# Patient Record
Sex: MALE | Race: BLACK OR AFRICAN AMERICAN | Employment: OTHER | URBAN - METROPOLITAN AREA
[De-identification: names, ages, dates, MRNs, and addresses within clinical notes are randomized per-mention and may not be internally consistent; named-entity substitution may affect disease eponyms.]

---

## 2018-04-18 ENCOUNTER — HOSPITAL ENCOUNTER (OUTPATIENT)
Age: 63
Discharge: HOME | End: 2018-04-18
Payer: MEDICARE

## 2018-04-18 LAB
ANION GAP SERPL CALCULATED.3IONS-SCNC: 11 MMOL/L (ref 7–16)
BACTERIA: NORMAL /HPF
BILIRUBIN URINE: ABNORMAL
BLOOD, URINE: ABNORMAL
BUN BLDV-MCNC: 21 MG/DL (ref 8–23)
CALCIUM SERPL-MCNC: 9.4 MG/DL (ref 8.6–10.2)
CHLORIDE BLD-SCNC: 104 MMOL/L (ref 98–107)
CLARITY: CLEAR
CO2: 24 MMOL/L (ref 22–29)
COLOR: YELLOW
CREAT SERPL-MCNC: 1.8 MG/DL (ref 0.7–1.2)
CREATININE URINE: 85 MG/DL (ref 40–278)
EPITHELIAL CELLS, UA: NORMAL /HPF
GFR AFRICAN AMERICAN: 47
GFR NON-AFRICAN AMERICAN: 47 ML/MIN/1.73
GLUCOSE BLD-MCNC: 109 MG/DL (ref 74–109)
GLUCOSE URINE: NEGATIVE MG/DL
KETONES, URINE: NEGATIVE MG/DL
LEUKOCYTE ESTERASE, URINE: NEGATIVE
NITRITE, URINE: NEGATIVE
PH UA: 5.5 (ref 5–9)
POTASSIUM SERPL-SCNC: 4.4 MMOL/L (ref 3.5–5)
PROTEIN PROTEIN: 205 MG/DL (ref 0–12)
PROTEIN UA: 100 MG/DL
PROTEIN/CREAT RATIO: 2.4
PROTEIN/CREAT RATIO: 2.4 (ref 0–0.2)
RBC UA: NORMAL /HPF (ref 0–2)
SODIUM BLD-SCNC: 139 MMOL/L (ref 132–146)
SPECIFIC GRAVITY UA: 1.02 (ref 1–1.03)
UROBILINOGEN, URINE: 0.2 E.U./DL
WBC UA: NORMAL /HPF (ref 0–5)

## 2018-04-18 PROCEDURE — 84156 ASSAY OF PROTEIN URINE: CPT

## 2018-04-18 PROCEDURE — 81001 URINALYSIS AUTO W/SCOPE: CPT

## 2018-04-18 PROCEDURE — 80048 BASIC METABOLIC PNL TOTAL CA: CPT

## 2018-04-18 PROCEDURE — 36415 COLL VENOUS BLD VENIPUNCTURE: CPT

## 2018-04-18 PROCEDURE — 82570 ASSAY OF URINE CREATININE: CPT

## 2018-05-01 ENCOUNTER — HOSPITAL ENCOUNTER (OUTPATIENT)
Age: 63
Discharge: HOME | End: 2018-05-01
Payer: MEDICARE

## 2018-05-01 LAB — PROSTATE SPECIFIC ANTIGEN: 0.86 NG/ML (ref 0–4)

## 2018-05-01 PROCEDURE — G0103 PSA SCREENING: HCPCS

## 2018-05-01 PROCEDURE — 36415 COLL VENOUS BLD VENIPUNCTURE: CPT

## 2018-05-15 ENCOUNTER — HOSPITAL ENCOUNTER (OUTPATIENT)
Age: 63
Discharge: HOME | End: 2018-05-17
Payer: MEDICARE

## 2018-05-15 ENCOUNTER — HOSPITAL ENCOUNTER (OUTPATIENT)
Dept: GENERAL RADIOLOGY | Age: 63
Discharge: HOME | End: 2018-05-17
Payer: MEDICARE

## 2018-05-15 DIAGNOSIS — R06.02 SOB (SHORTNESS OF BREATH): ICD-10-CM

## 2018-05-15 PROCEDURE — 71046 X-RAY EXAM CHEST 2 VIEWS: CPT

## 2018-05-17 ENCOUNTER — HOSPITAL ENCOUNTER (OUTPATIENT)
Dept: NON INVASIVE DIAGNOSTICS | Age: 63
Discharge: HOME | End: 2018-05-17
Payer: MEDICARE

## 2018-05-17 LAB
LV EF: 66 %
LVEF MODALITY: NORMAL

## 2018-05-17 PROCEDURE — 93306 TTE W/DOPPLER COMPLETE: CPT

## 2018-05-19 ENCOUNTER — HOSPITAL ENCOUNTER (OUTPATIENT)
Age: 63
Discharge: HOME | End: 2018-05-19
Payer: MEDICARE

## 2018-05-19 LAB
ANION GAP SERPL CALCULATED.3IONS-SCNC: 14 MMOL/L (ref 7–16)
BACTERIA: ABNORMAL /HPF
BILIRUBIN URINE: NEGATIVE
BLOOD, URINE: ABNORMAL
BUN BLDV-MCNC: 28 MG/DL (ref 8–23)
CALCIUM SERPL-MCNC: 9.2 MG/DL (ref 8.6–10.2)
CHLORIDE BLD-SCNC: 105 MMOL/L (ref 98–107)
CLARITY: CLEAR
CO2: 21 MMOL/L (ref 22–29)
COLOR: YELLOW
CREAT SERPL-MCNC: 2 MG/DL (ref 0.7–1.2)
CREATININE URINE: 145 MG/DL (ref 40–278)
EPITHELIAL CELLS, UA: ABNORMAL /HPF
GFR AFRICAN AMERICAN: 41
GFR NON-AFRICAN AMERICAN: 41 ML/MIN/1.73
GLUCOSE BLD-MCNC: 99 MG/DL (ref 74–109)
GLUCOSE URINE: NEGATIVE MG/DL
KETONES, URINE: NEGATIVE MG/DL
LEUKOCYTE ESTERASE, URINE: NEGATIVE
MICROALBUMIN UR-MCNC: 2322.7 MG/L
MICROALBUMIN/CREAT UR-RTO: 1601.9 (ref 0–30)
NITRITE, URINE: NEGATIVE
PH UA: 5.5 (ref 5–9)
POTASSIUM SERPL-SCNC: 4.6 MMOL/L (ref 3.5–5)
PROTEIN UA: >=300 MG/DL
RBC UA: ABNORMAL /HPF (ref 0–2)
SODIUM BLD-SCNC: 140 MMOL/L (ref 132–146)
SPECIFIC GRAVITY UA: >=1.03 (ref 1–1.03)
UROBILINOGEN, URINE: 0.2 E.U./DL
WBC UA: ABNORMAL /HPF (ref 0–5)

## 2018-05-19 PROCEDURE — 36415 COLL VENOUS BLD VENIPUNCTURE: CPT

## 2018-05-19 PROCEDURE — 82044 UR ALBUMIN SEMIQUANTITATIVE: CPT

## 2018-05-19 PROCEDURE — 80048 BASIC METABOLIC PNL TOTAL CA: CPT

## 2018-05-19 PROCEDURE — 82570 ASSAY OF URINE CREATININE: CPT

## 2018-05-19 PROCEDURE — 81001 URINALYSIS AUTO W/SCOPE: CPT

## 2018-08-03 ENCOUNTER — HOSPITAL ENCOUNTER (OUTPATIENT)
Age: 63
Discharge: HOME | End: 2018-08-03
Payer: MEDICARE

## 2018-08-03 ENCOUNTER — HOSPITAL ENCOUNTER (OUTPATIENT)
Dept: GENERAL RADIOLOGY | Age: 63
Discharge: HOME | End: 2018-08-05
Payer: MEDICARE

## 2018-08-03 ENCOUNTER — HOSPITAL ENCOUNTER (OUTPATIENT)
Age: 63
Discharge: HOME | End: 2018-08-05
Payer: MEDICARE

## 2018-08-03 DIAGNOSIS — R06.02 SOB (SHORTNESS OF BREATH): ICD-10-CM

## 2018-08-03 LAB
ANION GAP SERPL CALCULATED.3IONS-SCNC: 9 MMOL/L (ref 7–16)
BACTERIA: ABNORMAL /HPF
BILIRUBIN URINE: NEGATIVE
BLOOD, URINE: ABNORMAL
BUN BLDV-MCNC: 27 MG/DL (ref 8–23)
CALCIUM SERPL-MCNC: 9.5 MG/DL (ref 8.6–10.2)
CHLORIDE BLD-SCNC: 108 MMOL/L (ref 98–107)
CLARITY: CLEAR
CO2: 24 MMOL/L (ref 22–29)
COLOR: YELLOW
CREAT SERPL-MCNC: 2.2 MG/DL (ref 0.7–1.2)
CREATININE URINE: 188 MG/DL (ref 40–278)
EPITHELIAL CELLS, UA: ABNORMAL /HPF
FERRITIN: 87 NG/ML
GFR AFRICAN AMERICAN: 37
GFR NON-AFRICAN AMERICAN: 37 ML/MIN/1.73
GLUCOSE BLD-MCNC: 144 MG/DL (ref 74–109)
GLUCOSE URINE: NEGATIVE MG/DL
HCT VFR BLD CALC: 37.6 % (ref 37–54)
HEMOGLOBIN: 12 G/DL (ref 12.5–16.5)
IRON SATURATION: 29 % (ref 20–55)
IRON: 77 MCG/DL (ref 59–158)
KETONES, URINE: NEGATIVE MG/DL
LEUKOCYTE ESTERASE, URINE: NEGATIVE
MCH RBC QN AUTO: 26.2 PG (ref 26–35)
MCHC RBC AUTO-ENTMCNC: 31.9 % (ref 32–34.5)
MCV RBC AUTO: 82.1 FL (ref 80–99.9)
NITRITE, URINE: NEGATIVE
PARATHYROID HORMONE INTACT: 70 PG/ML (ref 15–65)
PDW BLD-RTO: 15.3 FL (ref 11.5–15)
PH UA: 5.5 (ref 5–9)
PHOSPHORUS: 3.4 MG/DL (ref 2.5–4.5)
PLATELET # BLD: 273 E9/L (ref 130–450)
PMV BLD AUTO: 10.5 FL (ref 7–12)
POTASSIUM SERPL-SCNC: 4.8 MMOL/L (ref 3.5–5)
PROTEIN PROTEIN: 345 MG/DL (ref 0–12)
PROTEIN UA: >=300 MG/DL
PROTEIN/CREAT RATIO: 1.8
PROTEIN/CREAT RATIO: 1.8 (ref 0–0.2)
RBC # BLD: 4.58 E12/L (ref 3.8–5.8)
RBC UA: ABNORMAL /HPF (ref 0–2)
SODIUM BLD-SCNC: 141 MMOL/L (ref 132–146)
SPECIFIC GRAVITY UA: 1.02 (ref 1–1.03)
TOTAL IRON BINDING CAPACITY: 263 MCG/DL (ref 250–450)
UROBILINOGEN, URINE: 0.2 E.U./DL
VITAMIN D 25-HYDROXY: 14 NG/ML (ref 30–100)
WBC # BLD: 5.6 E9/L (ref 4.5–11.5)
WBC UA: ABNORMAL /HPF (ref 0–5)

## 2018-08-03 PROCEDURE — 82570 ASSAY OF URINE CREATININE: CPT

## 2018-08-03 PROCEDURE — 82306 VITAMIN D 25 HYDROXY: CPT

## 2018-08-03 PROCEDURE — 82728 ASSAY OF FERRITIN: CPT

## 2018-08-03 PROCEDURE — 81001 URINALYSIS AUTO W/SCOPE: CPT

## 2018-08-03 PROCEDURE — 84156 ASSAY OF PROTEIN URINE: CPT

## 2018-08-03 PROCEDURE — 80048 BASIC METABOLIC PNL TOTAL CA: CPT

## 2018-08-03 PROCEDURE — 83550 IRON BINDING TEST: CPT

## 2018-08-03 PROCEDURE — 83970 ASSAY OF PARATHORMONE: CPT

## 2018-08-03 PROCEDURE — 36415 COLL VENOUS BLD VENIPUNCTURE: CPT

## 2018-08-03 PROCEDURE — 83540 ASSAY OF IRON: CPT

## 2018-08-03 PROCEDURE — 71046 X-RAY EXAM CHEST 2 VIEWS: CPT

## 2018-08-03 PROCEDURE — 84100 ASSAY OF PHOSPHORUS: CPT

## 2018-08-03 PROCEDURE — 85027 COMPLETE CBC AUTOMATED: CPT

## 2018-09-21 ENCOUNTER — HOSPITAL ENCOUNTER (INPATIENT)
Age: 63
LOS: 1 days | Discharge: HOME | DRG: 439 | End: 2018-09-24
Attending: EMERGENCY MEDICINE | Admitting: FAMILY MEDICINE
Payer: MEDICARE

## 2018-09-21 ENCOUNTER — APPOINTMENT (OUTPATIENT)
Dept: ULTRASOUND IMAGING | Age: 63
DRG: 439 | End: 2018-09-21
Payer: MEDICARE

## 2018-09-21 ENCOUNTER — APPOINTMENT (OUTPATIENT)
Dept: CT IMAGING | Age: 63
DRG: 439 | End: 2018-09-21
Payer: MEDICARE

## 2018-09-21 DIAGNOSIS — R10.13 ABDOMINAL PAIN, EPIGASTRIC: ICD-10-CM

## 2018-09-21 DIAGNOSIS — K85.90 ACUTE PANCREATITIS WITHOUT INFECTION OR NECROSIS, UNSPECIFIED PANCREATITIS TYPE: Primary | ICD-10-CM

## 2018-09-21 PROBLEM — K85.00 IDIOPATHIC ACUTE PANCREATITIS: Status: ACTIVE | Noted: 2018-09-21

## 2018-09-21 LAB
ALBUMIN SERPL-MCNC: 3.6 G/DL (ref 3.5–5.2)
ALP BLD-CCNC: 55 U/L (ref 40–129)
ALT SERPL-CCNC: 6 U/L (ref 0–40)
ANION GAP SERPL CALCULATED.3IONS-SCNC: 14 MMOL/L (ref 7–16)
AST SERPL-CCNC: 13 U/L (ref 0–39)
BACTERIA: ABNORMAL /HPF
BASOPHILS ABSOLUTE: 0.01 E9/L (ref 0–0.2)
BASOPHILS RELATIVE PERCENT: 0.1 % (ref 0–2)
BILIRUB SERPL-MCNC: 0.6 MG/DL (ref 0–1.2)
BILIRUBIN URINE: ABNORMAL
BLOOD, URINE: ABNORMAL
BUN BLDV-MCNC: 31 MG/DL (ref 8–23)
CALCIUM SERPL-MCNC: 9.4 MG/DL (ref 8.6–10.2)
CASTS: ABNORMAL /LPF
CHLORIDE BLD-SCNC: 104 MMOL/L (ref 98–107)
CLARITY: CLEAR
CO2: 23 MMOL/L (ref 22–29)
COLOR: YELLOW
CREAT SERPL-MCNC: 2.3 MG/DL (ref 0.7–1.2)
EKG ATRIAL RATE: 63 BPM
EKG P AXIS: 48 DEGREES
EKG P-R INTERVAL: 136 MS
EKG Q-T INTERVAL: 386 MS
EKG QRS DURATION: 98 MS
EKG QTC CALCULATION (BAZETT): 395 MS
EKG R AXIS: 59 DEGREES
EKG T AXIS: 56 DEGREES
EKG VENTRICULAR RATE: 63 BPM
EOSINOPHILS ABSOLUTE: 0.03 E9/L (ref 0.05–0.5)
EOSINOPHILS RELATIVE PERCENT: 0.4 % (ref 0–6)
EPITHELIAL CELLS, UA: ABNORMAL /HPF
GFR AFRICAN AMERICAN: 35
GFR NON-AFRICAN AMERICAN: 35 ML/MIN/1.73
GLUCOSE BLD-MCNC: 117 MG/DL (ref 74–109)
GLUCOSE URINE: NEGATIVE MG/DL
HCT VFR BLD CALC: 37.4 % (ref 37–54)
HEMOGLOBIN: 12 G/DL (ref 12.5–16.5)
IMMATURE GRANULOCYTES #: 0.02 E9/L
IMMATURE GRANULOCYTES %: 0.3 % (ref 0–5)
KETONES, URINE: ABNORMAL MG/DL
LACTIC ACID: 0.8 MMOL/L (ref 0.5–2.2)
LEUKOCYTE ESTERASE, URINE: NEGATIVE
LIPASE: 151 U/L (ref 13–60)
LYMPHOCYTES ABSOLUTE: 1.38 E9/L (ref 1.5–4)
LYMPHOCYTES RELATIVE PERCENT: 20.6 % (ref 20–42)
MCH RBC QN AUTO: 26.6 PG (ref 26–35)
MCHC RBC AUTO-ENTMCNC: 32.1 % (ref 32–34.5)
MCV RBC AUTO: 82.9 FL (ref 80–99.9)
MONOCYTES ABSOLUTE: 0.88 E9/L (ref 0.1–0.95)
MONOCYTES RELATIVE PERCENT: 13.1 % (ref 2–12)
NEUTROPHILS ABSOLUTE: 4.38 E9/L (ref 1.8–7.3)
NEUTROPHILS RELATIVE PERCENT: 65.5 % (ref 43–80)
NITRITE, URINE: NEGATIVE
PDW BLD-RTO: 15.9 FL (ref 11.5–15)
PH UA: 5.5 (ref 5–9)
PLATELET # BLD: 261 E9/L (ref 130–450)
PMV BLD AUTO: 10.4 FL (ref 7–12)
POTASSIUM SERPL-SCNC: 4.3 MMOL/L (ref 3.5–5)
PROTEIN UA: >=300 MG/DL
RBC # BLD: 4.51 E12/L (ref 3.8–5.8)
RBC UA: ABNORMAL /HPF (ref 0–2)
SODIUM BLD-SCNC: 141 MMOL/L (ref 132–146)
SPECIFIC GRAVITY UA: >=1.03 (ref 1–1.03)
TOTAL PROTEIN: 7.9 G/DL (ref 6.4–8.3)
TROPONIN: <0.01 NG/ML (ref 0–0.03)
UROBILINOGEN, URINE: 1 E.U./DL
WBC # BLD: 6.7 E9/L (ref 4.5–11.5)
WBC UA: ABNORMAL /HPF (ref 0–5)

## 2018-09-21 PROCEDURE — 36415 COLL VENOUS BLD VENIPUNCTURE: CPT

## 2018-09-21 PROCEDURE — 83690 ASSAY OF LIPASE: CPT

## 2018-09-21 PROCEDURE — G0378 HOSPITAL OBSERVATION PER HR: HCPCS

## 2018-09-21 PROCEDURE — 85025 COMPLETE CBC W/AUTO DIFF WBC: CPT

## 2018-09-21 PROCEDURE — 81001 URINALYSIS AUTO W/SCOPE: CPT

## 2018-09-21 PROCEDURE — 6370000000 HC RX 637 (ALT 250 FOR IP): Performed by: PHYSICIAN ASSISTANT

## 2018-09-21 PROCEDURE — 6360000002 HC RX W HCPCS: Performed by: FAMILY MEDICINE

## 2018-09-21 PROCEDURE — 74176 CT ABD & PELVIS W/O CONTRAST: CPT

## 2018-09-21 PROCEDURE — 80053 COMPREHEN METABOLIC PANEL: CPT

## 2018-09-21 PROCEDURE — 84484 ASSAY OF TROPONIN QUANT: CPT

## 2018-09-21 PROCEDURE — C9113 INJ PANTOPRAZOLE SODIUM, VIA: HCPCS | Performed by: PHYSICIAN ASSISTANT

## 2018-09-21 PROCEDURE — 96374 THER/PROPH/DIAG INJ IV PUSH: CPT

## 2018-09-21 PROCEDURE — 83605 ASSAY OF LACTIC ACID: CPT

## 2018-09-21 PROCEDURE — 76705 ECHO EXAM OF ABDOMEN: CPT

## 2018-09-21 PROCEDURE — 6360000002 HC RX W HCPCS: Performed by: PHYSICIAN ASSISTANT

## 2018-09-21 PROCEDURE — 2580000003 HC RX 258: Performed by: FAMILY MEDICINE

## 2018-09-21 PROCEDURE — 99285 EMERGENCY DEPT VISIT HI MDM: CPT

## 2018-09-21 PROCEDURE — 2580000003 HC RX 258: Performed by: PHYSICIAN ASSISTANT

## 2018-09-21 RX ORDER — PANTOPRAZOLE SODIUM 40 MG/10ML
40 INJECTION, POWDER, LYOPHILIZED, FOR SOLUTION INTRAVENOUS ONCE
Status: COMPLETED | OUTPATIENT
Start: 2018-09-21 | End: 2018-09-21

## 2018-09-21 RX ORDER — PROMETHAZINE HYDROCHLORIDE 25 MG/ML
25 INJECTION, SOLUTION INTRAMUSCULAR; INTRAVENOUS EVERY 4 HOURS PRN
Status: DISCONTINUED | OUTPATIENT
Start: 2018-09-21 | End: 2018-09-24 | Stop reason: HOSPADM

## 2018-09-21 RX ORDER — FUROSEMIDE 40 MG/1
40 TABLET ORAL DAILY
COMMUNITY
End: 2019-09-11 | Stop reason: ALTCHOICE

## 2018-09-21 RX ORDER — LISINOPRIL 20 MG/1
20 TABLET ORAL DAILY
Status: ON HOLD | COMMUNITY
End: 2020-02-13 | Stop reason: HOSPADM

## 2018-09-21 RX ORDER — SODIUM PHOSPHATE,MONO-DIBASIC 19G-7G/118
1 ENEMA (ML) RECTAL DAILY PRN
Status: DISCONTINUED | OUTPATIENT
Start: 2018-09-21 | End: 2018-09-24 | Stop reason: HOSPADM

## 2018-09-21 RX ORDER — GLIMEPIRIDE 4 MG/1
4 TABLET ORAL 2 TIMES DAILY WITH MEALS
COMMUNITY
End: 2019-09-11

## 2018-09-21 RX ORDER — GLUCOSAMINE HCL 500 MG
150 TABLET ORAL DAILY
COMMUNITY
End: 2021-06-28 | Stop reason: ALTCHOICE

## 2018-09-21 RX ORDER — SODIUM CHLORIDE 450 MG/100ML
INJECTION, SOLUTION INTRAVENOUS CONTINUOUS
Status: DISCONTINUED | OUTPATIENT
Start: 2018-09-21 | End: 2018-09-23

## 2018-09-21 RX ORDER — 0.9 % SODIUM CHLORIDE 0.9 %
1000 INTRAVENOUS SOLUTION INTRAVENOUS ONCE
Status: COMPLETED | OUTPATIENT
Start: 2018-09-21 | End: 2018-09-21

## 2018-09-21 RX ORDER — AMLODIPINE BESYLATE 10 MG/1
5 TABLET ORAL 2 TIMES DAILY
Status: ON HOLD | COMMUNITY
End: 2021-07-25 | Stop reason: SDUPTHER

## 2018-09-21 RX ORDER — BISACODYL 10 MG
10 SUPPOSITORY, RECTAL RECTAL DAILY PRN
Status: DISCONTINUED | OUTPATIENT
Start: 2018-09-21 | End: 2018-09-24 | Stop reason: HOSPADM

## 2018-09-21 RX ORDER — MORPHINE SULFATE 10 MG/ML
5 INJECTION, SOLUTION INTRAMUSCULAR; INTRAVENOUS
Status: DISCONTINUED | OUTPATIENT
Start: 2018-09-21 | End: 2018-09-24 | Stop reason: HOSPADM

## 2018-09-21 RX ADMIN — MORPHINE SULFATE 5 MG: 10 INJECTION INTRAVENOUS at 20:00

## 2018-09-21 RX ADMIN — LIDOCAINE HYDROCHLORIDE: 20 SOLUTION ORAL; TOPICAL at 11:03

## 2018-09-21 RX ADMIN — PANTOPRAZOLE SODIUM 40 MG: 40 INJECTION, POWDER, FOR SOLUTION INTRAVENOUS at 11:20

## 2018-09-21 RX ADMIN — SODIUM CHLORIDE: 4.5 INJECTION, SOLUTION INTRAVENOUS at 20:28

## 2018-09-21 RX ADMIN — SODIUM CHLORIDE 1000 ML: 9 INJECTION, SOLUTION INTRAVENOUS at 11:03

## 2018-09-21 ASSESSMENT — PAIN DESCRIPTION - FREQUENCY
FREQUENCY: CONTINUOUS

## 2018-09-21 ASSESSMENT — PAIN DESCRIPTION - ORIENTATION
ORIENTATION: MID
ORIENTATION: UPPER

## 2018-09-21 ASSESSMENT — PAIN DESCRIPTION - PROGRESSION: CLINICAL_PROGRESSION: NOT CHANGED

## 2018-09-21 ASSESSMENT — PAIN SCALES - GENERAL
PAINLEVEL_OUTOF10: 9

## 2018-09-21 ASSESSMENT — PAIN DESCRIPTION - PAIN TYPE
TYPE: ACUTE PAIN

## 2018-09-21 ASSESSMENT — PAIN DESCRIPTION - LOCATION
LOCATION: ABDOMEN

## 2018-09-21 ASSESSMENT — PAIN DESCRIPTION - ONSET: ONSET: ON-GOING

## 2018-09-21 ASSESSMENT — PAIN DESCRIPTION - DESCRIPTORS
DESCRIPTORS: SHOOTING;SHARP;DISCOMFORT
DESCRIPTORS: SHARP;DISCOMFORT;CONSTANT
DESCRIPTORS: SHARP;DISCOMFORT

## 2018-09-21 NOTE — ED PROVIDER NOTES
ED Attending  CC: No    HPI:  9/21/18,   Time: 11:12 AM         Minoo Meraz is a 63 y.o. male presenting to the ED for epigastric and upper abdominal pain, beginning 4 days ago. The complaint has been persistent, moderate in severity, and worsened by eating and laying down. Patient presents today with epigastric and upper abdominal pain. He reports that it started on Monday and has been constant. He describes this as a sharp pain that does not radiate. It is worse when he lays down and tries to eat. He denies any nausea or vomiting. He reports that he hasn't moved his bowels much at all since Monday. Denies black or bloody stools. States he isn't even passing any gas. He denies urinary burning or frequency. No reports of fever or chills. The patient tried some Gas-X with minimal relief reported. He states that he has had a history of acid reflux but is currently off his meds because he's not recently been symptomatic. He has never had any prior abdominal surgery. He states that couple years ago he had similar symptoms and was told that he had inflammation of his pancreas. Patient denies any alcohol use.  He is a smoker      ROS:     Constitutional: Negative for fever and chills  HENT: Negative for ear pain, sore throat and sinus pressure  Eyes: Negative for pain, discharge and redness  Respiratory:  Negative for shortness of breath, cough and wheezing  Cardiovascular: Negative for CP, edema or palpitations  Gastrointestinal: See HPI  Genitourinary: Negative for dysuria and frequency  Musculoskeletal: Negative for back pain and arthralgia  Skin: Negative for rash and wound  Neurological: Negative for weakness and headaches  Hematological: Negative for adenopathy    All other systems reviewed and are negative      --------------------------------------------- PAST HISTORY ---------------------------------------------  Past Medical History:  has a past medical history of Back pain; [facility] DMII mmol/L    CO2 23 22 - 29 mmol/L    Anion Gap 14 7 - 16 mmol/L    Glucose 117 (H) 74 - 109 mg/dL    BUN 31 (H) 8 - 23 mg/dL    CREATININE 2.3 (H) 0.7 - 1.2 mg/dL    GFR Non-African American 35 >=60 mL/min/1.73    GFR African American 35     Calcium 9.4 8.6 - 10.2 mg/dL    Total Protein 7.9 6.4 - 8.3 g/dL    Alb 3.6 3.5 - 5.2 g/dL    Total Bilirubin 0.6 0.0 - 1.2 mg/dL    Alkaline Phosphatase 55 40 - 129 U/L    ALT 6 0 - 40 U/L    AST 13 0 - 39 U/L   Troponin   Result Value Ref Range    Troponin <0.01 0.00 - 0.03 ng/mL   Lipase   Result Value Ref Range    Lipase 151 (H) 13 - 60 U/L   Lactic Acid, Plasma   Result Value Ref Range    Lactic Acid 0.8 0.5 - 2.2 mmol/L   Urinalysis   Result Value Ref Range    Color, UA Yellow Straw/Yellow    Clarity, UA Clear Clear    Glucose, Ur Negative Negative mg/dL    Bilirubin Urine MODERATE (A) Negative    Ketones, Urine TRACE (A) Negative mg/dL    Specific Gravity, UA >=1.030 1.005 - 1.030    Blood, Urine SMALL (A) Negative    pH, UA 5.5 5.0 - 9.0    Protein, UA >=300 (A) Negative mg/dL    Urobilinogen, Urine 1.0 <2.0 E.U./dL    Nitrite, Urine Negative Negative    Leukocyte Esterase, Urine Negative Negative   Microscopic Urinalysis   Result Value Ref Range    Casts RARE /LPF    WBC, UA 0-1 0 - 5 /HPF    RBC, UA 1-3 0 - 2 /HPF    Epi Cells NONE /HPF    Bacteria, UA FEW (A) /HPF       RADIOLOGY:  Interpreted by Radiologist.  CT ABDOMEN PELVIS WO CONTRAST Additional Contrast? None   Final Result   Findings suggesting pancreatitis poorly assessed on a noncontrast   study. US GALLBLADDER RUQ   Final Result   1. Unremarkable right upper quadrant ultrasound.                ------------------------- NURSING NOTES AND VITALS REVIEWED ---------------------------   The nursing notes within the ED encounter and vital signs as below have been reviewed.    BP (!) 162/58   Pulse 80   Temp 98.3 °F (36.8 °C) (Oral)   Resp 20   Ht 5' 7\" (1.702 m)   Wt 196 lb 4 oz (89 kg)   SpO2 96%   BMI plan.      --------------------------------- IMPRESSION AND DISPOSITION ---------------------------------    IMPRESSION  1. Acute pancreatitis without infection or necrosis, unspecified pancreatitis type    2.  Abdominal pain, epigastric        DISPOSITION  Disposition: Observation to med/surg floor  Patient condition is stable                   Theresa Amaya PA-C  09/21/18 [id number]

## 2018-09-21 NOTE — ED NOTES
Patient requesting food to eat; Tashi Pérez aware; box lunch given to patient.      Elissa Medina RN  09/21/18 [id number]

## 2018-09-22 LAB
ALBUMIN SERPL-MCNC: 3.2 G/DL (ref 3.5–5.2)
ALP BLD-CCNC: 52 U/L (ref 40–129)
ALT SERPL-CCNC: 7 U/L (ref 0–40)
AMYLASE: 110 U/L (ref 20–100)
ANION GAP SERPL CALCULATED.3IONS-SCNC: 8 MMOL/L (ref 7–16)
AST SERPL-CCNC: 11 U/L (ref 0–39)
BILIRUB SERPL-MCNC: 0.5 MG/DL (ref 0–1.2)
BUN BLDV-MCNC: 23 MG/DL (ref 8–23)
CALCIUM SERPL-MCNC: 9.1 MG/DL (ref 8.6–10.2)
CHLORIDE BLD-SCNC: 108 MMOL/L (ref 98–107)
CO2: 26 MMOL/L (ref 22–29)
CREAT SERPL-MCNC: 1.9 MG/DL (ref 0.7–1.2)
GFR AFRICAN AMERICAN: 44
GFR NON-AFRICAN AMERICAN: 44 ML/MIN/1.73
GLUCOSE BLD-MCNC: 103 MG/DL (ref 74–109)
HCT VFR BLD CALC: 35.5 % (ref 37–54)
HEMOGLOBIN: 11.1 G/DL (ref 12.5–16.5)
LIPASE: 146 U/L (ref 13–60)
MCH RBC QN AUTO: 26.2 PG (ref 26–35)
MCHC RBC AUTO-ENTMCNC: 31.3 % (ref 32–34.5)
MCV RBC AUTO: 83.9 FL (ref 80–99.9)
METER GLUCOSE: 143 MG/DL (ref 70–110)
METER GLUCOSE: 53 MG/DL (ref 70–110)
PDW BLD-RTO: 15.9 FL (ref 11.5–15)
PLATELET # BLD: 222 E9/L (ref 130–450)
PMV BLD AUTO: 9.5 FL (ref 7–12)
POTASSIUM SERPL-SCNC: 4.4 MMOL/L (ref 3.5–5)
RBC # BLD: 4.23 E12/L (ref 3.8–5.8)
SODIUM BLD-SCNC: 142 MMOL/L (ref 132–146)
TOTAL PROTEIN: 7.2 G/DL (ref 6.4–8.3)
TSH SERPL DL<=0.05 MIU/L-ACNC: 0.34 UIU/ML (ref 0.27–4.2)
WBC # BLD: 6.1 E9/L (ref 4.5–11.5)

## 2018-09-22 PROCEDURE — 84443 ASSAY THYROID STIM HORMONE: CPT

## 2018-09-22 PROCEDURE — 85027 COMPLETE CBC AUTOMATED: CPT

## 2018-09-22 PROCEDURE — 2580000003 HC RX 258: Performed by: FAMILY MEDICINE

## 2018-09-22 PROCEDURE — 36415 COLL VENOUS BLD VENIPUNCTURE: CPT

## 2018-09-22 PROCEDURE — 82150 ASSAY OF AMYLASE: CPT

## 2018-09-22 PROCEDURE — C9113 INJ PANTOPRAZOLE SODIUM, VIA: HCPCS | Performed by: FAMILY MEDICINE

## 2018-09-22 PROCEDURE — 80053 COMPREHEN METABOLIC PANEL: CPT

## 2018-09-22 PROCEDURE — 96375 TX/PRO/DX INJ NEW DRUG ADDON: CPT

## 2018-09-22 PROCEDURE — 6370000000 HC RX 637 (ALT 250 FOR IP): Performed by: FAMILY MEDICINE

## 2018-09-22 PROCEDURE — 96376 TX/PRO/DX INJ SAME DRUG ADON: CPT

## 2018-09-22 PROCEDURE — 82962 GLUCOSE BLOOD TEST: CPT

## 2018-09-22 PROCEDURE — 6360000002 HC RX W HCPCS: Performed by: FAMILY MEDICINE

## 2018-09-22 PROCEDURE — G0378 HOSPITAL OBSERVATION PER HR: HCPCS

## 2018-09-22 PROCEDURE — 83690 ASSAY OF LIPASE: CPT

## 2018-09-22 RX ORDER — 0.9 % SODIUM CHLORIDE 0.9 %
10 VIAL (ML) INJECTION 2 TIMES DAILY
Status: DISCONTINUED | OUTPATIENT
Start: 2018-09-22 | End: 2018-09-24 | Stop reason: HOSPADM

## 2018-09-22 RX ORDER — DEXTROSE MONOHYDRATE 25 G/50ML
25 INJECTION, SOLUTION INTRAVENOUS PRN
Status: DISCONTINUED | OUTPATIENT
Start: 2018-09-22 | End: 2018-09-24 | Stop reason: HOSPADM

## 2018-09-22 RX ORDER — PANTOPRAZOLE SODIUM 40 MG/10ML
40 INJECTION, POWDER, LYOPHILIZED, FOR SOLUTION INTRAVENOUS 2 TIMES DAILY
Status: DISCONTINUED | OUTPATIENT
Start: 2018-09-22 | End: 2018-09-24 | Stop reason: HOSPADM

## 2018-09-22 RX ADMIN — MORPHINE SULFATE 5 MG: 10 INJECTION INTRAVENOUS at 00:36

## 2018-09-22 RX ADMIN — BISACODYL 5 MG: 5 TABLET, COATED ORAL at 17:56

## 2018-09-22 RX ADMIN — PANTOPRAZOLE SODIUM 40 MG: 40 INJECTION, POWDER, FOR SOLUTION INTRAVENOUS at 08:22

## 2018-09-22 RX ADMIN — Medication 10 ML: at 08:22

## 2018-09-22 RX ADMIN — MORPHINE SULFATE 5 MG: 10 INJECTION INTRAVENOUS at 13:07

## 2018-09-22 RX ADMIN — SODIUM CHLORIDE: 4.5 INJECTION, SOLUTION INTRAVENOUS at 15:09

## 2018-09-22 RX ADMIN — MORPHINE SULFATE 5 MG: 10 INJECTION INTRAVENOUS at 19:51

## 2018-09-22 RX ADMIN — DEXTROSE MONOHYDRATE 25 G: 25 INJECTION, SOLUTION INTRAVENOUS at 15:17

## 2018-09-22 RX ADMIN — PANTOPRAZOLE SODIUM 40 MG: 40 INJECTION, POWDER, FOR SOLUTION INTRAVENOUS at 20:30

## 2018-09-22 RX ADMIN — SODIUM CHLORIDE: 4.5 INJECTION, SOLUTION INTRAVENOUS at 19:50

## 2018-09-22 RX ADMIN — MORPHINE SULFATE 5 MG: 10 INJECTION INTRAVENOUS at 08:21

## 2018-09-22 RX ADMIN — Medication 10 ML: at 20:30

## 2018-09-22 ASSESSMENT — PAIN SCALES - GENERAL
PAINLEVEL_OUTOF10: 0
PAINLEVEL_OUTOF10: 1
PAINLEVEL_OUTOF10: 6
PAINLEVEL_OUTOF10: 7
PAINLEVEL_OUTOF10: 5
PAINLEVEL_OUTOF10: 1
PAINLEVEL_OUTOF10: 5
PAINLEVEL_OUTOF10: 6

## 2018-09-22 ASSESSMENT — ENCOUNTER SYMPTOMS
SHORTNESS OF BREATH: 0
VOMITING: 0
DIARRHEA: 0
NAUSEA: 0
COUGH: 0

## 2018-09-22 ASSESSMENT — PAIN DESCRIPTION - PAIN TYPE
TYPE: ACUTE PAIN
TYPE: ACUTE PAIN

## 2018-09-22 ASSESSMENT — PAIN DESCRIPTION - ORIENTATION
ORIENTATION: RIGHT;LEFT;UPPER
ORIENTATION: RIGHT;LEFT

## 2018-09-22 ASSESSMENT — PAIN DESCRIPTION - LOCATION
LOCATION: ABDOMEN
LOCATION: ABDOMEN

## 2018-09-22 ASSESSMENT — PAIN DESCRIPTION - ONSET: ONSET: ON-GOING

## 2018-09-22 ASSESSMENT — PAIN DESCRIPTION - FREQUENCY: FREQUENCY: CONTINUOUS

## 2018-09-22 ASSESSMENT — PAIN DESCRIPTION - DESCRIPTORS
DESCRIPTORS: CRAMPING;SHARP;SHOOTING
DESCRIPTORS: SHOOTING;STABBING

## 2018-09-22 NOTE — H&P
three days.         Luis Alberto Frey MD    D: 09/22/2018 2:27:52       T: 09/22/2018 5:24:25     [email or web address]  Job#: [id number]     Doc#: [id number]    CC:

## 2018-09-22 NOTE — PLAN OF CARE
Problem: Pain:  Goal: Pain level will decrease  Pain level will decrease   Outcome: Ongoing      Problem: Falls - Risk of:  Goal: Will remain free from falls  Will remain free from falls   Outcome: Not Met This Shift

## 2018-09-23 PROBLEM — K85.90 ACUTE PANCREATITIS WITHOUT NECROSIS OR INFECTION, UNSPECIFIED: Status: ACTIVE | Noted: 2018-09-23

## 2018-09-23 LAB
AMYLASE: 69 U/L (ref 20–100)
ANION GAP SERPL CALCULATED.3IONS-SCNC: 11 MMOL/L (ref 7–16)
BUN BLDV-MCNC: 18 MG/DL (ref 8–23)
CALCIUM SERPL-MCNC: 8.7 MG/DL (ref 8.6–10.2)
CHLORIDE BLD-SCNC: 108 MMOL/L (ref 98–107)
CO2: 22 MMOL/L (ref 22–29)
CREAT SERPL-MCNC: 1.6 MG/DL (ref 0.7–1.2)
GFR AFRICAN AMERICAN: 53
GFR NON-AFRICAN AMERICAN: 53 ML/MIN/1.73
GLUCOSE BLD-MCNC: 150 MG/DL (ref 74–109)
LIPASE: 86 U/L (ref 13–60)
METER GLUCOSE: 112 MG/DL (ref 70–110)
METER GLUCOSE: 202 MG/DL (ref 70–110)
METER GLUCOSE: 247 MG/DL (ref 70–110)
POTASSIUM SERPL-SCNC: 4.4 MMOL/L (ref 3.5–5)
SODIUM BLD-SCNC: 141 MMOL/L (ref 132–146)

## 2018-09-23 PROCEDURE — 36415 COLL VENOUS BLD VENIPUNCTURE: CPT

## 2018-09-23 PROCEDURE — 80048 BASIC METABOLIC PNL TOTAL CA: CPT

## 2018-09-23 PROCEDURE — 6360000002 HC RX W HCPCS: Performed by: FAMILY MEDICINE

## 2018-09-23 PROCEDURE — 2580000003 HC RX 258: Performed by: STUDENT IN AN ORGANIZED HEALTH CARE EDUCATION/TRAINING PROGRAM

## 2018-09-23 PROCEDURE — 83690 ASSAY OF LIPASE: CPT

## 2018-09-23 PROCEDURE — 2580000003 HC RX 258: Performed by: FAMILY MEDICINE

## 2018-09-23 PROCEDURE — 82150 ASSAY OF AMYLASE: CPT

## 2018-09-23 PROCEDURE — 82962 GLUCOSE BLOOD TEST: CPT

## 2018-09-23 PROCEDURE — 6370000000 HC RX 637 (ALT 250 FOR IP): Performed by: FAMILY MEDICINE

## 2018-09-23 PROCEDURE — C9113 INJ PANTOPRAZOLE SODIUM, VIA: HCPCS | Performed by: FAMILY MEDICINE

## 2018-09-23 PROCEDURE — 82787 IGG 1 2 3 OR 4 EACH: CPT

## 2018-09-23 PROCEDURE — 96376 TX/PRO/DX INJ SAME DRUG ADON: CPT

## 2018-09-23 PROCEDURE — 1200000000 HC SEMI PRIVATE

## 2018-09-23 RX ORDER — AMLODIPINE BESYLATE 10 MG/1
10 TABLET ORAL DAILY
Status: DISCONTINUED | OUTPATIENT
Start: 2018-09-23 | End: 2018-09-24 | Stop reason: HOSPADM

## 2018-09-23 RX ORDER — LISINOPRIL 20 MG/1
20 TABLET ORAL DAILY
Status: DISCONTINUED | OUTPATIENT
Start: 2018-09-23 | End: 2018-09-24 | Stop reason: HOSPADM

## 2018-09-23 RX ORDER — LISINOPRIL 10 MG/1
10 TABLET ORAL DAILY
Status: DISCONTINUED | OUTPATIENT
Start: 2018-09-23 | End: 2018-09-23

## 2018-09-23 RX ORDER — AMLODIPINE BESYLATE 10 MG/1
10 TABLET ORAL DAILY
Status: DISCONTINUED | OUTPATIENT
Start: 2018-09-23 | End: 2018-09-23 | Stop reason: SDUPTHER

## 2018-09-23 RX ORDER — SODIUM CHLORIDE 9 MG/ML
INJECTION, SOLUTION INTRAVENOUS CONTINUOUS
Status: DISCONTINUED | OUTPATIENT
Start: 2018-09-23 | End: 2018-09-24 | Stop reason: HOSPADM

## 2018-09-23 RX ORDER — GLIMEPIRIDE 4 MG/1
4 TABLET ORAL 2 TIMES DAILY WITH MEALS
Status: DISCONTINUED | OUTPATIENT
Start: 2018-09-23 | End: 2018-09-24 | Stop reason: HOSPADM

## 2018-09-23 RX ORDER — LACTULOSE 10 G/15ML
20 SOLUTION ORAL 3 TIMES DAILY
Status: DISCONTINUED | OUTPATIENT
Start: 2018-09-23 | End: 2018-09-24 | Stop reason: HOSPADM

## 2018-09-23 RX ORDER — LISINOPRIL 20 MG/1
20 TABLET ORAL DAILY
Status: DISCONTINUED | OUTPATIENT
Start: 2018-09-23 | End: 2018-09-23 | Stop reason: SDUPTHER

## 2018-09-23 RX ADMIN — MORPHINE SULFATE 5 MG: 10 INJECTION INTRAVENOUS at 21:17

## 2018-09-23 RX ADMIN — MORPHINE SULFATE 5 MG: 10 INJECTION INTRAVENOUS at 13:47

## 2018-09-23 RX ADMIN — LISINOPRIL 20 MG: 20 TABLET ORAL at 09:17

## 2018-09-23 RX ADMIN — MAGNESIUM HYDROXIDE 30 ML: 400 SUSPENSION ORAL at 21:02

## 2018-09-23 RX ADMIN — Medication 10 ML: at 09:19

## 2018-09-23 RX ADMIN — GLIMEPIRIDE 4 MG: 4 TABLET ORAL at 21:02

## 2018-09-23 RX ADMIN — MORPHINE SULFATE 5 MG: 10 INJECTION INTRAVENOUS at 00:12

## 2018-09-23 RX ADMIN — BISACODYL 5 MG: 5 TABLET, COATED ORAL at 09:30

## 2018-09-23 RX ADMIN — VITAMIN D, TAB 1000IU (100/BT) 3000 UNITS: 25 TAB at 21:02

## 2018-09-23 RX ADMIN — Medication 10 ML: at 21:02

## 2018-09-23 RX ADMIN — SODIUM CHLORIDE: 9 INJECTION, SOLUTION INTRAVENOUS at 21:09

## 2018-09-23 RX ADMIN — SODIUM CHLORIDE: 9 INJECTION, SOLUTION INTRAVENOUS at 08:04

## 2018-09-23 RX ADMIN — AMLODIPINE BESYLATE 10 MG: 10 TABLET ORAL at 09:17

## 2018-09-23 RX ADMIN — MORPHINE SULFATE 5 MG: 10 INJECTION INTRAVENOUS at 09:30

## 2018-09-23 RX ADMIN — PANTOPRAZOLE SODIUM 40 MG: 40 INJECTION, POWDER, FOR SOLUTION INTRAVENOUS at 09:17

## 2018-09-23 RX ADMIN — SODIUM CHLORIDE: 9 INJECTION, SOLUTION INTRAVENOUS at 14:48

## 2018-09-23 RX ADMIN — SODIUM CHLORIDE: 4.5 INJECTION, SOLUTION INTRAVENOUS at 03:46

## 2018-09-23 RX ADMIN — MORPHINE SULFATE 5 MG: 10 INJECTION INTRAVENOUS at 03:46

## 2018-09-23 RX ADMIN — PANTOPRAZOLE SODIUM 40 MG: 40 INJECTION, POWDER, FOR SOLUTION INTRAVENOUS at 21:02

## 2018-09-23 ASSESSMENT — PAIN DESCRIPTION - DESCRIPTORS
DESCRIPTORS: ACHING;SHARP
DESCRIPTORS: ACHING;DISCOMFORT;DULL
DESCRIPTORS: CONSTANT;SHARP;STABBING

## 2018-09-23 ASSESSMENT — PAIN SCALES - GENERAL
PAINLEVEL_OUTOF10: 9
PAINLEVEL_OUTOF10: 5
PAINLEVEL_OUTOF10: 1
PAINLEVEL_OUTOF10: 5
PAINLEVEL_OUTOF10: 0
PAINLEVEL_OUTOF10: 7
PAINLEVEL_OUTOF10: 7
PAINLEVEL_OUTOF10: 0
PAINLEVEL_OUTOF10: 2
PAINLEVEL_OUTOF10: 1

## 2018-09-23 ASSESSMENT — PAIN DESCRIPTION - ORIENTATION
ORIENTATION: RIGHT;LEFT

## 2018-09-23 ASSESSMENT — PAIN DESCRIPTION - LOCATION
LOCATION: ABDOMEN

## 2018-09-23 ASSESSMENT — PAIN DESCRIPTION - ONSET
ONSET: ON-GOING
ONSET: ON-GOING

## 2018-09-23 ASSESSMENT — PAIN DESCRIPTION - FREQUENCY: FREQUENCY: INTERMITTENT

## 2018-09-23 ASSESSMENT — PAIN DESCRIPTION - PAIN TYPE
TYPE: ACUTE PAIN

## 2018-09-23 ASSESSMENT — ENCOUNTER SYMPTOMS
NAUSEA: 0
VOMITING: 0
DIARRHEA: 0
SHORTNESS OF BREATH: 0
COUGH: 0

## 2018-09-23 ASSESSMENT — PAIN DESCRIPTION - PROGRESSION: CLINICAL_PROGRESSION: GRADUALLY IMPROVING

## 2018-09-23 NOTE — PROGRESS NOTES
lb 4 oz (89 kg), SpO2 95 %. Subjective:  Symptoms:  Stable. He reports malaise, weakness and anxiety. No shortness of breath, cough, chest pain, headache, chest pressure, anorexia or diarrhea. Diet:  Poor intake. No nausea or vomiting. Activity level: Impaired due to weakness. Pain:  He complains of pain that is severe. Pain is requiring pain medication. Objective:  General Appearance:  Comfortable. Vital signs: (most recent): Blood pressure (!) 160/72, pulse 96, temperature 98 °F (36.7 °C), temperature source Oral, resp. rate 20, height 5' 7\" (1.702 m), weight 196 lb 4 oz (89 kg), SpO2 95 %. Vital signs are normal.    Output: Producing urine. HEENT: Normal HEENT exam.    Lungs:  Normal effort and normal respiratory rate. No rales, decreased breath sounds, wheezes or rhonchi. Heart: Normal rate. Regular rhythm. Abdomen: Abdomen is soft. Bowel sounds are normal.   There is generalized tenderness. There is epigastric tenderness. There is no suprapubic area or incisional tenderness. There is rebound tenderness. There is no guarding. There is no mass. Assessment:  (Principal Problem:    Idiopathic acute pancreatitis  Active Problems:    DMII (diabetes mellitus, type 2) (HCC)    HTN (hypertension)    Spinal stenosis    B12 deficiency    Acute pancreatitis without necrosis or infection, unspecified  Resolved Problems:    * No resolved hospital problems. *   ). Plan:   (Ivb fluids continue treatment  Amylase elevb).        Yaneth Acharya MD  9/23/2018

## 2018-09-23 NOTE — CONSULTS
[facility]  Dr. Jose Alejandro Saravia M.D., Dr. Joshua Reynaga M.D., Dr. Uma Jones D.O., Dr. Ann-Marie Stewart M.D. Patient Name: Ely Lundy  MRN: [id number]  : 1955 (63 y.o. male)  Allergies: is allergic to other. Date of Service: 2018       Reason for Consultation:  Abdominal Pain    HISTORY OF PRESENT ILLNESS:      The patient is a 63 y.o. male with history of HTN, HLD, DM and prior pancreatitis who presents with abdominal pain. Patient states that he developed acute onset of epigastric abdominal pain 6 days prior to admission. The pain was sharp and gnawing with radiation around his upper abdomen. The pain was worse with eating and lying flat. There was associated nausea without vomiting. He thought he was constipated and went to move his bowel, but the pain persisted. The following day, the pain was still present. The patient tried Gas-X which did not relieve his symptoms. Over the next few days, his pain was unresolving which prompted him to present to the ED. Upon admission, CT abdomen/pelvis without contrast showed inflammatory changes of the pancreas without pancreatic head mass. RUQ US was negative. Patient was admitted for further management Currently, patient states that his pain is improved but still present. He denies further nausea and would like to try a regular diet. Of note, he had one episode of pancreatitis in  of unknown cause. Patient does not consume alcohol. He denies family history of pancreatitis, recent trauma and change in medication. REVIEW OF SYSTEMS:   Constitutional: Denies fever, chills, or unintentional weight loss. HEENT: Denies double or blurry vision, headaches, ear pain or ringing in the ears. No drainage from the ears, nose or throat. Cardiovascular: Denies any chest pain, irregular heartbeats, or palpitations. Respiratory: Denies shortness of breath, coughing, sputum production, hemoptysis, or wheezing.    Gastrointestinal: Denies (PRINIVIL;ZESTRIL) 20 MG tablet Take 20 mg by mouth daily   Yes Historical Provider, MD   Cholecalciferol (VITAMIN D3) 3000 units TABS Take 3,000 Units by mouth daily   Yes Historical Provider, MD   metFORMIN (GLUCOPHAGE) 1000 MG tablet Take 1,000 mg by mouth 2 times daily (with meals)  3/9/16  Yes Historical Provider, MD       Allergies: Other    Social History:  Social History     Social History    Marital status: Single     Spouse name: N/A    Number of children: N/A    Years of education: N/A     Occupational History    Not on file. Social History Main Topics    Smoking status: Current Every Day Smoker     Packs/day: 1.00    Smokeless tobacco: Never Used    Alcohol use No    Drug use: No    Sexual activity: Not on file     Other Topics Concern    Not on file     Social History Narrative    No narrative on file       Family History:  History reviewed. No pertinent family history. PHYSICAL EXAM:  Vital Signs: BP (!) 152/68   Pulse 76   Temp 98 °F (36.7 °C) (Oral)   Resp 20   Ht 5' 7\" (1.702 m)   Wt 196 lb 4 oz (89 kg)   SpO2 98%   BMI 30.74 kg/m²   GENERAL APPEARANCE:  awake, alert, oriented, cooperative, and in no acute distress  EYES:  Lids and lashes normal, PERRLA, EOMI, sclera clear, conjunctiva normal  HENT:  Normocephalic, without obvious abnormality, atramatic, oral pharynx with moist mucus membranes, tonsils without erythema or exudates  NECK:  Supple with no carotid bruits, JVD or thyromegaly. No cervical adenopathy  LUNGS:  Clear to auscultation bilaterally with no wheezes, rales or rhonchi. No increased work of breathing, good air exchange. CARDIOVASCULAR: Regular rate and rhythm, no murmur  ABDOMEN:  normal bowel sounds in all 4 quadrants, soft, non-distended, tenderness in epigastrium, no masses palpated, no hepatosplenomegally  MUSCULOSKELETAL:  There is no redness, warmth, or swelling of the joints. Full range of motion noted.   Motor strength is 5 out of 5 all Findings suggesting pancreatitis poorly assessed on a noncontrast study. Us Gallbladder Ruq    Result Date: 9/21/2018  Location:200 Exam: US GALLBLADDER RUQ Comparison: None History:  Abdominal pain Technique: Real-time, gray scale, color flow images of the right upper quadrant was obtained. Findings: The liver is normal  in echogenicity. No intrahepatic biliary ductal dilation seen. The majority of the pancreas is obscured by bowel gas. The visualized portions of the pancreas are unremarkable. The gallbladder is unremarkable without stones or sludge. No focal wall thickening seen. Sonographic Apodaca sign was negative. The common bile duct measures 4.0 mm in greatest dimension. Screening examination of the right kidney is within normal limits. 1. Unremarkable right upper quadrant ultrasound. ASSESSMENT/PLAN:      1) Acute Pancreatitis  - patient currently meets 2/3 criteria for pancreatitis (epigastric pain and CT evidence of pancreatitic inflammation; lipase mildly elevated)  - CT imaging without contrast does not show pancreatitic head mass or anatomic abnormalities, patient may benefit with pancreatitic CT protocol vs. MRI of pancreas to further rule out pancreatic lesions/mass - will discuss with attending physician  - Rafi Lamwilmanedis does not show gall stones and patient does not consume ETOH  - CMP not consistent with hypercalcemia  - no family history of pancreatitis to suggest genetic causes  - review of his medication list reveals furosemide which has been reported to be a cause of pancreatitis   - Hx of DM and HLD: ordered triglyceride level for tomorrow which may be falsely low due to a prolonged fasting state  - ordered NKECHI/total IgG to screen for autoimmune pancreatitis  - changed IV fluids to NS at 150 cc/hr  - increased diet to regular with low fat and diabetic restrictions     Above recommendations are tentative at this time. Case will be discussed with Atul Wasserman/Carlos.      Clari Rondon Lillie Jimenez.   GI fellow  9/23/2018 at 7:41 AM     Pt seen and independently examined. D/w Dr. Erika Arnold. Agree with physical exam and A&P. Above has been discussed with the patient and all questions answered to his satisfaction - he is agreeable with the plan as delineated. Thank you for the opportunity to see Mr. [place] in consultation.       Harjit Maynard MD  9/23/2018  11:39 AM

## 2018-09-24 VITALS
OXYGEN SATURATION: 98 % | RESPIRATION RATE: 16 BRPM | TEMPERATURE: 98.5 F | WEIGHT: 196.25 LBS | HEART RATE: 69 BPM | BODY MASS INDEX: 30.8 KG/M2 | SYSTOLIC BLOOD PRESSURE: 170 MMHG | HEIGHT: 67 IN | DIASTOLIC BLOOD PRESSURE: 72 MMHG

## 2018-09-24 LAB
AMYLASE: 69 U/L (ref 20–100)
ANION GAP SERPL CALCULATED.3IONS-SCNC: 11 MMOL/L (ref 7–16)
BUN BLDV-MCNC: 19 MG/DL (ref 8–23)
CALCIUM SERPL-MCNC: 8.7 MG/DL (ref 8.6–10.2)
CHLORIDE BLD-SCNC: 110 MMOL/L (ref 98–107)
CO2: 24 MMOL/L (ref 22–29)
CREAT SERPL-MCNC: 1.6 MG/DL (ref 0.7–1.2)
GFR AFRICAN AMERICAN: 53
GFR NON-AFRICAN AMERICAN: 53 ML/MIN/1.73
GLUCOSE BLD-MCNC: 124 MG/DL (ref 74–109)
LIPASE: 78 U/L (ref 13–60)
POTASSIUM SERPL-SCNC: 4.3 MMOL/L (ref 3.5–5)
SODIUM BLD-SCNC: 145 MMOL/L (ref 132–146)
TRIGL SERPL-MCNC: 63 MG/DL (ref 0–149)

## 2018-09-24 PROCEDURE — 36415 COLL VENOUS BLD VENIPUNCTURE: CPT

## 2018-09-24 PROCEDURE — 80048 BASIC METABOLIC PNL TOTAL CA: CPT

## 2018-09-24 PROCEDURE — 84478 ASSAY OF TRIGLYCERIDES: CPT

## 2018-09-24 PROCEDURE — 6370000000 HC RX 637 (ALT 250 FOR IP): Performed by: FAMILY MEDICINE

## 2018-09-24 PROCEDURE — 2580000003 HC RX 258: Performed by: FAMILY MEDICINE

## 2018-09-24 PROCEDURE — C9113 INJ PANTOPRAZOLE SODIUM, VIA: HCPCS | Performed by: FAMILY MEDICINE

## 2018-09-24 PROCEDURE — 82150 ASSAY OF AMYLASE: CPT

## 2018-09-24 PROCEDURE — 86038 ANTINUCLEAR ANTIBODIES: CPT

## 2018-09-24 PROCEDURE — 6360000002 HC RX W HCPCS: Performed by: FAMILY MEDICINE

## 2018-09-24 PROCEDURE — 83690 ASSAY OF LIPASE: CPT

## 2018-09-24 PROCEDURE — 82784 ASSAY IGA/IGD/IGG/IGM EACH: CPT

## 2018-09-24 PROCEDURE — 2580000003 HC RX 258: Performed by: STUDENT IN AN ORGANIZED HEALTH CARE EDUCATION/TRAINING PROGRAM

## 2018-09-24 RX ORDER — PANTOPRAZOLE SODIUM 40 MG/1
40 TABLET, DELAYED RELEASE ORAL DAILY
Qty: 30 TABLET | Refills: 3 | Status: SHIPPED | OUTPATIENT
Start: 2018-09-24 | End: 2019-09-11

## 2018-09-24 RX ADMIN — GLIMEPIRIDE 4 MG: 4 TABLET ORAL at 17:48

## 2018-09-24 RX ADMIN — Medication 10 ML: at 09:49

## 2018-09-24 RX ADMIN — GLIMEPIRIDE 4 MG: 4 TABLET ORAL at 08:45

## 2018-09-24 RX ADMIN — AMLODIPINE BESYLATE 10 MG: 10 TABLET ORAL at 08:45

## 2018-09-24 RX ADMIN — VITAMIN D, TAB 1000IU (100/BT) 3000 UNITS: 25 TAB at 08:45

## 2018-09-24 RX ADMIN — LISINOPRIL 20 MG: 20 TABLET ORAL at 08:46

## 2018-09-24 RX ADMIN — LACTULOSE 20 G: 20 SOLUTION ORAL at 08:45

## 2018-09-24 RX ADMIN — PANTOPRAZOLE SODIUM 40 MG: 40 INJECTION, POWDER, FOR SOLUTION INTRAVENOUS at 09:49

## 2018-09-24 RX ADMIN — SODIUM CHLORIDE: 9 INJECTION, SOLUTION INTRAVENOUS at 03:57

## 2018-09-24 RX ADMIN — SODIUM CHLORIDE: 9 INJECTION, SOLUTION INTRAVENOUS at 10:03

## 2018-09-24 ASSESSMENT — ENCOUNTER SYMPTOMS
VOMITING: 0
COUGH: 0
SHORTNESS OF BREATH: 0
DIARRHEA: 0
NAUSEA: 0

## 2018-09-24 ASSESSMENT — PAIN SCALES - GENERAL
PAINLEVEL_OUTOF10: 0

## 2018-09-24 NOTE — PROGRESS NOTES
PROGRESS NOTE    By Abby Villarreal D.O GI Fellow    [facility]  Dr. Micheal Olsen MD, Dr. Keyshawn Gallego MD, Dr Jennifer Garces, Dr. Abdulaziz Gu  63 y.o.  male    SUBJECTIVE:  Patient seen and examined today at bedside. Only new complaint is not having a BM since admission. Abdominal pain is much improved. He is tolerating a diet without n/v/d and worsening abdominal pain. Overall he is much improved since admission.      OBJECTIVE:    BP (!) 170/72   Pulse 69   Temp 98.5 °F (36.9 °C) (Oral)   Resp 16   Ht 5' 7\" (1.702 m)   Wt 196 lb 4 oz (89 kg)   SpO2 98%   BMI 30.74 kg/m²     Gen: NAD, AAO x 3  HEENT:PEERL, no icterus  Heart: RRR, no M/R/G  Lungs: CTAB  Abd.: soft, minimal epigastric tenderness to deep palpation, ND, BS +, no G/R, no HSM  Extr.: no C/C/E, no bruising      Stool (measured) : 0 mL  Lab Results   Component Value Date    WBC 6.1 09/22/2018    WBC 6.7 09/21/2018    WBC 5.6 08/03/2018    HGB 11.1 09/22/2018    HGB 12.0 09/21/2018    HGB 12.0 08/03/2018    HCT 35.5 09/22/2018    MCV 83.9 09/22/2018    RDW 15.9 09/22/2018     09/22/2018     09/21/2018     08/03/2018     Lab Results   Component Value Date     09/23/2018    K 4.4 09/23/2018     09/23/2018    CO2 22 09/23/2018    BUN 18 09/23/2018    CREATININE 1.6 09/23/2018    CALCIUM 8.7 09/23/2018    PROT 7.2 09/22/2018    LABALBU 3.2 09/22/2018    BILITOT 0.5 09/22/2018    BILITOT 0.6 09/21/2018    BILITOT 0.5 03/08/2018    ALKPHOS 52 09/22/2018    ALKPHOS 55 09/21/2018    ALKPHOS 62 03/08/2018    AST 11 09/22/2018    AST 13 09/21/2018    AST 11 03/08/2018    ALT 7 09/22/2018    ALT 6 09/21/2018    ALT 9 03/08/2018     Lab Results   Component Value Date    LIPASE 86 09/23/2018    LIPASE 146 09/22/2018    LIPASE 151 09/21/2018     Lab Results   Component Value Date    AMYLASE 69 09/23/2018         ASSESSMENT/PLAN:    1) Acute Pancreatitis  - patient currently meets 2/3 criteria for pancreatitis (epigastric pain and CT evidence of pancreatitic inflammation; lipase mildly elevated)  - CT imaging without contrast does not show pancreatitic head mass or anatomic abnormalities, patient may benefit with pancreatitic CT protocol vs. MRI of pancreas to further rule out pancreatic lesions/mass - which can be completed as an outpatient in follow up  - RUQ US does not show gall stones and patient does not consume ETOH  - CMP not consistent with hypercalcemia  - no family history of pancreatitis to suggest genetic causes  - review of his medication list reveals furosemide which has been reported to be a cause of pancreatitis  - triglycerides wnl   - awaiting NKCEHI/total IgG to screen for autoimmune pancreatitis  - continue low fat diet as outpatient  - continue IV fluids while hospitalized  - patient can follow up 2-3 weeks in office post-discharge  - pt to call for appointment - [phone]. Above recommendations are tentative at this time. Case will be discussed with attending physician. 27 Cristina Pisano DO  9/24/2018  8:58 AM     Pt seen and independently examined. D/w Dr. Olivier Anderson. Agree with physical exam and A&P. Above has been discussed with patient and all questions answered to his satisfaction - he is agreeable with the plan as delineated. OK to be discharged from GI POV. Follow-up in the office in 2-3 weeks after discharge - patient to call for appointment at [phone]. Thank you for the opportunity to participate in the care of Mr. MARCIAL [facility].       Yolanda Villalba MD  9/24/2018  11:46 AM

## 2018-09-24 NOTE — PROGRESS NOTES
Spoke with Dr Jvoon Freeman who wants to DC this patient. Unable to sign DC order d/t admit order not being signed.

## 2018-09-24 NOTE — PROGRESS NOTES
Comments)     environmental     Principal Problem:    Idiopathic acute pancreatitis  Active Problems:    DMII (diabetes mellitus, type 2) (HCC)    HTN (hypertension)    Spinal stenosis    B12 deficiency    Acute pancreatitis without necrosis or infection, unspecified  Resolved Problems:    * No resolved hospital problems. *    Blood pressure (!) 170/72, pulse 69, temperature 98.5 °F (36.9 °C), temperature source Oral, resp. rate 16, height 5' 7\" (1.702 m), weight 196 lb 4 oz (89 kg), SpO2 98 %. Subjective:  Symptoms:  Stable. He reports malaise, weakness and anxiety. No shortness of breath, cough, chest pain, chest pressure, anorexia or diarrhea. Diet:  Adequate intake. No nausea or vomiting. Activity level: Impaired due to weakness. Pain:  He complains of pain that is moderate. Pain is requiring pain medication. Objective:  General Appearance:  Comfortable. Vital signs: (most recent): Blood pressure (!) 170/72, pulse 69, temperature 98.5 °F (36.9 °C), temperature source Oral, resp. rate 16, height 5' 7\" (1.702 m), weight 196 lb 4 oz (89 kg), SpO2 98 %. Vital signs are normal.  No fever. Output: Producing urine. HEENT: Normal HEENT exam.    Lungs:  Normal effort and normal respiratory rate. Breath sounds clear to auscultation. He is not in respiratory distress. No decreased breath sounds. Heart: Normal rate. Regular rhythm. No murmur. Abdomen: Abdomen is soft and non-distended. Bowel sounds are normal.   There is no abdominal tenderness. There is no epigastric area or suprapubic area tenderness. There is no rebound tenderness. There is no mass. Assessment:  (Principal Problem:    Idiopathic acute pancreatitis  Active Problems:    DMII (diabetes mellitus, type 2) (HCC)    HTN (hypertension)    Spinal stenosis    B12 deficiency    Acute pancreatitis without necrosis or infection, unspecified  Resolved Problems:    * No resolved hospital problems. *   ).      Plan: (Labs pending  Continue treatment).        Lissette Alcantar MD  9/24/2018

## 2018-09-25 LAB
ANTI-NUCLEAR ANTIBODY (ANA): NEGATIVE
IGG 1: 721 MG/DL (ref 240–1118)
IGG 2: 373 MG/DL (ref 124–549)
IGG 3: 92 MG/DL (ref 21–134)
IGG 4: 58 MG/DL (ref 1–123)
IGG: 1078 MG/DL (ref 700–1600)

## 2019-03-06 ENCOUNTER — HOSPITAL ENCOUNTER (OUTPATIENT)
Age: 64
Discharge: HOME | End: 2019-03-06
Payer: MEDICARE

## 2019-03-06 LAB
ANION GAP SERPL CALCULATED.3IONS-SCNC: 11 MMOL/L (ref 7–16)
BACTERIA: ABNORMAL /HPF
BILIRUBIN URINE: NEGATIVE
BLOOD, URINE: ABNORMAL
BUN BLDV-MCNC: 21 MG/DL (ref 8–23)
CALCIUM SERPL-MCNC: 9.2 MG/DL (ref 8.6–10.2)
CHLORIDE BLD-SCNC: 107 MMOL/L (ref 98–107)
CLARITY: CLEAR
CO2: 23 MMOL/L (ref 22–29)
COLOR: YELLOW
CREAT SERPL-MCNC: 2.2 MG/DL (ref 0.7–1.2)
CREATININE URINE: 30 MG/DL (ref 40–278)
EPITHELIAL CELLS, UA: ABNORMAL /HPF
GFR AFRICAN AMERICAN: 37
GFR NON-AFRICAN AMERICAN: 37 ML/MIN/1.73
GLUCOSE BLD-MCNC: 171 MG/DL (ref 74–99)
GLUCOSE URINE: NEGATIVE MG/DL
HCT VFR BLD CALC: 38.6 % (ref 37–54)
HEMOGLOBIN: 12 G/DL (ref 12.5–16.5)
KETONES, URINE: NEGATIVE MG/DL
LEUKOCYTE ESTERASE, URINE: NEGATIVE
MAGNESIUM: 2.3 MG/DL (ref 1.6–2.6)
MCH RBC QN AUTO: 26.5 PG (ref 26–35)
MCHC RBC AUTO-ENTMCNC: 31.1 % (ref 32–34.5)
MCV RBC AUTO: 85.4 FL (ref 80–99.9)
NITRITE, URINE: NEGATIVE
PARATHYROID HORMONE INTACT: 99 PG/ML (ref 15–65)
PDW BLD-RTO: 14.6 FL (ref 11.5–15)
PH UA: 6 (ref 5–9)
PHOSPHORUS: 3 MG/DL (ref 2.5–4.5)
PLATELET # BLD: 263 E9/L (ref 130–450)
PMV BLD AUTO: 10.5 FL (ref 7–12)
POTASSIUM SERPL-SCNC: 4.9 MMOL/L (ref 3.5–5)
PROTEIN PROTEIN: 143 MG/DL (ref 0–12)
PROTEIN UA: 100 MG/DL
PROTEIN/CREAT RATIO: 4.8
PROTEIN/CREAT RATIO: 4.8 (ref 0–0.2)
RBC # BLD: 4.52 E12/L (ref 3.8–5.8)
RBC UA: ABNORMAL /HPF (ref 0–2)
SODIUM BLD-SCNC: 141 MMOL/L (ref 132–146)
SPECIFIC GRAVITY UA: 1.01 (ref 1–1.03)
UROBILINOGEN, URINE: 0.2 E.U./DL
VITAMIN D 25-HYDROXY: 24 NG/ML (ref 30–100)
WBC # BLD: 6.1 E9/L (ref 4.5–11.5)
WBC UA: ABNORMAL /HPF (ref 0–5)

## 2019-03-06 PROCEDURE — 84156 ASSAY OF PROTEIN URINE: CPT

## 2019-03-06 PROCEDURE — 83735 ASSAY OF MAGNESIUM: CPT

## 2019-03-06 PROCEDURE — 80048 BASIC METABOLIC PNL TOTAL CA: CPT

## 2019-03-06 PROCEDURE — 36415 COLL VENOUS BLD VENIPUNCTURE: CPT

## 2019-03-06 PROCEDURE — 82306 VITAMIN D 25 HYDROXY: CPT

## 2019-03-06 PROCEDURE — 83970 ASSAY OF PARATHORMONE: CPT

## 2019-03-06 PROCEDURE — 84100 ASSAY OF PHOSPHORUS: CPT

## 2019-03-06 PROCEDURE — 82570 ASSAY OF URINE CREATININE: CPT

## 2019-03-06 PROCEDURE — 85027 COMPLETE CBC AUTOMATED: CPT

## 2019-03-06 PROCEDURE — 81001 URINALYSIS AUTO W/SCOPE: CPT

## 2019-03-22 ENCOUNTER — HOSPITAL ENCOUNTER (OUTPATIENT)
Dept: GENERAL RADIOLOGY | Age: 64
Discharge: HOME | End: 2019-03-24
Payer: MEDICARE

## 2019-03-22 ENCOUNTER — HOSPITAL ENCOUNTER (OUTPATIENT)
Age: 64
Discharge: HOME | End: 2019-03-24
Payer: MEDICARE

## 2019-03-22 DIAGNOSIS — R52 PAIN: ICD-10-CM

## 2019-03-22 PROCEDURE — 73030 X-RAY EXAM OF SHOULDER: CPT

## 2019-08-07 ENCOUNTER — HOSPITAL ENCOUNTER (EMERGENCY)
Age: 64
Discharge: HOME | End: 2019-08-07
Attending: EMERGENCY MEDICINE
Payer: MEDICARE

## 2019-08-07 VITALS
WEIGHT: 204 LBS | OXYGEN SATURATION: 99 % | DIASTOLIC BLOOD PRESSURE: 82 MMHG | HEIGHT: 66 IN | TEMPERATURE: 98.4 F | RESPIRATION RATE: 16 BRPM | SYSTOLIC BLOOD PRESSURE: 171 MMHG | BODY MASS INDEX: 32.78 KG/M2 | HEART RATE: 67 BPM

## 2019-08-07 DIAGNOSIS — L02.91 ABSCESS: Primary | ICD-10-CM

## 2019-08-07 PROCEDURE — 99282 EMERGENCY DEPT VISIT SF MDM: CPT

## 2019-08-07 PROCEDURE — 10061 I&D ABSCESS COMP/MULTIPLE: CPT

## 2019-08-07 PROCEDURE — 2500000003 HC RX 250 WO HCPCS: Performed by: EMERGENCY MEDICINE

## 2019-08-07 RX ORDER — LIDOCAINE HYDROCHLORIDE 10 MG/ML
5 INJECTION, SOLUTION INFILTRATION; PERINEURAL ONCE
Status: COMPLETED | OUTPATIENT
Start: 2019-08-07 | End: 2019-08-07

## 2019-08-07 RX ADMIN — LIDOCAINE HYDROCHLORIDE 5 ML: 10 INJECTION, SOLUTION INFILTRATION; PERINEURAL at 12:16

## 2019-08-07 ASSESSMENT — PAIN SCALES - GENERAL
PAINLEVEL_OUTOF10: 9
PAINLEVEL_OUTOF10: 9

## 2019-08-07 ASSESSMENT — PAIN DESCRIPTION - PAIN TYPE: TYPE: ACUTE PAIN

## 2019-08-07 ASSESSMENT — PAIN DESCRIPTION - DESCRIPTORS: DESCRIPTORS: CONSTANT

## 2019-08-07 ASSESSMENT — PAIN DESCRIPTION - LOCATION: LOCATION: BACK

## 2019-08-07 NOTE — ED PROVIDER NOTES
this time and they are agreeable with the plan.      --------------------------------- IMPRESSION AND DISPOSITION ---------------------------------    IMPRESSION  1. Abscess        DISPOSITION  Disposition: Discharge to home  Patient condition is good      NOTE: This report was transcribed using voice recognition software.  Every effort was made to ensure accuracy; however, inadvertent computerized transcription errors may be present      Aury West MD  08/07/19 [id number]

## 2019-08-12 ENCOUNTER — TELEPHONE (OUTPATIENT)
Dept: SURGERY | Age: 64
End: 2019-08-12

## 2019-09-11 ENCOUNTER — OFFICE VISIT (OUTPATIENT)
Dept: SURGERY | Age: 64
End: 2019-09-11
Payer: MEDICARE

## 2019-09-11 VITALS
HEART RATE: 87 BPM | DIASTOLIC BLOOD PRESSURE: 78 MMHG | OXYGEN SATURATION: 96 % | HEIGHT: 66 IN | RESPIRATION RATE: 16 BRPM | TEMPERATURE: 97 F | SYSTOLIC BLOOD PRESSURE: 140 MMHG | WEIGHT: 202 LBS | BODY MASS INDEX: 32.47 KG/M2

## 2019-09-11 DIAGNOSIS — R22.2 MASS OF SKIN OF BACK: Primary | ICD-10-CM

## 2019-09-11 PROCEDURE — 99204 OFFICE O/P NEW MOD 45 MIN: CPT | Performed by: PLASTIC SURGERY

## 2019-09-11 RX ORDER — ETODOLAC 400 MG/1
400 TABLET, FILM COATED ORAL
Status: ON HOLD | COMMUNITY
End: 2020-02-13 | Stop reason: HOSPADM

## 2019-09-11 RX ORDER — METOPROLOL TARTRATE 50 MG/1
50 TABLET, FILM COATED ORAL
Status: ON HOLD | COMMUNITY
Start: 2017-03-31 | End: 2020-02-13 | Stop reason: HOSPADM

## 2019-09-11 RX ORDER — GABAPENTIN 100 MG/1
100 CAPSULE ORAL 2 TIMES DAILY
COMMUNITY
End: 2021-08-10

## 2019-09-11 RX ORDER — GLIMEPIRIDE 4 MG/1
4 TABLET ORAL
Status: ON HOLD | COMMUNITY
Start: 2017-03-31 | End: 2020-02-02 | Stop reason: ALTCHOICE

## 2019-09-11 NOTE — PROGRESS NOTES
Department of Plastic Surgery - Adult  Attending Consult Note      CHIEF COMPLAINT:   Mass of back    History Obtained From:  patient    HISTORY OF PRESENT ILLNESS:                The patient is a 66 y.o. male who presents with a mass of his upper back for three months duration. Most recently patient was seen in [address] [place] [address] ED where the lesion was Incised and drainage was expressed from said lesion. The lesion has had discharge every morning since then. The mass has been stable in size, it has not been biopsied. Patient states that the lesion is painful on his shirt. Patient denies any associated symptoms. Past Medical History:    Past Medical History:   Diagnosis Date    Back pain     [facility]     DMII (diabetes mellitus, type 2) ([facility] 7/28/2015    History of cardiovascular stress test 2/16/2015    lexiscan    HTN (hypertension) 7/28/2015    Hyperlipidemia     Hypertension     Lumbar radicular pain 7/28/2015    Type II or unspecified type diabetes mellitus without mention of complication, not stated as uncontrolled      Past Surgical History:    Past Surgical History:   Procedure Laterality Date    OTHER SURGICAL HISTORY Left 06/06/14    cain bunionectomy left foot with osteomed screw x1    SHOULDER SURGERY      Bilateral    VEIN SURGERY       Current Medications:   No current facility-administered medications for this visit. Allergies:   Other    Social History:   Social History     Socioeconomic History    Marital status: Single     Spouse name: Not on file    Number of children: Not on file    Years of education: Not on file    Highest education level: Not on file   Occupational History    Not on file   Social Needs    Financial resource strain: Not on file    Food insecurity:     Worry: Not on file     Inability: Not on file    Transportation needs:     Medical: Not on file     Non-medical: Not on file   Tobacco Use    Smoking status: Current Every Day Smoker plan outlined. This document is generated, in part, by voice recognition software and thus  syntax and grammatical errors are possible.     Joseph Ewing  3:17 PM  9/11/2019

## 2019-09-26 ENCOUNTER — TELEPHONE (OUTPATIENT)
Dept: SURGERY | Age: 64
End: 2019-09-26

## 2019-10-02 ENCOUNTER — HOSPITAL ENCOUNTER (OUTPATIENT)
Age: 64
Discharge: HOME | End: 2019-10-02
Payer: COMMERCIAL

## 2019-10-02 LAB
AMORPHOUS: ABNORMAL
ANION GAP SERPL CALCULATED.3IONS-SCNC: 7 MMOL/L (ref 7–16)
BACTERIA: ABNORMAL /HPF
BILIRUBIN URINE: NEGATIVE
BLOOD, URINE: ABNORMAL
BUN BLDV-MCNC: 37 MG/DL (ref 8–23)
CALCIUM SERPL-MCNC: 9.4 MG/DL (ref 8.6–10.2)
CHLORIDE BLD-SCNC: 110 MMOL/L (ref 98–107)
CLARITY: CLEAR
CO2: 22 MMOL/L (ref 22–29)
COLOR: YELLOW
CREAT SERPL-MCNC: 3 MG/DL (ref 0.7–1.2)
CREATININE URINE: 146 MG/DL (ref 40–278)
EPITHELIAL CELLS, UA: ABNORMAL /HPF
GFR AFRICAN AMERICAN: 26
GFR NON-AFRICAN AMERICAN: 26 ML/MIN/1.73
GLUCOSE BLD-MCNC: 132 MG/DL (ref 74–99)
GLUCOSE URINE: NEGATIVE MG/DL
HCT VFR BLD CALC: 35.5 % (ref 37–54)
HEMOGLOBIN: 11 G/DL (ref 12.5–16.5)
KETONES, URINE: NEGATIVE MG/DL
LEUKOCYTE ESTERASE, URINE: NEGATIVE
MAGNESIUM: 1.9 MG/DL (ref 1.6–2.6)
MCH RBC QN AUTO: 26.4 PG (ref 26–35)
MCHC RBC AUTO-ENTMCNC: 31 % (ref 32–34.5)
MCV RBC AUTO: 85.3 FL (ref 80–99.9)
NITRITE, URINE: NEGATIVE
PARATHYROID HORMONE INTACT: 97 PG/ML (ref 15–65)
PDW BLD-RTO: 15 FL (ref 11.5–15)
PH UA: 5.5 (ref 5–9)
PHOSPHORUS: 3.3 MG/DL (ref 2.5–4.5)
PLATELET # BLD: 261 E9/L (ref 130–450)
PMV BLD AUTO: 10.1 FL (ref 7–12)
POTASSIUM SERPL-SCNC: 4.6 MMOL/L (ref 3.5–5)
PROTEIN PROTEIN: 346 MG/DL (ref 0–12)
PROTEIN UA: >=300 MG/DL
PROTEIN/CREAT RATIO: 2.4
PROTEIN/CREAT RATIO: 2.4 (ref 0–0.2)
RBC # BLD: 4.16 E12/L (ref 3.8–5.8)
RBC UA: ABNORMAL /HPF (ref 0–2)
SODIUM BLD-SCNC: 139 MMOL/L (ref 132–146)
SPECIFIC GRAVITY UA: 1.02 (ref 1–1.03)
UROBILINOGEN, URINE: 0.2 E.U./DL
VITAMIN D 25-HYDROXY: 42 NG/ML (ref 30–100)
WBC # BLD: 5.7 E9/L (ref 4.5–11.5)
WBC UA: ABNORMAL /HPF (ref 0–5)

## 2019-10-02 PROCEDURE — 82570 ASSAY OF URINE CREATININE: CPT

## 2019-10-02 PROCEDURE — 82306 VITAMIN D 25 HYDROXY: CPT

## 2019-10-02 PROCEDURE — 84100 ASSAY OF PHOSPHORUS: CPT

## 2019-10-02 PROCEDURE — 85027 COMPLETE CBC AUTOMATED: CPT

## 2019-10-02 PROCEDURE — 83970 ASSAY OF PARATHORMONE: CPT

## 2019-10-02 PROCEDURE — 83735 ASSAY OF MAGNESIUM: CPT

## 2019-10-02 PROCEDURE — 81001 URINALYSIS AUTO W/SCOPE: CPT

## 2019-10-02 PROCEDURE — 84156 ASSAY OF PROTEIN URINE: CPT

## 2019-10-02 PROCEDURE — 80048 BASIC METABOLIC PNL TOTAL CA: CPT

## 2019-10-02 PROCEDURE — 36415 COLL VENOUS BLD VENIPUNCTURE: CPT

## 2019-10-10 ENCOUNTER — HOSPITAL ENCOUNTER (OUTPATIENT)
Dept: ULTRASOUND IMAGING | Age: 64
Discharge: HOME | End: 2019-10-10
Payer: COMMERCIAL

## 2019-10-10 DIAGNOSIS — N18.4 CHRONIC KIDNEY DISEASE, STAGE IV (SEVERE) (HCC): ICD-10-CM

## 2019-10-10 PROCEDURE — 76770 US EXAM ABDO BACK WALL COMP: CPT

## 2019-10-10 PROCEDURE — 76775 US EXAM ABDO BACK WALL LIM: CPT

## 2019-10-16 ENCOUNTER — PROCEDURE VISIT (OUTPATIENT)
Dept: SURGERY | Age: 64
End: 2019-10-16
Payer: COMMERCIAL

## 2019-10-16 ENCOUNTER — HOSPITAL ENCOUNTER (OUTPATIENT)
Age: 64
Discharge: HOME | End: 2019-10-18
Payer: COMMERCIAL

## 2019-10-16 VITALS
TEMPERATURE: 97.2 F | RESPIRATION RATE: 20 BRPM | OXYGEN SATURATION: 93 % | WEIGHT: 209 LBS | BODY MASS INDEX: 32.8 KG/M2 | HEART RATE: 82 BPM | SYSTOLIC BLOOD PRESSURE: 160 MMHG | HEIGHT: 67 IN | DIASTOLIC BLOOD PRESSURE: 60 MMHG

## 2019-10-16 DIAGNOSIS — R22.2 MASS OF SKIN OF BACK: ICD-10-CM

## 2019-10-16 DIAGNOSIS — G89.18 POST-OP PAIN: Primary | ICD-10-CM

## 2019-10-16 PROCEDURE — 11402 EXC TR-EXT B9+MARG 1.1-2 CM: CPT | Performed by: PLASTIC SURGERY

## 2019-10-16 PROCEDURE — 12032 INTMD RPR S/A/T/EXT 2.6-7.5: CPT | Performed by: PLASTIC SURGERY

## 2019-10-16 PROCEDURE — 88304 TISSUE EXAM BY PATHOLOGIST: CPT

## 2019-10-16 RX ORDER — HYDROCODONE BITARTRATE AND ACETAMINOPHEN 5; 325 MG/1; MG/1
1 TABLET ORAL EVERY 6 HOURS PRN
Qty: 10 TABLET | Refills: 0 | Status: SHIPPED | OUTPATIENT
Start: 2019-10-16 | End: 2019-10-23

## 2019-10-16 RX ORDER — LIDOCAINE HYDROCHLORIDE AND EPINEPHRINE 10; 10 MG/ML; UG/ML
3 INJECTION, SOLUTION INFILTRATION; PERINEURAL ONCE
Status: COMPLETED | OUTPATIENT
Start: 2019-10-16 | End: 2019-10-16

## 2019-10-16 RX ORDER — CEPHALEXIN 500 MG/1
500 CAPSULE ORAL 4 TIMES DAILY
Qty: 20 CAPSULE | Refills: 0 | Status: SHIPPED | OUTPATIENT
Start: 2019-10-16 | End: 2019-10-21

## 2019-10-16 RX ADMIN — LIDOCAINE HYDROCHLORIDE AND EPINEPHRINE 3 ML: 10; 10 INJECTION, SOLUTION INFILTRATION; PERINEURAL at 13:46

## 2019-10-28 ENCOUNTER — OFFICE VISIT (OUTPATIENT)
Dept: SURGERY | Age: 64
End: 2019-10-28

## 2019-10-28 VITALS
HEART RATE: 92 BPM | RESPIRATION RATE: 20 BRPM | OXYGEN SATURATION: 99 % | WEIGHT: 208 LBS | BODY MASS INDEX: 32.65 KG/M2 | HEIGHT: 67 IN | TEMPERATURE: 97 F

## 2019-10-28 DIAGNOSIS — Z09 POSTOP CHECK: Primary | ICD-10-CM

## 2019-10-28 PROCEDURE — 99024 POSTOP FOLLOW-UP VISIT: CPT | Performed by: PHYSICIAN ASSISTANT

## 2019-11-13 ENCOUNTER — HOSPITAL ENCOUNTER (OUTPATIENT)
Age: 64
Discharge: HOME | End: 2019-11-13
Payer: COMMERCIAL

## 2019-11-13 LAB
ANION GAP SERPL CALCULATED.3IONS-SCNC: 11 MMOL/L (ref 7–16)
BUN BLDV-MCNC: 31 MG/DL (ref 8–23)
CALCIUM SERPL-MCNC: 9.5 MG/DL (ref 8.6–10.2)
CHLORIDE BLD-SCNC: 111 MMOL/L (ref 98–107)
CO2: 20 MMOL/L (ref 22–29)
CREAT SERPL-MCNC: 3.4 MG/DL (ref 0.7–1.2)
FERRITIN: 188 NG/ML
GFR AFRICAN AMERICAN: 22
GFR NON-AFRICAN AMERICAN: 22 ML/MIN/1.73
GLUCOSE BLD-MCNC: 63 MG/DL (ref 74–99)
HCT VFR BLD CALC: 34.5 % (ref 37–54)
HEMOGLOBIN: 10.4 G/DL (ref 12.5–16.5)
IRON SATURATION: 22 % (ref 20–55)
IRON: 58 MCG/DL (ref 59–158)
MAGNESIUM: 2.1 MG/DL (ref 1.6–2.6)
MCH RBC QN AUTO: 25.9 PG (ref 26–35)
MCHC RBC AUTO-ENTMCNC: 30.1 % (ref 32–34.5)
MCV RBC AUTO: 85.8 FL (ref 80–99.9)
PDW BLD-RTO: 15.8 FL (ref 11.5–15)
PHOSPHORUS: 3.6 MG/DL (ref 2.5–4.5)
PLATELET # BLD: 286 E9/L (ref 130–450)
PMV BLD AUTO: 9.1 FL (ref 7–12)
POTASSIUM SERPL-SCNC: 4.6 MMOL/L (ref 3.5–5)
RBC # BLD: 4.02 E12/L (ref 3.8–5.8)
SODIUM BLD-SCNC: 142 MMOL/L (ref 132–146)
TOTAL IRON BINDING CAPACITY: 262 MCG/DL (ref 250–450)
WBC # BLD: 7.2 E9/L (ref 4.5–11.5)

## 2019-11-13 PROCEDURE — 84100 ASSAY OF PHOSPHORUS: CPT

## 2019-11-13 PROCEDURE — 83735 ASSAY OF MAGNESIUM: CPT

## 2019-11-13 PROCEDURE — 80048 BASIC METABOLIC PNL TOTAL CA: CPT

## 2019-11-13 PROCEDURE — 85027 COMPLETE CBC AUTOMATED: CPT

## 2019-11-13 PROCEDURE — 82728 ASSAY OF FERRITIN: CPT

## 2019-11-13 PROCEDURE — 83550 IRON BINDING TEST: CPT

## 2019-11-13 PROCEDURE — 83540 ASSAY OF IRON: CPT

## 2019-11-13 PROCEDURE — 36415 COLL VENOUS BLD VENIPUNCTURE: CPT

## 2019-12-27 ENCOUNTER — HOSPITAL ENCOUNTER (OUTPATIENT)
Age: 64
Discharge: HOME | End: 2019-12-27
Payer: COMMERCIAL

## 2019-12-27 LAB
ANION GAP SERPL CALCULATED.3IONS-SCNC: 13 MMOL/L (ref 7–16)
BUN BLDV-MCNC: 41 MG/DL (ref 8–23)
CALCIUM SERPL-MCNC: 9 MG/DL (ref 8.6–10.2)
CHLORIDE BLD-SCNC: 107 MMOL/L (ref 98–107)
CO2: 18 MMOL/L (ref 22–29)
CREAT SERPL-MCNC: 3.4 MG/DL (ref 0.7–1.2)
GFR AFRICAN AMERICAN: 22
GFR NON-AFRICAN AMERICAN: 22 ML/MIN/1.73
GLUCOSE FASTING: 122 MG/DL (ref 74–99)
HCT VFR BLD CALC: 29.7 % (ref 37–54)
HEMOGLOBIN: 8.9 G/DL (ref 12.5–16.5)
MCH RBC QN AUTO: 26 PG (ref 26–35)
MCHC RBC AUTO-ENTMCNC: 30 % (ref 32–34.5)
MCV RBC AUTO: 86.8 FL (ref 80–99.9)
PDW BLD-RTO: 15.6 FL (ref 11.5–15)
PLATELET # BLD: 394 E9/L (ref 130–450)
PMV BLD AUTO: 9.5 FL (ref 7–12)
POTASSIUM SERPL-SCNC: 5.1 MMOL/L (ref 3.5–5)
RBC # BLD: 3.42 E12/L (ref 3.8–5.8)
SODIUM BLD-SCNC: 138 MMOL/L (ref 132–146)
WBC # BLD: 8.5 E9/L (ref 4.5–11.5)

## 2019-12-27 PROCEDURE — 80048 BASIC METABOLIC PNL TOTAL CA: CPT

## 2019-12-27 PROCEDURE — 36415 COLL VENOUS BLD VENIPUNCTURE: CPT

## 2019-12-27 PROCEDURE — 85027 COMPLETE CBC AUTOMATED: CPT

## 2020-01-23 ENCOUNTER — HOSPITAL ENCOUNTER (OUTPATIENT)
Age: 65
Discharge: HOME | End: 2020-01-23
Payer: MEDICARE

## 2020-01-23 LAB
ANION GAP SERPL CALCULATED.3IONS-SCNC: 15 MMOL/L (ref 7–16)
BACTERIA: ABNORMAL /HPF
BILIRUBIN URINE: NEGATIVE
BLOOD, URINE: ABNORMAL
BUN BLDV-MCNC: 39 MG/DL (ref 8–23)
CALCIUM SERPL-MCNC: 9 MG/DL (ref 8.6–10.2)
CHLORIDE BLD-SCNC: 105 MMOL/L (ref 98–107)
CLARITY: CLEAR
CO2: 19 MMOL/L (ref 22–29)
COLOR: YELLOW
CREAT SERPL-MCNC: 3.8 MG/DL (ref 0.7–1.2)
CREATININE URINE: 77 MG/DL (ref 40–278)
GFR AFRICAN AMERICAN: 20
GFR NON-AFRICAN AMERICAN: 20 ML/MIN/1.73
GLUCOSE FASTING: 115 MG/DL (ref 74–99)
GLUCOSE URINE: NEGATIVE MG/DL
HCT VFR BLD CALC: 30.8 % (ref 37–54)
HEMOGLOBIN: 9.3 G/DL (ref 12.5–16.5)
KETONES, URINE: NEGATIVE MG/DL
LEUKOCYTE ESTERASE, URINE: NEGATIVE
MAGNESIUM: 1.9 MG/DL (ref 1.6–2.6)
MCH RBC QN AUTO: 26.1 PG (ref 26–35)
MCHC RBC AUTO-ENTMCNC: 30.2 % (ref 32–34.5)
MCV RBC AUTO: 86.5 FL (ref 80–99.9)
NITRITE, URINE: NEGATIVE
PARATHYROID HORMONE INTACT: 154 PG/ML (ref 15–65)
PDW BLD-RTO: 15.6 FL (ref 11.5–15)
PH UA: 6 (ref 5–9)
PHOSPHORUS: 4.2 MG/DL (ref 2.5–4.5)
PLATELET # BLD: 311 E9/L (ref 130–450)
PMV BLD AUTO: 9.5 FL (ref 7–12)
POTASSIUM SERPL-SCNC: 4.8 MMOL/L (ref 3.5–5)
PROTEIN PROTEIN: 276 MG/DL (ref 0–12)
PROTEIN UA: 100 MG/DL
PROTEIN/CREAT RATIO: 3.6
PROTEIN/CREAT RATIO: 3.6 (ref 0–0.2)
RBC # BLD: 3.56 E12/L (ref 3.8–5.8)
RBC UA: ABNORMAL /HPF (ref 0–2)
SODIUM BLD-SCNC: 139 MMOL/L (ref 132–146)
SPECIFIC GRAVITY UA: 1.01 (ref 1–1.03)
UROBILINOGEN, URINE: 0.2 E.U./DL
VITAMIN D 25-HYDROXY: 32 NG/ML (ref 30–100)
WBC # BLD: 7.8 E9/L (ref 4.5–11.5)
WBC UA: ABNORMAL /HPF (ref 0–5)

## 2020-01-23 PROCEDURE — 85027 COMPLETE CBC AUTOMATED: CPT

## 2020-01-23 PROCEDURE — 82570 ASSAY OF URINE CREATININE: CPT

## 2020-01-23 PROCEDURE — 81001 URINALYSIS AUTO W/SCOPE: CPT

## 2020-01-23 PROCEDURE — 83735 ASSAY OF MAGNESIUM: CPT

## 2020-01-23 PROCEDURE — 84100 ASSAY OF PHOSPHORUS: CPT

## 2020-01-23 PROCEDURE — 82306 VITAMIN D 25 HYDROXY: CPT

## 2020-01-23 PROCEDURE — 84156 ASSAY OF PROTEIN URINE: CPT

## 2020-01-23 PROCEDURE — 83970 ASSAY OF PARATHORMONE: CPT

## 2020-01-23 PROCEDURE — 36415 COLL VENOUS BLD VENIPUNCTURE: CPT

## 2020-01-23 PROCEDURE — 80048 BASIC METABOLIC PNL TOTAL CA: CPT

## 2020-01-28 PROBLEM — J18.9 PNEUMONIA: Status: ACTIVE | Noted: 2020-01-28

## 2020-02-02 ENCOUNTER — APPOINTMENT (OUTPATIENT)
Dept: GENERAL RADIOLOGY | Age: 65
DRG: 291 | End: 2020-02-02
Payer: MEDICARE

## 2020-02-02 ENCOUNTER — APPOINTMENT (OUTPATIENT)
Dept: CT IMAGING | Age: 65
DRG: 291 | End: 2020-02-02
Payer: MEDICARE

## 2020-02-02 ENCOUNTER — HOSPITAL ENCOUNTER (INPATIENT)
Age: 65
LOS: 11 days | Discharge: HOME | DRG: 291 | End: 2020-02-13
Attending: EMERGENCY MEDICINE | Admitting: INTERNAL MEDICINE
Payer: MEDICARE

## 2020-02-02 PROBLEM — J96.90 RESPIRATORY FAILURE (HCC): Status: ACTIVE | Noted: 2020-02-02

## 2020-02-02 LAB
ADENOVIRUS BY PCR: NOT DETECTED
ALBUMIN SERPL-MCNC: 3.3 G/DL (ref 3.5–5.2)
ALP BLD-CCNC: 62 U/L (ref 40–129)
ALT SERPL-CCNC: 13 U/L (ref 0–40)
ANION GAP SERPL CALCULATED.3IONS-SCNC: 17 MMOL/L (ref 7–16)
AST SERPL-CCNC: 13 U/L (ref 0–39)
B.E.: -7.1 MMOL/L (ref -3–3)
BACTERIA: NORMAL /HPF
BASOPHILS ABSOLUTE: 0.04 E9/L (ref 0–0.2)
BASOPHILS RELATIVE PERCENT: 0.9 % (ref 0–2)
BILIRUB SERPL-MCNC: 0.4 MG/DL (ref 0–1.2)
BILIRUBIN URINE: NEGATIVE
BLOOD, URINE: ABNORMAL
BORDETELLA PARAPERTUSSIS BY PCR: NOT DETECTED
BORDETELLA PERTUSSIS BY PCR: NOT DETECTED
BUN BLDV-MCNC: 50 MG/DL (ref 8–23)
CALCIUM SERPL-MCNC: 9.5 MG/DL (ref 8.6–10.2)
CHLAMYDOPHILIA PNEUMONIAE BY PCR: NOT DETECTED
CHLORIDE BLD-SCNC: 105 MMOL/L (ref 98–107)
CLARITY: CLEAR
CO2: 16 MMOL/L (ref 22–29)
COLOR: YELLOW
CORONAVIRUS 229E BY PCR: NOT DETECTED
CORONAVIRUS HKU1 BY PCR: NOT DETECTED
CORONAVIRUS NL63 BY PCR: NOT DETECTED
CORONAVIRUS OC43 BY PCR: NOT DETECTED
CREAT SERPL-MCNC: 4 MG/DL (ref 0.7–1.2)
DELIVERY SYSTEMS: ABNORMAL
DEVICE: ABNORMAL
EKG ATRIAL RATE: 108 BPM
EKG P AXIS: 23 DEGREES
EKG P-R INTERVAL: 148 MS
EKG Q-T INTERVAL: 334 MS
EKG QRS DURATION: 78 MS
EKG QTC CALCULATION (BAZETT): 447 MS
EKG R AXIS: 42 DEGREES
EKG T AXIS: 54 DEGREES
EKG VENTRICULAR RATE: 108 BPM
EOSINOPHILS ABSOLUTE: 0 E9/L (ref 0.05–0.5)
EOSINOPHILS RELATIVE PERCENT: 0.4 % (ref 0–6)
FIO2 ARTERIAL: 15
GFR AFRICAN AMERICAN: 18
GFR NON-AFRICAN AMERICAN: 18 ML/MIN/1.73
GLUCOSE BLD-MCNC: 145 MG/DL (ref 74–99)
GLUCOSE URINE: 250 MG/DL
HBA1C MFR BLD: 7.6 % (ref 4–5.6)
HCO3 ARTERIAL: 17.1 MMOL/L (ref 22–26)
HCT VFR BLD CALC: 56 % (ref 37–54)
HEMOGLOBIN: 16.7 G/DL (ref 12.5–16.5)
HUMAN METAPNEUMOVIRUS BY PCR: NOT DETECTED
HUMAN RHINOVIRUS/ENTEROVIRUS BY PCR: NOT DETECTED
INFLUENZA A BY PCR: NOT DETECTED
INFLUENZA A BY PCR: NOT DETECTED
INFLUENZA B BY PCR: NOT DETECTED
INFLUENZA B BY PCR: NOT DETECTED
KETONES, URINE: NEGATIVE MG/DL
LACTIC ACID, SEPSIS: 1.5 MMOL/L (ref 0.5–1.9)
LEUKOCYTE ESTERASE, URINE: NEGATIVE
LYMPHOCYTES ABSOLUTE: 0.24 E9/L (ref 1.5–4)
LYMPHOCYTES RELATIVE PERCENT: 5.4 % (ref 20–42)
MCH RBC QN AUTO: 26 PG (ref 26–35)
MCHC RBC AUTO-ENTMCNC: 29.8 % (ref 32–34.5)
MCV RBC AUTO: 87.1 FL (ref 80–99.9)
METER GLUCOSE: 282 MG/DL (ref 74–99)
METER GLUCOSE: 321 MG/DL (ref 74–99)
MONOCYTES ABSOLUTE: 0.33 E9/L (ref 0.1–0.95)
MONOCYTES RELATIVE PERCENT: 7.2 % (ref 2–12)
MYCOPLASMA PNEUMONIAE BY PCR: NOT DETECTED
NEUTROPHILS ABSOLUTE: 4.09 E9/L (ref 1.8–7.3)
NEUTROPHILS RELATIVE PERCENT: 86.5 % (ref 43–80)
NITRITE, URINE: NEGATIVE
O2 SATURATION: 99.6 % (ref 92–98.5)
OPERATOR ID: 377
OVALOCYTES: ABNORMAL
PARAINFLUENZA VIRUS 1 BY PCR: NOT DETECTED
PARAINFLUENZA VIRUS 2 BY PCR: NOT DETECTED
PARAINFLUENZA VIRUS 3 BY PCR: NOT DETECTED
PARAINFLUENZA VIRUS 4 BY PCR: NOT DETECTED
PCO2 ARTERIAL: 29.1 MMHG (ref 35–45)
PDW BLD-RTO: 17.7 FL (ref 11.5–15)
PH BLOOD GAS: 7.38 (ref 7.35–7.45)
PH UA: 5 (ref 5–9)
PLATELET # BLD: 161 E9/L (ref 130–450)
PMV BLD AUTO: 9.7 FL (ref 7–12)
PO2 ARTERIAL: 185.1 MMHG (ref 60–80)
POIKILOCYTES: ABNORMAL
POLYCHROMASIA: ABNORMAL
POTASSIUM REFLEX MAGNESIUM: 5.6 MMOL/L (ref 3.5–5)
PRO-BNP: 4812 PG/ML (ref 0–125)
PROCALCITONIN: 0.25 NG/ML (ref 0–0.08)
PROTEIN UA: >=300 MG/DL
RBC # BLD: 6.43 E12/L (ref 3.8–5.8)
RBC UA: NORMAL /HPF (ref 0–2)
RESPIRATORY SYNCYTIAL VIRUS BY PCR: NOT DETECTED
SODIUM BLD-SCNC: 138 MMOL/L (ref 132–146)
SOURCE, BLOOD GAS: ABNORMAL
SPECIFIC GRAVITY UA: 1.02 (ref 1–1.03)
TOTAL PROTEIN: 8.4 G/DL (ref 6.4–8.3)
TROPONIN: 0.09 NG/ML (ref 0–0.03)
UROBILINOGEN, URINE: 0.2 E.U./DL
WBC # BLD: 4.7 E9/L (ref 4.5–11.5)
WBC UA: NORMAL /HPF (ref 0–5)

## 2020-02-02 PROCEDURE — 71045 X-RAY EXAM CHEST 1 VIEW: CPT

## 2020-02-02 PROCEDURE — 82962 GLUCOSE BLOOD TEST: CPT

## 2020-02-02 PROCEDURE — 6360000002 HC RX W HCPCS: Performed by: INTERNAL MEDICINE

## 2020-02-02 PROCEDURE — 6370000000 HC RX 637 (ALT 250 FOR IP): Performed by: INTERNAL MEDICINE

## 2020-02-02 PROCEDURE — 71250 CT THORAX DX C-: CPT

## 2020-02-02 PROCEDURE — 6360000002 HC RX W HCPCS: Performed by: EMERGENCY MEDICINE

## 2020-02-02 PROCEDURE — 81001 URINALYSIS AUTO W/SCOPE: CPT

## 2020-02-02 PROCEDURE — 36600 WITHDRAWAL OF ARTERIAL BLOOD: CPT

## 2020-02-02 PROCEDURE — 94644 CONT INHLJ TX 1ST HOUR: CPT

## 2020-02-02 PROCEDURE — 83880 ASSAY OF NATRIURETIC PEPTIDE: CPT

## 2020-02-02 PROCEDURE — 99285 EMERGENCY DEPT VISIT HI MDM: CPT

## 2020-02-02 PROCEDURE — 1200000000 HC SEMI PRIVATE

## 2020-02-02 PROCEDURE — 84145 PROCALCITONIN (PCT): CPT

## 2020-02-02 PROCEDURE — 87502 INFLUENZA DNA AMP PROBE: CPT

## 2020-02-02 PROCEDURE — 87040 BLOOD CULTURE FOR BACTERIA: CPT

## 2020-02-02 PROCEDURE — 94660 CPAP INITIATION&MGMT: CPT

## 2020-02-02 PROCEDURE — 83036 HEMOGLOBIN GLYCOSYLATED A1C: CPT

## 2020-02-02 PROCEDURE — 96374 THER/PROPH/DIAG INJ IV PUSH: CPT

## 2020-02-02 PROCEDURE — 87088 URINE BACTERIA CULTURE: CPT

## 2020-02-02 PROCEDURE — 0100U HC RESPIRPTHGN MULT REV TRANS & AMP PRB TECH 21 TRGT: CPT

## 2020-02-02 PROCEDURE — 6370000000 HC RX 637 (ALT 250 FOR IP): Performed by: EMERGENCY MEDICINE

## 2020-02-02 PROCEDURE — 82803 BLOOD GASES ANY COMBINATION: CPT

## 2020-02-02 PROCEDURE — 93005 ELECTROCARDIOGRAM TRACING: CPT | Performed by: EMERGENCY MEDICINE

## 2020-02-02 PROCEDURE — 80053 COMPREHEN METABOLIC PANEL: CPT

## 2020-02-02 PROCEDURE — 83605 ASSAY OF LACTIC ACID: CPT

## 2020-02-02 PROCEDURE — 85025 COMPLETE CBC W/AUTO DIFF WBC: CPT

## 2020-02-02 PROCEDURE — 84484 ASSAY OF TROPONIN QUANT: CPT

## 2020-02-02 PROCEDURE — 36415 COLL VENOUS BLD VENIPUNCTURE: CPT

## 2020-02-02 PROCEDURE — 2580000003 HC RX 258: Performed by: EMERGENCY MEDICINE

## 2020-02-02 PROCEDURE — 87450 HC DIRECT STREP B ANTIGEN: CPT

## 2020-02-02 RX ORDER — FUROSEMIDE 10 MG/ML
20 INJECTION INTRAMUSCULAR; INTRAVENOUS ONCE
Status: COMPLETED | OUTPATIENT
Start: 2020-02-02 | End: 2020-02-02

## 2020-02-02 RX ORDER — LISINOPRIL 20 MG/1
20 TABLET ORAL DAILY
Status: DISCONTINUED | OUTPATIENT
Start: 2020-02-03 | End: 2020-02-03

## 2020-02-02 RX ORDER — IPRATROPIUM BROMIDE AND ALBUTEROL SULFATE 2.5; .5 MG/3ML; MG/3ML
1 SOLUTION RESPIRATORY (INHALATION)
Status: DISCONTINUED | OUTPATIENT
Start: 2020-02-02 | End: 2020-02-13 | Stop reason: HOSPADM

## 2020-02-02 RX ORDER — VITAMIN B COMPLEX
3000 TABLET ORAL DAILY
Status: DISCONTINUED | OUTPATIENT
Start: 2020-02-03 | End: 2020-02-13 | Stop reason: HOSPADM

## 2020-02-02 RX ORDER — IPRATROPIUM BROMIDE AND ALBUTEROL SULFATE 2.5; .5 MG/3ML; MG/3ML
3 SOLUTION RESPIRATORY (INHALATION) ONCE
Status: COMPLETED | OUTPATIENT
Start: 2020-02-02 | End: 2020-02-02

## 2020-02-02 RX ORDER — NICOTINE POLACRILEX 4 MG
15 LOZENGE BUCCAL PRN
Status: DISCONTINUED | OUTPATIENT
Start: 2020-02-02 | End: 2020-02-13 | Stop reason: HOSPADM

## 2020-02-02 RX ORDER — IPRATROPIUM BROMIDE AND ALBUTEROL SULFATE 2.5; .5 MG/3ML; MG/3ML
3 SOLUTION RESPIRATORY (INHALATION)
Status: DISCONTINUED | OUTPATIENT
Start: 2020-02-02 | End: 2020-02-02

## 2020-02-02 RX ORDER — GLIMEPIRIDE 4 MG/1
4 TABLET ORAL
Status: DISCONTINUED | OUTPATIENT
Start: 2020-02-03 | End: 2020-02-08

## 2020-02-02 RX ORDER — METOPROLOL TARTRATE 50 MG/1
50 TABLET, FILM COATED ORAL 2 TIMES DAILY
Status: DISCONTINUED | OUTPATIENT
Start: 2020-02-02 | End: 2020-02-13 | Stop reason: HOSPADM

## 2020-02-02 RX ORDER — HYDRALAZINE HYDROCHLORIDE 25 MG/1
25 TABLET, FILM COATED ORAL 2 TIMES DAILY
Status: ON HOLD | COMMUNITY
End: 2020-02-13 | Stop reason: SDUPTHER

## 2020-02-02 RX ORDER — METHYLPREDNISOLONE SODIUM SUCCINATE 125 MG/2ML
125 INJECTION, POWDER, LYOPHILIZED, FOR SOLUTION INTRAMUSCULAR; INTRAVENOUS ONCE
Status: COMPLETED | OUTPATIENT
Start: 2020-02-02 | End: 2020-02-02

## 2020-02-02 RX ORDER — CALCITRIOL 0.25 UG/1
0.25 CAPSULE, LIQUID FILLED ORAL
Status: DISCONTINUED | OUTPATIENT
Start: 2020-02-03 | End: 2020-02-13 | Stop reason: HOSPADM

## 2020-02-02 RX ORDER — GUAIFENESIN 600 MG/1
600 TABLET, EXTENDED RELEASE ORAL 2 TIMES DAILY
Status: DISCONTINUED | OUTPATIENT
Start: 2020-02-02 | End: 2020-02-13 | Stop reason: HOSPADM

## 2020-02-02 RX ORDER — INSULIN GLARGINE 100 [IU]/ML
25 INJECTION, SOLUTION SUBCUTANEOUS NIGHTLY
Status: DISCONTINUED | OUTPATIENT
Start: 2020-02-02 | End: 2020-02-03

## 2020-02-02 RX ORDER — INSULIN GLARGINE 100 [IU]/ML
55 INJECTION, SOLUTION SUBCUTANEOUS NIGHTLY
Status: ON HOLD | COMMUNITY
End: 2021-07-25 | Stop reason: SDUPTHER

## 2020-02-02 RX ORDER — FERROUS SULFATE 325(65) MG
325 TABLET ORAL
Status: DISCONTINUED | OUTPATIENT
Start: 2020-02-03 | End: 2020-02-13 | Stop reason: HOSPADM

## 2020-02-02 RX ORDER — IPRATROPIUM BROMIDE AND ALBUTEROL SULFATE 2.5; .5 MG/3ML; MG/3ML
1 SOLUTION RESPIRATORY (INHALATION) EVERY 4 HOURS PRN
Status: DISCONTINUED | OUTPATIENT
Start: 2020-02-02 | End: 2020-02-13 | Stop reason: HOSPADM

## 2020-02-02 RX ORDER — FERROUS SULFATE 325(65) MG
325 TABLET ORAL
COMMUNITY
End: 2021-06-28 | Stop reason: ALTCHOICE

## 2020-02-02 RX ORDER — HYDRALAZINE HYDROCHLORIDE 25 MG/1
25 TABLET, FILM COATED ORAL 2 TIMES DAILY
Status: DISCONTINUED | OUTPATIENT
Start: 2020-02-02 | End: 2020-02-03

## 2020-02-02 RX ORDER — ONDANSETRON 2 MG/ML
4 INJECTION INTRAMUSCULAR; INTRAVENOUS EVERY 6 HOURS PRN
Status: DISCONTINUED | OUTPATIENT
Start: 2020-02-02 | End: 2020-02-13 | Stop reason: HOSPADM

## 2020-02-02 RX ORDER — DEXTROSE MONOHYDRATE 50 MG/ML
100 INJECTION, SOLUTION INTRAVENOUS PRN
Status: DISCONTINUED | OUTPATIENT
Start: 2020-02-02 | End: 2020-02-13 | Stop reason: HOSPADM

## 2020-02-02 RX ORDER — GABAPENTIN 100 MG/1
100 CAPSULE ORAL 2 TIMES DAILY
Status: DISCONTINUED | OUTPATIENT
Start: 2020-02-02 | End: 2020-02-13 | Stop reason: HOSPADM

## 2020-02-02 RX ORDER — AMLODIPINE BESYLATE 10 MG/1
10 TABLET ORAL DAILY
Status: DISCONTINUED | OUTPATIENT
Start: 2020-02-02 | End: 2020-02-13 | Stop reason: HOSPADM

## 2020-02-02 RX ORDER — ACETAMINOPHEN 500 MG
500 TABLET ORAL EVERY 6 HOURS PRN
Status: DISCONTINUED | OUTPATIENT
Start: 2020-02-02 | End: 2020-02-13 | Stop reason: HOSPADM

## 2020-02-02 RX ORDER — DEXTROSE MONOHYDRATE 25 G/50ML
12.5 INJECTION, SOLUTION INTRAVENOUS PRN
Status: DISCONTINUED | OUTPATIENT
Start: 2020-02-02 | End: 2020-02-13 | Stop reason: HOSPADM

## 2020-02-02 RX ORDER — HEPARIN SODIUM 5000 [USP'U]/ML
5000 INJECTION, SOLUTION INTRAVENOUS; SUBCUTANEOUS EVERY 8 HOURS SCHEDULED
Status: DISCONTINUED | OUTPATIENT
Start: 2020-02-02 | End: 2020-02-13 | Stop reason: HOSPADM

## 2020-02-02 RX ORDER — CALCITRIOL 0.25 UG/1
1.5 CAPSULE, LIQUID FILLED ORAL
COMMUNITY

## 2020-02-02 RX ORDER — ACETAMINOPHEN 325 MG/1
650 TABLET ORAL ONCE
Status: COMPLETED | OUTPATIENT
Start: 2020-02-02 | End: 2020-02-02

## 2020-02-02 RX ORDER — LEVOFLOXACIN 5 MG/ML
500 INJECTION, SOLUTION INTRAVENOUS
Status: DISCONTINUED | OUTPATIENT
Start: 2020-02-02 | End: 2020-02-13 | Stop reason: HOSPADM

## 2020-02-02 RX ADMIN — GABAPENTIN 100 MG: 100 CAPSULE ORAL at 21:33

## 2020-02-02 RX ADMIN — CALCIUM GLUCONATE 1 G: 98 INJECTION, SOLUTION INTRAVENOUS at 14:26

## 2020-02-02 RX ADMIN — CEFEPIME HYDROCHLORIDE 2 G: 2 INJECTION, POWDER, FOR SOLUTION INTRAVENOUS at 14:38

## 2020-02-02 RX ADMIN — METHYLPREDNISOLONE SODIUM SUCCINATE 125 MG: 125 INJECTION, POWDER, FOR SOLUTION INTRAMUSCULAR; INTRAVENOUS at 12:19

## 2020-02-02 RX ADMIN — INSULIN LISPRO 8 UNITS: 100 INJECTION, SOLUTION INTRAVENOUS; SUBCUTANEOUS at 17:17

## 2020-02-02 RX ADMIN — LEVOFLOXACIN 500 MG: 5 INJECTION, SOLUTION INTRAVENOUS at 17:14

## 2020-02-02 RX ADMIN — HEPARIN SODIUM 5000 UNITS: 5000 INJECTION, SOLUTION INTRAVENOUS; SUBCUTANEOUS at 21:34

## 2020-02-02 RX ADMIN — METOPROLOL TARTRATE 50 MG: 50 TABLET, FILM COATED ORAL at 21:33

## 2020-02-02 RX ADMIN — AMLODIPINE BESYLATE 10 MG: 10 TABLET ORAL at 17:14

## 2020-02-02 RX ADMIN — INSULIN GLARGINE 25 UNITS: 100 INJECTION, SOLUTION SUBCUTANEOUS at 21:34

## 2020-02-02 RX ADMIN — FUROSEMIDE 20 MG: 10 INJECTION, SOLUTION INTRAMUSCULAR; INTRAVENOUS at 17:14

## 2020-02-02 RX ADMIN — HYDRALAZINE HYDROCHLORIDE 25 MG: 25 TABLET, FILM COATED ORAL at 21:33

## 2020-02-02 RX ADMIN — ACETAMINOPHEN 650 MG: 325 TABLET, FILM COATED ORAL at 14:26

## 2020-02-02 RX ADMIN — INSULIN LISPRO 3 UNITS: 100 INJECTION, SOLUTION INTRAVENOUS; SUBCUTANEOUS at 21:34

## 2020-02-02 RX ADMIN — GUAIFENESIN 600 MG: 600 TABLET, EXTENDED RELEASE ORAL at 21:33

## 2020-02-02 RX ADMIN — FUROSEMIDE 20 MG: 10 INJECTION, SOLUTION INTRAMUSCULAR; INTRAVENOUS at 14:25

## 2020-02-02 RX ADMIN — IPRATROPIUM BROMIDE AND ALBUTEROL SULFATE 3 AMPULE: .5; 3 SOLUTION RESPIRATORY (INHALATION) at 12:07

## 2020-02-02 ASSESSMENT — ENCOUNTER SYMPTOMS
SORE THROAT: 0
CONSTIPATION: 0
ALLERGIC/IMMUNOLOGIC NEGATIVE: 1
VOMITING: 0
PHOTOPHOBIA: 0
EYE REDNESS: 0
FACIAL SWELLING: 0
CHEST TIGHTNESS: 0
DIARRHEA: 0
SHORTNESS OF BREATH: 1
ABDOMINAL PAIN: 0
EYE PAIN: 0
COUGH: 1
NAUSEA: 0

## 2020-02-02 ASSESSMENT — PAIN SCALES - GENERAL
PAINLEVEL_OUTOF10: 0

## 2020-02-02 NOTE — ED PROVIDER NOTES
Patient is 80-year-old male presented to ED after being discharged yesterday from another hospital that he was admitted to for pneumonia. Patient states that he was placed on 2 and half liters to oxygen to go home with and was given a tank however when he went to use his tank today he had ran out of oxygen. He he drove himself to the hospital and upon arrival he was 65% on room air. Patient states that he was in the hospital for pneumonia at that time. Upon arrival to the ED the patient was hypoxic still in the 60% to 65% pulse ox he was tachypneic and tachycardic and febrile upon arrival.  Me upon arrival he was placed in room and put on BiPAP which improved his symptoms and his oxygen saturation. The past history is negative for hypertension, acute kidney disease and diabetes mellitus type 2. The history is provided by the patient. Shortness of Breath   Severity:  Severe  Onset quality:  Gradual  Duration:  1 day  Timing:  Constant  Progression:  Worsening  Chronicity:  New  Relieved by:  Nothing  Worsened by:  Exertion, activity and movement  Ineffective treatments:  None tried  Associated symptoms: cough and headaches    Associated symptoms: no abdominal pain, no chest pain, no fever, no sore throat and no vomiting         Review of Systems   Constitutional: Negative for chills, fatigue and fever. HENT: Negative for congestion, facial swelling, hearing loss and sore throat. Eyes: Negative for photophobia, pain and redness. Respiratory: Positive for cough and shortness of breath. Negative for chest tightness. Cardiovascular: Negative for chest pain and palpitations. Gastrointestinal: Negative for abdominal pain, constipation, diarrhea, nausea and vomiting. Endocrine: Negative for cold intolerance, polydipsia and polyuria. Genitourinary: Negative for dysuria, flank pain and frequency. Musculoskeletal: Negative for arthralgias, joint swelling and myalgias. Skin: Negative. point. Patient was stable throughout his stay in the emergency department and will be admitted for treatment for his various issues at this point. ED Course as of Feb 02 1409   Sun Feb 02, 2020   1123 EKG: This EKG is signed and interpreted by me. Rate: 108  Rhythm: Sinus tachycardia  Interpretation: Sinus tachycardia. Comparison: Compared with prior EKG on 9- and there is no significant change.      [AD]      ED Course User Index  [AD] Lowellradhika CharbelDO allyn      ED Course as of Feb 02 1409   Sun Feb 02, 2020   1123 EKG: This EKG is signed and interpreted by me. Rate: 108  Rhythm: Sinus tachycardia  Interpretation: Sinus tachycardia. Comparison: Compared with prior EKG on 9- and there is no significant change.      [AD]      ED Course User Index  [AD] Julián BargerDO allyn       --------------------------------------------- PAST HISTORY ---------------------------------------------  Past Medical History:  has a past medical history of Back pain, Diabetes mellitus ([facility] [address] DMII (diabetes mellitus, type 2) ([facility] History of cardiovascular stress test, HTN (hypertension), Hyperlipidemia, Hypertension, Lumbar radicular pain, and Type II or unspecified type diabetes mellitus without mention of complication, not stated as uncontrolled. Past Surgical History:  has a past surgical history that includes shoulder surgery; Vein Surgery; and other surgical history (Left, 06/06/14). Social History:  reports that he has quit smoking. He has a 30.00 pack-year smoking history. He has never used smokeless tobacco. He reports that he does not drink alcohol or use drugs. Family History: family history is not on file. The patients home medications have been reviewed. Allergies:  Other    -------------------------------------------------- RESULTS -------------------------------------------------    LABS:  Results for orders placed or performed during the hospital encounter of 02/02/20   CBC auto differential   Result Value Ref Range    WBC 4.7 4.5 - 11.5 E9/L    RBC 6.43 (H) 3.80 - 5.80 E12/L    Hemoglobin 16.7 (H) 12.5 - 16.5 g/dL    Hematocrit 56.0 (H) 37.0 - 54.0 %    MCV 87.1 80.0 - 99.9 fL    MCH 26.0 26.0 - 35.0 pg    MCHC 29.8 (L) 32.0 - 34.5 %    RDW 17.7 (H) 11.5 - 15.0 fL    Platelets 990 448 - 964 E9/L    MPV 9.7 7.0 - 12.0 fL    Neutrophils % 86.5 (H) 43.0 - 80.0 %    Lymphocytes % 5.4 (L) 20.0 - 42.0 %    Monocytes % 7.2 2.0 - 12.0 %    Eosinophils % 0.4 0.0 - 6.0 %    Basophils % 0.9 0.0 - 2.0 %    Neutrophils Absolute 4.09 1.80 - 7.30 E9/L    Lymphocytes Absolute 0.24 (L) 1.50 - 4.00 E9/L    Monocytes Absolute 0.33 0.10 - 0.95 E9/L    Eosinophils Absolute 0.00 (L) 0.05 - 0.50 E9/L    Basophils Absolute 0.04 0.00 - 0.20 E9/L    Polychromasia 1+     Poikilocytes 1+     Ovalocytes 1+    Comprehensive Metabolic Panel w/ Reflex to MG   Result Value Ref Range    Sodium 138 132 - 146 mmol/L    Potassium reflex Magnesium 5.6 (H) 3.5 - 5.0 mmol/L    Chloride 105 98 - 107 mmol/L    CO2 16 (L) 22 - 29 mmol/L    Anion Gap 17 (H) 7 - 16 mmol/L    Glucose 145 (H) 74 - 99 mg/dL    BUN 50 (H) 8 - 23 mg/dL    CREATININE 4.0 (H) 0.7 - 1.2 mg/dL    GFR Non-African American 18 >=60 mL/min/1.73    GFR African American 18     Calcium 9.5 8.6 - 10.2 mg/dL    Total Protein 8.4 (H) 6.4 - 8.3 g/dL    Alb 3.3 (L) 3.5 - 5.2 g/dL    Total Bilirubin 0.4 0.0 - 1.2 mg/dL    Alkaline Phosphatase 62 40 - 129 U/L    ALT 13 0 - 40 U/L    AST 13 0 - 39 U/L   Lactate, Sepsis   Result Value Ref Range    Lactic Acid, Sepsis 1.5 0.5 - 1.9 mmol/L   Troponin   Result Value Ref Range    Troponin 0.09 (H) 0.00 - 0.03 ng/mL   Brain Natriuretic Peptide   Result Value Ref Range    Pro-BNP 4,812 (H) 0 - 125 pg/mL   Arterial Blood Gas, Respiratory Only   Result Value Ref Range    Source: Arterial     FIO2 Arterial 15.0     pH, Blood Gas 7.377 7.350 - 7.450    pCO2, Arterial 29.1 (L) 35.0 - 45.0 mmHg    pO2, Arterial 185.1 (H) 60.0 - 80.0

## 2020-02-02 NOTE — H&P
No pertinent family history. REVIEW OF SYSTEMS:    Gen: Patient denies any lightheadedness or dizziness. No LOC or syncope. No fevers or chills. HEENT: No earache, sore throat or nasal congestion. Resp: Denies cough, hemoptysis or sputum production. Cardiac: Denies chest pain, + SOB, diaphoresis or palpitations. GI: No nausea, vomiting, diarrhea or constipation. No melena or hematochezia. : No urinary complaints, dysuria, hematuria or frequency. MSK: No extremity weakness, paralysis or paresthesias. PHYSICAL EXAM:    Vitals:  BP (!) 188/88   Pulse 105   Temp 100.1 °F (37.8 °C) (Oral)   Resp 20   Ht 5' 8\" (1.727 m)   Wt 208 lb (94.3 kg)   SpO2 100%   BMI 31.63 kg/m²     General:  This is a 60 y.o. yo male who is alert and oriented in NAD  HEENT:  Head is normocephalic and atraumatic, PERRLA, EOMI, mucus membranes moist with no pharyngeal erythema or exudate. Neck:  Supple with no carotid bruits, JVD or thyromegaly.   No cervical adenopathy  CV:  Regular rate and rhythm, no murmurs  Lungs: Coarse breath sounds with mild scattered crackles to auscultation bilaterally with no wheezes or rhonchi  Abdomen:  Soft, + obese, nontender, nondistended, bowel sounds present  Extremities:  No edema, peripheral pulses intact bilaterally  Neuro:  Cranial nerves II-XII grossly intact; motor and sensory function intact with no focal deficits  Skin:  No rashes, lesions or wounds    DATA:  CBC with Differential:    Lab Results   Component Value Date    WBC 4.7 02/02/2020    RBC 6.43 02/02/2020    HGB 16.7 02/02/2020    HCT 56.0 02/02/2020     02/02/2020    MCV 87.1 02/02/2020    MCH 26.0 02/02/2020    MCHC 29.8 02/02/2020    RDW 17.7 02/02/2020    LYMPHOPCT 5.4 02/02/2020    MONOPCT 7.2 02/02/2020    BASOPCT 0.9 02/02/2020    MONOSABS 0.33 02/02/2020    LYMPHSABS 0.24 02/02/2020    EOSABS 0.00 02/02/2020    BASOSABS 0.04 02/02/2020     CMP:    Lab Results   Component Value Date     02/02/2020    K 5.6 02/02/2020     02/02/2020    CO2 16 02/02/2020    BUN 50 02/02/2020    CREATININE 4.0 02/02/2020    GFRAA 18 02/02/2020    LABGLOM 18 02/02/2020    GLUCOSE 145 02/02/2020    PROT 8.4 02/02/2020    LABALBU 3.3 02/02/2020    CALCIUM 9.5 02/02/2020    BILITOT 0.4 02/02/2020    ALKPHOS 62 02/02/2020    AST 13 02/02/2020    ALT 13 02/02/2020     Magnesium:    Lab Results   Component Value Date    MG 1.9 01/23/2020     Phosphorus:    Lab Results   Component Value Date    PHOS 4.2 01/23/2020     PT/INR:  No results found for: PROTIME, INR  Troponin:    Lab Results   Component Value Date    TROPONINI 0.09 02/02/2020     U/A:    Lab Results   Component Value Date    COLORU Yellow 01/23/2020    PROTEINU 100 01/23/2020    PHUR 6.0 01/23/2020    LABCAST RARE 09/21/2018    WBCUA 1-3 01/23/2020    RBCUA 2-5 01/23/2020    BACTERIA RARE 01/23/2020    CLARITYU Clear 01/23/2020    SPECGRAV 1.015 01/23/2020    LEUKOCYTESUR Negative 01/23/2020    UROBILINOGEN 0.2 01/23/2020    BILIRUBINUR Negative 01/23/2020    BLOODU SMALL 01/23/2020    GLUCOSEU Negative 01/23/2020    AMORPHOUS FEW 10/02/2019     ABG:    Lab Results   Component Value Date    PH 7.377 02/02/2020    BE -7.1 02/02/2020    O2SAT 99.6 02/02/2020     HgBA1c:    Lab Results   Component Value Date    LABA1C 6.8 03/08/2016     FLP:    Lab Results   Component Value Date    TRIG 63 09/24/2018    HDL 47 03/08/2016    LDLCALC 96 03/08/2016    LABVLDL 12 03/08/2016     TSH:    Lab Results   Component Value Date    TSH 0.337 09/22/2018     IRON:    Lab Results   Component Value Date    IRON 58 11/13/2019     LIPASE:    Lab Results   Component Value Date    LIPASE 78 09/24/2018       ASSESSMENT AND PLAN:      Patient Active Problem List    Diagnosis Date Noted    Hallux valgus, acquired 06/06/2014     Priority: Low     Class: Present on Admission    Pneumonia 01/28/2020    Acute pancreatitis without necrosis or infection, unspecified 09/23/2018    Idiopathic acute pancreatitis 09/21/2018    B12 deficiency 03/17/2016    DMII (diabetes mellitus, type 2) ([facility] [address] 07/28/2015    HTN (hypertension) 07/28/2015    Lumbar radicular pain 07/28/2015    Spinal stenosis 07/28/2015     1. Acute hypoxic respiratory failure  2. Acute congestive heart failure  3. Acute on chronic kidney disease stage IV  4. Hypertension  5. Non-insulin-dependent diabetes mellitus type II  6.   Pneumonia      Plan:  Admit to fourth floor telemetry  Echocardiogram  Get records from [address] [place] [address] reviewed  Glucoscans 4 times daily with sliding scale insulin  DuoNeb aerosols  O2 nasal cannula  Hold Lodine, metformin  Consult nephrology  Procalcitonin now  Routine labs in a.m.  CT of the lung without contrast    Pino Lund D.O.  2/2/2020  2:08 PM

## 2020-02-03 LAB
ALBUMIN SERPL-MCNC: 3.1 G/DL (ref 3.5–5.2)
ALP BLD-CCNC: 58 U/L (ref 40–129)
ALT SERPL-CCNC: 11 U/L (ref 0–40)
ANION GAP SERPL CALCULATED.3IONS-SCNC: 14 MMOL/L (ref 7–16)
ANION GAP SERPL CALCULATED.3IONS-SCNC: 18 MMOL/L (ref 7–16)
ANION GAP SERPL CALCULATED.3IONS-SCNC: 19 MMOL/L (ref 7–16)
AST SERPL-CCNC: 11 U/L (ref 0–39)
BASOPHILS ABSOLUTE: 0.01 E9/L (ref 0–0.2)
BASOPHILS RELATIVE PERCENT: 0.1 % (ref 0–2)
BILIRUB SERPL-MCNC: 0.3 MG/DL (ref 0–1.2)
BUN BLDV-MCNC: 67 MG/DL (ref 8–23)
BUN BLDV-MCNC: 76 MG/DL (ref 8–23)
BUN BLDV-MCNC: 77 MG/DL (ref 8–23)
CALCIUM SERPL-MCNC: 9.3 MG/DL (ref 8.6–10.2)
CALCIUM SERPL-MCNC: 9.6 MG/DL (ref 8.6–10.2)
CALCIUM SERPL-MCNC: 9.7 MG/DL (ref 8.6–10.2)
CHLORIDE BLD-SCNC: 100 MMOL/L (ref 98–107)
CHLORIDE BLD-SCNC: 102 MMOL/L (ref 98–107)
CHLORIDE BLD-SCNC: 104 MMOL/L (ref 98–107)
CHOLESTEROL, TOTAL: 143 MG/DL (ref 0–199)
CO2: 16 MMOL/L (ref 22–29)
CO2: 16 MMOL/L (ref 22–29)
CO2: 20 MMOL/L (ref 22–29)
CREAT SERPL-MCNC: 4.7 MG/DL (ref 0.7–1.2)
CREAT SERPL-MCNC: 4.7 MG/DL (ref 0.7–1.2)
CREAT SERPL-MCNC: 4.8 MG/DL (ref 0.7–1.2)
EOSINOPHILS ABSOLUTE: 0 E9/L (ref 0.05–0.5)
EOSINOPHILS RELATIVE PERCENT: 0 % (ref 0–6)
GFR AFRICAN AMERICAN: 15
GFR NON-AFRICAN AMERICAN: 15 ML/MIN/1.73
GLUCOSE BLD-MCNC: 146 MG/DL (ref 74–99)
GLUCOSE BLD-MCNC: 226 MG/DL (ref 74–99)
GLUCOSE BLD-MCNC: 253 MG/DL (ref 74–99)
HCT VFR BLD CALC: 27.6 % (ref 37–54)
HCT VFR BLD CALC: 28.1 % (ref 37–54)
HDLC SERPL-MCNC: 60 MG/DL
HEMOGLOBIN: 8.2 G/DL (ref 12.5–16.5)
HEMOGLOBIN: 8.3 G/DL (ref 12.5–16.5)
IMMATURE GRANULOCYTES #: 0.06 E9/L
IMMATURE GRANULOCYTES %: 0.5 % (ref 0–5)
L. PNEUMOPHILA SEROGP 1 UR AG: NORMAL
LDL CHOLESTEROL CALCULATED: 71 MG/DL (ref 0–99)
LYMPHOCYTES ABSOLUTE: 0.77 E9/L (ref 1.5–4)
LYMPHOCYTES RELATIVE PERCENT: 6.8 % (ref 20–42)
MAGNESIUM: 2.4 MG/DL (ref 1.6–2.6)
MCH RBC QN AUTO: 26.1 PG (ref 26–35)
MCHC RBC AUTO-ENTMCNC: 29.2 % (ref 32–34.5)
MCV RBC AUTO: 89.5 FL (ref 80–99.9)
METER GLUCOSE: 125 MG/DL (ref 74–99)
METER GLUCOSE: 136 MG/DL (ref 74–99)
METER GLUCOSE: 221 MG/DL (ref 74–99)
METER GLUCOSE: 259 MG/DL (ref 74–99)
MONOCYTES ABSOLUTE: 0.35 E9/L (ref 0.1–0.95)
MONOCYTES RELATIVE PERCENT: 3.1 % (ref 2–12)
NEUTROPHILS ABSOLUTE: 10.14 E9/L (ref 1.8–7.3)
NEUTROPHILS RELATIVE PERCENT: 89.5 % (ref 43–80)
PDW BLD-RTO: 16.2 FL (ref 11.5–15)
PHOSPHORUS: 5.1 MG/DL (ref 2.5–4.5)
PLATELET # BLD: 328 E9/L (ref 130–450)
PMV BLD AUTO: 9.7 FL (ref 7–12)
POTASSIUM SERPL-SCNC: 5.2 MMOL/L (ref 3.5–5)
POTASSIUM SERPL-SCNC: 5.3 MMOL/L (ref 3.5–5)
POTASSIUM SERPL-SCNC: 5.9 MMOL/L (ref 3.5–5)
RBC # BLD: 3.14 E12/L (ref 3.8–5.8)
SODIUM BLD-SCNC: 132 MMOL/L (ref 132–146)
SODIUM BLD-SCNC: 134 MMOL/L (ref 132–146)
SODIUM BLD-SCNC: 143 MMOL/L (ref 132–146)
STREP PNEUMONIAE ANTIGEN, URINE: NORMAL
TOTAL PROTEIN: 8.4 G/DL (ref 6.4–8.3)
TRIGL SERPL-MCNC: 60 MG/DL (ref 0–149)
TSH SERPL DL<=0.05 MIU/L-ACNC: 0.93 UIU/ML (ref 0.27–4.2)
VLDLC SERPL CALC-MCNC: 12 MG/DL
WBC # BLD: 11.3 E9/L (ref 4.5–11.5)

## 2020-02-03 PROCEDURE — 36415 COLL VENOUS BLD VENIPUNCTURE: CPT

## 2020-02-03 PROCEDURE — 80053 COMPREHEN METABOLIC PANEL: CPT

## 2020-02-03 PROCEDURE — 94640 AIRWAY INHALATION TREATMENT: CPT

## 2020-02-03 PROCEDURE — 93306 TTE W/DOPPLER COMPLETE: CPT

## 2020-02-03 PROCEDURE — 6360000002 HC RX W HCPCS: Performed by: INTERNAL MEDICINE

## 2020-02-03 PROCEDURE — 84100 ASSAY OF PHOSPHORUS: CPT

## 2020-02-03 PROCEDURE — 83735 ASSAY OF MAGNESIUM: CPT

## 2020-02-03 PROCEDURE — 80048 BASIC METABOLIC PNL TOTAL CA: CPT

## 2020-02-03 PROCEDURE — 85018 HEMOGLOBIN: CPT

## 2020-02-03 PROCEDURE — 2700000000 HC OXYGEN THERAPY PER DAY

## 2020-02-03 PROCEDURE — 80061 LIPID PANEL: CPT

## 2020-02-03 PROCEDURE — 85014 HEMATOCRIT: CPT

## 2020-02-03 PROCEDURE — 6370000000 HC RX 637 (ALT 250 FOR IP): Performed by: INTERNAL MEDICINE

## 2020-02-03 PROCEDURE — 85025 COMPLETE CBC W/AUTO DIFF WBC: CPT

## 2020-02-03 PROCEDURE — 1200000000 HC SEMI PRIVATE

## 2020-02-03 PROCEDURE — 94660 CPAP INITIATION&MGMT: CPT

## 2020-02-03 PROCEDURE — 82962 GLUCOSE BLOOD TEST: CPT

## 2020-02-03 PROCEDURE — 84443 ASSAY THYROID STIM HORMONE: CPT

## 2020-02-03 RX ORDER — HYDRALAZINE HYDROCHLORIDE 50 MG/1
50 TABLET, FILM COATED ORAL 2 TIMES DAILY
Status: DISCONTINUED | OUTPATIENT
Start: 2020-02-03 | End: 2020-02-12

## 2020-02-03 RX ORDER — SODIUM POLYSTYRENE SULFONATE 15 G/60ML
30 SUSPENSION ORAL; RECTAL ONCE
Status: COMPLETED | OUTPATIENT
Start: 2020-02-03 | End: 2020-02-03

## 2020-02-03 RX ORDER — SEVELAMER CARBONATE 800 MG/1
800 TABLET, FILM COATED ORAL
Status: DISCONTINUED | OUTPATIENT
Start: 2020-02-03 | End: 2020-02-13 | Stop reason: HOSPADM

## 2020-02-03 RX ORDER — INSULIN GLARGINE 100 [IU]/ML
32 INJECTION, SOLUTION SUBCUTANEOUS NIGHTLY
Status: DISCONTINUED | OUTPATIENT
Start: 2020-02-03 | End: 2020-02-08

## 2020-02-03 RX ORDER — SODIUM BICARBONATE 650 MG/1
1300 TABLET ORAL 3 TIMES DAILY
Status: DISCONTINUED | OUTPATIENT
Start: 2020-02-03 | End: 2020-02-13 | Stop reason: HOSPADM

## 2020-02-03 RX ADMIN — HYDRALAZINE HYDROCHLORIDE 25 MG: 25 TABLET, FILM COATED ORAL at 08:15

## 2020-02-03 RX ADMIN — AMLODIPINE BESYLATE 10 MG: 10 TABLET ORAL at 08:14

## 2020-02-03 RX ADMIN — IPRATROPIUM BROMIDE AND ALBUTEROL SULFATE 1 AMPULE: .5; 3 SOLUTION RESPIRATORY (INHALATION) at 13:38

## 2020-02-03 RX ADMIN — GABAPENTIN 100 MG: 100 CAPSULE ORAL at 20:27

## 2020-02-03 RX ADMIN — VITAMIN D, TAB 1000IU (100/BT) 3000 UNITS: 25 TAB at 08:14

## 2020-02-03 RX ADMIN — SEVELAMER CARBONATE 800 MG: 800 TABLET, FILM COATED ORAL at 12:42

## 2020-02-03 RX ADMIN — HYDRALAZINE HYDROCHLORIDE 50 MG: 50 TABLET ORAL at 20:27

## 2020-02-03 RX ADMIN — FERROUS SULFATE TAB 325 MG (65 MG ELEMENTAL FE) 325 MG: 325 (65 FE) TAB at 08:14

## 2020-02-03 RX ADMIN — INSULIN GLARGINE 32 UNITS: 100 INJECTION, SOLUTION SUBCUTANEOUS at 20:28

## 2020-02-03 RX ADMIN — METOPROLOL TARTRATE 50 MG: 50 TABLET, FILM COATED ORAL at 20:27

## 2020-02-03 RX ADMIN — GABAPENTIN 100 MG: 100 CAPSULE ORAL at 08:14

## 2020-02-03 RX ADMIN — SODIUM POLYSTYRENE SULFONATE 30 G: 15 SUSPENSION ORAL; RECTAL at 16:21

## 2020-02-03 RX ADMIN — CALCITRIOL 0.25 MCG: 0.25 CAPSULE ORAL at 12:55

## 2020-02-03 RX ADMIN — EPOETIN ALFA-EPBX 8000 UNITS: 10000 INJECTION, SOLUTION INTRAVENOUS; SUBCUTANEOUS at 16:20

## 2020-02-03 RX ADMIN — HEPARIN SODIUM 5000 UNITS: 5000 INJECTION, SOLUTION INTRAVENOUS; SUBCUTANEOUS at 21:02

## 2020-02-03 RX ADMIN — IPRATROPIUM BROMIDE AND ALBUTEROL SULFATE 1 AMPULE: .5; 3 SOLUTION RESPIRATORY (INHALATION) at 16:43

## 2020-02-03 RX ADMIN — LISINOPRIL 20 MG: 20 TABLET ORAL at 08:15

## 2020-02-03 RX ADMIN — INSULIN LISPRO 6 UNITS: 100 INJECTION, SOLUTION INTRAVENOUS; SUBCUTANEOUS at 08:18

## 2020-02-03 RX ADMIN — IPRATROPIUM BROMIDE AND ALBUTEROL SULFATE 1 AMPULE: .5; 3 SOLUTION RESPIRATORY (INHALATION) at 06:29

## 2020-02-03 RX ADMIN — METOPROLOL TARTRATE 50 MG: 50 TABLET, FILM COATED ORAL at 08:14

## 2020-02-03 RX ADMIN — SODIUM BICARBONATE 650 MG TABLET 1300 MG: at 20:27

## 2020-02-03 RX ADMIN — GUAIFENESIN 600 MG: 600 TABLET, EXTENDED RELEASE ORAL at 20:27

## 2020-02-03 RX ADMIN — IPRATROPIUM BROMIDE AND ALBUTEROL SULFATE 1 AMPULE: .5; 3 SOLUTION RESPIRATORY (INHALATION) at 09:51

## 2020-02-03 RX ADMIN — GUAIFENESIN 600 MG: 600 TABLET, EXTENDED RELEASE ORAL at 08:14

## 2020-02-03 RX ADMIN — SODIUM BICARBONATE 650 MG TABLET 1300 MG: at 13:01

## 2020-02-03 RX ADMIN — MOMETASONE FUROATE AND FORMOTEROL FUMARATE DIHYDRATE 2 PUFF: 200; 5 AEROSOL RESPIRATORY (INHALATION) at 16:45

## 2020-02-03 RX ADMIN — HEPARIN SODIUM 5000 UNITS: 5000 INJECTION, SOLUTION INTRAVENOUS; SUBCUTANEOUS at 13:01

## 2020-02-03 RX ADMIN — SEVELAMER CARBONATE 800 MG: 800 TABLET, FILM COATED ORAL at 16:20

## 2020-02-03 RX ADMIN — MOMETASONE FUROATE AND FORMOTEROL FUMARATE DIHYDRATE 2 PUFF: 200; 5 AEROSOL RESPIRATORY (INHALATION) at 06:29

## 2020-02-03 RX ADMIN — GLIMEPIRIDE 4 MG: 4 TABLET ORAL at 12:43

## 2020-02-03 RX ADMIN — HEPARIN SODIUM 5000 UNITS: 5000 INJECTION, SOLUTION INTRAVENOUS; SUBCUTANEOUS at 05:46

## 2020-02-03 RX ADMIN — INSULIN LISPRO 4 UNITS: 100 INJECTION, SOLUTION INTRAVENOUS; SUBCUTANEOUS at 12:44

## 2020-02-03 NOTE — CONSULTS
Progress Note  2/3/2020 12:04 PM  Subjective:   Admit Date: 2/2/2020  PCP: Ange Almazan DO  Interval History: Full consult deferred as just saw pt while he was inpt at [facility] and seen 2/2. He was admitted for Acute Resp Failure and our service was seening for CKD G4 with a baseline serum cr 3.0-3.8mg/dl. Pt was at baseline during the hospital admission. He had an amb pulse ox prior to D/C and was found to be hypoxic and home O2 prescribed. He presented back to ED 2/2/20 after O2 tank ran out. In the ED initial O2 sat 60% and /90. No fever or chills but he mdid feel lightheaded    Diet: DIET CARB CONTROL;    Data:   Scheduled Meds:   vancomycin  1,500 mg Intravenous Once    amLODIPine  10 mg Oral Daily    lisinopril  20 mg Oral Daily    Vitamin D  3,000 Units Oral Daily    metoprolol tartrate  50 mg Oral BID    glimepiride  4 mg Oral Daily with breakfast    gabapentin  100 mg Oral BID    insulin lispro  0-12 Units Subcutaneous TID WC    insulin lispro  0-6 Units Subcutaneous Nightly    heparin (porcine)  5,000 Units Subcutaneous 3 times per day    levofloxacin  500 mg Intravenous Q48H    ipratropium-albuterol  1 ampule Inhalation Q4H WA    guaiFENesin  600 mg Oral BID    mometasone-formoterol  2 puff Inhalation BID    insulin glargine  25 Units Subcutaneous Nightly    hydrALAZINE  25 mg Oral BID    ferrous sulfate  325 mg Oral Daily with breakfast    calcitRIOL  0.25 mcg Oral Once per day on Mon Wed Fri     Continuous Infusions:   dextrose       PRN Meds:glucose, dextrose, glucagon (rDNA), dextrose, acetaminophen, ondansetron, magnesium hydroxide, ipratropium-albuterol  I/O last 3 completed shifts:   In: 580 [P.O.:580]  Out: 575 [Urine:575]  I/O this shift:  In: 120 [P.O.:120]  Out: -     Intake/Output Summary (Last 24 hours) at 2/3/2020 1204  Last data filed at 2/3/2020 1018  Gross per 24 hour   Intake 700 ml   Output 575 ml   Net 125 ml     CBC:   Recent Labs     02/02/20  1153 Multifocal peripheral consolidation/airspace disease left lower   lobe. Objective:   Vitals: BP (!) 169/78   Pulse 88   Temp 97.7 °F (36.5 °C) (Oral)   Resp 20   Ht 5' 8\" (1.727 m)   Wt 208 lb (94.3 kg)   SpO2 94%   BMI 31.63 kg/m²   General appearance: alert, appears stated age and cooperative  Skin: Skin color, texture, turgor normal. No rashes or lesions  HEENT: Head: Normocephalic, no lesions, without obvious abnormality. Head: Normal, normocephalic, atraumatic. Eye: Normal external eye, conjunctiva, lids cornea, CHATO. Nose: Normal external nose, mucus membranes and septum. Pharynx: Dental Hygiene adequate. Normal buccal mucosa. Normal pharynx. Neck: no adenopathy, no carotid bruit, no JVD and supple, symmetrical, trachea midline  Lungs: rhonchi bilaterally  Heart: regular rate and rhythm, S1, S2 normal, no S3 or S4 and no rub  Abdomen: soft, non-tender; bowel sounds normal; no masses,  no organomegaly  Extremities: extremities normal, atraumatic, no cyanosis or edema  Neurologic: Mental status: Alert, oriented, thought content appropriate    Assessment:   Patient Active Problem List:     Hallux valgus, acquired     DMII (diabetes mellitus, type 2) (HCC)     HTN (hypertension)     Lumbar radicular pain     Spinal stenosis     B12 deficiency     Idiopathic acute pancreatitis     Acute pancreatitis without necrosis or infection, unspecified     Pneumonia     Respiratory failure ([facility] [address]    Plan:   1. Stage I HUGH -the rise in the cr above baseline most probably reflects the acute hypoxia and uncontrolled HTN    2. CKD G4 with a baseline serum cr 3.0-3.8mg/dl with an e-GFR=20-26ml/min with a Hx of Nephrotic Range Proteinuria most recent Urine Protein to Cr ratio 3.6 in Jan 2020. He has a Hx Microhematuria and in March 2018 had a (-) NKECHI, C3&4 not depressed, Hep B&C non reactive, (-) ANCA, (-) Anti-GBM, SPEP and UPEP no monoclonal protein    3.  Hyperkalemia in the setting of the worsening renal failure and ACEI-will treat K+ and stop the ACEI    4. Non Anion gap Met acidosis in the setting of the advanced CKD with HUGH-will start oral HCO3    5. 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 HTN with CKD I-IV-stop ACEI and titrate hydralazine-1/31/20 2D ECHO-EF 65%, mild LVH, LV wall motion normal, RV normal systolic function, no pericardial effusion or pl effusion, Pulm artery normal in size and mild dilation IVC    7. Acute Resp Failure with evidence Pl Effusions-on levaquin and Vanc for Pne    8. Anemia in CKD-ferritin 188 and Iron Sat 22% and placed on MANUEL and oral Fe++ last admission at [facility]     9.  Sec HPTH of Renal; Origin- and Vit D 32-on calcitriol    Soledad Green

## 2020-02-03 NOTE — CONSULTS
Smokeless tobacco: Never Used   Substance and Sexual Activity    Alcohol use: No    Drug use: No    Sexual activity: Not on file   Lifestyle    Physical activity:     Days per week: Not on file     Minutes per session: Not on file    Stress: Not on file   Relationships    Social connections:     Talks on phone: Not on file     Gets together: Not on file     Attends Shinto service: Not on file     Active member of club or organization: Not on file     Attends meetings of clubs or organizations: Not on file     Relationship status: Not on file    Intimate partner violence:     Fear of current or ex partner: Not on file     Emotionally abused: Not on file     Physically abused: Not on file     Forced sexual activity: Not on file   Other Topics Concern    Not on file   Social History Narrative    Not on file       MEDICAL HISTORY:  Past Medical History:   Diagnosis Date    Back pain     Diabetes mellitus ([facility] [address]     DMII (diabetes mellitus, type 2) ([facility] [address] 7/28/2015    History of cardiovascular stress test 2/16/2015    lexiscan    HTN (hypertension) 7/28/2015    Hyperlipidemia     Hypertension     Lumbar radicular pain 7/28/2015    Type II or unspecified type diabetes mellitus without mention of complication, not stated as uncontrolled        MEDICATIONS:   sodium polystyrene  30 g Oral Once    sodium bicarbonate  1,300 mg Oral TID    sevelamer  800 mg Oral TID WC    hydrALAZINE  50 mg Oral BID    epoetin elvia-epbx  8,000 Units Subcutaneous Once per day on Mon Wed Fri    insulin glargine  32 Units Subcutaneous Nightly    amLODIPine  10 mg Oral Daily    Vitamin D  3,000 Units Oral Daily    metoprolol tartrate  50 mg Oral BID    glimepiride  4 mg Oral Daily with breakfast    gabapentin  100 mg Oral BID    insulin lispro  0-12 Units Subcutaneous TID WC    insulin lispro  0-6 Units Subcutaneous Nightly    heparin (porcine)  5,000 Units Subcutaneous 3 times per day    levofloxacin  500 mg Intravenous Q48H    ipratropium-albuterol  1 ampule Inhalation Q4H WA    guaiFENesin  600 mg Oral BID    mometasone-formoterol  2 puff Inhalation BID    ferrous sulfate  325 mg Oral Daily with breakfast    calcitRIOL  0.25 mcg Oral Once per day on Mon Wed Fri      dextrose       glucose, dextrose, glucagon (rDNA), dextrose, acetaminophen, ondansetron, magnesium hydroxide, ipratropium-albuterol    REVIEW OF SYSTEMS:  Constitutional: Denies fever, weight loss, night sweats, and fatigue  Skin: Denies pigmentation, dark lesions, and rashes   HEENT: Denies hearing loss, tinnitus, ear drainage, epistaxis, sore throat, and hoarseness. Cardiovascular: Denies palpitations, chest pain, and chest pressure. Respiratory: Denies cough, dyspnea at rest, hemoptysis, apnea, and choking. Gastrointestinal: Denies nausea, vomiting, poor appetite, diarrhea, heartburn or reflux  Genitourinary: Denies dysuria, frequency, urgency or hematuria  Musculoskeletal: Denies myalgias, muscle weakness, and bone pain  Neurological: Denies dizziness, vertigo, headache, and focal weakness  Psychological: Denies anxiety and depression  Endocrine: Denies heat intolerance and cold intolerance  Hematopoietic/Lymphatic: Denies bleeding problems and blood transfusions    PHYSICAL EXAM:  Vitals:    02/03/20 1245   BP: (!) 140/60   Pulse: 77   Resp: 20   Temp: 97.5 °F (36.4 °C)   SpO2: 98%     FiO2 : 40 %  O2 Flow Rate (L/min): 3 L/min  O2 Device: Nasal cannula    Constitutional: No fever, chills, diaphoresis  Skin: Can rash, no skin breakdown  HEENT: Unremarkable  Neck: Thick neck, no jugular venous distention, lymphadenopathy, or thyromegaly  Cardiovascular: S1, S2 normal.  No S3-S4 murmurs rubs present  Respiratory: Faint inspiratory crackles at the bases.   I do not hear any overt wheezing or pleural rubs  Gastrointestinal: Soft, modestly obese, nontender  Genitourinary: No CVA tenderness  Extremities: No clubbing, cyanosis, or edema  Neurological:, Alert, oriented x3. No evidence of focal motor or sensory deficits  Psychological: Good affect.   In good spirits    LABS:  WBC   Date Value Ref Range Status   02/03/2020 11.3 4.5 - 11.5 E9/L Final   02/02/2020 4.7 4.5 - 11.5 E9/L Final   01/23/2020 7.8 4.5 - 11.5 E9/L Final     Hemoglobin   Date Value Ref Range Status   02/03/2020 8.3 (L) 12.5 - 16.5 g/dL Final   02/03/2020 8.2 (L) 12.5 - 16.5 g/dL Final   02/02/2020 16.7 (H) 12.5 - 16.5 g/dL Final     Hematocrit   Date Value Ref Range Status   02/03/2020 27.6 (L) 37.0 - 54.0 % Final   02/03/2020 28.1 (L) 37.0 - 54.0 % Final   02/02/2020 56.0 (H) 37.0 - 54.0 % Final     MCV   Date Value Ref Range Status   02/03/2020 89.5 80.0 - 99.9 fL Final   02/02/2020 87.1 80.0 - 99.9 fL Final   01/23/2020 86.5 80.0 - 99.9 fL Final     Platelets   Date Value Ref Range Status   02/03/2020 328 130 - 450 E9/L Final   02/02/2020 161 130 - 450 E9/L Final   01/23/2020 311 130 - 450 E9/L Final     Sodium   Date Value Ref Range Status   02/03/2020 134 132 - 146 mmol/L Final   02/03/2020 132 132 - 146 mmol/L Final   02/02/2020 138 132 - 146 mmol/L Final     Potassium   Date Value Ref Range Status   02/03/2020 5.2 (H) 3.5 - 5.0 mmol/L Final   02/03/2020 5.9 (H) 3.5 - 5.0 mmol/L Final   01/23/2020 4.8 3.5 - 5.0 mmol/L Final     Potassium reflex Magnesium   Date Value Ref Range Status   02/02/2020 5.6 (H) 3.5 - 5.0 mmol/L Final     Chloride   Date Value Ref Range Status   02/03/2020 100 98 - 107 mmol/L Final   02/03/2020 102 98 - 107 mmol/L Final   02/02/2020 105 98 - 107 mmol/L Final     CO2   Date Value Ref Range Status   02/03/2020 16 (L) 22 - 29 mmol/L Final   02/03/2020 16 (L) 22 - 29 mmol/L Final   02/02/2020 16 (L) 22 - 29 mmol/L Final     BUN   Date Value Ref Range Status   02/03/2020 76 (H) 8 - 23 mg/dL Final   02/03/2020 67 (H) 8 - 23 mg/dL Final   02/02/2020 50 (H) 8 - 23 mg/dL Final     CREATININE   Date Value Ref Range Status   02/03/2020 4.7 (H) 0.7 - 1.2 mg/dL Final   02/03/2020 4.7 (H) 0.7 - 1.2 mg/dL Final   02/02/2020 4.0 (H) 0.7 - 1.2 mg/dL Final     Glucose   Date Value Ref Range Status   02/03/2020 226 (H) 74 - 99 mg/dL Final   02/03/2020 253 (H) 74 - 99 mg/dL Final   02/02/2020 145 (H) 74 - 99 mg/dL Final     Calcium   Date Value Ref Range Status   02/03/2020 9.7 8.6 - 10.2 mg/dL Final   02/03/2020 9.6 8.6 - 10.2 mg/dL Final   02/02/2020 9.5 8.6 - 10.2 mg/dL Final     Total Protein   Date Value Ref Range Status   02/03/2020 8.4 (H) 6.4 - 8.3 g/dL Final   02/02/2020 8.4 (H) 6.4 - 8.3 g/dL Final   09/22/2018 7.2 6.4 - 8.3 g/dL Final     Alb   Date Value Ref Range Status   02/03/2020 3.1 (L) 3.5 - 5.2 g/dL Final   02/02/2020 3.3 (L) 3.5 - 5.2 g/dL Final   09/22/2018 3.2 (L) 3.5 - 5.2 g/dL Final     Total Bilirubin   Date Value Ref Range Status   02/03/2020 0.3 0.0 - 1.2 mg/dL Final   02/02/2020 0.4 0.0 - 1.2 mg/dL Final   09/22/2018 0.5 0.0 - 1.2 mg/dL Final     Alkaline Phosphatase   Date Value Ref Range Status   02/03/2020 58 40 - 129 U/L Final   02/02/2020 62 40 - 129 U/L Final   09/22/2018 52 40 - 129 U/L Final     AST   Date Value Ref Range Status   02/03/2020 11 0 - 39 U/L Final   02/02/2020 13 0 - 39 U/L Final   09/22/2018 11 0 - 39 U/L Final     ALT   Date Value Ref Range Status   02/03/2020 11 0 - 40 U/L Final   02/02/2020 13 0 - 40 U/L Final   09/22/2018 7 0 - 40 U/L Final     GFR Non-   Date Value Ref Range Status   02/03/2020 15 >=60 mL/min/1.73 Final     Comment:     Chronic Kidney Disease: less than 60 ml/min/1.73 sq.m. Kidney Failure: less than 15 ml/min/1.73 sq.m. Results valid for patients 18 years and older. 02/03/2020 15 >=60 mL/min/1.73 Final     Comment:     Chronic Kidney Disease: less than 60 ml/min/1.73 sq.m. Kidney Failure: less than 15 ml/min/1.73 sq.m. Results valid for patients 18 years and older.      02/02/2020 18 >=60 mL/min/1.73 Final     Comment:     Chronic Kidney Disease: less than 60 ml/min/1.73 sq.m. Kidney Failure: less than 15 ml/min/1.73 sq.m. Results valid for patients 18 years and older. GFR    Date Value Ref Range Status   02/03/2020 15  Final   02/03/2020 15  Final   02/02/2020 18  Final     Magnesium   Date Value Ref Range Status   02/03/2020 2.4 1.6 - 2.6 mg/dL Final   01/23/2020 1.9 1.6 - 2.6 mg/dL Final   11/13/2019 2.1 1.6 - 2.6 mg/dL Final     Phosphorus   Date Value Ref Range Status   02/03/2020 5.1 (H) 2.5 - 4.5 mg/dL Final   01/23/2020 4.2 2.5 - 4.5 mg/dL Final   11/13/2019 3.6 2.5 - 4.5 mg/dL Final     Recent Labs     02/02/20  1109   PH 7.377   BE -7.1*   O2SAT 99.6*       RADIOLOGY:  CT Chest WO Contrast   Final Result   1. Diffuse pulmonary groundglass opacities with septal thickening. 2. Small pleural effusions. 3. Multifocal peripheral consolidation/airspace disease left lower   lobe. XR CHEST PORTABLE   Final Result      XR CHEST STANDARD (2 VW)    (Results Pending)       IMPRESSION:  1. Bilateral pneumonia  2. Bilateral and subcarinal lymphadenopathy, likely related to infection or remote and asymptomatic sarcoidosis. Malignancy less likely  3. CKD 4  4. Severe COPD  5. Possible underlying obstructive sleep apnea    PLAN:  1. Agree with levofloxacin 500 mg every other day  2. Chest x-ray in a.m.   3. Nephrology consultation has already been performed  4. Echocardiogram for the morning        Electronically signed by Dorothy Fierro MD on 2/3/2020 at 4:18 PM

## 2020-02-03 NOTE — PROGRESS NOTES
[facility] Quality Flow/Interdisciplinary Rounds Progress Note        Quality Flow Rounds held on February 3, 2020    Disciplines Attending:  Bedside Nurse, ,  and Nursing Unit Leadership    Edis Wellington was admitted on 2/2/2020 10:58 AM    Anticipated Discharge Date:  Expected Discharge Date: 02/05/20    Disposition:    Bear Score:  Bear Scale Score: 22    Readmission Risk              Risk of Unplanned Readmission:        15           Discussed patient goal for the day, patient clinical progression, and barriers to discharge.   The following Goal(s) of the Day/Commitment(s) have been identified:  Activity progression, refused Erika bradford Barrier  February 3, 2020

## 2020-02-03 NOTE — PROGRESS NOTES
Department of Internal Medicine        CHIEF COMPLAINT: Shortness of breath    Reason for Admission: Acute hypoxic respiratory failure    HISTORY OF PRESENT ILLNESS:      The patient is a 60 y.o. male who presents with being discharged from [facility] [place] yesterday and was put on 2 half liters nasal cannula O2 and was given a portable tank but was not set up with a stationary tank at home. The patient stated that he checked his tank today and was empty. He was 65% O2 saturation on room air on admission. Chest x-ray suggested CHF. Patient had temperature of 100.1. BUN/creatinine was 50/4.0 with the patient have a creatinine of 3.8 on old lab work. proBNP was 4800. WBC is 4.7. Patient denies any fever and chills or productive cough at this time. Patient was admitted for further evaluation and treatment. 2/3/2020  Patient seen and examined on telemetry floor. Patient feels better today than yesterday but still on 3 L nasal cannula. BUN/creatinine is elevated at 76/4.7. Blood sugars are in the 200s. WBC 11.3. CT of the lungs showed diffuse pulmonary infiltrates. Temperature is 97.5 today but was 100.1 on admission. O2 sat is 90% on 3 L at rest.  Will consult pulmonology. Nephrology note reviewed.     Past Medical History:    Past Medical History:   Diagnosis Date    Back pain     Diabetes mellitus ([facility] [address]     DMII (diabetes mellitus, type 2) ([facility] [address] 7/28/2015    History of cardiovascular stress test 2/16/2015    lexiscan    HTN (hypertension) 7/28/2015    Hyperlipidemia     Hypertension     Lumbar radicular pain 7/28/2015    Type II or unspecified type diabetes mellitus without mention of complication, not stated as uncontrolled      Past Surgical History:    Past Surgical History:   Procedure Laterality Date    OTHER SURGICAL HISTORY Left 06/06/14    cain bunionectomy left foot with osteomed screw x1    SHOULDER SURGERY      Bilateral    VEIN SURGERY         Medications Prior to Admission: @  Prior to Admission medications    Medication Sig Start Date End Date Taking? Authorizing Provider   ferrous sulfate 325 (65 Fe) MG tablet Take 325 mg by mouth daily (with breakfast)   Yes Historical Provider, MD   hydrALAZINE (APRESOLINE) 25 MG tablet Take 25 mg by mouth 2 times daily   Yes Historical Provider, MD   calcitRIOL (ROCALTROL) 0.25 MCG capsule Take 0.25 mcg by mouth Takes 3 times a week   Yes Historical Provider, MD   insulin glargine (LANTUS) 100 UNIT/ML injection vial Inject 25 Units into the skin nightly   Yes Historical Provider, MD   metoprolol tartrate (LOPRESSOR) 50 MG tablet Take 50 mg by mouth 3/31/17  Yes Historical Provider, MD   gabapentin (NEURONTIN) 100 MG capsule Take 100 mg by mouth. Yes Historical Provider, MD   etodolac (LODINE) 400 MG tablet Take 400 mg by mouth   Yes Historical Provider, MD   amLODIPine (NORVASC) 10 MG tablet Take 5 mg by mouth 2 times daily    Yes Historical Provider, MD   lisinopril (PRINIVIL;ZESTRIL) 20 MG tablet Take 20 mg by mouth daily   Yes Historical Provider, MD   Cholecalciferol (VITAMIN D3) 3000 units TABS Take 150 mcg by mouth daily    Yes Historical Provider, MD       Allergies:   Other    Social History:   Social History     Socioeconomic History    Marital status: Single     Spouse name: Not on file    Number of children: Not on file    Years of education: Not on file    Highest education level: Not on file   Occupational History    Not on file   Social Needs    Financial resource strain: Not on file    Food insecurity:     Worry: Not on file     Inability: Not on file    Transportation needs:     Medical: Not on file     Non-medical: Not on file   Tobacco Use    Smoking status: Former Smoker     Packs/day: 1.00     Years: 30.00     Pack years: 30.00    Smokeless tobacco: Never Used   Substance and Sexual Activity    Alcohol use: No    Drug use: No    Sexual activity: Not on file   Lifestyle    Physical activity:     Days per week: Not on file     Minutes per session: Not on file    Stress: Not on file   Relationships    Social connections:     Talks on phone: Not on file     Gets together: Not on file     Attends Sikh service: Not on file     Active member of club or organization: Not on file     Attends meetings of clubs or organizations: Not on file     Relationship status: Not on file    Intimate partner violence:     Fear of current or ex partner: Not on file     Emotionally abused: Not on file     Physically abused: Not on file     Forced sexual activity: Not on file   Other Topics Concern    Not on file   Social History Narrative    Not on file       Family History:   History reviewed. No pertinent family history. REVIEW OF SYSTEMS:    Gen: Patient denies any lightheadedness or dizziness. No LOC or syncope. No fevers or chills. HEENT: No earache, sore throat or nasal congestion. Resp: Denies cough, hemoptysis or sputum production. Cardiac: Denies chest pain, + SOB, diaphoresis or palpitations. GI: No nausea, vomiting, diarrhea or constipation. No melena or hematochezia. : No urinary complaints, dysuria, hematuria or frequency. MSK: No extremity weakness, paralysis or paresthesias. PHYSICAL EXAM:    Vitals:  BP (!) 140/60   Pulse 77   Temp 97.5 °F (36.4 °C) (Axillary)   Resp 20   Ht 5' 8\" (1.727 m)   Wt 208 lb (94.3 kg)   SpO2 98%   BMI 31.63 kg/m²     General:  This is a 60 y.o. yo male who is alert and oriented in NAD  HEENT:  Head is normocephalic and atraumatic, PERRLA, EOMI, mucus membranes moist with no pharyngeal erythema or exudate. Neck:  Supple with no carotid bruits, JVD or thyromegaly.   No cervical adenopathy  CV:  Regular rate and rhythm, no murmurs  Lungs: Coarse breath sounds with mild scattered crackles to auscultation bilaterally with no wheezes or rhonchi  Abdomen:  Soft, + obese, nontender, nondistended, bowel sounds present  Extremities:  No edema, peripheral pulses intact bilaterally  Neuro:  Cranial nerves II-XII grossly intact; motor and sensory function intact with no focal deficits  Skin:  No rashes, lesions or wounds    DATA:  CBC with Differential:    Lab Results   Component Value Date    WBC 11.3 02/03/2020    RBC 3.14 02/03/2020    HGB 8.3 02/03/2020    HCT 27.6 02/03/2020     02/03/2020    MCV 89.5 02/03/2020    MCH 26.1 02/03/2020    MCHC 29.2 02/03/2020    RDW 16.2 02/03/2020    LYMPHOPCT 6.8 02/03/2020    MONOPCT 3.1 02/03/2020    BASOPCT 0.1 02/03/2020    MONOSABS 0.35 02/03/2020    LYMPHSABS 0.77 02/03/2020    EOSABS 0.00 02/03/2020    BASOSABS 0.01 02/03/2020     CMP:    Lab Results   Component Value Date     02/03/2020    K 5.2 02/03/2020    K 5.6 02/02/2020     02/03/2020    CO2 16 02/03/2020    BUN 76 02/03/2020    CREATININE 4.7 02/03/2020    GFRAA 15 02/03/2020    LABGLOM 15 02/03/2020    GLUCOSE 226 02/03/2020    PROT 8.4 02/03/2020    LABALBU 3.1 02/03/2020    CALCIUM 9.7 02/03/2020    BILITOT 0.3 02/03/2020    ALKPHOS 58 02/03/2020    AST 11 02/03/2020    ALT 11 02/03/2020     Magnesium:    Lab Results   Component Value Date    MG 2.4 02/03/2020     Phosphorus:    Lab Results   Component Value Date    PHOS 5.1 02/03/2020     PT/INR:  No results found for: PROTIME, INR  Troponin:    Lab Results   Component Value Date    TROPONINI 0.09 02/02/2020     U/A:    Lab Results   Component Value Date    COLORU Yellow 02/02/2020    PROTEINU >=300 02/02/2020    PHUR 5.0 02/02/2020    LABCAST RARE 09/21/2018    WBCUA 0-1 02/02/2020    RBCUA NONE 02/02/2020    BACTERIA NONE 02/02/2020    CLARITYU Clear 02/02/2020    SPECGRAV 1.025 02/02/2020    LEUKOCYTESUR Negative 02/02/2020    UROBILINOGEN 0.2 02/02/2020    BILIRUBINUR Negative 02/02/2020    BLOODU TRACE 02/02/2020    GLUCOSEU 250 02/02/2020    AMORPHOUS FEW 10/02/2019     ABG:    Lab Results   Component Value Date    PH 7.377 02/02/2020    BE -7.1 02/02/2020    O2SAT 99.6 02/02/2020     HgBA1c: Lab Results   Component Value Date    LABA1C 7.6 02/02/2020     FLP:    Lab Results   Component Value Date    TRIG 60 02/03/2020    HDL 60 02/03/2020    LDLCALC 71 02/03/2020    LABVLDL 12 02/03/2020     TSH:    Lab Results   Component Value Date    TSH 0.927 02/03/2020     IRON:    Lab Results   Component Value Date    IRON 58 11/13/2019     LIPASE:    Lab Results   Component Value Date    LIPASE 78 09/24/2018       ASSESSMENT AND PLAN:      Patient Active Problem List    Diagnosis Date Noted    Hallux valgus, acquired 06/06/2014     Priority: Low     Class: Present on Admission    Respiratory failure ([facility] 02/02/2020    Pneumonia 01/28/2020    Acute pancreatitis without necrosis or infection, unspecified 09/23/2018    Idiopathic acute pancreatitis 09/21/2018    B12 deficiency 03/17/2016    DMII (diabetes mellitus, type 2) ([facility] [address] 07/28/2015    HTN (hypertension) 07/28/2015    Lumbar radicular pain 07/28/2015    Spinal stenosis 07/28/2015     1. Acute hypoxic respiratory failure  2. Acute congestive heart failure  3. Acute on chronic kidney disease stage IV  4. Hypertension  5. Non-insulin-dependent diabetes mellitus type II-hyperglycemia  6.   Pneumonia      Plan:  Admit to fourth floor telemetry  Echocardiogram  [facility] from [facility] reviewed  Home meds reviewed  Glucoscans 4 times daily with sliding scale insulin  DuoNeb aerosols  O2 nasal cannula  Hold Lodine, metformin  Consult nephrology, pulmonology  Procalcitonin 0.25  Routine labs in a.m.  CT of the lung-diffuse pulmonary infiltrates    Sammy Giron D.O.  2/3/2020  3:30 PM

## 2020-02-04 ENCOUNTER — APPOINTMENT (OUTPATIENT)
Dept: GENERAL RADIOLOGY | Age: 65
DRG: 291 | End: 2020-02-04
Payer: MEDICARE

## 2020-02-04 LAB
ALBUMIN SERPL-MCNC: 2.9 G/DL (ref 3.5–5.2)
ALP BLD-CCNC: 53 U/L (ref 40–129)
ALT SERPL-CCNC: 20 U/L (ref 0–40)
ANION GAP SERPL CALCULATED.3IONS-SCNC: 20 MMOL/L (ref 7–16)
AST SERPL-CCNC: 25 U/L (ref 0–39)
BILIRUB SERPL-MCNC: <0.2 MG/DL (ref 0–1.2)
BUN BLDV-MCNC: 82 MG/DL (ref 8–23)
CALCIUM SERPL-MCNC: 8.4 MG/DL (ref 8.6–10.2)
CHLORIDE BLD-SCNC: 101 MMOL/L (ref 98–107)
CO2: 17 MMOL/L (ref 22–29)
CREAT SERPL-MCNC: 4.9 MG/DL (ref 0.7–1.2)
GFR AFRICAN AMERICAN: 15
GFR NON-AFRICAN AMERICAN: 15 ML/MIN/1.73
GLUCOSE BLD-MCNC: 160 MG/DL (ref 74–99)
HCT VFR BLD CALC: 28.2 % (ref 37–54)
HEMOGLOBIN: 8.1 G/DL (ref 12.5–16.5)
MAGNESIUM: 2.4 MG/DL (ref 1.6–2.6)
MCH RBC QN AUTO: 26 PG (ref 26–35)
MCHC RBC AUTO-ENTMCNC: 28.7 % (ref 32–34.5)
MCV RBC AUTO: 90.7 FL (ref 80–99.9)
METER GLUCOSE: 102 MG/DL (ref 74–99)
METER GLUCOSE: 262 MG/DL (ref 74–99)
METER GLUCOSE: 312 MG/DL (ref 74–99)
METER GLUCOSE: 47 MG/DL (ref 74–99)
METER GLUCOSE: 93 MG/DL (ref 74–99)
PDW BLD-RTO: 16.2 FL (ref 11.5–15)
PHOSPHORUS: 7.8 MG/DL (ref 2.5–4.5)
PLATELET # BLD: 349 E9/L (ref 130–450)
PMV BLD AUTO: 10.3 FL (ref 7–12)
POTASSIUM SERPL-SCNC: 4.7 MMOL/L (ref 3.5–5)
RBC # BLD: 3.11 E12/L (ref 3.8–5.8)
SODIUM BLD-SCNC: 138 MMOL/L (ref 132–146)
TOTAL PROTEIN: 7.6 G/DL (ref 6.4–8.3)
URINE CULTURE, ROUTINE: NORMAL
VANCOMYCIN RANDOM: <4 MCG/ML (ref 5–40)
WBC # BLD: 12.1 E9/L (ref 4.5–11.5)

## 2020-02-04 PROCEDURE — 6360000002 HC RX W HCPCS: Performed by: INTERNAL MEDICINE

## 2020-02-04 PROCEDURE — 83735 ASSAY OF MAGNESIUM: CPT

## 2020-02-04 PROCEDURE — 6370000000 HC RX 637 (ALT 250 FOR IP): Performed by: INTERNAL MEDICINE

## 2020-02-04 PROCEDURE — 94640 AIRWAY INHALATION TREATMENT: CPT

## 2020-02-04 PROCEDURE — 1200000000 HC SEMI PRIVATE

## 2020-02-04 PROCEDURE — 2700000000 HC OXYGEN THERAPY PER DAY

## 2020-02-04 PROCEDURE — 80053 COMPREHEN METABOLIC PANEL: CPT

## 2020-02-04 PROCEDURE — 36415 COLL VENOUS BLD VENIPUNCTURE: CPT

## 2020-02-04 PROCEDURE — 80202 ASSAY OF VANCOMYCIN: CPT

## 2020-02-04 PROCEDURE — 71046 X-RAY EXAM CHEST 2 VIEWS: CPT

## 2020-02-04 PROCEDURE — 85027 COMPLETE CBC AUTOMATED: CPT

## 2020-02-04 PROCEDURE — 82962 GLUCOSE BLOOD TEST: CPT

## 2020-02-04 PROCEDURE — 84100 ASSAY OF PHOSPHORUS: CPT

## 2020-02-04 RX ADMIN — GLIMEPIRIDE 4 MG: 4 TABLET ORAL at 07:54

## 2020-02-04 RX ADMIN — GUAIFENESIN 600 MG: 600 TABLET, EXTENDED RELEASE ORAL at 21:25

## 2020-02-04 RX ADMIN — MOMETASONE FUROATE AND FORMOTEROL FUMARATE DIHYDRATE 2 PUFF: 200; 5 AEROSOL RESPIRATORY (INHALATION) at 06:44

## 2020-02-04 RX ADMIN — AMLODIPINE BESYLATE 10 MG: 10 TABLET ORAL at 07:55

## 2020-02-04 RX ADMIN — FERROUS SULFATE TAB 325 MG (65 MG ELEMENTAL FE) 325 MG: 325 (65 FE) TAB at 07:55

## 2020-02-04 RX ADMIN — SODIUM BICARBONATE 650 MG TABLET 1300 MG: at 07:55

## 2020-02-04 RX ADMIN — IPRATROPIUM BROMIDE AND ALBUTEROL SULFATE 1 AMPULE: .5; 3 SOLUTION RESPIRATORY (INHALATION) at 12:28

## 2020-02-04 RX ADMIN — INSULIN LISPRO 8 UNITS: 100 INJECTION, SOLUTION INTRAVENOUS; SUBCUTANEOUS at 13:12

## 2020-02-04 RX ADMIN — IPRATROPIUM BROMIDE AND ALBUTEROL SULFATE 1 AMPULE: .5; 3 SOLUTION RESPIRATORY (INHALATION) at 16:28

## 2020-02-04 RX ADMIN — HEPARIN SODIUM 5000 UNITS: 5000 INJECTION, SOLUTION INTRAVENOUS; SUBCUTANEOUS at 21:31

## 2020-02-04 RX ADMIN — SEVELAMER CARBONATE 800 MG: 800 TABLET, FILM COATED ORAL at 19:48

## 2020-02-04 RX ADMIN — GABAPENTIN 100 MG: 100 CAPSULE ORAL at 21:25

## 2020-02-04 RX ADMIN — SEVELAMER CARBONATE 800 MG: 800 TABLET, FILM COATED ORAL at 13:12

## 2020-02-04 RX ADMIN — HEPARIN SODIUM 5000 UNITS: 5000 INJECTION, SOLUTION INTRAVENOUS; SUBCUTANEOUS at 06:02

## 2020-02-04 RX ADMIN — INSULIN LISPRO 3 UNITS: 100 INJECTION, SOLUTION INTRAVENOUS; SUBCUTANEOUS at 21:29

## 2020-02-04 RX ADMIN — GABAPENTIN 100 MG: 100 CAPSULE ORAL at 07:55

## 2020-02-04 RX ADMIN — HEPARIN SODIUM 5000 UNITS: 5000 INJECTION, SOLUTION INTRAVENOUS; SUBCUTANEOUS at 13:13

## 2020-02-04 RX ADMIN — INSULIN GLARGINE 32 UNITS: 100 INJECTION, SOLUTION SUBCUTANEOUS at 21:30

## 2020-02-04 RX ADMIN — METOPROLOL TARTRATE 50 MG: 50 TABLET, FILM COATED ORAL at 07:55

## 2020-02-04 RX ADMIN — GUAIFENESIN 600 MG: 600 TABLET, EXTENDED RELEASE ORAL at 07:55

## 2020-02-04 RX ADMIN — LEVOFLOXACIN 500 MG: 5 INJECTION, SOLUTION INTRAVENOUS at 19:36

## 2020-02-04 RX ADMIN — SODIUM BICARBONATE 650 MG TABLET 1300 MG: at 13:11

## 2020-02-04 RX ADMIN — MOMETASONE FUROATE AND FORMOTEROL FUMARATE DIHYDRATE 2 PUFF: 200; 5 AEROSOL RESPIRATORY (INHALATION) at 16:28

## 2020-02-04 RX ADMIN — METOPROLOL TARTRATE 50 MG: 50 TABLET, FILM COATED ORAL at 21:25

## 2020-02-04 RX ADMIN — SODIUM BICARBONATE 650 MG TABLET 1300 MG: at 21:25

## 2020-02-04 RX ADMIN — Medication 15 G: at 05:59

## 2020-02-04 RX ADMIN — HYDRALAZINE HYDROCHLORIDE 50 MG: 50 TABLET ORAL at 07:56

## 2020-02-04 RX ADMIN — SEVELAMER CARBONATE 800 MG: 800 TABLET, FILM COATED ORAL at 07:55

## 2020-02-04 RX ADMIN — VITAMIN D, TAB 1000IU (100/BT) 3000 UNITS: 25 TAB at 07:55

## 2020-02-04 RX ADMIN — HYDRALAZINE HYDROCHLORIDE 50 MG: 50 TABLET ORAL at 21:26

## 2020-02-04 RX ADMIN — IPRATROPIUM BROMIDE AND ALBUTEROL SULFATE 1 AMPULE: .5; 3 SOLUTION RESPIRATORY (INHALATION) at 06:43

## 2020-02-04 RX ADMIN — IPRATROPIUM BROMIDE AND ALBUTEROL SULFATE 1 AMPULE: .5; 3 SOLUTION RESPIRATORY (INHALATION) at 09:43

## 2020-02-04 ASSESSMENT — PAIN SCALES - GENERAL: PAINLEVEL_OUTOF10: 0

## 2020-02-04 NOTE — PROGRESS NOTES
[facility] Quality Flow/Interdisciplinary Rounds Progress Note        Quality Flow Rounds held on February 4, 2020    Disciplines Attending:  Bedside Nurse, ,  and Nursing Unit Leadership    Tunde Michael was admitted on 2/2/2020 10:58 AM    Anticipated Discharge Date:  Expected Discharge Date: 02/05/20    Disposition:    Bear Score:  Bear Scale Score: 19    Readmission Risk              Risk of Unplanned Readmission:        18           Discussed patient goal for the day, patient clinical progression, and barriers to discharge.   The following Goal(s) of the Day/Commitment(s) have been identified:  Activity progression, home O2, xray, need code      Roberto   February 4, 2020

## 2020-02-04 NOTE — PROGRESS NOTES
 DMII (diabetes mellitus, type 2) ([facility] [address] 7/28/2015    History of cardiovascular stress test 2/16/2015    lexiscan    HTN (hypertension) 7/28/2015    Hyperlipidemia     Hypertension     Lumbar radicular pain 7/28/2015    Type II or unspecified type diabetes mellitus without mention of complication, not stated as uncontrolled      Past Surgical History:    Past Surgical History:   Procedure Laterality Date    OTHER SURGICAL HISTORY Left 06/06/14    cain bunionectomy left foot with osteomed screw x1    SHOULDER SURGERY      Bilateral    VEIN SURGERY         Medications Prior to Admission:    @  Prior to Admission medications    Medication Sig Start Date End Date Taking? Authorizing Provider   ferrous sulfate 325 (65 Fe) MG tablet Take 325 mg by mouth daily (with breakfast)   Yes Historical Provider, MD   hydrALAZINE (APRESOLINE) 25 MG tablet Take 25 mg by mouth 2 times daily   Yes Historical Provider, MD   calcitRIOL (ROCALTROL) 0.25 MCG capsule Take 0.25 mcg by mouth Takes 3 times a week   Yes Historical Provider, MD   insulin glargine (LANTUS) 100 UNIT/ML injection vial Inject 25 Units into the skin nightly   Yes Historical Provider, MD   metoprolol tartrate (LOPRESSOR) 50 MG tablet Take 50 mg by mouth 3/31/17  Yes Historical Provider, MD   gabapentin (NEURONTIN) 100 MG capsule Take 100 mg by mouth. Yes Historical Provider, MD   etodolac (LODINE) 400 MG tablet Take 400 mg by mouth   Yes Historical Provider, MD   amLODIPine (NORVASC) 10 MG tablet Take 5 mg by mouth 2 times daily    Yes Historical Provider, MD   lisinopril (PRINIVIL;ZESTRIL) 20 MG tablet Take 20 mg by mouth daily   Yes Historical Provider, MD   Cholecalciferol (VITAMIN D3) 3000 units TABS Take 150 mcg by mouth daily    Yes Historical Provider, MD       Allergies:   Other    Social History:   Social History     Socioeconomic History    Marital status: Single     Spouse name: Not on file    Number of children: Not on file    Years of 208 lb (94.3 kg)   SpO2 97%   BMI 31.63 kg/m²     General:  This is a 60 y.o. yo male who is alert and oriented in NAD  HEENT:  Head is normocephalic and atraumatic, PERRLA, EOMI, mucus membranes moist with no pharyngeal erythema or exudate. Neck:  Supple with no carotid bruits, JVD or thyromegaly. No cervical adenopathy  CV:  Regular rate and rhythm, no murmurs  Lungs: Clear to auscultation bilaterally with no wheezes or rhonchi. Abdomen:  Soft, + obese, nontender, nondistended, bowel sounds present  Extremities: +trace nonpitting edema to right lower leg.  No edema to left leg, peripheral pulses intact bilaterally  Neuro:  Cranial nerves II-XII grossly intact; motor and sensory function intact with no focal deficits  Skin:  No rashes, lesions or wounds    DATA:  CBC with Differential:    Lab Results   Component Value Date    WBC 12.1 02/04/2020    RBC 3.11 02/04/2020    HGB 8.1 02/04/2020    HCT 28.2 02/04/2020     02/04/2020    MCV 90.7 02/04/2020    MCH 26.0 02/04/2020    MCHC 28.7 02/04/2020    RDW 16.2 02/04/2020    LYMPHOPCT 6.8 02/03/2020    MONOPCT 3.1 02/03/2020    BASOPCT 0.1 02/03/2020    MONOSABS 0.35 02/03/2020    LYMPHSABS 0.77 02/03/2020    EOSABS 0.00 02/03/2020    BASOSABS 0.01 02/03/2020     CMP:    Lab Results   Component Value Date     02/04/2020    K 4.7 02/04/2020    K 5.6 02/02/2020     02/04/2020    CO2 17 02/04/2020    BUN 82 02/04/2020    CREATININE 4.9 02/04/2020    GFRAA 15 02/04/2020    LABGLOM 15 02/04/2020    GLUCOSE 160 02/04/2020    PROT 7.6 02/04/2020    LABALBU 2.9 02/04/2020    CALCIUM 8.4 02/04/2020    BILITOT <0.2 02/04/2020    ALKPHOS 53 02/04/2020    AST 25 02/04/2020    ALT 20 02/04/2020     Magnesium:    Lab Results   Component Value Date    MG 2.4 02/04/2020     Phosphorus:    Lab Results   Component Value Date    PHOS 7.8 02/04/2020     PT/INR:  No results found for: PROTIME, INR  Troponin:    Lab Results   Component Value Date    TROPONINI 0.09 insulin  DuoNeb aerosols  O2 nasal cannula  Hold Lodine, metformin  Consult nephrology, pulmonology  Procalcitonin 0.25  Routine labs in a.m.  CT of the lung-diffuse pulmonary infiltrates    Veronica Guan D.O.  2/4/2020  12:23 PM

## 2020-02-04 NOTE — PROGRESS NOTES
Pulmonary/Critical Care Progress Note    We are following patient for bilateral pneumonia, community-acquired, chronic kidney disease, hypertension, hilar and subcarinal lymphadenopathy    SUBJECTIVE:  Patient is clearly improved both by his subjective and objective appearance. There are fewer crackles bilaterally. I believe the interstitial pattern on the chest x-ray is becoming better aerated compared to the prior study from February 2. We will continue the current course of levofloxacin as well as aerosolized bronchodilators and inhaled steroids. The patient does still desaturate with exertion but not nearly as much as before. BUN and creatinine continue to rise.     MEDICATIONS:   sodium bicarbonate  1,300 mg Oral TID    sevelamer  800 mg Oral TID WC    hydrALAZINE  50 mg Oral BID    epoetin elvia-epbx  8,000 Units Subcutaneous Once per day on Mon Wed Fri    insulin glargine  32 Units Subcutaneous Nightly    amLODIPine  10 mg Oral Daily    Vitamin D  3,000 Units Oral Daily    metoprolol tartrate  50 mg Oral BID    glimepiride  4 mg Oral Daily with breakfast    gabapentin  100 mg Oral BID    insulin lispro  0-12 Units Subcutaneous TID     insulin lispro  0-6 Units Subcutaneous Nightly    heparin (porcine)  5,000 Units Subcutaneous 3 times per day    levofloxacin  500 mg Intravenous Q48H    ipratropium-albuterol  1 ampule Inhalation Q4H WA    guaiFENesin  600 mg Oral BID    mometasone-formoterol  2 puff Inhalation BID    ferrous sulfate  325 mg Oral Daily with breakfast    calcitRIOL  0.25 mcg Oral Once per day on Mon Wed Fri      dextrose       perflutren lipid microspheres, glucose, dextrose, glucagon (rDNA), dextrose, acetaminophen, ondansetron, magnesium hydroxide, ipratropium-albuterol      REVIEW OF SYSTEMS:  Constitutional: Denies fever, weight loss, night sweats, and fatigue  Skin: Denies pigmentation, dark lesions, and rashes   HEENT: Denies hearing loss, tinnitus, ear Status   02/04/2020 2.9 (L) 3.5 - 5.2 g/dL Final   02/03/2020 3.1 (L) 3.5 - 5.2 g/dL Final   02/02/2020 3.3 (L) 3.5 - 5.2 g/dL Final     Total Bilirubin   Date Value Ref Range Status   02/04/2020 <0.2 0.0 - 1.2 mg/dL Final   02/03/2020 0.3 0.0 - 1.2 mg/dL Final   02/02/2020 0.4 0.0 - 1.2 mg/dL Final     Alkaline Phosphatase   Date Value Ref Range Status   02/04/2020 53 40 - 129 U/L Final   02/03/2020 58 40 - 129 U/L Final   02/02/2020 62 40 - 129 U/L Final     AST   Date Value Ref Range Status   02/04/2020 25 0 - 39 U/L Final   02/03/2020 11 0 - 39 U/L Final   02/02/2020 13 0 - 39 U/L Final     ALT   Date Value Ref Range Status   02/04/2020 20 0 - 40 U/L Final   02/03/2020 11 0 - 40 U/L Final   02/02/2020 13 0 - 40 U/L Final     GFR Non-   Date Value Ref Range Status   02/04/2020 15 >=60 mL/min/1.73 Final     Comment:     Chronic Kidney Disease: less than 60 ml/min/1.73 sq.m. Kidney Failure: less than 15 ml/min/1.73 sq.m. Results valid for patients 18 years and older. 02/03/2020 15 >=60 mL/min/1.73 Final     Comment:     Chronic Kidney Disease: less than 60 ml/min/1.73 sq.m. Kidney Failure: less than 15 ml/min/1.73 sq.m. Results valid for patients 18 years and older. 02/03/2020 15 >=60 mL/min/1.73 Final     Comment:     Chronic Kidney Disease: less than 60 ml/min/1.73 sq.m. Kidney Failure: less than 15 ml/min/1.73 sq.m. Results valid for patients 18 years and older.        GFR    Date Value Ref Range Status   02/04/2020 15  Final   02/03/2020 15  Final   02/03/2020 15  Final     Magnesium   Date Value Ref Range Status   02/04/2020 2.4 1.6 - 2.6 mg/dL Final   02/03/2020 2.4 1.6 - 2.6 mg/dL Final   01/23/2020 1.9 1.6 - 2.6 mg/dL Final     Phosphorus   Date Value Ref Range Status   02/04/2020 7.8 (H) 2.5 - 4.5 mg/dL Final   02/03/2020 5.1 (H) 2.5 - 4.5 mg/dL Final   01/23/2020 4.2 2.5 - 4.5 mg/dL Final     Recent Labs     02/02/20  1109   PH 7.377 BE -7.1*   O2SAT 99.6*       RADIOLOGY:  XR CHEST STANDARD (2 VW)   Final Result   Stable chest x-ray. CT Chest WO Contrast   Final Result   1. Diffuse pulmonary groundglass opacities with septal thickening. 2. Small pleural effusions. 3. Multifocal peripheral consolidation/airspace disease left lower   lobe. XR CHEST PORTABLE   Final Result              PROBLEM LIST:  Active Problems:    Respiratory failure ([address]  Resolved Problems:    * No resolved hospital problems. *      IMPRESSION:  1. Acute respiratory failure  2. Bilateral pneumonia  3. Hilar and subcarinal lymphadenopathy on CT  4. Chronic kidney disease, progressive    PLAN:  1. Continue Levaquin every other day  2. Continue inhaled bronchodilators and steroids  3.  May require dialysis at some point        Electronically signed by Starla Montez MD on 2/4/2020 at 3:25 PM

## 2020-02-04 NOTE — PROGRESS NOTES
[Urine:200]    Intake/Output Summary (Last 24 hours) at 2/4/2020 1358  Last data filed at 2/4/2020 0800  Gross per 24 hour   Intake 480 ml   Output 600 ml   Net -120 ml     CBC:   Recent Labs     02/02/20  1153 02/03/20  0536 02/03/20  1339 02/04/20  0705   WBC 4.7 11.3  --  12.1*   HGB 16.7* 8.2* 8.3* 8.1*    328  --  349     BMP:    Recent Labs     02/03/20  1339 02/03/20  1929 02/04/20  0705    143 138   K 5.2* 5.3* 4.7    104 101   CO2 16* 20* 17*   BUN 76* 77* 82*   CREATININE 4.7* 4.8* 4.9*   GLUCOSE 226* 146* 160*     Hepatic:   Recent Labs     02/02/20  1153 02/03/20  0536 02/04/20  0705   AST 13 11 25   ALT 13 11 20   BILITOT 0.4 0.3 <0.2   ALKPHOS 62 58 53     Troponin:   Recent Labs     02/02/20  1153   TROPONINI 0.09*     BNP: No results for input(s): BNP in the last 72 hours. Lipids:   Recent Labs     02/03/20  0536   CHOL 143   HDL 60     ABGs:   Lab Results   Component Value Date    PO2ART 185.1 02/02/2020    PZD5OME 29.1 02/02/2020     INR: No results for input(s): INR in the last 72 hours. -----------------------------------------------------------------  RAD:   Reading location:  200       HISTORY: Hypertension.       A single frontal portable chest x-ray was obtained.       Diffuse bilateral airspace disease is noted favored to represent CHF.       The heart is not enlarged.       IMPRESSION   1. Diffuse bilateral airspace disease favored to represent  CHF. Pneumonia is less likely but not excluded. Reading location: 200           INDICATION: Hypoxia       FINDINGS: Axial CT images through the thorax without benefit of IV   contrast. Automated dose control.        Limited assessment of the upper abdomen without acute finding.       Small right larger than the left pleural effusions. Normal heart size. Atherosclerosis. Multiple enlarged mediastinal and hilar lymph nodes. Left prevascular nodes range up to at least 21 x 15 mm diameter.    Subcarinal node 25 x 19 mm.     serum cr 3.0-3.8mg/dl with an e-GFR=20-26ml/min with a Hx of Nephrotic Range Proteinuria most recent Urine Protein to Cr ratio 3.6 in Jan 2020. He has a Hx Microhematuria and in March 2018 had a (-) NKECHI, C3&4 not depressed, Hep B&C non reactive, (-) ANCA, (-) Anti-GBM, SPEP and UPEP no monoclonal protein    3. Hyperkalemia in the setting of the worsening renal failure and ACEI-K+ back WNL post treatment with the Kayexalate    4. Non Anion gap Met acidosis in the setting of the advanced CKD with HUGH-started on oral HCO3    5. 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 HTN with CKD [id number] ACEI and titrated hydralazine-BP trending down-no new additions-1/31/20 2D ECHO-EF 65%, mild LVH, LV wall motion normal, RV normal systolic function, no pericardial effusion or pl effusion, Pulm artery normal in size and mild dilation IVC    7. Acute Resp Failure with evidence Pl Effusions-on levaquin-received  Vanc for Pne    8. Anemia in CKD-ferritin 188 and Iron Sat 22% and  on MANUEL and oral Fe++     9.  Sec HPTH of Renal; Origin- and Vit D 32-on calcitriol    Yuliet Green

## 2020-02-05 LAB
ANION GAP SERPL CALCULATED.3IONS-SCNC: 16 MMOL/L (ref 7–16)
BUN BLDV-MCNC: 90 MG/DL (ref 8–23)
CALCIUM SERPL-MCNC: 8.2 MG/DL (ref 8.6–10.2)
CHLORIDE BLD-SCNC: 99 MMOL/L (ref 98–107)
CO2: 24 MMOL/L (ref 22–29)
CREAT SERPL-MCNC: 5.4 MG/DL (ref 0.7–1.2)
GFR AFRICAN AMERICAN: 13
GFR NON-AFRICAN AMERICAN: 13 ML/MIN/1.73
GLUCOSE BLD-MCNC: 191 MG/DL (ref 74–99)
HCT VFR BLD CALC: 28.5 % (ref 37–54)
HEMOGLOBIN: 8.4 G/DL (ref 12.5–16.5)
MAGNESIUM: 2.5 MG/DL (ref 1.6–2.6)
MCH RBC QN AUTO: 26.3 PG (ref 26–35)
MCHC RBC AUTO-ENTMCNC: 29.5 % (ref 32–34.5)
MCV RBC AUTO: 89.1 FL (ref 80–99.9)
METER GLUCOSE: 100 MG/DL (ref 74–99)
METER GLUCOSE: 144 MG/DL (ref 74–99)
METER GLUCOSE: 147 MG/DL (ref 74–99)
METER GLUCOSE: 159 MG/DL (ref 74–99)
PDW BLD-RTO: 16.3 FL (ref 11.5–15)
PHOSPHORUS: 5.7 MG/DL (ref 2.5–4.5)
PLATELET # BLD: 380 E9/L (ref 130–450)
PMV BLD AUTO: 10.1 FL (ref 7–12)
POTASSIUM SERPL-SCNC: 4.7 MMOL/L (ref 3.5–5)
RBC # BLD: 3.2 E12/L (ref 3.8–5.8)
SODIUM BLD-SCNC: 139 MMOL/L (ref 132–146)
WBC # BLD: 8.8 E9/L (ref 4.5–11.5)

## 2020-02-05 PROCEDURE — 6360000002 HC RX W HCPCS: Performed by: INTERNAL MEDICINE

## 2020-02-05 PROCEDURE — 84100 ASSAY OF PHOSPHORUS: CPT

## 2020-02-05 PROCEDURE — 6370000000 HC RX 637 (ALT 250 FOR IP): Performed by: INTERNAL MEDICINE

## 2020-02-05 PROCEDURE — 80048 BASIC METABOLIC PNL TOTAL CA: CPT

## 2020-02-05 PROCEDURE — 1200000000 HC SEMI PRIVATE

## 2020-02-05 PROCEDURE — 94640 AIRWAY INHALATION TREATMENT: CPT

## 2020-02-05 PROCEDURE — 85027 COMPLETE CBC AUTOMATED: CPT

## 2020-02-05 PROCEDURE — 82962 GLUCOSE BLOOD TEST: CPT

## 2020-02-05 PROCEDURE — 36415 COLL VENOUS BLD VENIPUNCTURE: CPT

## 2020-02-05 PROCEDURE — 2700000000 HC OXYGEN THERAPY PER DAY

## 2020-02-05 PROCEDURE — 2580000003 HC RX 258

## 2020-02-05 PROCEDURE — 83735 ASSAY OF MAGNESIUM: CPT

## 2020-02-05 RX ORDER — SODIUM CHLORIDE 0.9 % (FLUSH) 0.9 %
SYRINGE (ML) INJECTION
Status: COMPLETED
Start: 2020-02-05 | End: 2020-02-05

## 2020-02-05 RX ADMIN — SEVELAMER CARBONATE 800 MG: 800 TABLET, FILM COATED ORAL at 08:55

## 2020-02-05 RX ADMIN — HYDRALAZINE HYDROCHLORIDE 50 MG: 50 TABLET ORAL at 21:42

## 2020-02-05 RX ADMIN — Medication 10 ML: at 11:51

## 2020-02-05 RX ADMIN — FERROUS SULFATE TAB 325 MG (65 MG ELEMENTAL FE) 325 MG: 325 (65 FE) TAB at 08:55

## 2020-02-05 RX ADMIN — SEVELAMER CARBONATE 800 MG: 800 TABLET, FILM COATED ORAL at 17:56

## 2020-02-05 RX ADMIN — HEPARIN SODIUM 5000 UNITS: 5000 INJECTION, SOLUTION INTRAVENOUS; SUBCUTANEOUS at 05:56

## 2020-02-05 RX ADMIN — INSULIN LISPRO 2 UNITS: 100 INJECTION, SOLUTION INTRAVENOUS; SUBCUTANEOUS at 08:59

## 2020-02-05 RX ADMIN — HEPARIN SODIUM 5000 UNITS: 5000 INJECTION, SOLUTION INTRAVENOUS; SUBCUTANEOUS at 21:46

## 2020-02-05 RX ADMIN — EPOETIN ALFA-EPBX 8000 UNITS: 10000 INJECTION, SOLUTION INTRAVENOUS; SUBCUTANEOUS at 14:33

## 2020-02-05 RX ADMIN — SODIUM BICARBONATE 650 MG TABLET 1300 MG: at 21:41

## 2020-02-05 RX ADMIN — SEVELAMER CARBONATE 800 MG: 800 TABLET, FILM COATED ORAL at 11:51

## 2020-02-05 RX ADMIN — IPRATROPIUM BROMIDE AND ALBUTEROL SULFATE 1 AMPULE: .5; 3 SOLUTION RESPIRATORY (INHALATION) at 14:04

## 2020-02-05 RX ADMIN — IPRATROPIUM BROMIDE AND ALBUTEROL SULFATE 1 AMPULE: .5; 3 SOLUTION RESPIRATORY (INHALATION) at 18:42

## 2020-02-05 RX ADMIN — CALCITRIOL 0.25 MCG: 0.25 CAPSULE ORAL at 08:54

## 2020-02-05 RX ADMIN — SODIUM BICARBONATE 650 MG TABLET 1300 MG: at 13:28

## 2020-02-05 RX ADMIN — MOMETASONE FUROATE AND FORMOTEROL FUMARATE DIHYDRATE 2 PUFF: 200; 5 AEROSOL RESPIRATORY (INHALATION) at 07:47

## 2020-02-05 RX ADMIN — GUAIFENESIN 600 MG: 600 TABLET, EXTENDED RELEASE ORAL at 21:41

## 2020-02-05 RX ADMIN — AMLODIPINE BESYLATE 10 MG: 10 TABLET ORAL at 08:55

## 2020-02-05 RX ADMIN — GUAIFENESIN 600 MG: 600 TABLET, EXTENDED RELEASE ORAL at 08:55

## 2020-02-05 RX ADMIN — IPRATROPIUM BROMIDE AND ALBUTEROL SULFATE 1 AMPULE: .5; 3 SOLUTION RESPIRATORY (INHALATION) at 10:47

## 2020-02-05 RX ADMIN — IPRATROPIUM BROMIDE AND ALBUTEROL SULFATE 1 AMPULE: .5; 3 SOLUTION RESPIRATORY (INHALATION) at 07:47

## 2020-02-05 RX ADMIN — SODIUM BICARBONATE 650 MG TABLET 1300 MG: at 08:54

## 2020-02-05 RX ADMIN — ACETAMINOPHEN 500 MG: 500 TABLET, FILM COATED ORAL at 13:28

## 2020-02-05 RX ADMIN — VITAMIN D, TAB 1000IU (100/BT) 3000 UNITS: 25 TAB at 08:54

## 2020-02-05 RX ADMIN — INSULIN LISPRO 1 UNITS: 100 INJECTION, SOLUTION INTRAVENOUS; SUBCUTANEOUS at 21:45

## 2020-02-05 RX ADMIN — INSULIN GLARGINE 32 UNITS: 100 INJECTION, SOLUTION SUBCUTANEOUS at 21:46

## 2020-02-05 RX ADMIN — METOPROLOL TARTRATE 50 MG: 50 TABLET, FILM COATED ORAL at 08:58

## 2020-02-05 RX ADMIN — GABAPENTIN 100 MG: 100 CAPSULE ORAL at 21:42

## 2020-02-05 RX ADMIN — INSULIN LISPRO 2 UNITS: 100 INJECTION, SOLUTION INTRAVENOUS; SUBCUTANEOUS at 17:56

## 2020-02-05 RX ADMIN — HYDRALAZINE HYDROCHLORIDE 50 MG: 50 TABLET ORAL at 08:54

## 2020-02-05 RX ADMIN — MOMETASONE FUROATE AND FORMOTEROL FUMARATE DIHYDRATE 2 PUFF: 200; 5 AEROSOL RESPIRATORY (INHALATION) at 18:43

## 2020-02-05 RX ADMIN — HEPARIN SODIUM 5000 UNITS: 5000 INJECTION, SOLUTION INTRAVENOUS; SUBCUTANEOUS at 13:28

## 2020-02-05 RX ADMIN — METOPROLOL TARTRATE 50 MG: 50 TABLET, FILM COATED ORAL at 21:44

## 2020-02-05 RX ADMIN — GABAPENTIN 100 MG: 100 CAPSULE ORAL at 08:54

## 2020-02-05 ASSESSMENT — PAIN SCALES - GENERAL
PAINLEVEL_OUTOF10: 0
PAINLEVEL_OUTOF10: 6
PAINLEVEL_OUTOF10: 0
PAINLEVEL_OUTOF10: 0

## 2020-02-05 NOTE — PROGRESS NOTES
Department of Internal Medicine        CHIEF COMPLAINT: Shortness of breath    Reason for Admission: Acute hypoxic respiratory failure    HISTORY OF PRESENT ILLNESS:      The patient is a 60 y.o. male who presents with being discharged from [facility] yesterday and was put on 2 half liters nasal cannula O2 and was given a portable tank but was not set up with a stationary tank at home. The patient stated that he checked his tank today and was empty. He was 65% O2 saturation on room air on admission. Chest x-ray suggested CHF. Patient had temperature of 100.1. BUN/creatinine was 50/4.0 with the patient have a creatinine of 3.8 on old lab work. proBNP was 4800. WBC is 4.7. Patient denies any fever and chills or productive cough at this time. Patient was admitted for further evaluation and treatment. 2/3/2020  Patient seen and examined on telemetry floor. Patient feels better today than yesterday but still on 3 L nasal cannula. BUN/creatinine is elevated at 76/4.7. Blood sugars are in the 200s. WBC 11.3. CT of the lungs showed diffuse pulmonary infiltrates. Temperature is 97.5 today but was 100.1 on admission. O2 sat is 90% on 3 L at rest.  Will consult pulmonology. Nephrology note reviewed. 2/4/2020  Patient seen and examined on telemetry floor. Patient states he feels better than he did yesterday. At the time of examination patient was not wearing his oxygen, states he took it off to go to the bathroom- he states he did not get SOB while off the oxygen. Denies chest pain, abdomen pain, nausea, vomiting, constipation, diarrhea. Vitals are as follows: temp 97.7F, heart rate 97 with resp rate of 18, /66, . Blood Glucose 160- metered glucose 312, BUN/Creat 82/4.9, CO2 17, WBC 12.1, RBC 3.1, Hemoglobin 8.1. O2 saturation on room air with activity was 84% and was 91% on 2 L with activity. 2/5/2020  Patient seen and examined on telemetry floor. Patient feels better again today.   Patient denies any productive cough. No chest pain or abdominal pain temperature 97.5 with a heart rate of 73 and blood pressure 127/63. Pending lab work today. Pulmonary and nephrology notes reviewed. .  Blood sugar ranged from 100-262. Past Medical History:    Past Medical History:   Diagnosis Date    Back pain     Diabetes mellitus ([facility] [address]     [facility]    History of cardiovascular stress test 2/16/2015    lexiscan    HTN (hypertension) 7/28/2015    Hyperlipidemia     Hypertension     Lumbar radicular pain 7/28/2015    Type II or unspecified type diabetes mellitus without mention of complication, not stated as uncontrolled      Past Surgical History:    Past Surgical History:   Procedure Laterality Date    OTHER SURGICAL HISTORY Left 06/06/14    cain bunionectomy left foot with osteomed screw x1    SHOULDER SURGERY      Bilateral    VEIN SURGERY         Medications Prior to Admission:    @  Prior to Admission medications    Medication Sig Start Date End Date Taking? Authorizing Provider   ferrous sulfate 325 (65 Fe) MG tablet Take 325 mg by mouth daily (with breakfast)   Yes Historical Provider, MD   hydrALAZINE (APRESOLINE) 25 MG tablet Take 25 mg by mouth 2 times daily   Yes Historical Provider, MD   calcitRIOL (ROCALTROL) 0.25 MCG capsule Take 0.25 mcg by mouth Takes 3 times a week   Yes Historical Provider, MD   insulin glargine (LANTUS) 100 UNIT/ML injection vial Inject 25 Units into the skin nightly   Yes Historical Provider, MD   metoprolol tartrate (LOPRESSOR) 50 MG tablet Take 50 mg by mouth 3/31/17  Yes Historical Provider, MD   gabapentin (NEURONTIN) 100 MG capsule Take 100 mg by mouth.    Yes Historical Provider, MD   etodolac (LODINE) 400 MG tablet Take 400 mg by mouth   Yes Historical Provider, MD   amLODIPine (NORVASC) 10 MG tablet Take 5 mg by mouth 2 times daily    Yes Historical Provider, MD   lisinopril (PRINIVIL;ZESTRIL) 20 MG tablet Take 20 mg by mouth daily   Yes Historical Provider, MD   Cholecalciferol (VITAMIN D3) 3000 units TABS Take 150 mcg by mouth daily    Yes Historical Provider, MD       Allergies: Other    Social History:   Social History     Socioeconomic History    Marital status: Single     Spouse name: Not on file    Number of children: Not on file    Years of education: Not on file    Highest education level: Not on file   Occupational History    Not on file   Social Needs    Financial resource strain: Not on file    Food insecurity:     Worry: Not on file     Inability: Not on file    Transportation needs:     Medical: Not on file     Non-medical: Not on file   Tobacco Use    Smoking status: Former Smoker     Packs/day: 1.00     Years: 30.00     Pack years: 30.00    Smokeless tobacco: Never Used   Substance and Sexual Activity    Alcohol use: No    Drug use: No    Sexual activity: Not on file   Lifestyle    Physical activity:     Days per week: Not on file     Minutes per session: Not on file    Stress: Not on file   Relationships    Social connections:     Talks on phone: Not on file     Gets together: Not on file     Attends [employer] service: Not on file     Active member of club or organization: Not on file     Attends meetings of clubs or organizations: Not on file     Relationship status: Not on file    Intimate partner violence:     Fear of current or ex partner: Not on file     Emotionally abused: Not on file     Physically abused: Not on file     Forced sexual activity: Not on file   Other Topics Concern    Not on file   Social History Narrative    Not on file       Family History:   History reviewed. No pertinent family history. REVIEW OF SYSTEMS:    Gen: Patient denies any lightheadedness or dizziness. No LOC or syncope. No fevers or chills. HEENT: No earache, sore throat or nasal congestion. Resp: Denies cough, hemoptysis or sputum production.     Cardiac: Denies chest pain, SOB, diaphoresis or palpitations. GI: No nausea, vomiting, diarrhea or constipation. No melena or hematochezia. : No urinary complaints, dysuria, hematuria or frequency. MSK: No extremity weakness, paralysis or paresthesias. PHYSICAL EXAM:    Vitals:  /63   Pulse 73   Temp 97.5 °F (36.4 °C) (Oral)   Resp 14   Ht 5' 8\" (1.727 m)   Wt 208 lb (94.3 kg)   SpO2 98%   BMI 31.63 kg/m²     General:  This is a 60 y.o. yo male who is alert and oriented in NAD  HEENT:  Head is normocephalic and atraumatic, PERRLA, EOMI, mucus membranes moist with no pharyngeal erythema or exudate. Neck:  Supple with no carotid bruits, JVD or thyromegaly. No cervical adenopathy  CV:  Regular rate and rhythm, no murmurs  Lungs: Clear to auscultation bilaterally with no wheezes or rhonchi. Abdomen:  Soft, + obese, nontender, nondistended, bowel sounds present  Extremities: +trace nonpitting edema to right lower leg.  No edema to left leg, peripheral pulses intact bilaterally  Neuro:  Cranial nerves II-XII grossly intact; motor and sensory function intact with no focal deficits  Skin:  No rashes, lesions or wounds    DATA:  CBC with Differential:    Lab Results   Component Value Date    WBC 12.1 02/04/2020    RBC 3.11 02/04/2020    HGB 8.1 02/04/2020    HCT 28.2 02/04/2020     02/04/2020    MCV 90.7 02/04/2020    MCH 26.0 02/04/2020    MCHC 28.7 02/04/2020    RDW 16.2 02/04/2020    LYMPHOPCT 6.8 02/03/2020    MONOPCT 3.1 02/03/2020    BASOPCT 0.1 02/03/2020    MONOSABS 0.35 02/03/2020    LYMPHSABS 0.77 02/03/2020    EOSABS 0.00 02/03/2020    BASOSABS 0.01 02/03/2020     CMP:    Lab Results   Component Value Date     02/04/2020    K 4.7 02/04/2020    K 5.6 02/02/2020     02/04/2020    CO2 17 02/04/2020    BUN 82 02/04/2020    CREATININE 4.9 02/04/2020    GFRAA 15 02/04/2020    LABGLOM 15 02/04/2020    GLUCOSE 160 02/04/2020    PROT 7.6 02/04/2020    LABALBU 2.9 02/04/2020    CALCIUM 8.4 02/04/2020    BILITOT <0.2 02/04/2020    ALKPHOS 53 02/04/2020    AST 25 02/04/2020    ALT 20 02/04/2020     Magnesium:    Lab Results   Component Value Date    MG 2.4 02/04/2020     Phosphorus:    Lab Results   Component Value Date    PHOS 7.8 02/04/2020     PT/INR:  No results found for: PROTIME, INR  Troponin:    Lab Results   Component Value Date    TROPONINI 0.09 02/02/2020     U/A:    Lab Results   Component Value Date    COLORU Yellow 02/02/2020    PROTEINU >=300 02/02/2020    PHUR 5.0 02/02/2020    LABCAST RARE 09/21/2018    WBCUA 0-1 02/02/2020    RBCUA NONE 02/02/2020    BACTERIA NONE 02/02/2020    CLARITYU Clear 02/02/2020    SPECGRAV 1.025 02/02/2020    LEUKOCYTESUR Negative 02/02/2020    UROBILINOGEN 0.2 02/02/2020    BILIRUBINUR Negative 02/02/2020    BLOODU TRACE 02/02/2020    GLUCOSEU 250 02/02/2020    AMORPHOUS FEW 10/02/2019     ABG:    Lab Results   Component Value Date    PH 7.377 02/02/2020    BE -7.1 02/02/2020    O2SAT 99.6 02/02/2020     HgBA1c:    Lab Results   Component Value Date    LABA1C 7.6 02/02/2020     FLP:    Lab Results   Component Value Date    TRIG 60 02/03/2020    HDL 60 02/03/2020    LDLCALC 71 02/03/2020    LABVLDL 12 02/03/2020     TSH:    Lab Results   Component Value Date    TSH 0.927 02/03/2020     IRON:    Lab Results   Component Value Date    IRON 58 11/13/2019     LIPASE:    Lab Results   Component Value Date    LIPASE 78 09/24/2018       ASSESSMENT AND PLAN:      Patient Active Problem List    Diagnosis Date Noted    Hallux valgus, acquired 06/06/2014     Priority: Low     Class: Present on Admission    Respiratory failure ([facility] [address] 02/02/2020    Pneumonia 01/28/2020    Acute pancreatitis without necrosis or infection, unspecified 09/23/2018    Idiopathic acute pancreatitis 09/21/2018    B12 deficiency 03/17/2016    DMII (diabetes mellitus, type 2) ([facility] [address] 07/28/2015    HTN (hypertension) 07/28/2015    Lumbar radicular pain 07/28/2015    Spinal stenosis 07/28/2015     1.   Acute/subacute

## 2020-02-05 NOTE — PROGRESS NOTES
Pulmonary/Critical Care Progress Note    We are following patient for bilateral pneumonia, chronic kidney disease, hypertension, hilar and subcarinal lymphadenopathy    SUBJECTIVE:  Patient is feeling better. He told me his pulse oximetry 97% on 3 L nasal cannula. We will check a room air blood gas tomorrow. We will also check a chest x-ray tomorrow. I would hope that he could be discharged on Friday if he continues to improve. MEDICATIONS:   sodium bicarbonate  1,300 mg Oral TID    sevelamer  800 mg Oral TID     hydrALAZINE  50 mg Oral BID    epoetin elvia-epbx  8,000 Units Subcutaneous Once per day on Mon Wed Fri    insulin glargine  32 Units Subcutaneous Nightly    amLODIPine  10 mg Oral Daily    Vitamin D  3,000 Units Oral Daily    metoprolol tartrate  50 mg Oral BID    glimepiride  4 mg Oral Daily with breakfast    gabapentin  100 mg Oral BID    insulin lispro  0-12 Units Subcutaneous TID     insulin lispro  0-6 Units Subcutaneous Nightly    heparin (porcine)  5,000 Units Subcutaneous 3 times per day    levofloxacin  500 mg Intravenous Q48H    ipratropium-albuterol  1 ampule Inhalation Q4H WA    guaiFENesin  600 mg Oral BID    mometasone-formoterol  2 puff Inhalation BID    ferrous sulfate  325 mg Oral Daily with breakfast    calcitRIOL  0.25 mcg Oral Once per day on Mon Wed Fri      dextrose       perflutren lipid microspheres, glucose, dextrose, glucagon (rDNA), dextrose, acetaminophen, ondansetron, magnesium hydroxide, ipratropium-albuterol      REVIEW OF SYSTEMS:  Constitutional: Denies fever, weight loss, night sweats, and fatigue  Skin: Denies pigmentation, dark lesions, and rashes   HEENT: Denies hearing loss, tinnitus, ear drainage, epistaxis, sore throat, and hoarseness. Cardiovascular: Denies palpitations, chest pain, and chest pressure. Respiratory: Denies cough, dyspnea at rest, hemoptysis, apnea, and choking.   Gastrointestinal: Denies nausea, vomiting, poor appetite, 450 E9/L Final     Sodium   Date Value Ref Range Status   02/04/2020 138 132 - 146 mmol/L Final   02/03/2020 143 132 - 146 mmol/L Final   02/03/2020 134 132 - 146 mmol/L Final     Potassium   Date Value Ref Range Status   02/04/2020 4.7 3.5 - 5.0 mmol/L Final   02/03/2020 5.3 (H) 3.5 - 5.0 mmol/L Final   02/03/2020 5.2 (H) 3.5 - 5.0 mmol/L Final     Potassium reflex Magnesium   Date Value Ref Range Status   02/02/2020 5.6 (H) 3.5 - 5.0 mmol/L Final     Chloride   Date Value Ref Range Status   02/04/2020 101 98 - 107 mmol/L Final   02/03/2020 104 98 - 107 mmol/L Final   02/03/2020 100 98 - 107 mmol/L Final     CO2   Date Value Ref Range Status   02/04/2020 17 (L) 22 - 29 mmol/L Final   02/03/2020 20 (L) 22 - 29 mmol/L Final   02/03/2020 16 (L) 22 - 29 mmol/L Final     BUN   Date Value Ref Range Status   02/04/2020 82 (H) 8 - 23 mg/dL Final   02/03/2020 77 (H) 8 - 23 mg/dL Final   02/03/2020 76 (H) 8 - 23 mg/dL Final     CREATININE   Date Value Ref Range Status   02/04/2020 4.9 (H) 0.7 - 1.2 mg/dL Final   02/03/2020 4.8 (H) 0.7 - 1.2 mg/dL Final   02/03/2020 4.7 (H) 0.7 - 1.2 mg/dL Final     Glucose   Date Value Ref Range Status   02/04/2020 160 (H) 74 - 99 mg/dL Final   02/03/2020 146 (H) 74 - 99 mg/dL Final   02/03/2020 226 (H) 74 - 99 mg/dL Final     Calcium   Date Value Ref Range Status   02/04/2020 8.4 (L) 8.6 - 10.2 mg/dL Final   02/03/2020 9.3 8.6 - 10.2 mg/dL Final   02/03/2020 9.7 8.6 - 10.2 mg/dL Final     Total Protein   Date Value Ref Range Status   02/04/2020 7.6 6.4 - 8.3 g/dL Final   02/03/2020 8.4 (H) 6.4 - 8.3 g/dL Final   02/02/2020 8.4 (H) 6.4 - 8.3 g/dL Final     Alb   Date Value Ref Range Status   02/04/2020 2.9 (L) 3.5 - 5.2 g/dL Final   02/03/2020 3.1 (L) 3.5 - 5.2 g/dL Final   02/02/2020 3.3 (L) 3.5 - 5.2 g/dL Final     Total Bilirubin   Date Value Ref Range Status   02/04/2020 <0.2 0.0 - 1.2 mg/dL Final   02/03/2020 0.3 0.0 - 1.2 mg/dL Final   02/02/2020 0.4 0.0 - 1.2 mg/dL Final     Alkaline Phosphatase   Date Value Ref Range Status   02/04/2020 53 40 - 129 U/L Final   02/03/2020 58 40 - 129 U/L Final   02/02/2020 62 40 - 129 U/L Final     AST   Date Value Ref Range Status   02/04/2020 25 0 - 39 U/L Final   02/03/2020 11 0 - 39 U/L Final   02/02/2020 13 0 - 39 U/L Final     ALT   Date Value Ref Range Status   02/04/2020 20 0 - 40 U/L Final   02/03/2020 11 0 - 40 U/L Final   02/02/2020 13 0 - 40 U/L Final     GFR Non-   Date Value Ref Range Status   02/04/2020 15 >=60 mL/min/1.73 Final     Comment:     Chronic Kidney Disease: less than 60 ml/min/1.73 sq.m. Kidney Failure: less than 15 ml/min/1.73 sq.m. Results valid for patients 18 years and older. 02/03/2020 15 >=60 mL/min/1.73 Final     Comment:     Chronic Kidney Disease: less than 60 ml/min/1.73 sq.m. Kidney Failure: less than 15 ml/min/1.73 sq.m. Results valid for patients 18 years and older. 02/03/2020 15 >=60 mL/min/1.73 Final     Comment:     Chronic Kidney Disease: less than 60 ml/min/1.73 sq.m. Kidney Failure: less than 15 ml/min/1.73 sq.m. Results valid for patients 18 years and older. GFR    Date Value Ref Range Status   02/04/2020 15  Final   02/03/2020 15  Final   02/03/2020 15  Final     Magnesium   Date Value Ref Range Status   02/04/2020 2.4 1.6 - 2.6 mg/dL Final   02/03/2020 2.4 1.6 - 2.6 mg/dL Final   01/23/2020 1.9 1.6 - 2.6 mg/dL Final     Phosphorus   Date Value Ref Range Status   02/04/2020 7.8 (H) 2.5 - 4.5 mg/dL Final   02/03/2020 5.1 (H) 2.5 - 4.5 mg/dL Final   01/23/2020 4.2 2.5 - 4.5 mg/dL Final     No results for input(s): PH, PO2, PCO2, HCO3, BE, O2SAT in the last 72 hours. RADIOLOGY:  XR CHEST STANDARD (2 VW)   Final Result   Stable chest x-ray. CT Chest WO Contrast   Final Result   1. Diffuse pulmonary groundglass opacities with septal thickening. 2. Small pleural effusions.    3. Multifocal peripheral consolidation/airspace disease left lower   lobe. XR CHEST PORTABLE   Final Result      XR CHEST STANDARD (2 VW)    (Results Pending)           PROBLEM LIST:  Active Problems:    Respiratory failure ([address]  Resolved Problems:    * No resolved hospital problems. *      IMPRESSION:  1. Bilateral pneumonia, community-acquired  2. Bilateral lymph adenopathy  3. Chronic kidney disease  4. Hypertension    PLAN:  1. Continue alternate day Levaquin  2. ABGs on room air tomorrow  3.  PA and lateral chest x-ray in a.m.  4. CBC and chemistries have already been ordered      Electronically signed by Mamta Paiz MD on 2/5/2020 at 4:09 PM

## 2020-02-05 NOTE — PROGRESS NOTES
[facility] Quality Flow/Interdisciplinary Rounds Progress Note        Quality Flow Rounds held on February 5, 2020    Disciplines Attending:  Bedside Nurse, ,  and Nursing Unit Leadership    Dora Vargas was admitted on 2/2/2020 10:58 AM    Anticipated Discharge Date:  Expected Discharge Date: 02/05/20    Disposition:    Bear Score:  Bear Scale Score: 19    Readmission Risk              Risk of Unplanned Readmission:        18           Discussed patient goal for the day, patient clinical progression, and barriers to discharge.   The following Goal(s) of the Day/Commitment(s) have been identified:  Needs home O2      Naun Joseph  February 5, 2020

## 2020-02-05 NOTE — PROGRESS NOTES
thickening. Focal areas of peripheral consolidation noted   inferolateral aspect of the left lower lobe.     Intact osseous structures.           Impression   1. Diffuse pulmonary groundglass opacities with septal thickening. 2. Small pleural effusions. 3. Multifocal peripheral consolidation/airspace disease left lower   lobe. Objective:   Vitals: /63   Pulse 73   Temp 97.5 °F (36.4 °C) (Oral)   Resp 14   Ht 5' 8\" (1.727 m)   Wt 208 lb (94.3 kg)   SpO2 98%   BMI 31.63 kg/m²   General appearance: alert, appears stated age and cooperative  Skin: Skin color, texture, turgor normal. No rashes or lesions  HEENT: Head: Normocephalic, no lesions, without obvious abnormality. Head: Normal, normocephalic, atraumatic. Eye: Normal external eye, conjunctiva, lids cornea, CHATO. Nose: Normal external nose, mucus membranes and septum. Pharynx: Dental Hygiene adequate. Normal buccal mucosa. Normal pharynx. Neck: no adenopathy, no carotid bruit, no JVD and supple, symmetrical, trachea midline  Lungs: rhonchi bilaterally  Heart: regular rate and rhythm, S1, S2 normal, no S3 or S4 and no rub  Abdomen: soft, non-tender; bowel sounds normal; no masses,  no organomegaly  Extremities: extremities normal, atraumatic, no cyanosis or edema  Neurologic: Mental status: Alert, oriented, thought content appropriate    Assessment:   Patient Active Problem List:     Hallux valgus, acquired     DMII (diabetes mellitus, type 2) (HCC)     HTN (hypertension)     Lumbar radicular pain     Spinal stenosis     B12 deficiency     Idiopathic acute pancreatitis     Acute pancreatitis without necrosis or infection, unspecified     Pneumonia     Respiratory failure ([address]    Plan:   1. Stage I HUGH -the rise in the cr above baseline most probably reflects the acute hypoxia and uncontrolled HTN-no significant improvement-await AM labs    2.  CKD G4 with a baseline serum cr 3.0-3.8mg/dl with an e-GFR=20-26ml/min with a Hx of Nephrotic Range Proteinuria most recent Urine Protein to Cr ratio 3.6 in Jan 2020. He has a Hx Microhematuria and in March 2018 had a (-) NKECHI, C3&4 not depressed, Hep B&C non reactive, (-) ANCA, (-) Anti-GBM, SPEP and UPEP no monoclonal protein    3. Hyperkalemia in the setting of the worsening renal failure and ACEI-Last K+ back WNL post treatment with the Kayexalate    4. Non Anion gap Met acidosis in the setting of the advanced CKD with HUGH-started on oral HCO3    5. 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 HTN with CKD [id number] ACEI and titrated hydralazine-BP trending down-no new additions-1/31/20 2D ECHO-EF 65%, mild LVH, LV wall motion normal, RV normal systolic function, no pericardial effusion or pl effusion, Pulm artery normal in size and mild dilation IVC    7. Acute Resp Failure with evidence Pl Effusions-on levaquin for Pne    8. Anemia in CKD-ferritin 188 and Iron Sat 22% and  on MANUEL and oral Fe++     9.  Sec HPTH of Renal; Origin- and Vit D 32-on calcitriol    Carol Green

## 2020-02-06 ENCOUNTER — APPOINTMENT (OUTPATIENT)
Dept: GENERAL RADIOLOGY | Age: 65
DRG: 291 | End: 2020-02-06
Payer: MEDICARE

## 2020-02-06 LAB
ANION GAP SERPL CALCULATED.3IONS-SCNC: 15 MMOL/L (ref 7–16)
B.E.: -5.8 MMOL/L (ref -3–3)
BUN BLDV-MCNC: 93 MG/DL (ref 8–23)
CALCIUM SERPL-MCNC: 8 MG/DL (ref 8.6–10.2)
CHLORIDE BLD-SCNC: 102 MMOL/L (ref 98–107)
CO2: 20 MMOL/L (ref 22–29)
COHB: 0.2 % (ref 0–1.5)
CREAT SERPL-MCNC: 5.3 MG/DL (ref 0.7–1.2)
CRITICAL: ABNORMAL
DATE ANALYZED: ABNORMAL
DATE OF COLLECTION: ABNORMAL
GFR AFRICAN AMERICAN: 13
GFR NON-AFRICAN AMERICAN: 13 ML/MIN/1.73
GLUCOSE BLD-MCNC: 113 MG/DL (ref 74–99)
HCO3: 20 MMOL/L (ref 22–26)
HCT VFR BLD CALC: 26.8 % (ref 37–54)
HEMOGLOBIN: 7.9 G/DL (ref 12.5–16.5)
HHB: 19.2 % (ref 0–5)
LAB: ABNORMAL
Lab: ABNORMAL
MAGNESIUM: 2.6 MG/DL (ref 1.6–2.6)
MCH RBC QN AUTO: 26.2 PG (ref 26–35)
MCHC RBC AUTO-ENTMCNC: 29.5 % (ref 32–34.5)
MCV RBC AUTO: 88.7 FL (ref 80–99.9)
METER GLUCOSE: 181 MG/DL (ref 74–99)
METER GLUCOSE: 185 MG/DL (ref 74–99)
METER GLUCOSE: 219 MG/DL (ref 74–99)
METER GLUCOSE: 92 MG/DL (ref 74–99)
METHB: 0.4 % (ref 0–1.5)
MODE: ABNORMAL
O2 CONTENT: 10.3 ML/DL
O2 SATURATION: 80.7 % (ref 92–98.5)
O2HB: 80.2 % (ref 94–97)
OPERATOR ID: 901
PATIENT TEMP: 37
PCO2: 40.5 MMHG (ref 35–45)
PDW BLD-RTO: 16.3 FL (ref 11.5–15)
PH BLOOD GAS: 7.31 (ref 7.35–7.45)
PHOSPHORUS: 5.4 MG/DL (ref 2.5–4.5)
PLATELET # BLD: 361 E9/L (ref 130–450)
PMV BLD AUTO: 10.3 FL (ref 7–12)
PO2: 49.7 MMHG (ref 75–100)
POTASSIUM SERPL-SCNC: 4.5 MMOL/L (ref 3.5–5)
RBC # BLD: 3.02 E12/L (ref 3.8–5.8)
SODIUM BLD-SCNC: 137 MMOL/L (ref 132–146)
SOURCE, BLOOD GAS: ABNORMAL
THB: 9.1 G/DL (ref 11.5–16.5)
TIME ANALYZED: 457
WBC # BLD: 8.7 E9/L (ref 4.5–11.5)

## 2020-02-06 PROCEDURE — 71046 X-RAY EXAM CHEST 2 VIEWS: CPT

## 2020-02-06 PROCEDURE — 80048 BASIC METABOLIC PNL TOTAL CA: CPT

## 2020-02-06 PROCEDURE — 85027 COMPLETE CBC AUTOMATED: CPT

## 2020-02-06 PROCEDURE — 6370000000 HC RX 637 (ALT 250 FOR IP): Performed by: INTERNAL MEDICINE

## 2020-02-06 PROCEDURE — 83735 ASSAY OF MAGNESIUM: CPT

## 2020-02-06 PROCEDURE — 1200000000 HC SEMI PRIVATE

## 2020-02-06 PROCEDURE — 84100 ASSAY OF PHOSPHORUS: CPT

## 2020-02-06 PROCEDURE — 36600 WITHDRAWAL OF ARTERIAL BLOOD: CPT

## 2020-02-06 PROCEDURE — 94640 AIRWAY INHALATION TREATMENT: CPT

## 2020-02-06 PROCEDURE — 82805 BLOOD GASES W/O2 SATURATION: CPT

## 2020-02-06 PROCEDURE — 82962 GLUCOSE BLOOD TEST: CPT

## 2020-02-06 PROCEDURE — 6360000002 HC RX W HCPCS: Performed by: INTERNAL MEDICINE

## 2020-02-06 PROCEDURE — 2700000000 HC OXYGEN THERAPY PER DAY

## 2020-02-06 PROCEDURE — 36415 COLL VENOUS BLD VENIPUNCTURE: CPT

## 2020-02-06 RX ORDER — POLYETHYLENE GLYCOL 3350 17 G/17G
17 POWDER, FOR SOLUTION ORAL DAILY
Status: DISCONTINUED | OUTPATIENT
Start: 2020-02-06 | End: 2020-02-13 | Stop reason: HOSPADM

## 2020-02-06 RX ORDER — DOCUSATE SODIUM 100 MG/1
100 CAPSULE, LIQUID FILLED ORAL 2 TIMES DAILY
Status: DISCONTINUED | OUTPATIENT
Start: 2020-02-06 | End: 2020-02-13 | Stop reason: HOSPADM

## 2020-02-06 RX ADMIN — GUAIFENESIN 600 MG: 600 TABLET, EXTENDED RELEASE ORAL at 09:24

## 2020-02-06 RX ADMIN — HEPARIN SODIUM 5000 UNITS: 5000 INJECTION, SOLUTION INTRAVENOUS; SUBCUTANEOUS at 12:32

## 2020-02-06 RX ADMIN — VITAMIN D, TAB 1000IU (100/BT) 3000 UNITS: 25 TAB at 09:23

## 2020-02-06 RX ADMIN — INSULIN LISPRO 1 UNITS: 100 INJECTION, SOLUTION INTRAVENOUS; SUBCUTANEOUS at 21:03

## 2020-02-06 RX ADMIN — GUAIFENESIN 600 MG: 600 TABLET, EXTENDED RELEASE ORAL at 20:59

## 2020-02-06 RX ADMIN — SEVELAMER CARBONATE 800 MG: 800 TABLET, FILM COATED ORAL at 12:32

## 2020-02-06 RX ADMIN — DOCUSATE SODIUM 100 MG: 100 CAPSULE, LIQUID FILLED ORAL at 23:04

## 2020-02-06 RX ADMIN — GLIMEPIRIDE 4 MG: 4 TABLET ORAL at 10:07

## 2020-02-06 RX ADMIN — SODIUM BICARBONATE 650 MG TABLET 1300 MG: at 20:56

## 2020-02-06 RX ADMIN — IPRATROPIUM BROMIDE AND ALBUTEROL SULFATE 1 AMPULE: .5; 3 SOLUTION RESPIRATORY (INHALATION) at 16:53

## 2020-02-06 RX ADMIN — AMLODIPINE BESYLATE 10 MG: 10 TABLET ORAL at 09:24

## 2020-02-06 RX ADMIN — IPRATROPIUM BROMIDE AND ALBUTEROL SULFATE 1 AMPULE: .5; 3 SOLUTION RESPIRATORY (INHALATION) at 10:51

## 2020-02-06 RX ADMIN — INSULIN LISPRO 2 UNITS: 100 INJECTION, SOLUTION INTRAVENOUS; SUBCUTANEOUS at 09:25

## 2020-02-06 RX ADMIN — IPRATROPIUM BROMIDE AND ALBUTEROL SULFATE 1 AMPULE: .5; 3 SOLUTION RESPIRATORY (INHALATION) at 13:38

## 2020-02-06 RX ADMIN — HYDRALAZINE HYDROCHLORIDE 50 MG: 50 TABLET ORAL at 20:59

## 2020-02-06 RX ADMIN — GABAPENTIN 100 MG: 100 CAPSULE ORAL at 09:24

## 2020-02-06 RX ADMIN — GABAPENTIN 100 MG: 100 CAPSULE ORAL at 20:59

## 2020-02-06 RX ADMIN — FERROUS SULFATE TAB 325 MG (65 MG ELEMENTAL FE) 325 MG: 325 (65 FE) TAB at 09:24

## 2020-02-06 RX ADMIN — METOPROLOL TARTRATE 50 MG: 50 TABLET, FILM COATED ORAL at 09:24

## 2020-02-06 RX ADMIN — INSULIN LISPRO 4 UNITS: 100 INJECTION, SOLUTION INTRAVENOUS; SUBCUTANEOUS at 12:33

## 2020-02-06 RX ADMIN — METOPROLOL TARTRATE 50 MG: 50 TABLET, FILM COATED ORAL at 20:58

## 2020-02-06 RX ADMIN — INSULIN GLARGINE 32 UNITS: 100 INJECTION, SOLUTION SUBCUTANEOUS at 21:01

## 2020-02-06 RX ADMIN — MAGNESIUM HYDROXIDE 30 ML: 400 SUSPENSION ORAL at 04:57

## 2020-02-06 RX ADMIN — SEVELAMER CARBONATE 800 MG: 800 TABLET, FILM COATED ORAL at 18:45

## 2020-02-06 RX ADMIN — SEVELAMER CARBONATE 800 MG: 800 TABLET, FILM COATED ORAL at 09:24

## 2020-02-06 RX ADMIN — SODIUM BICARBONATE 650 MG TABLET 1300 MG: at 12:32

## 2020-02-06 RX ADMIN — SODIUM BICARBONATE 650 MG TABLET 1300 MG: at 09:24

## 2020-02-06 RX ADMIN — POLYETHYLENE GLYCOL 3350 17 G: 17 POWDER, FOR SOLUTION ORAL at 23:04

## 2020-02-06 RX ADMIN — HYDRALAZINE HYDROCHLORIDE 50 MG: 50 TABLET ORAL at 09:24

## 2020-02-06 RX ADMIN — LEVOFLOXACIN 500 MG: 5 INJECTION, SOLUTION INTRAVENOUS at 18:45

## 2020-02-06 RX ADMIN — MOMETASONE FUROATE AND FORMOTEROL FUMARATE DIHYDRATE 2 PUFF: 200; 5 AEROSOL RESPIRATORY (INHALATION) at 10:53

## 2020-02-06 RX ADMIN — MOMETASONE FUROATE AND FORMOTEROL FUMARATE DIHYDRATE 2 PUFF: 200; 5 AEROSOL RESPIRATORY (INHALATION) at 16:53

## 2020-02-06 RX ADMIN — HEPARIN SODIUM 5000 UNITS: 5000 INJECTION, SOLUTION INTRAVENOUS; SUBCUTANEOUS at 05:01

## 2020-02-06 ASSESSMENT — PAIN SCALES - GENERAL
PAINLEVEL_OUTOF10: 0

## 2020-02-06 NOTE — CARE COORDINATION
SS NOTE: SW recd the SS consult to arrange outpt hemodialysis. JEANA made contact with Collette at [employer] to begin reviewing pt for a chair time at [facility]. Tito Pryor. 2/6/2020.3:01 PM

## 2020-02-06 NOTE — PROGRESS NOTES
Progress Note  2/6/2020 2:09 PM  Subjective:   Admit Date: 2/2/2020  PCP: Markus Marks DO  Interval History: Full consult deferred as just saw pt while he was inpt at [facility] and seen 2/2. He was admitted for Acute Resp Failure and our service was seening for CKD G4 with a baseline serum cr 3.0-3.8mg/dl. Pt was at baseline during the hospital admission. He had an amb pulse ox prior to D/C and was found to be hypoxic and home O2 prescribed. He presented back to ED 2/2/20 after O2 tank ran out. In the ED initial O2 sat 60% and /90. No fever or chills but he mdid feel lightheaded    2/6/20: Pt states increasing  Lower ext edema. NO worsening of the SOB Discussed with pt continued worsening of the renal function.  I recommended initiation of renal replacement therapy and the pt is willing to proceed      Diet: DIET CARB CONTROL;    Data:   Scheduled Meds:   sodium bicarbonate  1,300 mg Oral TID    sevelamer  800 mg Oral TID WC    hydrALAZINE  50 mg Oral BID    epoetin elvia-epbx  8,000 Units Subcutaneous Once per day on Mon Wed Fri    insulin glargine  32 Units Subcutaneous Nightly    amLODIPine  10 mg Oral Daily    Vitamin D  3,000 Units Oral Daily    metoprolol tartrate  50 mg Oral BID    glimepiride  4 mg Oral Daily with breakfast    gabapentin  100 mg Oral BID    insulin lispro  0-12 Units Subcutaneous TID WC    insulin lispro  0-6 Units Subcutaneous Nightly    heparin (porcine)  5,000 Units Subcutaneous 3 times per day    levofloxacin  500 mg Intravenous Q48H    ipratropium-albuterol  1 ampule Inhalation Q4H WA    guaiFENesin  600 mg Oral BID    mometasone-formoterol  2 puff Inhalation BID    ferrous sulfate  325 mg Oral Daily with breakfast    calcitRIOL  0.25 mcg Oral Once per day on Mon Wed Fri     Continuous Infusions:   dextrose       PRN Meds:perflutren lipid microspheres, glucose, dextrose, glucagon (rDNA), dextrose, acetaminophen, ondansetron, magnesium hydroxide, ipratropium-albuterol  I/O last 3 completed shifts: In: 240 [P.O.:240]  Out: -   I/O this shift:  In: 180 [P.O.:180]  Out: -     Intake/Output Summary (Last 24 hours) at 2/6/2020 1409  Last data filed at 2/6/2020 0945  Gross per 24 hour   Intake 420 ml   Output --   Net 420 ml     CBC:   Recent Labs     02/04/20  0705 02/05/20  1613 02/06/20  0806   WBC 12.1* 8.8 8.7   HGB 8.1* 8.4* 7.9*    380 361     BMP:    Recent Labs     02/04/20  0705 02/05/20  1613 02/06/20  0805    139 137   K 4.7 4.7 4.5    99 102   CO2 17* 24 20*   BUN 82* 90* 93*   CREATININE 4.9* 5.4* 5.3*   GLUCOSE 160* 191* 113*     Hepatic:   Recent Labs     02/04/20  0705   AST 25   ALT 20   BILITOT <0.2   ALKPHOS 53     Troponin:   No results for input(s): TROPONINI in the last 72 hours. BNP: No results for input(s): BNP in the last 72 hours. Lipids:   No results for input(s): CHOL, HDL in the last 72 hours. Invalid input(s): LDLCALCU  ABGs:   Lab Results   Component Value Date    PO2ART 185.1 02/02/2020    [id number] 29.1 02/02/2020     INR: No results for input(s): INR in the last 72 hours. -----------------------------------------------------------------  RAD:   Reading location:  200       HISTORY: Hypertension.       A single frontal portable chest x-ray was obtained.       Diffuse bilateral airspace disease is noted favored to represent CHF.       The heart is not enlarged.       IMPRESSION   1. Diffuse bilateral airspace disease favored to represent  CHF. Pneumonia is less likely but not excluded. Reading location: 200           INDICATION: Hypoxia       FINDINGS: Axial CT images through the thorax without benefit of IV   contrast. Automated dose control.        Limited assessment of the upper abdomen without acute finding.       Small right larger than the left pleural effusions. Normal heart size. Atherosclerosis. Multiple enlarged mediastinal and hilar lymph nodes.    Left prevascular nodes range up to at

## 2020-02-06 NOTE — PLAN OF CARE
Problem: Falls - Risk of:  Goal: Will remain free from falls  Description  Will remain free from falls  2/6/2020 1031 by Gwynneth Landau, RN  Outcome: Met This Shift     Problem: Falls - Risk of:  Goal: Absence of physical injury  Description  Absence of physical injury  Outcome: Met This Shift     Problem: Cardiac:  Goal: Hemodynamic stability will improve  Description  Hemodynamic stability will improve  Outcome: Met This Shift     Problem: Respiratory:  Goal: Ability to maintain a clear airway will improve  Description  Ability to maintain a clear airway will improve  Outcome: Met This Shift     Problem: Respiratory:  Goal: Ability to maintain adequate ventilation will improve  Description  Ability to maintain adequate ventilation will improve  Outcome: Met This Shift     Problem: Serum Glucose Level - Abnormal:  Goal: Ability to maintain appropriate glucose levels will improve  Description  Ability to maintain appropriate glucose levels will improve  Outcome: Met This Shift     Problem: Sensory Perception - Impaired:  Goal: Ability to maintain a stable neurologic state will improve  Description  Ability to maintain a stable neurologic state will improve  Outcome: Met This Shift

## 2020-02-06 NOTE — PROGRESS NOTES
Pulse ox was 93% on room air at rest.  Ambulated patient on room air. Oxygen saturation was 83% on room air while ambulating. Oxygen applied. Recovery pulse ox was 92% on 3 liters of oxygen while ambulating.

## 2020-02-06 NOTE — PROGRESS NOTES
Department of Internal Medicine        CHIEF COMPLAINT: Shortness of breath    Reason for Admission: Acute hypoxic respiratory failure    HISTORY OF PRESENT ILLNESS:      The patient is a 60 y.o. male who presents with being discharged from [facility] yesterday and was put on 2 half liters nasal cannula O2 and was given a portable tank but was not set up with a stationary tank at home. The patient stated that he checked his tank today and was empty. He was 65% O2 saturation on room air on admission. Chest x-ray suggested CHF. Patient had temperature of 100.1. BUN/creatinine was 50/4.0 with the patient have a creatinine of 3.8 on old lab work. proBNP was 4800. WBC is 4.7. Patient denies any fever and chills or productive cough at this time. Patient was admitted for further evaluation and treatment. 2/3/2020  Patient seen and examined on telemetry floor. Patient feels better today than yesterday but still on 3 L nasal cannula. BUN/creatinine is elevated at 76/4.7. Blood sugars are in the 200s. WBC 11.3. CT of the lungs showed diffuse pulmonary infiltrates. Temperature is 97.5 today but was 100.1 on admission. O2 sat is 90% on 3 L at rest.  Will consult pulmonology. Nephrology note reviewed. 2/4/2020  Patient seen and examined on telemetry floor. Patient states he feels better than he did yesterday. At the time of examination patient was not wearing his oxygen, states he took it off to go to the bathroom- he states he did not get SOB while off the oxygen. Denies chest pain, abdomen pain, nausea, vomiting, constipation, diarrhea. Vitals are as follows: temp 97.7F, heart rate 97 with resp rate of 18, /66, . Blood Glucose 160- metered glucose 312, BUN/Creat 82/4.9, CO2 17, WBC 12.1, RBC 3.1, Hemoglobin 8.1. O2 saturation on room air with activity was 84% and was 91% on 2 L with activity. 2/5/2020  Patient seen and examined on telemetry floor. Patient feels better again today.   Patient denies any productive cough. No chest pain or abdominal pain temperature 97.5 with a heart rate of 73 and blood pressure 127/63. Pending lab work today. Pulmonary and nephrology notes reviewed. .  Blood sugar ranged from 100-262.    2/6/2020  Patient seen and examined on telemetry floor. Patient is seen resting in bed. Patient got new that he may need dialysis and he is upset about this. He states he had SOB when he tried to go to the bathroom earlier today, he did have his oxygen on at that time. He denies chest pain, abdomen pain, nausea, vomiting. He is having constipation- last bowel movement was 2 or 3 days ago. He says the nurses gave him some Milk of magnesia this morning and he has had no relief. Vitals as follows: oral temp 98.4F, heart rate 70 with resp rate of 18, /63, 96% O2 on 3L nasal cannula. BUN/Creat 93/5.3. Glucose ranges 144-219. Hemoglobin has decreased to 7.9 from yesterdays value of 8.4. RBC 3.02    Past Medical History:    Past Medical History:   Diagnosis Date    Back pain     [facility] mellitus ([facility] [address]     DMII (diabetes mellitus, type 2) ([facility] [address] 7/28/2015    History of cardiovascular stress test 2/16/2015    lexiscan    HTN (hypertension) 7/28/2015    Hyperlipidemia     Hypertension     Lumbar radicular pain 7/28/2015    Type II or unspecified type diabetes mellitus without mention of complication, not stated as uncontrolled      Past Surgical History:    Past Surgical History:   Procedure Laterality Date    OTHER SURGICAL HISTORY Left 06/06/14    cain bunionectomy left foot with osteomed screw x1    SHOULDER SURGERY      Bilateral    VEIN SURGERY         Medications Prior to Admission:    @  Prior to Admission medications    Medication Sig Start Date End Date Taking?  Authorizing Provider   ferrous sulfate 325 (65 Fe) MG tablet Take 325 mg by mouth daily (with breakfast)   Yes Historical Provider, MD   hydrALAZINE (APRESOLINE) 25 MG tablet Take 25 mg by mouth 2 times daily   Yes Historical Provider, MD   calcitRIOL (ROCALTROL) 0.25 MCG capsule Take 0.25 mcg by mouth Takes 3 times a week   Yes Historical Provider, MD   insulin glargine (LANTUS) 100 UNIT/ML injection vial Inject 25 Units into the skin nightly   Yes Historical Provider, MD   metoprolol tartrate (LOPRESSOR) 50 MG tablet Take 50 mg by mouth 3/31/17  Yes Historical Provider, MD   gabapentin (NEURONTIN) 100 MG capsule Take 100 mg by mouth. Yes Historical Provider, MD   etodolac (LODINE) 400 MG tablet Take 400 mg by mouth   Yes Historical Provider, MD   amLODIPine (NORVASC) 10 MG tablet Take 5 mg by mouth 2 times daily    Yes Historical Provider, MD   lisinopril (PRINIVIL;ZESTRIL) 20 MG tablet Take 20 mg by mouth daily   Yes Historical Provider, MD   Cholecalciferol (VITAMIN D3) 3000 units TABS Take 150 mcg by mouth daily    Yes Historical Provider, MD       Allergies:   Other    Social History:   Social History     Socioeconomic History    Marital status: Single     Spouse name: Not on file    Number of children: Not on file    Years of education: Not on file    Highest education level: Not on file   Occupational History    Not on file   Social Needs    Financial resource strain: Not on file    Food insecurity:     Worry: Not on file     Inability: Not on file    Transportation needs:     Medical: Not on file     Non-medical: Not on file   Tobacco Use    Smoking status: Former Smoker     Packs/day: 1.00     Years: 30.00     Pack years: 30.00    Smokeless tobacco: Never Used   Substance and Sexual Activity    Alcohol use: No    Drug use: No    Sexual activity: Not on file   Lifestyle    Physical activity:     Days per week: Not on file     Minutes per session: Not on file    Stress: Not on file   Relationships    Social connections:     Talks on phone: Not on file     Gets together: Not on file     Attends [employer] service: Not on file     Active member of club or organization: Not on file     Attends meetings of clubs or organizations: Not on file     Relationship status: Not on file    Intimate partner violence:     Fear of current or ex partner: Not on file     Emotionally abused: Not on file     Physically abused: Not on file     Forced sexual activity: Not on file   Other Topics Concern    Not on file   Social History Narrative    Not on file       Family History:   History reviewed. No pertinent family history. REVIEW OF SYSTEMS:    Gen: Patient denies any lightheadedness or dizziness. No LOC or syncope. No fevers or chills. HEENT: No earache, sore throat or nasal congestion. Resp: Denies cough, hemoptysis or sputum production. Cardiac: +SOB, Denies chest pain, diaphoresis or palpitations. GI: No nausea, vomiting, diarrhea or constipation. No melena or hematochezia. : No urinary complaints, dysuria, hematuria or frequency. MSK: No extremity weakness, paralysis or paresthesias. PHYSICAL EXAM:    Vitals:  /63   Pulse 70   Temp 98.4 °F (36.9 °C) (Oral)   Resp 18   Ht 5' 8\" (1.727 m)   Wt 208 lb (94.3 kg)   SpO2 96%   BMI 31.63 kg/m²     General:  This is a 60 y.o. yo male who is alert and oriented in NAD  HEENT:  Head is normocephalic and atraumatic, PERRLA, EOMI, mucus membranes moist with no pharyngeal erythema or exudate. Neck:  Supple with no carotid bruits, JVD or thyromegaly. No cervical adenopathy  CV:  Regular rate and rhythm, no murmurs  Lungs: Clear to auscultation bilaterally with no wheezes or rhonchi.   Abdomen:  Soft, + obese, nontender, nondistended, bowel sounds present  Extremities: +1 pitting edema to right > L lower leg, peripheral pulses intact bilaterally  Neuro:  Cranial nerves II-XII grossly intact; motor and sensory function intact with no focal deficits  Skin:  No rashes, lesions or wounds    DATA:  CBC with Differential:    Lab Results   Component Value Date    WBC 8.7 02/06/2020    RBC 3.02 02/06/2020    HGB 7.9 02/06/2020    HCT 26.8 02/06/2020  02/06/2020    MCV 88.7 02/06/2020    MCH 26.2 02/06/2020    MCHC 29.5 02/06/2020    RDW 16.3 02/06/2020    LYMPHOPCT 6.8 02/03/2020    MONOPCT 3.1 02/03/2020    BASOPCT 0.1 02/03/2020    MONOSABS 0.35 02/03/2020    LYMPHSABS 0.77 02/03/2020    EOSABS 0.00 02/03/2020    BASOSABS 0.01 02/03/2020     CMP:    Lab Results   Component Value Date     02/06/2020    K 4.5 02/06/2020    K 5.6 02/02/2020     02/06/2020    CO2 20 02/06/2020    BUN 93 02/06/2020    CREATININE 5.3 02/06/2020    GFRAA 13 02/06/2020    LABGLOM 13 02/06/2020    GLUCOSE 113 02/06/2020    PROT 7.6 02/04/2020    LABALBU 2.9 02/04/2020    CALCIUM 8.0 02/06/2020    BILITOT <0.2 02/04/2020    ALKPHOS 53 02/04/2020    AST 25 02/04/2020    ALT 20 02/04/2020     Magnesium:    Lab Results   Component Value Date    MG 2.6 02/06/2020     Phosphorus:    Lab Results   Component Value Date    PHOS 5.4 02/06/2020     PT/INR:  No results found for: PROTIME, INR  Troponin:    Lab Results   Component Value Date    TROPONINI 0.09 02/02/2020     U/A:    Lab Results   Component Value Date    COLORU Yellow 02/02/2020    PROTEINU >=300 02/02/2020    PHUR 5.0 02/02/2020    LABCAST RARE 09/21/2018    WBCUA 0-1 02/02/2020    RBCUA NONE 02/02/2020    BACTERIA NONE 02/02/2020    CLARITYU Clear 02/02/2020    SPECGRAV 1.025 02/02/2020    LEUKOCYTESUR Negative 02/02/2020    UROBILINOGEN 0.2 02/02/2020    BILIRUBINUR Negative 02/02/2020    BLOODU TRACE 02/02/2020    GLUCOSEU 250 02/02/2020    AMORPHOUS FEW 10/02/2019     ABG:    Lab Results   Component Value Date    PH 7.312 02/06/2020    PCO2 40.5 02/06/2020    PO2 49.7 02/06/2020    HCO3 20.0 02/06/2020    BE -5.8 02/06/2020    O2SAT 80.7 02/06/2020     HgBA1c:    Lab Results   Component Value Date    LABA1C 7.6 02/02/2020     FLP:    Lab Results   Component Value Date    TRIG 60 02/03/2020    HDL 60 02/03/2020    LDLCALC 71 02/03/2020    LABVLDL 12 02/03/2020     TSH:    Lab Results   Component Value Date

## 2020-02-06 NOTE — PLAN OF CARE
Problem: Falls - Risk of:  Goal: Will remain free from falls  Description  Will remain free from falls  2/6/2020 0412 by Lois Pérez RN  Outcome: Met This Shift

## 2020-02-06 NOTE — PROGRESS NOTES
Platelets   Date Value Ref Range Status   02/06/2020 361 130 - 450 E9/L Final   02/05/2020 380 130 - 450 E9/L Final   02/04/2020 349 130 - 450 E9/L Final     Sodium   Date Value Ref Range Status   02/06/2020 137 132 - 146 mmol/L Final   02/05/2020 139 132 - 146 mmol/L Final   02/04/2020 138 132 - 146 mmol/L Final     Potassium   Date Value Ref Range Status   02/06/2020 4.5 3.5 - 5.0 mmol/L Final   02/05/2020 4.7 3.5 - 5.0 mmol/L Final   02/04/2020 4.7 3.5 - 5.0 mmol/L Final     Potassium reflex Magnesium   Date Value Ref Range Status   02/02/2020 5.6 (H) 3.5 - 5.0 mmol/L Final     Chloride   Date Value Ref Range Status   02/06/2020 102 98 - 107 mmol/L Final   02/05/2020 99 98 - 107 mmol/L Final   02/04/2020 101 98 - 107 mmol/L Final     CO2   Date Value Ref Range Status   02/06/2020 20 (L) 22 - 29 mmol/L Final   02/05/2020 24 22 - 29 mmol/L Final   02/04/2020 17 (L) 22 - 29 mmol/L Final     BUN   Date Value Ref Range Status   02/06/2020 93 (H) 8 - 23 mg/dL Final   02/05/2020 90 (H) 8 - 23 mg/dL Final   02/04/2020 82 (H) 8 - 23 mg/dL Final     CREATININE   Date Value Ref Range Status   02/06/2020 5.3 (H) 0.7 - 1.2 mg/dL Final   02/05/2020 5.4 (H) 0.7 - 1.2 mg/dL Final   02/04/2020 4.9 (H) 0.7 - 1.2 mg/dL Final     Glucose   Date Value Ref Range Status   02/06/2020 113 (H) 74 - 99 mg/dL Final   02/05/2020 191 (H) 74 - 99 mg/dL Final   02/04/2020 160 (H) 74 - 99 mg/dL Final     Calcium   Date Value Ref Range Status   02/06/2020 8.0 (L) 8.6 - 10.2 mg/dL Final   02/05/2020 8.2 (L) 8.6 - 10.2 mg/dL Final   02/04/2020 8.4 (L) 8.6 - 10.2 mg/dL Final     Total Protein   Date Value Ref Range Status   02/04/2020 7.6 6.4 - 8.3 g/dL Final   02/03/2020 8.4 (H) 6.4 - 8.3 g/dL Final   02/02/2020 8.4 (H) 6.4 - 8.3 g/dL Final     Alb   Date Value Ref Range Status   02/04/2020 2.9 (L) 3.5 - 5.2 g/dL Final   02/03/2020 3.1 (L) 3.5 - 5.2 g/dL Final   02/02/2020 3.3 (L) 3.5 - 5.2 g/dL Final     Total Bilirubin   Date Value Ref Range findings of   interstitial edema. XR CHEST STANDARD (2 VW)   Final Result   Stable chest x-ray. CT Chest WO Contrast   Final Result   1. Diffuse pulmonary groundglass opacities with septal thickening. 2. Small pleural effusions. 3. Multifocal peripheral consolidation/airspace disease left lower   lobe. XR CHEST PORTABLE   Final Result      IR Interventional Radiology Procedure Request    (Results Pending)           PROBLEM LIST:  Active Problems:    Respiratory failure ([address]  Resolved Problems:    * No resolved hospital problems. *      IMPRESSION:  1. Pneumonia/interstitial infiltrates  2. Hilar/mediastinal adenopathy  3. Chronic renal failure, worse  4. Hypoxemia    PLAN:  1. Plans for dialysis are being considered which the patient said he does not want  2.  After the dialysis question is settled, he should, if his chest x-ray is not improved, undergo mediastinoscopy as an outpatient and be referred to the thoracic surgeon at [facility] for a definitive diagnosis        Electronically signed by Fany Clayton MD on 2/6/2020 at 2:46 PM

## 2020-02-07 ENCOUNTER — APPOINTMENT (OUTPATIENT)
Dept: GENERAL RADIOLOGY | Age: 65
DRG: 291 | End: 2020-02-07
Payer: MEDICARE

## 2020-02-07 ENCOUNTER — APPOINTMENT (OUTPATIENT)
Dept: INTERVENTIONAL RADIOLOGY/VASCULAR | Age: 65
DRG: 291 | End: 2020-02-07
Payer: MEDICARE

## 2020-02-07 LAB
ANION GAP SERPL CALCULATED.3IONS-SCNC: 13 MMOL/L (ref 7–16)
APTT: 26.8 SEC (ref 24.5–35.1)
BLOOD CULTURE, ROUTINE: NORMAL
BUN BLDV-MCNC: 91 MG/DL (ref 8–23)
CALCIUM IONIZED: 1.26 MMOL/L (ref 1.15–1.33)
CALCIUM SERPL-MCNC: 8.4 MG/DL (ref 8.6–10.2)
CHLORIDE BLD-SCNC: 104 MMOL/L (ref 98–107)
CO2: 21 MMOL/L (ref 22–29)
CREAT SERPL-MCNC: 5.4 MG/DL (ref 0.7–1.2)
GFR AFRICAN AMERICAN: 13
GFR NON-AFRICAN AMERICAN: 13 ML/MIN/1.73
GLUCOSE BLD-MCNC: 69 MG/DL (ref 74–99)
HBV SURFACE AB TITR SER: NORMAL {TITER}
HCT VFR BLD CALC: 27.7 % (ref 37–54)
HEMOGLOBIN: 7.9 G/DL (ref 12.5–16.5)
HEPATITIS B SURFACE ANTIGEN INTERPRETATION: NORMAL
HEPATITIS C ANTIBODY INTERPRETATION: NORMAL
INR BLD: 1
MAGNESIUM: 2.7 MG/DL (ref 1.6–2.6)
MCH RBC QN AUTO: 26.1 PG (ref 26–35)
MCHC RBC AUTO-ENTMCNC: 28.5 % (ref 32–34.5)
MCV RBC AUTO: 91.4 FL (ref 80–99.9)
METER GLUCOSE: 116 MG/DL (ref 74–99)
METER GLUCOSE: 171 MG/DL (ref 74–99)
METER GLUCOSE: 83 MG/DL (ref 74–99)
METER GLUCOSE: 84 MG/DL (ref 74–99)
PDW BLD-RTO: 16.6 FL (ref 11.5–15)
PHOSPHORUS: 5.1 MG/DL (ref 2.5–4.5)
PLATELET # BLD: 377 E9/L (ref 130–450)
PMV BLD AUTO: 10.3 FL (ref 7–12)
POTASSIUM SERPL-SCNC: 5.1 MMOL/L (ref 3.5–5)
PROTHROMBIN TIME: 11.8 SEC (ref 9.3–12.4)
RBC # BLD: 3.03 E12/L (ref 3.8–5.8)
SODIUM BLD-SCNC: 138 MMOL/L (ref 132–146)
WBC # BLD: 8.8 E9/L (ref 4.5–11.5)

## 2020-02-07 PROCEDURE — 80048 BASIC METABOLIC PNL TOTAL CA: CPT

## 2020-02-07 PROCEDURE — 82330 ASSAY OF CALCIUM: CPT

## 2020-02-07 PROCEDURE — 94660 CPAP INITIATION&MGMT: CPT

## 2020-02-07 PROCEDURE — 84100 ASSAY OF PHOSPHORUS: CPT

## 2020-02-07 PROCEDURE — 2580000003 HC RX 258

## 2020-02-07 PROCEDURE — 6370000000 HC RX 637 (ALT 250 FOR IP): Performed by: INTERNAL MEDICINE

## 2020-02-07 PROCEDURE — 86706 HEP B SURFACE ANTIBODY: CPT

## 2020-02-07 PROCEDURE — C1894 INTRO/SHEATH, NON-LASER: HCPCS

## 2020-02-07 PROCEDURE — 85730 THROMBOPLASTIN TIME PARTIAL: CPT

## 2020-02-07 PROCEDURE — 02HV33Z INSERTION OF INFUSION DEVICE INTO SUPERIOR VENA CAVA, PERCUTANEOUS APPROACH: ICD-10-PCS | Performed by: INTERNAL MEDICINE

## 2020-02-07 PROCEDURE — 87340 HEPATITIS B SURFACE AG IA: CPT

## 2020-02-07 PROCEDURE — 94640 AIRWAY INHALATION TREATMENT: CPT

## 2020-02-07 PROCEDURE — 90935 HEMODIALYSIS ONE EVALUATION: CPT

## 2020-02-07 PROCEDURE — 2000000000 HC ICU R&B

## 2020-02-07 PROCEDURE — 2500000003 HC RX 250 WO HCPCS

## 2020-02-07 PROCEDURE — 76937 US GUIDE VASCULAR ACCESS: CPT | Performed by: RADIOLOGY

## 2020-02-07 PROCEDURE — 6360000002 HC RX W HCPCS: Performed by: INTERNAL MEDICINE

## 2020-02-07 PROCEDURE — 83735 ASSAY OF MAGNESIUM: CPT

## 2020-02-07 PROCEDURE — 86803 HEPATITIS C AB TEST: CPT

## 2020-02-07 PROCEDURE — 82962 GLUCOSE BLOOD TEST: CPT

## 2020-02-07 PROCEDURE — 71045 X-RAY EXAM CHEST 1 VIEW: CPT

## 2020-02-07 PROCEDURE — 85027 COMPLETE CBC AUTOMATED: CPT

## 2020-02-07 PROCEDURE — 5A1D70Z PERFORMANCE OF URINARY FILTRATION, INTERMITTENT, LESS THAN 6 HOURS PER DAY: ICD-10-PCS | Performed by: INTERNAL MEDICINE

## 2020-02-07 PROCEDURE — 36415 COLL VENOUS BLD VENIPUNCTURE: CPT

## 2020-02-07 PROCEDURE — 85610 PROTHROMBIN TIME: CPT

## 2020-02-07 PROCEDURE — 77001 FLUOROGUIDE FOR VEIN DEVICE: CPT | Performed by: RADIOLOGY

## 2020-02-07 PROCEDURE — 36558 INSERT TUNNELED CV CATH: CPT | Performed by: RADIOLOGY

## 2020-02-07 PROCEDURE — 2700000000 HC OXYGEN THERAPY PER DAY

## 2020-02-07 RX ORDER — SODIUM CHLORIDE 0.9 % (FLUSH) 0.9 %
SYRINGE (ML) INJECTION
Status: COMPLETED
Start: 2020-02-07 | End: 2020-02-07

## 2020-02-07 RX ORDER — BUMETANIDE 0.25 MG/ML
INJECTION, SOLUTION INTRAMUSCULAR; INTRAVENOUS
Status: COMPLETED
Start: 2020-02-07 | End: 2020-02-07

## 2020-02-07 RX ORDER — BUMETANIDE 0.25 MG/ML
2 INJECTION, SOLUTION INTRAMUSCULAR; INTRAVENOUS ONCE
Status: DISCONTINUED | OUTPATIENT
Start: 2020-02-07 | End: 2020-02-07 | Stop reason: ALTCHOICE

## 2020-02-07 RX ADMIN — GUAIFENESIN 600 MG: 600 TABLET, EXTENDED RELEASE ORAL at 08:57

## 2020-02-07 RX ADMIN — MOMETASONE FUROATE AND FORMOTEROL FUMARATE DIHYDRATE 2 PUFF: 200; 5 AEROSOL RESPIRATORY (INHALATION) at 06:34

## 2020-02-07 RX ADMIN — FERROUS SULFATE TAB 325 MG (65 MG ELEMENTAL FE) 325 MG: 325 (65 FE) TAB at 08:58

## 2020-02-07 RX ADMIN — HYDRALAZINE HYDROCHLORIDE 50 MG: 50 TABLET ORAL at 08:57

## 2020-02-07 RX ADMIN — INSULIN GLARGINE 32 UNITS: 100 INJECTION, SOLUTION SUBCUTANEOUS at 21:41

## 2020-02-07 RX ADMIN — DOCUSATE SODIUM 100 MG: 100 CAPSULE, LIQUID FILLED ORAL at 20:50

## 2020-02-07 RX ADMIN — SEVELAMER CARBONATE 800 MG: 800 TABLET, FILM COATED ORAL at 18:55

## 2020-02-07 RX ADMIN — IPRATROPIUM BROMIDE AND ALBUTEROL SULFATE 1 AMPULE: .5; 3 SOLUTION RESPIRATORY (INHALATION) at 06:34

## 2020-02-07 RX ADMIN — Medication 10 ML: at 18:57

## 2020-02-07 RX ADMIN — BUMETANIDE 2 MG: 0.25 INJECTION INTRAMUSCULAR; INTRAVENOUS at 15:05

## 2020-02-07 RX ADMIN — DOCUSATE SODIUM 100 MG: 100 CAPSULE, LIQUID FILLED ORAL at 08:58

## 2020-02-07 RX ADMIN — METOPROLOL TARTRATE 50 MG: 50 TABLET, FILM COATED ORAL at 20:54

## 2020-02-07 RX ADMIN — SODIUM BICARBONATE 650 MG TABLET 1300 MG: at 08:57

## 2020-02-07 RX ADMIN — POLYETHYLENE GLYCOL 3350 17 G: 17 POWDER, FOR SOLUTION ORAL at 08:58

## 2020-02-07 RX ADMIN — AMLODIPINE BESYLATE 10 MG: 10 TABLET ORAL at 08:57

## 2020-02-07 RX ADMIN — INSULIN LISPRO 1 UNITS: 100 INJECTION, SOLUTION INTRAVENOUS; SUBCUTANEOUS at 21:40

## 2020-02-07 RX ADMIN — EPOETIN ALFA-EPBX 8000 UNITS: 10000 INJECTION, SOLUTION INTRAVENOUS; SUBCUTANEOUS at 10:31

## 2020-02-07 RX ADMIN — BUMETANIDE 2 MG: 0.25 INJECTION, SOLUTION INTRAMUSCULAR; INTRAVENOUS at 15:05

## 2020-02-07 RX ADMIN — GLIMEPIRIDE 4 MG: 4 TABLET ORAL at 08:57

## 2020-02-07 RX ADMIN — GABAPENTIN 100 MG: 100 CAPSULE ORAL at 08:57

## 2020-02-07 RX ADMIN — METOPROLOL TARTRATE 50 MG: 50 TABLET, FILM COATED ORAL at 08:57

## 2020-02-07 RX ADMIN — GUAIFENESIN 600 MG: 600 TABLET, EXTENDED RELEASE ORAL at 20:51

## 2020-02-07 RX ADMIN — SODIUM BICARBONATE 650 MG TABLET 1300 MG: at 20:50

## 2020-02-07 RX ADMIN — GABAPENTIN 100 MG: 100 CAPSULE ORAL at 20:50

## 2020-02-07 RX ADMIN — CALCITRIOL 0.25 MCG: 0.25 CAPSULE ORAL at 09:06

## 2020-02-07 RX ADMIN — HEPARIN SODIUM 5000 UNITS: 5000 INJECTION, SOLUTION INTRAVENOUS; SUBCUTANEOUS at 21:41

## 2020-02-07 RX ADMIN — SEVELAMER CARBONATE 800 MG: 800 TABLET, FILM COATED ORAL at 08:58

## 2020-02-07 RX ADMIN — IPRATROPIUM BROMIDE AND ALBUTEROL SULFATE 1 AMPULE: .5; 3 SOLUTION RESPIRATORY (INHALATION) at 16:49

## 2020-02-07 RX ADMIN — VITAMIN D, TAB 1000IU (100/BT) 3000 UNITS: 25 TAB at 08:57

## 2020-02-07 RX ADMIN — HYDRALAZINE HYDROCHLORIDE 50 MG: 50 TABLET ORAL at 20:51

## 2020-02-07 ASSESSMENT — PAIN SCALES - GENERAL
PAINLEVEL_OUTOF10: 0

## 2020-02-07 NOTE — PROGRESS NOTES
37.0 - 54.0 % Final   02/06/2020 26.8 (L) 37.0 - 54.0 % Final   02/05/2020 28.5 (L) 37.0 - 54.0 % Final     MCV   Date Value Ref Range Status   02/07/2020 91.4 80.0 - 99.9 fL Final   02/06/2020 88.7 80.0 - 99.9 fL Final   02/05/2020 89.1 80.0 - 99.9 fL Final     Platelets   Date Value Ref Range Status   02/07/2020 377 130 - 450 E9/L Final   02/06/2020 361 130 - 450 E9/L Final   02/05/2020 380 130 - 450 E9/L Final     Sodium   Date Value Ref Range Status   02/07/2020 138 132 - 146 mmol/L Final   02/06/2020 137 132 - 146 mmol/L Final   02/05/2020 139 132 - 146 mmol/L Final     Potassium   Date Value Ref Range Status   02/07/2020 5.1 (H) 3.5 - 5.0 mmol/L Final   02/06/2020 4.5 3.5 - 5.0 mmol/L Final   02/05/2020 4.7 3.5 - 5.0 mmol/L Final     Potassium reflex Magnesium   Date Value Ref Range Status   02/02/2020 5.6 (H) 3.5 - 5.0 mmol/L Final     Chloride   Date Value Ref Range Status   02/07/2020 104 98 - 107 mmol/L Final   02/06/2020 102 98 - 107 mmol/L Final   02/05/2020 99 98 - 107 mmol/L Final     CO2   Date Value Ref Range Status   02/07/2020 21 (L) 22 - 29 mmol/L Final   02/06/2020 20 (L) 22 - 29 mmol/L Final   02/05/2020 24 22 - 29 mmol/L Final     BUN   Date Value Ref Range Status   02/07/2020 91 (H) 8 - 23 mg/dL Final   02/06/2020 93 (H) 8 - 23 mg/dL Final   02/05/2020 90 (H) 8 - 23 mg/dL Final     CREATININE   Date Value Ref Range Status   02/07/2020 5.4 (H) 0.7 - 1.2 mg/dL Final   02/06/2020 5.3 (H) 0.7 - 1.2 mg/dL Final   02/05/2020 5.4 (H) 0.7 - 1.2 mg/dL Final     Glucose   Date Value Ref Range Status   02/07/2020 69 (L) 74 - 99 mg/dL Final   02/06/2020 113 (H) 74 - 99 mg/dL Final   02/05/2020 191 (H) 74 - 99 mg/dL Final     Calcium   Date Value Ref Range Status   02/07/2020 8.4 (L) 8.6 - 10.2 mg/dL Final   02/06/2020 8.0 (L) 8.6 - 10.2 mg/dL Final   02/05/2020 8.2 (L) 8.6 - 10.2 mg/dL Final     Total Protein   Date Value Ref Range Status   02/04/2020 7.6 6.4 - 8.3 g/dL Final   02/03/2020 8.4 (H) 6.4 - 8.3 g/dL Final   02/02/2020 8.4 (H) 6.4 - 8.3 g/dL Final     Alb   Date Value Ref Range Status   02/04/2020 2.9 (L) 3.5 - 5.2 g/dL Final   02/03/2020 3.1 (L) 3.5 - 5.2 g/dL Final   02/02/2020 3.3 (L) 3.5 - 5.2 g/dL Final     Total Bilirubin   Date Value Ref Range Status   02/04/2020 <0.2 0.0 - 1.2 mg/dL Final   02/03/2020 0.3 0.0 - 1.2 mg/dL Final   02/02/2020 0.4 0.0 - 1.2 mg/dL Final     Alkaline Phosphatase   Date Value Ref Range Status   02/04/2020 53 40 - 129 U/L Final   02/03/2020 58 40 - 129 U/L Final   02/02/2020 62 40 - 129 U/L Final     AST   Date Value Ref Range Status   02/04/2020 25 0 - 39 U/L Final   02/03/2020 11 0 - 39 U/L Final   02/02/2020 13 0 - 39 U/L Final     ALT   Date Value Ref Range Status   02/04/2020 20 0 - 40 U/L Final   02/03/2020 11 0 - 40 U/L Final   02/02/2020 13 0 - 40 U/L Final     GFR Non-   Date Value Ref Range Status   02/07/2020 13 >=60 mL/min/1.73 Final     Comment:     Chronic Kidney Disease: less than 60 ml/min/1.73 sq.m. Kidney Failure: less than 15 ml/min/1.73 sq.m. Results valid for patients 18 years and older. 02/06/2020 13 >=60 mL/min/1.73 Final     Comment:     Chronic Kidney Disease: less than 60 ml/min/1.73 sq.m. Kidney Failure: less than 15 ml/min/1.73 sq.m. Results valid for patients 18 years and older. 02/05/2020 13 >=60 mL/min/1.73 Final     Comment:     Chronic Kidney Disease: less than 60 ml/min/1.73 sq.m. Kidney Failure: less than 15 ml/min/1.73 sq.m. Results valid for patients 18 years and older.        GFR    Date Value Ref Range Status   02/07/2020 13  Final   02/06/2020 13  Final   02/05/2020 13  Final     Magnesium   Date Value Ref Range Status   02/07/2020 2.7 (H) 1.6 - 2.6 mg/dL Final   02/06/2020 2.6 1.6 - 2.6 mg/dL Final   02/05/2020 2.5 1.6 - 2.6 mg/dL Final     Phosphorus   Date Value Ref Range Status   02/07/2020 5.1 (H) 2.5 - 4.5 mg/dL Final   02/06/2020 5.4 (H) 2.5 - 4.5 mg/dL

## 2020-02-07 NOTE — PROGRESS NOTES
intact; motor and sensory function intact with no focal deficits  Skin:  No rashes, lesions or wounds    DATA:  CBC with Differential:    Lab Results   Component Value Date    WBC 8.8 02/07/2020    RBC 3.03 02/07/2020    HGB 7.9 02/07/2020    HCT 27.7 02/07/2020     02/07/2020    MCV 91.4 02/07/2020    MCH 26.1 02/07/2020    MCHC 28.5 02/07/2020    RDW 16.6 02/07/2020    LYMPHOPCT 6.8 02/03/2020    MONOPCT 3.1 02/03/2020    BASOPCT 0.1 02/03/2020    MONOSABS 0.35 02/03/2020    LYMPHSABS 0.77 02/03/2020    EOSABS 0.00 02/03/2020    BASOSABS 0.01 02/03/2020     CMP:    Lab Results   Component Value Date     02/07/2020    K 5.1 02/07/2020    K 5.6 02/02/2020     02/07/2020    CO2 21 02/07/2020    BUN 91 02/07/2020    CREATININE 5.4 02/07/2020    GFRAA 13 02/07/2020    LABGLOM 13 02/07/2020    GLUCOSE 69 02/07/2020    PROT 7.6 02/04/2020    LABALBU 2.9 02/04/2020    CALCIUM 8.4 02/07/2020    BILITOT <0.2 02/04/2020    ALKPHOS 53 02/04/2020    AST 25 02/04/2020    ALT 20 02/04/2020     Magnesium:    Lab Results   Component Value Date    MG 2.7 02/07/2020     Phosphorus:    Lab Results   Component Value Date    PHOS 5.1 02/07/2020     PT/INR:    Lab Results   Component Value Date    PROTIME 11.8 02/07/2020    INR 1.0 02/07/2020     Troponin:    Lab Results   Component Value Date    TROPONINI 0.09 02/02/2020     U/A:    Lab Results   Component Value Date    COLORU Yellow 02/02/2020    PROTEINU >=300 02/02/2020    PHUR 5.0 02/02/2020    LABCAST RARE 09/21/2018    WBCUA 0-1 02/02/2020    RBCUA NONE 02/02/2020    BACTERIA NONE 02/02/2020    CLARITYU Clear 02/02/2020    SPECGRAV 1.025 02/02/2020    LEUKOCYTESUR Negative 02/02/2020    UROBILINOGEN 0.2 02/02/2020    BILIRUBINUR Negative 02/02/2020    BLOODU TRACE 02/02/2020    GLUCOSEU 250 02/02/2020    AMORPHOUS FEW 10/02/2019     ABG:    Lab Results   Component Value Date    PH 7.312 02/06/2020    PCO2 40.5 02/06/2020    PO2 49.7 02/06/2020    HCO3 20.0 02/06/2020    BE -5.8 02/06/2020    O2SAT 80.7 02/06/2020     HgBA1c:    Lab Results   Component Value Date    LABA1C 7.6 02/02/2020     FLP:    Lab Results   Component Value Date    TRIG 60 02/03/2020    HDL 60 02/03/2020    LDLCALC 71 02/03/2020    LABVLDL 12 02/03/2020     TSH:    Lab Results   Component Value Date    TSH 0.927 02/03/2020     IRON:    Lab Results   Component Value Date    IRON 58 11/13/2019     LIPASE:    Lab Results   Component Value Date    LIPASE 78 09/24/2018       ASSESSMENT AND PLAN:      Patient Active Problem List    Diagnosis Date Noted    Hallux valgus, acquired 06/06/2014     Priority: Low     Class: Present on Admission    Respiratory failure ([facility] [address] 02/02/2020    Pneumonia 01/28/2020    Acute pancreatitis without necrosis or infection, unspecified 09/23/2018    Idiopathic acute pancreatitis 09/21/2018    B12 deficiency 03/17/2016    DMII (diabetes mellitus, type 2) ([facility] [address] 07/28/2015    HTN (hypertension) 07/28/2015    Lumbar radicular pain 07/28/2015    Spinal stenosis 07/28/2015     1. Acute/subacute hypoxic respiratory failure  2. Acute congestive heart failure  3. Acute on chronic kidney disease stage IV  4. Hypertension  5. Non-insulin-dependent diabetes mellitus type II-hyperglycemia  6.   Pneumonia-lateral lobe      Plan:  Echocardiogram-EF 66%, pulmonary hypertension at 64 mmHg  Records from [facility] reviewed  Home meds reviewed  Glucoscans 4 times daily with sliding scale insulin  DuoNeb aerosols  O2 nasal cannula  Hold Lodine, metformin  Consult nephrology, pulmonology  Procalcitonin 0.25  Routine labs in a.m.  CT of the lung-diffuse pulmonary infiltrates    Elia Sharma D.O.  2/7/2020  12:25 PM

## 2020-02-07 NOTE — PROGRESS NOTES
hydroxide, ipratropium-albuterol  I/O last 3 completed shifts: In: 1060 [P.O.:1060]  Out: 600 [Urine:600]  I/O this shift:  In: -   Out: 200 [Urine:200]    Intake/Output Summary (Last 24 hours) at 2/7/2020 1519  Last data filed at 2/7/2020 1511  Gross per 24 hour   Intake 1060 ml   Output 800 ml   Net 260 ml     CBC:   Recent Labs     02/05/20  1613 02/06/20  0806 02/07/20  0625   WBC 8.8 8.7 8.8   HGB 8.4* 7.9* 7.9*    361 377     BMP:    Recent Labs     02/05/20  1613 02/06/20  0805 02/07/20  0625    137 138   K 4.7 4.5 5.1*   CL 99 102 104   CO2 24 20* 21*   BUN 90* 93* 91*   CREATININE 5.4* 5.3* 5.4*   GLUCOSE 191* 113* 69*     Hepatic:   No results for input(s): AST, ALT, ALB, BILITOT, ALKPHOS in the last 72 hours. Troponin:   No results for input(s): TROPONINI in the last 72 hours. BNP: No results for input(s): BNP in the last 72 hours. Lipids:   No results for input(s): CHOL, HDL in the last 72 hours. Invalid input(s): LDLCALCU  ABGs:   Lab Results   Component Value Date    PO2ART 185.1 02/02/2020    XKD9CCD 29.1 02/02/2020     INR:   Recent Labs     02/07/20  0625   INR 1.0     -----------------------------------------------------------------  RAD:   Reading location:  200       HISTORY: Hypertension.       A single frontal portable chest x-ray was obtained.       Diffuse bilateral airspace disease is noted favored to represent CHF.       The heart is not enlarged.       IMPRESSION   1. Diffuse bilateral airspace disease favored to represent  CHF. Pneumonia is less likely but not excluded. Reading location: 200           INDICATION: Hypoxia       FINDINGS: Axial CT images through the thorax without benefit of IV   contrast. Automated dose control.        Limited assessment of the upper abdomen without acute finding.       Small right larger than the left pleural effusions. Normal heart size. Atherosclerosis. Multiple enlarged mediastinal and hilar lymph nodes.    Left prevascular nodes range up to at least 21 x 15 mm diameter. Subcarinal node 25 x 19 mm.       Extensive/diffuse pulmonary groundglass opacities with septal and   fissural thickening. Focal areas of peripheral consolidation noted   inferolateral aspect of the left lower lobe.     Intact osseous structures.           Impression   1. Diffuse pulmonary groundglass opacities with septal thickening. 2. Small pleural effusions. 3. Multifocal peripheral consolidation/airspace disease left lower   lobe. Objective:   Vitals: BP (!) 155/97   Pulse 85   Temp 98.3 °F (36.8 °C) (Axillary)   Resp (!) 38   Ht 5' 8\" (1.727 m)   Wt 225 lb 12.8 oz (102.4 kg)   SpO2 96%   BMI 34.33 kg/m²   General appearance: alert, appears stated age and cooperative  Skin: Skin color, texture, turgor normal. No rashes or lesions  HEENT: Head: Normocephalic, no lesions, without obvious abnormality. Head: Normal, normocephalic, atraumatic. Eye: Normal external eye, conjunctiva, lids cornea, CHATO. Nose: Normal external nose, mucus membranes and septum. Pharynx: Dental Hygiene adequate. Normal buccal mucosa. Normal pharynx. Neck: no adenopathy, no carotid bruit, no JVD and supple, symmetrical, trachea midline, R IJ TDC  Lungs: rhonchi bilaterally on BIPAP  Heart: regular rate and rhythm, S1, S2 normal, no S3 or S4 and no rub  Abdomen: soft, non-tender; bowel sounds normal; no masses,  no organomegaly  Extremities: 2+bilat edema  Neurologic: Mental status: Alert, oriented, thought content appropriate    Assessment:   Patient Active Problem List:     Hallux valgus, acquired     DMII (diabetes mellitus, type 2) (HCC)     HTN (hypertension)     Lumbar radicular pain     Spinal stenosis     B12 deficiency     Idiopathic acute pancreatitis     Acute pancreatitis without necrosis or infection, unspecified     Pneumonia     Respiratory failure ([facility] [address]    Plan:   1.  Stage I HUGH -the rise in the cr above baseline most probably reflects the acute

## 2020-02-07 NOTE — PROCEDURES
[address]                                 ECHOCARDIOGRAM    PATIENT NAME: Emiliano Benavidez                  :        1955  MED REC NO:   [id number]                            ROOM:       6092  ACCOUNT NO:   [de-identified]                           ADMIT DATE: 2020  PROVIDER:     Wolfgang Collazo MD    DATE OF STUDY:  2020    ECHOCARDIOGRAPHER:  Sumi Drew MD    INDICATIONS:  Congestive heart failure per chest x-ray. 2-D MEASUREMENTS:  Left ventricular outflow tract 2.1 cm. Right  ventricle diastole 2.8. Left ventricle diastole 5.2, systole 2.7. Septal wall thickness of the left ventricle 1.2 cm, posterior wall 1.1. Left atrial dimension 4.4 cm. Left atrial area 18 sq. cm. Right atrial  area 20 sq. cm. Aortic root diameter 3.4 cm. Aortic valve cusp  separation 2.0 cm. IMPRESSION:  1. The left and right atria are both mildly dilated. The remaining  cardiac chamber sizes including aortic root are within normal limits. 2.  The left ventricle shows asymmetric septal hypertrophy at 1.2 cm. Systolic function is well preserved with ejection fraction of 66%. No  focal wall motion abnormality is seen. No definite diastolic filling  dysfunction is seen. 3.  The mitral valve appears structurally normal, shows a maximal  gradient of 3.2 mmHg. Mitral valve area 6 sq. cm by pressure half time. There is no significant mitral stenosis nor regurgitation. 4.  The aortic valve is mildly sclerotic, leaflets open well. Maximal  gradient 10.8 mmHg. Aortic valve area 2 sq. cm. There is no  significant aortic stenosis nor insufficiency identified. 5.  There is no pulmonic stenosis nor insufficiency. There is trace to  1+ tricuspid regurgitation with pulmonary hypertension at 64 mmHg.   6.  There is no significant pericardial effusion nor any definite  intracavitary mass or thrombus or shunt identified on this study.         Alejandra Vasquez MD    D: 02/06/2020 20:00:10       T: 02/06/2020 20:08:10     [id number]  Job#: [id number]     Doc#: [id number]    CC:  DO Shravan Cavazos MD Kayla Pond, MD

## 2020-02-07 NOTE — PLAN OF CARE
Perception - Impaired:  Goal: Ability to maintain a stable neurologic state will improve  Description  Ability to maintain a stable neurologic state will improve  Outcome: Met This Shift

## 2020-02-07 NOTE — CARE COORDINATION
2/7/2020  transition of care:  Pt to icu continuous bipap. Watch for home oxygen needs- testing to be sent to [facility] fax at: [phone]. Outpt HD being looked into by Collette at [facility]. CM to follow.   Electronically signed by SHALA Faulkner on 2/7/2020 at 3:16 PM

## 2020-02-08 ENCOUNTER — APPOINTMENT (OUTPATIENT)
Dept: GENERAL RADIOLOGY | Age: 65
DRG: 291 | End: 2020-02-08
Payer: MEDICARE

## 2020-02-08 LAB
ALBUMIN SERPL-MCNC: 2.5 G/DL (ref 3.5–5.2)
ALP BLD-CCNC: 42 U/L (ref 40–129)
ALT SERPL-CCNC: 9 U/L (ref 0–40)
ANION GAP SERPL CALCULATED.3IONS-SCNC: 8 MMOL/L (ref 7–16)
AST SERPL-CCNC: 9 U/L (ref 0–39)
B.E.: 0.7 MMOL/L (ref -3–3)
BILIRUB SERPL-MCNC: 0.2 MG/DL (ref 0–1.2)
BUN BLDV-MCNC: 69 MG/DL (ref 8–23)
CALCIUM IONIZED: 1.23 MMOL/L (ref 1.15–1.33)
CALCIUM SERPL-MCNC: 8.2 MG/DL (ref 8.6–10.2)
CHLORIDE BLD-SCNC: 103 MMOL/L (ref 98–107)
CO2: 26 MMOL/L (ref 22–29)
COHB: 0.8 % (ref 0–1.5)
CREAT SERPL-MCNC: 4.6 MG/DL (ref 0.7–1.2)
CRITICAL: ABNORMAL
DATE ANALYZED: ABNORMAL
DATE OF COLLECTION: ABNORMAL
FIO2: 100 %
GFR AFRICAN AMERICAN: 16
GFR NON-AFRICAN AMERICAN: 16 ML/MIN/1.73
GLUCOSE BLD-MCNC: 119 MG/DL (ref 74–99)
HCO3: 26.3 MMOL/L (ref 22–26)
HCT VFR BLD CALC: 25.4 % (ref 37–54)
HEMOGLOBIN: 7.7 G/DL (ref 12.5–16.5)
HHB: 1.2 % (ref 0–5)
LAB: ABNORMAL
Lab: ABNORMAL
MAGNESIUM: 2.5 MG/DL (ref 1.6–2.6)
MCH RBC QN AUTO: 27 PG (ref 26–35)
MCHC RBC AUTO-ENTMCNC: 30.3 % (ref 32–34.5)
MCV RBC AUTO: 89.1 FL (ref 80–99.9)
METER GLUCOSE: 100 MG/DL (ref 74–99)
METER GLUCOSE: 126 MG/DL (ref 74–99)
METER GLUCOSE: 137 MG/DL (ref 74–99)
METER GLUCOSE: 139 MG/DL (ref 74–99)
METER GLUCOSE: 211 MG/DL (ref 74–99)
METER GLUCOSE: 46 MG/DL (ref 74–99)
METER GLUCOSE: 63 MG/DL (ref 74–99)
METHB: 0.4 % (ref 0–1.5)
MODE: ABNORMAL
O2 CONTENT: 11.7 ML/DL
O2 SATURATION: 98.8 % (ref 92–98.5)
O2HB: 97.6 % (ref 94–97)
OPERATOR ID: 797
PATIENT TEMP: 37 C
PCO2: 47.3 MMHG (ref 35–45)
PDW BLD-RTO: 16.9 FL (ref 11.5–15)
PEEP/CPAP: 8 CMH2O
PFO2: 1.4 MMHG/%
PH BLOOD GAS: 7.36 (ref 7.35–7.45)
PHOSPHORUS: 4.6 MG/DL (ref 2.5–4.5)
PIP: 14 CMH2O
PLATELET # BLD: 215 E9/L (ref 130–450)
PMV BLD AUTO: 9.9 FL (ref 7–12)
PO2: 140.5 MMHG (ref 75–100)
POTASSIUM SERPL-SCNC: 5.4 MMOL/L (ref 3.5–5)
RBC # BLD: 2.85 E12/L (ref 3.8–5.8)
RI(T): 3.56
SODIUM BLD-SCNC: 137 MMOL/L (ref 132–146)
SOURCE, BLOOD GAS: ABNORMAL
THB: 8.3 G/DL (ref 11.5–16.5)
TIME ANALYZED: 549
TOTAL PROTEIN: 6.6 G/DL (ref 6.4–8.3)
WBC # BLD: 11.1 E9/L (ref 4.5–11.5)

## 2020-02-08 PROCEDURE — 94660 CPAP INITIATION&MGMT: CPT

## 2020-02-08 PROCEDURE — 85027 COMPLETE CBC AUTOMATED: CPT

## 2020-02-08 PROCEDURE — 71045 X-RAY EXAM CHEST 1 VIEW: CPT

## 2020-02-08 PROCEDURE — 36415 COLL VENOUS BLD VENIPUNCTURE: CPT

## 2020-02-08 PROCEDURE — 83735 ASSAY OF MAGNESIUM: CPT

## 2020-02-08 PROCEDURE — 6370000000 HC RX 637 (ALT 250 FOR IP): Performed by: INTERNAL MEDICINE

## 2020-02-08 PROCEDURE — 2700000000 HC OXYGEN THERAPY PER DAY

## 2020-02-08 PROCEDURE — 2000000000 HC ICU R&B

## 2020-02-08 PROCEDURE — 84100 ASSAY OF PHOSPHORUS: CPT

## 2020-02-08 PROCEDURE — 80053 COMPREHEN METABOLIC PANEL: CPT

## 2020-02-08 PROCEDURE — 82330 ASSAY OF CALCIUM: CPT

## 2020-02-08 PROCEDURE — 6360000002 HC RX W HCPCS: Performed by: INTERNAL MEDICINE

## 2020-02-08 PROCEDURE — 82962 GLUCOSE BLOOD TEST: CPT

## 2020-02-08 PROCEDURE — 90935 HEMODIALYSIS ONE EVALUATION: CPT | Performed by: INTERNAL MEDICINE

## 2020-02-08 PROCEDURE — 36600 WITHDRAWAL OF ARTERIAL BLOOD: CPT

## 2020-02-08 PROCEDURE — 94640 AIRWAY INHALATION TREATMENT: CPT

## 2020-02-08 PROCEDURE — 82805 BLOOD GASES W/O2 SATURATION: CPT

## 2020-02-08 RX ORDER — INSULIN GLARGINE 100 [IU]/ML
16 INJECTION, SOLUTION SUBCUTANEOUS NIGHTLY
Status: DISCONTINUED | OUTPATIENT
Start: 2020-02-08 | End: 2020-02-11

## 2020-02-08 RX ADMIN — HEPARIN SODIUM 5000 UNITS: 5000 INJECTION, SOLUTION INTRAVENOUS; SUBCUTANEOUS at 21:04

## 2020-02-08 RX ADMIN — GUAIFENESIN 600 MG: 600 TABLET, EXTENDED RELEASE ORAL at 08:55

## 2020-02-08 RX ADMIN — GABAPENTIN 100 MG: 100 CAPSULE ORAL at 08:54

## 2020-02-08 RX ADMIN — SODIUM BICARBONATE 650 MG TABLET 1300 MG: at 08:56

## 2020-02-08 RX ADMIN — IPRATROPIUM BROMIDE AND ALBUTEROL SULFATE 1 AMPULE: .5; 3 SOLUTION RESPIRATORY (INHALATION) at 21:57

## 2020-02-08 RX ADMIN — DOCUSATE SODIUM 100 MG: 100 CAPSULE, LIQUID FILLED ORAL at 20:59

## 2020-02-08 RX ADMIN — METOPROLOL TARTRATE 50 MG: 50 TABLET, FILM COATED ORAL at 08:56

## 2020-02-08 RX ADMIN — ACETAMINOPHEN 500 MG: 500 TABLET, FILM COATED ORAL at 20:59

## 2020-02-08 RX ADMIN — FERROUS SULFATE TAB 325 MG (65 MG ELEMENTAL FE) 325 MG: 325 (65 FE) TAB at 08:35

## 2020-02-08 RX ADMIN — SEVELAMER CARBONATE 800 MG: 800 TABLET, FILM COATED ORAL at 13:22

## 2020-02-08 RX ADMIN — IPRATROPIUM BROMIDE AND ALBUTEROL SULFATE 1 AMPULE: .5; 3 SOLUTION RESPIRATORY (INHALATION) at 18:22

## 2020-02-08 RX ADMIN — DOCUSATE SODIUM 100 MG: 100 CAPSULE, LIQUID FILLED ORAL at 08:54

## 2020-02-08 RX ADMIN — METOPROLOL TARTRATE 50 MG: 50 TABLET, FILM COATED ORAL at 20:59

## 2020-02-08 RX ADMIN — VITAMIN D, TAB 1000IU (100/BT) 3000 UNITS: 25 TAB at 08:57

## 2020-02-08 RX ADMIN — INSULIN GLARGINE 16 UNITS: 100 INJECTION, SOLUTION SUBCUTANEOUS at 21:04

## 2020-02-08 RX ADMIN — IPRATROPIUM BROMIDE AND ALBUTEROL SULFATE 1 AMPULE: .5; 3 SOLUTION RESPIRATORY (INHALATION) at 09:38

## 2020-02-08 RX ADMIN — SODIUM BICARBONATE 650 MG TABLET 1300 MG: at 20:59

## 2020-02-08 RX ADMIN — HEPARIN SODIUM 5000 UNITS: 5000 INJECTION, SOLUTION INTRAVENOUS; SUBCUTANEOUS at 06:36

## 2020-02-08 RX ADMIN — INSULIN LISPRO 2 UNITS: 100 INJECTION, SOLUTION INTRAVENOUS; SUBCUTANEOUS at 21:03

## 2020-02-08 RX ADMIN — AMLODIPINE BESYLATE 10 MG: 10 TABLET ORAL at 08:54

## 2020-02-08 RX ADMIN — POLYETHYLENE GLYCOL 3350 17 G: 17 POWDER, FOR SOLUTION ORAL at 08:56

## 2020-02-08 RX ADMIN — SEVELAMER CARBONATE 800 MG: 800 TABLET, FILM COATED ORAL at 08:36

## 2020-02-08 RX ADMIN — HYDRALAZINE HYDROCHLORIDE 50 MG: 50 TABLET ORAL at 20:59

## 2020-02-08 RX ADMIN — Medication 15 G: at 04:20

## 2020-02-08 RX ADMIN — SEVELAMER CARBONATE 800 MG: 800 TABLET, FILM COATED ORAL at 17:00

## 2020-02-08 RX ADMIN — IPRATROPIUM BROMIDE AND ALBUTEROL SULFATE 1 AMPULE: .5; 3 SOLUTION RESPIRATORY (INHALATION) at 06:16

## 2020-02-08 RX ADMIN — Medication 15 G: at 04:05

## 2020-02-08 RX ADMIN — HEPARIN SODIUM 5000 UNITS: 5000 INJECTION, SOLUTION INTRAVENOUS; SUBCUTANEOUS at 14:19

## 2020-02-08 RX ADMIN — GUAIFENESIN 600 MG: 600 TABLET, EXTENDED RELEASE ORAL at 21:10

## 2020-02-08 RX ADMIN — LEVOFLOXACIN 500 MG: 5 INJECTION, SOLUTION INTRAVENOUS at 17:29

## 2020-02-08 RX ADMIN — GABAPENTIN 100 MG: 100 CAPSULE ORAL at 20:59

## 2020-02-08 RX ADMIN — IPRATROPIUM BROMIDE AND ALBUTEROL SULFATE 1 AMPULE: .5; 3 SOLUTION RESPIRATORY (INHALATION) at 14:02

## 2020-02-08 RX ADMIN — SODIUM BICARBONATE 650 MG TABLET 1300 MG: at 14:18

## 2020-02-08 RX ADMIN — HYDRALAZINE HYDROCHLORIDE 50 MG: 50 TABLET ORAL at 08:55

## 2020-02-08 ASSESSMENT — PAIN DESCRIPTION - ONSET: ONSET: GRADUAL

## 2020-02-08 ASSESSMENT — PAIN SCALES - GENERAL
PAINLEVEL_OUTOF10: 0
PAINLEVEL_OUTOF10: 8
PAINLEVEL_OUTOF10: 0

## 2020-02-08 ASSESSMENT — PAIN DESCRIPTION - LOCATION: LOCATION: KNEE

## 2020-02-08 ASSESSMENT — PAIN DESCRIPTION - ORIENTATION: ORIENTATION: LEFT

## 2020-02-08 ASSESSMENT — PAIN DESCRIPTION - PAIN TYPE: TYPE: ACUTE PAIN

## 2020-02-08 ASSESSMENT — PAIN DESCRIPTION - DESCRIPTORS: DESCRIPTORS: ACHING;DISCOMFORT;SORE

## 2020-02-08 ASSESSMENT — PAIN DESCRIPTION - FREQUENCY: FREQUENCY: INTERMITTENT

## 2020-02-08 NOTE — PLAN OF CARE
Problem: Coping:  Goal: Level of anxiety will decrease  Description  Level of anxiety will decrease  2/7/2020 1944 by Brien Giron RN  Outcome: Met This Shift  2/7/2020 1943 by Brien Giron RN  Outcome: Not Met This Shift     Problem: Falls - Risk of:  Goal: Will remain free from falls  Description  Will remain free from falls  2/7/2020 1944 by Brien Giron RN  Outcome: Met This Shift  2/7/2020 1943 by Brien Giron RN  Outcome: Met This Shift  2/7/2020 1216 by Ren Roque RN  Outcome: Met This Shift  Goal: Absence of physical injury  Description  Absence of physical injury  2/7/2020 1944 by Brien Giron RN  Outcome: Met This Shift  2/7/2020 1943 by Brien Giron RN  Outcome: Met This Shift     Problem: Cardiac:  Goal: Hemodynamic stability will improve  Description  Hemodynamic stability will improve  2/7/2020 1216 by Ren Roque RN  Outcome: Met This Shift     Problem: Coping:  Goal: Level of anxiety will decrease  Description  Level of anxiety will decrease  2/7/2020 1944 by Brien Giron RN  Outcome: Met This Shift  2/7/2020 1943 by Brien Giron RN  Outcome: Not Met This Shift  Goal: Ability to cope will improve  Description  Ability to cope will improve  Outcome: Met This Shift  Goal: Ability to establish a method of communication will improve  Description  Ability to establish a method of communication will improve  Outcome: Met This Shift

## 2020-02-08 NOTE — FLOWSHEET NOTE
02/08/20 1310   Vital Signs   BP (!) 156/74   Temp 97.8 °F (36.6 °C)   Pulse 69   Resp 18   Weight 217 lb 13 oz (98.8 kg)   Weight Method Bed scale   Percent Weight Change -1.98   Post-Hemodialysis Assessment   Post-Treatment Procedures Blood returned;Catheter capped, clamped and heparinized x 2 ports   Machine Disinfection Process Acid/Vinegar Clean;Machine Absence of Bleach Machine; Exterior Machine Disinfection;Bleach   Rinseback Volume (ml) 300 ml   Total Liters Processed (l/min) 41.6 l/min   Dialyzer Clearance Lightly streaked   Duration of Treatment (minutes) 180 minutes   Heparin amount administered during treatment (units) 0 units   Hemodialysis Intake (ml) 600 ml   Hemodialysis Output (ml) 2600 ml   NET Removed (ml) 2000 ml   Tolerated Treatment Good   Patient Response to Treatment Completed 2nd Dialysis w/o complications. No hypotension or cramping with fluid removal.  Alert/responsive at tx's end.

## 2020-02-08 NOTE — PROGRESS NOTES
Progress Note  2/8/2020 9:53 AM  Subjective:   Admit Date: 2/2/2020  PCP: Shant Newton DO  Interval History: Full consult deferred as just saw pt while he was inpt at [facility] and seen 2/2. He was admitted for Acute Resp Failure and our service was seening for CKD G4 with a baseline serum cr 3.0-3.8mg/dl. Pt was at baseline during the hospital admission. He had an amb pulse ox prior to D/C and was found to be hypoxic and home O2 prescribed. He presented back to ED 2/2/20 after O2 tank ran out. In the ED initial O2 sat 60% and /90. No fever or chills but he mdid feel lightheaded    2/7/20: Pt went down to IR for [facility] placement and had worsening SOB requiring BIPAP and transfer to MICU.  Pt currently on BIPAP visibly dyspneic and tachypneic    2/8/20: Pt remains on BIPAP this AM still with desaturation off BIPAP and SOB      Diet: DIET CARB CONTROL;    Data:   Scheduled Meds:   docusate sodium  100 mg Oral BID    polyethylene glycol  17 g Oral Daily    sodium bicarbonate  1,300 mg Oral TID    sevelamer  800 mg Oral TID WC    hydrALAZINE  50 mg Oral BID    epoetin elvia-epbx  8,000 Units Subcutaneous Once per day on Mon Wed Fri    insulin glargine  32 Units Subcutaneous Nightly    amLODIPine  10 mg Oral Daily    Vitamin D  3,000 Units Oral Daily    metoprolol tartrate  50 mg Oral BID    glimepiride  4 mg Oral Daily with breakfast    gabapentin  100 mg Oral BID    insulin lispro  0-12 Units Subcutaneous TID     insulin lispro  0-6 Units Subcutaneous Nightly    heparin (porcine)  5,000 Units Subcutaneous 3 times per day    levofloxacin  500 mg Intravenous Q48H    ipratropium-albuterol  1 ampule Inhalation Q4H WA    guaiFENesin  600 mg Oral BID    mometasone-formoterol  2 puff Inhalation BID    ferrous sulfate  325 mg Oral Daily with breakfast    calcitRIOL  0.25 mcg Oral Once per day on Mon Wed Fri     Continuous Infusions:   dextrose       PRN Meds:perflutren lipid microspheres, glucose,

## 2020-02-08 NOTE — PROGRESS NOTES
mometasone-formoterol (DULERA) 200-5 MCG/ACT inhaler 2 puff, 2 puff, Inhalation, BID, Jacqueline Alvarado DO, 2 puff at 02/07/20 0634    ipratropium-albuterol (DUONEB) nebulizer solution 1 ampule, 1 ampule, Inhalation, Q4H PRN, Jacqueline Alvarado, DO    ferrous sulfate tablet 325 mg, 325 mg, Oral, Daily with breakfast, Jacqueline Alvarado DO, 325 mg at 02/08/20 2136    calcitRIOL (ROCALTROL) capsule 0.25 mcg, 0.25 mcg, Oral, Once per day on Mon Wed Fri, Pramod KELECHI Rolle DO, 0.25 mcg at 02/07/20 0039    Review of Systems:   General: denies weight gain, denies loss of appetite, fever, chills, night sweats. HEENT: denies headaches, dizziness, head trauma, visual changes, eye pain, tinnitus, nosebleeds, hoarseness or throat pain    Respiratory: denies chest pain,+ve dyspnea, denies cough and hemoptysis  Cardiovascular: denies orthopnea, paroxysmal nocturnal dyspnea, leg swelling, and previous heart attack. Gastrointestinal: denies pain, nausea vomiting, diarrhea, constipation, melena or bleeding. Genitourinary: denies hematuria, frequency, urgency or dysuria  Neurology: denies syncope, seizures, paralysis, paraesthesia   Endocrine: denies polyuria, polydipsia, skin or hair changes, and heat or cold intolerance  Musculoskeletal: denies joint pain, swelling, arthritis or myalgia  Hematologic: denies bleeding, adenopathy and easy bruising  Skin: denies rashes and skin discoloration  Psychiatry: denies depression    Physical Exam:   Vital Signs:  BP (!) 156/74   Pulse 69   Temp 97.8 °F (36.6 °C)   Resp 18   Ht 5' 8\" (1.727 m)   Wt 217 lb 13 oz (98.8 kg)   SpO2 100%   BMI 33.12 kg/m²     Input/Output:   In: 3000 [P.O.:1800]  Out: 5815     Oxygen requirements: BiPAP    Ventilator Information:  Vent Information  Skin Assessment: Clean, dry, & intact  FiO2 : 80 %    General appearance:  not in pain or distress, in no respiratory distress    HEENT: Atraumatic/normocephalic, EOMI, BRENNA, pharynx clear, moist mucosa, PM

## 2020-02-08 NOTE — PROGRESS NOTES
any productive cough. No chest pain or abdominal pain temperature 97.5 with a heart rate of 73 and blood pressure 127/63. Pending lab work today. Pulmonary and nephrology notes reviewed. .  Blood sugar ranged from 100-262.    2/6/2020  Patient seen and examined on telemetry floor. Patient is seen resting in bed. Patient got new that he may need dialysis and he is upset about this. He states he had SOB when he tried to go to the bathroom earlier today, he did have his oxygen on at that time. He denies chest pain, abdomen pain, nausea, vomiting. He is having constipation- last bowel movement was 2 or 3 days ago. He says the nurses gave him some Milk of magnesia this morning and he has had no relief. Vitals as follows: oral temp 98.4F, heart rate 70 with resp rate of 18, /63, 96% O2 on 3L nasal cannula. BUN/Creat 93/5.3. Glucose ranges 144-219. Hemoglobin has decreased to 7.9 from yesterdays value of 8.4. RBC 3.02    2/7/2020  Patient was seen and examined on telemetry floor. Blood sugars range . Vital signs were stable with a blood pressure is 128/62. Patient currently on 3 L nasal cannula and O2 sats 95% at rest.  Patient is set up for dialysis catheter today. 2/8/2020  Patient seen and examined in ICU. Patient denies any chest or abdominal pain. Patient denies any dizziness or nausea vomiting. Patient had increasing respiratory difficulties while he was down in specials yesterday for his dialysis catheter. Patient currently on BiPAP. Temperature is 97.6 with a heart rate of 68 and blood pressure 142/83. Patient had good urine output yesterday to the IV diuretics. Blood sugars range .     Past Medical History:    Past Medical History:   Diagnosis Date    Back pain     Diabetes mellitus ([facility]     DMII (diabetes mellitus, type 2) ([facility] [address] 7/28/2015    History of cardiovascular stress test 2/16/2015    lexiscan    HTN (hypertension) 7/28/2015    Hyperlipidemia     Hypertension     Lumbar radicular pain 7/28/2015    Type II or unspecified type diabetes mellitus without mention of complication, not stated as uncontrolled      Past Surgical History:    Past Surgical History:   Procedure Laterality Date    OTHER SURGICAL HISTORY Left 06/06/14    cain bunionectomy left foot with osteomed screw x1    SHOULDER SURGERY      Bilateral    VEIN SURGERY         Medications Prior to Admission:    @  Prior to Admission medications    Medication Sig Start Date End Date Taking? Authorizing Provider   ferrous sulfate 325 (65 Fe) MG tablet Take 325 mg by mouth daily (with breakfast)   Yes Historical Provider, MD   hydrALAZINE (APRESOLINE) 25 MG tablet Take 25 mg by mouth 2 times daily   Yes Historical Provider, MD   calcitRIOL (ROCALTROL) 0.25 MCG capsule Take 0.25 mcg by mouth Takes 3 times a week   Yes Historical Provider, MD   insulin glargine (LANTUS) 100 UNIT/ML injection vial Inject 25 Units into the skin nightly   Yes Historical Provider, MD   metoprolol tartrate (LOPRESSOR) 50 MG tablet Take 50 mg by mouth 3/31/17  Yes Historical Provider, MD   gabapentin (NEURONTIN) 100 MG capsule Take 100 mg by mouth. Yes Historical Provider, MD   etodolac (LODINE) 400 MG tablet Take 400 mg by mouth   Yes Historical Provider, MD   amLODIPine (NORVASC) 10 MG tablet Take 5 mg by mouth 2 times daily    Yes Historical Provider, MD   lisinopril (PRINIVIL;ZESTRIL) 20 MG tablet Take 20 mg by mouth daily   Yes Historical Provider, MD   Cholecalciferol (VITAMIN D3) 3000 units TABS Take 150 mcg by mouth daily    Yes Historical Provider, MD       Allergies:   Other    Social History:   Social History     Socioeconomic History    Marital status: Single     Spouse name: Not on file    Number of children: Not on file    Years of education: Not on file    Highest education level: Not on file   Occupational History    Not on file   Social Needs    Financial resource strain: Not on file    Food insecurity:     Worry: Not on file     Inability: Not on file    Transportation needs:     Medical: Not on file     Non-medical: Not on file   Tobacco Use    Smoking status: Former Smoker     Packs/day: 1.00     Years: 30.00     Pack years: 30.00    Smokeless tobacco: Never Used   Substance and Sexual Activity    Alcohol use: No    Drug use: No    Sexual activity: Not on file   Lifestyle    Physical activity:     Days per week: Not on file     Minutes per session: Not on file    Stress: Not on file   Relationships    Social connections:     Talks on phone: Not on file     Gets together: Not on file     Attends Evangelical service: Not on file     Active member of club or organization: Not on file     Attends meetings of clubs or organizations: Not on file     Relationship status: Not on file    Intimate partner violence:     Fear of current or ex partner: Not on file     Emotionally abused: Not on file     Physically abused: Not on file     Forced sexual activity: Not on file   Other Topics Concern    Not on file   Social History Narrative    Not on file       Family History:   History reviewed. No pertinent family history. REVIEW OF SYSTEMS:    Gen: Patient denies any lightheadedness or dizziness. No LOC or syncope. No fevers or chills. HEENT: No earache, sore throat or nasal congestion. Resp: Denies cough, hemoptysis or sputum production. Cardiac: +SOB, Denies chest pain, diaphoresis or palpitations. GI: No nausea, vomiting, diarrhea or constipation. No melena or hematochezia. : No urinary complaints, dysuria, hematuria or frequency. MSK: No extremity weakness, paralysis or paresthesias.      PHYSICAL EXAM:    Vitals:  BP (!) 142/83   Pulse 65   Temp 97.6 °F (36.4 °C) (Axillary)   Resp 18   Ht 5' 8\" (1.727 m)   Wt 222 lb 3.6 oz (100.8 kg)   SpO2 100%   BMI 33.79 kg/m²     General:  This is a 60 y.o. yo male who is alert and oriented in NAD  HEENT:  Head is normocephalic and atraumatic, PERRLA, EOMI, mucus membranes moist with no pharyngeal erythema or exudate. Neck:  Supple with no carotid bruits, JVD or thyromegaly. No cervical adenopathy  CV:  Regular rate and rhythm, no murmurs  Lungs: Clear to auscultation bilaterally with no wheezes or rhonchi.   Abdomen:  Soft, + obese, nontender, nondistended, bowel sounds present  Extremities: +1 pitting edema to right > L lower leg, peripheral pulses intact bilaterally  Neuro:  Cranial nerves II-XII grossly intact; motor and sensory function intact with no focal deficits  Skin:  No rashes, lesions or wounds    DATA:  CBC with Differential:    Lab Results   Component Value Date    WBC 11.1 02/08/2020    RBC 2.85 02/08/2020    HGB 7.7 02/08/2020    HCT 25.4 02/08/2020     02/08/2020    MCV 89.1 02/08/2020    MCH 27.0 02/08/2020    MCHC 30.3 02/08/2020    RDW 16.9 02/08/2020    LYMPHOPCT 6.8 02/03/2020    MONOPCT 3.1 02/03/2020    BASOPCT 0.1 02/03/2020    MONOSABS 0.35 02/03/2020    LYMPHSABS 0.77 02/03/2020    EOSABS 0.00 02/03/2020    BASOSABS 0.01 02/03/2020     CMP:    Lab Results   Component Value Date     02/08/2020    K 5.4 02/08/2020    K 5.6 02/02/2020     02/08/2020    CO2 26 02/08/2020    BUN 69 02/08/2020    CREATININE 4.6 02/08/2020    GFRAA 16 02/08/2020    LABGLOM 16 02/08/2020    GLUCOSE 119 02/08/2020    PROT 6.6 02/08/2020    LABALBU 2.5 02/08/2020    CALCIUM 8.2 02/08/2020    BILITOT 0.2 02/08/2020    ALKPHOS 42 02/08/2020    AST 9 02/08/2020    ALT 9 02/08/2020     Magnesium:    Lab Results   Component Value Date    MG 2.5 02/08/2020     Phosphorus:    Lab Results   Component Value Date    PHOS 4.6 02/08/2020     PT/INR:    Lab Results   Component Value Date    PROTIME 11.8 02/07/2020    INR 1.0 02/07/2020     Troponin:    Lab Results   Component Value Date    TROPONINI 0.09 02/02/2020     U/A:    Lab Results   Component Value Date    COLORU Yellow 02/02/2020    PROTEINU >=300 02/02/2020    PHUR 5.0 02/02/2020    LABCAST RARE 09/21/2018    WBCUA 0-1 02/02/2020    RBCUA NONE 02/02/2020    BACTERIA NONE 02/02/2020    CLARITYU Clear 02/02/2020    SPECGRAV 1.025 02/02/2020    LEUKOCYTESUR Negative 02/02/2020    UROBILINOGEN 0.2 02/02/2020    BILIRUBINUR Negative 02/02/2020    BLOODU TRACE 02/02/2020    GLUCOSEU 250 02/02/2020    AMORPHOUS FEW 10/02/2019     ABG:    Lab Results   Component Value Date    PH 7.363 02/08/2020    PCO2 47.3 02/08/2020    PO2 140.5 02/08/2020    HCO3 26.3 02/08/2020    BE 0.7 02/08/2020    O2SAT 98.8 02/08/2020     HgBA1c:    Lab Results   Component Value Date    LABA1C 7.6 02/02/2020     FLP:    Lab Results   Component Value Date    TRIG 60 02/03/2020    HDL 60 02/03/2020    LDLCALC 71 02/03/2020    LABVLDL 12 02/03/2020     TSH:    Lab Results   Component Value Date    TSH 0.927 02/03/2020     IRON:    Lab Results   Component Value Date    IRON 58 11/13/2019     LIPASE:    Lab Results   Component Value Date    LIPASE 78 09/24/2018       ASSESSMENT AND PLAN:      Patient Active Problem List    Diagnosis Date Noted    Hallux valgus, acquired 06/06/2014     Priority: Low     Class: Present on Admission    Respiratory failure ([facility] [address] 02/02/2020    Pneumonia 01/28/2020    Acute pancreatitis without necrosis or infection, unspecified 09/23/2018    Idiopathic acute pancreatitis 09/21/2018    B12 deficiency 03/17/2016    DMII (diabetes mellitus, type 2) ([facility] [address] 07/28/2015    HTN (hypertension) 07/28/2015    Lumbar radicular pain 07/28/2015    Spinal stenosis 07/28/2015     1. Acute/subacute hypoxic respiratory failure  2. Acute congestive heart failure  3. Acute on chronic kidney disease stage IV  4. Hypertension  5. Non-insulin-dependent diabetes mellitus type II-hyperglycemia  6. Pneumonia-lateral lobe  7.   Acute pulmonary edema secondary to fluid overload- 2/7      Plan:  Echocardiogram-EF 66%, pulmonary hypertension at 64 mmHg  [facility] from [facility] reviewed  Home meds reviewed  Diabetic meds adjusted  Glucoscans 4 times daily with sliding scale insulin  DuoNeb aerosols  O2 nasal cannula  Hold Lodine, metformin  Consult nephrology, pulmonology  Procalcitonin 0.25  Routine labs in a.m.  CT of the lung-diffuse pulmonary infiltrates    Siobhan Young D.O.  2/8/2020  12:25 PM

## 2020-02-09 LAB
ABO/RH: NORMAL
ANION GAP SERPL CALCULATED.3IONS-SCNC: 10 MMOL/L (ref 7–16)
ANTIBODY SCREEN: NORMAL
BLOOD BANK DISPENSE STATUS: NORMAL
BLOOD BANK DISPENSE STATUS: NORMAL
BLOOD BANK PRODUCT CODE: NORMAL
BLOOD BANK PRODUCT CODE: NORMAL
BPU ID: NORMAL
BPU ID: NORMAL
BUN BLDV-MCNC: 51 MG/DL (ref 8–23)
CALCIUM IONIZED: 1.23 MMOL/L (ref 1.15–1.33)
CALCIUM SERPL-MCNC: 8.9 MG/DL (ref 8.6–10.2)
CHLORIDE BLD-SCNC: 102 MMOL/L (ref 98–107)
CO2: 28 MMOL/L (ref 22–29)
CREAT SERPL-MCNC: 4 MG/DL (ref 0.7–1.2)
DESCRIPTION BLOOD BANK: NORMAL
DESCRIPTION BLOOD BANK: NORMAL
FOLATE: 9.6 NG/ML (ref 4.8–24.2)
GFR AFRICAN AMERICAN: 18
GFR NON-AFRICAN AMERICAN: 18 ML/MIN/1.73
GLUCOSE BLD-MCNC: 66 MG/DL (ref 74–99)
HCT VFR BLD CALC: 23.9 % (ref 37–54)
HCT VFR BLD CALC: 29.3 % (ref 37–54)
HEMOGLOBIN: 7 G/DL (ref 12.5–16.5)
HEMOGLOBIN: 9.2 G/DL (ref 12.5–16.5)
IMMATURE RETIC FRACT: 20.7 % (ref 2.3–13.4)
IRON SATURATION: 10 % (ref 20–55)
IRON: 19 MCG/DL (ref 59–158)
MAGNESIUM: 2.5 MG/DL (ref 1.6–2.6)
MCH RBC QN AUTO: 26.1 PG (ref 26–35)
MCH RBC QN AUTO: 27.2 PG (ref 26–35)
MCHC RBC AUTO-ENTMCNC: 29.3 % (ref 32–34.5)
MCHC RBC AUTO-ENTMCNC: 31.4 % (ref 32–34.5)
MCV RBC AUTO: 86.7 FL (ref 80–99.9)
MCV RBC AUTO: 89.2 FL (ref 80–99.9)
METER GLUCOSE: 116 MG/DL (ref 74–99)
METER GLUCOSE: 118 MG/DL (ref 74–99)
METER GLUCOSE: 171 MG/DL (ref 74–99)
METER GLUCOSE: 203 MG/DL (ref 74–99)
METER GLUCOSE: 63 MG/DL (ref 74–99)
METER GLUCOSE: 81 MG/DL (ref 74–99)
PDW BLD-RTO: 16.5 FL (ref 11.5–15)
PDW BLD-RTO: 17.1 FL (ref 11.5–15)
PHOSPHORUS: 4.8 MG/DL (ref 2.5–4.5)
PLATELET # BLD: 157 E9/L (ref 130–450)
PLATELET # BLD: 171 E9/L (ref 130–450)
PMV BLD AUTO: 10.4 FL (ref 7–12)
PMV BLD AUTO: 10.8 FL (ref 7–12)
POTASSIUM SERPL-SCNC: 4.7 MMOL/L (ref 3.5–5)
RBC # BLD: 2.68 E12/L (ref 3.8–5.8)
RBC # BLD: 3.38 E12/L (ref 3.8–5.8)
RETIC HGB EQUIVALENT: 24.4 PG (ref 28.2–36.6)
RETICULOCYTE ABSOLUTE COUNT: 0.1 E12/L
RETICULOCYTE COUNT PCT: 3.7 % (ref 0.4–1.9)
SODIUM BLD-SCNC: 140 MMOL/L (ref 132–146)
TOTAL IRON BINDING CAPACITY: 189 MCG/DL (ref 250–450)
VITAMIN B-12: 320 PG/ML (ref 211–946)
WBC # BLD: 14.6 E9/L (ref 4.5–11.5)
WBC # BLD: 9.8 E9/L (ref 4.5–11.5)

## 2020-02-09 PROCEDURE — 83540 ASSAY OF IRON: CPT

## 2020-02-09 PROCEDURE — 80048 BASIC METABOLIC PNL TOTAL CA: CPT

## 2020-02-09 PROCEDURE — P9016 RBC LEUKOCYTES REDUCED: HCPCS

## 2020-02-09 PROCEDURE — 90935 HEMODIALYSIS ONE EVALUATION: CPT

## 2020-02-09 PROCEDURE — 6370000000 HC RX 637 (ALT 250 FOR IP): Performed by: INTERNAL MEDICINE

## 2020-02-09 PROCEDURE — 2700000000 HC OXYGEN THERAPY PER DAY

## 2020-02-09 PROCEDURE — 2060000000 HC ICU INTERMEDIATE R&B

## 2020-02-09 PROCEDURE — 94660 CPAP INITIATION&MGMT: CPT

## 2020-02-09 PROCEDURE — 36430 TRANSFUSION BLD/BLD COMPNT: CPT

## 2020-02-09 PROCEDURE — 82607 VITAMIN B-12: CPT

## 2020-02-09 PROCEDURE — 84100 ASSAY OF PHOSPHORUS: CPT

## 2020-02-09 PROCEDURE — 86901 BLOOD TYPING SEROLOGIC RH(D): CPT

## 2020-02-09 PROCEDURE — 83550 IRON BINDING TEST: CPT

## 2020-02-09 PROCEDURE — 94640 AIRWAY INHALATION TREATMENT: CPT

## 2020-02-09 PROCEDURE — 36415 COLL VENOUS BLD VENIPUNCTURE: CPT

## 2020-02-09 PROCEDURE — 82962 GLUCOSE BLOOD TEST: CPT

## 2020-02-09 PROCEDURE — 6360000002 HC RX W HCPCS: Performed by: INTERNAL MEDICINE

## 2020-02-09 PROCEDURE — 86850 RBC ANTIBODY SCREEN: CPT

## 2020-02-09 PROCEDURE — 2580000003 HC RX 258: Performed by: INTERNAL MEDICINE

## 2020-02-09 PROCEDURE — 86923 COMPATIBILITY TEST ELECTRIC: CPT

## 2020-02-09 PROCEDURE — 83735 ASSAY OF MAGNESIUM: CPT

## 2020-02-09 PROCEDURE — 85027 COMPLETE CBC AUTOMATED: CPT

## 2020-02-09 PROCEDURE — 82746 ASSAY OF FOLIC ACID SERUM: CPT

## 2020-02-09 PROCEDURE — 86900 BLOOD TYPING SEROLOGIC ABO: CPT

## 2020-02-09 PROCEDURE — 85045 AUTOMATED RETICULOCYTE COUNT: CPT

## 2020-02-09 PROCEDURE — 82330 ASSAY OF CALCIUM: CPT

## 2020-02-09 RX ORDER — 0.9 % SODIUM CHLORIDE 0.9 %
20 INTRAVENOUS SOLUTION INTRAVENOUS ONCE
Status: DISCONTINUED | OUTPATIENT
Start: 2020-02-09 | End: 2020-02-13 | Stop reason: HOSPADM

## 2020-02-09 RX ORDER — MUSCLE RUB CREAM 100; 150 MG/G; MG/G
CREAM TOPICAL PRN
Status: DISCONTINUED | OUTPATIENT
Start: 2020-02-09 | End: 2020-02-13 | Stop reason: HOSPADM

## 2020-02-09 RX ORDER — 0.9 % SODIUM CHLORIDE 0.9 %
20 INTRAVENOUS SOLUTION INTRAVENOUS ONCE
Status: COMPLETED | OUTPATIENT
Start: 2020-02-09 | End: 2020-02-09

## 2020-02-09 RX ADMIN — INSULIN GLARGINE 16 UNITS: 100 INJECTION, SOLUTION SUBCUTANEOUS at 21:24

## 2020-02-09 RX ADMIN — GABAPENTIN 100 MG: 100 CAPSULE ORAL at 08:49

## 2020-02-09 RX ADMIN — HEPARIN SODIUM 5000 UNITS: 5000 INJECTION, SOLUTION INTRAVENOUS; SUBCUTANEOUS at 05:33

## 2020-02-09 RX ADMIN — IPRATROPIUM BROMIDE AND ALBUTEROL SULFATE 1 AMPULE: .5; 3 SOLUTION RESPIRATORY (INHALATION) at 17:21

## 2020-02-09 RX ADMIN — METOPROLOL TARTRATE 50 MG: 50 TABLET, FILM COATED ORAL at 08:50

## 2020-02-09 RX ADMIN — HEPARIN SODIUM 5000 UNITS: 5000 INJECTION, SOLUTION INTRAVENOUS; SUBCUTANEOUS at 14:15

## 2020-02-09 RX ADMIN — Medication 15 G: at 06:30

## 2020-02-09 RX ADMIN — IPRATROPIUM BROMIDE AND ALBUTEROL SULFATE 1 AMPULE: .5; 3 SOLUTION RESPIRATORY (INHALATION) at 10:08

## 2020-02-09 RX ADMIN — IPRATROPIUM BROMIDE AND ALBUTEROL SULFATE 1 AMPULE: .5; 3 SOLUTION RESPIRATORY (INHALATION) at 06:08

## 2020-02-09 RX ADMIN — SODIUM CHLORIDE 20 ML: 9 INJECTION, SOLUTION INTRAVENOUS at 14:14

## 2020-02-09 RX ADMIN — MOMETASONE FUROATE AND FORMOTEROL FUMARATE DIHYDRATE 2 PUFF: 200; 5 AEROSOL RESPIRATORY (INHALATION) at 06:08

## 2020-02-09 RX ADMIN — INSULIN LISPRO 2 UNITS: 100 INJECTION, SOLUTION INTRAVENOUS; SUBCUTANEOUS at 21:24

## 2020-02-09 RX ADMIN — SEVELAMER CARBONATE 800 MG: 800 TABLET, FILM COATED ORAL at 08:23

## 2020-02-09 RX ADMIN — ACETAMINOPHEN 500 MG: 500 TABLET, FILM COATED ORAL at 18:37

## 2020-02-09 RX ADMIN — POLYETHYLENE GLYCOL 3350 17 G: 17 POWDER, FOR SOLUTION ORAL at 08:50

## 2020-02-09 RX ADMIN — ACETAMINOPHEN 500 MG: 500 TABLET, FILM COATED ORAL at 03:13

## 2020-02-09 RX ADMIN — VITAMIN D, TAB 1000IU (100/BT) 3000 UNITS: 25 TAB at 08:51

## 2020-02-09 RX ADMIN — HEPARIN SODIUM 5000 UNITS: 5000 INJECTION, SOLUTION INTRAVENOUS; SUBCUTANEOUS at 21:25

## 2020-02-09 RX ADMIN — DOCUSATE SODIUM 100 MG: 100 CAPSULE, LIQUID FILLED ORAL at 08:49

## 2020-02-09 RX ADMIN — IPRATROPIUM BROMIDE AND ALBUTEROL SULFATE 1 AMPULE: .5; 3 SOLUTION RESPIRATORY (INHALATION) at 13:19

## 2020-02-09 RX ADMIN — GUAIFENESIN 600 MG: 600 TABLET, EXTENDED RELEASE ORAL at 21:16

## 2020-02-09 RX ADMIN — METOPROLOL TARTRATE 50 MG: 50 TABLET, FILM COATED ORAL at 21:17

## 2020-02-09 RX ADMIN — INSULIN LISPRO 2 UNITS: 100 INJECTION, SOLUTION INTRAVENOUS; SUBCUTANEOUS at 16:57

## 2020-02-09 RX ADMIN — SODIUM BICARBONATE 650 MG TABLET 1300 MG: at 14:13

## 2020-02-09 RX ADMIN — MENTHOL, METHYL SALICYLATE: 10; 15 CREAM TOPICAL at 17:48

## 2020-02-09 RX ADMIN — HYDRALAZINE HYDROCHLORIDE 50 MG: 50 TABLET ORAL at 21:17

## 2020-02-09 RX ADMIN — SODIUM BICARBONATE 650 MG TABLET 1300 MG: at 08:50

## 2020-02-09 RX ADMIN — AMLODIPINE BESYLATE 10 MG: 10 TABLET ORAL at 08:47

## 2020-02-09 RX ADMIN — MOMETASONE FUROATE AND FORMOTEROL FUMARATE DIHYDRATE 2 PUFF: 200; 5 AEROSOL RESPIRATORY (INHALATION) at 17:21

## 2020-02-09 RX ADMIN — SODIUM BICARBONATE 650 MG TABLET 1300 MG: at 21:16

## 2020-02-09 RX ADMIN — DOCUSATE SODIUM 100 MG: 100 CAPSULE, LIQUID FILLED ORAL at 21:16

## 2020-02-09 RX ADMIN — SEVELAMER CARBONATE 800 MG: 800 TABLET, FILM COATED ORAL at 16:56

## 2020-02-09 RX ADMIN — GUAIFENESIN 600 MG: 600 TABLET, EXTENDED RELEASE ORAL at 08:56

## 2020-02-09 RX ADMIN — ACETAMINOPHEN 500 MG: 500 TABLET, FILM COATED ORAL at 12:41

## 2020-02-09 RX ADMIN — HYDRALAZINE HYDROCHLORIDE 50 MG: 50 TABLET ORAL at 08:56

## 2020-02-09 RX ADMIN — GABAPENTIN 100 MG: 100 CAPSULE ORAL at 21:16

## 2020-02-09 RX ADMIN — FERROUS SULFATE TAB 325 MG (65 MG ELEMENTAL FE) 325 MG: 325 (65 FE) TAB at 08:23

## 2020-02-09 ASSESSMENT — PAIN DESCRIPTION - ONSET
ONSET: GRADUAL
ONSET: GRADUAL

## 2020-02-09 ASSESSMENT — PAIN SCALES - GENERAL
PAINLEVEL_OUTOF10: 0
PAINLEVEL_OUTOF10: 0
PAINLEVEL_OUTOF10: 7
PAINLEVEL_OUTOF10: 0
PAINLEVEL_OUTOF10: 8
PAINLEVEL_OUTOF10: 8

## 2020-02-09 ASSESSMENT — PAIN DESCRIPTION - FREQUENCY
FREQUENCY: INTERMITTENT
FREQUENCY: INTERMITTENT

## 2020-02-09 ASSESSMENT — PAIN DESCRIPTION - PAIN TYPE
TYPE: ACUTE PAIN
TYPE: ACUTE PAIN

## 2020-02-09 ASSESSMENT — PAIN DESCRIPTION - LOCATION
LOCATION: KNEE
LOCATION: KNEE

## 2020-02-09 ASSESSMENT — PAIN DESCRIPTION - DESCRIPTORS
DESCRIPTORS: ACHING;DISCOMFORT;SORE;THROBBING
DESCRIPTORS: ACHING;DISCOMFORT;SORE

## 2020-02-09 ASSESSMENT — PAIN - FUNCTIONAL ASSESSMENT: PAIN_FUNCTIONAL_ASSESSMENT: PREVENTS OR INTERFERES SOME ACTIVE ACTIVITIES AND ADLS

## 2020-02-09 ASSESSMENT — PAIN DESCRIPTION - ORIENTATION
ORIENTATION: LEFT
ORIENTATION: LEFT

## 2020-02-09 NOTE — PROGRESS NOTES
Inhalation BID    ferrous sulfate  325 mg Oral Daily with breakfast    calcitRIOL  0.25 mcg Oral Once per day on Mon Wed Fri     Continuous Infusions:   dextrose       PRN Meds:perflutren lipid microspheres, glucose, dextrose, glucagon (rDNA), dextrose, acetaminophen, ondansetron, magnesium hydroxide, ipratropium-albuterol  I/O last 3 completed shifts: In: 2250 [P.O.:1550; IV Piggyback:100]  Out: 4000 [Urine:1400]  I/O this shift:  In: 600 [P.O.:600]  Out: 200 [Urine:200]    Intake/Output Summary (Last 24 hours) at 2/9/2020 1159  Last data filed at 2/9/2020 0850  Gross per 24 hour   Intake 2370 ml   Output 3825 ml   Net -1455 ml     CBC:   Recent Labs     02/07/20  0625 02/08/20  0635 02/09/20  0525   WBC 8.8 11.1 9.8   HGB 7.9* 7.7* 7.0*    215 171     BMP:    Recent Labs     02/07/20  0625 02/08/20  0635 02/09/20  0525    137 140   K 5.1* 5.4* 4.7    103 102   CO2 21* 26 28   BUN 91* 69* 51*   CREATININE 5.4* 4.6* 4.0*   GLUCOSE 69* 119* 66*     Hepatic:   Recent Labs     02/08/20  0635   AST 9   ALT 9   BILITOT 0.2   ALKPHOS 42     Troponin:   No results for input(s): TROPONINI in the last 72 hours. BNP: No results for input(s): BNP in the last 72 hours. Lipids:   No results for input(s): CHOL, HDL in the last 72 hours. Invalid input(s): LDLCALCU  ABGs:   Lab Results   Component Value Date    PO2ART 185.1 02/02/2020    RYI5GMV 29.1 02/02/2020     INR:   Recent Labs     02/07/20  0625   INR 1.0     -----------------------------------------------------------------  RAD:   Reading location:  200       HISTORY: Hypertension.       A single frontal portable chest x-ray was obtained.       Diffuse bilateral airspace disease is noted favored to represent CHF.       The heart is not enlarged.       IMPRESSION   1. Diffuse bilateral airspace disease favored to represent  CHF. Pneumonia is less likely but not excluded.      Reading location: 200           INDICATION: Hypoxia       FINDINGS: (hypertension)     Lumbar radicular pain     Spinal stenosis     B12 deficiency     Idiopathic acute pancreatitis     Acute pancreatitis without necrosis or infection, unspecified     Pneumonia     Respiratory failure ([facility] [address]    Plan:   1. Non Resolving  HUGH -the rise in the cr above baseline most probably reflects the acute hypoxia and uncontrolled HTN--S/P R IJ TDC and  1st IHD 2/7-plan is  3rd treatment 2/9 with 3.4L vol removal and will plan for IHD 2/10    2. CKD G4 with a baseline serum cr 3.0-3.8mg/dl with an e-GFR=20-26ml/min with a Hx of Nephrotic Range Proteinuria most recent Urine Protein to Cr ratio 3.6 in Jan 2020. He has a Hx Microhematuria and in March 2018 had a (-) NKECHI, C3&4 not depressed, Hep B&C non reactive, (-) ANCA, (-) Anti-GBM, SPEP and UPEP no monoclonal protein    3. Hyperkalemia in the setting of the worsening renal failure and ACEI- K+ 4.7-on a 3K+ bath    4. Non Anion gap Met acidosis in the setting of the advanced CKD with HUGH- corrected  with the initiation dialysis    5. Sec HPTH of Renal Origin with Hyperphosphatemia-PO4 trending down on  PO4 binder- ionized Ca++ WNL    6. HTN with CKD I-IV--1/31/20 2D ECHO-EF 65%, mild LVH, LV wall motion normal, RV normal systolic function, no pericardial effusion or pl effusion, Pulm artery normal in size and mild dilation IVC-BP above goal will follow with vol removal    7. Acute Resp Failure with evidence Pl Effusions-on levaquin for Pne-continues on BIPAP -plan is vol removal on IHD    8. Anemia in CKD-ferritin 188 and Iron Sat 22% and  on MANUEL and oral Fe++ -HgB low but stable    9.  Sec HPTH of Renal; Origin- and Vit D 32-on calcitriol    Mando Green

## 2020-02-09 NOTE — FLOWSHEET NOTE
02/09/20 1435   Vital Signs   BP (!) 151/63   Temp 98.1 °F (36.7 °C)   Pulse 82   Resp 24   Weight 218 lb 7.6 oz (99.1 kg)   Weight Method Bed scale   Percent Weight Change -0.6   Post-Hemodialysis Assessment   Post-Treatment Procedures Catheter capped, clamped and heparinized x 2 ports; Blood returned   Machine Disinfection Process Acid/Vinegar Clean;Heat Disinfect; Exterior Machine Disinfection   Rinseback Volume (ml) 200 ml   Total Liters Processed (l/min) 57.9 l/min   Dialyzer Clearance Clotted   Duration of Treatment (minutes) 214 minutes   Hemodialysis Intake (ml) 400 ml   Hemodialysis Output (ml) 2700 ml   NET Removed (ml) 2300 ml   Tolerated Treatment Good   Patient Response to Treatment tolerated well. System clotted, patient rinsed back as well as possible 26 minutes prior to the scheduled end of treatment. estimated blood loss 50-75 mL. Edema Right lower extremity; Left lower extremity;Generalized   Edema Generalized +1   RLE Edema +2   LLE Edema +2   Physician Notified?  Yes

## 2020-02-09 NOTE — PROGRESS NOTES
Dialysis RN on floor for pt's dialysis txmt - is pt to receive any RBCs for Hgb of 7.0? V/m to Dr. Shane Mars to call the ICU. Waiting for reply. 1045 -  Dr. Shane Mars on ICU floor. See new orders. Blood product consent obtained and signed by the patient and placed in soft chart.     Josephine Valle  2/9/2020

## 2020-02-09 NOTE — PLAN OF CARE
minimized  Description  Complications related to the disease process, condition or treatment will be avoided or minimized  2/9/2020 0143 by Alexis Bailon RN  Outcome: Met This Shift  2/8/2020 1909 by Kiarra Jurado RN  Outcome: Met This Shift     Problem: Serum Glucose Level - Abnormal:  Goal: Ability to maintain appropriate glucose levels will improve  Description  Ability to maintain appropriate glucose levels will improve  2/9/2020 0143 by Alexis Bailon RN  Outcome: Met This Shift  2/8/2020 1909 by Kiarra Jurado RN  Outcome: Met This Shift     Problem: Sensory Perception - Impaired:  Goal: Ability to maintain a stable neurologic state will improve  Description  Ability to maintain a stable neurologic state will improve  2/9/2020 0143 by Alexis Bailon RN  Outcome: Met This Shift  2/8/2020 1909 by Kiarra Jurado RN  Outcome: Met This Shift

## 2020-02-09 NOTE — PROGRESS NOTES
any productive cough. No chest pain or abdominal pain temperature 97.5 with a heart rate of 73 and blood pressure 127/63. Pending lab work today. Pulmonary and nephrology notes reviewed. .  Blood sugar ranged from 100-262.    2/6/2020  Patient seen and examined on telemetry floor. Patient is seen resting in bed. Patient got new that he may need dialysis and he is upset about this. He states he had SOB when he tried to go to the bathroom earlier today, he did have his oxygen on at that time. He denies chest pain, abdomen pain, nausea, vomiting. He is having constipation- last bowel movement was 2 or 3 days ago. He says the nurses gave him some Milk of magnesia this morning and he has had no relief. Vitals as follows: oral temp 98.4F, heart rate 70 with resp rate of 18, /63, 96% O2 on 3L nasal cannula. BUN/Creat 93/5.3. Glucose ranges 144-219. Hemoglobin has decreased to 7.9 from yesterdays value of 8.4. RBC 3.02    2/7/2020  Patient was seen and examined on telemetry floor. Blood sugars range . Vital signs were stable with a blood pressure is 128/62. Patient currently on 3 L nasal cannula and O2 sats 95% at rest.  Patient is set up for dialysis catheter today. 2/8/2020  Patient seen and examined in ICU. Patient denies any chest or abdominal pain. Patient denies any dizziness or nausea vomiting. Patient had increasing respiratory difficulties while he was down in specials yesterday for his dialysis catheter. Patient currently on BiPAP. Temperature is 97.6 with a heart rate of 68 and blood pressure 142/83. Patient had good urine output yesterday to the IV diuretics. Blood sugars range .    2/9/2020  Patient seen and examined in ICU. Patient feels better and currently on 8 L nasal cannula. He denies any chest pain, abdominal pain, nausea or vomiting. Patient states he is breathing better. Blood pressure ranges from 136//75. Temperature ranges from 98.1-99.6. Heart rate 70. Globin 7.0 and the patient received 2 units of packed RBCs with dialysis today blood sugars ranging from . Past Medical History:    Past Medical History:   Diagnosis Date    Back pain     Diabetes mellitus ([facility] [address]     DMII (diabetes mellitus, type 2) ([facility] [address] 7/28/2015    History of cardiovascular stress test 2/16/2015    lexiscan    HTN (hypertension) 7/28/2015    Hyperlipidemia     Hypertension     Lumbar radicular pain 7/28/2015    Type II or unspecified type diabetes mellitus without mention of complication, not stated as uncontrolled      Past Surgical History:    Past Surgical History:   Procedure Laterality Date    OTHER SURGICAL HISTORY Left 06/06/14    cain bunionectomy left foot with osteomed screw x1    SHOULDER SURGERY      Bilateral    VEIN SURGERY         Medications Prior to Admission:    @  Prior to Admission medications    Medication Sig Start Date End Date Taking? Authorizing Provider   ferrous sulfate 325 (65 Fe) MG tablet Take 325 mg by mouth daily (with breakfast)   Yes Historical Provider, MD   hydrALAZINE (APRESOLINE) 25 MG tablet Take 25 mg by mouth 2 times daily   Yes Historical Provider, MD   calcitRIOL (ROCALTROL) 0.25 MCG capsule Take 0.25 mcg by mouth Takes 3 times a week   Yes Historical Provider, MD   insulin glargine (LANTUS) 100 UNIT/ML injection vial Inject 25 Units into the skin nightly   Yes Historical Provider, MD   metoprolol tartrate (LOPRESSOR) 50 MG tablet Take 50 mg by mouth 3/31/17  Yes Historical Provider, MD   gabapentin (NEURONTIN) 100 MG capsule Take 100 mg by mouth.    Yes Historical Provider, MD   etodolac (LODINE) 400 MG tablet Take 400 mg by mouth   Yes Historical Provider, MD   amLODIPine (NORVASC) 10 MG tablet Take 5 mg by mouth 2 times daily    Yes Historical Provider, MD   lisinopril (PRINIVIL;ZESTRIL) 20 MG tablet Take 20 mg by mouth daily   Yes Historical Provider, MD   Cholecalciferol (VITAMIN D3) 3000 units TABS Take 150 mcg by mouth daily complaints, dysuria, hematuria or frequency. MSK: No extremity weakness, paralysis or paresthesias. PHYSICAL EXAM:    Vitals:  /67   Pulse 70   Temp 98.1 °F (36.7 °C)   Resp 27   Ht 5' 8\" (1.727 m)   Wt 219 lb 12.8 oz (99.7 kg)   SpO2 93%   BMI 33.42 kg/m²     General:  This is a 60 y.o. yo male who is alert and oriented in NAD  HEENT:  Head is normocephalic and atraumatic, PERRLA, EOMI, mucus membranes moist with no pharyngeal erythema or exudate. Neck:  Supple with no carotid bruits, JVD or thyromegaly. No cervical adenopathy  CV:  Regular rate and rhythm, no murmurs  Lungs: Clear to auscultation bilaterally with no wheezes or rhonchi.   Abdomen:  Soft, + obese, nontender, nondistended, bowel sounds present  Extremities: +1 pitting edema to right > L lower leg, peripheral pulses intact bilaterally  Neuro:  Cranial nerves II-XII grossly intact; motor and sensory function intact with no focal deficits  Skin:  No rashes, lesions or wounds    DATA:  CBC with Differential:    Lab Results   Component Value Date    WBC 9.8 02/09/2020    RBC 2.68 02/09/2020    HGB 7.0 02/09/2020    HCT 23.9 02/09/2020     02/09/2020    MCV 89.2 02/09/2020    MCH 26.1 02/09/2020    MCHC 29.3 02/09/2020    RDW 17.1 02/09/2020    LYMPHOPCT 6.8 02/03/2020    MONOPCT 3.1 02/03/2020    BASOPCT 0.1 02/03/2020    MONOSABS 0.35 02/03/2020    LYMPHSABS 0.77 02/03/2020    EOSABS 0.00 02/03/2020    BASOSABS 0.01 02/03/2020     CMP:    Lab Results   Component Value Date     02/09/2020    K 4.7 02/09/2020    K 5.6 02/02/2020     02/09/2020    CO2 28 02/09/2020    BUN 51 02/09/2020    CREATININE 4.0 02/09/2020    GFRAA 18 02/09/2020    LABGLOM 18 02/09/2020    GLUCOSE 66 02/09/2020    PROT 6.6 02/08/2020    LABALBU 2.5 02/08/2020    CALCIUM 8.9 02/09/2020    BILITOT 0.2 02/08/2020    ALKPHOS 42 02/08/2020    AST 9 02/08/2020    ALT 9 02/08/2020     Magnesium:    Lab Results   Component Value Date    MG 2.5 02/09/2020     Phosphorus:    Lab Results   Component Value Date    PHOS 4.8 02/09/2020     PT/INR:    Lab Results   Component Value Date    PROTIME 11.8 02/07/2020    INR 1.0 02/07/2020     Troponin:    Lab Results   Component Value Date    TROPONINI 0.09 02/02/2020     U/A:    Lab Results   Component Value Date    COLORU Yellow 02/02/2020    PROTEINU >=300 02/02/2020    PHUR 5.0 02/02/2020    LABCAST RARE 09/21/2018    WBCUA 0-1 02/02/2020    RBCUA NONE 02/02/2020    BACTERIA NONE 02/02/2020    CLARITYU Clear 02/02/2020    SPECGRAV 1.025 02/02/2020    LEUKOCYTESUR Negative 02/02/2020    UROBILINOGEN 0.2 02/02/2020    BILIRUBINUR Negative 02/02/2020    BLOODU TRACE 02/02/2020    GLUCOSEU 250 02/02/2020    AMORPHOUS FEW 10/02/2019     ABG:    Lab Results   Component Value Date    PH 7.363 02/08/2020    PCO2 47.3 02/08/2020    PO2 140.5 02/08/2020    HCO3 26.3 02/08/2020    BE 0.7 02/08/2020    O2SAT 98.8 02/08/2020     HgBA1c:    Lab Results   Component Value Date    LABA1C 7.6 02/02/2020     FLP:    Lab Results   Component Value Date    TRIG 60 02/03/2020    HDL 60 02/03/2020    LDLCALC 71 02/03/2020    LABVLDL 12 02/03/2020     TSH:    Lab Results   Component Value Date    TSH 0.927 02/03/2020     IRON:    Lab Results   Component Value Date    IRON 19 02/09/2020     LIPASE:    Lab Results   Component Value Date    LIPASE 78 09/24/2018       ASSESSMENT AND PLAN:      Patient Active Problem List    Diagnosis Date Noted    Hallux valgus, acquired 06/06/2014     Priority: Low     Class: Present on Admission    Respiratory failure ([facility] [address] 02/02/2020    Pneumonia 01/28/2020    Acute pancreatitis without necrosis or infection, unspecified 09/23/2018    Idiopathic acute pancreatitis 09/21/2018    B12 deficiency 03/17/2016    DMII (diabetes mellitus, type 2) ([facility] [address] 07/28/2015    HTN (hypertension) 07/28/2015    Lumbar radicular pain 07/28/2015    Spinal stenosis 07/28/2015     1.   Acute/subacute hypoxic respiratory failure  2. Acute congestive heart failure  3. Acute on chronic kidney disease stage IV  4. Hypertension  5. Non-insulin-dependent diabetes mellitus type II-hyperglycemia  6. Pneumonia-lateral lobe  7.   Acute pulmonary edema secondary to fluid overload- 2/7      Plan:  Transfer to monitored bed  Repeat procalcitonin  Echocardiogram-EF 66%, pulmonary hypertension at 64 mmHg  [facility] reviewed  Home meds reviewed  Diabetic meds adjusted  Glucoscans 4 times daily with sliding scale insulin  DuoNeb aerosols  O2 nasal cannula  Hold Lodine, metformin  Consult nephrology, pulmonology  Procalcitonin 0.25  Routine labs in a.m.  CT of the lung-diffuse pulmonary infiltrates    George Floyd D.O.  2/9/2020  12:25 PM

## 2020-02-09 NOTE — PROGRESS NOTES
Patient c/o of continued left knee pain. He has ointment from home and would like to use it. Sent the ointment down to pharmacy and called to ask if we have that. Per Anju Plasencia, we do not have that but do have an equivalent muscle rub.    [id number] - Page out to Dr. Robert Woodard - waiting for reply. 4591 - p/c from Dr. oRbert Woodard - updated with pt's request. See new order.     Himanshu Nelson  2/9/2020

## 2020-02-09 NOTE — PLAN OF CARE
Problem: Falls - Risk of:  Goal: Will remain free from falls  Description  Will remain free from falls  Outcome: Met This Shift     Problem: Falls - Risk of:  Goal: Absence of physical injury  Description  Absence of physical injury  Outcome: Met This Shift     Problem: Cardiac:  Goal: Hemodynamic stability will improve  Description  Hemodynamic stability will improve  Outcome: Met This Shift     Problem: Coping:  Goal: Level of anxiety will decrease  Description  Level of anxiety will decrease  Outcome: Met This Shift     Problem: Coping:  Goal: Ability to cope will improve  Description  Ability to cope will improve  Outcome: Met This Shift     Problem: Coping:  Goal: Ability to establish a method of communication will improve  Description  Ability to establish a method of communication will improve  Outcome: Met This Shift     Problem: Respiratory:  Goal: Ability to maintain a clear airway will improve  Description  Ability to maintain a clear airway will improve  Outcome: Met This Shift     Problem: Respiratory:  Goal: Ability to maintain adequate ventilation will improve  Description  Ability to maintain adequate ventilation will improve  Outcome: Met This Shift     Problem: Respiratory:  Goal: Complications related to the disease process, condition or treatment will be avoided or minimized  Description  Complications related to the disease process, condition or treatment will be avoided or minimized  Outcome: Met This Shift     Problem: Serum Glucose Level - Abnormal:  Goal: Ability to maintain appropriate glucose levels will improve  Description  Ability to maintain appropriate glucose levels will improve  Outcome: Met This Shift     Problem: Sensory Perception - Impaired:  Goal: Ability to maintain a stable neurologic state will improve  Description  Ability to maintain a stable neurologic state will improve  Outcome: Met This Shift

## 2020-02-09 NOTE — PROGRESS NOTES
Mortimer Muslim, MD, 1,300 mg at 02/09/20 0850    sevelamer (RENVELA) tablet 800 mg, 800 mg, Oral, TID WC, Mortimer Muslim, MD, 800 mg at 02/09/20 5324    hydrALAZINE (APRESOLINE) tablet 50 mg, 50 mg, Oral, BID, Mortimer Muslim, MD, 50 mg at 02/09/20 0637    epoetin elvia-epbx (RETACRIT) injection 8,000 Units, 8,000 Units, Subcutaneous, Once per day on Mon Wed Fri, Walter Green MD, 8,000 Units at 02/07/20 1031    perflutren lipid microspheres (DEFINITY) injection 1.65 mg, 1.5 mL, Intravenous, ONCE PRN, Angela Pena MD    amLODIPine (NORVASC) tablet 10 mg, 10 mg, Oral, Daily, Sammie Fonder, DO, 10 mg at 02/09/20 6698    Vitamin D (CHOLECALCIFEROL) tablet 3,000 Units, 3,000 Units, Oral, Daily, [facility], 3,000 Units at 02/09/20 5581    metoprolol tartrate (LOPRESSOR) tablet 50 mg, 50 mg, Oral, BID, Sammie Fonder, DO, 50 mg at 02/09/20 0850    gabapentin (NEURONTIN) capsule 100 mg, 100 mg, Oral, BID, Pramod Rolle, DO, 100 mg at 02/09/20 0849    glucose (GLUTOSE) 40 % oral gel 15 g, 15 g, Oral, PRN, Sammie Fonder, DO, 15 g at 02/09/20 0630    dextrose 50 % IV solution, 12.5 g, Intravenous, PRN, Sammie Fonder, DO    glucagon (rDNA) injection 1 mg, 1 mg, Intramuscular, PRN, Sammie Fonder, DO    dextrose 5 % solution, 100 mL/hr, Intravenous, PRN, Sammie Fonder, DO    insulin lispro (HUMALOG) injection vial 0-12 Units, 0-12 Units, Subcutaneous, TID WC, Sammie Joseph, DO, 4 Units at 02/06/20 1233    insulin lispro (HUMALOG) injection vial 0-6 Units, 0-6 Units, Subcutaneous, Nightly, Sammie Fonder, DO, 2 Units at 02/08/20 2103    acetaminophen (TYLENOL) tablet 500 mg, 500 mg, Oral, Q6H PRN, Sammie Joseph DO, 500 mg at 02/09/20 0313    heparin (porcine) injection 5,000 Units, 5,000 Units, Subcutaneous, 3 times per day, Sammie Joseph DO, 5,000 Units at 02/09/20 0533    levofloxacin (LEVAQUIN) 500 MG/100ML infusion 500 mg, 500 mg, Intravenous, Q48H, rashes and skin discoloration  Psychiatry: denies depression    Physical Exam:   Vital Signs:  BP (!) 152/64   Pulse 76   Temp 98.2 °F (36.8 °C) (Oral)   Resp 27   Ht 5' 8\" (1.727 m)   Wt 219 lb 12.8 oz (99.7 kg)   SpO2 93%   BMI 33.42 kg/m²     Input/Output: In: 1407 [P.O.:1430]  Out: 1075     Oxygen requirements: BiPAP    Ventilator Information:  Vent Information  Skin Assessment: Clean, dry, & intact  FiO2 : 50 %    General appearance:  not in pain or distress, in no respiratory distress    HEENT: Atraumatic/normocephalic, EOMI, BRENNA, pharynx clear, moist mucosa, redness of the uvula appreciated,   Neck: Supple, no jugular venous distension, lymphadenopathy, thyromegaly or carotid bruits  Chest: Decreased breath sounds, no wheezing, +ve crackles and no tenderness over ribs   Cardiovascular: Normal S1 , S2, regular rate and rhythm, no murmur, rub or gallop  Abdomen: Normal sounds present, soft, lax with no tenderness, no hepatosplenomegaly, and no masses  Extremities: No edema. Pulses are equally present. Skin: intact, no rashes   Neurologic: Alert and oriented x 3, No focal deficit     Investigations:  Labs, radiological imaging and cardiac work up were reviewed        ICU STAFF PHYSICIAN NOTE OF PERSONAL INVOLVEMENT IN CARE  As the attending physician, I certify that I personally reviewed the patient's history and personally examined the patient to confirm the physical findings described above, and that I reviewed the relevant imaging studies and available reports. I also discussed the differential diagnosis and all of the proposed management plans with the patient and individuals accompanying the patient to this visit. They had the opportunity to ask questions about the proposed management plans and to have those questions answered.     This patient has a high probability of sudden, clinically significant deterioration, which requires the highest level of physician preparedness to intervene urgently. I managed/supervised life or organ supporting interventions that required frequent physician assessment. I devoted my full attention to the direct care of this patient for the amount of time indicated below. Time I spent with family or surrogate(s) is included only if the patient was incapable of providing the necessary information or participating in medical decision-making. Time devoted to teaching and to any procedures I billed separately is not included.   Critical Care Time: 35 minutes      Electronically signed by Marsha Concepcion MD on 2/9/2020 at 12:39 PM

## 2020-02-10 LAB
ANION GAP SERPL CALCULATED.3IONS-SCNC: 8 MMOL/L (ref 7–16)
BUN BLDV-MCNC: 26 MG/DL (ref 8–23)
CALCIUM SERPL-MCNC: 9 MG/DL (ref 8.6–10.2)
CHLORIDE BLD-SCNC: 97 MMOL/L (ref 98–107)
CO2: 33 MMOL/L (ref 22–29)
CREAT SERPL-MCNC: 3 MG/DL (ref 0.7–1.2)
GFR AFRICAN AMERICAN: 26
GFR NON-AFRICAN AMERICAN: 26 ML/MIN/1.73
GLUCOSE BLD-MCNC: 162 MG/DL (ref 74–99)
HCT VFR BLD CALC: 29.1 % (ref 37–54)
HEMOGLOBIN: 8.7 G/DL (ref 12.5–16.5)
MAGNESIUM: 2.1 MG/DL (ref 1.6–2.6)
MCH RBC QN AUTO: 26.9 PG (ref 26–35)
MCHC RBC AUTO-ENTMCNC: 29.9 % (ref 32–34.5)
MCV RBC AUTO: 89.8 FL (ref 80–99.9)
METER GLUCOSE: 126 MG/DL (ref 74–99)
METER GLUCOSE: 146 MG/DL (ref 74–99)
METER GLUCOSE: 167 MG/DL (ref 74–99)
METER GLUCOSE: 173 MG/DL (ref 74–99)
PDW BLD-RTO: 16.7 FL (ref 11.5–15)
PHOSPHORUS: 3.1 MG/DL (ref 2.5–4.5)
PLATELET # BLD: 133 E9/L (ref 130–450)
PMV BLD AUTO: 10.7 FL (ref 7–12)
POTASSIUM SERPL-SCNC: 4.8 MMOL/L (ref 3.5–5)
RBC # BLD: 3.24 E12/L (ref 3.8–5.8)
SODIUM BLD-SCNC: 138 MMOL/L (ref 132–146)
WBC # BLD: 10.3 E9/L (ref 4.5–11.5)

## 2020-02-10 PROCEDURE — 94640 AIRWAY INHALATION TREATMENT: CPT

## 2020-02-10 PROCEDURE — 94660 CPAP INITIATION&MGMT: CPT

## 2020-02-10 PROCEDURE — 36415 COLL VENOUS BLD VENIPUNCTURE: CPT

## 2020-02-10 PROCEDURE — 85027 COMPLETE CBC AUTOMATED: CPT

## 2020-02-10 PROCEDURE — 80048 BASIC METABOLIC PNL TOTAL CA: CPT

## 2020-02-10 PROCEDURE — 6370000000 HC RX 637 (ALT 250 FOR IP): Performed by: INTERNAL MEDICINE

## 2020-02-10 PROCEDURE — 6360000002 HC RX W HCPCS: Performed by: INTERNAL MEDICINE

## 2020-02-10 PROCEDURE — 2060000000 HC ICU INTERMEDIATE R&B

## 2020-02-10 PROCEDURE — 83735 ASSAY OF MAGNESIUM: CPT

## 2020-02-10 PROCEDURE — 84100 ASSAY OF PHOSPHORUS: CPT

## 2020-02-10 PROCEDURE — 82962 GLUCOSE BLOOD TEST: CPT

## 2020-02-10 PROCEDURE — 90935 HEMODIALYSIS ONE EVALUATION: CPT

## 2020-02-10 PROCEDURE — 2700000000 HC OXYGEN THERAPY PER DAY

## 2020-02-10 RX ADMIN — LEVOFLOXACIN 500 MG: 5 INJECTION, SOLUTION INTRAVENOUS at 17:05

## 2020-02-10 RX ADMIN — GABAPENTIN 100 MG: 100 CAPSULE ORAL at 21:04

## 2020-02-10 RX ADMIN — ACETAMINOPHEN 500 MG: 500 TABLET, FILM COATED ORAL at 21:04

## 2020-02-10 RX ADMIN — GUAIFENESIN 600 MG: 600 TABLET, EXTENDED RELEASE ORAL at 07:59

## 2020-02-10 RX ADMIN — GUAIFENESIN 600 MG: 600 TABLET, EXTENDED RELEASE ORAL at 21:05

## 2020-02-10 RX ADMIN — FERROUS SULFATE TAB 325 MG (65 MG ELEMENTAL FE) 325 MG: 325 (65 FE) TAB at 07:59

## 2020-02-10 RX ADMIN — INSULIN LISPRO 2 UNITS: 100 INJECTION, SOLUTION INTRAVENOUS; SUBCUTANEOUS at 14:43

## 2020-02-10 RX ADMIN — SODIUM BICARBONATE 650 MG TABLET 1300 MG: at 07:59

## 2020-02-10 RX ADMIN — IPRATROPIUM BROMIDE AND ALBUTEROL SULFATE 1 AMPULE: .5; 3 SOLUTION RESPIRATORY (INHALATION) at 06:56

## 2020-02-10 RX ADMIN — CALCITRIOL 0.25 MCG: 0.25 CAPSULE ORAL at 07:59

## 2020-02-10 RX ADMIN — SODIUM BICARBONATE 650 MG TABLET 1300 MG: at 14:32

## 2020-02-10 RX ADMIN — DOCUSATE SODIUM 100 MG: 100 CAPSULE, LIQUID FILLED ORAL at 07:59

## 2020-02-10 RX ADMIN — HEPARIN SODIUM 5000 UNITS: 5000 INJECTION, SOLUTION INTRAVENOUS; SUBCUTANEOUS at 14:34

## 2020-02-10 RX ADMIN — DOCUSATE SODIUM 100 MG: 100 CAPSULE, LIQUID FILLED ORAL at 21:04

## 2020-02-10 RX ADMIN — HYDRALAZINE HYDROCHLORIDE 50 MG: 50 TABLET ORAL at 21:04

## 2020-02-10 RX ADMIN — INSULIN LISPRO 1 UNITS: 100 INJECTION, SOLUTION INTRAVENOUS; SUBCUTANEOUS at 21:05

## 2020-02-10 RX ADMIN — ACETAMINOPHEN 500 MG: 500 TABLET, FILM COATED ORAL at 01:17

## 2020-02-10 RX ADMIN — HEPARIN SODIUM 5000 UNITS: 5000 INJECTION, SOLUTION INTRAVENOUS; SUBCUTANEOUS at 07:15

## 2020-02-10 RX ADMIN — HEPARIN SODIUM 5000 UNITS: 5000 INJECTION, SOLUTION INTRAVENOUS; SUBCUTANEOUS at 21:44

## 2020-02-10 RX ADMIN — INSULIN LISPRO 2 UNITS: 100 INJECTION, SOLUTION INTRAVENOUS; SUBCUTANEOUS at 17:06

## 2020-02-10 RX ADMIN — EPOETIN ALFA-EPBX 8000 UNITS: 10000 INJECTION, SOLUTION INTRAVENOUS; SUBCUTANEOUS at 17:05

## 2020-02-10 RX ADMIN — GABAPENTIN 100 MG: 100 CAPSULE ORAL at 07:59

## 2020-02-10 RX ADMIN — INSULIN GLARGINE 16 UNITS: 100 INJECTION, SOLUTION SUBCUTANEOUS at 21:05

## 2020-02-10 RX ADMIN — METOPROLOL TARTRATE 50 MG: 50 TABLET, FILM COATED ORAL at 21:04

## 2020-02-10 RX ADMIN — SEVELAMER CARBONATE 800 MG: 800 TABLET, FILM COATED ORAL at 17:50

## 2020-02-10 RX ADMIN — MOMETASONE FUROATE AND FORMOTEROL FUMARATE DIHYDRATE 2 PUFF: 200; 5 AEROSOL RESPIRATORY (INHALATION) at 06:56

## 2020-02-10 RX ADMIN — SEVELAMER CARBONATE 800 MG: 800 TABLET, FILM COATED ORAL at 14:33

## 2020-02-10 RX ADMIN — SODIUM BICARBONATE 650 MG TABLET 1300 MG: at 21:04

## 2020-02-10 RX ADMIN — SEVELAMER CARBONATE 800 MG: 800 TABLET, FILM COATED ORAL at 08:19

## 2020-02-10 RX ADMIN — IPRATROPIUM BROMIDE AND ALBUTEROL SULFATE 1 AMPULE: .5; 3 SOLUTION RESPIRATORY (INHALATION) at 14:26

## 2020-02-10 RX ADMIN — POLYETHYLENE GLYCOL 3350 17 G: 17 POWDER, FOR SOLUTION ORAL at 07:59

## 2020-02-10 RX ADMIN — VITAMIN D, TAB 1000IU (100/BT) 3000 UNITS: 25 TAB at 07:59

## 2020-02-10 ASSESSMENT — PAIN DESCRIPTION - LOCATION: LOCATION: KNEE

## 2020-02-10 ASSESSMENT — PAIN SCALES - GENERAL
PAINLEVEL_OUTOF10: 4
PAINLEVEL_OUTOF10: 0
PAINLEVEL_OUTOF10: 0

## 2020-02-10 ASSESSMENT — PAIN DESCRIPTION - ORIENTATION: ORIENTATION: LEFT

## 2020-02-10 ASSESSMENT — PAIN DESCRIPTION - PAIN TYPE: TYPE: ACUTE PAIN

## 2020-02-10 NOTE — PROGRESS NOTES
[facility]  Nephrology Attending Progress Note  St[place] . Marilyn Ballard MD        SUBJECTIVE:     2/6: Full consult deferred as just saw pt while he was inpt at [facility] and seen 2/2. He was admitted for Acute Resp Failure and our service was seening for CKD G4 with a baseline serum cr 3.0-3.8mg/dl. Pt was at baseline during the hospital admission. He had an amb pulse ox prior to D/C and was found to be hypoxic and home O2 prescribed. He presented back to ED 2/2/20 after O2 tank ran out. In the ED initial O2 sat 60% and /90. No fever or chills but he mdid feel lightheaded     2/7/20: Pt went down to IR for [facility] [place] placement and had worsening SOB requiring BIPAP and transfer to MICU. Pt currently on BIPAP visibly dyspneic and tachypneic     2/8/20: Pt remains on BIPAP this AM still with desaturation off BIPAP and SOB     2/9/20: Pt seen on IHD on 8L NC, states he feels better and less SOB when at rest still with dyspnea with minimal exertion    2/10: pt seen in room. For hd today, complaining of right knee pain. Knee is swollen             PROBLEM LIST:    Patient Active Problem List   Diagnosis    Hallux valgus, acquired    DMII (diabetes mellitus, type 2) ([address]    HTN (hypertension)    Lumbar radicular pain    Spinal stenosis    B12 deficiency    Idiopathic acute pancreatitis    Acute pancreatitis without necrosis or infection, unspecified    Pneumonia    Respiratory failure ([address]        PAST MEDICAL HISTORY:    Past Medical History:   Diagnosis Date    Back pain     Diabetes mellitus ([address]     DMII (diabetes mellitus, type 2) ([address] 7/28/2015    History of cardiovascular stress test 2/16/2015    lexiscan    HTN (hypertension) 7/28/2015    Hyperlipidemia     Hypertension     Lumbar radicular pain 7/28/2015    Type II or unspecified type diabetes mellitus without mention of complication, not stated as uncontrolled        DIET:    DIET CARB CONTROL;   Dietary Nutrition Supplements: Renal Oral Supplement     PHYSICAL EXAM:     Patient Vitals for the past 24 hrs:   BP Temp Temp src Pulse Resp SpO2 Weight   02/10/20 1200 (!) 150/77 -- -- 76 -- -- --   02/10/20 1130 (!) 140/73 -- -- 72 -- -- --   02/10/20 1100 135/68 -- -- 71 -- -- --   02/10/20 1030 136/67 -- -- 74 -- -- --   02/10/20 1000 (!) 146/70 -- -- 73 -- -- --   02/10/20 0930 (!) 146/74 -- -- 73 -- -- --   02/10/20 0900 (!) 169/80 -- -- 78 -- -- --   02/10/20 0830 (!) 164/71 97.8 °F (36.6 °C) -- 80 20 -- 218 lb 7.6 oz (99.1 kg)   02/10/20 0745 (!) 162/84 98.1 °F (36.7 °C) Oral 102 20 92 % --   02/09/20 2230 -- -- -- -- 20 -- --   02/09/20 2117 (!) 184/75 -- -- 105 -- -- --   02/09/20 1900 (!) 184/75 98.5 °F (36.9 °C) Oral 105 20 (!) 89 % --   02/09/20 1837 (!) 169/71 98.4 °F (36.9 °C) Oral 99 26 91 % --   02/09/20 1600 (!) 160/72 -- -- 90 29 94 % --   02/09/20 1502 (!) 158/80 -- -- 86 27 (!) 89 % --   02/09/20 1435 (!) 151/63 98.1 °F (36.7 °C) -- 82 24 -- 218 lb 7.6 oz (99.1 kg)   02/09/20 1415 (!) 179/74 97.8 °F (36.6 °C) Oral -- -- -- --   @      Intake/Output Summary (Last 24 hours) at 2/10/2020 1412  Last data filed at 2/9/2020 2239  Gross per 24 hour   Intake 990 ml   Output 3200 ml   Net -2210 ml         Wt Readings from Last 3 Encounters:   02/10/20 218 lb 7.6 oz (99.1 kg)   10/28/19 208 lb (94.3 kg)   10/16/19 209 lb (94.8 kg)       Constitutional:  Pt is in no acute distress  Head: normocephalic, atraumatic  Neck: no JVD  Cardiovascular: regular rate and rhythm, no murmurs, gallops, or rubs  Respiratory:  No rales, rhochi, or wheezes  Gastrointestinal:  Soft, nontender, nondistended, bowel sounds x 4  Ext: tr edema.  r knee with effusion over patella tendon  Skin: dry, no rash  Neuro: aaox3    MEDS (scheduled):    sodium chloride  20 mL Intravenous Once    insulin glargine  16 Units Subcutaneous Nightly    docusate sodium  100 mg Oral BID    polyethylene glycol  17 g Oral Daily    sodium bicarbonate  1,300 mg Oral TID    sevelamer  800 mg Oral TID     hydrALAZINE  50 mg Oral BID    epoetin elvia-epbx  8,000 Units Subcutaneous Once per day on Mon Wed Fri    amLODIPine  10 mg Oral Daily    Vitamin D  3,000 Units Oral Daily    metoprolol tartrate  50 mg Oral BID    gabapentin  100 mg Oral BID    insulin lispro  0-12 Units Subcutaneous TID     insulin lispro  0-6 Units Subcutaneous Nightly    heparin (porcine)  5,000 Units Subcutaneous 3 times per day    levofloxacin  500 mg Intravenous Q48H    ipratropium-albuterol  1 ampule Inhalation Q4H WA    guaiFENesin  600 mg Oral BID    mometasone-formoterol  2 puff Inhalation BID    ferrous sulfate  325 mg Oral Daily with breakfast    calcitRIOL  0.25 mcg Oral Once per day on Mon Wed Fri       MEDS (infusions):   dextrose         MEDS (prn):  muscle rub, perflutren lipid microspheres, glucose, dextrose, glucagon (rDNA), dextrose, acetaminophen, ondansetron, magnesium hydroxide, ipratropium-albuterol    DATA:    Recent Labs     02/08/20  0635 02/09/20  0525 02/09/20  1528   WBC 11.1 9.8 14.6*   HGB 7.7* 7.0* 9.2*   HCT 25.4* 23.9* 29.3*   MCV 89.1 89.2 86.7    171 157     Recent Labs     02/08/20  0635 02/09/20  0525    140   K 5.4* 4.7    102   CO2 26 28   BUN 69* 51*   CREATININE 4.6* 4.0*   LABGLOM 16 18   GLUCOSE 119* 66*   CALCIUM 8.2* 8.9   ALT 9  --    AST 9  --    BILITOT 0.2  --    ALKPHOS 42  --    MG 2.5 2.5   PHOS 4.6* 4.8*       Lab Results   Component Value Date    LABALBU 2.5 (L) 02/08/2020    LABALBU 2.9 (L) 02/04/2020    LABALBU 3.1 (L) 02/03/2020     Lab Results   Component Value Date    TSH 0.927 02/03/2020       Iron Studies  Lab Results   Component Value Date    IRON 19 (L) 02/09/2020    TIBC 189 (L) 02/09/2020    FERRITIN 188 11/13/2019     Vitamin B-12   Date Value Ref Range Status   02/09/2020 320 211 - 946 pg/mL Final     Folate   Date Value Ref Range Status   02/09/2020 9.6 4.8 - 24.2 ng/mL Final       Vit D, 25-Hydroxy   Date Value Ref Range Status   01/23/2020 32 30 - 100 ng/mL Final     Comment:     <20 ng/mL. ........... Corinne Beagle Deficient  20-30 ng/mL. ......... Corinne Beagle Insufficient   ng/mL. ........ Corinne Beagle Sufficient  >100 ng/mL. .......... Corinne Beagle Toxic       PTH   Date Value Ref Range Status   01/23/2020 154 (H) 15 - 65 pg/mL Final       No components found for: URIC    Lab Results   Component Value Date    COLORU Yellow 02/02/2020    NITRU Negative 02/02/2020    GLUCOSEU 250 02/02/2020    KETUA Negative 02/02/2020    UROBILINOGEN 0.2 02/02/2020    BILIRUBINUR Negative 02/02/2020       No results found for: Ced Bosebrian      IMPRESSION/RECOMMENDATIONS:      1. arf  acute hypoxia and uncontrolled HTN  S/P R IJ TDC\  1st IHD 2/7 4th hd today     2. CKD G4   baseline serum cr 3.0-3.8mg/dl with an e-GFR=20-26ml/min  Hx of Nephrotic Range Proteinuria Urine Protein to Cr ratio 3.6 in Jan 2020 Microhematuria and in March 2018 had a (-) NKECHI, C3&4 not depressed, Hep B&C non reactive, (-) ANCA, (-) Anti-GBM, SPEP and UPEP no monoclonal protein     3. Hyperkalemia  setting of the worsening renal failure and ACE  Improved with hd     4. Non Anion gap Met acidosis  setting of the advanced CKD with HUGH  corrected  with the initiation dialysis     5. 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 HTN with CKD I-IV  1/31/20ECHO-EF 65%, mild LVH, LV wall motion normal, RV normal systolic function, no pericardial effusion or pl effusion, Pulm artery normal in size and mild dilation IVC  BP above goal will follow with vol removal     7. Acute Resp Failure  Pl Effusions  on levaquin for Pna  Continue uf with hd     8. Anemia in CKD  ferritin 188 and Iron Sat 22%   on MANUEL and oral Fe++   HgB low but stable     9. Sec HPTH of Renal   and Vit D 32  on calcitriol    10. r knee pain  Check uric acid       Ninetta Mylene.  James Homans, MD

## 2020-02-10 NOTE — PROGRESS NOTES
Department of Internal Medicine        CHIEF COMPLAINT: Shortness of breath    Reason for Admission: Acute hypoxic respiratory failure    HISTORY OF PRESENT ILLNESS:      The patient is a 60 y.o. male who presents with being discharged from [facility] yesterday and was put on 2 half liters nasal cannula O2 and was given a portable tank but was not set up with a stationary tank at home. The patient stated that he checked his tank today and was empty. He was 65% O2 saturation on room air on admission. Chest x-ray suggested CHF. Patient had temperature of 100.1. BUN/creatinine was 50/4.0 with the patient have a creatinine of 3.8 on old lab work. proBNP was 4800. WBC is 4.7. Patient denies any fever and chills or productive cough at this time. Patient was admitted for further evaluation and treatment. 2/3/2020  Patient seen and examined on telemetry floor. Patient feels better today than yesterday but still on 3 L nasal cannula. BUN/creatinine is elevated at 76/4.7. Blood sugars are in the 200s. WBC 11.3. CT of the lungs showed diffuse pulmonary infiltrates. Temperature is 97.5 today but was 100.1 on admission. O2 sat is 90% on 3 L at rest.  Will consult pulmonology. Nephrology note reviewed. 2/4/2020  Patient seen and examined on telemetry floor. Patient states he feels better than he did yesterday. At the time of examination patient was not wearing his oxygen, states he took it off to go to the bathroom- he states he did not get SOB while off the oxygen. Denies chest pain, abdomen pain, nausea, vomiting, constipation, diarrhea. Vitals are as follows: temp 97.7F, heart rate 97 with resp rate of 18, /66, . Blood Glucose 160- metered glucose 312, BUN/Creat 82/4.9, CO2 17, WBC 12.1, RBC 3.1, Hemoglobin 8.1. O2 saturation on room air with activity was 84% and was 91% on 2 L with activity. 2/5/2020  Patient seen and examined on telemetry floor. Patient feels better again today.   Patient denies 02/09/2020    LABGLOM 18 02/09/2020    GLUCOSE 66 02/09/2020    PROT 6.6 02/08/2020    LABALBU 2.5 02/08/2020    CALCIUM 8.9 02/09/2020    BILITOT 0.2 02/08/2020    ALKPHOS 42 02/08/2020    AST 9 02/08/2020    ALT 9 02/08/2020     Magnesium:    Lab Results   Component Value Date    MG 2.5 02/09/2020     Phosphorus:    Lab Results   Component Value Date    PHOS 4.8 02/09/2020     PT/INR:    Lab Results   Component Value Date    PROTIME 11.8 02/07/2020    INR 1.0 02/07/2020     Troponin:    Lab Results   Component Value Date    TROPONINI 0.09 02/02/2020     U/A:    Lab Results   Component Value Date    COLORU Yellow 02/02/2020    PROTEINU >=300 02/02/2020    PHUR 5.0 02/02/2020    LABCAST RARE 09/21/2018    WBCUA 0-1 02/02/2020    RBCUA NONE 02/02/2020    BACTERIA NONE 02/02/2020    CLARITYU Clear 02/02/2020    SPECGRAV 1.025 02/02/2020    LEUKOCYTESUR Negative 02/02/2020    UROBILINOGEN 0.2 02/02/2020    BILIRUBINUR Negative 02/02/2020    BLOODU TRACE 02/02/2020    GLUCOSEU 250 02/02/2020    AMORPHOUS FEW 10/02/2019     ABG:    Lab Results   Component Value Date    PH 7.363 02/08/2020    PCO2 47.3 02/08/2020    PO2 140.5 02/08/2020    HCO3 26.3 02/08/2020    BE 0.7 02/08/2020    O2SAT 98.8 02/08/2020     HgBA1c:    Lab Results   Component Value Date    LABA1C 7.6 02/02/2020     FLP:    Lab Results   Component Value Date    TRIG 60 02/03/2020    HDL 60 02/03/2020    LDLCALC 71 02/03/2020    LABVLDL 12 02/03/2020     TSH:    Lab Results   Component Value Date    TSH 0.927 02/03/2020     IRON:    Lab Results   Component Value Date    IRON 19 02/09/2020     LIPASE:    Lab Results   Component Value Date    LIPASE 78 09/24/2018       ASSESSMENT AND PLAN:      Patient Active Problem List    Diagnosis Date Noted    Hallux valgus, acquired 06/06/2014     Priority: Low     Class: Present on Admission    Respiratory failure ([address] 02/02/2020    Pneumonia 01/28/2020    Acute pancreatitis without necrosis or infection, unspecified 09/23/2018    Idiopathic acute pancreatitis 09/21/2018    B12 deficiency 03/17/2016    DMII (diabetes mellitus, type 2) ([facility] 07/28/2015    HTN (hypertension) 07/28/2015    Lumbar radicular pain 07/28/2015    Spinal stenosis 07/28/2015     1. Acute/subacute hypoxic respiratory failure  2. Acute congestive heart failure  3. Acute on chronic kidney disease stage IV  4. Hypertension  5. Non-insulin-dependent diabetes mellitus type II-hyperglycemia  6. Pneumonia-lateral lobe  7.   Acute pulmonary edema secondary to fluid overload- 2/7      Plan:  Transfer to [facility]  Repeat procalcitonin  Echocardiogram-EF 66%, pulmonary hypertension at 64 mmHg  Records from [facility] reviewed  Home meds reviewed  Diabetic meds adjusted  Glucoscans 4 times daily with sliding scale insulin  DuoNeb aerosols  O2 nasal cannula  Hold Lodine, metformin  Consult nephrology, pulmonology  Procalcitonin 0.25  Routine labs in a.m.  CT of the lung-diffuse pulmonary infiltrates    Altagracia La D.O.  2/10/2020  12:25 PM

## 2020-02-10 NOTE — PLAN OF CARE
Problem: Falls - Risk of:  Goal: Will remain free from falls  Description  Will remain free from falls  Outcome: Met This Shift     Problem: Falls - Risk of:  Goal: Absence of physical injury  Description  Absence of physical injury  Outcome: Met This Shift     Problem: Cardiac:  Goal: Hemodynamic stability will improve  Description  Hemodynamic stability will improve  Outcome: Met This Shift     Problem: Coping:  Goal: Level of anxiety will decrease  Description  Level of anxiety will decrease  Outcome: Met This Shift     Problem: Coping:  Goal: Ability to cope will improve  Description  Ability to cope will improve  Outcome: Met This Shift     Problem: Coping:  Goal: Ability to establish a method of communication will improve  Description  Ability to establish a method of communication will improve  Outcome: Met This Shift     Problem: Respiratory:  Goal: Ability to maintain a clear airway will improve  Description  Ability to maintain a clear airway will improve  Outcome: Met This Shift     Problem: Respiratory:  Goal: Ability to maintain adequate ventilation will improve  Description  Ability to maintain adequate ventilation will improve  Outcome: Met This Shift     Problem: Respiratory:  Goal: Complications related to the disease process, condition or treatment will be avoided or minimized  Description  Complications related to the disease process, condition or treatment will be avoided or minimized  Outcome: Met This Shift     Problem: Discharge Planning:  Goal: Discharged to appropriate level of care  Description  Discharged to appropriate level of care  Outcome: Met This Shift     Problem: Serum Glucose Level - Abnormal:  Goal: Ability to maintain appropriate glucose levels will improve  Description  Ability to maintain appropriate glucose levels will improve  Outcome: Met This Shift     Problem: Sensory Perception - Impaired:  Goal: Ability to maintain a stable neurologic state will improve  Description  Ability to maintain a stable neurologic state will improve  Outcome: Met This Shift

## 2020-02-10 NOTE — CARE COORDINATION
SS Note: Per [facility], pt's chair time will be [employer], M,W,F 2:30PM. SW updated pt and he stated he is able to drive himself to dialysis, SW placed dialysis information on pt's dc instructions. Per IMCU rounds today pt presently on 10 liters. Per 2/3 SW note, the SW called referral to [facility], will need updated O2 testing, face, to face physician note stating the need for O2, and the order for home O2. SW/CM will continue to follow for transition of care planning.   Electronically signed by FLY Melendez on 2/10/2020 at 4:04 PM

## 2020-02-10 NOTE — PROGRESS NOTES
Patient transported to [place] [address] via wheelchair on [employer] 6L, monitor, with pt's belongings, and chart with ICU RN and transporter. Patient tolerated transport well.     Kiarra Jurado  2/9/2020

## 2020-02-10 NOTE — ADT AUTH CERT
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 HTN with CKD P-XN-thscoar ACEI and titrated hydralazine-BP trending down-no new additions-1/31/20 2D ECHO-EF 65%, mild LVH, LV wall motion normal, RV normal systolic function, no pericardial effusion or pl effusion, Pulm artery normal in size and mild dilation IVC       7. Acute Resp Failure with evidence Pl Effusions-on levaquin for Pne       8. Anemia in CKD-ferritin 188 and Iron Sat 22% and  on MANUEL and oral Fe++        9. Sec HPTH of Renal; Origin- and Vit D 32-on calcitriol         Internal Medicine Note      ASSESSMENT AND PLAN:         Patient Active Problem List     Diagnosis Date Noted   · Hallux valgus, acquired 06/06/2014       Priority: Low       Class: Present on Admission   · Respiratory failure ([facility] 02/02/2020   · Pneumonia 01/28/2020   · Acute pancreatitis without necrosis or infection, unspecified 09/23/2018   · Idiopathic acute pancreatitis 09/21/2018   · B12 deficiency 03/17/2016   · DMII (diabetes mellitus, type 2) ([facility] 07/28/2015   · HTN (hypertension) 07/28/2015   · Lumbar radicular pain 07/28/2015   · Spinal stenosis 07/28/2015       1.  Acute/subacute hypoxic respiratory failure   2.  Acute congestive heart failure   3.  Acute on chronic kidney disease stage IV   4.  Hypertension   5.  Non-insulin-dependent diabetes mellitus type II-hyperglycemia   6.  Pneumonia-lateral lobe           Plan:   Echocardiogram   Some records from [facility] reviewed   Home meds reviewed   Glucoscans 4 times daily with sliding scale insulin   DuoNeb aerosols   O2 nasal cannula   Hold Lodine, metformin   Consult nephrology, pulmonology   Procalcitonin 0.25   Routine labs in a.m.   CT of the lung-diffuse pulmonary infiltrates             Pulmonology Note       IMPRESSION:   1. Bilateral pneumonia, community-acquired   2. Bilateral lymph adenopathy   3. Chronic kidney disease   4. Hypertension       PLAN:   1.  Continue alternate day Levaquin   2. ABGs on room air tomorrow   3. PA and lateral chest x-ray in a.m.   4. CBC and chemistries have already been ordered            Note:   Per Nephrology note 2/6   Pt states increasing  Lower ext edema. NO worsening of the SOB Discussed with pt continued worsening of the renal function.  I recommended initiation of renal replacement therapy and the pt is willing to proceed          Per Case Management Note   SS NOTE: JEANA recd the SS consult to arrange outpt hemodialysis.  JEANA made contact with Collette at [employer] to begin reviewing pt for a chair time at [facility]         Note:  2/7/2020  Has order for hemodialysis today           Respiratory Failure GRG - Care Day 3 (2/4/2020) by Jennifer Dupree RN         Review Status Review Entered   Completed 2/7/2020 12:46       Criteria Review      Care Day: 3 Care Date: 2/4/2020 Level of Care: Telemetry    Guideline Day 3    Level Of Care    (X) * Activity level acceptable    ( ) Floor to discharge    2/7/2020 12:46 PM EST by Patrick Burgos      MEDICAL SURGICAL UNIT [facility] TELEMETRY    Clinical Status    ( ) * Ventilation status appropriate    2/7/2020 12:46 PM EST by Patrick Burgos      84% RA; 91% 2L O2 NC    (X) * Airway status acceptable    (X) * Respiratory status acceptable    (X) * Stable chest findings    (X) * Neurologic status acceptable    (X) * Temperature status acceptable    ( ) * No infection, or status acceptable    ( ) * General Discharge Criteria met    Interventions    (X) * No chest tube, or status acceptable    (X) * Intake acceptable    ( ) * No inpatient interventions needed    2/7/2020 12:46 PM EST by Patrick Burgos      IV ANTIBIOTICS CONITNUE  CREATININE 4.9  MAY REQUIRE DIALYSIS    * Milestone   Additional Notes   2/4/2020  Care Day 3      VS:  T. 36.6, b/p 140/70, HR 66, R 18, SpO2 97% 3L O2 NC      Labs:      Sodium: 138   Potassium: 4.7   Chloride: 101   CO2: 17 (L)   BUN: 82 (H)   Creatinine: 4.9 (H)   Anion Gap: 20 (H)   GFR Non- a.m.   CT of the lung-diffuse pulmonary infiltrates         Nephrology Note   Pt states he feels better. He ambulated and O2 sat dropped to 84% and on 2L O2 sat 91%      Assessment:   Patient Active Problem List:      Hallux valgus, acquired      DMII (diabetes mellitus, type 2) ([facility]      HTN (hypertension)      Lumbar radicular pain      Spinal stenosis      B12 deficiency      Idiopathic acute pancreatitis      Acute pancreatitis without necrosis or infection, unspecified      Pneumonia      Respiratory failure ([facility]       Plan:   1. Stage I HUGH -the rise in the cr above baseline most probably reflects the acute hypoxia and uncontrolled HTN-no significant improvement       2. CKD G4 with a baseline serum cr 3.0-3.8mg/dl with an e-GFR=20-26ml/min with a Hx of Nephrotic Range Proteinuria most recent Urine Protein to Cr ratio 3.6 in Jan 2020. He has a Hx Microhematuria and in March 2018 had a (-) NKECHI, C3&4 not depressed, Hep B&C non reactive, (-) ANCA, (-) Anti-GBM, SPEP and UPEP no monoclonal protein       3. Hyperkalemia in the setting of the worsening renal failure and ACEI-K+ back WNL post treatment with the Kayexalate       4. Non Anion gap Met acidosis in the setting of the advanced CKD with HUGH-started on oral HCO3       5. 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 HTN with CKD [id number] ACEI and titrated hydralazine-BP trending down-no new additions-1/31/20 2D ECHO-EF 65%, mild LVH, LV wall motion normal, RV normal systolic function, no pericardial effusion or pl effusion, Pulm artery normal in size and mild dilation IVC       7. Acute Resp Failure with evidence Pl Effusions-on levaquin-received  Vanc for Pne       8.  Anemia in CKD-ferritin 188 and Iron Sat 22% and  on MANUEL and oral Fe++        9. Sec HPTH of Renal; Origin- and Vit D 32-on calcitriol         Pulmonary Note   SUBJECTIVE:   Patient is clearly improved both by his subjective and objective

## 2020-02-11 ENCOUNTER — APPOINTMENT (OUTPATIENT)
Dept: GENERAL RADIOLOGY | Age: 65
DRG: 291 | End: 2020-02-11
Payer: MEDICARE

## 2020-02-11 LAB
ANION GAP SERPL CALCULATED.3IONS-SCNC: 11 MMOL/L (ref 7–16)
BUN BLDV-MCNC: 31 MG/DL (ref 8–23)
CALCIUM SERPL-MCNC: 9.1 MG/DL (ref 8.6–10.2)
CHLORIDE BLD-SCNC: 98 MMOL/L (ref 98–107)
CO2: 31 MMOL/L (ref 22–29)
CREAT SERPL-MCNC: 3.4 MG/DL (ref 0.7–1.2)
GFR AFRICAN AMERICAN: 22
GFR NON-AFRICAN AMERICAN: 22 ML/MIN/1.73
GLUCOSE BLD-MCNC: 165 MG/DL (ref 74–99)
HCT VFR BLD CALC: 29.9 % (ref 37–54)
HEMOGLOBIN: 8.6 G/DL (ref 12.5–16.5)
MAGNESIUM: 2.3 MG/DL (ref 1.6–2.6)
MCH RBC QN AUTO: 26.4 PG (ref 26–35)
MCHC RBC AUTO-ENTMCNC: 28.8 % (ref 32–34.5)
MCV RBC AUTO: 91.7 FL (ref 80–99.9)
METER GLUCOSE: 155 MG/DL (ref 74–99)
METER GLUCOSE: 164 MG/DL (ref 74–99)
METER GLUCOSE: 173 MG/DL (ref 74–99)
METER GLUCOSE: 198 MG/DL (ref 74–99)
METER GLUCOSE: 223 MG/DL (ref 74–99)
METER GLUCOSE: 224 MG/DL (ref 74–99)
PDW BLD-RTO: 16.5 FL (ref 11.5–15)
PHOSPHORUS: 4 MG/DL (ref 2.5–4.5)
PLATELET # BLD: 137 E9/L (ref 130–450)
PMV BLD AUTO: 10.7 FL (ref 7–12)
POTASSIUM SERPL-SCNC: 4.8 MMOL/L (ref 3.5–5)
RBC # BLD: 3.26 E12/L (ref 3.8–5.8)
SODIUM BLD-SCNC: 140 MMOL/L (ref 132–146)
URIC ACID, SERUM: 4 MG/DL (ref 3.4–7)
WBC # BLD: 10.1 E9/L (ref 4.5–11.5)

## 2020-02-11 PROCEDURE — 94660 CPAP INITIATION&MGMT: CPT

## 2020-02-11 PROCEDURE — 36415 COLL VENOUS BLD VENIPUNCTURE: CPT

## 2020-02-11 PROCEDURE — 71046 X-RAY EXAM CHEST 2 VIEWS: CPT

## 2020-02-11 PROCEDURE — 82962 GLUCOSE BLOOD TEST: CPT

## 2020-02-11 PROCEDURE — 83735 ASSAY OF MAGNESIUM: CPT

## 2020-02-11 PROCEDURE — 6360000002 HC RX W HCPCS: Performed by: INTERNAL MEDICINE

## 2020-02-11 PROCEDURE — 84550 ASSAY OF BLOOD/URIC ACID: CPT

## 2020-02-11 PROCEDURE — 6370000000 HC RX 637 (ALT 250 FOR IP): Performed by: INTERNAL MEDICINE

## 2020-02-11 PROCEDURE — 84100 ASSAY OF PHOSPHORUS: CPT

## 2020-02-11 PROCEDURE — 2700000000 HC OXYGEN THERAPY PER DAY

## 2020-02-11 PROCEDURE — 85027 COMPLETE CBC AUTOMATED: CPT

## 2020-02-11 PROCEDURE — 2060000000 HC ICU INTERMEDIATE R&B

## 2020-02-11 PROCEDURE — 80048 BASIC METABOLIC PNL TOTAL CA: CPT

## 2020-02-11 PROCEDURE — 94640 AIRWAY INHALATION TREATMENT: CPT

## 2020-02-11 RX ORDER — ARFORMOTEROL TARTRATE 15 UG/2ML
15 SOLUTION RESPIRATORY (INHALATION) 2 TIMES DAILY
Status: DISCONTINUED | OUTPATIENT
Start: 2020-02-11 | End: 2020-02-13 | Stop reason: HOSPADM

## 2020-02-11 RX ORDER — INSULIN GLARGINE 100 [IU]/ML
20 INJECTION, SOLUTION SUBCUTANEOUS NIGHTLY
Status: DISCONTINUED | OUTPATIENT
Start: 2020-02-11 | End: 2020-02-13 | Stop reason: HOSPADM

## 2020-02-11 RX ORDER — BUDESONIDE 0.5 MG/2ML
0.5 INHALANT ORAL 2 TIMES DAILY
Status: DISCONTINUED | OUTPATIENT
Start: 2020-02-11 | End: 2020-02-13 | Stop reason: HOSPADM

## 2020-02-11 RX ORDER — LIDOCAINE 40 MG/G
CREAM TOPICAL PRN
Status: DISCONTINUED | OUTPATIENT
Start: 2020-02-11 | End: 2020-02-13 | Stop reason: HOSPADM

## 2020-02-11 RX ADMIN — GUAIFENESIN 600 MG: 600 TABLET, EXTENDED RELEASE ORAL at 08:23

## 2020-02-11 RX ADMIN — BUDESONIDE 500 MCG: 0.5 INHALANT RESPIRATORY (INHALATION) at 16:41

## 2020-02-11 RX ADMIN — AMLODIPINE BESYLATE 10 MG: 10 TABLET ORAL at 08:22

## 2020-02-11 RX ADMIN — HYDRALAZINE HYDROCHLORIDE 50 MG: 50 TABLET ORAL at 08:23

## 2020-02-11 RX ADMIN — SEVELAMER CARBONATE 800 MG: 800 TABLET, FILM COATED ORAL at 16:14

## 2020-02-11 RX ADMIN — SEVELAMER CARBONATE 800 MG: 800 TABLET, FILM COATED ORAL at 11:11

## 2020-02-11 RX ADMIN — SODIUM BICARBONATE 650 MG TABLET 1300 MG: at 15:12

## 2020-02-11 RX ADMIN — METOPROLOL TARTRATE 50 MG: 50 TABLET, FILM COATED ORAL at 08:23

## 2020-02-11 RX ADMIN — GUAIFENESIN 600 MG: 600 TABLET, EXTENDED RELEASE ORAL at 20:26

## 2020-02-11 RX ADMIN — DOCUSATE SODIUM 100 MG: 100 CAPSULE, LIQUID FILLED ORAL at 20:26

## 2020-02-11 RX ADMIN — INSULIN LISPRO 2 UNITS: 100 INJECTION, SOLUTION INTRAVENOUS; SUBCUTANEOUS at 16:14

## 2020-02-11 RX ADMIN — ARFORMOTEROL TARTRATE 15 MCG: 15 SOLUTION RESPIRATORY (INHALATION) at 06:30

## 2020-02-11 RX ADMIN — INSULIN GLARGINE 20 UNITS: 100 INJECTION, SOLUTION SUBCUTANEOUS at 20:33

## 2020-02-11 RX ADMIN — ACETAMINOPHEN 500 MG: 500 TABLET, FILM COATED ORAL at 05:23

## 2020-02-11 RX ADMIN — BUDESONIDE 500 MCG: 0.5 INHALANT RESPIRATORY (INHALATION) at 06:32

## 2020-02-11 RX ADMIN — HEPARIN SODIUM 5000 UNITS: 5000 INJECTION, SOLUTION INTRAVENOUS; SUBCUTANEOUS at 21:38

## 2020-02-11 RX ADMIN — INSULIN LISPRO 1 UNITS: 100 INJECTION, SOLUTION INTRAVENOUS; SUBCUTANEOUS at 20:33

## 2020-02-11 RX ADMIN — ACETAMINOPHEN 500 MG: 500 TABLET, FILM COATED ORAL at 20:26

## 2020-02-11 RX ADMIN — GABAPENTIN 100 MG: 100 CAPSULE ORAL at 08:23

## 2020-02-11 RX ADMIN — IPRATROPIUM BROMIDE AND ALBUTEROL SULFATE 1 AMPULE: .5; 3 SOLUTION RESPIRATORY (INHALATION) at 06:09

## 2020-02-11 RX ADMIN — HEPARIN SODIUM 5000 UNITS: 5000 INJECTION, SOLUTION INTRAVENOUS; SUBCUTANEOUS at 05:25

## 2020-02-11 RX ADMIN — GABAPENTIN 100 MG: 100 CAPSULE ORAL at 20:26

## 2020-02-11 RX ADMIN — SODIUM BICARBONATE 650 MG TABLET 1300 MG: at 08:23

## 2020-02-11 RX ADMIN — SODIUM BICARBONATE 650 MG TABLET 1300 MG: at 20:26

## 2020-02-11 RX ADMIN — INSULIN LISPRO 4 UNITS: 100 INJECTION, SOLUTION INTRAVENOUS; SUBCUTANEOUS at 11:11

## 2020-02-11 RX ADMIN — DOCUSATE SODIUM 100 MG: 100 CAPSULE, LIQUID FILLED ORAL at 08:23

## 2020-02-11 RX ADMIN — INSULIN LISPRO 2 UNITS: 100 INJECTION, SOLUTION INTRAVENOUS; SUBCUTANEOUS at 08:24

## 2020-02-11 RX ADMIN — VITAMIN D, TAB 1000IU (100/BT) 3000 UNITS: 25 TAB at 08:22

## 2020-02-11 RX ADMIN — HYDRALAZINE HYDROCHLORIDE 50 MG: 50 TABLET ORAL at 20:26

## 2020-02-11 RX ADMIN — IPRATROPIUM BROMIDE AND ALBUTEROL SULFATE 1 AMPULE: .5; 3 SOLUTION RESPIRATORY (INHALATION) at 13:02

## 2020-02-11 RX ADMIN — MENTHOL, METHYL SALICYLATE: 10; 15 CREAM TOPICAL at 08:29

## 2020-02-11 RX ADMIN — FERROUS SULFATE TAB 325 MG (65 MG ELEMENTAL FE) 325 MG: 325 (65 FE) TAB at 08:23

## 2020-02-11 RX ADMIN — ARFORMOTEROL TARTRATE 15 MCG: 15 SOLUTION RESPIRATORY (INHALATION) at 16:39

## 2020-02-11 RX ADMIN — IPRATROPIUM BROMIDE AND ALBUTEROL SULFATE 1 AMPULE: .5; 3 SOLUTION RESPIRATORY (INHALATION) at 16:39

## 2020-02-11 RX ADMIN — METOPROLOL TARTRATE 50 MG: 50 TABLET, FILM COATED ORAL at 20:26

## 2020-02-11 RX ADMIN — SEVELAMER CARBONATE 800 MG: 800 TABLET, FILM COATED ORAL at 08:23

## 2020-02-11 RX ADMIN — IPRATROPIUM BROMIDE AND ALBUTEROL SULFATE 1 AMPULE: .5; 3 SOLUTION RESPIRATORY (INHALATION) at 09:22

## 2020-02-11 RX ADMIN — HEPARIN SODIUM 5000 UNITS: 5000 INJECTION, SOLUTION INTRAVENOUS; SUBCUTANEOUS at 15:12

## 2020-02-11 ASSESSMENT — PAIN SCALES - GENERAL
PAINLEVEL_OUTOF10: 6
PAINLEVEL_OUTOF10: 0
PAINLEVEL_OUTOF10: 3
PAINLEVEL_OUTOF10: 0

## 2020-02-11 ASSESSMENT — PAIN DESCRIPTION - FREQUENCY: FREQUENCY: INTERMITTENT

## 2020-02-11 ASSESSMENT — PAIN - FUNCTIONAL ASSESSMENT: PAIN_FUNCTIONAL_ASSESSMENT: PREVENTS OR INTERFERES SOME ACTIVE ACTIVITIES AND ADLS

## 2020-02-11 ASSESSMENT — PAIN DESCRIPTION - DESCRIPTORS: DESCRIPTORS: ACHING;CRAMPING;DISCOMFORT

## 2020-02-11 ASSESSMENT — PAIN DESCRIPTION - ONSET: ONSET: GRADUAL

## 2020-02-11 ASSESSMENT — PAIN DESCRIPTION - ORIENTATION: ORIENTATION: LEFT

## 2020-02-11 ASSESSMENT — PAIN DESCRIPTION - PAIN TYPE: TYPE: ACUTE PAIN

## 2020-02-11 ASSESSMENT — PAIN DESCRIPTION - LOCATION: LOCATION: KNEE

## 2020-02-11 NOTE — PROGRESS NOTES
Department of Internal Medicine        CHIEF COMPLAINT: Shortness of breath    Reason for Admission: Acute hypoxic respiratory failure    HISTORY OF PRESENT ILLNESS:      The patient is a 60 y.o. male who presents with being discharged from [facility] (formerly [facility]3] yesterday and was put on 2 half liters nasal cannula O2 and was given a portable tank but was not set up with a stationary tank at home. The patient stated that he checked his tank today and was empty. He was 65% O2 saturation on room air on admission. Chest x-ray suggested CHF. Patient had temperature of 100.1. BUN/creatinine was 50/4.0 with the patient have a creatinine of 3.8 on old lab work. proBNP was 4800. WBC is 4.7. Patient denies any fever and chills or productive cough at this time. Patient was admitted for further evaluation and treatment. 2/3/2020  Patient seen and examined on telemetry floor. Patient feels better today than yesterday but still on 3 L nasal cannula. BUN/creatinine is elevated at 76/4.7. Blood sugars are in the 200s. WBC 11.3. CT of the lungs showed diffuse pulmonary infiltrates. Temperature is 97.5 today but was 100.1 on admission. O2 sat is 90% on 3 L at rest.  Will consult pulmonology. Nephrology note reviewed. 2/4/2020  Patient seen and examined on telemetry floor. Patient states he feels better than he did yesterday. At the time of examination patient was not wearing his oxygen, states he took it off to go to the bathroom- he states he did not get SOB while off the oxygen. Denies chest pain, abdomen pain, nausea, vomiting, constipation, diarrhea. Vitals are as follows: temp 97.7F, heart rate 97 with resp rate of 18, /66, . Blood Glucose 160- metered glucose 312, BUN/Creat 82/4.9, CO2 17, WBC 12.1, RBC 3.1, Hemoglobin 8.1. O2 saturation on room air with activity was 84% and was 91% on 2 L with activity. 2/5/2020  Patient seen and examined on telemetry floor. Patient feels better again today.   Patient denies any productive cough. No chest pain or abdominal pain temperature 97.5 with a heart rate of 73 and blood pressure 127/63. Pending lab work today. Pulmonary and nephrology notes reviewed. .  Blood sugar ranged from 100-262.    2/6/2020  Patient seen and examined on telemetry floor. Patient is seen resting in bed. Patient got new that he may need dialysis and he is upset about this. He states he had SOB when he tried to go to the bathroom earlier today, he did have his oxygen on at that time. He denies chest pain, abdomen pain, nausea, vomiting. He is having constipation- last bowel movement was 2 or 3 days ago. He says the nurses gave him some Milk of magnesia this morning and he has had no relief. Vitals as follows: oral temp 98.4F, heart rate 70 with resp rate of 18, /63, 96% O2 on 3L nasal cannula. BUN/Creat 93/5.3. Glucose ranges 144-219. Hemoglobin has decreased to 7.9 from yesterdays value of 8.4. RBC 3.02    2/7/2020  Patient was seen and examined on telemetry floor. Blood sugars range . Vital signs were stable with a blood pressure is 128/62. Patient currently on 3 L nasal cannula and O2 sats 95% at rest.  Patient is set up for dialysis catheter today. 2/8/2020  Patient seen and examined in ICU. Patient denies any chest or abdominal pain. Patient denies any dizziness or nausea vomiting. Patient had increasing respiratory difficulties while he was down in specials yesterday for his dialysis catheter. Patient currently on BiPAP. Temperature is 97.6 with a heart rate of 68 and blood pressure 142/83. Patient had good urine output yesterday to the IV diuretics. Blood sugars range .    2/9/2020  Patient seen and examined in ICU. Patient feels better and currently on 8 L nasal cannula. He denies any chest pain, abdominal pain, nausea or vomiting. Patient states he is breathing better. Blood pressure ranges from 136//75. Temperature ranges from 98.1-99.6. Heart rate 70. Globin 7.0 and the patient received 2 units of packed RBCs with dialysis today blood sugars ranging from .    2/10/2020  Patient was seen and examined on [facility]. Patient feels better today. Patient denies any chest pain, abdominal pain, nausea or vomiting. .  Patient though still needing 10 L high flow nasal cannula. Blood pressure is 140/73 with a heart rate is 72. Blood sugars range 126-203.    2/11/2020  Patient was seen and examined on [facility]. Patient feels better except for his left knee hurting. Patient's O2 is doing much better and currently down to 3 L nasal cannula. He denies any productive cough, chest pain, abdominal pain, nausea or vomiting. Sugars ranging from 164-223. Blood pressure currently 145/66 temperature 97.6 with a heart rate of 76. We will repeat chest x-ray today. Pending reevaluation with nephrology and pulmonology. Past Medical History:    Past Medical History:   Diagnosis Date    Back pain     Diabetes mellitus [address] [address]     DMII (diabetes mellitus, type 2) ([facility] [address] 7/28/2015    History of cardiovascular stress test 2/16/2015    lexiscan    HTN (hypertension) 7/28/2015    Hyperlipidemia     Hypertension     Lumbar radicular pain 7/28/2015    Type II or unspecified type diabetes mellitus without mention of complication, not stated as uncontrolled      Past Surgical History:    Past Surgical History:   Procedure Laterality Date    OTHER SURGICAL HISTORY Left 06/06/14    cain bunionectomy left foot with osteomed screw x1    SHOULDER SURGERY      Bilateral    VEIN SURGERY         Medications Prior to Admission:    @  Prior to Admission medications    Medication Sig Start Date End Date Taking?  Authorizing Provider   ferrous sulfate 325 (65 Fe) MG tablet Take 325 mg by mouth daily (with breakfast)   Yes Historical Provider, MD   hydrALAZINE (APRESOLINE) 25 MG tablet Take 25 mg by mouth 2 times daily   Yes Historical Provider, MD   calcitRIOL (ROCALTROL) 0.25 MCG capsule Take 0.25 mcg by mouth Takes 3 times a week   Yes Historical Provider, MD   insulin glargine (LANTUS) 100 UNIT/ML injection vial Inject 25 Units into the skin nightly   Yes Historical Provider, MD   metoprolol tartrate (LOPRESSOR) 50 MG tablet Take 50 mg by mouth 3/31/17  Yes Historical Provider, MD   gabapentin (NEURONTIN) 100 MG capsule Take 100 mg by mouth. Yes Historical Provider, MD   etodolac (LODINE) 400 MG tablet Take 400 mg by mouth   Yes Historical Provider, MD   amLODIPine (NORVASC) 10 MG tablet Take 5 mg by mouth 2 times daily    Yes Historical Provider, MD   lisinopril (PRINIVIL;ZESTRIL) 20 MG tablet Take 20 mg by mouth daily   Yes Historical Provider, MD   Cholecalciferol (VITAMIN D3) 3000 units TABS Take 150 mcg by mouth daily    Yes Historical Provider, MD       Allergies:   Other    Social History:   Social History     Socioeconomic History    Marital status: Single     Spouse name: Not on file    Number of children: Not on file    Years of education: Not on file    Highest education level: Not on file   Occupational History    Not on file   Social Needs    Financial resource strain: Not on file    Food insecurity:     Worry: Not on file     Inability: Not on file    Transportation needs:     Medical: Not on file     Non-medical: Not on file   Tobacco Use    Smoking status: Former Smoker     Packs/day: 1.00     Years: 30.00     Pack years: 30.00    Smokeless tobacco: Never Used   Substance and Sexual Activity    Alcohol use: No    Drug use: No    Sexual activity: Not on file   Lifestyle    Physical activity:     Days per week: Not on file     Minutes per session: Not on file    Stress: Not on file   Relationships    Social connections:     Talks on phone: Not on file     Gets together: Not on file     Attends Mu-ism service: Not on file     Active member of club or organization: Not on file     Attends meetings of clubs or organizations: Not on file     Relationship status: Not on 02/11/2020    MCHC 28.8 02/11/2020    RDW 16.5 02/11/2020    LYMPHOPCT 6.8 02/03/2020    MONOPCT 3.1 02/03/2020    BASOPCT 0.1 02/03/2020    MONOSABS 0.35 02/03/2020    LYMPHSABS 0.77 02/03/2020    EOSABS 0.00 02/03/2020    BASOSABS 0.01 02/03/2020     CMP:    Lab Results   Component Value Date     02/11/2020    K 4.8 02/11/2020    K 5.6 02/02/2020    CL 98 02/11/2020    CO2 31 02/11/2020    BUN 31 02/11/2020    CREATININE 3.4 02/11/2020    GFRAA 22 02/11/2020    LABGLOM 22 02/11/2020    GLUCOSE 165 02/11/2020    PROT 6.6 02/08/2020    LABALBU 2.5 02/08/2020    CALCIUM 9.1 02/11/2020    BILITOT 0.2 02/08/2020    ALKPHOS 42 02/08/2020    AST 9 02/08/2020    ALT 9 02/08/2020     Magnesium:    Lab Results   Component Value Date    MG 2.3 02/11/2020     Phosphorus:    Lab Results   Component Value Date    PHOS 4.0 02/11/2020     PT/INR:    Lab Results   Component Value Date    PROTIME 11.8 02/07/2020    INR 1.0 02/07/2020     Troponin:    Lab Results   Component Value Date    TROPONINI 0.09 02/02/2020     U/A:    Lab Results   Component Value Date    COLORU Yellow 02/02/2020    PROTEINU >=300 02/02/2020    PHUR 5.0 02/02/2020    LABCAST RARE 09/21/2018    WBCUA 0-1 02/02/2020    RBCUA NONE 02/02/2020    BACTERIA NONE 02/02/2020    CLARITYU Clear 02/02/2020    SPECGRAV 1.025 02/02/2020    LEUKOCYTESUR Negative 02/02/2020    UROBILINOGEN 0.2 02/02/2020    BILIRUBINUR Negative 02/02/2020    BLOODU TRACE 02/02/2020    GLUCOSEU 250 02/02/2020    AMORPHOUS FEW 10/02/2019     ABG:    Lab Results   Component Value Date    PH 7.363 02/08/2020    PCO2 47.3 02/08/2020    PO2 140.5 02/08/2020    HCO3 26.3 02/08/2020    BE 0.7 02/08/2020    O2SAT 98.8 02/08/2020     HgBA1c:    Lab Results   Component Value Date    LABA1C 7.6 02/02/2020     FLP:    Lab Results   Component Value Date    TRIG 60 02/03/2020    HDL 60 02/03/2020    LDLCALC 71 02/03/2020    LABVLDL 12 02/03/2020     TSH:    Lab Results   Component Value Date    TSH

## 2020-02-11 NOTE — PROGRESS NOTES
[facility]  Nephrology Attending Progress Note  Palak Fleming. Gladys Johns MD        SUBJECTIVE:     2/6: Full consult deferred as just saw pt while he was inpt at [facility] and seen 2/2. He was admitted for Acute Resp Failure and our service was seening for CKD G4 with a baseline serum cr 3.0-3.8mg/dl. Pt was at baseline during the hospital admission. He had an amb pulse ox prior to D/C and was found to be hypoxic and home O2 prescribed. He presented back to ED 2/2/20 after O2 tank ran out. In the ED initial O2 sat 60% and /90. No fever or chills but he mdid feel lightheaded     2/7/20: Pt went down to IR for [facility] placement and had worsening SOB requiring BIPAP and transfer to MICU. Pt currently on BIPAP visibly dyspneic and tachypneic     2/8/20: Pt remains on BIPAP this AM still with desaturation off BIPAP and SOB     2/9/20: Pt seen on IHD on 8L NC, states he feels better and less SOB when at rest still with dyspnea with minimal exertion    2/10: pt seen in room. For hd today, complaining of right knee pain. Knee is swollen    2/11: seen in room,  No cp or sob           PROBLEM LIST:    Patient Active Problem List   Diagnosis    Hallux valgus, acquired    DMII (diabetes mellitus, type 2) ([address]    HTN (hypertension)    Lumbar radicular pain    Spinal stenosis    B12 deficiency    Idiopathic acute pancreatitis    Acute pancreatitis without necrosis or infection, unspecified    Pneumonia    Respiratory failure ([address]        PAST MEDICAL HISTORY:    Past Medical History:   Diagnosis Date    Back pain     Diabetes mellitus ([address]     DMII (diabetes mellitus, type 2) ([address] 7/28/2015    History of cardiovascular stress test 2/16/2015    lexiscan    HTN (hypertension) 7/28/2015    Hyperlipidemia     Hypertension     Lumbar radicular pain 7/28/2015    Type II or unspecified type diabetes mellitus without mention of complication, not stated as uncontrolled        DIET:    DIET CARB CONTROL;   Dietary Nutrition Supplements: Renal Oral Supplement     PHYSICAL EXAM:     Patient Vitals for the past 24 hrs:   BP Temp Temp src Pulse Resp SpO2 Weight   02/11/20 0815 (!) 145/66 97.6 °F (36.4 °C) Oral 76 16 93 % --   02/11/20 0200 -- -- -- -- -- -- 210 lb 3.2 oz (95.3 kg)   02/10/20 2045 (!) 142/68 98.6 °F (37 °C) Oral 79 16 94 % --   02/10/20 1430 (!) 147/71 98.1 °F (36.7 °C) Oral 84 -- 92 % --   02/10/20 1300 (!) 153/79 -- -- 80 18 -- 211 lb 10.3 oz (96 kg)   02/10/20 1230 (!) 143/74 -- -- 87 -- -- --   02/10/20 1200 (!) 150/77 -- -- 76 -- -- --   02/10/20 1130 (!) 140/73 -- -- 72 -- -- --   02/10/20 1100 135/68 -- -- 71 -- -- --   02/10/20 1030 136/67 -- -- 74 -- -- --   02/10/20 1000 (!) 146/70 -- -- 73 -- -- --   02/10/20 0930 (!) 146/74 -- -- 73 -- -- --   @      Intake/Output Summary (Last 24 hours) at 2/11/2020 0926  Last data filed at 2/10/2020 2121  Gross per 24 hour   Intake 420 ml   Output 3450 ml   Net -3030 ml         Wt Readings from Last 3 Encounters:   02/11/20 210 lb 3.2 oz (95.3 kg)   10/28/19 208 lb (94.3 kg)   10/16/19 209 lb (94.8 kg)       Constitutional:  Pt is in no acute distress  Head: normocephalic, atraumatic  Neck: no JVD  Cardiovascular: regular rate and rhythm, no murmurs, gallops, or rubs  Respiratory:  No rales, rhochi, or wheezes  Gastrointestinal:  Soft, nontender, nondistended, bowel sounds x 4  Ext: tr edema. r knee with effusion over patella tendon  Skin: dry, no rash  Neuro: aaox3    MEDS (scheduled):     Arformoterol Tartrate  15 mcg Nebulization BID    And    budesonide  0.5 mg Nebulization BID    sodium chloride  20 mL Intravenous Once    insulin glargine  16 Units Subcutaneous Nightly    docusate sodium  100 mg Oral BID    polyethylene glycol  17 g Oral Daily    sodium bicarbonate  1,300 mg Oral TID    sevelamer  800 mg Oral TID WC    hydrALAZINE  50 mg Oral BID    epoetin elvia-epbx  8,000 Units Subcutaneous Once per day on Mon Wed Fri    amLODIPine  10 mg Oral Daily  Vitamin D  3,000 Units Oral Daily    metoprolol tartrate  50 mg Oral BID    gabapentin  100 mg Oral BID    insulin lispro  0-12 Units Subcutaneous TID WC    insulin lispro  0-6 Units Subcutaneous Nightly    heparin (porcine)  5,000 Units Subcutaneous 3 times per day    levofloxacin  500 mg Intravenous Q48H    ipratropium-albuterol  1 ampule Inhalation Q4H WA    guaiFENesin  600 mg Oral BID    ferrous sulfate  325 mg Oral Daily with breakfast    calcitRIOL  0.25 mcg Oral Once per day on Mon Wed Fri       MEDS (infusions):   dextrose         MEDS (prn):  muscle rub, perflutren lipid microspheres, glucose, dextrose, glucagon (rDNA), dextrose, acetaminophen, ondansetron, magnesium hydroxide, ipratropium-albuterol    DATA:    Recent Labs     02/09/20  1528 02/10/20  2105 02/11/20  0530   WBC 14.6* 10.3 10.1   HGB 9.2* 8.7* 8.6*   HCT 29.3* 29.1* 29.9*   MCV 86.7 89.8 91.7    133 137     Recent Labs     02/09/20  0525 02/10/20  2105 02/11/20  0531    138 140   K 4.7 4.8 4.8    97* 98   CO2 28 33* 31*   BUN 51* 26* 31*   CREATININE 4.0* 3.0* 3.4*   LABGLOM 18 26 22   GLUCOSE 66* 162* 165*   CALCIUM 8.9 9.0 9.1   MG 2.5 2.1 2.3   PHOS 4.8* 3.1 4.0       Lab Results   Component Value Date    LABALBU 2.5 (L) 02/08/2020    LABALBU 2.9 (L) 02/04/2020    LABALBU 3.1 (L) 02/03/2020     Lab Results   Component Value Date    TSH 0.927 02/03/2020       Iron Studies  Lab Results   Component Value Date    IRON 19 (L) 02/09/2020    TIBC 189 (L) 02/09/2020    FERRITIN 188 11/13/2019     Vitamin B-12   Date Value Ref Range Status   02/09/2020 320 211 - 946 pg/mL Final     Folate   Date Value Ref Range Status   02/09/2020 9.6 4.8 - 24.2 ng/mL Final       Vit D, 25-Hydroxy   Date Value Ref Range Status   01/23/2020 32 30 - 100 ng/mL Final     Comment:     <20 ng/mL. ........... Corinne Beagle Deficient  20-30 ng/mL. ......... Corinne Beagle Insufficient   ng/mL. ........ Corinne Beagle Sufficient  >100 ng/mL. .......... Corinne Beagle Toxic       PTH   Date Value

## 2020-02-12 LAB
ANION GAP SERPL CALCULATED.3IONS-SCNC: 12 MMOL/L (ref 7–16)
BUN BLDV-MCNC: 37 MG/DL (ref 8–23)
CALCIUM SERPL-MCNC: 9.2 MG/DL (ref 8.6–10.2)
CHLORIDE BLD-SCNC: 99 MMOL/L (ref 98–107)
CO2: 25 MMOL/L (ref 22–29)
CREAT SERPL-MCNC: 4.2 MG/DL (ref 0.7–1.2)
GFR AFRICAN AMERICAN: 17
GFR NON-AFRICAN AMERICAN: 17 ML/MIN/1.73
GLUCOSE BLD-MCNC: 104 MG/DL (ref 74–99)
HCT VFR BLD CALC: 27.5 % (ref 37–54)
HEMOGLOBIN: 8.2 G/DL (ref 12.5–16.5)
MAGNESIUM: 2.3 MG/DL (ref 1.6–2.6)
MCH RBC QN AUTO: 26.9 PG (ref 26–35)
MCHC RBC AUTO-ENTMCNC: 29.8 % (ref 32–34.5)
MCV RBC AUTO: 90.2 FL (ref 80–99.9)
METER GLUCOSE: 103 MG/DL (ref 74–99)
METER GLUCOSE: 113 MG/DL (ref 74–99)
METER GLUCOSE: 172 MG/DL (ref 74–99)
METER GLUCOSE: 185 MG/DL (ref 74–99)
PDW BLD-RTO: 16.7 FL (ref 11.5–15)
PHOSPHORUS: 4.5 MG/DL (ref 2.5–4.5)
PLATELET # BLD: 162 E9/L (ref 130–450)
PMV BLD AUTO: 10.4 FL (ref 7–12)
POTASSIUM SERPL-SCNC: 4.7 MMOL/L (ref 3.5–5)
RBC # BLD: 3.05 E12/L (ref 3.8–5.8)
SODIUM BLD-SCNC: 136 MMOL/L (ref 132–146)
WBC # BLD: 11 E9/L (ref 4.5–11.5)

## 2020-02-12 PROCEDURE — 90935 HEMODIALYSIS ONE EVALUATION: CPT

## 2020-02-12 PROCEDURE — 6360000002 HC RX W HCPCS: Performed by: INTERNAL MEDICINE

## 2020-02-12 PROCEDURE — 6370000000 HC RX 637 (ALT 250 FOR IP): Performed by: INTERNAL MEDICINE

## 2020-02-12 PROCEDURE — 94660 CPAP INITIATION&MGMT: CPT

## 2020-02-12 PROCEDURE — 84100 ASSAY OF PHOSPHORUS: CPT

## 2020-02-12 PROCEDURE — 82962 GLUCOSE BLOOD TEST: CPT

## 2020-02-12 PROCEDURE — 94640 AIRWAY INHALATION TREATMENT: CPT

## 2020-02-12 PROCEDURE — 80048 BASIC METABOLIC PNL TOTAL CA: CPT

## 2020-02-12 PROCEDURE — 2700000000 HC OXYGEN THERAPY PER DAY

## 2020-02-12 PROCEDURE — 83735 ASSAY OF MAGNESIUM: CPT

## 2020-02-12 PROCEDURE — 1200000000 HC SEMI PRIVATE

## 2020-02-12 PROCEDURE — 85027 COMPLETE CBC AUTOMATED: CPT

## 2020-02-12 PROCEDURE — 36415 COLL VENOUS BLD VENIPUNCTURE: CPT

## 2020-02-12 RX ADMIN — METOPROLOL TARTRATE 50 MG: 50 TABLET, FILM COATED ORAL at 08:05

## 2020-02-12 RX ADMIN — DOCUSATE SODIUM 100 MG: 100 CAPSULE, LIQUID FILLED ORAL at 08:05

## 2020-02-12 RX ADMIN — GABAPENTIN 100 MG: 100 CAPSULE ORAL at 08:07

## 2020-02-12 RX ADMIN — GUAIFENESIN 600 MG: 600 TABLET, EXTENDED RELEASE ORAL at 08:06

## 2020-02-12 RX ADMIN — GABAPENTIN 100 MG: 100 CAPSULE ORAL at 21:21

## 2020-02-12 RX ADMIN — EPOETIN ALFA-EPBX 8000 UNITS: 10000 INJECTION, SOLUTION INTRAVENOUS; SUBCUTANEOUS at 08:08

## 2020-02-12 RX ADMIN — SEVELAMER CARBONATE 800 MG: 800 TABLET, FILM COATED ORAL at 17:21

## 2020-02-12 RX ADMIN — SODIUM BICARBONATE 650 MG TABLET 1300 MG: at 21:21

## 2020-02-12 RX ADMIN — GUAIFENESIN 600 MG: 600 TABLET, EXTENDED RELEASE ORAL at 21:21

## 2020-02-12 RX ADMIN — IPRATROPIUM BROMIDE AND ALBUTEROL SULFATE 1 AMPULE: .5; 3 SOLUTION RESPIRATORY (INHALATION) at 16:14

## 2020-02-12 RX ADMIN — DOCUSATE SODIUM 100 MG: 100 CAPSULE, LIQUID FILLED ORAL at 21:21

## 2020-02-12 RX ADMIN — BUDESONIDE 500 MCG: 0.5 INHALANT RESPIRATORY (INHALATION) at 16:14

## 2020-02-12 RX ADMIN — ARFORMOTEROL TARTRATE 15 MCG: 15 SOLUTION RESPIRATORY (INHALATION) at 16:14

## 2020-02-12 RX ADMIN — INSULIN LISPRO 2 UNITS: 100 INJECTION, SOLUTION INTRAVENOUS; SUBCUTANEOUS at 17:20

## 2020-02-12 RX ADMIN — ARFORMOTEROL TARTRATE 15 MCG: 15 SOLUTION RESPIRATORY (INHALATION) at 05:20

## 2020-02-12 RX ADMIN — VITAMIN D, TAB 1000IU (100/BT) 3000 UNITS: 25 TAB at 08:04

## 2020-02-12 RX ADMIN — HYDRALAZINE HYDROCHLORIDE 75 MG: 50 TABLET ORAL at 21:21

## 2020-02-12 RX ADMIN — HEPARIN SODIUM 5000 UNITS: 5000 INJECTION, SOLUTION INTRAVENOUS; SUBCUTANEOUS at 06:00

## 2020-02-12 RX ADMIN — HEPARIN SODIUM 5000 UNITS: 5000 INJECTION, SOLUTION INTRAVENOUS; SUBCUTANEOUS at 13:47

## 2020-02-12 RX ADMIN — SODIUM BICARBONATE 650 MG TABLET 1300 MG: at 08:04

## 2020-02-12 RX ADMIN — INSULIN GLARGINE 20 UNITS: 100 INJECTION, SOLUTION SUBCUTANEOUS at 21:22

## 2020-02-12 RX ADMIN — SODIUM BICARBONATE 650 MG TABLET 1300 MG: at 13:45

## 2020-02-12 RX ADMIN — METOPROLOL TARTRATE 50 MG: 50 TABLET, FILM COATED ORAL at 21:21

## 2020-02-12 RX ADMIN — AMLODIPINE BESYLATE 10 MG: 10 TABLET ORAL at 08:06

## 2020-02-12 RX ADMIN — FERROUS SULFATE TAB 325 MG (65 MG ELEMENTAL FE) 325 MG: 325 (65 FE) TAB at 08:06

## 2020-02-12 RX ADMIN — HEPARIN SODIUM 5000 UNITS: 5000 INJECTION, SOLUTION INTRAVENOUS; SUBCUTANEOUS at 21:22

## 2020-02-12 RX ADMIN — SEVELAMER CARBONATE 800 MG: 800 TABLET, FILM COATED ORAL at 08:05

## 2020-02-12 RX ADMIN — IPRATROPIUM BROMIDE AND ALBUTEROL SULFATE 1 AMPULE: .5; 3 SOLUTION RESPIRATORY (INHALATION) at 05:20

## 2020-02-12 RX ADMIN — ACETAMINOPHEN 500 MG: 500 TABLET, FILM COATED ORAL at 21:31

## 2020-02-12 RX ADMIN — CALCITRIOL 0.25 MCG: 0.25 CAPSULE ORAL at 08:06

## 2020-02-12 RX ADMIN — BUDESONIDE 500 MCG: 0.5 INHALANT RESPIRATORY (INHALATION) at 05:20

## 2020-02-12 RX ADMIN — LEVOFLOXACIN 500 MG: 5 INJECTION, SOLUTION INTRAVENOUS at 17:21

## 2020-02-12 RX ADMIN — HYDRALAZINE HYDROCHLORIDE 50 MG: 50 TABLET ORAL at 08:06

## 2020-02-12 RX ADMIN — INSULIN LISPRO 1 UNITS: 100 INJECTION, SOLUTION INTRAVENOUS; SUBCUTANEOUS at 21:22

## 2020-02-12 ASSESSMENT — PAIN SCALES - GENERAL
PAINLEVEL_OUTOF10: 0
PAINLEVEL_OUTOF10: 5
PAINLEVEL_OUTOF10: 8
PAINLEVEL_OUTOF10: 0
PAINLEVEL_OUTOF10: 0
PAINLEVEL_OUTOF10: 4
PAINLEVEL_OUTOF10: 0
PAINLEVEL_OUTOF10: 6
PAINLEVEL_OUTOF10: 0

## 2020-02-12 ASSESSMENT — PAIN DESCRIPTION - DESCRIPTORS: DESCRIPTORS: ACHING;DISCOMFORT;SORE

## 2020-02-12 ASSESSMENT — PAIN DESCRIPTION - PAIN TYPE: TYPE: ACUTE PAIN

## 2020-02-12 ASSESSMENT — PAIN DESCRIPTION - LOCATION: LOCATION: KNEE

## 2020-02-12 ASSESSMENT — PAIN DESCRIPTION - ORIENTATION: ORIENTATION: LEFT

## 2020-02-12 NOTE — PLAN OF CARE
Problem: Falls - Risk of:  Goal: Will remain free from falls  Description  Will remain free from falls  2/12/2020 0050 by Juan Luis Tran RN  Outcome: Met This Shift     Problem: Falls - Risk of:  Goal: Absence of physical injury  Description  Absence of physical injury  2/12/2020 0050 by Juan Luis Tran RN  Outcome: Met This Shift     Problem: Cardiac:  Goal: Hemodynamic stability will improve  Description  Hemodynamic stability will improve  Outcome: Met This Shift

## 2020-02-12 NOTE — PROGRESS NOTES
Department of Internal Medicine        CHIEF COMPLAINT: Shortness of breath    Reason for Admission: Acute hypoxic respiratory failure    HISTORY OF PRESENT ILLNESS:      The patient is a 60 y.o. male who presents with being discharged from [facility] yesterday and was put on 2 half liters nasal cannula O2 and was given a portable tank but was not set up with a stationary tank at home. The patient stated that he checked his tank today and was empty. He was 65% O2 saturation on room air on admission. Chest x-ray suggested CHF. Patient had temperature of 100.1. BUN/creatinine was 50/4.0 with the patient have a creatinine of 3.8 on old lab work. proBNP was 4800. WBC is 4.7. Patient denies any fever and chills or productive cough at this time. Patient was admitted for further evaluation and treatment. 2/3/2020  Patient seen and examined on telemetry floor. Patient feels better today than yesterday but still on 3 L nasal cannula. BUN/creatinine is elevated at 76/4.7. Blood sugars are in the 200s. WBC 11.3. CT of the lungs showed diffuse pulmonary infiltrates. Temperature is 97.5 today but was 100.1 on admission. O2 sat is 90% on 3 L at rest.  Will consult pulmonology. Nephrology note reviewed. 2/4/2020  Patient seen and examined on telemetry floor. Patient states he feels better than he did yesterday. At the time of examination patient was not wearing his oxygen, states he took it off to go to the bathroom- he states he did not get SOB while off the oxygen. Denies chest pain, abdomen pain, nausea, vomiting, constipation, diarrhea. Vitals are as follows: temp 97.7F, heart rate 97 with resp rate of 18, /66, . Blood Glucose 160- metered glucose 312, BUN/Creat 82/4.9, CO2 17, WBC 12.1, RBC 3.1, Hemoglobin 8.1. O2 saturation on room air with activity was 84% and was 91% on 2 L with activity. 2/5/2020  Patient seen and examined on telemetry floor. Patient feels better again today.   Patient denies Globin 7.0 and the patient received 2 units of packed RBCs with dialysis today blood sugars ranging from .    2/10/2020  Patient was seen and examined on [address]. Patient feels better today. Patient denies any chest pain, abdominal pain, nausea or vomiting. .  Patient though still needing 10 L high flow nasal cannula. Blood pressure is 140/73 with a heart rate is 72. Blood sugars range 126-203.    2/11/2020  Patient was seen and examined on [address]. Patient feels better except for his left knee hurting. Patient's O2 is doing much better and currently down to 3 L nasal cannula. He denies any productive cough, chest pain, abdominal pain, nausea or vomiting. Sugars ranging from 164-223. Blood pressure currently 145/66 temperature 97.6 with a heart rate of 76. We will repeat chest x-ray today. Pending reevaluation with nephrology and pulmonology. 2/12/2020  Patient seen and examined on [address]. Patient continues to feel better. Patient denies any chest or abdominal pain. O2 has been weaned down to 2-3 L nasal cannula. Blood sugars ranging 103-185. Hemoglobin is 8.2 today. Blood pressure ranges 146//76. Past Medical History:    Past Medical History:   Diagnosis Date    Back pain     Diabetes mellitus ([facility] [address]     DMII (diabetes mellitus, type 2) ([facility] [address] 7/28/2015    History of cardiovascular stress test 2/16/2015    lexiscan    HTN (hypertension) 7/28/2015    Hyperlipidemia     Hypertension     Lumbar radicular pain 7/28/2015    Type II or unspecified type diabetes mellitus without mention of complication, not stated as uncontrolled      Past Surgical History:    Past Surgical History:   Procedure Laterality Date    OTHER SURGICAL HISTORY Left 06/06/14    cain bunionectomy left foot with osteomed screw x1    SHOULDER SURGERY      Bilateral    VEIN SURGERY         Medications Prior to Admission:    @  Prior to Admission medications    Medication Sig Start Date End Date Taking?  Authorizing Provider   ferrous sulfate 325 (65 Fe) MG tablet Take 325 mg by mouth daily (with breakfast)   Yes Historical Provider, MD   hydrALAZINE (APRESOLINE) 25 MG tablet Take 25 mg by mouth 2 times daily   Yes Historical Provider, MD   calcitRIOL (ROCALTROL) 0.25 MCG capsule Take 0.25 mcg by mouth Takes 3 times a week   Yes Historical Provider, MD   insulin glargine (LANTUS) 100 UNIT/ML injection vial Inject 25 Units into the skin nightly   Yes Historical Provider, MD   metoprolol tartrate (LOPRESSOR) 50 MG tablet Take 50 mg by mouth 3/31/17  Yes Historical Provider, MD   gabapentin (NEURONTIN) 100 MG capsule Take 100 mg by mouth. Yes Historical Provider, MD   etodolac (LODINE) 400 MG tablet Take 400 mg by mouth   Yes Historical Provider, MD   amLODIPine (NORVASC) 10 MG tablet Take 5 mg by mouth 2 times daily    Yes Historical Provider, MD   lisinopril (PRINIVIL;ZESTRIL) 20 MG tablet Take 20 mg by mouth daily   Yes Historical Provider, MD   Cholecalciferol (VITAMIN D3) 3000 units TABS Take 150 mcg by mouth daily    Yes Historical Provider, MD       Allergies:   Other    Social History:   Social History     Socioeconomic History    Marital status: Single     Spouse name: Not on file    Number of children: Not on file    Years of education: Not on file    Highest education level: Not on file   Occupational History    Not on file   Social Needs    Financial resource strain: Not on file    Food insecurity:     Worry: Not on file     Inability: Not on file    Transportation needs:     Medical: Not on file     Non-medical: Not on file   Tobacco Use    Smoking status: Former Smoker     Packs/day: 1.00     Years: 30.00     Pack years: 30.00    Smokeless tobacco: Never Used   Substance and Sexual Activity    Alcohol use: No    Drug use: No    Sexual activity: Not on file   Lifestyle    Physical activity:     Days per week: Not on file     Minutes per session: Not on file    Stress: Not on file   Relationships    Social connections:     Talks on phone: Not on file     Gets together: Not on file     Attends Latter day service: Not on file     Active member of club or organization: Not on file     Attends meetings of clubs or organizations: Not on file     Relationship status: Not on file    Intimate partner violence:     Fear of current or ex partner: Not on file     Emotionally abused: Not on file     Physically abused: Not on file     Forced sexual activity: Not on file   Other Topics Concern    Not on file   Social History Narrative    Not on file       Family History:   History reviewed. No pertinent family history. REVIEW OF SYSTEMS:    Gen: Patient denies any lightheadedness or dizziness. No LOC or syncope. No fevers or chills. HEENT: No earache, sore throat or nasal congestion. Resp: Denies cough, hemoptysis or sputum production. Cardiac: +SOB, Denies chest pain, diaphoresis or palpitations. GI: No nausea, vomiting, diarrhea or constipation. No melena or hematochezia. : No urinary complaints, dysuria, hematuria or frequency. MSK: No extremity weakness, paralysis or paresthesias. PHYSICAL EXAM:    Vitals:  BP (!) 161/76   Pulse 72   Temp 98 °F (36.7 °C) (Oral)   Resp 18   Ht 5' 8\" (1.727 m)   Wt 213 lb 10 oz (96.9 kg)   SpO2 100%   BMI 32.48 kg/m²     General:  This is a 60 y.o. yo male who is alert and oriented in NAD  HEENT:  Head is normocephalic and atraumatic, PERRLA, EOMI, mucus membranes moist with no pharyngeal erythema or exudate. Neck:  Supple with no carotid bruits, JVD or thyromegaly. No cervical adenopathy  CV:  Regular rate and rhythm, no murmurs  Lungs: Clear to auscultation bilaterally with no wheezes or rhonchi.   Abdomen:  Soft, + obese, nontender, nondistended, bowel sounds present  Extremities: +1 pitting edema to right > L lower leg, peripheral pulses intact bilaterally  Neuro:  Cranial nerves II-XII grossly intact; motor and sensory function intact with no focal deficits  Skin:  No rashes, lesions or wounds    DATA:  CBC with Differential:    Lab Results   Component Value Date    WBC 11.0 02/12/2020    RBC 3.05 02/12/2020    HGB 8.2 02/12/2020    HCT 27.5 02/12/2020     02/12/2020    MCV 90.2 02/12/2020    MCH 26.9 02/12/2020    MCHC 29.8 02/12/2020    RDW 16.7 02/12/2020    LYMPHOPCT 6.8 02/03/2020    MONOPCT 3.1 02/03/2020    BASOPCT 0.1 02/03/2020    MONOSABS 0.35 02/03/2020    LYMPHSABS 0.77 02/03/2020    EOSABS 0.00 02/03/2020    BASOSABS 0.01 02/03/2020     CMP:    Lab Results   Component Value Date     02/12/2020    K 4.7 02/12/2020    K 5.6 02/02/2020    CL 99 02/12/2020    CO2 25 02/12/2020    BUN 37 02/12/2020    CREATININE 4.2 02/12/2020    GFRAA 17 02/12/2020    LABGLOM 17 02/12/2020    GLUCOSE 104 02/12/2020    PROT 6.6 02/08/2020    LABALBU 2.5 02/08/2020    CALCIUM 9.2 02/12/2020    BILITOT 0.2 02/08/2020    ALKPHOS 42 02/08/2020    AST 9 02/08/2020    ALT 9 02/08/2020     Magnesium:    Lab Results   Component Value Date    MG 2.3 02/12/2020     Phosphorus:    Lab Results   Component Value Date    PHOS 4.5 02/12/2020     PT/INR:    Lab Results   Component Value Date    PROTIME 11.8 02/07/2020    INR 1.0 02/07/2020     Troponin:    Lab Results   Component Value Date    TROPONINI 0.09 02/02/2020     U/A:    Lab Results   Component Value Date    COLORU Yellow 02/02/2020    PROTEINU >=300 02/02/2020    PHUR 5.0 02/02/2020    LABCAST RARE 09/21/2018    WBCUA 0-1 02/02/2020    RBCUA NONE 02/02/2020    BACTERIA NONE 02/02/2020    CLARITYU Clear 02/02/2020    SPECGRAV 1.025 02/02/2020    LEUKOCYTESUR Negative 02/02/2020    UROBILINOGEN 0.2 02/02/2020    BILIRUBINUR Negative 02/02/2020    BLOODU TRACE 02/02/2020    GLUCOSEU 250 02/02/2020    AMORPHOUS FEW 10/02/2019     ABG:    Lab Results   Component Value Date    PH 7.363 02/08/2020    PCO2 47.3 02/08/2020    PO2 140.5 02/08/2020    HCO3 26.3 02/08/2020    BE 0.7 02/08/2020    O2SAT 98.8 02/08/2020     HgBA1c:    Lab Results   Component Value Date    LABA1C 7.6 02/02/2020     FLP:    Lab Results   Component Value Date    TRIG 60 02/03/2020    HDL 60 02/03/2020    LDLCALC 71 02/03/2020    LABVLDL 12 02/03/2020     TSH:    Lab Results   Component Value Date    TSH 0.927 02/03/2020     IRON:    Lab Results   Component Value Date    IRON 19 02/09/2020     LIPASE:    Lab Results   Component Value Date    LIPASE 78 09/24/2018       ASSESSMENT AND PLAN:      Patient Active Problem List    Diagnosis Date Noted    Sudarshan andres, acquired 06/06/2014     Priority: Low     Class: Present on Admission    Respiratory failure ([facility] [address] 02/02/2020    Pneumonia 01/28/2020    Acute pancreatitis without necrosis or infection, unspecified 09/23/2018    Idiopathic acute pancreatitis 09/21/2018    B12 deficiency 03/17/2016    DMII (diabetes mellitus, type 2) ([facility] [address] 07/28/2015    HTN (hypertension) 07/28/2015    Lumbar radicular pain 07/28/2015    Spinal stenosis 07/28/2015     1. Acute/subacute hypoxic respiratory failure  2. Acute congestive heart failure  3. Acute on chronic kidney disease stage IV  4. Hypertension  5. Non-insulin-dependent diabetes mellitus type II-hyperglycemia  6. Pneumonia-lateral lobe  7.   Acute pulmonary edema secondary to fluid overload- 2/7      Plan:  [facility]  Repeat procalcitonin  Echocardiogram-EF 66%, pulmonary hypertension at 64 mmHg  Records from [facility] reviewed  Home meds reviewed  Diabetic meds adjusted  Glucoscans 4 times daily with sliding scale insulin  DuoNeb aerosols  O2 nasal cannula  Hold Lodine, metformin  Consult nephrology, pulmonology  Procalcitonin 0.25  Routine labs in a.m.  CT of the lung-diffuse pulmonary infiltrates    Discharge home when okay with nephrology    Dinesh Marshall D.O.  2/12/2020  12:25 PM

## 2020-02-12 NOTE — PROGRESS NOTES
DIET:    DIET CARB CONTROL; Dietary Nutrition Supplements: Renal Oral Supplement     PHYSICAL EXAM:     Patient Vitals for the past 24 hrs:   BP Temp Temp src Pulse Resp SpO2 Weight   02/12/20 1130 (!) 163/88 -- -- 61 -- -- --   02/12/20 1115 (!) 154/78 -- -- 62 -- -- --   02/12/20 1030 (!) 152/74 -- -- 63 -- -- --   02/12/20 1000 (!) 151/71 -- -- 71 -- -- --   02/12/20 0930 (!) 147/75 -- -- 67 -- -- --   02/12/20 0900 (!) 152/70 -- -- 79 -- -- --   02/12/20 0840 (!) 161/76 98.5 °F (36.9 °C) -- 79 20 -- 219 lb 9.3 oz (99.6 kg)   02/12/20 0730 (!) 162/77 98.1 °F (36.7 °C) Oral 78 16 93 % --   02/12/20 0145 -- -- -- -- -- -- 219 lb 1.6 oz (99.4 kg)   02/11/20 2015 (!) 146/64 99.5 °F (37.5 °C) Oral 90 16 95 % --   02/11/20 1615 (!) 141/60 98.4 °F (36.9 °C) Oral 81 16 93 % --   @      Intake/Output Summary (Last 24 hours) at 2/12/2020 1142  Last data filed at 2/11/2020 1806  Gross per 24 hour   Intake 600 ml   Output --   Net 600 ml         Wt Readings from Last 3 Encounters:   02/12/20 219 lb 9.3 oz (99.6 kg)   10/28/19 208 lb (94.3 kg)   10/16/19 209 lb (94.8 kg)       Constitutional:  Pt is in no acute distress  Head: normocephalic, atraumatic  Neck: no JVD  Cardiovascular: regular rate and rhythm, no murmurs, gallops, or rubs  Respiratory:  No rales, rhochi, or wheezes  Gastrointestinal:  Soft, nontender, nondistended, bowel sounds x 4  Ext: tr edema. r knee with effusion over patella tendon  Skin: dry, no rash  Neuro: aaox3    MEDS (scheduled):     Arformoterol Tartrate  15 mcg Nebulization BID    And    budesonide  0.5 mg Nebulization BID    insulin glargine  20 Units Subcutaneous Nightly    sodium chloride  20 mL Intravenous Once    docusate sodium  100 mg Oral BID    polyethylene glycol  17 g Oral Daily    sodium bicarbonate  1,300 mg Oral TID    sevelamer  800 mg Oral TID WC    hydrALAZINE  50 mg Oral BID    epoetin elvia-epbx  8,000 Units Subcutaneous Once per day on Mon Wed Fri    amLODIPine 10 mg Oral Daily    Vitamin D  3,000 Units Oral Daily    metoprolol tartrate  50 mg Oral BID    gabapentin  100 mg Oral BID    insulin lispro  0-12 Units Subcutaneous TID     insulin lispro  0-6 Units Subcutaneous Nightly    heparin (porcine)  5,000 Units Subcutaneous 3 times per day    levofloxacin  500 mg Intravenous Q48H    ipratropium-albuterol  1 ampule Inhalation Q4H WA    guaiFENesin  600 mg Oral BID    ferrous sulfate  325 mg Oral Daily with breakfast    calcitRIOL  0.25 mcg Oral Once per day on Mon Wed Fri       MEDS (infusions):   dextrose         MEDS (prn):  lidocaine, muscle rub, perflutren lipid microspheres, glucose, dextrose, glucagon (rDNA), dextrose, acetaminophen, ondansetron, magnesium hydroxide, ipratropium-albuterol    DATA:    Recent Labs     02/10/20  2105 02/11/20  0530 02/12/20  0626   WBC 10.3 10.1 11.0   HGB 8.7* 8.6* 8.2*   HCT 29.1* 29.9* 27.5*   MCV 89.8 91.7 90.2    137 162     Recent Labs     02/10/20  2105 02/11/20  0531 02/12/20  0626    140 136   K 4.8 4.8 4.7   CL 97* 98 99   CO2 33* 31* 25   BUN 26* 31* 37*   CREATININE 3.0* 3.4* 4.2*   LABGLOM 26 22 17   GLUCOSE 162* 165* 104*   CALCIUM 9.0 9.1 9.2   MG 2.1 2.3 2.3   PHOS 3.1 4.0 4.5       Lab Results   Component Value Date    LABALBU 2.5 (L) 02/08/2020    LABALBU 2.9 (L) 02/04/2020    LABALBU 3.1 (L) 02/03/2020     Lab Results   Component Value Date    TSH 0.927 02/03/2020       Iron Studies  Lab Results   Component Value Date    IRON 19 (L) 02/09/2020    TIBC 189 (L) 02/09/2020    FERRITIN 188 11/13/2019     Vitamin B-12   Date Value Ref Range Status   02/09/2020 320 211 - 946 pg/mL Final     Folate   Date Value Ref Range Status   02/09/2020 9.6 4.8 - 24.2 ng/mL Final       Vit D, 25-Hydroxy   Date Value Ref Range Status   01/23/2020 32 30 - 100 ng/mL Final     Comment:     <20 ng/mL. ........... Clarene Search Deficient  20-30 ng/mL. ......... Clarene Search Insufficient   ng/mL. ........ Clarene Search Sufficient  >100 ng/mL........... Andre Wolf Toxic       PTH   Date Value Ref Range Status   01/23/2020 154 (H) 15 - 65 pg/mL Final       No components found for: URIC    Lab Results   Component Value Date    COLORU Yellow 02/02/2020    NITRU Negative 02/02/2020    GLUCOSEU 250 02/02/2020    KETUA Negative 02/02/2020    UROBILINOGEN 0.2 02/02/2020    BILIRUBINUR Negative 02/02/2020       No results found for: Tunde Quivers      IMPRESSION/RECOMMENDATIONS:      1. arf  acute hypoxia and uncontrolled HTN  S/P R IJ TDC  1st IHD 2/7  4th hd 2/8  Next hd today     2. CKD G4   baseline serum cr 3.0-3.8mg/dl with an e-GFR=20-26ml/min  Hx of Nephrotic Range Proteinuria Urine Protein to Cr ratio 3.6 in Jan 2020 Microhematuria and in March 2018 had a (-) NKECHI, C3&4 not depressed, Hep B&C non reactive, (-) ANCA, (-) Anti-GBM, SPEP and UPEP no monoclonal protein     3. Hyperkalemia  setting of the worsening renal failure and ACE  Improved with hd     4. Non Anion gap Met acidosis  setting of the advanced CKD with HUGH  corrected  with the initiation dialysis     5. 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 HTN with CKD I-IV  1/31/20ECHO-EF 65%, mild LVH, LV wall motion normal, RV normal systolic function, no pericardial effusion or pl effusion, Pulm artery normal in size and mild dilation IVC  BP above goal will follow with vol removal  Inc hydralazine     7. Acute Resp Failure  Pl Effusions  on levaquin for Pna  Continue uf with hd     8. Anemia in CKD  ferritin 188 and Iron Sat 22%   on MANUEL and oral Fe++   HgB low but stable     9. Sec HPTH of Renal   and Vit D 32  on calcitriol    10. r knee pain  uric acid 4  Per im       Susi Joel.  Shanique Sidhu MD

## 2020-02-12 NOTE — CARE COORDINATION
SOCIAL WORK / DISCHARGE PLANNING:  Sw consult noted for dc planning with home O2, Dc plan remains to return home. Currently wearing 2L O2, Sw request RN complete home O2 eval. If qualifies will need script and physician chart note. Pt had chosen [facility] and it was confirmed he will need updated testing etc. Pt started on new HD, outpt chairtime set up for [facility] MWF 230pm and pt was to drive himself.                      Electronically signed by FLY Espitia on 2/12/2020 at 3:20 PM

## 2020-02-13 VITALS
WEIGHT: 211.5 LBS | HEIGHT: 68 IN | BODY MASS INDEX: 32.05 KG/M2 | SYSTOLIC BLOOD PRESSURE: 143 MMHG | RESPIRATION RATE: 18 BRPM | OXYGEN SATURATION: 93 % | TEMPERATURE: 97.9 F | HEART RATE: 75 BPM | DIASTOLIC BLOOD PRESSURE: 68 MMHG

## 2020-02-13 LAB
ANION GAP SERPL CALCULATED.3IONS-SCNC: 12 MMOL/L (ref 7–16)
BUN BLDV-MCNC: 27 MG/DL (ref 8–23)
CALCIUM SERPL-MCNC: 9.2 MG/DL (ref 8.6–10.2)
CHLORIDE BLD-SCNC: 97 MMOL/L (ref 98–107)
CO2: 29 MMOL/L (ref 22–29)
CREAT SERPL-MCNC: 3.2 MG/DL (ref 0.7–1.2)
GFR AFRICAN AMERICAN: 24
GFR NON-AFRICAN AMERICAN: 24 ML/MIN/1.73
GLUCOSE BLD-MCNC: 169 MG/DL (ref 74–99)
HCT VFR BLD CALC: 28.7 % (ref 37–54)
HEMOGLOBIN: 8.5 G/DL (ref 12.5–16.5)
MAGNESIUM: 2.2 MG/DL (ref 1.6–2.6)
MCH RBC QN AUTO: 26.7 PG (ref 26–35)
MCHC RBC AUTO-ENTMCNC: 29.6 % (ref 32–34.5)
MCV RBC AUTO: 90.3 FL (ref 80–99.9)
METER GLUCOSE: 124 MG/DL (ref 74–99)
METER GLUCOSE: 160 MG/DL (ref 74–99)
PDW BLD-RTO: 16.5 FL (ref 11.5–15)
PHOSPHORUS: 4.6 MG/DL (ref 2.5–4.5)
PLATELET # BLD: 168 E9/L (ref 130–450)
PMV BLD AUTO: 10.9 FL (ref 7–12)
POTASSIUM SERPL-SCNC: 4.7 MMOL/L (ref 3.5–5)
RBC # BLD: 3.18 E12/L (ref 3.8–5.8)
SODIUM BLD-SCNC: 138 MMOL/L (ref 132–146)
WBC # BLD: 10.8 E9/L (ref 4.5–11.5)

## 2020-02-13 PROCEDURE — 94660 CPAP INITIATION&MGMT: CPT

## 2020-02-13 PROCEDURE — 80048 BASIC METABOLIC PNL TOTAL CA: CPT

## 2020-02-13 PROCEDURE — 84100 ASSAY OF PHOSPHORUS: CPT

## 2020-02-13 PROCEDURE — 2700000000 HC OXYGEN THERAPY PER DAY

## 2020-02-13 PROCEDURE — 6370000000 HC RX 637 (ALT 250 FOR IP): Performed by: INTERNAL MEDICINE

## 2020-02-13 PROCEDURE — 6360000002 HC RX W HCPCS: Performed by: INTERNAL MEDICINE

## 2020-02-13 PROCEDURE — 85027 COMPLETE CBC AUTOMATED: CPT

## 2020-02-13 PROCEDURE — 82962 GLUCOSE BLOOD TEST: CPT

## 2020-02-13 PROCEDURE — 94640 AIRWAY INHALATION TREATMENT: CPT

## 2020-02-13 PROCEDURE — 36415 COLL VENOUS BLD VENIPUNCTURE: CPT

## 2020-02-13 PROCEDURE — 83735 ASSAY OF MAGNESIUM: CPT

## 2020-02-13 RX ORDER — HYDRALAZINE HYDROCHLORIDE 25 MG/1
50 TABLET, FILM COATED ORAL 2 TIMES DAILY
Qty: 120 TABLET | Refills: 3 | Status: SHIPPED | OUTPATIENT
Start: 2020-02-13 | End: 2021-06-28 | Stop reason: ALTCHOICE

## 2020-02-13 RX ORDER — IPRATROPIUM BROMIDE AND ALBUTEROL SULFATE 2.5; .5 MG/3ML; MG/3ML
3 SOLUTION RESPIRATORY (INHALATION) EVERY 4 HOURS PRN
Qty: 360 ML | Refills: 1 | Status: SHIPPED | OUTPATIENT
Start: 2020-02-13 | End: 2021-08-10

## 2020-02-13 RX ORDER — SEVELAMER CARBONATE 800 MG/1
800 TABLET, FILM COATED ORAL
Qty: 90 TABLET | Refills: 3 | Status: SHIPPED | OUTPATIENT
Start: 2020-02-13 | End: 2021-06-28 | Stop reason: DRUGHIGH

## 2020-02-13 RX ORDER — METOPROLOL TARTRATE 50 MG/1
50 TABLET, FILM COATED ORAL 2 TIMES DAILY
Qty: 60 TABLET | Refills: 3 | Status: SHIPPED | OUTPATIENT
Start: 2020-02-13 | End: 2021-06-28 | Stop reason: ALTCHOICE

## 2020-02-13 RX ORDER — ALBUTEROL SULFATE 90 UG/1
2 AEROSOL, METERED RESPIRATORY (INHALATION) EVERY 4 HOURS PRN
Qty: 1 INHALER | Refills: 3 | Status: ON HOLD | OUTPATIENT
Start: 2020-02-13 | End: 2021-07-01 | Stop reason: SDUPTHER

## 2020-02-13 RX ORDER — SODIUM BICARBONATE 650 MG/1
1300 TABLET ORAL 3 TIMES DAILY
Qty: 90 TABLET | Refills: 3 | Status: SHIPPED | OUTPATIENT
Start: 2020-02-13

## 2020-02-13 RX ORDER — FLUTICASONE FUROATE AND VILANTEROL 200; 25 UG/1; UG/1
1 POWDER RESPIRATORY (INHALATION) DAILY
Qty: 1 EACH | Refills: 2 | Status: SHIPPED | OUTPATIENT
Start: 2020-02-13 | End: 2020-02-13 | Stop reason: HOSPADM

## 2020-02-13 RX ADMIN — DOCUSATE SODIUM 100 MG: 100 CAPSULE, LIQUID FILLED ORAL at 08:51

## 2020-02-13 RX ADMIN — GUAIFENESIN 600 MG: 600 TABLET, EXTENDED RELEASE ORAL at 08:51

## 2020-02-13 RX ADMIN — AMLODIPINE BESYLATE 10 MG: 10 TABLET ORAL at 08:51

## 2020-02-13 RX ADMIN — SODIUM BICARBONATE 650 MG TABLET 1300 MG: at 08:50

## 2020-02-13 RX ADMIN — IPRATROPIUM BROMIDE AND ALBUTEROL SULFATE 1 AMPULE: .5; 3 SOLUTION RESPIRATORY (INHALATION) at 16:25

## 2020-02-13 RX ADMIN — VITAMIN D, TAB 1000IU (100/BT) 3000 UNITS: 25 TAB at 08:50

## 2020-02-13 RX ADMIN — IPRATROPIUM BROMIDE AND ALBUTEROL SULFATE 1 AMPULE: .5; 3 SOLUTION RESPIRATORY (INHALATION) at 09:15

## 2020-02-13 RX ADMIN — HYDRALAZINE HYDROCHLORIDE 75 MG: 50 TABLET ORAL at 08:51

## 2020-02-13 RX ADMIN — BUDESONIDE 500 MCG: 0.5 INHALANT RESPIRATORY (INHALATION) at 16:25

## 2020-02-13 RX ADMIN — BUDESONIDE 500 MCG: 0.5 INHALANT RESPIRATORY (INHALATION) at 05:58

## 2020-02-13 RX ADMIN — INSULIN LISPRO 2 UNITS: 100 INJECTION, SOLUTION INTRAVENOUS; SUBCUTANEOUS at 08:54

## 2020-02-13 RX ADMIN — SEVELAMER CARBONATE 800 MG: 800 TABLET, FILM COATED ORAL at 12:00

## 2020-02-13 RX ADMIN — GABAPENTIN 100 MG: 100 CAPSULE ORAL at 08:52

## 2020-02-13 RX ADMIN — ARFORMOTEROL TARTRATE 15 MCG: 15 SOLUTION RESPIRATORY (INHALATION) at 16:26

## 2020-02-13 RX ADMIN — ARFORMOTEROL TARTRATE 15 MCG: 15 SOLUTION RESPIRATORY (INHALATION) at 06:00

## 2020-02-13 RX ADMIN — SODIUM BICARBONATE 650 MG TABLET 1300 MG: at 13:37

## 2020-02-13 RX ADMIN — FERROUS SULFATE TAB 325 MG (65 MG ELEMENTAL FE) 325 MG: 325 (65 FE) TAB at 08:51

## 2020-02-13 RX ADMIN — IPRATROPIUM BROMIDE AND ALBUTEROL SULFATE 1 AMPULE: .5; 3 SOLUTION RESPIRATORY (INHALATION) at 12:51

## 2020-02-13 RX ADMIN — IPRATROPIUM BROMIDE AND ALBUTEROL SULFATE 1 AMPULE: .5; 3 SOLUTION RESPIRATORY (INHALATION) at 06:00

## 2020-02-13 RX ADMIN — METOPROLOL TARTRATE 50 MG: 50 TABLET, FILM COATED ORAL at 09:00

## 2020-02-13 RX ADMIN — SEVELAMER CARBONATE 800 MG: 800 TABLET, FILM COATED ORAL at 08:50

## 2020-02-13 ASSESSMENT — PAIN SCALES - GENERAL: PAINLEVEL_OUTOF10: 0

## 2020-02-13 NOTE — PLAN OF CARE
Problem: Falls - Risk of:  Goal: Will remain free from falls  Description  Will remain free from falls  2/13/2020 1309 by Angelia Hill RN  Outcome: Completed     Problem: Falls - Risk of:  Goal: Absence of physical injury  Description  Absence of physical injury  2/13/2020 1309 by Angelia Hill RN  Outcome: Completed     Problem: Cardiac:  Goal: Hemodynamic stability will improve  Description  Hemodynamic stability will improve  Outcome: Completed     Problem: Coping:  Goal: Level of anxiety will decrease  Description  Level of anxiety will decrease  2/13/2020 1309 by Angelia Hill RN  Outcome: Completed     Problem: Coping:  Goal: Ability to cope will improve  Description  Ability to cope will improve  Outcome: Completed     Problem: Coping:  Goal: Ability to establish a method of communication will improve  Description  Ability to establish a method of communication will improve  Outcome: Completed     Problem: Coping:  Goal: Ability to cope will improve  Description  Ability to cope will improve  Outcome: Completed     Problem: Coping:  Goal: Ability to establish a method of communication will improve  Description  Ability to establish a method of communication will improve  Outcome: Completed     Problem: Respiratory:  Goal: Ability to maintain a clear airway will improve  Description  Ability to maintain a clear airway will improve  2/13/2020 1309 by Angelia Hill RN  Outcome: Completed     Problem: Respiratory:  Goal: Ability to maintain adequate ventilation will improve  Description  Ability to maintain adequate ventilation will improve  Outcome: Completed     Problem: Respiratory:  Goal: Complications related to the disease process, condition or treatment will be avoided or minimized  Description  Complications related to the disease process, condition or treatment will be avoided or minimized  Outcome: Completed     Problem: Discharge Planning:  Goal: Discharged to appropriate level of

## 2020-02-13 NOTE — CARE COORDINATION
Patient to be discharged on home oxygen and nebulizer treatments. Face sheet, orders, testing and notes faxed to [facility]. Referral called to Baudilio at [facility] and advised of pending discharge today.  Electronically signed by Gordo Fischre RN on 2/13/2020 at 11:11 AM

## 2020-02-13 NOTE — CARE COORDINATION
2/13/2020 1107 CM note: Pt ordered spiriva and dulera upon discharge. Spoke with pt regarding free 30 day trial for both inhalers as well as meds to bed program and pt agreeable. [facility] to pt room prior to dc. Odalis COMBS

## 2020-02-13 NOTE — PROGRESS NOTES
[facility]  Nephrology Attending Progress Note  Carol Werner. Ronell Mcardle, MD        SUBJECTIVE:     2/6: Full consult deferred as just saw pt while he was inpt at [facility] and seen 2/2. He was admitted for Acute Resp Failure and our service was seening for CKD G4 with a baseline serum cr 3.0-3.8mg/dl. Pt was at baseline during the hospital admission. He had an amb pulse ox prior to D/C and was found to be hypoxic and home O2 prescribed. He presented back to ED 2/2/20 after O2 tank ran out. In the ED initial O2 sat 60% and /90. No fever or chills but he mdid feel lightheaded     2/7/20: Pt went down to IR for [facility] placement and had worsening SOB requiring BIPAP and transfer to MICU. Pt currently on BIPAP visibly dyspneic and tachypneic     2/8/20: Pt remains on BIPAP this AM still with desaturation off BIPAP and SOB     2/9/20: Pt seen on IHD on 8L NC, states he feels better and less SOB when at rest still with dyspnea with minimal exertion    2/10: pt seen in room. For hd today, complaining of right knee pain.  Knee is swollen    2/11: seen in room,  No cp or sob     2/12: seen in room, continues with knee pain    2/13: sp hd yesterday, tolerated well          PROBLEM LIST:    Patient Active Problem List   Diagnosis    Hallux valgus, acquired    DMII (diabetes mellitus, type 2) ([address]    HTN (hypertension)    Lumbar radicular pain    Spinal stenosis    B12 deficiency    Idiopathic acute pancreatitis    Acute pancreatitis without necrosis or infection, unspecified    Pneumonia    Respiratory failure ([address]        PAST MEDICAL HISTORY:    Past Medical History:   Diagnosis Date    Back pain     Diabetes mellitus ([address]     DMII (diabetes mellitus, type 2) ([address] 7/28/2015    History of cardiovascular stress test 2/16/2015    lexiscan    HTN (hypertension) 7/28/2015    Hyperlipidemia     Hypertension     Lumbar radicular pain 7/28/2015    Type II or unspecified type diabetes mellitus without mention of docusate sodium  100 mg Oral BID    polyethylene glycol  17 g Oral Daily    sodium bicarbonate  1,300 mg Oral TID    sevelamer  800 mg Oral TID     epoetin elvia-epbx  8,000 Units Subcutaneous Once per day on Mon Wed Fri    amLODIPine  10 mg Oral Daily    Vitamin D  3,000 Units Oral Daily    metoprolol tartrate  50 mg Oral BID    gabapentin  100 mg Oral BID    insulin lispro  0-12 Units Subcutaneous TID     insulin lispro  0-6 Units Subcutaneous Nightly    heparin (porcine)  5,000 Units Subcutaneous 3 times per day    levofloxacin  500 mg Intravenous Q48H    ipratropium-albuterol  1 ampule Inhalation Q4H WA    guaiFENesin  600 mg Oral BID    ferrous sulfate  325 mg Oral Daily with breakfast    calcitRIOL  0.25 mcg Oral Once per day on Mon Wed Fri       MEDS (infusions):   dextrose         MEDS (prn):  lidocaine, muscle rub, perflutren lipid microspheres, glucose, dextrose, glucagon (rDNA), dextrose, acetaminophen, ondansetron, magnesium hydroxide, ipratropium-albuterol    DATA:    Recent Labs     02/11/20  0530 02/12/20  0626 02/13/20  0356   WBC 10.1 11.0 10.8   HGB 8.6* 8.2* 8.5*   HCT 29.9* 27.5* 28.7*   MCV 91.7 90.2 90.3    162 168     Recent Labs     02/11/20  0531 02/12/20  0626 02/13/20  0356    136 138   K 4.8 4.7 4.7   CL 98 99 97*   CO2 31* 25 29   BUN 31* 37* 27*   CREATININE 3.4* 4.2* 3.2*   LABGLOM 22 17 24   GLUCOSE 165* 104* 169*   CALCIUM 9.1 9.2 9.2   MG 2.3 2.3 2.2   PHOS 4.0 4.5 4.6*       Lab Results   Component Value Date    LABALBU 2.5 (L) 02/08/2020    LABALBU 2.9 (L) 02/04/2020    LABALBU 3.1 (L) 02/03/2020     Lab Results   Component Value Date    TSH 0.927 02/03/2020       Iron Studies  Lab Results   Component Value Date    IRON 19 (L) 02/09/2020    TIBC 189 (L) 02/09/2020    FERRITIN 188 11/13/2019     Vitamin B-12   Date Value Ref Range Status   02/09/2020 320 211 - 946 pg/mL Final     Folate   Date Value Ref Range Status   02/09/2020 9.6 4.8 - 24.2 ng/mL

## 2020-02-13 NOTE — DISCHARGE SUMMARY
Department of Internal Medicine        CHIEF COMPLAINT: Shortness of breath    Reason for Admission: Acute hypoxic respiratory failure    HISTORY OF PRESENT ILLNESS:      The patient is a 60 y.o. male who presents with being discharged from [facility] yesterday and was put on 2 half liters nasal cannula O2 and was given a portable tank but was not set up with a stationary tank at home. The patient stated that he checked his tank today and was empty. He was 65% O2 saturation on room air on admission. Chest x-ray suggested CHF. Patient had temperature of 100.1. BUN/creatinine was 50/4.0 with the patient have a creatinine of 3.8 on old lab work. proBNP was 4800. WBC is 4.7. Patient denies any fever and chills or productive cough at this time. Patient was admitted for further evaluation and treatment. 2/3/2020  Patient seen and examined on telemetry floor. Patient feels better today than yesterday but still on 3 L nasal cannula. BUN/creatinine is elevated at 76/4.7. Blood sugars are in the 200s. WBC 11.3. CT of the lungs showed diffuse pulmonary infiltrates. Temperature is 97.5 today but was 100.1 on admission. O2 sat is 90% on 3 L at rest.  Will consult pulmonology. Nephrology note reviewed. 2/4/2020  Patient seen and examined on telemetry floor. Patient states he feels better than he did yesterday. At the time of examination patient was not wearing his oxygen, states he took it off to go to the bathroom- he states he did not get SOB while off the oxygen. Denies chest pain, abdomen pain, nausea, vomiting, constipation, diarrhea. Vitals are as follows: temp 97.7F, heart rate 97 with resp rate of 18, /66, . Blood Glucose 160- metered glucose 312, BUN/Creat 82/4.9, CO2 17, WBC 12.1, RBC 3.1, Hemoglobin 8.1. O2 saturation on room air with activity was 84% and was 91% on 2 L with activity. 2/5/2020  Patient seen and examined on telemetry floor. Patient feels better again today.   Patient denies complication, not stated as uncontrolled      Past Surgical History:    Past Surgical History:   Procedure Laterality Date    OTHER SURGICAL HISTORY Left 06/06/14    cain bunionectomy left foot with osteomed screw x1    SHOULDER SURGERY      Bilateral    VEIN SURGERY         Medications Prior to Admission:    @  Prior to Admission medications    Medication Sig Start Date End Date Taking? Authorizing Provider   ferrous sulfate 325 (65 Fe) MG tablet Take 325 mg by mouth daily (with breakfast)   Yes Historical Provider, MD   hydrALAZINE (APRESOLINE) 25 MG tablet Take 25 mg by mouth 2 times daily   Yes Historical Provider, MD   calcitRIOL (ROCALTROL) 0.25 MCG capsule Take 0.25 mcg by mouth Takes 3 times a week   Yes Historical Provider, MD   insulin glargine (LANTUS) 100 UNIT/ML injection vial Inject 25 Units into the skin nightly   Yes Historical Provider, MD   metoprolol tartrate (LOPRESSOR) 50 MG tablet Take 50 mg by mouth 3/31/17  Yes Historical Provider, MD   gabapentin (NEURONTIN) 100 MG capsule Take 100 mg by mouth. Yes Historical Provider, MD   etodolac (LODINE) 400 MG tablet Take 400 mg by mouth   Yes Historical Provider, MD   amLODIPine (NORVASC) 10 MG tablet Take 5 mg by mouth 2 times daily    Yes Historical Provider, MD   lisinopril (PRINIVIL;ZESTRIL) 20 MG tablet Take 20 mg by mouth daily   Yes Historical Provider, MD   Cholecalciferol (VITAMIN D3) 3000 units TABS Take 150 mcg by mouth daily    Yes Historical Provider, MD       Allergies:   Other    Social History:   Social History     Socioeconomic History    Marital status: Single     Spouse name: Not on file    Number of children: Not on file    Years of education: Not on file    Highest education level: Not on file   Occupational History    Not on file   Social Needs    Financial resource strain: Not on file    Food insecurity:     Worry: Not on file     Inability: Not on file    Transportation needs:     Medical: Not on file Non-medical: Not on file   Tobacco Use    Smoking status: Former Smoker     Packs/day: 1.00     Years: 30.00     Pack years: 30.00    Smokeless tobacco: Never Used   Substance and Sexual Activity    Alcohol use: No    Drug use: No    Sexual activity: Not on file   Lifestyle    Physical activity:     Days per week: Not on file     Minutes per session: Not on file    Stress: Not on file   Relationships    Social connections:     Talks on phone: Not on file     Gets together: Not on file     Attends Yazdanism service: Not on file     Active member of club or organization: Not on file     Attends meetings of clubs or organizations: Not on file     Relationship status: Not on file    Intimate partner violence:     Fear of current or ex partner: Not on file     Emotionally abused: Not on file     Physically abused: Not on file     Forced sexual activity: Not on file   Other Topics Concern    Not on file   Social History Narrative    Not on file       Family History:   History reviewed. No pertinent family history. REVIEW OF SYSTEMS:    Gen: Patient denies any lightheadedness or dizziness. No LOC or syncope. No fevers or chills. HEENT: No earache, sore throat or nasal congestion. Resp: Denies cough, hemoptysis or sputum production. Cardiac: +SOB, Denies chest pain, diaphoresis or palpitations. GI: No nausea, vomiting, diarrhea or constipation. No melena or hematochezia. : No urinary complaints, dysuria, hematuria or frequency. MSK: No extremity weakness, paralysis or paresthesias. PHYSICAL EXAM:    Vitals:  BP (!) 143/68   Pulse 75   Temp 97.9 °F (36.6 °C) (Oral)   Resp 18   Ht 5' 8\" (1.727 m)   Wt 211 lb 8 oz (95.9 kg)   SpO2 93%   BMI 32.16 kg/m²     General:  This is a 60 y.o. yo male who is alert and oriented in NAD  HEENT:  Head is normocephalic and atraumatic, PERRLA, EOMI, mucus membranes moist with no pharyngeal erythema or exudate.   Neck:  Supple with no carotid bruits, JVD or thyromegaly. No cervical adenopathy  CV:  Regular rate and rhythm, no murmurs  Lungs: Clear to auscultation bilaterally with no wheezes or rhonchi.   Abdomen:  Soft, + obese, nontender, nondistended, bowel sounds present  Extremities: +1 pitting edema to right > L lower leg, peripheral pulses intact bilaterally  Neuro:  Cranial nerves II-XII grossly intact; motor and sensory function intact with no focal deficits  Skin:  No rashes, lesions or wounds    DATA:  CBC with Differential:    Lab Results   Component Value Date    WBC 10.8 02/13/2020    RBC 3.18 02/13/2020    HGB 8.5 02/13/2020    HCT 28.7 02/13/2020     02/13/2020    MCV 90.3 02/13/2020    MCH 26.7 02/13/2020    MCHC 29.6 02/13/2020    RDW 16.5 02/13/2020    LYMPHOPCT 6.8 02/03/2020    MONOPCT 3.1 02/03/2020    BASOPCT 0.1 02/03/2020    MONOSABS 0.35 02/03/2020    LYMPHSABS 0.77 02/03/2020    EOSABS 0.00 02/03/2020    BASOSABS 0.01 02/03/2020     CMP:    Lab Results   Component Value Date     02/13/2020    K 4.7 02/13/2020    K 5.6 02/02/2020    CL 97 02/13/2020    CO2 29 02/13/2020    BUN 27 02/13/2020    CREATININE 3.2 02/13/2020    GFRAA 24 02/13/2020    LABGLOM 24 02/13/2020    GLUCOSE 169 02/13/2020    PROT 6.6 02/08/2020    LABALBU 2.5 02/08/2020    CALCIUM 9.2 02/13/2020    BILITOT 0.2 02/08/2020    ALKPHOS 42 02/08/2020    AST 9 02/08/2020    ALT 9 02/08/2020     Magnesium:    Lab Results   Component Value Date    MG 2.2 02/13/2020     Phosphorus:    Lab Results   Component Value Date    PHOS 4.6 02/13/2020     PT/INR:    Lab Results   Component Value Date    PROTIME 11.8 02/07/2020    INR 1.0 02/07/2020     Troponin:    Lab Results   Component Value Date    TROPONINI 0.09 02/02/2020     U/A:    Lab Results   Component Value Date    COLORU Yellow 02/02/2020    PROTEINU >=300 02/02/2020    PHUR 5.0 02/02/2020    LABCAST RARE 09/21/2018    WBCUA 0-1 02/02/2020    RBCUA NONE 02/02/2020    BACTERIA NONE 02/02/2020    CLARITYU Clear 02/02/2020    SPECGRAV 1.025 02/02/2020    LEUKOCYTESUR Negative 02/02/2020    UROBILINOGEN 0.2 02/02/2020    BILIRUBINUR Negative 02/02/2020    BLOODU TRACE 02/02/2020    GLUCOSEU 250 02/02/2020    AMORPHOUS FEW 10/02/2019     ABG:    Lab Results   Component Value Date    PH 7.363 02/08/2020    PCO2 47.3 02/08/2020    PO2 140.5 02/08/2020    HCO3 26.3 02/08/2020    BE 0.7 02/08/2020    O2SAT 98.8 02/08/2020     HgBA1c:    Lab Results   Component Value Date    LABA1C 7.6 02/02/2020     FLP:    Lab Results   Component Value Date    TRIG 60 02/03/2020    HDL 60 02/03/2020    LDLCALC 71 02/03/2020    LABVLDL 12 02/03/2020     TSH:    Lab Results   Component Value Date    TSH 0.927 02/03/2020     IRON:    Lab Results   Component Value Date    IRON 19 02/09/2020     LIPASE:    Lab Results   Component Value Date    LIPASE 78 09/24/2018       ASSESSMENT AND PLAN:      Patient Active Problem List    Diagnosis Date Noted    Sudarshan andres, acquired 06/06/2014     Priority: Low     Class: Present on Admission    Respiratory failure ([facility] 02/02/2020    Pneumonia 01/28/2020    Acute pancreatitis without necrosis or infection, unspecified 09/23/2018    Idiopathic acute pancreatitis 09/21/2018    B12 deficiency 03/17/2016    DMII (diabetes mellitus, type 2) ([facility] 07/28/2015    HTN (hypertension) 07/28/2015    Lumbar radicular pain 07/28/2015    Spinal stenosis 07/28/2015     1. Acute/subacute hypoxic respiratory failure  2. Acute congestive heart failure with preserved ejection fraction  3. Acute on chronic kidney disease stage IV  4. Hypertension  5. Iinsulin-dependent diabetes mellitus type II-hyperglycemia  6. Pneumonia-bilateral lobe  7. Acute pulmonary edema secondary to fluid overload- 2/7  8. Pulmonary hypertension at 64 mmHg  9. Normocytic, iron deficiency anemia  10.   History of prior tobacco abuse and probable COPD with acute exacerbation      Plan:  Discharge home   has set up

## 2020-02-13 NOTE — PLAN OF CARE
Problem: Falls - Risk of:  Goal: Will remain free from falls  Description  Will remain free from falls  Outcome: Met This Shift     Problem: Falls - Risk of:  Goal: Absence of physical injury  Description  Absence of physical injury  Outcome: Met This Shift     Problem: Cardiac:  Goal: Hemodynamic stability will improve  Description  Hemodynamic stability will improve  Outcome: Met This Shift     Problem: Pain:  Goal: Pain level will decrease  Description  Pain level will decrease  Outcome: Met This Shift     Problem: Pain:  Goal: Control of acute pain  Description  Control of acute pain  Outcome: Met This Shift

## 2020-02-13 NOTE — PLAN OF CARE
Problem: Falls - Risk of:  Goal: Will remain free from falls  Description  Will remain free from falls  2/13/2020 0329 by Francisco Barker RN  Outcome: Met This Shift     Problem: Falls - Risk of:  Goal: Absence of physical injury  Description  Absence of physical injury  2/13/2020 0329 by Francisco Barker RN  Outcome: Met This Shift     Problem: Coping:  Goal: Level of anxiety will decrease  Description  Level of anxiety will decrease  Outcome: Met This Shift     Problem: Respiratory:  Goal: Ability to maintain a clear airway will improve  Description  Ability to maintain a clear airway will improve  Outcome: Met This Shift

## 2020-02-14 NOTE — PROGRESS NOTES
CLINICAL PHARMACY NOTE: MEDS TO [address] Select Patient?: No  Total # of Prescriptions Filled: 8   The following medications were delivered to the patient:  · Spiriva respimat 2.5 mcg  · Dulera 200-5 mcg  · Sodium Bicarbonate 650 mg   · sevelamer carbonate 800 mg  · Hydralazine 25 mg  · Metoprolol tartrate 50 mg  · Albuterol sulfate hfa 108  · Ipratropium albuterol   Total # of Interventions Completed: 2  Time Spent (min): 30    Additional Documentation:

## 2020-02-29 ENCOUNTER — HOSPITAL ENCOUNTER (OUTPATIENT)
Dept: GENERAL RADIOLOGY | Age: 65
End: 2020-02-29
Payer: MEDICARE

## 2020-02-29 ENCOUNTER — HOSPITAL ENCOUNTER (OUTPATIENT)
Age: 65
Discharge: HOME | End: 2020-03-02
Payer: MEDICARE

## 2020-02-29 ENCOUNTER — HOSPITAL ENCOUNTER (OUTPATIENT)
Dept: GENERAL RADIOLOGY | Age: 65
Discharge: HOME | End: 2020-03-02
Payer: MEDICARE

## 2020-02-29 PROCEDURE — 71046 X-RAY EXAM CHEST 2 VIEWS: CPT

## 2020-03-02 ENCOUNTER — TELEPHONE (OUTPATIENT)
Dept: SLEEP CENTER | Age: 65
End: 2020-03-02

## 2020-03-06 ENCOUNTER — HOSPITAL ENCOUNTER (OUTPATIENT)
Dept: CT IMAGING | Age: 65
Discharge: HOME | End: 2020-03-06
Payer: MEDICARE

## 2020-03-06 PROCEDURE — 71250 CT THORAX DX C-: CPT

## 2020-03-14 ENCOUNTER — HOSPITAL ENCOUNTER (OUTPATIENT)
Dept: SLEEP CENTER | Age: 65
Discharge: HOME | End: 2020-03-14
Payer: MEDICARE

## 2020-06-05 ENCOUNTER — TELEPHONE (OUTPATIENT)
Dept: SLEEP CENTER | Age: 65
End: 2020-06-05

## 2020-06-05 NOTE — TELEPHONE ENCOUNTER
Called patient to schedule sleep study, he said he will wait until after he has his breathing test and will call us back to schedule

## 2020-06-16 ENCOUNTER — HOSPITAL ENCOUNTER (OUTPATIENT)
Dept: PULMONOLOGY | Age: 65
Discharge: HOME | End: 2020-06-16
Payer: MEDICARE

## 2020-06-16 PROCEDURE — 94726 PLETHYSMOGRAPHY LUNG VOLUMES: CPT

## 2020-06-16 PROCEDURE — 94729 DIFFUSING CAPACITY: CPT

## 2020-06-16 PROCEDURE — 94060 EVALUATION OF WHEEZING: CPT

## 2020-12-10 DIAGNOSIS — R76.11 POSITIVE SKIN TEST FOR TUBERCULOSIS: Primary | ICD-10-CM

## 2020-12-16 ENCOUNTER — HOSPITAL ENCOUNTER (OUTPATIENT)
Dept: GENERAL RADIOLOGY | Age: 65
Discharge: HOME | End: 2020-12-18
Payer: MEDICARE

## 2020-12-16 ENCOUNTER — HOSPITAL ENCOUNTER (OUTPATIENT)
Age: 65
Discharge: HOME | End: 2020-12-18
Payer: MEDICARE

## 2020-12-16 PROCEDURE — 71046 X-RAY EXAM CHEST 2 VIEWS: CPT

## 2021-03-21 ENCOUNTER — APPOINTMENT (OUTPATIENT)
Dept: GENERAL RADIOLOGY | Age: 66
End: 2021-03-21
Payer: MEDICARE

## 2021-03-21 ENCOUNTER — HOSPITAL ENCOUNTER (OUTPATIENT)
Age: 66
Setting detail: OBSERVATION
Discharge: HOME | End: 2021-03-21
Attending: EMERGENCY MEDICINE | Admitting: INTERNAL MEDICINE
Payer: MEDICARE

## 2021-03-21 VITALS
TEMPERATURE: 98 F | HEART RATE: 71 BPM | OXYGEN SATURATION: 100 % | WEIGHT: 209 LBS | SYSTOLIC BLOOD PRESSURE: 154 MMHG | RESPIRATION RATE: 20 BRPM | BODY MASS INDEX: 32.8 KG/M2 | HEIGHT: 67 IN | DIASTOLIC BLOOD PRESSURE: 77 MMHG

## 2021-03-21 DIAGNOSIS — N18.6 END STAGE RENAL FAILURE ON DIALYSIS (HCC): ICD-10-CM

## 2021-03-21 DIAGNOSIS — Z99.2 END STAGE RENAL FAILURE ON DIALYSIS (HCC): ICD-10-CM

## 2021-03-21 DIAGNOSIS — J81.0 ACUTE PULMONARY EDEMA (HCC): ICD-10-CM

## 2021-03-21 DIAGNOSIS — J96.01 ACUTE RESPIRATORY FAILURE WITH HYPOXIA (HCC): Primary | ICD-10-CM

## 2021-03-21 LAB
ANION GAP SERPL CALCULATED.3IONS-SCNC: 16 MMOL/L (ref 7–16)
BUN BLDV-MCNC: 39 MG/DL (ref 8–23)
CALCIUM SERPL-MCNC: 8.5 MG/DL (ref 8.6–10.2)
CHLORIDE BLD-SCNC: 97 MMOL/L (ref 98–107)
CHOLESTEROL, TOTAL: 123 MG/DL (ref 0–199)
CHP ED QC CHECK: YES
CO2: 26 MMOL/L (ref 22–29)
CREAT SERPL-MCNC: 9.8 MG/DL (ref 0.7–1.2)
EKG ATRIAL RATE: 80 BPM
EKG P AXIS: 24 DEGREES
EKG P-R INTERVAL: 148 MS
EKG Q-T INTERVAL: 390 MS
EKG QRS DURATION: 88 MS
EKG QTC CALCULATION (BAZETT): 449 MS
EKG R AXIS: 33 DEGREES
EKG T AXIS: 38 DEGREES
EKG VENTRICULAR RATE: 80 BPM
GFR AFRICAN AMERICAN: 7
GFR NON-AFRICAN AMERICAN: 7 ML/MIN/1.73
GLUCOSE BLD-MCNC: 114 MG/DL
GLUCOSE BLD-MCNC: 188 MG/DL (ref 74–99)
HBA1C MFR BLD: 8.4 % (ref 4–5.6)
HCT VFR BLD CALC: 28.1 % (ref 37–54)
HDLC SERPL-MCNC: 36 MG/DL
HEMOGLOBIN: 8.4 G/DL (ref 12.5–16.5)
LDL CHOLESTEROL CALCULATED: 66 MG/DL (ref 0–99)
MCH RBC QN AUTO: 28.2 PG (ref 26–35)
MCHC RBC AUTO-ENTMCNC: 29.9 % (ref 32–34.5)
MCV RBC AUTO: 94.3 FL (ref 80–99.9)
METER GLUCOSE: 114 MG/DL (ref 74–99)
METER GLUCOSE: 168 MG/DL (ref 74–99)
PDW BLD-RTO: 15.3 FL (ref 11.5–15)
PLATELET # BLD: 183 E9/L (ref 130–450)
PMV BLD AUTO: 10.2 FL (ref 7–12)
POTASSIUM SERPL-SCNC: 3.8 MMOL/L (ref 3.5–5)
RBC # BLD: 2.98 E12/L (ref 3.8–5.8)
SARS-COV-2, NAAT: NOT DETECTED
SODIUM BLD-SCNC: 139 MMOL/L (ref 132–146)
TRIGL SERPL-MCNC: 106 MG/DL (ref 0–149)
TROPONIN: 0.1 NG/ML (ref 0–0.03)
TSH SERPL DL<=0.05 MIU/L-ACNC: 1.16 UIU/ML (ref 0.27–4.2)
VLDLC SERPL CALC-MCNC: 21 MG/DL
WBC # BLD: 10 E9/L (ref 4.5–11.5)

## 2021-03-21 PROCEDURE — 6370000000 HC RX 637 (ALT 250 FOR IP): Performed by: INTERNAL MEDICINE

## 2021-03-21 PROCEDURE — 87635 SARS-COV-2 COVID-19 AMP PRB: CPT

## 2021-03-21 PROCEDURE — 96372 THER/PROPH/DIAG INJ SC/IM: CPT

## 2021-03-21 PROCEDURE — 36415 COLL VENOUS BLD VENIPUNCTURE: CPT

## 2021-03-21 PROCEDURE — 93010 ELECTROCARDIOGRAM REPORT: CPT | Performed by: INTERNAL MEDICINE

## 2021-03-21 PROCEDURE — 83036 HEMOGLOBIN GLYCOSYLATED A1C: CPT

## 2021-03-21 PROCEDURE — 6360000002 HC RX W HCPCS: Performed by: INTERNAL MEDICINE

## 2021-03-21 PROCEDURE — 2700000000 HC OXYGEN THERAPY PER DAY

## 2021-03-21 PROCEDURE — 90935 HEMODIALYSIS ONE EVALUATION: CPT

## 2021-03-21 PROCEDURE — G0378 HOSPITAL OBSERVATION PER HR: HCPCS

## 2021-03-21 PROCEDURE — 85027 COMPLETE CBC AUTOMATED: CPT

## 2021-03-21 PROCEDURE — 80048 BASIC METABOLIC PNL TOTAL CA: CPT

## 2021-03-21 PROCEDURE — 84443 ASSAY THYROID STIM HORMONE: CPT

## 2021-03-21 PROCEDURE — 84484 ASSAY OF TROPONIN QUANT: CPT

## 2021-03-21 PROCEDURE — 96374 THER/PROPH/DIAG INJ IV PUSH: CPT

## 2021-03-21 PROCEDURE — 71045 X-RAY EXAM CHEST 1 VIEW: CPT

## 2021-03-21 PROCEDURE — 93005 ELECTROCARDIOGRAM TRACING: CPT | Performed by: EMERGENCY MEDICINE

## 2021-03-21 PROCEDURE — 94664 DEMO&/EVAL PT USE INHALER: CPT

## 2021-03-21 PROCEDURE — 99282 EMERGENCY DEPT VISIT SF MDM: CPT

## 2021-03-21 PROCEDURE — 82962 GLUCOSE BLOOD TEST: CPT

## 2021-03-21 PROCEDURE — 80061 LIPID PANEL: CPT

## 2021-03-21 RX ORDER — IPRATROPIUM BROMIDE AND ALBUTEROL SULFATE 2.5; .5 MG/3ML; MG/3ML
1 SOLUTION RESPIRATORY (INHALATION) EVERY 4 HOURS PRN
Status: DISCONTINUED | OUTPATIENT
Start: 2021-03-21 | End: 2021-03-21 | Stop reason: HOSPADM

## 2021-03-21 RX ORDER — BUDESONIDE AND FORMOTEROL FUMARATE DIHYDRATE 160; 4.5 UG/1; UG/1
2 AEROSOL RESPIRATORY (INHALATION) 2 TIMES DAILY
Status: DISCONTINUED | OUTPATIENT
Start: 2021-03-21 | End: 2021-03-21 | Stop reason: CLARIF

## 2021-03-21 RX ORDER — ACETAMINOPHEN 500 MG
500 TABLET ORAL EVERY 6 HOURS PRN
Status: DISCONTINUED | OUTPATIENT
Start: 2021-03-21 | End: 2021-03-21 | Stop reason: HOSPADM

## 2021-03-21 RX ORDER — GABAPENTIN 100 MG/1
100 CAPSULE ORAL 2 TIMES DAILY
Status: DISCONTINUED | OUTPATIENT
Start: 2021-03-21 | End: 2021-03-21 | Stop reason: HOSPADM

## 2021-03-21 RX ORDER — DEXTROSE MONOHYDRATE 50 MG/ML
100 INJECTION, SOLUTION INTRAVENOUS PRN
Status: DISCONTINUED | OUTPATIENT
Start: 2021-03-21 | End: 2021-03-21 | Stop reason: HOSPADM

## 2021-03-21 RX ORDER — SODIUM BICARBONATE 650 MG/1
1300 TABLET ORAL 3 TIMES DAILY
Status: DISCONTINUED | OUTPATIENT
Start: 2021-03-21 | End: 2021-03-21 | Stop reason: HOSPADM

## 2021-03-21 RX ORDER — NICOTINE POLACRILEX 4 MG
15 LOZENGE BUCCAL PRN
Status: DISCONTINUED | OUTPATIENT
Start: 2021-03-21 | End: 2021-03-21 | Stop reason: HOSPADM

## 2021-03-21 RX ORDER — ONDANSETRON 2 MG/ML
4 INJECTION INTRAMUSCULAR; INTRAVENOUS EVERY 6 HOURS PRN
Status: DISCONTINUED | OUTPATIENT
Start: 2021-03-21 | End: 2021-03-21 | Stop reason: HOSPADM

## 2021-03-21 RX ORDER — BUDESONIDE 0.5 MG/2ML
0.5 INHALANT ORAL 2 TIMES DAILY
Status: DISCONTINUED | OUTPATIENT
Start: 2021-03-21 | End: 2021-03-21 | Stop reason: HOSPADM

## 2021-03-21 RX ORDER — INSULIN GLARGINE 100 [IU]/ML
25 INJECTION, SOLUTION SUBCUTANEOUS NIGHTLY
Status: DISCONTINUED | OUTPATIENT
Start: 2021-03-21 | End: 2021-03-21 | Stop reason: HOSPADM

## 2021-03-21 RX ORDER — CALCITRIOL 0.25 UG/1
0.25 CAPSULE, LIQUID FILLED ORAL
Status: DISCONTINUED | OUTPATIENT
Start: 2021-03-22 | End: 2021-03-21 | Stop reason: HOSPADM

## 2021-03-21 RX ORDER — SEVELAMER CARBONATE 800 MG/1
800 TABLET, FILM COATED ORAL
Status: DISCONTINUED | OUTPATIENT
Start: 2021-03-21 | End: 2021-03-21 | Stop reason: HOSPADM

## 2021-03-21 RX ORDER — AMLODIPINE BESYLATE 5 MG/1
5 TABLET ORAL 2 TIMES DAILY
Status: DISCONTINUED | OUTPATIENT
Start: 2021-03-21 | End: 2021-03-21 | Stop reason: HOSPADM

## 2021-03-21 RX ORDER — FERROUS SULFATE 325(65) MG
325 TABLET ORAL
Status: DISCONTINUED | OUTPATIENT
Start: 2021-03-21 | End: 2021-03-21 | Stop reason: HOSPADM

## 2021-03-21 RX ORDER — POLYETHYLENE GLYCOL 3350 17 G/17G
17 POWDER, FOR SOLUTION ORAL ONCE
Status: COMPLETED | OUTPATIENT
Start: 2021-03-21 | End: 2021-03-21

## 2021-03-21 RX ORDER — VITAMIN B COMPLEX
3000 TABLET ORAL DAILY
Status: DISCONTINUED | OUTPATIENT
Start: 2021-03-21 | End: 2021-03-21 | Stop reason: HOSPADM

## 2021-03-21 RX ORDER — ARFORMOTEROL TARTRATE 15 UG/2ML
15 SOLUTION RESPIRATORY (INHALATION) 2 TIMES DAILY
Status: DISCONTINUED | OUTPATIENT
Start: 2021-03-21 | End: 2021-03-21 | Stop reason: HOSPADM

## 2021-03-21 RX ORDER — HYDRALAZINE HYDROCHLORIDE 25 MG/1
50 TABLET, FILM COATED ORAL 2 TIMES DAILY
Status: DISCONTINUED | OUTPATIENT
Start: 2021-03-21 | End: 2021-03-21 | Stop reason: HOSPADM

## 2021-03-21 RX ORDER — DEXTROSE MONOHYDRATE 25 G/50ML
12.5 INJECTION, SOLUTION INTRAVENOUS PRN
Status: DISCONTINUED | OUTPATIENT
Start: 2021-03-21 | End: 2021-03-21 | Stop reason: HOSPADM

## 2021-03-21 RX ORDER — HEPARIN SODIUM 5000 [USP'U]/ML
5000 INJECTION, SOLUTION INTRAVENOUS; SUBCUTANEOUS EVERY 8 HOURS SCHEDULED
Status: DISCONTINUED | OUTPATIENT
Start: 2021-03-21 | End: 2021-03-21 | Stop reason: HOSPADM

## 2021-03-21 RX ADMIN — BUDESONIDE 500 MCG: 0.5 INHALANT RESPIRATORY (INHALATION) at 08:21

## 2021-03-21 RX ADMIN — HEPARIN SODIUM 5000 UNITS: 5000 INJECTION INTRAVENOUS; SUBCUTANEOUS at 08:22

## 2021-03-21 RX ADMIN — SEVELAMER CARBONATE 800 MG: 800 TABLET, FILM COATED ORAL at 09:42

## 2021-03-21 RX ADMIN — Medication 3000 UNITS: at 08:22

## 2021-03-21 RX ADMIN — SODIUM BICARBONATE 1300 MG: 650 TABLET ORAL at 09:42

## 2021-03-21 RX ADMIN — GABAPENTIN 100 MG: 100 CAPSULE ORAL at 08:23

## 2021-03-21 RX ADMIN — HEPARIN SODIUM 5000 UNITS: 5000 INJECTION INTRAVENOUS; SUBCUTANEOUS at 15:10

## 2021-03-21 RX ADMIN — HYDRALAZINE HYDROCHLORIDE 50 MG: 25 TABLET, FILM COATED ORAL at 09:48

## 2021-03-21 RX ADMIN — ONDANSETRON 4 MG: 2 INJECTION INTRAMUSCULAR; INTRAVENOUS at 08:22

## 2021-03-21 RX ADMIN — AMLODIPINE BESYLATE 5 MG: 5 TABLET ORAL at 08:23

## 2021-03-21 RX ADMIN — IPRATROPIUM BROMIDE 0.5 MG: 0.5 SOLUTION RESPIRATORY (INHALATION) at 08:21

## 2021-03-21 RX ADMIN — POLYETHYLENE GLYCOL 3350 17 G: 17 POWDER, FOR SOLUTION ORAL at 08:22

## 2021-03-21 RX ADMIN — INSULIN LISPRO 2 UNITS: 100 INJECTION, SOLUTION INTRAVENOUS; SUBCUTANEOUS at 08:23

## 2021-03-21 RX ADMIN — ARFORMOTEROL TARTRATE 15 MCG: 15 SOLUTION RESPIRATORY (INHALATION) at 08:21

## 2021-03-21 RX ADMIN — FERROUS SULFATE TAB 325 MG (65 MG ELEMENTAL FE) 325 MG: 325 (65 FE) TAB at 09:42

## 2021-03-21 ASSESSMENT — ENCOUNTER SYMPTOMS
EYE PAIN: 0
DIARRHEA: 0
SHORTNESS OF BREATH: 1
EYE DISCHARGE: 0
ABDOMINAL PAIN: 0
VOMITING: 0
TACHYPNEA: 1
SINUS PRESSURE: 0
WHEEZING: 0
SORE THROAT: 0
NAUSEA: 0
BACK PAIN: 0
COUGH: 0
EYE REDNESS: 0

## 2021-03-21 NOTE — ED PROVIDER NOTES
decreased breath sounds. Examination of the left-upper field reveals decreased breath sounds. Examination of the right-middle field reveals decreased breath sounds. Examination of the left-middle field reveals decreased breath sounds. Examination of the right-lower field reveals decreased breath sounds. Examination of the left-lower field reveals decreased breath sounds. Decreased breath sounds present. No wheezing, rhonchi or rales. Abdominal:      General: Bowel sounds are normal.      Palpations: Abdomen is soft. Tenderness: There is no abdominal tenderness. There is no guarding. Musculoskeletal: Normal range of motion. Skin:     General: Skin is warm and dry. Neurological:      Mental Status: He is alert and oriented to person, place, and time. Procedures     [facility]                [id number]. Spoke with Dr. Kimberlee Lau (Nephrology). Discussed case. They will call the dialysis team.      --------------------------------------------- PAST HISTORY ---------------------------------------------  Past Medical History:  has a past medical history of Back pain, Diabetes mellitus ([facility] DMII (diabetes mellitus, type 2) ([facility] History of cardiovascular stress test, HTN (hypertension), Hyperlipidemia, Hypertension, Lumbar radicular pain, and Type II or unspecified type diabetes mellitus without mention of complication, not stated as uncontrolled. Past Surgical History:  has a past surgical history that includes shoulder surgery; Vein Surgery; and other surgical history (Left, 06/06/14). Social History:  reports that he has quit smoking. He has a 30.00 pack-year smoking history. He has never used smokeless tobacco. He reports that he does not drink alcohol or use drugs. Family History: family history is not on file. The patients home medications have been reviewed. Allergies:  Other    -------------------------------------------------- RESULTS -------------------------------------------------    LABS:  Results for orders placed or performed during the hospital encounter of 03/21/21   COVID-19, Rapid    Specimen: Nasopharyngeal Swab   Result Value Ref Range    SARS-CoV-2, NAAT Not Detected Not Detected   Basic metabolic panel   Result Value Ref Range    Sodium 139 132 - 146 mmol/L    Potassium 3.8 3.5 - 5.0 mmol/L    Chloride 97 (L) 98 - 107 mmol/L    CO2 26 22 - 29 mmol/L    Anion Gap 16 7 - 16 mmol/L    Glucose 188 (H) 74 - 99 mg/dL    BUN 39 (H) 8 - 23 mg/dL    Calcium 8.5 (L) 8.6 - 10.2 mg/dL   CBC   Result Value Ref Range    WBC 10.0 4.5 - 11.5 E9/L    RBC 2.98 (L) 3.80 - 5.80 E12/L    Hemoglobin 8.4 (L) 12.5 - 16.5 g/dL    Hematocrit 28.1 (L) 37.0 - 54.0 %    MCV 94.3 80.0 - 99.9 fL    MCH 28.2 26.0 - 35.0 pg    MCHC 29.9 (L) 32.0 - 34.5 %    RDW 15.3 (H) 11.5 - 15.0 fL    Platelets 249 518 - 448 E9/L    MPV 10.2 7.0 - 12.0 fL   Troponin   Result Value Ref Range    Troponin 0.10 (H) 0.00 - 0.03 ng/mL       RADIOLOGY:  XR CHEST PORTABLE   Final Result   Bilateral mixed infiltrates which may represent mild edema versus developing   pneumonia. Continued follow-up recommended. EKG:  This EKG is signed and interpreted by me. Rate: 80  Rhythm: Sinus  Interpretation: no acute changes and sinus arrhythmia  Comparison: no previous EKG available      ------------------------- NURSING NOTES AND VITALS REVIEWED ---------------------------  Date / Time Roomed:  3/21/2021  5:10 AM  ED Bed Assignment:  08/08    The nursing notes within the ED encounter and vital signs as below have been reviewed.      Patient Vitals for the past 24 hrs:   BP Temp Temp src Pulse Resp SpO2 Height Weight   03/21/21 0625 129/68 -- -- 68 22 100 % -- --   03/21/21 0613 -- -- -- 69 20 99 % -- --   03/21/21 0506 132/62 97.8 °F (36.6 °C) Oral 153 -- (!) 88 % 5' 7\" (1.702 m) 209 lb (94.8 kg)       Oxygen Saturation Interpretation: Abnormal    ------------------------------------------ PROGRESS NOTES ------------------------------------------  Re-evaluation(s):  Time: 9225. Patients symptoms are improving  Repeat physical examination is improved    Counseling:  I have spoken with the patient and discussed todays results, in addition to providing specific details for the plan of care and counseling regarding the diagnosis and prognosis. Their questions are answered at this time and they are agreeable with the plan of admission.    --------------------------------- ADDITIONAL PROVIDER NOTES ---------------------------------  Consultations:  Time: 5765. Spoke with Dr. Bronwyn Taylor. Discussed case. They will admit the patient. This patient's ED course included: a personal history and physicial examination, re-evaluation prior to disposition, multiple bedside re-evaluations, IV medications, cardiac monitoring and continuous pulse oximetry    This patient has remained hemodynamically stable during their ED course. Diagnosis:  1. Acute respiratory failure with hypoxia ([address]    2. Acute pulmonary edema (HCC)    3. End stage renal failure on dialysis [facility])        Disposition:  Patient's disposition: Admit to telemetry  Patient's condition is stable.            Zachary Owens, DO  03/21/21 2112       [facility], DO  03/21/21 0585

## 2021-03-21 NOTE — ED NOTES
Meal tray ordered at this time.      Andree Michelle RN  03/21/21 [address] [place] [address]  03/21/21 4301

## 2021-03-21 NOTE — VIRTUAL HEALTH
30.00     Pack years: 30.00    Smokeless tobacco: Never Used   Substance and Sexual Activity    Alcohol use: No    Drug use: No    Sexual activity: Not on file   Lifestyle    Physical activity     Days per week: Not on file     Minutes per session: Not on file    Stress: Not on file   Relationships    Social connections     Talks on phone: Not on file     Gets together: Not on file     Attends [employer] service: Not on file     Active member of club or organization: Not on file     Attends meetings of clubs or organizations: Not on file     Relationship status: Not on file    Intimate partner violence     Fear of current or ex partner: Not on file     Emotionally abused: Not on file     Physically abused: Not on file     Forced sexual activity: Not on file   Other Topics Concern    Not on file   Social History Narrative    Not on file       Family History  History reviewed. No pertinent family history. Scheduled Meds:   amLODIPine  5 mg Oral BID    Vitamin D  3,000 Units Oral Daily    gabapentin  100 mg Oral BID    ferrous sulfate  325 mg Oral Daily with breakfast    [START ON 3/22/2021] calcitRIOL  0.25 mcg Oral Q MWF    insulin glargine  25 Units Subcutaneous Nightly    sodium bicarbonate  1,300 mg Oral TID    metoprolol tartrate  50 mg Oral BID    hydrALAZINE  50 mg Oral BID    sevelamer  800 mg Oral TID     heparin (porcine)  5,000 Units Subcutaneous 3 times per day    insulin lispro  0-12 Units Subcutaneous TID     insulin lispro  0-6 Units Subcutaneous Nightly    Arformoterol Tartrate  15 mcg Nebulization BID    And    budesonide  0.5 mg Nebulization BID    ipratropium  0.5 mg Nebulization 4x daily       Continuous Infusions:  [unfilled]    PRN meds  ipratropium-albuterol, acetaminophen, ondansetron, glucose, dextrose, glucagon (rDNA), dextrose    Allergies: Other    Review of Systems     Pertinent positives and negatives as in HPI.  Other systems reviewed and were negative. LAST 3 CMP  Recent Labs     03/21/21  0530      K 3.8   CL 97*   CO2 26   BUN 39*   CREATININE 9.8*   GLUCOSE 188*   CALCIUM 8.5*       LAST 3 CBC:  Recent Labs     03/21/21  0530   WBC 10.0   RBC 2.98*   HGB 8.4*   HCT 28.1*          No intake or output data in the 24 hours ending 03/21/21 1152  Patient Vitals for the past 24 hrs:   BP Temp Temp src Pulse Resp SpO2 Height Weight   03/21/21 1145 122/67   65       03/21/21 1113 131/66 97.9 °F (36.6 °C)  65 18      03/21/21 1031 135/69   66 16 100 %     03/21/21 1001 (!) 149/65   63 17 100 %     03/21/21 0932 136/72   67 17 100 %     03/21/21 0902 (!) 146/66   66 15      03/21/21 0900      100 %     03/21/21 0800 (!) 164/80   65 21 100 %     03/21/21 0730 131/65   67 19 100 %     03/21/21 0700 129/68   66 20 100 %     03/21/21 0630 139/67   75 19 99 %     03/21/21 0625 129/68   68 22 100 %     03/21/21 0613    69 20 99 %     03/21/21 0506 132/62 97.8 °F (36.6 °C) Oral 153  (!) 88 % 5' 7\" (1.702 m) 209 lb (94.8 kg)       General appearance:  Appears stated age  Skin: color, texture, turgor normal. No rashes or lesions. Neck: supple, no masses, no JVD, No carotid bruits, No thyromegaly  Lungs: respirations unlabored. Clear to auscultation. No rales, wheezes, no rhonchi. Equal chest excursion with respirations. Heart RRR. pmi not laterally displaced. No S3 or S4, no rub  Abdomen:  Soft, ND, NT. Bowel sounds present. No HSM. No epigastric bruit, no increased Ao pulsation,  Extremities: warm to touch. No LE edema or cyanosis. No fem bruit. 2+ upstrokes and equal bilaterally. PT/Pedal 2+ equal bilat  Neuro: Cr N 2-12 grossly intact. No focal motor neuro deficit. No alteration in recent remote memory. Assessment/Plan    1) ESRD .  Missed dialysis treatment , comes in with pulm edema needing urgent       Dialysis and fluid removal     2) HTN  Controlled     3) Type 2 DM controlled 4) anemia of CKD Hb somewhat low , on MANUEL and iron per protocol     5) secondary HPT       Currently on HD , UF as tolerated .            Thank you for allowing us to participate in the care of [address] [place] [address]  3/21/2021  11:52 AM

## 2021-03-21 NOTE — FLOWSHEET NOTE
03/21/21 1443   Vital Signs   BP (!) 148/76   Temp 98 °F (36.7 °C)   Pulse 76   Resp 18   Post-Hemodialysis Assessment   Post-Treatment Procedures Blood returned;Catheter capped, clamped and heparinized x 2 ports   Machine Disinfection Process Acid/Vinegar Clean;Machine Absence of Bleach Machine; Exterior Machine Disinfection   Rinseback Volume (ml) 300 ml   Dialyzer Clearance Lightly streaked   Duration of Treatment (minutes) 210 minutes   Hemodialysis Intake (ml) 500 ml   Hemodialysis Output (ml) 3200 ml   NET Removed (ml) 2700 ml   Tolerated Treatment Good   Patient Response to Treatment tolerated tx well had some side and calf cramping in last 15 mins. of tx. uf off but cramping was not relieved. thus rinsed pt back with 10 mins. remaining. cramping was relieved. cath closed with sufficient amount of heparin 1000:1 and capped aseptically. dressing to [facility] cath changed. some swelling noted d/t recent insertion. no signs of infection. pt has no complaints post rinseback and states his breathing is better.  pt sent back to er in stable condition   Bilateral Breath Sounds Diminished   Edema Generalized   Edema Generalized Non-pitting

## 2021-03-21 NOTE — H&P
Department of Internal Medicine        CHIEF COMPLAINT:  SOB    Reason for Admission:  CHF    HISTORY OF PRESENT ILLNESS:      The patient is a 63 y.o. male who presents with increased SOB that started yesterday. Patient states he is a  and noticed he could barely do his job walking around yesterday. Patient denies any problem with chest pain, palpitations or dizziness. Patient is states he was close to have dialysis 2 days ago but he had his catheter replaced and when he went to dialysis they told him he had to come back the next morning i.e. yesterday. Patient had to work yesterday and could not go to the dialysis treatment. Patient comes to the ED with marked dyspnea with any exertion with chest x-ray showing bilateral infiltrates suggestive of CHF/pulmonary edema. .  Serum creatinine was up to 9.8 and potassium is stable at 3.8 with troponin 0.1. Hemoglobin is 8.4 with a WBC of 10. Patient was admitted for further evaluation and for dialysis treatment for his acute pulmonary edema because of missed dialysis treatment. Past Medical History:    Past Medical History:   Diagnosis Date    Back pain     Diabetes mellitus ([facility] [address]     DMII (diabetes mellitus, type 2) ([facility] [address] 7/28/2015    History of cardiovascular stress test 2/16/2015    lexiscan    HTN (hypertension) 7/28/2015    Hyperlipidemia     Hypertension     Lumbar radicular pain 7/28/2015    Type II or unspecified type diabetes mellitus without mention of complication, not stated as uncontrolled      Past Surgical History:    Past Surgical History:   Procedure Laterality Date    OTHER SURGICAL HISTORY Left 06/06/14    cain bunionectomy left foot with osteomed screw x1    SHOULDER SURGERY      Bilateral    VEIN SURGERY         Medications Prior to Admission:    @  Prior to Admission medications    Medication Sig Start Date End Date Taking?  Authorizing Provider   sodium bicarbonate 650 MG tablet Take 2 tablets by mouth 3 times daily 2/13/20   Shannan Block, DO   ipratropium-albuterol (DUONEB) 0.5-2.5 (3) MG/3ML SOLN nebulizer solution Inhale 3 mLs into the lungs every 4 hours as needed for Shortness of Breath 2/13/20   Shannan Block, DO   metoprolol tartrate (LOPRESSOR) 50 MG tablet Take 1 tablet by mouth 2 times daily 2/13/20   Shannan Block, DO   hydrALAZINE (APRESOLINE) 25 MG tablet Take 2 tablets by mouth 2 times daily 2/13/20   Shannan Block, DO   sevelamer (RENVELA) 800 MG tablet Take 1 tablet by mouth 3 times daily (with meals) 2/13/20   Shannan Block, DO   tiotropium (SPIRIVA RESPIMAT) 2.5 MCG/ACT AERS inhaler Inhale 2 puffs into the lungs daily 2/13/20   Shannan Block, DO   albuterol sulfate HFA (PROVENTIL HFA) 108 (90 Base) MCG/ACT inhaler Inhale 2 puffs into the lungs every 4 hours as needed for Wheezing or Shortness of Breath 2/13/20   Shannan Block, DO   mometasone-formoterol [facility]) 200-5 MCG/ACT inhaler Inhale 2 puffs into the lungs every 12 hours 2/13/20   Shannan Block, DO   ferrous sulfate 325 (65 Fe) MG tablet Take 325 mg by mouth daily (with breakfast)    Historical Provider, MD   calcitRIOL (ROCALTROL) 0.25 MCG capsule Take 0.25 mcg by mouth Takes 3 times a week    Historical Provider, MD   insulin glargine (LANTUS) 100 UNIT/ML injection vial Inject 25 Units into the skin nightly    Historical Provider, MD   gabapentin (NEURONTIN) 100 MG capsule Take 100 mg by mouth. Historical Provider, MD   amLODIPine (NORVASC) 10 MG tablet Take 5 mg by mouth 2 times daily     Historical Provider, MD   Cholecalciferol (VITAMIN D3) 3000 units TABS Take 150 mcg by mouth daily     Historical Provider, MD       Allergies:   Other    Social History:   Social History     Socioeconomic History    Marital status: Single     Spouse name: Not on file    Number of children: Not on file    Years of education: Not on file    Highest education level: Not on file   Occupational History    Not on file   Social Needs  Financial resource strain: Not on file    Food insecurity     Worry: Not on file     Inability: Not on file   [employer] needs     Medical: Not on file     Non-medical: Not on file   Tobacco Use    Smoking status: Former Smoker     Packs/day: 1.00     Years: 30.00     Pack years: 30.00    Smokeless tobacco: Never Used   Substance and Sexual Activity    Alcohol use: No    Drug use: No    Sexual activity: Not on file   Lifestyle    Physical activity     Days per week: Not on file     Minutes per session: Not on file    Stress: Not on file   Relationships    Social connections     Talks on phone: Not on file     Gets together: Not on file     Attends [employer] service: Not on file     Active member of club or organization: Not on file     Attends meetings of clubs or organizations: Not on file     Relationship status: Not on file    Intimate partner violence     Fear of current or ex partner: Not on file     Emotionally abused: Not on file     Physically abused: Not on file     Forced sexual activity: Not on file   Other Topics Concern    Not on file   Social History Narrative    Not on file       Family History:   History reviewed. No pertinent family history. REVIEW OF SYSTEMS:    Gen: Patient denies any lightheadedness or dizziness. No LOC or syncope. No fevers or chills. HEENT: No earache, sore throat or nasal congestion. Resp: Denies cough, hemoptysis or sputum production. Cardiac: Denies chest pain, +SOB,no diaphoresis or palpitations. GI: No nausea, vomiting, diarrhea or constipation. No melena or hematochezia. : No urinary complaints, dysuria, hematuria or frequency. MSK: No extremity weakness, paralysis or paresthesias.      PHYSICAL EXAM:    Vitals:  /68   Pulse 68   Temp 97.8 °F (36.6 °C) (Oral)   Resp 22   Ht 5' 7\" (1.702 m)   Wt 209 lb (94.8 kg)   SpO2 100%   BMI 32.73 kg/m²     General:  This is a 63 y.o. yo male who is alert and oriented in mild resp distress  HEENT:  Head is normocephalic and atraumatic, PERRLA, EOMI, mucus membranes moist with no pharyngeal erythema or exudate. Neck:  Supple with no carotid bruits, JVD or thyromegaly.   No cervical adenopathy  CV:  Regular rate and rhythm, no murmurs  Lungs:  Coarse breath sounds to auscultation bilaterally with no wheezes, rales or rhonchi  Abdomen:  Soft, nontender, nondistended, bowel sounds present  Extremities:  No edema, peripheral pulses intact bilaterally  Neuro:  Cranial nerves II-XII grossly intact; motor and sensory function intact with no focal deficits  Skin:  No rashes, lesions or wounds    DATA:  CBC with Differential:    Lab Results   Component Value Date    WBC 10.0 03/21/2021    RBC 2.98 03/21/2021    HGB 8.4 03/21/2021    HCT 28.1 03/21/2021     03/21/2021    MCV 94.3 03/21/2021    MCH 28.2 03/21/2021    MCHC 29.9 03/21/2021    RDW 15.3 03/21/2021    LYMPHOPCT 6.8 02/03/2020    MONOPCT 3.1 02/03/2020    BASOPCT 0.1 02/03/2020    MONOSABS 0.35 02/03/2020    LYMPHSABS 0.77 02/03/2020    EOSABS 0.00 02/03/2020    BASOSABS 0.01 02/03/2020     CMP:    Lab Results   Component Value Date     03/21/2021    K 3.8 03/21/2021    K 5.6 02/02/2020    CL 97 03/21/2021    CO2 26 03/21/2021    BUN 39 03/21/2021    CREATININE 9.8 03/21/2021    GFRAA 7 03/21/2021    LABGLOM 7 03/21/2021    GLUCOSE 188 03/21/2021    PROT 6.6 02/08/2020    LABALBU 2.5 02/08/2020    CALCIUM 8.5 03/21/2021    BILITOT 0.2 02/08/2020    ALKPHOS 42 02/08/2020    AST 9 02/08/2020    ALT 9 02/08/2020     Magnesium:    Lab Results   Component Value Date    MG 2.2 02/13/2020     Phosphorus:    Lab Results   Component Value Date    PHOS 4.6 02/13/2020     PT/INR:    Lab Results   Component Value Date    PROTIME 11.8 02/07/2020    INR 1.0 02/07/2020     Troponin:    Lab Results   Component Value Date    TROPONINI 0.10 03/21/2021     U/A:    Lab Results   Component Value Date    COLORU Yellow 02/02/2020    PROTEINU >=300 02/02/2020    PHUR 5.0 02/02/2020    LABCAST RARE 09/21/2018    WBCUA 0-1 02/02/2020    RBCUA NONE 02/02/2020    BACTERIA NONE 02/02/2020    CLARITYU Clear 02/02/2020    SPECGRAV 1.025 02/02/2020    LEUKOCYTESUR Negative 02/02/2020    UROBILINOGEN 0.2 02/02/2020    BILIRUBINUR Negative 02/02/2020    BLOODU TRACE 02/02/2020    GLUCOSEU 250 02/02/2020    AMORPHOUS FEW 10/02/2019     ABG:    Lab Results   Component Value Date    PH 7.363 02/08/2020    PCO2 47.3 02/08/2020    PO2 140.5 02/08/2020    HCO3 26.3 02/08/2020    BE 0.7 02/08/2020    O2SAT 98.8 02/08/2020     HgBA1c:    Lab Results   Component Value Date    LABA1C 7.6 02/02/2020     FLP:    Lab Results   Component Value Date    TRIG 60 02/03/2020    HDL 60 02/03/2020    LDLCALC 71 02/03/2020    LABVLDL 12 02/03/2020     TSH:    Lab Results   Component Value Date    TSH 0.927 02/03/2020     IRON:    Lab Results   Component Value Date    IRON 19 02/09/2020     LIPASE:    Lab Results   Component Value Date    LIPASE 78 09/24/2018       ASSESSMENT AND PLAN:      Patient Active Problem List    Diagnosis Date Noted    Halldonna andres, acquired 06/06/2014     Priority: Low     Class: Present on Admission    Respiratory failure ([facility] [address] 02/02/2020    Pneumonia 01/28/2020    Acute pancreatitis without necrosis or infection, unspecified 09/23/2018    Idiopathic acute pancreatitis 09/21/2018    B12 deficiency 03/17/2016    DMII (diabetes mellitus, type 2) ([facility] [address] 07/28/2015    HTN (hypertension) 07/28/2015    Lumbar radicular pain 07/28/2015    Spinal stenosis 07/28/2015     Impression:  1. Acute hypoxic respiratory distress  2. CRF with HD- missed last dialysis tx  3. IDDM T 2  4. HTN  5. Chronic normocytic anemia  6.   Elevated troponin-most likely related to chronic renal failure with missed dialysis treatment with increasing BUN/creatinine    Plan:  Plan is observation general medical floor  Consult nephrology for dialysis treatment today  Continue home meds  O2 nasal cannula as needed  Renal diet  Activity up ad layla.       Amanda Moran D.O.  3/21/2021  7:21 AM

## 2021-03-21 NOTE — DISCHARGE SUMMARY
Department of Internal Medicine        CHIEF COMPLAINT:  SOB    Reason for Admission:  CHF    HISTORY OF PRESENT ILLNESS:      The patient is a 63 y.o. male who presents with increased SOB that started yesterday. Patient states he is a  and noticed he could barely do his job walking around yesterday. Patient denies any problem with chest pain, palpitations or dizziness. Patient is states he was close to have dialysis 2 days ago but he had his catheter replaced and when he went to dialysis they told him he had to come back the next morning i.e. yesterday. Patient had to work yesterday and could not go to the dialysis treatment. Patient comes to the ED with marked dyspnea with any exertion with chest x-ray showing bilateral infiltrates suggestive of CHF/pulmonary edema. .  Serum creatinine was up to 9.8 and potassium is stable at 3.8 with troponin 0.1. Hemoglobin is 8.4 with a WBC of 10. Patient was admitted for further evaluation and for dialysis treatment for his acute pulmonary edema because of missed dialysis treatment. Patient received dialysis and felt much better. He denied any cough or congestion. He was discharged per Nephro.     Pt is stable for discharge    Past Medical History:    Past Medical History:   Diagnosis Date    Back pain     Diabetes mellitus ([facility] [address]     DMII (diabetes mellitus, type 2) ([facility] [address] 7/28/2015    History of cardiovascular stress test 2/16/2015    lexiscan    HTN (hypertension) 7/28/2015    Hyperlipidemia     Hypertension     Lumbar radicular pain 7/28/2015    Type II or unspecified type diabetes mellitus without mention of complication, not stated as uncontrolled      Past Surgical History:    Past Surgical History:   Procedure Laterality Date    OTHER SURGICAL HISTORY Left 06/06/14    cain bunionectomy left foot with osteomed screw x1    SHOULDER SURGERY      Bilateral    VEIN SURGERY         Medications Prior to Admission:    @  Prior to Admission medications    Medication Sig Start Date End Date Taking? Authorizing Provider   sodium bicarbonate 650 MG tablet Take 2 tablets by mouth 3 times daily 2/13/20   Yair Root, DO   ipratropium-albuterol (DUONEB) 0.5-2.5 (3) MG/3ML SOLN nebulizer solution Inhale 3 mLs into the lungs every 4 hours as needed for Shortness of Breath 2/13/20   Yair Root, DO   metoprolol tartrate (LOPRESSOR) 50 MG tablet Take 1 tablet by mouth 2 times daily 2/13/20   Yair Root, DO   hydrALAZINE (APRESOLINE) 25 MG tablet Take 2 tablets by mouth 2 times daily 2/13/20   Yair Root, DO   sevelamer (RENVELA) 800 MG tablet Take 1 tablet by mouth 3 times daily (with meals) 2/13/20   Yair Root, DO   tiotropium (SPIRIVA RESPIMAT) 2.5 MCG/ACT AERS inhaler Inhale 2 puffs into the lungs daily 2/13/20   Yair Root, DO   albuterol sulfate HFA (PROVENTIL HFA) 108 (90 Base) MCG/ACT inhaler Inhale 2 puffs into the lungs every 4 hours as needed for Wheezing or Shortness of Breath 2/13/20   Yair Root, DO   mometasone-formoterol [facility]) 200-5 MCG/ACT inhaler Inhale 2 puffs into the lungs every 12 hours 2/13/20   Yair Root, DO   ferrous sulfate 325 (65 Fe) MG tablet Take 325 mg by mouth daily (with breakfast)    Historical Provider, MD   calcitRIOL (ROCALTROL) 0.25 MCG capsule Take 0.25 mcg by mouth Takes 3 times a week    Historical Provider, MD   insulin glargine (LANTUS) 100 UNIT/ML injection vial Inject 25 Units into the skin nightly    Historical Provider, MD   gabapentin (NEURONTIN) 100 MG capsule Take 100 mg by mouth. Historical Provider, MD   amLODIPine (NORVASC) 10 MG tablet Take 5 mg by mouth 2 times daily     Historical Provider, MD   Cholecalciferol (VITAMIN D3) 3000 units TABS Take 150 mcg by mouth daily     Historical Provider, MD       Allergies:   Other    Social History:   Social History     Socioeconomic History    Marital status: Single     Spouse name: Not on file    Number of children: Not on file    Years of education: Not on file    Highest education level: Not on file   Occupational History    Not on file   Social Needs    Financial resource strain: Not on file    Food insecurity     Worry: Not on file     Inability: Not on file    Transportation needs     Medical: Not on file     Non-medical: Not on file   Tobacco Use    Smoking status: Former Smoker     Packs/day: 1.00     Years: 30.00     Pack years: 30.00    Smokeless tobacco: Never Used   Substance and Sexual Activity    Alcohol use: No    Drug use: No    Sexual activity: Not on file   Lifestyle    Physical activity     Days per week: Not on file     Minutes per session: Not on file    Stress: Not on file   Relationships    Social connections     Talks on phone: Not on file     Gets together: Not on file     Attends Yazdanism service: Not on file     Active member of club or organization: Not on file     Attends meetings of clubs or organizations: Not on file     Relationship status: Not on file    Intimate partner violence     Fear of current or ex partner: Not on file     Emotionally abused: Not on file     Physically abused: Not on file     Forced sexual activity: Not on file   Other Topics Concern    Not on file   Social History Narrative    Not on file       Family History:   History reviewed. No pertinent family history. REVIEW OF SYSTEMS:    Gen: Patient denies any lightheadedness or dizziness. No LOC or syncope. No fevers or chills. HEENT: No earache, sore throat or nasal congestion. Resp: Denies cough, hemoptysis or sputum production. Cardiac: Denies chest pain, +SOB,no diaphoresis or palpitations. GI: No nausea, vomiting, diarrhea or constipation. No melena or hematochezia. : No urinary complaints, dysuria, hematuria or frequency. MSK: No extremity weakness, paralysis or paresthesias.      PHYSICAL EXAM:    Vitals:  BP (!) 148/76   Pulse 76   Temp 98 °F (36.7 °C)   Resp 18 Ht 5' 7\" (1.702 m)   Wt 209 lb (94.8 kg)   SpO2 100%   BMI 32.73 kg/m²     General:  This is a 63 y.o. yo male who is alert and oriented in mild resp distress  HEENT:  Head is normocephalic and atraumatic, PERRLA, EOMI, mucus membranes moist with no pharyngeal erythema or exudate. Neck:  Supple with no carotid bruits, JVD or thyromegaly.   No cervical adenopathy  CV:  Regular rate and rhythm, no murmurs  Lungs:  Coarse breath sounds to auscultation bilaterally with no wheezes, rales or rhonchi  Abdomen:  Soft, nontender, nondistended, bowel sounds present  Extremities:  No edema, peripheral pulses intact bilaterally  Neuro:  Cranial nerves II-XII grossly intact; motor and sensory function intact with no focal deficits  Skin:  No rashes, lesions or wounds    DATA:  CBC with Differential:    Lab Results   Component Value Date    WBC 10.0 03/21/2021    RBC 2.98 03/21/2021    HGB 8.4 03/21/2021    HCT 28.1 03/21/2021     03/21/2021    MCV 94.3 03/21/2021    MCH 28.2 03/21/2021    MCHC 29.9 03/21/2021    RDW 15.3 03/21/2021    LYMPHOPCT 6.8 02/03/2020    MONOPCT 3.1 02/03/2020    BASOPCT 0.1 02/03/2020    MONOSABS 0.35 02/03/2020    LYMPHSABS 0.77 02/03/2020    EOSABS 0.00 02/03/2020    BASOSABS 0.01 02/03/2020     CMP:    Lab Results   Component Value Date     03/21/2021    K 3.8 03/21/2021    K 5.6 02/02/2020    CL 97 03/21/2021    CO2 26 03/21/2021    BUN 39 03/21/2021    CREATININE 9.8 03/21/2021    GFRAA 7 03/21/2021    LABGLOM 7 03/21/2021    GLUCOSE 114 03/21/2021    PROT 6.6 02/08/2020    LABALBU 2.5 02/08/2020    CALCIUM 8.5 03/21/2021    BILITOT 0.2 02/08/2020    ALKPHOS 42 02/08/2020    AST 9 02/08/2020    ALT 9 02/08/2020     Magnesium:    Lab Results   Component Value Date    MG 2.2 02/13/2020     Phosphorus:    Lab Results   Component Value Date    PHOS 4.6 02/13/2020     PT/INR:    Lab Results   Component Value Date    PROTIME 11.8 02/07/2020    INR 1.0 02/07/2020     Troponin:    Lab Results   Component Value Date    TROPONINI 0.10 03/21/2021     U/A:    Lab Results   Component Value Date    COLORU Yellow 02/02/2020    PROTEINU >=300 02/02/2020    PHUR 5.0 02/02/2020    LABCAST RARE 09/21/2018    WBCUA 0-1 02/02/2020    RBCUA NONE 02/02/2020    BACTERIA NONE 02/02/2020    CLARITYU Clear 02/02/2020    SPECGRAV 1.025 02/02/2020    LEUKOCYTESUR Negative 02/02/2020    UROBILINOGEN 0.2 02/02/2020    BILIRUBINUR Negative 02/02/2020    BLOODU TRACE 02/02/2020    GLUCOSEU 250 02/02/2020    AMORPHOUS FEW 10/02/2019     ABG:    Lab Results   Component Value Date    PH 7.363 02/08/2020    PCO2 47.3 02/08/2020    PO2 140.5 02/08/2020    HCO3 26.3 02/08/2020    BE 0.7 02/08/2020    O2SAT 98.8 02/08/2020     HgBA1c:    Lab Results   Component Value Date    LABA1C 8.4 03/21/2021     FLP:    Lab Results   Component Value Date    TRIG 106 03/21/2021    HDL 36 03/21/2021    LDLCALC 66 03/21/2021    LABVLDL 21 03/21/2021     TSH:    Lab Results   Component Value Date    TSH 1.160 03/21/2021     IRON:    Lab Results   Component Value Date    IRON 19 02/09/2020     LIPASE:    Lab Results   Component Value Date    LIPASE 78 09/24/2018       ASSESSMENT AND PLAN:      Patient Active Problem List    Diagnosis Date Noted    Hallux valgus, acquired 06/06/2014     Priority: Low     Class: Present on Admission    Respiratory failure ([facility] [address] 02/02/2020    Pneumonia 01/28/2020    Acute pancreatitis without necrosis or infection, unspecified 09/23/2018    Idiopathic acute pancreatitis 09/21/2018    B12 deficiency 03/17/2016    DMII (diabetes mellitus, type 2) ([facility] [address] 07/28/2015    HTN (hypertension) 07/28/2015    Lumbar radicular pain 07/28/2015    Spinal stenosis 07/28/2015     Impression:  1. Acute hypoxic respiratory distress from fluid overload  2. CRF with HD- missed last dialysis tx  3. IDDM T 2  4. HTN  5. Chronic normocytic anemia  6.   Elevated troponin-most likely related to chronic renal failure with missed dialysis treatment with increasing BUN/creatinine    Plan:  Discharge home   Cont home meds  F/U with Dialysis M-W-F  F/U with PCP in 1 week or as directed      Annamarie Barcenas D.O.  3/21/2021  3:32 PM

## 2021-03-21 NOTE — Clinical Note
Patient Class: Observation [104]   REQUIRED: Diagnosis: Acute respiratory failure with hypoxia ([facility] [address] [167849]   Estimated Length of Stay: Estimated stay of less than 2 midnights   Admitting Provider: Ranjeet Wooten [[id number]]   Preferred Department: Telemetry   Telemetry Bed Required?: Yes

## 2021-03-21 NOTE — ED NOTES
Pt back to ED at this time, states would like to be discharged \"I have dialysis tomorrow\". Dr Sergio Be paged.      Mala Fish, RN  03/21/21 [id number]

## 2021-05-07 ENCOUNTER — HOSPITAL ENCOUNTER (OUTPATIENT)
Dept: GENERAL RADIOLOGY | Age: 66
Discharge: HOME | End: 2021-05-09
Payer: MEDICARE

## 2021-05-07 ENCOUNTER — HOSPITAL ENCOUNTER (OUTPATIENT)
Age: 66
Discharge: HOME | End: 2021-05-09
Payer: MEDICARE

## 2021-05-07 DIAGNOSIS — R76.11 POSITIVE SKIN TEST FOR TUBERCULOSIS: ICD-10-CM

## 2021-05-07 PROCEDURE — 71046 X-RAY EXAM CHEST 2 VIEWS: CPT

## 2021-06-20 ENCOUNTER — APPOINTMENT (OUTPATIENT)
Dept: GENERAL RADIOLOGY | Age: 66
DRG: 193 | End: 2021-06-20
Payer: MEDICARE

## 2021-06-20 ENCOUNTER — HOSPITAL ENCOUNTER (INPATIENT)
Age: 66
LOS: 4 days | Discharge: HOME HEALTH | DRG: 193 | End: 2021-06-24
Attending: EMERGENCY MEDICINE | Admitting: INTERNAL MEDICINE
Payer: MEDICARE

## 2021-06-20 DIAGNOSIS — Z99.2 DIALYSIS PATIENT (HCC): ICD-10-CM

## 2021-06-20 DIAGNOSIS — J18.9 HEALTHCARE-ASSOCIATED PNEUMONIA: ICD-10-CM

## 2021-06-20 DIAGNOSIS — J96.01 ACUTE RESPIRATORY FAILURE WITH HYPOXIA (HCC): Primary | ICD-10-CM

## 2021-06-20 LAB
ALBUMIN SERPL-MCNC: 3.6 G/DL (ref 3.5–5.2)
ALP BLD-CCNC: 49 U/L (ref 40–129)
ALT SERPL-CCNC: <5 U/L (ref 0–40)
ANION GAP SERPL CALCULATED.3IONS-SCNC: 16 MMOL/L (ref 7–16)
AST SERPL-CCNC: 8 U/L (ref 0–39)
B.E.: 3.1 MMOL/L (ref -3–3)
BASOPHILS ABSOLUTE: 0.02 E9/L (ref 0–0.2)
BASOPHILS RELATIVE PERCENT: 0.2 % (ref 0–2)
BILIRUB SERPL-MCNC: 0.6 MG/DL (ref 0–1.2)
BUN BLDV-MCNC: 30 MG/DL (ref 6–23)
CALCIUM SERPL-MCNC: 9.3 MG/DL (ref 8.6–10.2)
CHLORIDE BLD-SCNC: 91 MMOL/L (ref 98–107)
CO2: 28 MMOL/L (ref 22–29)
CREAT SERPL-MCNC: 8.6 MG/DL (ref 0.7–1.2)
DELIVERY SYSTEMS: ABNORMAL
DEVICE: ABNORMAL
EKG ATRIAL RATE: 105 BPM
EKG P AXIS: 38 DEGREES
EKG P-R INTERVAL: 158 MS
EKG Q-T INTERVAL: 362 MS
EKG QRS DURATION: 80 MS
EKG QTC CALCULATION (BAZETT): 478 MS
EKG R AXIS: 30 DEGREES
EKG T AXIS: 66 DEGREES
EKG VENTRICULAR RATE: 105 BPM
EOSINOPHILS ABSOLUTE: 0.12 E9/L (ref 0.05–0.5)
EOSINOPHILS RELATIVE PERCENT: 1 % (ref 0–6)
GFR AFRICAN AMERICAN: 8
GFR NON-AFRICAN AMERICAN: 8 ML/MIN/1.73
GLUCOSE BLD-MCNC: 256 MG/DL (ref 74–99)
HBA1C MFR BLD: 8.1 % (ref 4–5.6)
HCO3 ARTERIAL: 28 MMOL/L (ref 22–26)
HCT VFR BLD CALC: 35.7 % (ref 37–54)
HEMOGLOBIN: 10.8 G/DL (ref 12.5–16.5)
IMMATURE GRANULOCYTES #: 0.04 E9/L
IMMATURE GRANULOCYTES %: 0.3 % (ref 0–5)
LACTIC ACID, SEPSIS: 2.3 MMOL/L (ref 0.5–1.9)
LACTIC ACID, SEPSIS: 2.4 MMOL/L (ref 0.5–1.9)
LYMPHOCYTES ABSOLUTE: 1.35 E9/L (ref 1.5–4)
LYMPHOCYTES RELATIVE PERCENT: 11.6 % (ref 20–42)
MCH RBC QN AUTO: 28.2 PG (ref 26–35)
MCHC RBC AUTO-ENTMCNC: 30.3 % (ref 32–34.5)
MCV RBC AUTO: 93.2 FL (ref 80–99.9)
METER GLUCOSE: 198 MG/DL (ref 74–99)
METER GLUCOSE: 309 MG/DL (ref 74–99)
MONOCYTES ABSOLUTE: 1.11 E9/L (ref 0.1–0.95)
MONOCYTES RELATIVE PERCENT: 9.5 % (ref 2–12)
NEUTROPHILS ABSOLUTE: 9.01 E9/L (ref 1.8–7.3)
NEUTROPHILS RELATIVE PERCENT: 77.4 % (ref 43–80)
O2 SATURATION: 98.5 % (ref 92–98.5)
OPERATOR ID: 797
PCO2 ARTERIAL: 43.3 MMHG (ref 35–45)
PDW BLD-RTO: 19.4 FL (ref 11.5–15)
PH BLOOD GAS: 7.42 (ref 7.35–7.45)
PLATELET # BLD: 253 E9/L (ref 130–450)
PMV BLD AUTO: 10.1 FL (ref 7–12)
PO2 ARTERIAL: 113.7 MMHG (ref 60–80)
POTASSIUM REFLEX MAGNESIUM: 4.5 MMOL/L (ref 3.5–5)
PRO-BNP: ABNORMAL PG/ML (ref 0–125)
RBC # BLD: 3.83 E12/L (ref 3.8–5.8)
SARS-COV-2, NAAT: NOT DETECTED
SODIUM BLD-SCNC: 135 MMOL/L (ref 132–146)
SOURCE, BLOOD GAS: ABNORMAL
TOTAL PROTEIN: 8.1 G/DL (ref 6.4–8.3)
TROPONIN, HIGH SENSITIVITY: 118 NG/L (ref 0–11)
TROPONIN, HIGH SENSITIVITY: 120 NG/L (ref 0–11)
WBC # BLD: 11.7 E9/L (ref 4.5–11.5)

## 2021-06-20 PROCEDURE — 80053 COMPREHEN METABOLIC PANEL: CPT

## 2021-06-20 PROCEDURE — 2580000003 HC RX 258: Performed by: STUDENT IN AN ORGANIZED HEALTH CARE EDUCATION/TRAINING PROGRAM

## 2021-06-20 PROCEDURE — 71045 X-RAY EXAM CHEST 1 VIEW: CPT

## 2021-06-20 PROCEDURE — 6360000002 HC RX W HCPCS: Performed by: STUDENT IN AN ORGANIZED HEALTH CARE EDUCATION/TRAINING PROGRAM

## 2021-06-20 PROCEDURE — 82962 GLUCOSE BLOOD TEST: CPT

## 2021-06-20 PROCEDURE — 94664 DEMO&/EVAL PT USE INHALER: CPT

## 2021-06-20 PROCEDURE — 1200000000 HC SEMI PRIVATE

## 2021-06-20 PROCEDURE — 85025 COMPLETE CBC W/AUTO DIFF WBC: CPT

## 2021-06-20 PROCEDURE — 94640 AIRWAY INHALATION TREATMENT: CPT

## 2021-06-20 PROCEDURE — 93010 ELECTROCARDIOGRAM REPORT: CPT | Performed by: INTERNAL MEDICINE

## 2021-06-20 PROCEDURE — 93005 ELECTROCARDIOGRAM TRACING: CPT | Performed by: STUDENT IN AN ORGANIZED HEALTH CARE EDUCATION/TRAINING PROGRAM

## 2021-06-20 PROCEDURE — 36415 COLL VENOUS BLD VENIPUNCTURE: CPT

## 2021-06-20 PROCEDURE — 99284 EMERGENCY DEPT VISIT MOD MDM: CPT

## 2021-06-20 PROCEDURE — 83605 ASSAY OF LACTIC ACID: CPT

## 2021-06-20 PROCEDURE — 2700000000 HC OXYGEN THERAPY PER DAY

## 2021-06-20 PROCEDURE — 6360000002 HC RX W HCPCS: Performed by: INTERNAL MEDICINE

## 2021-06-20 PROCEDURE — 87635 SARS-COV-2 COVID-19 AMP PRB: CPT

## 2021-06-20 PROCEDURE — 96365 THER/PROPH/DIAG IV INF INIT: CPT

## 2021-06-20 PROCEDURE — 83036 HEMOGLOBIN GLYCOSYLATED A1C: CPT

## 2021-06-20 PROCEDURE — 5A1D70Z PERFORMANCE OF URINARY FILTRATION, INTERMITTENT, LESS THAN 6 HOURS PER DAY: ICD-10-PCS | Performed by: RADIOLOGY

## 2021-06-20 PROCEDURE — 82803 BLOOD GASES ANY COMBINATION: CPT

## 2021-06-20 PROCEDURE — 02HV33Z INSERTION OF INFUSION DEVICE INTO SUPERIOR VENA CAVA, PERCUTANEOUS APPROACH: ICD-10-PCS | Performed by: RADIOLOGY

## 2021-06-20 PROCEDURE — 6370000000 HC RX 637 (ALT 250 FOR IP): Performed by: STUDENT IN AN ORGANIZED HEALTH CARE EDUCATION/TRAINING PROGRAM

## 2021-06-20 PROCEDURE — 6370000000 HC RX 637 (ALT 250 FOR IP): Performed by: INTERNAL MEDICINE

## 2021-06-20 PROCEDURE — 87040 BLOOD CULTURE FOR BACTERIA: CPT

## 2021-06-20 PROCEDURE — 83880 ASSAY OF NATRIURETIC PEPTIDE: CPT

## 2021-06-20 PROCEDURE — 84484 ASSAY OF TROPONIN QUANT: CPT

## 2021-06-20 RX ORDER — ACETAMINOPHEN 500 MG
500 TABLET ORAL EVERY 6 HOURS PRN
Status: DISCONTINUED | OUTPATIENT
Start: 2021-06-20 | End: 2021-06-24 | Stop reason: HOSPADM

## 2021-06-20 RX ORDER — ARFORMOTEROL TARTRATE 15 UG/2ML
15 SOLUTION RESPIRATORY (INHALATION) 2 TIMES DAILY
Status: DISCONTINUED | OUTPATIENT
Start: 2021-06-20 | End: 2021-06-24 | Stop reason: HOSPADM

## 2021-06-20 RX ORDER — NICOTINE POLACRILEX 4 MG
15 LOZENGE BUCCAL PRN
Status: DISCONTINUED | OUTPATIENT
Start: 2021-06-20 | End: 2021-06-24 | Stop reason: HOSPADM

## 2021-06-20 RX ORDER — ALBUTEROL SULFATE 2.5 MG/3ML
2.5 SOLUTION RESPIRATORY (INHALATION) EVERY 6 HOURS PRN
Status: DISCONTINUED | OUTPATIENT
Start: 2021-06-20 | End: 2021-06-21

## 2021-06-20 RX ORDER — IPRATROPIUM BROMIDE AND ALBUTEROL SULFATE 2.5; .5 MG/3ML; MG/3ML
3 SOLUTION RESPIRATORY (INHALATION) EVERY 4 HOURS
Status: DISCONTINUED | OUTPATIENT
Start: 2021-06-20 | End: 2021-06-22

## 2021-06-20 RX ORDER — HEPARIN SODIUM 5000 [USP'U]/ML
5000 INJECTION, SOLUTION INTRAVENOUS; SUBCUTANEOUS EVERY 8 HOURS SCHEDULED
Status: DISCONTINUED | OUTPATIENT
Start: 2021-06-20 | End: 2021-06-24 | Stop reason: HOSPADM

## 2021-06-20 RX ORDER — DEXTROSE MONOHYDRATE 50 MG/ML
100 INJECTION, SOLUTION INTRAVENOUS PRN
Status: DISCONTINUED | OUTPATIENT
Start: 2021-06-20 | End: 2021-06-24 | Stop reason: HOSPADM

## 2021-06-20 RX ORDER — VITAMIN B COMPLEX
2000 TABLET ORAL DAILY
Status: DISCONTINUED | OUTPATIENT
Start: 2021-06-20 | End: 2021-06-24 | Stop reason: HOSPADM

## 2021-06-20 RX ORDER — SODIUM BICARBONATE 650 MG/1
1300 TABLET ORAL 3 TIMES DAILY
Status: DISCONTINUED | OUTPATIENT
Start: 2021-06-20 | End: 2021-06-24 | Stop reason: HOSPADM

## 2021-06-20 RX ORDER — FERROUS SULFATE 325(65) MG
325 TABLET ORAL
Status: DISCONTINUED | OUTPATIENT
Start: 2021-06-21 | End: 2021-06-24 | Stop reason: HOSPADM

## 2021-06-20 RX ORDER — CALCITRIOL 0.25 UG/1
0.25 CAPSULE, LIQUID FILLED ORAL
Status: DISCONTINUED | OUTPATIENT
Start: 2021-06-21 | End: 2021-06-24 | Stop reason: HOSPADM

## 2021-06-20 RX ORDER — GUAIFENESIN/DEXTROMETHORPHAN 100-10MG/5
5 SYRUP ORAL EVERY 4 HOURS PRN
Status: DISCONTINUED | OUTPATIENT
Start: 2021-06-20 | End: 2021-06-24 | Stop reason: HOSPADM

## 2021-06-20 RX ORDER — IPRATROPIUM BROMIDE AND ALBUTEROL SULFATE 2.5; .5 MG/3ML; MG/3ML
1 SOLUTION RESPIRATORY (INHALATION)
Status: DISCONTINUED | OUTPATIENT
Start: 2021-06-20 | End: 2021-06-20

## 2021-06-20 RX ORDER — HYDRALAZINE HYDROCHLORIDE 25 MG/1
50 TABLET, FILM COATED ORAL 2 TIMES DAILY
Status: DISCONTINUED | OUTPATIENT
Start: 2021-06-20 | End: 2021-06-24 | Stop reason: HOSPADM

## 2021-06-20 RX ORDER — SEVELAMER CARBONATE 800 MG/1
800 TABLET, FILM COATED ORAL
Status: DISCONTINUED | OUTPATIENT
Start: 2021-06-20 | End: 2021-06-22

## 2021-06-20 RX ORDER — ALBUTEROL SULFATE 90 UG/1
2 AEROSOL, METERED RESPIRATORY (INHALATION) EVERY 4 HOURS PRN
Status: DISCONTINUED | OUTPATIENT
Start: 2021-06-20 | End: 2021-06-20

## 2021-06-20 RX ORDER — DEXTROSE MONOHYDRATE 25 G/50ML
12.5 INJECTION, SOLUTION INTRAVENOUS PRN
Status: DISCONTINUED | OUTPATIENT
Start: 2021-06-20 | End: 2021-06-24 | Stop reason: HOSPADM

## 2021-06-20 RX ORDER — BUDESONIDE AND FORMOTEROL FUMARATE DIHYDRATE 160; 4.5 UG/1; UG/1
2 AEROSOL RESPIRATORY (INHALATION) 2 TIMES DAILY
Status: DISCONTINUED | OUTPATIENT
Start: 2021-06-20 | End: 2021-06-20

## 2021-06-20 RX ORDER — GABAPENTIN 100 MG/1
100 CAPSULE ORAL NIGHTLY
Status: DISCONTINUED | OUTPATIENT
Start: 2021-06-20 | End: 2021-06-24 | Stop reason: HOSPADM

## 2021-06-20 RX ORDER — BUDESONIDE 0.25 MG/2ML
0.25 INHALANT ORAL 2 TIMES DAILY
Status: DISCONTINUED | OUTPATIENT
Start: 2021-06-20 | End: 2021-06-24 | Stop reason: HOSPADM

## 2021-06-20 RX ORDER — INSULIN GLARGINE 100 [IU]/ML
25 INJECTION, SOLUTION SUBCUTANEOUS NIGHTLY
Status: DISCONTINUED | OUTPATIENT
Start: 2021-06-20 | End: 2021-06-24 | Stop reason: HOSPADM

## 2021-06-20 RX ORDER — AMLODIPINE BESYLATE 5 MG/1
5 TABLET ORAL 2 TIMES DAILY
Status: DISCONTINUED | OUTPATIENT
Start: 2021-06-20 | End: 2021-06-24 | Stop reason: HOSPADM

## 2021-06-20 RX ADMIN — INSULIN GLARGINE 25 UNITS: 100 INJECTION, SOLUTION SUBCUTANEOUS at 21:36

## 2021-06-20 RX ADMIN — AMLODIPINE BESYLATE 5 MG: 5 TABLET ORAL at 21:32

## 2021-06-20 RX ADMIN — Medication 2000 UNITS: at 13:12

## 2021-06-20 RX ADMIN — INSULIN LISPRO 1 UNITS: 100 INJECTION, SOLUTION INTRAVENOUS; SUBCUTANEOUS at 21:35

## 2021-06-20 RX ADMIN — IPRATROPIUM BROMIDE AND ALBUTEROL SULFATE 3 ML: .5; 2.5 SOLUTION RESPIRATORY (INHALATION) at 16:51

## 2021-06-20 RX ADMIN — ARFORMOTEROL TARTRATE 15 MCG: 15 SOLUTION RESPIRATORY (INHALATION) at 16:50

## 2021-06-20 RX ADMIN — GABAPENTIN 100 MG: 100 CAPSULE ORAL at 21:32

## 2021-06-20 RX ADMIN — HYDRALAZINE HYDROCHLORIDE 50 MG: 25 TABLET, FILM COATED ORAL at 13:12

## 2021-06-20 RX ADMIN — HEPARIN SODIUM 5000 UNITS: 5000 INJECTION INTRAVENOUS; SUBCUTANEOUS at 13:13

## 2021-06-20 RX ADMIN — HEPARIN SODIUM 5000 UNITS: 5000 INJECTION INTRAVENOUS; SUBCUTANEOUS at 21:33

## 2021-06-20 RX ADMIN — INSULIN LISPRO 8 UNITS: 100 INJECTION, SOLUTION INTRAVENOUS; SUBCUTANEOUS at 16:48

## 2021-06-20 RX ADMIN — CEFEPIME HYDROCHLORIDE 2000 MG: 2 INJECTION, POWDER, FOR SOLUTION INTRAVENOUS at 11:15

## 2021-06-20 RX ADMIN — BUDESONIDE 250 MCG: 0.25 INHALANT RESPIRATORY (INHALATION) at 16:50

## 2021-06-20 RX ADMIN — METOPROLOL TARTRATE 50 MG: 25 TABLET, FILM COATED ORAL at 13:12

## 2021-06-20 RX ADMIN — IPRATROPIUM BROMIDE AND ALBUTEROL SULFATE 3 ML: .5; 2.5 SOLUTION RESPIRATORY (INHALATION) at 22:39

## 2021-06-20 RX ADMIN — VANCOMYCIN HYDROCHLORIDE 2000 MG: 10 INJECTION, POWDER, LYOPHILIZED, FOR SOLUTION INTRAVENOUS at 12:57

## 2021-06-20 RX ADMIN — GUAIFENESIN AND DEXTROMETHORPHAN 5 ML: 100; 10 SYRUP ORAL at 21:39

## 2021-06-20 RX ADMIN — IPRATROPIUM BROMIDE AND ALBUTEROL SULFATE 1 AMPULE: .5; 3 SOLUTION RESPIRATORY (INHALATION) at 10:13

## 2021-06-20 RX ADMIN — SEVELAMER CARBONATE 800 MG: 800 TABLET, FILM COATED ORAL at 16:48

## 2021-06-20 RX ADMIN — AZITHROMYCIN DIHYDRATE 500 MG: 500 INJECTION, POWDER, LYOPHILIZED, FOR SOLUTION INTRAVENOUS at 11:49

## 2021-06-20 RX ADMIN — AMLODIPINE BESYLATE 5 MG: 5 TABLET ORAL at 13:12

## 2021-06-20 RX ADMIN — SODIUM BICARBONATE 1300 MG: 650 TABLET ORAL at 21:32

## 2021-06-20 ASSESSMENT — ENCOUNTER SYMPTOMS
COUGH: 1
VOMITING: 0
ABDOMINAL PAIN: 0
SHORTNESS OF BREATH: 1

## 2021-06-20 ASSESSMENT — PAIN SCALES - GENERAL
PAINLEVEL_OUTOF10: 0
PAINLEVEL_OUTOF10: 0

## 2021-06-20 NOTE — ED PROVIDER NOTES
HPI   53-year-old male patient presented to emergency department chief complaint of shortness of breath. Patient arrived 58% on room air. Shortness of breath is constant and not better with rest.  States he has been having cough since yesterday and worsening shortness of breath. Patient is a dialysis patient last session was on Friday. He denies any chest pain, nausea, vomiting, diarrhea. States he is coughing up some clear phlegm. States last time he was this short of breath he had pneumonia. Patient shortness of breath is worse with exertion. He has received both Covid vaccines. Denies any history of blood clot, no calf swelling and no history of CHF. Review of Systems   Constitutional: Negative for chills and fever. HENT: Negative for congestion. Clear phlegm production   Respiratory: Positive for cough and shortness of breath. Cardiovascular: Negative for chest pain and leg swelling. Gastrointestinal: Negative for abdominal pain and vomiting. Genitourinary: Negative for dysuria. All other systems reviewed and are negative. Physical Exam  Vitals and nursing note reviewed. HENT:      Head: Normocephalic and atraumatic. Nose: No congestion. Mouth/Throat:      Pharynx: Oropharynx is clear. Comments: Poor dentition. Eyes:      General: No scleral icterus. Conjunctiva/sclera: Conjunctivae normal.   Cardiovascular:      Rate and Rhythm: Regular rhythm. Tachycardia present. Pulmonary:      Comments: No wheezes auscultated. Patient is tachypneic. Pulse ox 58% on room air. Chest:      Chest wall: No tenderness. Abdominal:      General: Bowel sounds are normal. There is no distension. Tenderness: There is no abdominal tenderness. Musculoskeletal:         General: Normal range of motion. Cervical back: Normal range of motion. Skin:     General: Skin is warm and dry. Neurological:      General: No focal deficit present.       Mental Status: He is alert. Psychiatric:         Mood and Affect: Mood normal.         Behavior: Behavior normal.          Procedures     MDM   61-year-old male here in acute respiratory distress 58% on room air. Improved significantly with oxygen here. ABG not showing any significant issues. Patient's chest x-ray showing diffuse pneumonia. He is dialysis patient will be treated as healthcare associated pneumonia with cefepime and azithromycin. Patient is due for dialysis tomorrow. Potassium is 4.5. Patient to be admitted for further IV antibiotics and new oxygen requirement. EKG: This EKG is signed and interpreted by me. Rate: 105  Rhythm: Sinus  Interpretation: No acute findings on EKG. Comparison: Stable as compared to prior. ED Course as of Jun 20 1145   Sun Jun 20, 2021   1008   ATTENDING PROVIDER ATTESTATION:     I have personally performed and/or participated in the history, exam, medical decision making, and procedures and agree with all pertinent clinical information unless otherwise noted. I have also reviewed and agree with the past medical, family and social history unless otherwise noted. I have discussed this patient in detail with the resident and provided the instruction and education regarding the evidence-based evaluation and treatment of SOB. History: patient presents from home with complaint of SOB. He denies CP or peripheral edema. He has not missed dialysis. He admits to occasional cough. He denies history of PE/DVT. My findings: Brett Moreland is a 63 y.o. male whom is in mild distress. Physical exam reveals tachypnea. No wheezes appreciated. Heart rate is elevated and regular. Lungs CTA. Abdomen is soft and nontender. No peripheral tenderness or edema. He is awake and alert. My plan: Symptomatic and supportive care. Patient placed on NRB with immediate improvement of hypoxia which was initially 60%. Will perform cardiopulmonary evaluation.     Electronically signed by Bear Castellano DO on 6/20/21 at 10:08 AM EDT          [JS]   [id number] Patient states he does make urine.    [FG]      ED Course User Index  [FG] Yoly Noriega DO  [JS] Bear Castellano DO        ED Course as of Jun 20 1145   Sun Jun 20, 2021   1008   ATTENDING PROVIDER ATTESTATION:     I have personally performed and/or participated in the history, exam, medical decision making, and procedures and agree with all pertinent clinical information unless otherwise noted. I have also reviewed and agree with the past medical, family and social history unless otherwise noted. I have discussed this patient in detail with the resident and provided the instruction and education regarding the evidence-based evaluation and treatment of SOB. History: patient presents from home with complaint of SOB. He denies CP or peripheral edema. He has not missed dialysis. He admits to occasional cough. He denies history of PE/DVT. My findings: Danay Delong is a 63 y.o. male whom is in mild distress. Physical exam reveals tachypnea. No wheezes appreciated. Heart rate is elevated and regular. Lungs CTA. Abdomen is soft and nontender. No peripheral tenderness or edema. He is awake and alert. My plan: Symptomatic and supportive care. Patient placed on NRB with immediate improvement of hypoxia which was initially 60%. Will perform cardiopulmonary evaluation.     Electronically signed by Bear Castellano DO on 6/20/21 at 10:08 AM EDT          [JS]   [id number] Patient states he does make urine.    [FG]      ED Course User Index  [FG] Yoly Noriega DO  [JS] Bear Castellano DO       --------------------------------------------- PAST HISTORY ---------------------------------------------  Past Medical History:  has a past medical history of Back pain, Diabetes mellitus ([facility] [address] DMII (diabetes mellitus, type 2) ([facility] History of cardiovascular stress test, HTN (hypertension), Hyperlipidemia, Hypertension, Lumbar radicular pain, and Type II or unspecified type diabetes mellitus without mention of complication, not stated as uncontrolled. Past Surgical History:  has a past surgical history that includes shoulder surgery; Vein Surgery; and other surgical history (Left, 06/06/14). Social History:  reports that he has quit smoking. He has a 30.00 pack-year smoking history. He has never used smokeless tobacco. He reports that he does not drink alcohol and does not use drugs. Family History: family history is not on file. The patients home medications have been reviewed. Allergies:  Other    -------------------------------------------------- RESULTS -------------------------------------------------    LABS:  Results for orders placed or performed during the hospital encounter of 06/20/21   COVID-19, Rapid    Specimen: Nasopharyngeal Swab   Result Value Ref Range    SARS-CoV-2, NAAT Not Detected Not Detected   CBC Auto Differential   Result Value Ref Range    WBC 11.7 (H) 4.5 - 11.5 E9/L    RBC 3.83 3.80 - 5.80 E12/L    Hemoglobin 10.8 (L) 12.5 - 16.5 g/dL    Hematocrit 35.7 (L) 37.0 - 54.0 %    MCV 93.2 80.0 - 99.9 fL    MCH 28.2 26.0 - 35.0 pg    MCHC 30.3 (L) 32.0 - 34.5 %    RDW 19.4 (H) 11.5 - 15.0 fL    Platelets 333 245 - 011 E9/L    MPV 10.1 7.0 - 12.0 fL    Neutrophils % 77.4 43.0 - 80.0 %    Immature Granulocytes % 0.3 0.0 - 5.0 %    Lymphocytes % 11.6 (L) 20.0 - 42.0 %    Monocytes % 9.5 2.0 - 12.0 %    Eosinophils % 1.0 0.0 - 6.0 %    Basophils % 0.2 0.0 - 2.0 %    Neutrophils Absolute 9.01 (H) 1.80 - 7.30 E9/L    Immature Granulocytes # 0.04 E9/L    Lymphocytes Absolute 1.35 (L) 1.50 - 4.00 E9/L    Monocytes Absolute 1.11 (H) 0.10 - 0.95 E9/L    Eosinophils Absolute 0.12 0.05 - 0.50 E9/L    Basophils Absolute 0.02 0.00 - 0.20 E9/L   Comprehensive Metabolic Panel w/ Reflex to MG   Result Value Ref Range    Sodium 135 132 - 146 mmol/L    Potassium reflex Magnesium 4.5 3.5 - 5.0 mmol/L    Chloride 91 (L) 98 - 107 mmol/L    CO2 28 22 - 29 mmol/L    Anion Gap 16 7 - 16 mmol/L    Glucose 256 (H) 74 - 99 mg/dL    BUN 30 (H) 6 - 23 mg/dL    CREATININE 8.6 (HH) 0.7 - 1.2 mg/dL    GFR Non-African American 8 >=60 mL/min/1.73    GFR African American 8     Calcium 9.3 8.6 - 10.2 mg/dL    Total Protein 8.1 6.4 - 8.3 g/dL    Albumin 3.6 3.5 - 5.2 g/dL    Total Bilirubin 0.6 0.0 - 1.2 mg/dL    Alkaline Phosphatase 49 40 - 129 U/L    ALT <5 0 - 40 U/L    AST 8 0 - 39 U/L   Troponin   Result Value Ref Range    Troponin, High Sensitivity 120 (H) 0 - 11 ng/L   Brain Natriuretic Peptide   Result Value Ref Range    Pro-BNP 14,333 (H) 0 - 125 pg/mL   Lactate, Sepsis   Result Value Ref Range    Lactic Acid, Sepsis 2.4 (H) 0.5 - 1.9 mmol/L   Arterial Blood Gas, Respiratory Only   Result Value Ref Range    Source: Arterial     pH, Blood Gas 7.419 7.350 - 7.450    pCO2, Arterial 43.3 35.0 - 45.0 mmHg    pO2, Arterial 113.7 (H) 60.0 - 80.0 mmHg    HCO3, Arterial 28.0 (H) 22.0 - 26.0 mmol/L    B.E. 3.1 (H) -3.0 - 3.0 mmol/L    O2 Sat 98.5 92.0 - 98.5 %          DEVICE [id number]     Delivery Systems [employer]    EKG 12 Lead   Result Value Ref Range    Ventricular Rate 105 BPM    Atrial Rate 105 BPM    P-R Interval 158 ms    QRS Duration 80 ms    Q-T Interval 362 ms    QTc Calculation (Bazett) 478 ms    P Axis 38 degrees    R Axis 30 degrees    T Axis 66 degrees       RADIOLOGY:  XR CHEST PORTABLE   Final Result   Diffuse multifocal bilateral pulmonary opacities             ------------------------- NURSING NOTES AND VITALS REVIEWED ---------------------------  Date / Time Roomed:  6/20/2021  9:48 AM  ED Bed Assignment:  01/01    The nursing notes within the ED encounter and vital signs as below have been reviewed.      Patient Vitals for the past 24 hrs:   BP Temp Temp src Pulse Resp SpO2 Height Weight   06/20/21 1143 (!) 141/66 -- -- 80 17 100 % -- --   06/20/21 1040 112/61 -- -- 93 22 96 % -- --   06/20/21 1006 -- -- -- 96 17 99 % -- --   06/20/21 1002 -- -- -- -- -- 93 % -- --   06/20/21 0948 139/70 97.6 °F (36.4 °C) Oral 109 30 (!) 58 % 5' 7\" (1.702 m) 210 lb (95.3 kg)       Oxygen Saturation Interpretation: Improved after treatment    ------------------------------------------ PROGRESS NOTES ------------------------------------------  Re-evaluation(s):  Time: 1140am  Patients symptoms are improving  Repeat physical examination is improved    Counseling:  I have spoken with the patient and discussed todays results, in addition to providing specific details for the plan of care and counseling regarding the diagnosis and prognosis. Their questions are answered at this time and they are agreeable with the plan of admission.    --------------------------------- ADDITIONAL PROVIDER NOTES ---------------------------------  Consultations:  Time: 1140am. Spoke with Dr. Keeley Torres. Discussed case. They will admit the patient. This patient's ED course included: a personal history and physicial examination, re-evaluation prior to disposition, multiple bedside re-evaluations, IV medications, cardiac monitoring, continuous pulse oximetry and complex medical decision making and emergency management    This patient has remained hemodynamically stable during their ED course. Diagnosis:  1. Acute respiratory failure with hypoxia ([address]    2. Healthcare-associated pneumonia    3. Dialysis patient [facility])        Disposition:  Patient's disposition: Admit to med/surg floor with tele  Patient's condition is stable.          Alley Adkins DO  Resident  06/20/21 1149

## 2021-06-20 NOTE — H&P
Department of Internal Medicine        CHIEF COMPLAINT: Shortness of breath, cough and essentially is nonproductive    Reason for Admission: Acute hypoxic respiratory failure, HCAP    HISTORY OF PRESENT ILLNESS:      The patient is a 63 y.o. male who presents with increased shortness of breath. Patient arrived in ED with O2 sat 50% on room air. Patient also stated he developed a mild to clear productive cough 6/19. Patient has history of chronic renal failure with hemodialysis with patient having dialysis treatment on Friday. Patient denying problem chest pain, nausea/vomiting, fever/chills. Chest x-ray showed diffuse bilateral pulmonary infiltrates. He had a WBC 11.7 but also had a proBNP of 14,000. Patient O2 sat did go as high as 100% on 6 L nasal cannula. Patient was last hospitalized March 21, 2021 with acute hypoxic respiratory failure from fluid overload with patient having history of missing some dialysis treatments. Patient sent home on home O2 at that time but has been without oxygen since April 2021 1 he states his primary care physician took him off of it. Currently the patient states he is breathing much better on 5 L nasal cannula and he denies any chest pain or productive cough.     Past Medical History:    Past Medical History:   Diagnosis Date    Back pain     Diabetes mellitus ([address]     DMII (diabetes mellitus, type 2) [address] [address] 7/28/2015    History of cardiovascular stress test 2/16/2015    lexiscan    HTN (hypertension) 7/28/2015    Hyperlipidemia     Hypertension     Lumbar radicular pain 7/28/2015    Type II or unspecified type diabetes mellitus without mention of complication, not stated as uncontrolled      Past Surgical History:    Past Surgical History:   Procedure Laterality Date    OTHER SURGICAL HISTORY Left 06/06/14    cain bunionectomy left foot with osteomed screw x1    SHOULDER SURGERY      Bilateral    VEIN SURGERY         Medications Prior to Admission: @  Prior to Admission medications    Medication Sig Start Date End Date Taking? Authorizing Provider   sodium bicarbonate 650 MG tablet Take 2 tablets by mouth 3 times daily 2/13/20   Christiano Gutiérrez, DO   ipratropium-albuterol (DUONEB) 0.5-2.5 (3) MG/3ML SOLN nebulizer solution Inhale 3 mLs into the lungs every 4 hours as needed for Shortness of Breath 2/13/20   Christiano Gutiérrez, DO   metoprolol tartrate (LOPRESSOR) 50 MG tablet Take 1 tablet by mouth 2 times daily 2/13/20   Christiano Gutiérrez, DO   hydrALAZINE (APRESOLINE) 25 MG tablet Take 2 tablets by mouth 2 times daily 2/13/20   Christiano Gutiérrez, DO   sevelamer (RENVELA) 800 MG tablet Take 1 tablet by mouth 3 times daily (with meals) 2/13/20   Christiano Gutiérrez, DO   tiotropium (SPIRIVA RESPIMAT) 2.5 MCG/ACT AERS inhaler Inhale 2 puffs into the lungs daily 2/13/20   Christiano Gutiérrez, DO   albuterol sulfate HFA (PROVENTIL HFA) 108 (90 Base) MCG/ACT inhaler Inhale 2 puffs into the lungs every 4 hours as needed for Wheezing or Shortness of Breath 2/13/20   Christiano Gutiérrez, DO   mometasone-formoterol [facility]) 200-5 MCG/ACT inhaler Inhale 2 puffs into the lungs every 12 hours 2/13/20   Christiano Gutiérrez, DO   ferrous sulfate 325 (65 Fe) MG tablet Take 325 mg by mouth daily (with breakfast)    Historical Provider, MD   calcitRIOL (ROCALTROL) 0.25 MCG capsule Take 0.25 mcg by mouth Takes 3 times a week    Historical Provider, MD   insulin glargine (LANTUS) 100 UNIT/ML injection vial Inject 25 Units into the skin nightly    Historical Provider, MD   gabapentin (NEURONTIN) 100 MG capsule Take 100 mg by mouth. Historical Provider, MD   amLODIPine (NORVASC) 10 MG tablet Take 5 mg by mouth 2 times daily     Historical Provider, MD   Cholecalciferol (VITAMIN D3) 3000 units TABS Take 150 mcg by mouth daily     Historical Provider, MD       Allergies:   Other    Social History:   Social History     Socioeconomic History    Marital status: Single     Spouse name: Not on file    Number of children: Not on file    Years of education: Not on file    Highest education level: Not on file   Occupational History    Not on file   Tobacco Use    Smoking status: Former Smoker     Packs/day: 1.00     Years: 30.00     Pack years: 30.00    Smokeless tobacco: Never Used   Vaping Use    Vaping Use: Never used   Substance and Sexual Activity    Alcohol use: No    Drug use: No    Sexual activity: Not on file   Other Topics Concern    Not on file   Social History Narrative    Not on file     Social Determinants of Health     Financial Resource Strain:     Difficulty of Paying Living Expenses:    Food Insecurity:     Worried About Running Out of Food in the Last Year:     [address] in the Last Year:    Transportation Needs:     Lack of Transportation (Medical):  Lack of Transportation (Non-Medical):    Physical Activity:     Days of Exercise per Week:     Minutes of Exercise per Session:    Stress:     Feeling of Stress :    Social Connections:     Frequency of Communication with Friends and Family:     Frequency of Social Gatherings with Friends and Family:     Attends Christianity Services:     Active Member of Clubs or Organizations:     Attends Club or Organization Meetings:     Marital Status:    Intimate Partner Violence:     Fear of Current or Ex-Partner:     Emotionally Abused:     Physically Abused:     Sexually Abused:        Family History:   History reviewed. No pertinent family history. REVIEW OF SYSTEMS:    Gen: Patient denies any lightheadedness or dizziness. No LOC or syncope. No fevers or chills. HEENT: No earache, sore throat or nasal congestion. Resp: + Scant clear cough  Cardiac: Denies chest pain, +SOB, no diaphoresis or palpitations. GI: No nausea, vomiting, diarrhea or constipation. No melena or hematochezia. : No urinary complaints, dysuria, hematuria or frequency. MSK: No extremity weakness, paralysis or paresthesias. Date    PROTIME 11.8 02/07/2020    INR 1.0 02/07/2020     Troponin:    Lab Results   Component Value Date    TROPONINI 0.10 03/21/2021     U/A:    Lab Results   Component Value Date    COLORU Yellow 02/02/2020    PROTEINU >=300 02/02/2020    PHUR 5.0 02/02/2020    LABCAST RARE 09/21/2018    WBCUA 0-1 02/02/2020    RBCUA NONE 02/02/2020    BACTERIA NONE 02/02/2020    CLARITYU Clear 02/02/2020    SPECGRAV 1.025 02/02/2020    LEUKOCYTESUR Negative 02/02/2020    UROBILINOGEN 0.2 02/02/2020    BILIRUBINUR Negative 02/02/2020    BLOODU TRACE 02/02/2020    GLUCOSEU 250 02/02/2020    AMORPHOUS FEW 10/02/2019     ABG:    Lab Results   Component Value Date    PH 7.419 06/20/2021    PCO2 47.3 02/08/2020    PO2 140.5 02/08/2020    HCO3 26.3 02/08/2020    BE 3.1 06/20/2021    O2SAT 98.5 06/20/2021     HgBA1c:    Lab Results   Component Value Date    LABA1C 8.4 03/21/2021     FLP:    Lab Results   Component Value Date    TRIG 106 03/21/2021    HDL 36 03/21/2021    LDLCALC 66 03/21/2021    LABVLDL 21 03/21/2021     TSH:    Lab Results   Component Value Date    TSH 1.160 03/21/2021     IRON:    Lab Results   Component Value Date    IRON 19 02/09/2020     LIPASE:    Lab Results   Component Value Date    LIPASE 78 09/24/2018       ASSESSMENT AND PLAN:      Patient Active Problem List    Diagnosis Date Noted    Hallux valgus, acquired 06/06/2014    Acute respiratory failure with hypoxia ([facility] [address] 06/20/2021    Respiratory failure ([facility] [address] 02/02/2020    Pneumonia 01/28/2020    Acute pancreatitis without necrosis or infection, unspecified 09/23/2018    Idiopathic acute pancreatitis 09/21/2018    B12 deficiency 03/17/2016    DMII (diabetes mellitus, type 2) ([facility] [address] 07/28/2015    HTN (hypertension) 07/28/2015    Lumbar radicular pain 07/28/2015    Spinal stenosis 07/28/2015     Impression:  1. Acute hypoxic respiratory failure  2. Possible HCAP  3. History of chronic renal failure with hemodialysis  4.   Insulin dependent diabetes mellitus type 2  5.   Hypertension      Plan:  Admit to [address]  Home medications reviewed  Glucoscans 4 times daily with sliding scale insulin  Monitor heart rate, O2 saturation and blood pressure  O2 nasal cannula and titrate to keep O2 saturations in mid 90s  IV piggyback vancomycin with dialysis  IV cefepime 2 g 3 times a week with dialysis  Blood cultures  Sputum cultures  Robitussin-DM as needed  Heparin subcu twice daily        Hima Bach DO, D.O.  6/20/2021  12:11 PM

## 2021-06-21 LAB
ALBUMIN SERPL-MCNC: 3.4 G/DL (ref 3.5–5.2)
ALP BLD-CCNC: 50 U/L (ref 40–129)
ALT SERPL-CCNC: 5 U/L (ref 0–40)
ANION GAP SERPL CALCULATED.3IONS-SCNC: 17 MMOL/L (ref 7–16)
AST SERPL-CCNC: 7 U/L (ref 0–39)
BASOPHILS ABSOLUTE: 0.02 E9/L (ref 0–0.2)
BASOPHILS RELATIVE PERCENT: 0.2 % (ref 0–2)
BILIRUB SERPL-MCNC: 0.5 MG/DL (ref 0–1.2)
BUN BLDV-MCNC: 40 MG/DL (ref 6–23)
CALCIUM SERPL-MCNC: 9.4 MG/DL (ref 8.6–10.2)
CHLORIDE BLD-SCNC: 89 MMOL/L (ref 98–107)
CO2: 28 MMOL/L (ref 22–29)
CREAT SERPL-MCNC: 9.7 MG/DL (ref 0.7–1.2)
EOSINOPHILS ABSOLUTE: 0.31 E9/L (ref 0.05–0.5)
EOSINOPHILS RELATIVE PERCENT: 3.2 % (ref 0–6)
GFR AFRICAN AMERICAN: 7
GFR NON-AFRICAN AMERICAN: 7 ML/MIN/1.73
GLUCOSE BLD-MCNC: 243 MG/DL (ref 74–99)
HCT VFR BLD CALC: 35.7 % (ref 37–54)
HEMOGLOBIN: 10.8 G/DL (ref 12.5–16.5)
IMMATURE GRANULOCYTES #: 0.03 E9/L
IMMATURE GRANULOCYTES %: 0.3 % (ref 0–5)
LYMPHOCYTES ABSOLUTE: 1.31 E9/L (ref 1.5–4)
LYMPHOCYTES RELATIVE PERCENT: 13.7 % (ref 20–42)
MAGNESIUM: 2.5 MG/DL (ref 1.6–2.6)
MCH RBC QN AUTO: 27.8 PG (ref 26–35)
MCHC RBC AUTO-ENTMCNC: 30.3 % (ref 32–34.5)
MCV RBC AUTO: 91.8 FL (ref 80–99.9)
METER GLUCOSE: 139 MG/DL (ref 74–99)
METER GLUCOSE: 192 MG/DL (ref 74–99)
METER GLUCOSE: 245 MG/DL (ref 74–99)
MONOCYTES ABSOLUTE: 0.87 E9/L (ref 0.1–0.95)
MONOCYTES RELATIVE PERCENT: 9.1 % (ref 2–12)
NEUTROPHILS ABSOLUTE: 7.03 E9/L (ref 1.8–7.3)
NEUTROPHILS RELATIVE PERCENT: 73.5 % (ref 43–80)
PDW BLD-RTO: 19.1 FL (ref 11.5–15)
PHOSPHORUS: 5.3 MG/DL (ref 2.5–4.5)
PLATELET # BLD: 238 E9/L (ref 130–450)
PMV BLD AUTO: 9.7 FL (ref 7–12)
POTASSIUM SERPL-SCNC: 4.3 MMOL/L (ref 3.5–5)
PROCALCITONIN: 0.75 NG/ML (ref 0–0.08)
RBC # BLD: 3.89 E12/L (ref 3.8–5.8)
SODIUM BLD-SCNC: 134 MMOL/L (ref 132–146)
TOTAL PROTEIN: 7.9 G/DL (ref 6.4–8.3)
TSH SERPL DL<=0.05 MIU/L-ACNC: 1.21 UIU/ML (ref 0.27–4.2)
VANCOMYCIN RANDOM: 23 MCG/ML (ref 5–40)
WBC # BLD: 9.6 E9/L (ref 4.5–11.5)

## 2021-06-21 PROCEDURE — 94640 AIRWAY INHALATION TREATMENT: CPT

## 2021-06-21 PROCEDURE — 84145 PROCALCITONIN (PCT): CPT

## 2021-06-21 PROCEDURE — 85025 COMPLETE CBC W/AUTO DIFF WBC: CPT

## 2021-06-21 PROCEDURE — 6360000002 HC RX W HCPCS: Performed by: INTERNAL MEDICINE

## 2021-06-21 PROCEDURE — 36415 COLL VENOUS BLD VENIPUNCTURE: CPT

## 2021-06-21 PROCEDURE — 1200000000 HC SEMI PRIVATE

## 2021-06-21 PROCEDURE — 83735 ASSAY OF MAGNESIUM: CPT

## 2021-06-21 PROCEDURE — 84100 ASSAY OF PHOSPHORUS: CPT

## 2021-06-21 PROCEDURE — 84443 ASSAY THYROID STIM HORMONE: CPT

## 2021-06-21 PROCEDURE — 6370000000 HC RX 637 (ALT 250 FOR IP): Performed by: INTERNAL MEDICINE

## 2021-06-21 PROCEDURE — 80202 ASSAY OF VANCOMYCIN: CPT

## 2021-06-21 PROCEDURE — 2580000003 HC RX 258: Performed by: INTERNAL MEDICINE

## 2021-06-21 PROCEDURE — 82962 GLUCOSE BLOOD TEST: CPT

## 2021-06-21 PROCEDURE — 2700000000 HC OXYGEN THERAPY PER DAY

## 2021-06-21 PROCEDURE — 80053 COMPREHEN METABOLIC PANEL: CPT

## 2021-06-21 PROCEDURE — 90935 HEMODIALYSIS ONE EVALUATION: CPT

## 2021-06-21 RX ADMIN — SEVELAMER CARBONATE 800 MG: 800 TABLET, FILM COATED ORAL at 08:27

## 2021-06-21 RX ADMIN — AMLODIPINE BESYLATE 5 MG: 5 TABLET ORAL at 21:29

## 2021-06-21 RX ADMIN — GUAIFENESIN AND DEXTROMETHORPHAN 5 ML: 100; 10 SYRUP ORAL at 06:43

## 2021-06-21 RX ADMIN — SEVELAMER CARBONATE 800 MG: 800 TABLET, FILM COATED ORAL at 13:43

## 2021-06-21 RX ADMIN — BUDESONIDE 250 MCG: 0.25 INHALANT RESPIRATORY (INHALATION) at 18:30

## 2021-06-21 RX ADMIN — IPRATROPIUM BROMIDE AND ALBUTEROL SULFATE 3 ML: .5; 2.5 SOLUTION RESPIRATORY (INHALATION) at 02:41

## 2021-06-21 RX ADMIN — IPRATROPIUM BROMIDE AND ALBUTEROL SULFATE 3 ML: .5; 2.5 SOLUTION RESPIRATORY (INHALATION) at 06:16

## 2021-06-21 RX ADMIN — INSULIN LISPRO 4 UNITS: 100 INJECTION, SOLUTION INTRAVENOUS; SUBCUTANEOUS at 16:37

## 2021-06-21 RX ADMIN — ARFORMOTEROL TARTRATE 15 MCG: 15 SOLUTION RESPIRATORY (INHALATION) at 18:29

## 2021-06-21 RX ADMIN — INSULIN GLARGINE 25 UNITS: 100 INJECTION, SOLUTION SUBCUTANEOUS at 21:25

## 2021-06-21 RX ADMIN — INSULIN LISPRO 1 UNITS: 100 INJECTION, SOLUTION INTRAVENOUS; SUBCUTANEOUS at 21:24

## 2021-06-21 RX ADMIN — ARFORMOTEROL TARTRATE 15 MCG: 15 SOLUTION RESPIRATORY (INHALATION) at 06:16

## 2021-06-21 RX ADMIN — Medication 2000 UNITS: at 13:44

## 2021-06-21 RX ADMIN — BUDESONIDE 250 MCG: 0.25 INHALANT RESPIRATORY (INHALATION) at 06:16

## 2021-06-21 RX ADMIN — SODIUM BICARBONATE 1300 MG: 650 TABLET ORAL at 13:44

## 2021-06-21 RX ADMIN — CALCITRIOL CAPSULES 0.25 MCG 0.25 MCG: 0.25 CAPSULE ORAL at 16:37

## 2021-06-21 RX ADMIN — HEPARIN SODIUM 5000 UNITS: 5000 INJECTION INTRAVENOUS; SUBCUTANEOUS at 21:26

## 2021-06-21 RX ADMIN — SEVELAMER CARBONATE 800 MG: 800 TABLET, FILM COATED ORAL at 16:37

## 2021-06-21 RX ADMIN — FERROUS SULFATE TAB 325 MG (65 MG ELEMENTAL FE) 325 MG: 325 (65 FE) TAB at 13:43

## 2021-06-21 RX ADMIN — HEPARIN SODIUM 5000 UNITS: 5000 INJECTION INTRAVENOUS; SUBCUTANEOUS at 13:44

## 2021-06-21 RX ADMIN — SODIUM BICARBONATE 1300 MG: 650 TABLET ORAL at 21:28

## 2021-06-21 RX ADMIN — HEPARIN SODIUM 5000 UNITS: 5000 INJECTION INTRAVENOUS; SUBCUTANEOUS at 06:43

## 2021-06-21 RX ADMIN — GABAPENTIN 100 MG: 100 CAPSULE ORAL at 21:29

## 2021-06-21 RX ADMIN — CEFEPIME HYDROCHLORIDE 2000 MG: 2 INJECTION, POWDER, FOR SOLUTION INTRAVENOUS at 14:50

## 2021-06-21 RX ADMIN — IPRATROPIUM BROMIDE AND ALBUTEROL SULFATE 3 ML: .5; 2.5 SOLUTION RESPIRATORY (INHALATION) at 18:29

## 2021-06-21 RX ADMIN — IPRATROPIUM BROMIDE AND ALBUTEROL SULFATE 3 ML: .5; 2.5 SOLUTION RESPIRATORY (INHALATION) at 22:35

## 2021-06-21 ASSESSMENT — PAIN SCALES - GENERAL
PAINLEVEL_OUTOF10: 0

## 2021-06-21 NOTE — FLOWSHEET NOTE
06/21/21 1315   Observations & Evaluations   Level of Consciousness Alert (0)   Oriented X 4   Heart Rhythm Regular   Respiratory Quality/Effort Unlabored   O2 Device High flow nasal cannula   Bilateral Breath Sounds Diminished   Skin Color Appropriate for ethnicity   Skin Condition/Temp Dry;Flaky   Appetite Good   Bowel Sounds (All Quadrants) Active   Edema None   Vital Signs   /64   Temp 97.3 °F (36.3 °C)   Pulse 83   Resp 16   Weight 200 lb 2.8 oz (90.8 kg)   Percent Weight Change -4.52   Pain Assessment   Pain Level 0   Post-Hemodialysis Assessment   Post-Treatment Procedures Blood returned;Catheter capped, clamped and heparinized x 2 ports   Machine Disinfection Process Exterior Machine Disinfection   Total Liters Processed (l/min) 69.4 l/min   Dialyzer Clearance Clotted   Duration of Treatment (minutes) 220 minutes   Hemodialysis Intake (ml) 200 ml   Hemodialysis Output (ml) 4000 ml   NET Removed (ml) 3800 ml   Tolerated Treatment Good   Patient Response to Treatment System clotted unable to return blood   Physician Notified?  Yes  (no new orders)

## 2021-06-21 NOTE — CONSULTS
Nephrology Consult Note  Patient's Name: Aretha Dykes  10:53 AM  6/21/2021    Nephrologist: Em Valerio    Reason for Consult:  ESKD  Requesting Physician:  Evangelina Munoz DO    Chief Complaint:  SOB    History Obtained From:  patient and past medical records    History of Present Ilness:    Aretha Dykes is a 63 y.o. male with prior history of ESKD requiring KRT with IHD via a R IJ TDC. Pt states he awoke yesterday AM with SOB. He went out to get coffee and had to rest several times when walking from his car to his apt. When he was back in his apt he checked his pulse ox and the 1st time it was 55% and the second time it was 61% and he came into the ED. First pulse ox in the ED 58%. CXR with bilat opacities. He denies F/C/N/V/D    Past Medical History:   Diagnosis Date    Back pain     Diabetes mellitus ([facility] [address]     DMII (diabetes mellitus, type 2) ([facility] [address] 7/28/2015    History of cardiovascular stress test 2/16/2015    lexiscan    HTN (hypertension) 7/28/2015    Hyperlipidemia     Hypertension     Lumbar radicular pain 7/28/2015    Type II or unspecified type diabetes mellitus without mention of complication, not stated as uncontrolled        Past Surgical History:   Procedure Laterality Date    OTHER SURGICAL HISTORY Left 06/06/14    cain bunionectomy left foot with osteomed screw x1    SHOULDER SURGERY      Bilateral    VEIN SURGERY         History reviewed. No pertinent family history. reports that he has quit smoking. He has a 30.00 pack-year smoking history. He has never used smokeless tobacco. He reports that he does not drink alcohol and does not use drugs. Allergies:   Other    Current Medications:    amLODIPine (NORVASC) tablet 5 mg, BID  Vitamin D (CHOLECALCIFEROL) tablet 2,000 Units, Daily  gabapentin (NEURONTIN) capsule 100 mg, Nightly  ferrous sulfate (IRON 325) tablet 325 mg, Daily with breakfast  calcitRIOL (ROCALTROL) capsule 0.25 mcg, Once per day on Mon Wed Fri  insulin glargine (LANTUS) injection vial 25 Units, Nightly  sodium bicarbonate tablet 1,300 mg, TID  ipratropium-albuterol (DUONEB) nebulizer solution 3 mL, Q4H  metoprolol tartrate (LOPRESSOR) tablet 50 mg, BID  hydrALAZINE (APRESOLINE) tablet 50 mg, BID  sevelamer (RENVELA) tablet 800 mg, TID WC  acetaminophen (TYLENOL) tablet 500 mg, Q6H PRN  heparin (porcine) injection 5,000 Units, 3 times per day  guaiFENesin-dextromethorphan (ROBITUSSIN DM) 100-10 MG/5ML syrup 5 mL, Q4H PRN  trimethobenzamide (TIGAN) injection 200 mg, Q6H PRN  glucose (GLUTOSE) 40 % oral gel 15 g, PRN  dextrose 50 % IV solution, PRN  glucagon (rDNA) injection 1 mg, PRN  dextrose 5 % solution, PRN  insulin lispro (HUMALOG) injection vial 0-12 Units, TID WC  insulin lispro (HUMALOG) injection vial 0-6 Units, Nightly  albuterol (PROVENTIL) nebulizer solution 2.5 mg, Q6H PRN  Arformoterol Tartrate (BROVANA) nebulizer solution 15 mcg, BID   And  budesonide (PULMICORT) nebulizer suspension 250 mcg, BID  vancomycin (VANCOCIN) intermittent dosing (placeholder), RX Placeholder  cefepime (MAXIPIME) 2000 mg IVPB minibag, Once per day on Mon Wed Fri        Review of Systems:   Pertinent items are noted in HPI.     Physical exam:   Constitutional:  Elderly male in NAD  Vitals:   VITALS:  BP (!) 124/59   Pulse 83   Temp 98.2 °F (36.8 °C) (Axillary)   Resp 16   Ht 5' 7\" (1.702 m)   Wt 210 lb (95.3 kg)   SpO2 94%   BMI 32.89 kg/m²   24HR INTAKE/OUTPUT:    Intake/Output Summary (Last 24 hours) at 6/21/2021 1054  Last data filed at 6/20/2021 1948  Gross per 24 hour   Intake 740 ml   Output --   Net 740 ml     URINARY CATHETER OUTPUT (Moe):     DRAIN/TUBE OUTPUT:     VENT SETTINGS:  Vent Information  SpO2: 94 %  Additional Respiratory  Assessments  Pulse: 83  Resp: 16  SpO2: 94 %    Skin: no rash, turgor wnl  Heent:  eomi, mmm  Neck: no bruits or jvd noted, R IJ TDC  Cardiovascular: PMI lat displaced  S1, S2 without S3 or rub  Respiratory: Coarse BS bilat  Abdomen:  +bs, soft, nt, nd  Ext: (-) bilat lower extremity edema  Psychiatric: mood and affect appropriate      Data:   Labs:  CBC:   Lab Results   Component Value Date    WBC 9.6 06/21/2021    RBC 3.89 06/21/2021    HGB 10.8 06/21/2021    HCT 35.7 06/21/2021    MCV 91.8 06/21/2021    MCH 27.8 06/21/2021    MCHC 30.3 06/21/2021    RDW 19.1 06/21/2021     06/21/2021    MPV 9.7 06/21/2021     CBC with Differential:    Lab Results   Component Value Date    WBC 9.6 06/21/2021    RBC 3.89 06/21/2021    HGB 10.8 06/21/2021    HCT 35.7 06/21/2021     06/21/2021    MCV 91.8 06/21/2021    MCH 27.8 06/21/2021    MCHC 30.3 06/21/2021    RDW 19.1 06/21/2021    LYMPHOPCT 13.7 06/21/2021    MONOPCT 9.1 06/21/2021    BASOPCT 0.2 06/21/2021    MONOSABS 0.87 06/21/2021    LYMPHSABS 1.31 06/21/2021    EOSABS 0.31 06/21/2021    BASOSABS 0.02 06/21/2021     Hemoglobin/Hematocrit:    Lab Results   Component Value Date    HGB 10.8 06/21/2021    HCT 35.7 06/21/2021     CMP:    Lab Results   Component Value Date     06/21/2021    K 4.3 06/21/2021    K 4.5 06/20/2021    CL 89 06/21/2021    CO2 28 06/21/2021    BUN 40 06/21/2021    CREATININE 9.7 06/21/2021    GFRAA 7 06/21/2021    LABGLOM 7 06/21/2021    GLUCOSE 243 06/21/2021    PROT 7.9 06/21/2021    LABALBU 3.4 06/21/2021    CALCIUM 9.4 06/21/2021    BILITOT 0.5 06/21/2021    ALKPHOS 50 06/21/2021    AST 7 06/21/2021    ALT 5 06/21/2021     BMP:    Lab Results   Component Value Date     06/21/2021    K 4.3 06/21/2021    K 4.5 06/20/2021    CL 89 06/21/2021    CO2 28 06/21/2021    BUN 40 06/21/2021    LABALBU 3.4 06/21/2021    CREATININE 9.7 06/21/2021    CALCIUM 9.4 06/21/2021    GFRAA 7 06/21/2021    LABGLOM 7 06/21/2021    GLUCOSE 243 06/21/2021     BUN/Creatinine:    Lab Results   Component Value Date    BUN 40 06/21/2021    CREATININE 9.7 06/21/2021     Hepatic Function Panel:    Lab Results   Component Value Date    ALKPHOS 50 06/21/2021    ALT 5 06/21/2021    AST 7 06/21/2021    PROT 7.9 06/21/2021    BILITOT 0.5 06/21/2021    LABALBU 3.4 06/21/2021     Albumin:    Lab Results   Component Value Date    LABALBU 3.4 06/21/2021     Calcium:    Lab Results   Component Value Date    CALCIUM 9.4 06/21/2021     Ionized Calcium:  No results found for: IONCA  Magnesium:    Lab Results   Component Value Date    MG 2.5 06/21/2021     Phosphorus:    Lab Results   Component Value Date    PHOS 5.3 06/21/2021     LDH:  No results found for: LDH  Uric Acid:    Lab Results   Component Value Date    LABURIC 4.0 02/11/2020     PT/INR:    Lab Results   Component Value Date    PROTIME 11.8 02/07/2020    INR 1.0 02/07/2020     PTT:    Lab Results   Component Value Date    APTT 26.8 02/07/2020   [APTT}  Troponin:    Lab Results   Component Value Date    TROPONINI 0.10 03/21/2021     U/A:    Lab Results   Component Value Date    COLORU Yellow 02/02/2020    PROTEINU >=300 02/02/2020    PHUR 5.0 02/02/2020    LABCAST RARE 09/21/2018    WBCUA 0-1 02/02/2020    RBCUA NONE 02/02/2020    BACTERIA NONE 02/02/2020    CLARITYU Clear 02/02/2020    SPECGRAV 1.025 02/02/2020    LEUKOCYTESUR Negative 02/02/2020    UROBILINOGEN 0.2 02/02/2020    BILIRUBINUR Negative 02/02/2020    BLOODU TRACE 02/02/2020    GLUCOSEU 250 02/02/2020    AMORPHOUS FEW 10/02/2019     ABG:    Lab Results   Component Value Date    PH 7.419 06/20/2021    PCO2 47.3 02/08/2020    PO2 140.5 02/08/2020    HCO3 26.3 02/08/2020    BE 3.1 06/20/2021    O2SAT 98.5 06/20/2021     HgBA1c:    Lab Results   Component Value Date    LABA1C 8.1 06/20/2021     Microalbumen/Creatinine ratio:  No components found for: RUCREAT  FLP:    Lab Results   Component Value Date    TRIG 106 03/21/2021    HDL 36 03/21/2021    LDLCALC 66 03/21/2021    LABVLDL 21 03/21/2021     TSH:    Lab Results   Component Value Date    TSH 1.210 06/21/2021     VITAMIN B12: No components found for: B12  FOLATE:    Lab Results   Component Value Date    FOLATE 9.6 02/09/2020     Iron Saturation:  No components found for: PERCENTFE  FERRITIN:    Lab Results   Component Value Date    FERRITIN 188 11/13/2019     Urine Toxicology:  No components found for: IAMMENTA, IBARBIT, IBENZO, ICOCAINE, IMARTHC, IOPIATES, IPHENCYC     Imaging:  CXR results:  EXAMINATION:   ONE XRAY VIEW OF THE CHEST       6/20/2021 9:03 am       COMPARISON:   05/07/2021       HISTORY:   ORDERING SYSTEM PROVIDED HISTORY: Arrived 58% on room air.  Shortness of   breath   TECHNOLOGIST PROVIDED HISTORY:   Reason for exam:->Arrived 58% on room air.  Shortness of breath       FINDINGS:   Cardiomediastinal silhouette is unremarkable.  There are diffuse patchy   bilateral pulmonary airspace opacities.  No effusion or pneumothorax. Double-lumen right central venous catheter is stable.  No acute osseous   abnormality.           Impression   Diffuse multifocal bilateral pulmonary opacities         Assessment  1-ESKD-on IHD MWF via a R IJ TDC  PLAN:1. Continue the MWF schedule-pt seen on IHD this AM and attempting 3.5L vol removal-by wt 5kg over outpt dry wt    2-Acute Hypoxic Resp Failure with Pne and Vol overload-on azithromycin and cefepime  PLAN:1. Continue vol removal on IHD    3-Anemia in CKD  HgB above goal 10  PLAN:1. 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 Follow on the calcitriol and sevelamer  2.  Check Vit D    5- HTN with CKD 5/ESKD-BP at goal <140/90  PLAN:1. Continue to monitor    Thank you  for allowing us to participate in care of Alba Roberts MD  10:53 AM  6/21/2021

## 2021-06-21 NOTE — PROGRESS NOTES
Pharmacy Consultation Note  (Antibiotic Dosing and Monitoring)    Initial consult date: 21  Consulting physician: Dr. Bishop Carias  Drug(s): Vancomycin  Indication: Pneumonia    Ht Readings from Last 1 Encounters:   21 5' 7\" (1.702 m)     Wt Readings from Last 1 Encounters:   21 209 lb 10.5 oz (95.1 kg)         Age/  Gender IBW DW  Allergy Information   63 y.o.     male 66.1 kg 77.8 kg  Other                 Date  WBC HD Drug/Dose Time   Given Level(s)   (Time) Comments     (#1) 11.7 -- Vancomycin 2,000 mg IV x 1 in ED 1257       (#2) 9.6 4 hrs -- -- Random @ 1800      (#3)           (#4)           (#5)           (#6)           (#7)           Estimated Creatinine Clearance: 9 mL/min (A) (based on SCr of 9.7 mg/dL (HH)). UOP over the past 24 hours:       Intake/Output Summary (Last 24 hours) at 2021 1244  Last data filed at 2021 1948  Gross per 24 hour   Intake 740 ml   Output --   Net 740 ml       Temp max: Temp (24hrs), Av.3 °F (36.8 °C), Min:98.1 °F (36.7 °C), Max:98.6 °F (37 °C)      Antibiotic Regimen:  Antibiotic Dose Date Initiated   Cefepime 2 g  [facility]      Cultures:  available culture and sensitivity results were reviewed in EPIC  Cultures sent and are pending. Culture Date Result    Blood cx #1     Blood cx #2     Covid-19  Not detected     Assessment:  · Consulted by Dr. Bishop Carias to dose/monitor vancomycin  · Goal trough level:  10-20 mcg/mL, AUC/DAPHNE: 400-600  · Pt is a 63 yom admitted with pneumonia  · Patient is ESRD on HD MWF, last dialyzed on Friday  · Received Vancomycin 2,000 mg IV x 1 in ED    Plan:  · Dose per levels  · Post-dialysis level   · Follow nephro notes/HD orders  · Pharmacist will follow and monitor/adjust dosing as necessary      Thank you for the consult,    Veronica Diana, PharmD 2021 1:03 PM   [phone]      **ADDENDUM: Random @ 2651 = 23 mcg/mL. No dose today.   Veronica Diana, PharmD 2021 8:00 PM

## 2021-06-21 NOTE — PROGRESS NOTES
Department of Internal Medicine        CHIEF COMPLAINT: Shortness of breath, cough and essentially is nonproductive    Reason for Admission: Acute hypoxic respiratory failure, HCAP    HISTORY OF PRESENT ILLNESS:      The patient is a 63 y.o. male who presents with increased shortness of breath. Patient arrived in ED with O2 sat 50% on room air. Patient also stated he developed a mild to clear productive cough 6/19. Patient has history of chronic renal failure with hemodialysis with patient having dialysis treatment on Friday. Patient denying problem chest pain, nausea/vomiting, fever/chills. Chest x-ray showed diffuse bilateral pulmonary infiltrates. He had a WBC 11.7 but also had a proBNP of 14,000. Patient O2 sat did go as high as 100% on 6 L nasal cannula. Patient was last hospitalized March 21, 2021 with acute hypoxic respiratory failure from fluid overload with patient having history of missing some dialysis treatments. Patient sent home on home O2 at that time but has been without oxygen since April 2021 1 he states his primary care physician took him off of it. Currently the patient states he is breathing much better on 5 L nasal cannula and he denies any chest pain or productive cough. 6/21/2021  Patient seen examined on telemetry floor. Patient was denied any problem with chest pain, abdominal pain, nausea/vomiting or unusual shortness of breath with the patient feeling a lot better following his dialysis today. Patient denies any productive cough. Hemoglobin 10.8 with WBC 9.6. Blood sugar ranges 139-243. Temperature 98.2 with heart rate 83 and blood pressure 138/71. O2 sat 94% on 5 L nasal cannula.     Past Medical History:    Past Medical History:   Diagnosis Date    Back pain     [facility]     DMII (diabetes mellitus, type 2) ([facility] 7/28/2015    History of cardiovascular stress test 2/16/2015    lexiscan    HTN (hypertension) 7/28/2015    Hyperlipidemia     Hypertension     Lumbar radicular pain 7/28/2015    Type II or unspecified type diabetes mellitus without mention of complication, not stated as uncontrolled      Past Surgical History:    Past Surgical History:   Procedure Laterality Date    OTHER SURGICAL HISTORY Left 06/06/14    cain bunionectomy left foot with osteomed screw x1    SHOULDER SURGERY      Bilateral    VEIN SURGERY         Medications Prior to Admission:    @  Prior to Admission medications    Medication Sig Start Date End Date Taking?  Authorizing Provider   sodium bicarbonate 650 MG tablet Take 2 tablets by mouth 3 times daily 2/13/20   Gearldine , DO   ipratropium-albuterol (DUONEB) 0.5-2.5 (3) MG/3ML SOLN nebulizer solution Inhale 3 mLs into the lungs every 4 hours as needed for Shortness of Breath 2/13/20   Gearldine , DO   metoprolol tartrate (LOPRESSOR) 50 MG tablet Take 1 tablet by mouth 2 times daily 2/13/20   Gearldine , DO   hydrALAZINE (APRESOLINE) 25 MG tablet Take 2 tablets by mouth 2 times daily 2/13/20   Gearldine , DO   sevelamer (RENVELA) 800 MG tablet Take 1 tablet by mouth 3 times daily (with meals) 2/13/20   Gearldine , DO   tiotropium (SPIRIVA RESPIMAT) 2.5 MCG/ACT AERS inhaler Inhale 2 puffs into the lungs daily 2/13/20   Gearldine , DO   albuterol sulfate HFA (PROVENTIL HFA) 108 (90 Base) MCG/ACT inhaler Inhale 2 puffs into the lungs every 4 hours as needed for Wheezing or Shortness of Breath 2/13/20   Gearldine , DO   mometasone-formoterol [facility]) 200-5 MCG/ACT inhaler Inhale 2 puffs into the lungs every 12 hours 2/13/20   Gearldine , DO   ferrous sulfate 325 (65 Fe) MG tablet Take 325 mg by mouth daily (with breakfast)    Historical Provider, MD   calcitRIOL (ROCALTROL) 0.25 MCG capsule Take 0.25 mcg by mouth Takes 3 times a week    Historical Provider, MD   insulin glargine (LANTUS) 100 UNIT/ML injection vial Inject 55 Units into the skin nightly     Historical Provider, MD   gabapentin (NEURONTIN) 100 MG capsule Take 100 mg by mouth. Historical Provider, MD   amLODIPine (NORVASC) 10 MG tablet Take 5 mg by mouth 2 times daily     Historical Provider, MD   Cholecalciferol (VITAMIN D3) 3000 units TABS Take 150 mcg by mouth daily     Historical Provider, MD       Allergies: Other    Social History:   Social History     Socioeconomic History    Marital status: Single     Spouse name: Not on file    Number of children: Not on file    Years of education: Not on file    Highest education level: Not on file   Occupational History    Not on file   Tobacco Use    Smoking status: Former Smoker     Packs/day: 1.00     Years: 30.00     Pack years: 30.00    Smokeless tobacco: Never Used   Vaping Use    Vaping Use: Never used   Substance and Sexual Activity    Alcohol use: No    Drug use: No    Sexual activity: Not on file   Other Topics Concern    Not on file   Social History Narrative    Not on file     Social Determinants of Health     Financial Resource Strain:     Difficulty of Paying Living Expenses:    Food Insecurity:     Worried About [address] in the Last Year:     [address] [employer] in the Last Year:    Transportation Needs:     Lack of Transportation (Medical):  Lack of Transportation (Non-Medical):    Physical Activity:     Days of Exercise per Week:     Minutes of Exercise per Session:    Stress:     Feeling of Stress :    Social Connections:     Frequency of Communication with Friends and Family:     Frequency of Social Gatherings with Friends and Family:     Attends Congregation Services:     Active Member of Clubs or Organizations:     Attends Club or Organization Meetings:     Marital Status:    Intimate Partner Violence:     Fear of Current or Ex-Partner:     Emotionally Abused:     Physically Abused:     Sexually Abused:        Family History:   History reviewed. No pertinent family history.     REVIEW OF SYSTEMS:    Gen: Patient denies any lightheadedness or dizziness. No LOC or syncope. No fevers or chills. HEENT: No earache, sore throat or nasal congestion. Resp: + Scant clear cough  Cardiac: Denies chest pain, +SOB, no diaphoresis or palpitations. GI: No nausea, vomiting, diarrhea or constipation. No melena or hematochezia. : No urinary complaints, dysuria, hematuria or frequency. MSK: No extremity weakness, paralysis or paresthesias. PHYSICAL EXAM:    Vitals:  /71   Pulse 83   Temp 98.2 °F (36.8 °C)   Resp 18   Ht 5' 7\" (1.702 m)   Wt 209 lb 10.5 oz (95.1 kg)   SpO2 94%   BMI 32.84 kg/m²     General:  This is a 63 y.o. yo male who is alert and oriented in NAD  HEENT:  Head is normocephalic and atraumatic, PERRLA, EOMI, mucus membranes moist with no pharyngeal erythema or exudate. Neck:  Supple with no carotid bruits, JVD or thyromegaly.   No cervical adenopathy  CV:  Regular rate and rhythm, no murmurs  Lungs: Coarse decreased breath sounds to auscultation bilaterally with no wheezes, rales or rhonchi  Abdomen:  Soft, nontender, nondistended, bowel sounds present  Extremities:  No edema, peripheral pulses intact bilaterally  Neuro:  Cranial nerves II-XII grossly intact; motor and sensory function intact with no focal deficits  Skin:  No rashes, lesions or wounds    DATA:  CBC with Differential:    Lab Results   Component Value Date    WBC 9.6 06/21/2021    RBC 3.89 06/21/2021    HGB 10.8 06/21/2021    HCT 35.7 06/21/2021     06/21/2021    MCV 91.8 06/21/2021    MCH 27.8 06/21/2021    MCHC 30.3 06/21/2021    RDW 19.1 06/21/2021    LYMPHOPCT 13.7 06/21/2021    MONOPCT 9.1 06/21/2021    BASOPCT 0.2 06/21/2021    MONOSABS 0.87 06/21/2021    LYMPHSABS 1.31 06/21/2021    EOSABS 0.31 06/21/2021    BASOSABS 0.02 06/21/2021     CMP:    Lab Results   Component Value Date     06/21/2021    K 4.3 06/21/2021    K 4.5 06/20/2021    CL 89 06/21/2021    CO2 28 06/21/2021    BUN 40 06/21/2021 CREATININE 9.7 06/21/2021    GFRAA 7 06/21/2021    LABGLOM 7 06/21/2021    GLUCOSE 243 06/21/2021    PROT 7.9 06/21/2021    LABALBU 3.4 06/21/2021    CALCIUM 9.4 06/21/2021    BILITOT 0.5 06/21/2021    ALKPHOS 50 06/21/2021    AST 7 06/21/2021    ALT 5 06/21/2021     Magnesium:    Lab Results   Component Value Date    MG 2.5 06/21/2021     Phosphorus:    Lab Results   Component Value Date    PHOS 5.3 06/21/2021     PT/INR:    Lab Results   Component Value Date    PROTIME 11.8 02/07/2020    INR 1.0 02/07/2020     Troponin:    Lab Results   Component Value Date    TROPONINI 0.10 03/21/2021     U/A:    Lab Results   Component Value Date    COLORU Yellow 02/02/2020    PROTEINU >=300 02/02/2020    PHUR 5.0 02/02/2020    LABCAST RARE 09/21/2018    WBCUA 0-1 02/02/2020    RBCUA NONE 02/02/2020    BACTERIA NONE 02/02/2020    CLARITYU Clear 02/02/2020    SPECGRAV 1.025 02/02/2020    LEUKOCYTESUR Negative 02/02/2020    UROBILINOGEN 0.2 02/02/2020    BILIRUBINUR Negative 02/02/2020    BLOODU TRACE 02/02/2020    GLUCOSEU 250 02/02/2020    AMORPHOUS FEW 10/02/2019     ABG:    Lab Results   Component Value Date    PH 7.419 06/20/2021    PCO2 47.3 02/08/2020    PO2 140.5 02/08/2020    HCO3 26.3 02/08/2020    BE 3.1 06/20/2021    O2SAT 98.5 06/20/2021     HgBA1c:    Lab Results   Component Value Date    LABA1C 8.1 06/20/2021     FLP:    Lab Results   Component Value Date    TRIG 106 03/21/2021    HDL 36 03/21/2021    LDLCALC 66 03/21/2021    LABVLDL 21 03/21/2021     TSH:    Lab Results   Component Value Date    TSH 1.210 06/21/2021     IRON:    Lab Results   Component Value Date    IRON 19 02/09/2020     LIPASE:    Lab Results   Component Value Date    LIPASE 78 09/24/2018       ASSESSMENT AND PLAN:      Patient Active Problem List    Diagnosis Date Noted    Hallux valgus, acquired 06/06/2014    Acute respiratory failure with hypoxia ([facility] 06/20/2021    [facility] 02/02/2020    Pneumonia 01/28/2020    Acute pancreatitis without necrosis or infection, unspecified 09/23/2018    Idiopathic acute pancreatitis 09/21/2018    B12 deficiency 03/17/2016    DMII (diabetes mellitus, type 2) ([facility] [facility] 07/28/2015    HTN (hypertension) 07/28/2015    Lumbar radicular pain 07/28/2015    Spinal stenosis 07/28/2015     Impression:  1. Acute hypoxic respiratory failure  2. Possible HCAP  3. History of chronic renal failure with hemodialysis  4. Insulin dependent diabetes mellitus type 2  5. Hypertension      Plan:  Admit to [address]  Home medications reviewed  Glucoscans 4 times daily with sliding scale insulin  Monitor heart rate, O2 saturation and blood pressure  O2 nasal cannula and titrate to keep O2 saturations in mid 90s  IV piggyback vancomycin with dialysis  IV cefepime 2 g 3 times a week with dialysis  Blood cultures  Sputum cultures  Robitussin-DM as needed  Heparin subcu twice daily    BMP in a.m.    Chest x-ray PA and lateral tomorrow        Deborah Earl DO, D.OHenri  6/21/2021  12:09 PM

## 2021-06-21 NOTE — PLAN OF CARE
Problem: Falls - Risk of:  Goal: Will remain free from falls  Description: Will remain free from falls  6/21/2021 1629 by Lyndsay Graham RN  Outcome: Met This Shift

## 2021-06-21 NOTE — CARE COORDINATION
6/21/21 1607 CM note: COVID (-) 6/20/21. Met with patient at the bedside to discuss transition of care at discharge. Patient lives alone in a 2nd floor apartment, 3-4 steps to enter, 4 steps to the 2nd floor. Patient is independent with ADLs, drives, and has no DME. Patient does not have home O2; patient is currently on 5L nc. Pts PCP is Dr Rod Pinon and his pharmacy is [facility] in [place]. Pt gets HD at [facility] on M-W-F with chairtime of 1205 and her nephrologist is Dr Fili Armendariz. Patient has no hx HHC or MANDA. Discharge plan is home. Patient is agreeable to [facility] if recommended and has no preference to agency; also no preference to DME if home O2 is needed. Will await PT/OT evals. Pts vehicle is in the parking lot and plans to drive home at discharge.  Electronically signed by Radha Cameron RN on 6/21/2021 at 4:13 PM

## 2021-06-22 ENCOUNTER — APPOINTMENT (OUTPATIENT)
Dept: GENERAL RADIOLOGY | Age: 66
DRG: 193 | End: 2021-06-22
Payer: MEDICARE

## 2021-06-22 LAB
ALBUMIN SERPL-MCNC: 3.3 G/DL (ref 3.5–5.2)
ALP BLD-CCNC: 62 U/L (ref 40–129)
ALT SERPL-CCNC: <5 U/L (ref 0–40)
ANION GAP SERPL CALCULATED.3IONS-SCNC: 13 MMOL/L (ref 7–16)
AST SERPL-CCNC: 11 U/L (ref 0–39)
BILIRUB SERPL-MCNC: 0.3 MG/DL (ref 0–1.2)
BUN BLDV-MCNC: 31 MG/DL (ref 6–23)
CALCIUM SERPL-MCNC: 9.3 MG/DL (ref 8.6–10.2)
CHLORIDE BLD-SCNC: 95 MMOL/L (ref 98–107)
CO2: 27 MMOL/L (ref 22–29)
CREAT SERPL-MCNC: 8 MG/DL (ref 0.7–1.2)
GFR AFRICAN AMERICAN: 8
GFR NON-AFRICAN AMERICAN: 8 ML/MIN/1.73
GLUCOSE BLD-MCNC: 169 MG/DL (ref 74–99)
HCT VFR BLD CALC: 32.9 % (ref 37–54)
HEMOGLOBIN: 10 G/DL (ref 12.5–16.5)
MAGNESIUM: 2.4 MG/DL (ref 1.6–2.6)
MCH RBC QN AUTO: 28.4 PG (ref 26–35)
MCHC RBC AUTO-ENTMCNC: 30.4 % (ref 32–34.5)
MCV RBC AUTO: 93.5 FL (ref 80–99.9)
METER GLUCOSE: 145 MG/DL (ref 74–99)
METER GLUCOSE: 155 MG/DL (ref 74–99)
METER GLUCOSE: 215 MG/DL (ref 74–99)
METER GLUCOSE: 222 MG/DL (ref 74–99)
PDW BLD-RTO: 19.3 FL (ref 11.5–15)
PHOSPHORUS: 5.9 MG/DL (ref 2.5–4.5)
PLATELET # BLD: 163 E9/L (ref 130–450)
PMV BLD AUTO: 10 FL (ref 7–12)
POTASSIUM SERPL-SCNC: 4.4 MMOL/L (ref 3.5–5)
RBC # BLD: 3.52 E12/L (ref 3.8–5.8)
SODIUM BLD-SCNC: 135 MMOL/L (ref 132–146)
TOTAL PROTEIN: 7.7 G/DL (ref 6.4–8.3)
VITAMIN D 25-HYDROXY: 40 NG/ML (ref 30–100)
WBC # BLD: 8.3 E9/L (ref 4.5–11.5)

## 2021-06-22 PROCEDURE — 6370000000 HC RX 637 (ALT 250 FOR IP): Performed by: INTERNAL MEDICINE

## 2021-06-22 PROCEDURE — 83735 ASSAY OF MAGNESIUM: CPT

## 2021-06-22 PROCEDURE — 82306 VITAMIN D 25 HYDROXY: CPT

## 2021-06-22 PROCEDURE — 84100 ASSAY OF PHOSPHORUS: CPT

## 2021-06-22 PROCEDURE — 6360000002 HC RX W HCPCS: Performed by: INTERNAL MEDICINE

## 2021-06-22 PROCEDURE — 1200000000 HC SEMI PRIVATE

## 2021-06-22 PROCEDURE — 80053 COMPREHEN METABOLIC PANEL: CPT

## 2021-06-22 PROCEDURE — 97161 PT EVAL LOW COMPLEX 20 MIN: CPT | Performed by: PHYSICAL THERAPIST

## 2021-06-22 PROCEDURE — 36415 COLL VENOUS BLD VENIPUNCTURE: CPT

## 2021-06-22 PROCEDURE — 82962 GLUCOSE BLOOD TEST: CPT

## 2021-06-22 PROCEDURE — 97165 OT EVAL LOW COMPLEX 30 MIN: CPT

## 2021-06-22 PROCEDURE — 94640 AIRWAY INHALATION TREATMENT: CPT

## 2021-06-22 PROCEDURE — 6370000000 HC RX 637 (ALT 250 FOR IP): Performed by: NURSE PRACTITIONER

## 2021-06-22 PROCEDURE — 71046 X-RAY EXAM CHEST 2 VIEWS: CPT

## 2021-06-22 PROCEDURE — 85027 COMPLETE CBC AUTOMATED: CPT

## 2021-06-22 PROCEDURE — 2700000000 HC OXYGEN THERAPY PER DAY

## 2021-06-22 RX ORDER — SENNA AND DOCUSATE SODIUM 50; 8.6 MG/1; MG/1
2 TABLET, FILM COATED ORAL 2 TIMES DAILY
Status: DISCONTINUED | OUTPATIENT
Start: 2021-06-22 | End: 2021-06-24 | Stop reason: HOSPADM

## 2021-06-22 RX ORDER — ALBUTEROL SULFATE 2.5 MG/3ML
2.5 SOLUTION RESPIRATORY (INHALATION)
Status: DISCONTINUED | OUTPATIENT
Start: 2021-06-22 | End: 2021-06-24 | Stop reason: HOSPADM

## 2021-06-22 RX ORDER — POLYETHYLENE GLYCOL 3350 17 G/17G
17 POWDER, FOR SOLUTION ORAL DAILY PRN
Status: DISCONTINUED | OUTPATIENT
Start: 2021-06-22 | End: 2021-06-24 | Stop reason: HOSPADM

## 2021-06-22 RX ORDER — SEVELAMER CARBONATE 800 MG/1
1600 TABLET, FILM COATED ORAL
Status: DISCONTINUED | OUTPATIENT
Start: 2021-06-22 | End: 2021-06-24 | Stop reason: HOSPADM

## 2021-06-22 RX ADMIN — IPRATROPIUM BROMIDE AND ALBUTEROL SULFATE 3 ML: .5; 2.5 SOLUTION RESPIRATORY (INHALATION) at 14:32

## 2021-06-22 RX ADMIN — SODIUM BICARBONATE 1300 MG: 650 TABLET ORAL at 14:08

## 2021-06-22 RX ADMIN — BUDESONIDE 250 MCG: 0.25 INHALANT RESPIRATORY (INHALATION) at 17:34

## 2021-06-22 RX ADMIN — INSULIN LISPRO 2 UNITS: 100 INJECTION, SOLUTION INTRAVENOUS; SUBCUTANEOUS at 17:00

## 2021-06-22 RX ADMIN — FERROUS SULFATE TAB 325 MG (65 MG ELEMENTAL FE) 325 MG: 325 (65 FE) TAB at 08:19

## 2021-06-22 RX ADMIN — SENNOSIDES AND DOCUSATE SODIUM 2 TABLET: 8.6; 5 TABLET ORAL at 22:32

## 2021-06-22 RX ADMIN — GABAPENTIN 100 MG: 100 CAPSULE ORAL at 21:23

## 2021-06-22 RX ADMIN — POLYETHYLENE GLYCOL 3350 17 G: 17 POWDER, FOR SOLUTION ORAL at 22:32

## 2021-06-22 RX ADMIN — SEVELAMER CARBONATE 800 MG: 800 TABLET, FILM COATED ORAL at 08:19

## 2021-06-22 RX ADMIN — INSULIN GLARGINE 25 UNITS: 100 INJECTION, SOLUTION SUBCUTANEOUS at 21:24

## 2021-06-22 RX ADMIN — SEVELAMER CARBONATE 1600 MG: 800 TABLET, FILM COATED ORAL at 12:18

## 2021-06-22 RX ADMIN — SODIUM BICARBONATE 1300 MG: 650 TABLET ORAL at 21:23

## 2021-06-22 RX ADMIN — BUDESONIDE 250 MCG: 0.25 INHALANT RESPIRATORY (INHALATION) at 06:08

## 2021-06-22 RX ADMIN — HEPARIN SODIUM 5000 UNITS: 5000 INJECTION INTRAVENOUS; SUBCUTANEOUS at 06:20

## 2021-06-22 RX ADMIN — INSULIN LISPRO 2 UNITS: 100 INJECTION, SOLUTION INTRAVENOUS; SUBCUTANEOUS at 08:18

## 2021-06-22 RX ADMIN — HEPARIN SODIUM 5000 UNITS: 5000 INJECTION INTRAVENOUS; SUBCUTANEOUS at 14:08

## 2021-06-22 RX ADMIN — METOPROLOL TARTRATE 50 MG: 25 TABLET, FILM COATED ORAL at 21:22

## 2021-06-22 RX ADMIN — INSULIN LISPRO 2 UNITS: 100 INJECTION, SOLUTION INTRAVENOUS; SUBCUTANEOUS at 21:24

## 2021-06-22 RX ADMIN — ARFORMOTEROL TARTRATE 15 MCG: 15 SOLUTION RESPIRATORY (INHALATION) at 17:34

## 2021-06-22 RX ADMIN — IPRATROPIUM BROMIDE AND ALBUTEROL SULFATE 3 ML: .5; 2.5 SOLUTION RESPIRATORY (INHALATION) at 01:51

## 2021-06-22 RX ADMIN — IPRATROPIUM BROMIDE AND ALBUTEROL SULFATE 3 ML: .5; 2.5 SOLUTION RESPIRATORY (INHALATION) at 06:07

## 2021-06-22 RX ADMIN — SODIUM BICARBONATE 1300 MG: 650 TABLET ORAL at 08:18

## 2021-06-22 RX ADMIN — AMLODIPINE BESYLATE 5 MG: 5 TABLET ORAL at 08:19

## 2021-06-22 RX ADMIN — HEPARIN SODIUM 5000 UNITS: 5000 INJECTION INTRAVENOUS; SUBCUTANEOUS at 21:25

## 2021-06-22 RX ADMIN — INSULIN LISPRO 4 UNITS: 100 INJECTION, SOLUTION INTRAVENOUS; SUBCUTANEOUS at 12:19

## 2021-06-22 RX ADMIN — ARFORMOTEROL TARTRATE 15 MCG: 15 SOLUTION RESPIRATORY (INHALATION) at 06:07

## 2021-06-22 RX ADMIN — Medication 2000 UNITS: at 08:19

## 2021-06-22 RX ADMIN — IPRATROPIUM BROMIDE AND ALBUTEROL SULFATE 3 ML: .5; 2.5 SOLUTION RESPIRATORY (INHALATION) at 09:20

## 2021-06-22 RX ADMIN — AMLODIPINE BESYLATE 5 MG: 5 TABLET ORAL at 21:24

## 2021-06-22 RX ADMIN — HYDRALAZINE HYDROCHLORIDE 50 MG: 25 TABLET, FILM COATED ORAL at 21:23

## 2021-06-22 RX ADMIN — SEVELAMER CARBONATE 1600 MG: 800 TABLET, FILM COATED ORAL at 16:59

## 2021-06-22 ASSESSMENT — PAIN SCALES - GENERAL
PAINLEVEL_OUTOF10: 0

## 2021-06-22 NOTE — PROGRESS NOTES
Physical Therapy Initial Evaluation/Plan of Care    Room #:  0423/0423-01  Patient Name: Dian Sanchez  YOB: 1955  MRN: [id number]    Date of Service: 6/22/2021      Tentative placement recommendation: Home    Equipment recommendation: None      Evaluating Physical Therapist: Dipti Jones PT #[id number]      Specific Provider Orders/Date/Referring Provider :  06/21/21 1615   PT eval and treat Start: 06/21/21 1615, End: 06/21/21 1615, ONE TIME, Standing Count: 1 Occurrences, R    Kala Round, DO      Admitting Diagnosis:   Acute respiratory failure with hypoxia ([facility] [J96.01]  shortness of breath. Patient arrived 58% on room air. Visit Diagnoses       Codes    Healthcare-associated pneumonia     J18.9    Dialysis patient [facility])     Z99.2        Surgery: -    Patient Active Problem List   Diagnosis    Hallux valgus, acquired    DMII (diabetes mellitus, type 2) ([address]    HTN (hypertension)    Lumbar radicular pain    Spinal stenosis    B12 deficiency    Idiopathic acute pancreatitis    Acute pancreatitis without necrosis or infection, unspecified    Pneumonia    Respiratory failure ([address]    Acute respiratory failure with hypoxia ([address]        ASSESSMENT of Current Deficits Patient exhibits decreased endurance impairing gait distance and tolerance to activity decreased O2 saturation on room air and with activity. Patient requires skilled physical therapy to address concerns listed above to increase safety and independence at discharge.        PHYSICAL THERAPY  PLAN OF CARE       Physical therapy plan of care is established based on physician order,  patient diagnosis and clinical assessment    Current Treatment Recommendations:    -Endurance: Utilize Supervised activities to increase level of endurance to allow for safe functional mobility including transfers and gait  and Use graduated activities to promote good breathing techniques and provide support and education to maximize respiratory function  -Stairs: Stair training with instruction on proper technique and hand placement on rail  -Instruction in independent management of O2 line    PT long term treatment goals are located in below grid    Patient and or family understand(s) diagnosis, prognosis, and plan of care. Frequency of treatments: Patient will be seen  2-3 times/week. Prior Level of Function: Patient ambulated independently    Rehab Potential: good  - for baseline    Past medical history:   Past Medical History:   Diagnosis Date    Back pain     Diabetes mellitus ([facility]     DMII (diabetes mellitus, type 2) ([facility] [address] [facility] 7/28/2015    History of cardiovascular stress test 2/16/2015    lexiscan    HTN (hypertension) 7/28/2015    Hyperlipidemia     Hypertension     Lumbar radicular pain 7/28/2015    Type II or unspecified type diabetes mellitus without mention of complication, not stated as uncontrolled      Past Surgical History:   Procedure Laterality Date    OTHER SURGICAL HISTORY Left 06/06/14    cain bunionectomy left foot with osteomed screw x1    SHOULDER SURGERY      Bilateral    VEIN SURGERY         SUBJECTIVE:    Precautions:  Activity as tolerated, falls and O2 ,  O2 saturation drops quickly  Social history: Patient lives alone   in a apartment 2nd floor 4 steps to 2nd floor with rail with 4 steps  to enter with Rail  Tub shower grab bars    Equipment owned: None,       [id number] Mobility Inpatient   How much difficulty turning over in bed?: None  How much difficulty sitting down on / standing up from a chair with arms?: A Little  How much difficulty moving from lying on back to sitting on side of bed?: None  How much help from another person moving to and from a bed to a chair?: A Little  How much help from another person needed to walk in hospital room?: A Little  How much help from another person for climbing 3-5 steps with a railing?: A Little  AM-[facility] Inpatient Mobility Raw Score : 20  AM-PAC Inpatient T-Scale Score : 47.67  Mobility Inpatient CMS 0-100% Score: 35.83  Mobility Inpatient CMS G-Code Modifier : [address] cleared patient for PT evaluation. The admitting diagnosis and active problem list as listed above have been reviewed prior to the initiation of this evaluation. OBJECTIVE;   Initial Evaluation  Date: 6/22/2021 Treatment Date:     Short Term/ Long Term   Goals   Was pt agreeable to Eval/treatment? Yes    To be met in 2 days   Pain level   0/10        Bed Mobility    Rolling: Independent    Supine to sit: Independent    Sit to supine: Independent    Scooting: Independent    Rolling: Independent    Supine to sit: Independent    Sit to supine: Independent    Scooting: Independent     Transfers Sit to stand: Supervision      Sit to stand: Independent     Ambulation    2 x 100  feet using  no device with Supervision        150 feet using  no device with Independent    Stair negotiation: ascended and descended   Not assessed       8 steps 1 rail with supervision    ROM Within functional limits         Strength within functional limits       Balance Sitting EOB:  good    Dynamic Standing:  good -  Sitting EOB:  good    Dynamic Standing: good       Patient is Alert & Oriented x person, place, time and situation and follows directions    Sensation:  Patient  denies numbness/tingling     Edema:  no    Endurance: fair  -    Vitals: 3 liters nasal cannula    Blood Pressure at rest   Blood Pressure during session     Heart Rate at rest 86 Heart Rate during session 101   SPO2 at rest 96%  SPO2 during session 81% on room air, donned O2 cues purse lip breathing improved to 99% doffed O2 remained 95-96%, ambulated in hallway, maintained 90-95% for 80 feet, decreased to 89% donned O2, seated rest, pulse ox decreased to 70% heart rate 102; cues for purse lip breathing. Improved to 90%< 1 min. Nursing notified.  Ambulated back to room on 3 liters, maintained 95-98%, upon sitting in chair decreased to 90% on 3 liters, cues purse lip breathing improved to 95% < 1 min     Patient education  Patient educated on role of Physical Therapy, risks of immobility, safety and plan of care,  importance of mobility while in hospital , purse lip breathing and O2 line management and safety       Patient response to education:   Pt verbalized understanding Pt demonstrated skill Pt requires further education in this area   Yes Partial Yes      Treatment:  Patient practiced and was instructed/facilitated in the following treatment: Patient transferred to edge of bed  Sat edge of bed 3 minutes with No assist to increase dynamic sitting balance and activity tolerance. ambulated in hallway as above. Seated rest, ambulated back to room assisted up to chair. Therapeutic Exercises:  not performed       At end of session, patient in chair with   call light and phone within reach,   all lines and tubes intact, nursing notified. Patient would benefit from continued skilled Physical Therapy to improve functional independence and quality of life. Patient's/ family goals   home        Time in  0951  Time out  1011    Total Treatment Time  0 minutes    Evaluation time includes thorough review of current medical information, gathering information on past medical history/social history and prior level of function, completion of standardized testing/informal observation of tasks, assessment of data, and development of Plan of care and goals.      CPT codes:  Low Complexity PT evaluation (44054)  No treatment    Mark Luu PT

## 2021-06-22 NOTE — RT PROTOCOL NOTE
RT Nebulizer Bronchodilator Protocol Note    There is a bronchodilator order in the chart from a provider indicating to follow the RT Bronchodilator Protocol and there is an Initiate RT Bronchodilator Protocol order as well (see protocol at bottom of note). The findings from the last RT Protocol Assessment were as follows:  Smoking: >15 Pack years  Surgical Status: No surgery  Xray: One lobe infiltrates/atelectasis/pleural effusion/edema  Respiratory Pattern: RR 12-20  Mental Status: Alert and Oriented  Breath Sounds: Clear  Cough: Strong, spontaneous, non-productive  Activity Level: Walking unassisted  Oxygen Requirement: Room Air - 2LNC/28% or home setting  Indication for Bronchodilator Therapy:    Bronchodilator Assessment Score: 4    Aerosolized bronchodilator medication orders have been revised according to the RT Bronchodilator Protocol. RT Bronchodilator Protocol:    Respiratory Therapist to perform RT Therapy Protocol Assessment then follow the protocol. No Indications - adjust the frequency to every 6 hours PRN wheezing or bronchospasm, if no treatments needed after 48 hours then discontinue using Per Protocol order mode. If indication present, adjust the RT bronchodilator orders based on the Bronchodilator Assessment Score as follows:    0-6 - enter or revise RT Bronchodilator order to Albuterol Nebulizer order with frequency of every 2 hours PRN for wheezing or increased work of breathing using Per Protocol order mode. Repeat RT Therapy Protocol Assessment as needed. 7-10 - discontinue any other Inpatient aerosolized bronchodilator medication orders and enter or revise two Albuterol Nebulizer orders, one with BID frequency and one with frequency of every 2 hours PRN wheezing or increased work of breathing using Per Protocol order mode. Repeat RT Therapy Protocol Assessment with second treatment then BID and as needed.       11-13 - discontinue any other Inpatient aerosolized bronchodilator medication orders and enter DuoNeb Nebulizer order with QID frequency and an Albuterol Nebulizer order with frequency of every 2 hours PRN wheezing or increased work of breathing using Per Protocol order mode. Repeat RT Therapy Protocol Assessment with second treatment then QID and as needed. Greater than 13 - discontinue any other Inpatient aerosolized bronchodilator medication orders and enter a DuoNeb Nebulizer order with every 4 hours frequency and an Albuterol Nebulizer order with frequency of every 2 hours PRN wheezing or increased work of breathing using Per Protocol order mode. Repeat RT Therapy Protocol Assessment with second treatment then every 4 hours and as needed. RT to enter RT Home Evaluation for COPD & MDI Assessment order using Per Protocol order mode.     Electronically signed by Clifford Oconnor RCP on 6/22/2021 at 3:13 PM

## 2021-06-22 NOTE — CARE COORDINATION
6/22/21 1522 CM note: COVID (-) 6/20/21. Met with patient at the bedside to discuss discharge planning. Discharge plan remains home. Patient is agreeable to [facility] for nursing & therapy- no preference to agency. Referral given to [facility] FACILITY liaison, and they can accept. HHC order noted. Patient currently on 3L nc; no home O2. IF HOME O2 NEEDED WILL NEED O2 DOCUMENTATION ON CHART AND O2 SCRIPT. [facility] with chairtime of 1205 and her nephrologist is Dr Fran Solis Pts vehicle is in the parking lot and plans to drive home at discharge.  Electronically signed by Manish Garay RN on 6/22/2021 at 3:26 PM

## 2021-06-22 NOTE — PROGRESS NOTES
[address] [facility]  [facility] [place]. [place]        Date:2021                                                  Patient Name: Hernán Christianson    MRN: [id number]    : 1955    Room: [address] [place] [address]      Evaluating OT: Horacio Quijano OTR/L; [id number]     Referring Provider and Specific Provider Orders/Date:      21  OT eval and treat Start: 21, End: 21, ONE TIME, Standing Count: 1 Occurrences, R      Nemaha Fee, DO     Placement Recommendation: Home with no skilled occupational therapy needed after discharge from inpatient. Diagnosis:   1. Acute respiratory failure with hypoxia ([facility]    2. Healthcare-associated pneumonia    3.  Dialysis patient [facility])         Surgery: none       Pertinent Medical History:       Past Medical History:   Diagnosis Date    Back pain     Diabetes mellitus ([facility]     DMII (diabetes mellitus, type 2) ([facility] 2015    History of cardiovascular stress test 2015    lexiscan    HTN (hypertension) 2015    Hyperlipidemia     Hypertension     Lumbar radicular pain 2015    Type II or unspecified type diabetes mellitus without mention of complication, not stated as uncontrolled          Past Surgical History:   Procedure Laterality Date    OTHER SURGICAL HISTORY Left 14    cain bunionectomy left foot with osteomed screw x1    SHOULDER SURGERY      Bilateral    VEIN SURGERY        Precautions:  Fall Risk, hemodialysis      Assessment of current deficits    [x] Functional mobility  [x]ADLs  [x] Strength               []Cognition    [x] Functional transfers   [x] IADLs         [] Safety Awareness   [x]Endurance    [] Fine Coordination              [x] Balance      [] Vision/perception   []Sensation     []Gross Motor Coordination  [] ROM  [] Delirium                   [] Motor Control     OT PLAN OF CARE   OT POC based on physician orders, patient diagnosis and results of clinical assessment    Frequency/Duration 1-3 days/wk for 2 weeks PRN     Specific OT Treatment Interventions to include:   * Instruction/training on adapted ADL techniques and AE recommendations to increase functional independence within precautions       * Training on energy conservation strategies, correct breathing pattern and techniques to improve independence/tolerance for self-care routine  * Functional transfer/mobility training/DME recommendations for increased independence, safety, and fall prevention  * Patient/Family education to increase follow through with safety techniques and functional independence  * Recommendation of environmental modifications for increased safety with functional transfers/mobility and ADLs  * Therapeutic exercise to improve motor endurance, ROM, and functional strength for ADLs/functional transfers  * Therapeutic activities to facilitate/challenge dynamic balance, stand tolerance for increased safety and independence with ADLs    Recommended Adaptive Equipment: TBD      Home Living: alone; 2nd floor apartment, 3-4 steps to enter with rail, 4-5 steps with B rails to 2nd floor, tub shower. Equipment owned: none     Prior Level of Function: Independent with ADLs , Independent with IADLs; ambulated no device, sponge bathes    Pain Level: no pain; Nursing notified.       Cognition: A&O: 4/4; Follows 3 step directions   Memory: good    Sequencing: good    Problem solving: good    Judgement/safety: good     [facility]   AM-PAC Daily Activity Inpatient   How much help for putting on and taking off regular lower body clothing?: A Little  How much help for Bathing?: A Little  How much help for Toileting?: A Little  How much help for putting on and taking off regular upper body clothing?: A Little  How much help for taking care of personal grooming?: A Little  How much help for eating meals?: None  AM-PAC Inpatient Daily Activity Raw independence and quality of life. Treatment: OT treatment provided this date includes:    Instruction/training on safety and adapted techniques for completion of ADLs    Instruction/training on safe functional mobility/transfer techniques    Instruction/training on energy conservation/work simplification for completion of ADLs    Proper Positioning/Alignment    Rehab Potential: Good for established goals. Patient / Family Goal: return home      Patient and/or family were instructed on functional diagnosis, prognosis/goals and OT plan of care. Demonstrated good understanding. Eval Complexity: Low    Time In: 2:56pm   Time Out: 3:10pm       Min Units   OT Eval Low 32870  X     OT Eval Medium 11367      OT Eval High 35789      OT Re-Eval [id number]            ADL/Self Care 02749     Therapeutic Activities 31961       Therapeutic Ex 44314       Orthotic Management 22340       Manual 23733     Neuro Re-Ed 61860       Non-Billable Time     Total Treatment Time:   0  0      Evaluation Time additionally includes thorough review of current medical information, gathering information on past medical history/social history and prior level of function, interpretation of standardized testing/informal observation of tasks, assessment of data and development of plan of care and goals.         Evaluating OT: Leonard Portillo OTR/L; [id number]

## 2021-06-22 NOTE — PROGRESS NOTES
Department of Internal Medicine        CHIEF COMPLAINT: Shortness of breath, cough and essentially is nonproductive    Reason for Admission: Acute hypoxic respiratory failure, HCAP    HISTORY OF PRESENT ILLNESS:      The patient is a 63 y.o. male who presents with increased shortness of breath. Patient arrived in ED with O2 sat 50% on room air. Patient also stated he developed a mild to clear productive cough 6/19. Patient has history of chronic renal failure with hemodialysis with patient having dialysis treatment on Friday. Patient denying problem chest pain, nausea/vomiting, fever/chills. Chest x-ray showed diffuse bilateral pulmonary infiltrates. He had a WBC 11.7 but also had a proBNP of 14,000. Patient O2 sat did go as high as 100% on 6 L nasal cannula. Patient was last hospitalized March 21, 2021 with acute hypoxic respiratory failure from fluid overload with patient having history of missing some dialysis treatments. Patient sent home on home O2 at that time but has been without oxygen since April 2021 1 he states his primary care physician took him off of it. Currently the patient states he is breathing much better on 5 L nasal cannula and he denies any chest pain or productive cough. 6/21/2021  Patient seen examined on telemetry floor. Patient was denied any problem with chest pain, abdominal pain, nausea/vomiting or unusual shortness of breath with the patient feeling a lot better following his dialysis today. Patient denies any productive cough. Hemoglobin 10.8 with WBC 9.6. Blood sugar ranges 139-243. Temperature 98.2 with heart rate 83 and blood pressure 138/71. O2 sat 94% on 5 L nasal cannula. 6/22/2021  Patient is seen and examined on telemetry floor. Patient was requesting to go home but vital signs labs and x-rays were reviewed with him. Patient still on O2 nasal cannula and chest x-ray not showing much improvement.   He denies any fever/chills and denies any significant productive cough. Blood sugars range 155-192. Hemoglobin 10.0 with a WBC 8.3. Temperature 90.6 with heart rate 90 and blood pressure currently 120s over 66. O2 sat 97% on 3 L nasal cannula. Chest x-ray today showed no change in diffuse bilateral airspace opacities. Procalcitonin was 0.75. Past Medical History:    Past Medical History:   Diagnosis Date    Back pain     [facility]     DMII (diabetes mellitus, type 2) ([facility] 7/28/2015    History of cardiovascular stress test 2/16/2015    lexiscan    HTN (hypertension) 7/28/2015    Hyperlipidemia     Hypertension     Lumbar radicular pain 7/28/2015    Type II or unspecified type diabetes mellitus without mention of complication, not stated as uncontrolled      Past Surgical History:    Past Surgical History:   Procedure Laterality Date    OTHER SURGICAL HISTORY Left 06/06/14    cani bunionectomy left foot with osteomed screw x1    SHOULDER SURGERY      Bilateral    VEIN SURGERY         Medications Prior to Admission:    @  Prior to Admission medications    Medication Sig Start Date End Date Taking?  Authorizing Provider   sodium bicarbonate 650 MG tablet Take 2 tablets by mouth 3 times daily 2/13/20   Faizasebastian Garvey, DO   ipratropium-albuterol (DUONEB) 0.5-2.5 (3) MG/3ML SOLN nebulizer solution Inhale 3 mLs into the lungs every 4 hours as needed for Shortness of Breath 2/13/20   Faizasebastian Garvey, DO   metoprolol tartrate (LOPRESSOR) 50 MG tablet Take 1 tablet by mouth 2 times daily 2/13/20   Faizasebastian Garvey, DO   hydrALAZINE (APRESOLINE) 25 MG tablet Take 2 tablets by mouth 2 times daily 2/13/20   Faizasebastian Garvey, DO   sevelamer (RENVELA) 800 MG tablet Take 1 tablet by mouth 3 times daily (with meals) 2/13/20   Faizasebastian Garevy, DO   tiotropium (SPIRIVA RESPIMAT) 2.5 MCG/ACT AERS inhaler Inhale 2 puffs into the lungs daily 2/13/20   Faizasebastian Garvey, DO   albuterol sulfate HFA (PROVENTIL HFA) 108 (90 Base) MCG/ACT inhaler Inhale 2 puffs into the lungs every 4 hours as needed for Wheezing or Shortness of Breath 2/13/20   Yesylydia Gates DO   mometasone-formoterol [facility]) 200-5 MCG/ACT inhaler Inhale 2 puffs into the lungs every 12 hours 2/13/20   Yesylydia Gates, DO   ferrous sulfate 325 (65 Fe) MG tablet Take 325 mg by mouth daily (with breakfast)    Historical Provider, MD   calcitRIOL (ROCALTROL) 0.25 MCG capsule Take 0.25 mcg by mouth Takes 3 times a week    Historical Provider, MD   insulin glargine (LANTUS) 100 UNIT/ML injection vial Inject 55 Units into the skin nightly     Historical Provider, MD   gabapentin (NEURONTIN) 100 MG capsule Take 100 mg by mouth. Historical Provider, MD   amLODIPine (NORVASC) 10 MG tablet Take 5 mg by mouth 2 times daily     Historical Provider, MD   Cholecalciferol (VITAMIN D3) 3000 units TABS Take 150 mcg by mouth daily     Historical Provider, MD       Allergies: Other    Social History:   Social History     Socioeconomic History    Marital status: Single     Spouse name: Not on file    Number of children: Not on file    Years of education: Not on file    Highest education level: Not on file   Occupational History    Not on file   Tobacco Use    Smoking status: Former Smoker     Packs/day: 1.00     Years: 30.00     Pack years: 30.00    Smokeless tobacco: Never Used   Vaping Use    Vaping Use: Never used   Substance and Sexual Activity    Alcohol use: No    Drug use: No    Sexual activity: Not on file   Other Topics Concern    Not on file   Social History Narrative    Not on file     Social Determinants of Health     Financial Resource Strain:     Difficulty of Paying Living Expenses:    Food Insecurity:     Worried About [address] in the Last Year:     [facility] in the Last Year:    Transportation Needs:     Lack of Transportation (Medical):      Lack of Transportation (Non-Medical):    Physical Activity:     Days of Exercise per Week:     Minutes of Exercise per Session:    Stress:     Feeling of Stress :    Social Connections:     Frequency of Communication with Friends and Family:     Frequency of Social Gatherings with Friends and Family:     Attends Hindu Services:     Active Member of Clubs or Organizations:     Attends Club or Organization Meetings:     Marital Status:    Intimate Partner Violence:     Fear of Current or Ex-Partner:     Emotionally Abused:     Physically Abused:     Sexually Abused:        Family History:   History reviewed. No pertinent family history. REVIEW OF SYSTEMS:    Gen: Patient denies any lightheadedness or dizziness. No LOC or syncope. No fevers or chills. HEENT: No earache, sore throat or nasal congestion. Resp: + Scant clear cough  Cardiac: Denies chest pain, +SOB, no diaphoresis or palpitations. GI: No nausea, vomiting, diarrhea or constipation. No melena or hematochezia. : No urinary complaints, dysuria, hematuria or frequency. MSK: No extremity weakness, paralysis or paresthesias. PHYSICAL EXAM:    Vitals:  /66   Pulse 90   Temp 98.6 °F (37 °C) (Oral)   Resp 18   Ht 5' 7\" (1.702 m)   Wt 200 lb 2.8 oz (90.8 kg)   SpO2 97%   BMI 31.35 kg/m²     General:  This is a 63 y.o. yo male who is alert and oriented in NAD  HEENT:  Head is normocephalic and atraumatic, PERRLA, EOMI, mucus membranes moist with no pharyngeal erythema or exudate. Neck:  Supple with no carotid bruits, JVD or thyromegaly.   No cervical adenopathy  CV:  Regular rate and rhythm, no murmurs  Lungs: Coarse decreased breath sounds to auscultation bilaterally with no wheezes, rales or rhonchi  Abdomen:  Soft, nontender, nondistended, bowel sounds present  Extremities:  No edema, peripheral pulses intact bilaterally  Neuro:  Cranial nerves II-XII grossly intact; motor and sensory function intact with no focal deficits  Skin:  No rashes, lesions or wounds    DATA:  CBC with Differential:    Lab Results   Component Value Date    WBC 8.3 06/22/2021    RBC 3.52 06/22/2021    HGB 10.0 06/22/2021    HCT 32.9 06/22/2021     06/22/2021    MCV 93.5 06/22/2021    MCH 28.4 06/22/2021    MCHC 30.4 06/22/2021    RDW 19.3 06/22/2021    LYMPHOPCT 13.7 06/21/2021    MONOPCT 9.1 06/21/2021    BASOPCT 0.2 06/21/2021    MONOSABS 0.87 06/21/2021    LYMPHSABS 1.31 06/21/2021    EOSABS 0.31 06/21/2021    BASOSABS 0.02 06/21/2021     CMP:    Lab Results   Component Value Date     06/22/2021    K 4.4 06/22/2021    K 4.5 06/20/2021    CL 95 06/22/2021    CO2 27 06/22/2021    BUN 31 06/22/2021    CREATININE 8.0 06/22/2021    GFRAA 8 06/22/2021    LABGLOM 8 06/22/2021    GLUCOSE 169 06/22/2021    PROT 7.7 06/22/2021    LABALBU 3.3 06/22/2021    CALCIUM 9.3 06/22/2021    BILITOT 0.3 06/22/2021    ALKPHOS 62 06/22/2021    AST 11 06/22/2021    ALT <5 06/22/2021     Magnesium:    Lab Results   Component Value Date    MG 2.4 06/22/2021     Phosphorus:    Lab Results   Component Value Date    PHOS 5.9 06/22/2021     PT/INR:    Lab Results   Component Value Date    PROTIME 11.8 02/07/2020    INR 1.0 02/07/2020     Troponin:    Lab Results   Component Value Date    TROPONINI 0.10 03/21/2021     U/A:    Lab Results   Component Value Date    COLORU Yellow 02/02/2020    PROTEINU >=300 02/02/2020    PHUR 5.0 02/02/2020    LABCAST RARE 09/21/2018    WBCUA 0-1 02/02/2020    RBCUA NONE 02/02/2020    BACTERIA NONE 02/02/2020    CLARITYU Clear 02/02/2020    SPECGRAV 1.025 02/02/2020    LEUKOCYTESUR Negative 02/02/2020    UROBILINOGEN 0.2 02/02/2020    BILIRUBINUR Negative 02/02/2020    BLOODU TRACE 02/02/2020    GLUCOSEU 250 02/02/2020    AMORPHOUS FEW 10/02/2019     ABG:    Lab Results   Component Value Date    PH 7.419 06/20/2021    PCO2 47.3 02/08/2020    PO2 140.5 02/08/2020    HCO3 26.3 02/08/2020    BE 3.1 06/20/2021    O2SAT 98.5 06/20/2021     HgBA1c:    Lab Results   Component Value Date    LABA1C 8.1 06/20/2021     FLP:    Lab Results   Component Value Date    TRIG 106 03/21/2021    HDL 36 03/21/2021    LDLCALC 66 03/21/2021    LABVLDL 21 03/21/2021     TSH:    Lab Results   Component Value Date    TSH 1.210 06/21/2021     IRON:    Lab Results   Component Value Date    IRON 19 02/09/2020     LIPASE:    Lab Results   Component Value Date    LIPASE 78 09/24/2018       ASSESSMENT AND PLAN:      Patient Active Problem List    Diagnosis Date Noted    Hallux valgus, acquired 06/06/2014    Acute respiratory failure with hypoxia ([facility] 06/20/2021    Respiratory failure ([facility] 02/02/2020    Pneumonia 01/28/2020    Acute pancreatitis without necrosis or infection, unspecified 09/23/2018    Idiopathic acute pancreatitis 09/21/2018    B12 deficiency 03/17/2016    DMII (diabetes mellitus, type 2) ([facility] 07/28/2015    HTN (hypertension) 07/28/2015    Lumbar radicular pain 07/28/2015    Spinal stenosis 07/28/2015     Impression:  1. Acute hypoxic respiratory failure-possibly from HCAP versus fluid overload from his chronic renal failure  2. Possible HCAP  3. History of chronic renal failure with hemodialysis  4. Insulin dependent diabetes mellitus type 2-hemoglobin A1c 8.1.  5.  Hypertension      Plan:  Admit to [facility] medications reviewed  Glucoscans 4 times daily with sliding scale insulin  Monitor heart rate, O2 saturation and blood pressure  O2 nasal cannula and titrate to keep O2 saturations in mid 90s  IV piggyback vancomycin with dialysis  IV cefepime 2 g 3 times a week with dialysis  Blood cultures  Sputum cultures  Robitussin-DM as needed  Heparin subcu 3 times daily     Chest x-ray PA and lateral 6/22-unchanged bilateral pulmonary infiltrates from admission    BMP in a.m. Repeat Procalcitonin in a.m.         David Block DO, LUCY.OHenri  6/22/2021  11:39 AM

## 2021-06-22 NOTE — PROGRESS NOTES
Department of Internal Medicine  Nephrology Attending Progress Note    SUBJECTIVE:  We are following this patient for end-stage renal failure . 6/22/21- awake and alert. He is up in chair at the bedside. No distress.      PROBLEM LIST:    Patient Active Problem List   Diagnosis    Hallux valgus, acquired    DMII (diabetes mellitus, type 2) ([address]    HTN (hypertension)    Lumbar radicular pain    Spinal stenosis    B12 deficiency    Idiopathic acute pancreatitis    Acute pancreatitis without necrosis or infection, unspecified    Pneumonia    Respiratory failure ([address]    Acute respiratory failure with hypoxia ([address]        PAST MEDICAL HISTORY:    Past Medical History:   Diagnosis Date    Back pain     Diabetes mellitus ([address]     DMII [facility]di[facility] mellitus, type 2) [facility] 7/28/2015    History of cardiovascular stress test 2/16/2015    lexiscan    HTN (hypertension) 7/28/2015    Hyperlipidemia     Hypertension     Lumbar radicular pain 7/28/2015    Type II or unspecified type diabetes mellitus without mention of complication, not stated as uncontrolled        MEDS (scheduled):   amLODIPine  5 mg Oral BID    Vitamin D  2,000 Units Oral Daily    gabapentin  100 mg Oral Nightly    ferrous sulfate  325 mg Oral Daily with breakfast    calcitRIOL  0.25 mcg Oral Once per day on Mon Wed Fri    insulin glargine  25 Units Subcutaneous Nightly    sodium bicarbonate  1,300 mg Oral TID    ipratropium-albuterol  3 mL Inhalation Q4H    metoprolol tartrate  50 mg Oral BID    hydrALAZINE  50 mg Oral BID    sevelamer  800 mg Oral TID     heparin (porcine)  5,000 Units Subcutaneous 3 times per day    insulin lispro  0-12 Units Subcutaneous TID     insulin lispro  0-6 Units Subcutaneous Nightly    Arformoterol Tartrate  15 mcg Nebulization BID    And    budesonide  0.25 mg Nebulization BID    vancomycin (VANCOCIN) intermittent dosing (placeholder)   Other RX Placeholder    cefepime  2,000 mg Intravenous Once per day on Mon Wed Fri       MEDS (infusions):   dextrose         MEDS (prn):  acetaminophen, guaiFENesin-dextromethorphan, trimethobenzamide, glucose, dextrose, glucagon (rDNA), dextrose    DIET:    ADULT DIET;  Regular      PHYSICAL EXAM:      Patient Vitals for the past 24 hrs:   BP Temp Temp src Pulse Resp SpO2 Weight   06/22/21 0815 127/66 98.6 °F (37 °C) Oral 90 18 97 % --   06/21/21 2254 119/65 -- -- 95 18 95 % --   06/21/21 2000 123/76 97.5 °F (36.4 °C) Oral 96 18 95 % --   06/21/21 1445 -- -- -- -- -- 99 % --   06/21/21 1330 128/64 -- -- (!) 20 -- -- --   06/21/21 1315 136/64 97.3 °F (36.3 °C) -- 83 16 -- 200 lb 2.8 oz (90.8 kg)   06/21/21 1310 116/64 -- -- 84 -- -- --   06/21/21 1300 125/89 -- -- 81 -- -- --   06/21/21 1230 113/61 -- -- 84 -- -- --   06/21/21 1200 128/69 -- -- 81 -- -- --   06/21/21 1130 138/71 -- -- 83 -- -- --   06/21/21 1100 117/66 -- -- 74 -- -- --   06/21/21 1030 (!) 131/54 -- -- 84 -- -- --          Intake/Output Summary (Last 24 hours) at 6/22/2021 1013  Last data filed at 6/21/2021 1718  Gross per 24 hour   Intake 560 ml   Output 4000 ml   Net -3440 ml       Wt Readings from Last 3 Encounters:   06/21/21 200 lb 2.8 oz (90.8 kg)   03/21/21 209 lb (94.8 kg)   02/13/20 211 lb 8 oz (95.9 kg)       Constitutional:  Patient in no acute distress  Head: normocephalic, atraumatic  Cardiovascular: S1 S2 no S3 or rub  Respiratory:  Clear upper, diminished in bases  Gastrointestinal: soft, nontender, nondistended  Ext: trace edema   Neuro: awake and alert  Skin: dry, no rash      DATA:      Recent Labs     06/20/21  1012 06/21/21  0858 06/22/21  0655   WBC 11.7* 9.6 8.3   HGB 10.8* 10.8* 10.0*   HCT 35.7* 35.7* 32.9*   MCV 93.2 91.8 93.5    238 163     Recent Labs     06/20/21  1012 06/21/21  0858 06/22/21  0655    134 135   K 4.5 4.3 4.4   CL 91* 89* 95*   CO2 28 28 27   BUN 30* 40* 31*   CREATININE 8.6* 9.7* 8.0*   LABGLOM 8 7 8   GLUCOSE 256* 243* 169* CALCIUM 9.3 9.4 9.3     Recent Labs     06/20/21  1012 06/21/21  0858 06/22/21  0655   ALT <5 5 <5     Lab Results   Component Value Date    LABALBU 3.3 (L) 06/22/2021    LABALBU 3.4 (L) 06/21/2021    LABALBU 3.6 06/20/2021       Iron studies:  Lab Results   Component Value Date    FERRITIN 188 11/13/2019    IRON 19 (L) 02/09/2020    TIBC 189 (L) 02/09/2020     Vitamin B-12   Date Value Ref Range Status   02/09/2020 320 211 - 946 pg/mL Final     Folate   Date Value Ref Range Status   02/09/2020 9.6 4.8 - 24.2 ng/mL Final       Bone disease:  Lab Results   Component Value Date    MG 2.4 06/22/2021    PHOS 5.9 (H) 06/22/2021     Vit D, 25-Hydroxy   Date Value Ref Range Status   01/23/2020 32 30 - 100 ng/mL Final     Comment:     <20 ng/mL. ........... [facility] Deficient  20-30 ng/mL. ......... [facility] Insufficient   ng/mL. ........ [facility] Sufficient  >100 ng/mL. .......... [facility] Toxic       PTH   Date Value Ref Range Status   01/23/2020 154 (H) 15 - 65 pg/mL Final       No components found for: URIC    Lab Results   Component Value Date    COLORU Yellow 02/02/2020    NITRU Negative 02/02/2020    GLUCOSEU 250 02/02/2020    KETUA Negative 02/02/2020    UROBILINOGEN 0.2 02/02/2020    BILIRUBINUR Negative 02/02/2020       No results found for: Cydne Duet        IMPRESSION/RECOMMENDATIONS:      1-ESKD-on IHD MWF via a R IJ TDC  Continue the MWF schedule  S/P net UF 4 liters 6/21  Plan treatment in the am.      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  I had a face to face encounter with

## 2021-06-22 NOTE — PROGRESS NOTES
Pharmacy Consultation Note  (Antibiotic Dosing and Monitoring)    Initial consult date: 21  Consulting physician: Dr. Kolton Brown  Drug(s): Vancomycin  Indication: Pneumonia    Ht Readings from Last 1 Encounters:   21 5' 7\" (1.702 m)     Wt Readings from Last 1 Encounters:   21 200 lb 2.8 oz (90.8 kg)         Age/  Gender IBW DW  Allergy Information   63 y.o.     male 66.1 kg 77.8 kg  Other                 Date  WBC HD Drug/Dose Time   Given Level(s)   (Time) Comments     (#1) 11.7 -- Vancomycin 2,000 mg IV x 1 in ED 1257       (#2) 9.6 4 hrs -- -- Random @ 1859 = 23 mcg/mL      (#3) 8.3 -- -- --       (#4)           (#5)           (#6)           (#7)           Estimated Creatinine Clearance: 10 mL/min (A) (based on SCr of 8 mg/dL (H)). UOP over the past 24 hours:       Intake/Output Summary (Last 24 hours) at 2021 1217  Last data filed at 2021 0815  Gross per 24 hour   Intake 760 ml   Output 4000 ml   Net -3240 ml       Temp max: Temp (24hrs), Av.8 °F (36.6 °C), Min:97.3 °F (36.3 °C), Max:98.6 °F (37 °C)      Antibiotic Regimen:  Antibiotic Dose Date Initiated   Cefepime 2 g  [facility]      Cultures:  available culture and sensitivity results were reviewed in EPIC  Cultures sent and are pending.   Culture Date Result    Blood cx #1  NGTD   Blood cx #2  NGTD   Covid-19  Not detected     Assessment:  · Consulted by Dr. Kolton Brown to dose/monitor vancomycin  · Goal trough level:  10-20 mcg/mL, AUC/DAPHNE: 400-600  · Pt is a 63 yom admitted with pneumonia  · Patient is ESRD on HD MWF, last dialyzed on Friday  · Received Vancomycin 2,000 mg IV x 1 in ED    Plan:  · No HD today; no dose  · Dose per levels  · Random level tomorrow morning  · Follow nephro notes/HD orders  · Pharmacist will follow and monitor/adjust dosing as necessary      Thank you for the consult,    Ratna Cerda, PharmD 2021 12:17 PM   [phone]

## 2021-06-23 LAB
ANION GAP SERPL CALCULATED.3IONS-SCNC: 14 MMOL/L (ref 7–16)
BUN BLDV-MCNC: 41 MG/DL (ref 6–23)
CALCIUM SERPL-MCNC: 9.5 MG/DL (ref 8.6–10.2)
CHLORIDE BLD-SCNC: 89 MMOL/L (ref 98–107)
CO2: 27 MMOL/L (ref 22–29)
CORTISOL TOTAL: 12.74 MCG/DL (ref 2.68–18.4)
CREAT SERPL-MCNC: 10 MG/DL (ref 0.7–1.2)
GFR AFRICAN AMERICAN: 6
GFR NON-AFRICAN AMERICAN: 6 ML/MIN/1.73
GLUCOSE BLD-MCNC: 207 MG/DL (ref 74–99)
HCT VFR BLD CALC: 32.6 % (ref 37–54)
HEMOGLOBIN: 9.7 G/DL (ref 12.5–16.5)
MAGNESIUM: 2.5 MG/DL (ref 1.6–2.6)
MCH RBC QN AUTO: 27.6 PG (ref 26–35)
MCHC RBC AUTO-ENTMCNC: 29.8 % (ref 32–34.5)
MCV RBC AUTO: 92.6 FL (ref 80–99.9)
METER GLUCOSE: 111 MG/DL (ref 74–99)
METER GLUCOSE: 174 MG/DL (ref 74–99)
METER GLUCOSE: 246 MG/DL (ref 74–99)
METER GLUCOSE: 98 MG/DL (ref 74–99)
PDW BLD-RTO: 19 FL (ref 11.5–15)
PHOSPHORUS: 5.5 MG/DL (ref 2.5–4.5)
PLATELET # BLD: 182 E9/L (ref 130–450)
PMV BLD AUTO: 10 FL (ref 7–12)
POTASSIUM SERPL-SCNC: 5.8 MMOL/L (ref 3.5–5)
PROCALCITONIN: 0.63 NG/ML (ref 0–0.08)
RBC # BLD: 3.52 E12/L (ref 3.8–5.8)
SODIUM BLD-SCNC: 130 MMOL/L (ref 132–146)
VANCOMYCIN RANDOM: 18 MCG/ML (ref 5–40)
WBC # BLD: 7.5 E9/L (ref 4.5–11.5)

## 2021-06-23 PROCEDURE — 82533 TOTAL CORTISOL: CPT

## 2021-06-23 PROCEDURE — 83735 ASSAY OF MAGNESIUM: CPT

## 2021-06-23 PROCEDURE — 6360000002 HC RX W HCPCS: Performed by: INTERNAL MEDICINE

## 2021-06-23 PROCEDURE — 2580000003 HC RX 258: Performed by: INTERNAL MEDICINE

## 2021-06-23 PROCEDURE — 84145 PROCALCITONIN (PCT): CPT

## 2021-06-23 PROCEDURE — 6370000000 HC RX 637 (ALT 250 FOR IP): Performed by: INTERNAL MEDICINE

## 2021-06-23 PROCEDURE — 36415 COLL VENOUS BLD VENIPUNCTURE: CPT

## 2021-06-23 PROCEDURE — 82962 GLUCOSE BLOOD TEST: CPT

## 2021-06-23 PROCEDURE — 1200000000 HC SEMI PRIVATE

## 2021-06-23 PROCEDURE — 90935 HEMODIALYSIS ONE EVALUATION: CPT

## 2021-06-23 PROCEDURE — 6370000000 HC RX 637 (ALT 250 FOR IP): Performed by: NURSE PRACTITIONER

## 2021-06-23 PROCEDURE — 84100 ASSAY OF PHOSPHORUS: CPT

## 2021-06-23 PROCEDURE — 80202 ASSAY OF VANCOMYCIN: CPT

## 2021-06-23 PROCEDURE — 94640 AIRWAY INHALATION TREATMENT: CPT

## 2021-06-23 PROCEDURE — 80048 BASIC METABOLIC PNL TOTAL CA: CPT

## 2021-06-23 PROCEDURE — 85027 COMPLETE CBC AUTOMATED: CPT

## 2021-06-23 PROCEDURE — 2700000000 HC OXYGEN THERAPY PER DAY

## 2021-06-23 RX ORDER — SODIUM CHLORIDE 9 MG/ML
INJECTION, SOLUTION INTRAVENOUS
Status: DISCONTINUED | OUTPATIENT
Start: 2021-06-24 | End: 2021-06-24 | Stop reason: HOSPADM

## 2021-06-23 RX ADMIN — Medication 2000 UNITS: at 08:14

## 2021-06-23 RX ADMIN — FERROUS SULFATE TAB 325 MG (65 MG ELEMENTAL FE) 325 MG: 325 (65 FE) TAB at 08:14

## 2021-06-23 RX ADMIN — AMLODIPINE BESYLATE 5 MG: 5 TABLET ORAL at 08:15

## 2021-06-23 RX ADMIN — ALBUTEROL SULFATE 2.5 MG: 2.5 SOLUTION RESPIRATORY (INHALATION) at 18:11

## 2021-06-23 RX ADMIN — SODIUM BICARBONATE 1300 MG: 650 TABLET ORAL at 08:13

## 2021-06-23 RX ADMIN — BUDESONIDE 250 MCG: 0.25 INHALANT RESPIRATORY (INHALATION) at 18:09

## 2021-06-23 RX ADMIN — INSULIN LISPRO 2 UNITS: 100 INJECTION, SOLUTION INTRAVENOUS; SUBCUTANEOUS at 21:06

## 2021-06-23 RX ADMIN — VANCOMYCIN HYDROCHLORIDE 1250 MG: 10 INJECTION, POWDER, LYOPHILIZED, FOR SOLUTION INTRAVENOUS at 18:26

## 2021-06-23 RX ADMIN — METOPROLOL TARTRATE 50 MG: 25 TABLET, FILM COATED ORAL at 08:13

## 2021-06-23 RX ADMIN — INSULIN LISPRO 2 UNITS: 100 INJECTION, SOLUTION INTRAVENOUS; SUBCUTANEOUS at 11:38

## 2021-06-23 RX ADMIN — SODIUM BICARBONATE 1300 MG: 650 TABLET ORAL at 12:31

## 2021-06-23 RX ADMIN — SENNOSIDES AND DOCUSATE SODIUM 2 TABLET: 8.6; 5 TABLET ORAL at 08:12

## 2021-06-23 RX ADMIN — HYDRALAZINE HYDROCHLORIDE 50 MG: 25 TABLET, FILM COATED ORAL at 08:15

## 2021-06-23 RX ADMIN — SEVELAMER CARBONATE 1600 MG: 800 TABLET, FILM COATED ORAL at 11:37

## 2021-06-23 RX ADMIN — AMLODIPINE BESYLATE 5 MG: 5 TABLET ORAL at 21:05

## 2021-06-23 RX ADMIN — BUDESONIDE 250 MCG: 0.25 INHALANT RESPIRATORY (INHALATION) at 06:12

## 2021-06-23 RX ADMIN — CALCITRIOL CAPSULES 0.25 MCG 0.25 MCG: 0.25 CAPSULE ORAL at 08:14

## 2021-06-23 RX ADMIN — SEVELAMER CARBONATE 1600 MG: 800 TABLET, FILM COATED ORAL at 17:57

## 2021-06-23 RX ADMIN — ARFORMOTEROL TARTRATE 15 MCG: 15 SOLUTION RESPIRATORY (INHALATION) at 18:09

## 2021-06-23 RX ADMIN — SEVELAMER CARBONATE 1600 MG: 800 TABLET, FILM COATED ORAL at 08:14

## 2021-06-23 RX ADMIN — HYDRALAZINE HYDROCHLORIDE 50 MG: 25 TABLET, FILM COATED ORAL at 21:05

## 2021-06-23 RX ADMIN — ARFORMOTEROL TARTRATE 15 MCG: 15 SOLUTION RESPIRATORY (INHALATION) at 06:11

## 2021-06-23 RX ADMIN — INSULIN GLARGINE 25 UNITS: 100 INJECTION, SOLUTION SUBCUTANEOUS at 21:06

## 2021-06-23 RX ADMIN — METOPROLOL TARTRATE 50 MG: 25 TABLET, FILM COATED ORAL at 21:05

## 2021-06-23 RX ADMIN — GABAPENTIN 100 MG: 100 CAPSULE ORAL at 21:04

## 2021-06-23 RX ADMIN — SENNOSIDES AND DOCUSATE SODIUM 2 TABLET: 8.6; 5 TABLET ORAL at 21:05

## 2021-06-23 RX ADMIN — HEPARIN SODIUM 5000 UNITS: 5000 INJECTION INTRAVENOUS; SUBCUTANEOUS at 21:06

## 2021-06-23 RX ADMIN — SODIUM BICARBONATE 1300 MG: 650 TABLET ORAL at 21:04

## 2021-06-23 RX ADMIN — CEFEPIME HYDROCHLORIDE 2000 MG: 2 INJECTION, POWDER, FOR SOLUTION INTRAVENOUS at 21:04

## 2021-06-23 ASSESSMENT — PAIN SCALES - GENERAL
PAINLEVEL_OUTOF10: 0

## 2021-06-23 NOTE — PROGRESS NOTES
Department of Internal Medicine        CHIEF COMPLAINT: Shortness of breath, cough and essentially is nonproductive    Reason for Admission: Acute hypoxic respiratory failure, HCAP    HISTORY OF PRESENT ILLNESS:      The patient is a 63 y.o. male who presents with increased shortness of breath. Patient arrived in ED with O2 sat 50% on room air. Patient also stated he developed a mild to clear productive cough 6/19. Patient has history of chronic renal failure with hemodialysis with patient having dialysis treatment on Friday. Patient denying problem chest pain, nausea/vomiting, fever/chills. Chest x-ray showed diffuse bilateral pulmonary infiltrates. He had a WBC 11.7 but also had a proBNP of 14,000. Patient O2 sat did go as high as 100% on 6 L nasal cannula. Patient was last hospitalized March 21, 2021 with acute hypoxic respiratory failure from fluid overload with patient having history of missing some dialysis treatments. Patient sent home on home O2 at that time but has been without oxygen since April 2021 1 he states his primary care physician took him off of it. Currently the patient states he is breathing much better on 5 L nasal cannula and he denies any chest pain or productive cough. 6/21/2021  Patient seen examined on telemetry floor. Patient was denied any problem with chest pain, abdominal pain, nausea/vomiting or unusual shortness of breath with the patient feeling a lot better following his dialysis today. Patient denies any productive cough. Hemoglobin 10.8 with WBC 9.6. Blood sugar ranges 139-243. Temperature 98.2 with heart rate 83 and blood pressure 138/71. O2 sat 94% on 5 L nasal cannula. 6/22/2021  Patient is seen and examined on telemetry floor. Patient was requesting to go home but vital signs labs and x-rays were reviewed with him. Patient still on O2 nasal cannula and chest x-ray not showing much improvement.   He denies any fever/chills and denies any significant productive cough. Blood sugars range 155-192. Hemoglobin 10.0 with a WBC 8.3. Temperature 90.6 with heart rate 90 and blood pressure currently 120s over 66. O2 sat 97% on 3 L nasal cannula. Chest x-ray today showed no change in diffuse bilateral airspace opacities. Procalcitonin was 0.75.    6/23/2021  Patient seen examined on telemetry floor. Patient feels little bit better and denies any chest or abdominal pain. Patient states his breathing with activity is improved. Temperature 98.1 with heart rate of 77 blood pressure 102/57. O2 sat 94-97% on 2 L nasal cannula. Blood cultures are still negative. Patient for dialysis today. Past Medical History:    Past Medical History:   Diagnosis Date    Back pain     Diabetes mellitus ([facility]     DMII (diabetes mellitus, type 2) ([facility] [address] 7/28/2015    History of cardiovascular stress test 2/16/2015    lexiscan    HTN (hypertension) 7/28/2015    Hyperlipidemia     Hypertension     Lumbar radicular pain 7/28/2015    Type II or unspecified type diabetes mellitus without mention of complication, not stated as uncontrolled      Past Surgical History:    Past Surgical History:   Procedure Laterality Date    OTHER SURGICAL HISTORY Left 06/06/14    cain bunionectomy left foot with osteomed screw x1    SHOULDER SURGERY      Bilateral    VEIN SURGERY         Medications Prior to Admission:    @  Prior to Admission medications    Medication Sig Start Date End Date Taking?  Authorizing Provider   sodium bicarbonate 650 MG tablet Take 2 tablets by mouth 3 times daily 2/13/20   McCook Round, DO   ipratropium-albuterol (DUONEB) 0.5-2.5 (3) MG/3ML SOLN nebulizer solution Inhale 3 mLs into the lungs every 4 hours as needed for Shortness of Breath 2/13/20   Kala Round, DO   metoprolol tartrate (LOPRESSOR) 50 MG tablet Take 1 tablet by mouth 2 times daily 2/13/20 Darrow Round, DO   hydrALAZINE (APRESOLINE) 25 MG tablet Take 2 tablets by mouth 2 times daily Strain:     Difficulty of Paying Living Expenses:    Food Insecurity:     Worried About Running Out of Food in the Last Year:     [address] in the Last Year:    Transportation Needs:     Lack of Transportation (Medical):  Lack of Transportation (Non-Medical):    Physical Activity:     Days of Exercise per Week:     Minutes of Exercise per Session:    Stress:     Feeling of Stress :    Social Connections:     Frequency of Communication with Friends and Family:     Frequency of Social Gatherings with Friends and Family:     Attends [employer] Services:     Active Member of Clubs or Organizations:     Attends Club or Organization Meetings:     Marital Status:    Intimate Partner Violence:     Fear of Current or Ex-Partner:     Emotionally Abused:     Physically Abused:     Sexually Abused:        Family History:   History reviewed. No pertinent family history. REVIEW OF SYSTEMS:    Gen: Patient denies any lightheadedness or dizziness. No LOC or syncope. No fevers or chills. HEENT: No earache, sore throat or nasal congestion. Resp: + Scant clear cough  Cardiac: Denies chest pain, +SOB, no diaphoresis or palpitations. GI: No nausea, vomiting, diarrhea or constipation. No melena or hematochezia. : No urinary complaints, dysuria, hematuria or frequency. MSK: No extremity weakness, paralysis or paresthesias. PHYSICAL EXAM:    Vitals:  BP (!) 102/57   Pulse 77   Temp 98.1 °F (36.7 °C) (Oral)   Resp 16   Ht 5' 7\" (1.702 m)   Wt 200 lb 2.8 oz (90.8 kg)   SpO2 94%   BMI 31.35 kg/m²     General:  This is a 63 y.o. yo male who is alert and oriented in NAD  HEENT:  Head is normocephalic and atraumatic, PERRLA, EOMI, mucus membranes moist with no pharyngeal erythema or exudate. Neck:  Supple with no carotid bruits, JVD or thyromegaly.   No cervical adenopathy  CV:  Regular rate and rhythm, no murmurs  Lungs: Coarse decreased breath sounds to auscultation bilaterally with 02/02/2020    GLUCOSEU 250 02/02/2020    AMORPHOUS FEW 10/02/2019     ABG:    Lab Results   Component Value Date    PH 7.419 06/20/2021    PCO2 47.3 02/08/2020    PO2 140.5 02/08/2020    HCO3 26.3 02/08/2020    BE 3.1 06/20/2021    O2SAT 98.5 06/20/2021     HgBA1c:    Lab Results   Component Value Date    LABA1C 8.1 06/20/2021     FLP:    Lab Results   Component Value Date    TRIG 106 03/21/2021    HDL 36 03/21/2021    LDLCALC 66 03/21/2021    LABVLDL 21 03/21/2021     TSH:    Lab Results   Component Value Date    TSH 1.210 06/21/2021     IRON:    Lab Results   Component Value Date    IRON 19 02/09/2020     LIPASE:    Lab Results   Component Value Date    LIPASE 78 09/24/2018       ASSESSMENT AND PLAN:      Patient Active Problem List    Diagnosis Date Noted    Hallux valgus, acquired 06/06/2014    Acute respiratory failure with hypoxia ([facility] [address] 06/20/2021    Respiratory failure ([facility] [address] 02/02/2020    Pneumonia 01/28/2020    Acute pancreatitis without necrosis or infection, unspecified 09/23/2018    Idiopathic acute pancreatitis 09/21/2018    B12 deficiency 03/17/2016    DMII (diabetes mellitus, type 2) ([facility] [address] 07/28/2015    HTN (hypertension) 07/28/2015    Lumbar radicular pain 07/28/2015    Spinal stenosis 07/28/2015     Impression:  1. Acute hypoxic respiratory failure-possibly from HCAP versus fluid overload from his chronic renal failure  2. Possible HCAP  3. History of chronic renal failure with hemodialysis  4.   Insulin dependent diabetes mellitus type 2-hemoglobin A1c 8.1.  5.  Hypertension      Plan:  [facility] medications reviewed  Glucoscans 4 times daily with sliding scale insulin  Monitor heart rate, O2 saturation and blood pressure  O2 nasal cannula and titrate to keep O2 saturations in mid 90s  IV piggyback vancomycin with dialysis  IV cefepime 2 g 3 times a week with dialysis  Blood cultures  Sputum cultures  Robitussin-DM as needed  Heparin subcu 3 times daily     Chest x-ray PA and lateral 6/22-unchanged bilateral pulmonary infiltrates from admission    Repeat Procalcitonin -  Continue O2 saturations on room air at rest and with activity    Missy Seymour DO, MELONIE  6/23/2021  10:28 AM

## 2021-06-23 NOTE — CARE COORDINATION
6/23/21 1357 CM note: COVID (-) 6/22/21. Met with patient at the bedside to discuss discharge planning. Discharge plan remains home with [facility]. Luz Licona liaison, accepted; Candido Kelsey order noted. Patient is currently on 2L nc; no home O2. Documentation on chart shows patient goes down to 82% on room air and requires 2L O2 to recoup to appropriate O2 level. WILL NEED O2 SCRIPT. Patient has no preference to DME agency. Pt gets HD at [facility] on M-W-F with chairtime of 1205 and her nephrologist is Dr Sandro Gutierrez Pts vehicle is in the parking lot and plans to drive home at discharge.  Electronically signed by Helder Ferrera RN on 6/23/2021 at 2:03 PM

## 2021-06-23 NOTE — PROGRESS NOTES
Department of Internal Medicine  Nephrology Attending Progress Note    SUBJECTIVE:  We are following this patient for end-stage renal failure . 6/23/21- awake and alert. He is up in chair at the bedside. Upset he is still in the hospital. Does not have O2 at home and has been requiring here. For HD this afternoon.      PROBLEM LIST:    Patient Active Problem List   Diagnosis    Hallux valgus, acquired    DMII (diabetes mellitus, type 2) ([address]    HTN (hypertension)    Lumbar radicular pain    Spinal stenosis    B12 deficiency    Idiopathic acute pancreatitis    Acute pancreatitis without necrosis or infection, unspecified    Pneumonia    Respiratory failure ([address]    Acute respiratory failure with hypoxia ([address]        PAST MEDICAL HISTORY:    Past Medical History:   Diagnosis Date    Back pain     Diabetes mellitus ([address]     DMII (diabetes mellitus, type 2) ([address] 7/28/2015    History of cardiovascular stress test 2/16/2015    lexiscan    HTN (hypertension) 7/28/2015    Hyperlipidemia     Hypertension     Lumbar radicular pain 7/28/2015    Type II or unspecified type diabetes mellitus without mention of complication, not stated as uncontrolled        MEDS (scheduled):   sevelamer  1,600 mg Oral TID     sennosides-docusate sodium  2 tablet Oral BID    amLODIPine  5 mg Oral BID    Vitamin D  2,000 Units Oral Daily    gabapentin  100 mg Oral Nightly    ferrous sulfate  325 mg Oral Daily with breakfast    calcitRIOL  0.25 mcg Oral Once per day on Mon Wed Fri    insulin glargine  25 Units Subcutaneous Nightly    sodium bicarbonate  1,300 mg Oral TID    metoprolol tartrate  50 mg Oral BID    hydrALAZINE  50 mg Oral BID    heparin (porcine)  5,000 Units Subcutaneous 3 times per day    insulin lispro  0-12 Units Subcutaneous TID     insulin lispro  0-6 Units Subcutaneous Nightly    Arformoterol Tartrate  15 mcg Nebulization BID    And    budesonide  0.25 mg Nebulization BID    vancomycin (VANCOCIN) intermittent dosing (placeholder)   Other RX Placeholder    cefepime  2,000 mg Intravenous Once per day on Mon Wed Fri       MEDS (infusions):   dextrose         MEDS (prn):  albuterol, polyethylene glycol, acetaminophen, guaiFENesin-dextromethorphan, trimethobenzamide, glucose, dextrose, glucagon (rDNA), dextrose    DIET:    ADULT DIET;  Regular      PHYSICAL EXAM:      Patient Vitals for the past 24 hrs:   BP Temp Temp src Pulse Resp SpO2   06/23/21 0800 (!) 102/57 98.1 °F (36.7 °C) Oral 77 16 94 %   06/23/21 0545 128/75 98 °F (36.7 °C) Oral 78 16 97 %   06/22/21 2100 135/65 98.2 °F (36.8 °C) Oral 83 16 99 %   06/22/21 1736 -- -- -- -- -- 94 %   06/22/21 1545 129/63 97.9 °F (36.6 °C) Oral 88 14 97 %          Intake/Output Summary (Last 24 hours) at 6/23/2021 1024  Last data filed at 6/23/2021 0053  Gross per 24 hour   Intake 380 ml   Output --   Net 380 ml       Wt Readings from Last 3 Encounters:   06/21/21 200 lb 2.8 oz (90.8 kg)   03/21/21 209 lb (94.8 kg)   02/13/20 211 lb 8 oz (95.9 kg)       Constitutional:  Patient in no acute distress  Head: normocephalic, atraumatic  Cardiovascular: S1 S2 no S3 or rub  Respiratory:  Clear upper, diminished in bases  Gastrointestinal: soft, nontender, nondistended  Ext: trace edema   Neuro: awake and alert  Skin: dry, no rash      DATA:      Recent Labs     06/21/21  0858 06/22/21  0655   WBC 9.6 8.3   HGB 10.8* 10.0*   HCT 35.7* 32.9*   MCV 91.8 93.5    163     Recent Labs     06/21/21  0858 06/22/21  0655    135   K 4.3 4.4   CL 89* 95*   CO2 28 27   BUN 40* 31*   CREATININE 9.7* 8.0*   LABGLOM 7 8   GLUCOSE 243* 169*   CALCIUM 9.4 9.3     Recent Labs     06/21/21  0858 06/22/21  0655   ALT 5 <5     Lab Results   Component Value Date    LABALBU 3.3 (L) 06/22/2021    LABALBU 3.4 (L) 06/21/2021    LABALBU 3.6 06/20/2021       Iron studies:  Lab Results   Component Value Date    FERRITIN 188 11/13/2019    IRON 19 (L) 02/09/2020    TIBC PM

## 2021-06-23 NOTE — PLAN OF CARE
Problem: Falls - Risk of:  Goal: Will remain free from falls  Description: Will remain free from falls  6/23/2021 1235 by Leela Sweet RN  Outcome: Met This Shift     Problem: Falls - Risk of:  Goal: Absence of physical injury  Description: Absence of physical injury  6/23/2021 1235 by Leela Sweet RN  Outcome: Met This Shift     Problem: Tobacco Use:  Goal: Inpatient tobacco use cessation counseling participation  Description: Inpatient tobacco use cessation counseling participation  Outcome: Met This Shift

## 2021-06-23 NOTE — PROGRESS NOTES
Pulse ox was 95 % on room air at rest.  Ambulated patient on room air. Oxygen saturation was  82% on room air while ambulating. Oxygen applied. Recovery pulse ox was 94% on  2 liters of oxygen while ambulating.

## 2021-06-23 NOTE — FLOWSHEET NOTE
06/23/21 1725   Vital Signs   /70   Temp 98.1 °F (36.7 °C)   Pulse 68   Resp 20   Weight 209 lb 14.1 oz (95.2 kg)   Weight Method Bed scale   Percent Weight Change -3.06   Pain Assessment   Pain Assessment 0-10   Post-Hemodialysis Assessment   Post-Treatment Procedures Blood returned;Catheter capped, clamped and heparinized x 2 ports   Machine Disinfection Process Acid/Vinegar Clean;Heat Disinfect; Exterior Machine Disinfection   Rinseback Volume (ml) 300 ml   Total Liters Processed (l/min) 70.6 l/min   Dialyzer Clearance Heavily streaked   Duration of Treatment (minutes) 255 minutes   Heparin amount administered during treatment (units) 0 units   Hemodialysis Intake (ml) 600 ml   Hemodialysis Output (ml) 3300 ml   NET Removed (ml) 2700 ml   Tolerated Treatment Good   Patient Response to Treatment No bp drops , tolerated well, clotted system x 1 , reset. post line care completed. 1/1000 u heparin to fill volume of catheter.

## 2021-06-23 NOTE — PROGRESS NOTES
Pt refusing heparin injection this am. Pt told nurse and nurse extern to get out of his room during attempted med pass. Pt adamant about being discharged today and states he will leave today whether discharged or not.

## 2021-06-24 VITALS
WEIGHT: 209.88 LBS | BODY MASS INDEX: 32.94 KG/M2 | HEART RATE: 77 BPM | HEIGHT: 67 IN | RESPIRATION RATE: 16 BRPM | SYSTOLIC BLOOD PRESSURE: 114 MMHG | TEMPERATURE: 98 F | DIASTOLIC BLOOD PRESSURE: 54 MMHG | OXYGEN SATURATION: 93 %

## 2021-06-24 LAB — METER GLUCOSE: 136 MG/DL (ref 74–99)

## 2021-06-24 PROCEDURE — 94760 N-INVAS EAR/PLS OXIMETRY 1: CPT

## 2021-06-24 PROCEDURE — 2700000000 HC OXYGEN THERAPY PER DAY

## 2021-06-24 PROCEDURE — 82962 GLUCOSE BLOOD TEST: CPT

## 2021-06-24 PROCEDURE — 94640 AIRWAY INHALATION TREATMENT: CPT

## 2021-06-24 PROCEDURE — 6370000000 HC RX 637 (ALT 250 FOR IP): Performed by: NURSE PRACTITIONER

## 2021-06-24 PROCEDURE — 6370000000 HC RX 637 (ALT 250 FOR IP): Performed by: INTERNAL MEDICINE

## 2021-06-24 PROCEDURE — 97530 THERAPEUTIC ACTIVITIES: CPT

## 2021-06-24 RX ORDER — DOXYCYCLINE HYCLATE 100 MG/1
100 CAPSULE ORAL 2 TIMES DAILY
Qty: 14 CAPSULE | Refills: 0 | Status: SHIPPED | OUTPATIENT
Start: 2021-06-24 | End: 2021-07-01

## 2021-06-24 RX ORDER — CEFDINIR 300 MG/1
300 CAPSULE ORAL 2 TIMES DAILY
Qty: 14 CAPSULE | Refills: 0 | Status: ON HOLD | OUTPATIENT
Start: 2021-06-24 | End: 2021-07-01 | Stop reason: SDUPTHER

## 2021-06-24 RX ADMIN — SEVELAMER CARBONATE 1600 MG: 800 TABLET, FILM COATED ORAL at 11:48

## 2021-06-24 ASSESSMENT — PAIN SCALES - GENERAL: PAINLEVEL_OUTOF10: 0

## 2021-06-24 NOTE — PROGRESS NOTES
Department of Internal Medicine  Nephrology Attending Progress Note    SUBJECTIVE:  We are following this patient for end-stage renal failure on HD    6/24/21- Patient sitting up in chair, states he feels very good and wants to go home. He refused lab draw and medications this morning. Discussed this with patient and he felt he was being given the wrong medications. I reviewed the ordered medications and he agreed that they are what he takes at home. He does not want any labwork done until he talks to Dr. Mike Suarez.      PROBLEM LIST:    Patient Active Problem List   Diagnosis    Hallux valgus, acquired    DMII (diabetes mellitus, type 2) ([address]    HTN (hypertension)    Lumbar radicular pain    Spinal stenosis    B12 deficiency    Idiopathic acute pancreatitis    Acute pancreatitis without necrosis or infection, unspecified    Pneumonia    Respiratory failure ([address]    Acute respiratory failure with hypoxia ([address]        PAST MEDICAL HISTORY:    Past Medical History:   Diagnosis Date    Back pain     Diabetes mellitus ([address]     DMII (diabetes mellitus, type 2) ([facility] [address] 7/28/2015    History of cardiovascular stress test 2/16/2015    lexiscan    HTN (hypertension) 7/28/2015    Hyperlipidemia     Hypertension     Lumbar radicular pain 7/28/2015    Type II or unspecified type diabetes mellitus without mention of complication, not stated as uncontrolled        MEDS (scheduled):   cefepime (MAXIPIME) 2000 mg IVPB extended (mini-bag)  2,000 mg Intravenous Once per day on Mon Wed Fri    sevelamer  1,600 mg Oral TID WC    sennosides-docusate sodium  2 tablet Oral BID    amLODIPine  5 mg Oral BID    Vitamin D  2,000 Units Oral Daily    gabapentin  100 mg Oral Nightly    ferrous sulfate  325 mg Oral Daily with breakfast    calcitRIOL  0.25 mcg Oral Once per day on Mon Wed Fri    insulin glargine  25 Units Subcutaneous Nightly    sodium bicarbonate  1,300 mg Oral TID    metoprolol tartrate  50 mg Oral BID tomorrow     2-Acute Hypoxic Resp Failure with Pne and Vol overload  Antibiotics per primary- CXR 6/22 suggesting pneumonia vs pulmonary edema  Continue vol removal on IHD     3-Anemia in CKD  HgB above goal 10  Start MANUEL when HgB <10     4- 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 I had a face to face encounter with the patient. Agree with exam.    Agree with  formulation, assessment and plan as outlined above and directed by me. Addition and corrections were done as deemed appropriate. My exam and plan include:   okay to discharge from renal standpoint.           David Ojeda MD  Nephrology        Electronically signed by David Ojeda MD on 6/24/2021 at 1:58 PM

## 2021-06-24 NOTE — PROGRESS NOTES
Pulse ox was 96% on room air at rest.  Ambulated patient on room air. Oxygen saturation was 82% on room air while ambulating. Oxygen applied. Recovery pulse ox was 95% on 2 liters of oxygen while ambulating.

## 2021-06-24 NOTE — PROGRESS NOTES
Walked into patient room he was getting dressed. When asking patient what he was doing patient stated \" he was waiting to be discharged by the doctor this morning\". He is verbally aggressive with staff. When trying to assist patient remove his gown so dialysis catheter is not removed patient stated \"dont touch me or come anywhere near me\".

## 2021-06-24 NOTE — PROGRESS NOTES
Patient pulled out on iv against all advice from medical staff. Patient is being verbally aggressive. Wants to leave this morning \"no matter what\".

## 2021-06-24 NOTE — PROGRESS NOTES
CLINICAL PHARMACY NOTE: MEDS TO BEDS    Total # of Prescriptions Filled: 2   The following medications were delivered to the patient:  · Cefdinir 300 mg  · Doxycycline hyclate 100 mg capsule    Additional Documentation:

## 2021-06-24 NOTE — PROGRESS NOTES
Attempted to go over discharge paperwork with patient. While going over meds, patient states that he doesn't want me to continue going over paperwork, that he would just sign the paper so he could go. Discharge paperwork given to patient, along with oxygen prescription.

## 2021-06-24 NOTE — PROGRESS NOTES
Physical Therapy Treatment Note/Plan of Care    Room #:  0423/0423-01  Patient Name: Sangeetha Dempsey  YOB: 1955  MRN: [id number]    Date of Service: 6/24/2021      Tentative placement recommendation: Home    Equipment recommendation: None      Evaluating Physical Therapist: Ruslan Grace PT #[id number]      Specific Provider Orders/Date/Referring Provider :  06/21/21 1615   PT eval and treat Start: 06/21/21 1615, End: 06/21/21 1615, ONE TIME, Standing Count: 1 Occurrences, R    Shana Neri, DO      Admitting Diagnosis:   Acute respiratory failure with hypoxia ([facility] [address] [J96.01]  shortness of breath. Patient arrived 58% on room air. Visit Diagnoses       Codes    Healthcare-associated pneumonia     J18.9    Dialysis patient [facility])     Z99.2        Surgery: -    Patient Active Problem List   Diagnosis    Hallux valgus, acquired    DMII (diabetes mellitus, type 2) ([address]    HTN (hypertension)    Lumbar radicular pain    Spinal stenosis    B12 deficiency    Idiopathic acute pancreatitis    Acute pancreatitis without necrosis or infection, unspecified    Pneumonia    Respiratory failure ([address]    Acute respiratory failure with hypoxia ([address]        ASSESSMENT of Current Deficits Patient exhibits decreased endurance impairing gait distance and tolerance to activity. Patients spo2 dropped to 81% ambulating in room with 3L, standing rest break to perform pursed lip breathing technique to increase spo2 to 93%. Unable to find oxygen tank to ambulate in hallway this treatment, due to decreased ambulation, patient states \"I proved I can walk can I stop\". Patient able to perform 02 line management with education. Patient requires skilled physical therapy to address concerns listed above to increase safety and independence at discharge.        PHYSICAL THERAPY  PLAN OF CARE       Physical therapy plan of care is established based on physician order,  patient diagnosis and clinical assessment    Current Treatment Recommendations:    -Endurance: Utilize Supervised activities to increase level of endurance to allow for safe functional mobility including transfers and gait  and Use graduated activities to promote good breathing techniques and provide support and education to maximize respiratory function  -Stairs: Stair training with instruction on proper technique and hand placement on rail  -Instruction in independent management of O2 line    PT long term treatment goals are located in below grid    Patient and or family understand(s) diagnosis, prognosis, and plan of care. Frequency of treatments: Patient will be seen  2-3 times/week. Prior Level of Function: Patient ambulated independently    Rehab Potential: good  - for baseline    Past medical history:   Past Medical History:   Diagnosis Date    Back pain     Diabetes mellitus ([facility] [address]     DMII (diabetes mellitus, type 2) ([facility] [address] 7/28/2015    History of cardiovascular stress test 2/16/2015    lexiscan    HTN (hypertension) 7/28/2015    Hyperlipidemia     Hypertension     Lumbar radicular pain 7/28/2015    Type II or unspecified type diabetes mellitus without mention of complication, not stated as uncontrolled      Past Surgical History:   Procedure Laterality Date    OTHER SURGICAL HISTORY Left 06/06/14    cain bunionectomy left foot with osteomed screw x1    SHOULDER SURGERY      Bilateral    VEIN SURGERY         SUBJECTIVE:    Precautions:  Activity as tolerated, falls and O2 ,  O2 saturation drops quickly  Social history: Patient lives alone   in a apartment 2nd floor 4 steps to 2nd floor with rail with 4 steps  to enter with Rail  Tub shower grab bars    Equipment owned: None,       [id number] [facility]  Mobility Inpatient   How much difficulty turning over in bed?: None  How much difficulty sitting down on / standing up from a chair with arms?: A Little  How much difficulty moving from lying on back to sitting on side of bed?: A Little  How much help from another person moving to and from a bed to a chair?: A Little  How much help from another person needed to walk in hospital room?: A Little  How much help from another person for climbing 3-5 steps with a railing?: A Little  AM-PAC Inpatient Mobility Raw Score : 19  AM-PAC Inpatient T-Scale Score : 45.44  Mobility Inpatient CMS 0-100% Score: 41.77  Mobility Inpatient CMS G-Code Modifier : CK    Nursing cleared patient for PT treatment. Patient wants to see doctor so he can get out of the hospital.    OBJECTIVE;   Initial Evaluation  Date: 6/22/2021 Treatment Date:  6/24/2021     Short Term/ Long Term   Goals   Was pt agreeable to Eval/treatment? Yes   Yes  To be met in 2 days   Pain level   0/10    0/10    Bed Mobility    Rolling: Independent    Supine to sit: Independent    Sit to supine: Independent    Scooting: Independent   Rolling: Not assessed    Supine to sit: Not assessed    Sit to supine: Not assessed    Scooting: Not assessed     Rolling: Independent    Supine to sit:  Independent    Sit to supine: Independent    Scooting: Independent     Transfers Sit to stand: Supervision     Sit to stand: Supervision       Sit to stand: Independent     Ambulation    2 x 100  feet using  no device with Supervision      75 feet using  no device with Supervision       150 feet using  no device with Independent    Stair negotiation: ascended and descended   Not assessed       8 steps 1 rail with supervision    ROM Within functional limits         Strength within functional limits       Balance Sitting EOB:  good    Dynamic Standing:  good - Sitting EOB: good   Dynamic Standing: good-   Sitting EOB:  good    Dynamic Standing: good       Patient is Alert & Oriented x person, place, time and situation and follows directions    Sensation:  Patient  denies numbness/tingling     Edema:  no    Endurance: fair  -    Vitals: 3 liters nasal cannula    Blood Pressure at rest   Blood Pressure during

## 2021-06-24 NOTE — CARE COORDINATION
6/24/21 1225 CM note: COVID (-) 6/20/21. Discharge order noted. Discharge plan remains home with [facility]; [address] order noted. Notified [facility] liaison of pts discharge today. Qualifying documentation and O2 script noted. Patient has no preference to DME agency. Referral given to [facility] liaison, O2 script faxed, and Kobe Reed will arrange delivery of portable O2 tank to pts room. Pt gets HD at [facility] on M-W-F with chairtime of 1205 and her nephrologist is Dr Gayla Tobias Pts vehicle is in the parking lot and plans to drive home at discharge.  Electronically signed by Tyler Fulton RN on 6/24/2021 at 12:59 PM

## 2021-06-24 NOTE — PROGRESS NOTES
Walked into patient's room with this morning medications. Explained to patient what each medication was and what it was for before opening. When I handed the patient the pills in the medicine cup, he said that he does not recognize the pills, does not think that his doctor prescribed these specific mediations, and that he is not taking any medications until he talks to his doctor. Pills taken back from patient and wasted, since they had already been opened.

## 2021-06-24 NOTE — DISCHARGE SUMMARY
Department of Internal Medicine        CHIEF COMPLAINT: Shortness of breath, cough and essentially is nonproductive    Reason for Admission: Acute hypoxic respiratory failure, HCAP    HISTORY OF PRESENT ILLNESS:      The patient is a 63 y.o. male who presents with increased shortness of breath. Patient arrived in ED with O2 sat 50% on room air. Patient also stated he developed a mild to clear productive cough 6/19. Patient has history of chronic renal failure with hemodialysis with patient having dialysis treatment on Friday. Patient denying problem chest pain, nausea/vomiting, fever/chills. Chest x-ray showed diffuse bilateral pulmonary infiltrates. He had a WBC 11.7 but also had a proBNP of 14,000. Patient O2 sat did go as high as 100% on 6 L nasal cannula. Patient was last hospitalized March 21, 2021 with acute hypoxic respiratory failure from fluid overload with patient having history of missing some dialysis treatments. Patient sent home on home O2 at that time but has been without oxygen since April 2021 1 he states his primary care physician took him off of it. Currently the patient states he is breathing much better on 5 L nasal cannula and he denies any chest pain or productive cough. 6/21/2021  Patient seen examined on telemetry floor. Patient was denied any problem with chest pain, abdominal pain, nausea/vomiting or unusual shortness of breath with the patient feeling a lot better following his dialysis today. Patient denies any productive cough. Hemoglobin 10.8 with WBC 9.6. Blood sugar ranges 139-243. Temperature 98.2 with heart rate 83 and blood pressure 138/71. O2 sat 94% on 5 L nasal cannula. 6/22/2021  Patient is seen and examined on telemetry floor. Patient was requesting to go home but vital signs labs and x-rays were reviewed with him. Patient still on O2 nasal cannula and chest x-ray not showing much improvement.   He denies any fever/chills and denies any significant productive cough. Blood sugars range 155-192. Hemoglobin 10.0 with a WBC 8.3. Temperature 90.6 with heart rate 90 and blood pressure currently 120s over 66. O2 sat 97% on 3 L nasal cannula. Chest x-ray today showed no change in diffuse bilateral airspace opacities. Procalcitonin was 0.75.    6/23/2021  Patient seen examined on telemetry floor. Patient feels little bit better and denies any chest or abdominal pain. Patient states his breathing with activity is improved. Temperature 98.1 with heart rate of 77 blood pressure 102/57. O2 sat 94-97% on 2 L nasal cannula. Blood cultures are still negative. Patient for dialysis today. 6/24/2021  Patient seen examined on telemetry floor. Patient states he is adamant that he was going home today. Patient states he feels a lot better. Patient denies any chest or abdominal pain. Patient denies any other productive cough at this time unlike on admission. Temperature 98.3 with heart rate 77 blood pressure 114/54. O2 sat 93% on 2 L nasal cannula at rest.  O2 saturation yesterday afternoon was 82% on room air with activity. Blood sugars range 111-246. Procalcitonin yesterday was mildly increased at 0.63. WBC was 7.5 yesterday with hemoglobin 9.7. Patient had received 5 days IV vancomycin and cefepime. Patient will be discharged home today on oral antibiotics and follow-up primary care physician. Patient stable for discharge.     Past Medical History:    Past Medical History:   Diagnosis Date    Back pain     Diabetes mellitus ([facility] [address]     DMII (diabetes mellitus, type 2) ([facility] [address] 7/28/2015    History of cardiovascular stress test 2/16/2015    lexiscan    HTN (hypertension) 7/28/2015    Hyperlipidemia     Hypertension     Lumbar radicular pain 7/28/2015    Type II or unspecified type diabetes mellitus without mention of complication, not stated as uncontrolled      Past Surgical History:    Past Surgical History:   Procedure Laterality Date    OTHER SURGICAL HISTORY Left 06/06/14    cain bunionectomy left foot with osteomed screw x1    SHOULDER SURGERY      Bilateral    VEIN SURGERY         Medications Prior to Admission:    @  Prior to Admission medications    Medication Sig Start Date End Date Taking? Authorizing Provider   sodium bicarbonate 650 MG tablet Take 2 tablets by mouth 3 times daily 2/13/20   Hume Fee, DO   ipratropium-albuterol (DUONEB) 0.5-2.5 (3) MG/3ML SOLN nebulizer solution Inhale 3 mLs into the lungs every 4 hours as needed for Shortness of Breath 2/13/20   Hume Fee, DO   metoprolol tartrate (LOPRESSOR) 50 MG tablet Take 1 tablet by mouth 2 times daily 2/13/20   Karlos Fee, DO   hydrALAZINE (APRESOLINE) 25 MG tablet Take 2 tablets by mouth 2 times daily 2/13/20   Hume Fee, DO   sevelamer (RENVELA) 800 MG tablet Take 1 tablet by mouth 3 times daily (with meals) 2/13/20   Karlos Fee, DO   tiotropium (SPIRIVA RESPIMAT) 2.5 MCG/ACT AERS inhaler Inhale 2 puffs into the lungs daily 2/13/20   Hume Fee, DO   albuterol sulfate HFA (PROVENTIL HFA) 108 (90 Base) MCG/ACT inhaler Inhale 2 puffs into the lungs every 4 hours as needed for Wheezing or Shortness of Breath 2/13/20   Karlos Fee, DO   mometasone-formoterol [facility]) 200-5 MCG/ACT inhaler Inhale 2 puffs into the lungs every 12 hours 2/13/20   Hume Fee, DO   ferrous sulfate 325 (65 Fe) MG tablet Take 325 mg by mouth daily (with breakfast)    Historical Provider, MD   calcitRIOL (ROCALTROL) 0.25 MCG capsule Take 0.25 mcg by mouth Takes 3 times a week    Historical Provider, MD   insulin glargine (LANTUS) 100 UNIT/ML injection vial Inject 55 Units into the skin nightly     Historical Provider, MD   gabapentin (NEURONTIN) 100 MG capsule Take 100 mg by mouth.     Historical Provider, MD   amLODIPine (NORVASC) 10 MG tablet Take 5 mg by mouth 2 times daily     Historical Provider, MD   Cholecalciferol (VITAMIN D3) 3000 units TABS Take 150 mcg by mouth daily     Historical Provider, MD       Allergies: Other    Social History:   Social History     Socioeconomic History    Marital status: Single     Spouse name: Not on file    Number of children: Not on file    Years of education: Not on file    Highest education level: Not on file   Occupational History    Not on file   Tobacco Use    Smoking status: Former Smoker     Packs/day: 1.00     Years: 30.00     Pack years: 30.00    Smokeless tobacco: Never Used   Vaping Use    Vaping Use: Never used   Substance and Sexual Activity    Alcohol use: No    Drug use: No    Sexual activity: Not on file   Other Topics Concern    Not on file   Social History Narrative    Not on file     Social Determinants of Health     Financial Resource Strain:     Difficulty of Paying Living Expenses:    Food Insecurity:     Worried About [address] in the Last Year:     [address] [employer] in the Last Year:    Transportation Needs:     Lack of Transportation (Medical):  Lack of Transportation (Non-Medical):    Physical Activity:     Days of Exercise per Week:     Minutes of Exercise per Session:    Stress:     Feeling of Stress :    Social Connections:     Frequency of Communication with Friends and Family:     Frequency of Social Gatherings with Friends and Family:     Attends L[employer] day Services:     Active Member of Clubs or Organizations:     Attends Club or Organization Meetings:     Marital Status:    Intimate Partner Violence:     Fear of Current or Ex-Partner:     Emotionally Abused:     Physically Abused:     Sexually Abused:        Family History:   History reviewed. No pertinent family history. REVIEW OF SYSTEMS:    Gen: Patient denies any lightheadedness or dizziness. No LOC or syncope. No fevers or chills. HEENT: No earache, sore throat or nasal congestion. Resp: + Scant clear cough  Cardiac: Denies chest pain, +SOB, no diaphoresis or palpitations.     GI: No nausea, <5 06/22/2021     Magnesium:    Lab Results   Component Value Date    MG 2.5 06/23/2021     Phosphorus:    Lab Results   Component Value Date    PHOS 5.5 06/23/2021     PT/INR:    Lab Results   Component Value Date    PROTIME 11.8 02/07/2020    INR 1.0 02/07/2020     Troponin:    Lab Results   Component Value Date    TROPONINI 0.10 03/21/2021     U/A:    Lab Results   Component Value Date    COLORU Yellow 02/02/2020    PROTEINU >=300 02/02/2020    PHUR 5.0 02/02/2020    LABCAST RARE 09/21/2018    WBCUA 0-1 02/02/2020    RBCUA NONE 02/02/2020    BACTERIA NONE 02/02/2020    CLARITYU Clear 02/02/2020    SPECGRAV 1.025 02/02/2020    LEUKOCYTESUR Negative 02/02/2020    UROBILINOGEN 0.2 02/02/2020    BILIRUBINUR Negative 02/02/2020    BLOODU TRACE 02/02/2020    GLUCOSEU 250 02/02/2020    AMORPHOUS FEW 10/02/2019     ABG:    Lab Results   Component Value Date    PH 7.419 06/20/2021    PCO2 47.3 02/08/2020    PO2 140.5 02/08/2020    HCO3 26.3 02/08/2020    BE 3.1 06/20/2021    O2SAT 98.5 06/20/2021     HgBA1c:    Lab Results   Component Value Date    LABA1C 8.1 06/20/2021     FLP:    Lab Results   Component Value Date    TRIG 106 03/21/2021    HDL 36 03/21/2021    LDLCALC 66 03/21/2021    LABVLDL 21 03/21/2021     TSH:    Lab Results   Component Value Date    TSH 1.210 06/21/2021     IRON:    Lab Results   Component Value Date    IRON 19 02/09/2020     LIPASE:    Lab Results   Component Value Date    LIPASE 78 09/24/2018       ASSESSMENT AND PLAN:      Patient Active Problem List    Diagnosis Date Noted    Hallux valgus, acquired 06/06/2014    Acute respiratory failure with hypoxia ([facility] [address] 06/20/2021    Respiratory failure ([facility] [address] 02/02/2020    Pneumonia 01/28/2020    Acute pancreatitis without necrosis or infection, unspecified 09/23/2018    Idiopathic acute pancreatitis 09/21/2018    B12 deficiency 03/17/2016    DMII (diabetes mellitus, type 2) ([facility] 07/28/2015    HTN (hypertension) 07/28/2015    Lumbar radicular pain 07/28/2015    Spinal stenosis 07/28/2015     Impression:  1. Acute hypoxic respiratory failure-  2. HCAP  3. History of chronic renal failure with hemodialysis  4. Insulin dependent diabetes mellitus type 2-hemoglobin A1c 8.1.  5.  Hypertension  6.   Hyperlipidemia      Plan:  Discharge home today    Prescription for home O2-stationary and portable  -Activity-2-3 L nasal cannula  -Sleep-2 L nasal cannula  -Rest-0-2 L nasal cannula    Prescription for Vibramycin 100 mg twice daily for 7 days  Prescription for Omnicef 300 mg twice daily for 7 days    Recommend repeat chest x-ray in 1-2 weeks    Continue home meds    Jose G Gong DO, D.O.  6/24/2021  10:45 AM

## 2021-06-25 LAB
BLOOD CULTURE, ROUTINE: NORMAL
CULTURE, BLOOD 2: NORMAL

## 2021-06-28 ENCOUNTER — APPOINTMENT (OUTPATIENT)
Dept: GENERAL RADIOLOGY | Age: 66
DRG: 291 | End: 2021-06-28
Payer: MEDICARE

## 2021-06-28 ENCOUNTER — TELEPHONE (OUTPATIENT)
Dept: OTHER | Facility: CLINIC | Age: 66
End: 2021-06-28

## 2021-06-28 ENCOUNTER — APPOINTMENT (OUTPATIENT)
Dept: CT IMAGING | Age: 66
DRG: 291 | End: 2021-06-28
Payer: MEDICARE

## 2021-06-28 ENCOUNTER — HOSPITAL ENCOUNTER (INPATIENT)
Age: 66
LOS: 3 days | Discharge: HOME | DRG: 291 | End: 2021-07-01
Attending: EMERGENCY MEDICINE | Admitting: INTERNAL MEDICINE
Payer: MEDICARE

## 2021-06-28 DIAGNOSIS — N18.6 ESRD ON DIALYSIS (HCC): ICD-10-CM

## 2021-06-28 DIAGNOSIS — J44.1 COPD EXACERBATION (HCC): ICD-10-CM

## 2021-06-28 DIAGNOSIS — J81.0 ACUTE PULMONARY EDEMA (HCC): ICD-10-CM

## 2021-06-28 DIAGNOSIS — Z99.2 ESRD ON DIALYSIS (HCC): ICD-10-CM

## 2021-06-28 DIAGNOSIS — J96.01 ACUTE RESPIRATORY FAILURE WITH HYPOXIA (HCC): Primary | ICD-10-CM

## 2021-06-28 DIAGNOSIS — J90 PLEURAL EFFUSION: ICD-10-CM

## 2021-06-28 LAB
ALBUMIN SERPL-MCNC: 3.3 G/DL (ref 3.5–5.2)
ALP BLD-CCNC: 50 U/L (ref 40–129)
ALT SERPL-CCNC: 11 U/L (ref 0–40)
ANION GAP SERPL CALCULATED.3IONS-SCNC: 15 MMOL/L (ref 7–16)
AST SERPL-CCNC: 27 U/L (ref 0–39)
BASOPHILS ABSOLUTE: 0.01 E9/L (ref 0–0.2)
BASOPHILS RELATIVE PERCENT: 0.1 % (ref 0–2)
BILIRUB SERPL-MCNC: 0.3 MG/DL (ref 0–1.2)
BUN BLDV-MCNC: 73 MG/DL (ref 6–23)
C-REACTIVE PROTEIN: 3.3 MG/DL (ref 0–0.4)
CALCIUM SERPL-MCNC: 9.5 MG/DL (ref 8.6–10.2)
CHLORIDE BLD-SCNC: 98 MMOL/L (ref 98–107)
CO2: 24 MMOL/L (ref 22–29)
CREAT SERPL-MCNC: 9.7 MG/DL (ref 0.7–1.2)
EOSINOPHILS ABSOLUTE: 0.01 E9/L (ref 0.05–0.5)
EOSINOPHILS RELATIVE PERCENT: 0.1 % (ref 0–6)
GFR AFRICAN AMERICAN: 7
GFR NON-AFRICAN AMERICAN: 7 ML/MIN/1.73
GLUCOSE BLD-MCNC: 304 MG/DL (ref 74–99)
HBA1C MFR BLD: 7.4 % (ref 4–5.6)
HCT VFR BLD CALC: 33 % (ref 37–54)
HEMOGLOBIN: 10 G/DL (ref 12.5–16.5)
IMMATURE GRANULOCYTES #: 0.04 E9/L
IMMATURE GRANULOCYTES %: 0.4 % (ref 0–5)
LACTIC ACID: 1.9 MMOL/L (ref 0.5–2.2)
LYMPHOCYTES ABSOLUTE: 0.66 E9/L (ref 1.5–4)
LYMPHOCYTES RELATIVE PERCENT: 7.1 % (ref 20–42)
MCH RBC QN AUTO: 28.3 PG (ref 26–35)
MCHC RBC AUTO-ENTMCNC: 30.3 % (ref 32–34.5)
MCV RBC AUTO: 93.5 FL (ref 80–99.9)
METER GLUCOSE: 227 MG/DL (ref 74–99)
METER GLUCOSE: 248 MG/DL (ref 74–99)
MONOCYTES ABSOLUTE: 0.75 E9/L (ref 0.1–0.95)
MONOCYTES RELATIVE PERCENT: 8.1 % (ref 2–12)
NEUTROPHILS ABSOLUTE: 7.81 E9/L (ref 1.8–7.3)
NEUTROPHILS RELATIVE PERCENT: 84.2 % (ref 43–80)
PDW BLD-RTO: 18.6 FL (ref 11.5–15)
PLATELET # BLD: 244 E9/L (ref 130–450)
PMV BLD AUTO: 9.8 FL (ref 7–12)
POTASSIUM REFLEX MAGNESIUM: 6.4 MMOL/L (ref 3.5–5)
PRO-BNP: ABNORMAL PG/ML (ref 0–125)
PROCALCITONIN: 0.27 NG/ML (ref 0–0.08)
RBC # BLD: 3.53 E12/L (ref 3.8–5.8)
SARS-COV-2, NAAT: NOT DETECTED
SODIUM BLD-SCNC: 137 MMOL/L (ref 132–146)
TOTAL PROTEIN: 7.8 G/DL (ref 6.4–8.3)
TROPONIN, HIGH SENSITIVITY: 100 NG/L (ref 0–11)
TROPONIN, HIGH SENSITIVITY: 91 NG/L (ref 0–11)
WBC # BLD: 9.3 E9/L (ref 4.5–11.5)

## 2021-06-28 PROCEDURE — 99285 EMERGENCY DEPT VISIT HI MDM: CPT

## 2021-06-28 PROCEDURE — 6370000000 HC RX 637 (ALT 250 FOR IP): Performed by: STUDENT IN AN ORGANIZED HEALTH CARE EDUCATION/TRAINING PROGRAM

## 2021-06-28 PROCEDURE — 86140 C-REACTIVE PROTEIN: CPT

## 2021-06-28 PROCEDURE — 84484 ASSAY OF TROPONIN QUANT: CPT

## 2021-06-28 PROCEDURE — 71046 X-RAY EXAM CHEST 2 VIEWS: CPT

## 2021-06-28 PROCEDURE — 6360000002 HC RX W HCPCS: Performed by: INTERNAL MEDICINE

## 2021-06-28 PROCEDURE — 82962 GLUCOSE BLOOD TEST: CPT

## 2021-06-28 PROCEDURE — 6370000000 HC RX 637 (ALT 250 FOR IP): Performed by: INTERNAL MEDICINE

## 2021-06-28 PROCEDURE — 85025 COMPLETE CBC W/AUTO DIFF WBC: CPT

## 2021-06-28 PROCEDURE — 87449 NOS EACH ORGANISM AG IA: CPT

## 2021-06-28 PROCEDURE — 70450 CT HEAD/BRAIN W/O DYE: CPT

## 2021-06-28 PROCEDURE — 96374 THER/PROPH/DIAG INJ IV PUSH: CPT

## 2021-06-28 PROCEDURE — 83036 HEMOGLOBIN GLYCOSYLATED A1C: CPT

## 2021-06-28 PROCEDURE — 93005 ELECTROCARDIOGRAM TRACING: CPT | Performed by: STUDENT IN AN ORGANIZED HEALTH CARE EDUCATION/TRAINING PROGRAM

## 2021-06-28 PROCEDURE — 84145 PROCALCITONIN (PCT): CPT

## 2021-06-28 PROCEDURE — 36415 COLL VENOUS BLD VENIPUNCTURE: CPT

## 2021-06-28 PROCEDURE — 83605 ASSAY OF LACTIC ACID: CPT

## 2021-06-28 PROCEDURE — 94664 DEMO&/EVAL PT USE INHALER: CPT

## 2021-06-28 PROCEDURE — 2700000000 HC OXYGEN THERAPY PER DAY

## 2021-06-28 PROCEDURE — 83880 ASSAY OF NATRIURETIC PEPTIDE: CPT

## 2021-06-28 PROCEDURE — 80053 COMPREHEN METABOLIC PANEL: CPT

## 2021-06-28 PROCEDURE — 6360000002 HC RX W HCPCS: Performed by: STUDENT IN AN ORGANIZED HEALTH CARE EDUCATION/TRAINING PROGRAM

## 2021-06-28 PROCEDURE — 2060000000 HC ICU INTERMEDIATE R&B

## 2021-06-28 PROCEDURE — 87635 SARS-COV-2 COVID-19 AMP PRB: CPT

## 2021-06-28 PROCEDURE — 94640 AIRWAY INHALATION TREATMENT: CPT

## 2021-06-28 RX ORDER — IPRATROPIUM BROMIDE AND ALBUTEROL SULFATE 2.5; .5 MG/3ML; MG/3ML
1 SOLUTION RESPIRATORY (INHALATION)
Status: COMPLETED | OUTPATIENT
Start: 2021-06-28 | End: 2021-06-28

## 2021-06-28 RX ORDER — GLUCOSAMINE HCL 500 MG
150 TABLET ORAL DAILY
Status: DISCONTINUED | OUTPATIENT
Start: 2021-06-28 | End: 2021-06-28 | Stop reason: CLARIF

## 2021-06-28 RX ORDER — HYDRALAZINE HYDROCHLORIDE 50 MG/1
50 TABLET, FILM COATED ORAL 2 TIMES DAILY
Status: ON HOLD | COMMUNITY
End: 2021-07-25 | Stop reason: SDUPTHER

## 2021-06-28 RX ORDER — SEVELAMER CARBONATE 800 MG/1
2 TABLET, FILM COATED ORAL
COMMUNITY

## 2021-06-28 RX ORDER — ALBUTEROL SULFATE 90 UG/1
2 AEROSOL, METERED RESPIRATORY (INHALATION) EVERY 4 HOURS PRN
Status: DISCONTINUED | OUTPATIENT
Start: 2021-06-28 | End: 2021-06-28 | Stop reason: CLARIF

## 2021-06-28 RX ORDER — CALCITRIOL 0.25 UG/1
0.25 CAPSULE, LIQUID FILLED ORAL
Status: DISCONTINUED | OUTPATIENT
Start: 2021-06-28 | End: 2021-07-01 | Stop reason: HOSPADM

## 2021-06-28 RX ORDER — IPRATROPIUM BROMIDE AND ALBUTEROL SULFATE 2.5; .5 MG/3ML; MG/3ML
3 SOLUTION RESPIRATORY (INHALATION) EVERY 4 HOURS PRN
Status: DISCONTINUED | OUTPATIENT
Start: 2021-06-28 | End: 2021-07-01 | Stop reason: HOSPADM

## 2021-06-28 RX ORDER — ARFORMOTEROL TARTRATE 15 UG/2ML
15 SOLUTION RESPIRATORY (INHALATION) 2 TIMES DAILY
Status: DISCONTINUED | OUTPATIENT
Start: 2021-06-28 | End: 2021-07-01 | Stop reason: HOSPADM

## 2021-06-28 RX ORDER — CEFDINIR 300 MG/1
300 CAPSULE ORAL DAILY
Status: DISCONTINUED | OUTPATIENT
Start: 2021-06-28 | End: 2021-07-01 | Stop reason: HOSPADM

## 2021-06-28 RX ORDER — SEVELAMER CARBONATE 800 MG/1
1 TABLET, FILM COATED ORAL 2 TIMES DAILY PRN
COMMUNITY
End: 2021-08-10

## 2021-06-28 RX ORDER — HYDRALAZINE HYDROCHLORIDE 25 MG/1
50 TABLET, FILM COATED ORAL 2 TIMES DAILY
Status: DISCONTINUED | OUTPATIENT
Start: 2021-06-28 | End: 2021-07-01 | Stop reason: HOSPADM

## 2021-06-28 RX ORDER — BUDESONIDE 0.5 MG/2ML
0.5 INHALANT ORAL 2 TIMES DAILY
Status: DISCONTINUED | OUTPATIENT
Start: 2021-06-28 | End: 2021-07-01 | Stop reason: HOSPADM

## 2021-06-28 RX ORDER — DOXYCYCLINE HYCLATE 100 MG/1
100 CAPSULE ORAL 2 TIMES DAILY
Status: DISCONTINUED | OUTPATIENT
Start: 2021-06-28 | End: 2021-07-01 | Stop reason: HOSPADM

## 2021-06-28 RX ORDER — INSULIN GLARGINE 100 [IU]/ML
55 INJECTION, SOLUTION SUBCUTANEOUS NIGHTLY
Status: DISCONTINUED | OUTPATIENT
Start: 2021-06-28 | End: 2021-07-01 | Stop reason: HOSPADM

## 2021-06-28 RX ORDER — METHYLPREDNISOLONE SODIUM SUCCINATE 125 MG/2ML
125 INJECTION, POWDER, LYOPHILIZED, FOR SOLUTION INTRAMUSCULAR; INTRAVENOUS ONCE
Status: COMPLETED | OUTPATIENT
Start: 2021-06-28 | End: 2021-06-28

## 2021-06-28 RX ORDER — HEPARIN SODIUM 5000 [USP'U]/ML
5000 INJECTION, SOLUTION INTRAVENOUS; SUBCUTANEOUS EVERY 8 HOURS SCHEDULED
Status: DISCONTINUED | OUTPATIENT
Start: 2021-06-28 | End: 2021-07-01 | Stop reason: HOSPADM

## 2021-06-28 RX ORDER — FUROSEMIDE 10 MG/ML
80 INJECTION INTRAMUSCULAR; INTRAVENOUS ONCE
Status: COMPLETED | OUTPATIENT
Start: 2021-06-28 | End: 2021-06-28

## 2021-06-28 RX ORDER — DEXTROSE MONOHYDRATE 50 MG/ML
100 INJECTION, SOLUTION INTRAVENOUS PRN
Status: DISCONTINUED | OUTPATIENT
Start: 2021-06-28 | End: 2021-07-01 | Stop reason: HOSPADM

## 2021-06-28 RX ORDER — BUDESONIDE AND FORMOTEROL FUMARATE DIHYDRATE 160; 4.5 UG/1; UG/1
2 AEROSOL RESPIRATORY (INHALATION) 2 TIMES DAILY
Status: DISCONTINUED | OUTPATIENT
Start: 2021-06-28 | End: 2021-06-28 | Stop reason: CLARIF

## 2021-06-28 RX ORDER — GABAPENTIN 100 MG/1
100 CAPSULE ORAL NIGHTLY
Status: DISCONTINUED | OUTPATIENT
Start: 2021-06-28 | End: 2021-07-01 | Stop reason: HOSPADM

## 2021-06-28 RX ORDER — ONDANSETRON 2 MG/ML
4 INJECTION INTRAMUSCULAR; INTRAVENOUS EVERY 6 HOURS PRN
Status: DISCONTINUED | OUTPATIENT
Start: 2021-06-28 | End: 2021-07-01 | Stop reason: HOSPADM

## 2021-06-28 RX ORDER — SEVELAMER CARBONATE 800 MG/1
800 TABLET, FILM COATED ORAL
Status: DISCONTINUED | OUTPATIENT
Start: 2021-06-28 | End: 2021-07-01 | Stop reason: HOSPADM

## 2021-06-28 RX ORDER — NICOTINE POLACRILEX 4 MG
15 LOZENGE BUCCAL PRN
Status: DISCONTINUED | OUTPATIENT
Start: 2021-06-28 | End: 2021-07-01 | Stop reason: HOSPADM

## 2021-06-28 RX ORDER — EMPAGLIFLOZIN 10 MG/1
10 TABLET, FILM COATED ORAL DAILY
COMMUNITY

## 2021-06-28 RX ORDER — VITAMIN B COMPLEX
1000 TABLET ORAL DAILY
Status: DISCONTINUED | OUTPATIENT
Start: 2021-06-28 | End: 2021-06-28

## 2021-06-28 RX ORDER — ACETAMINOPHEN 500 MG
500 TABLET ORAL EVERY 6 HOURS PRN
Status: DISCONTINUED | OUTPATIENT
Start: 2021-06-28 | End: 2021-07-01 | Stop reason: HOSPADM

## 2021-06-28 RX ORDER — DEXTROSE MONOHYDRATE 25 G/50ML
12.5 INJECTION, SOLUTION INTRAVENOUS PRN
Status: DISCONTINUED | OUTPATIENT
Start: 2021-06-28 | End: 2021-07-01 | Stop reason: HOSPADM

## 2021-06-28 RX ORDER — SODIUM BICARBONATE 650 MG/1
1300 TABLET ORAL 3 TIMES DAILY
Status: DISCONTINUED | OUTPATIENT
Start: 2021-06-28 | End: 2021-07-01 | Stop reason: HOSPADM

## 2021-06-28 RX ORDER — AMLODIPINE BESYLATE 5 MG/1
5 TABLET ORAL 2 TIMES DAILY
Status: DISCONTINUED | OUTPATIENT
Start: 2021-06-28 | End: 2021-07-01 | Stop reason: HOSPADM

## 2021-06-28 RX ORDER — ALBUTEROL SULFATE 2.5 MG/3ML
2.5 SOLUTION RESPIRATORY (INHALATION) EVERY 4 HOURS PRN
Status: DISCONTINUED | OUTPATIENT
Start: 2021-06-28 | End: 2021-06-30

## 2021-06-28 RX ORDER — FERROUS SULFATE 325(65) MG
325 TABLET ORAL
Status: DISCONTINUED | OUTPATIENT
Start: 2021-06-29 | End: 2021-07-01 | Stop reason: HOSPADM

## 2021-06-28 RX ADMIN — INSULIN LISPRO 4 UNITS: 100 INJECTION, SOLUTION INTRAVENOUS; SUBCUTANEOUS at 17:31

## 2021-06-28 RX ADMIN — SODIUM BICARBONATE 1300 MG: 650 TABLET ORAL at 17:19

## 2021-06-28 RX ADMIN — HEPARIN SODIUM 5000 UNITS: 5000 INJECTION INTRAVENOUS; SUBCUTANEOUS at 21:37

## 2021-06-28 RX ADMIN — METHYLPREDNISOLONE SODIUM SUCCINATE 125 MG: 125 INJECTION, POWDER, FOR SOLUTION INTRAMUSCULAR; INTRAVENOUS at 10:58

## 2021-06-28 RX ADMIN — IPRATROPIUM BROMIDE AND ALBUTEROL SULFATE 1 AMPULE: .5; 3 SOLUTION RESPIRATORY (INHALATION) at 11:22

## 2021-06-28 RX ADMIN — IPRATROPIUM BROMIDE AND ALBUTEROL SULFATE 1 AMPULE: .5; 3 SOLUTION RESPIRATORY (INHALATION) at 11:18

## 2021-06-28 RX ADMIN — VITAMIN D, TAB 1000IU (100/BT) 1000 UNITS: 25 TAB at 17:27

## 2021-06-28 RX ADMIN — ARFORMOTEROL TARTRATE 15 MCG: 15 SOLUTION RESPIRATORY (INHALATION) at 19:13

## 2021-06-28 RX ADMIN — SEVELAMER CARBONATE 800 MG: 800 TABLET, FILM COATED ORAL at 17:19

## 2021-06-28 RX ADMIN — INSULIN LISPRO 2 UNITS: 100 INJECTION, SOLUTION INTRAVENOUS; SUBCUTANEOUS at 21:37

## 2021-06-28 RX ADMIN — CALCITRIOL CAPSULES 0.25 MCG 0.25 MCG: 0.25 CAPSULE ORAL at 17:26

## 2021-06-28 RX ADMIN — CHOLECALCIFEROL TAB 125 MCG (5000 UNIT) 5000 UNITS: 125 TAB at 17:19

## 2021-06-28 RX ADMIN — IPRATROPIUM BROMIDE AND ALBUTEROL SULFATE 1 AMPULE: .5; 3 SOLUTION RESPIRATORY (INHALATION) at 11:20

## 2021-06-28 RX ADMIN — HEPARIN SODIUM 5000 UNITS: 5000 INJECTION INTRAVENOUS; SUBCUTANEOUS at 17:22

## 2021-06-28 RX ADMIN — AMLODIPINE BESYLATE 5 MG: 5 TABLET ORAL at 21:34

## 2021-06-28 RX ADMIN — DOXYCYCLINE HYCLATE 100 MG: 100 CAPSULE ORAL at 21:35

## 2021-06-28 RX ADMIN — METOPROLOL TARTRATE 50 MG: 25 TABLET, FILM COATED ORAL at 21:35

## 2021-06-28 RX ADMIN — HYDRALAZINE HYDROCHLORIDE 50 MG: 25 TABLET, FILM COATED ORAL at 21:36

## 2021-06-28 RX ADMIN — BUDESONIDE 500 MCG: 0.5 INHALANT RESPIRATORY (INHALATION) at 19:12

## 2021-06-28 RX ADMIN — FUROSEMIDE 80 MG: 10 INJECTION, SOLUTION INTRAVENOUS at 21:25

## 2021-06-28 RX ADMIN — GABAPENTIN 100 MG: 100 CAPSULE ORAL at 21:36

## 2021-06-28 RX ADMIN — SODIUM BICARBONATE 1300 MG: 650 TABLET ORAL at 21:36

## 2021-06-28 RX ADMIN — INSULIN GLARGINE 55 UNITS: 100 INJECTION, SOLUTION SUBCUTANEOUS at 21:41

## 2021-06-28 RX ADMIN — IPRATROPIUM BROMIDE 0.5 MG: 0.5 SOLUTION RESPIRATORY (INHALATION) at 19:12

## 2021-06-28 RX ADMIN — CEFDINIR 300 MG: 300 CAPSULE ORAL at 17:43

## 2021-06-28 SDOH — ECONOMIC STABILITY: FOOD INSECURITY: WITHIN THE PAST 12 MONTHS, THE FOOD YOU BOUGHT JUST DIDN'T LAST AND YOU DIDN'T HAVE MONEY TO GET MORE.: NEVER TRUE

## 2021-06-28 SDOH — ECONOMIC STABILITY: FOOD INSECURITY: WITHIN THE PAST 12 MONTHS, YOU WORRIED THAT YOUR FOOD WOULD RUN OUT BEFORE YOU GOT MONEY TO BUY MORE.: NEVER TRUE

## 2021-06-28 SDOH — HEALTH STABILITY: PHYSICAL HEALTH: ON AVERAGE, HOW MANY DAYS PER WEEK DO YOU ENGAGE IN MODERATE TO STRENUOUS EXERCISE (LIKE A BRISK WALK)?: 0 DAYS

## 2021-06-28 SDOH — ECONOMIC STABILITY: HOUSING INSECURITY
IN THE LAST 12 MONTHS, WAS THERE A TIME WHEN YOU DID NOT HAVE A STEADY PLACE TO SLEEP OR SLEPT IN A SHELTER (INCLUDING NOW)?: NO

## 2021-06-28 SDOH — ECONOMIC STABILITY: TRANSPORTATION INSECURITY
IN THE PAST 12 MONTHS, HAS LACK OF TRANSPORTATION KEPT YOU FROM MEETINGS, WORK, OR FROM GETTING THINGS NEEDED FOR DAILY LIVING?: NO

## 2021-06-28 SDOH — HEALTH STABILITY: PHYSICAL HEALTH: ON AVERAGE, HOW MANY MINUTES DO YOU ENGAGE IN EXERCISE AT THIS LEVEL?: 0 MIN

## 2021-06-28 SDOH — ECONOMIC STABILITY: TRANSPORTATION INSECURITY
IN THE PAST 12 MONTHS, HAS THE LACK OF TRANSPORTATION KEPT YOU FROM MEDICAL APPOINTMENTS OR FROM GETTING MEDICATIONS?: NO

## 2021-06-28 SDOH — ECONOMIC STABILITY: INCOME INSECURITY: IN THE LAST 12 MONTHS, WAS THERE A TIME WHEN YOU WERE NOT ABLE TO PAY THE MORTGAGE OR RENT ON TIME?: NO

## 2021-06-28 ASSESSMENT — SOCIAL DETERMINANTS OF HEALTH (SDOH)
DO YOU BELONG TO ANY CLUBS OR ORGANIZATIONS SUCH AS CHURCH GROUPS UNIONS, FRATERNAL OR ATHLETIC GROUPS, OR SCHOOL GROUPS?: NO
WITHIN THE LAST YEAR, HAVE YOU BEEN HUMILIATED OR EMOTIONALLY ABUSED IN OTHER WAYS BY YOUR PARTNER OR EX-PARTNER?: NO
WITHIN THE LAST YEAR, HAVE YOU BEEN KICKED, HIT, SLAPPED, OR OTHERWISE PHYSICALLY HURT BY YOUR PARTNER OR EX-PARTNER?: NO
IN A TYPICAL WEEK, HOW MANY TIMES DO YOU TALK ON THE PHONE WITH FAMILY, FRIENDS, OR NEIGHBORS?: THREE TIMES A WEEK
WITHIN THE LAST YEAR, HAVE TO BEEN RAPED OR FORCED TO HAVE ANY KIND OF SEXUAL ACTIVITY BY YOUR PARTNER OR EX-PARTNER?: NO
HOW OFTEN DO YOU ATTEND CHURCH OR RELIGIOUS SERVICES?: 1 TO 4 TIMES PER YEAR
HOW HARD IS IT FOR YOU TO PAY FOR THE VERY BASICS LIKE FOOD, HOUSING, MEDICAL CARE, AND HEATING?: NOT VERY HARD
WITHIN THE LAST YEAR, HAVE YOU BEEN AFRAID OF YOUR PARTNER OR EX-PARTNER?: PATIENT DECLINED
HOW OFTEN DO YOU ATTENT MEETINGS OF THE CLUB OR ORGANIZATION YOU BELONG TO?: NEVER
HOW OFTEN DO YOU GET TOGETHER WITH FRIENDS OR RELATIVES?: TWICE A WEEK
ARE YOU MARRIED, WIDOWED, DIVORCED, SEPARATED, NEVER MARRIED, OR LIVING WITH A PARTNER?: PATIENT DECLINED

## 2021-06-28 ASSESSMENT — LIFESTYLE VARIABLES
HOW OFTEN DO YOU HAVE A DRINK CONTAINING ALCOHOL: NEVER
HOW MANY STANDARD DRINKS CONTAINING ALCOHOL DO YOU HAVE ON A TYPICAL DAY: PATIENT DECLINED

## 2021-06-28 ASSESSMENT — PATIENT HEALTH QUESTIONNAIRE - PHQ9: DEPRESSION UNABLE TO ASSESS: NO

## 2021-06-28 ASSESSMENT — PAIN SCALES - GENERAL
PAINLEVEL_OUTOF10: 0
PAINLEVEL_OUTOF10: 0

## 2021-06-28 NOTE — ED NOTES
Bed: 07  Expected date:   Expected time:   Means of arrival:   Comments:  EMT AURY Winters, RN  06/28/21 1000

## 2021-06-28 NOTE — ED PROVIDER NOTES
Department of Emergency Medicine   ED  Provider Note  Admit Date/RoomTime: 6/28/2021 10:00 AM  ED Room: 0617/0617-01          History of Present Illness:  6/28/21, Time: 10:29 AM EDT  Chief Complaint   Patient presents with    Shortness of Breath    Leg Swelling        Emanuel Busby is a 63 y.o. male presenting to the ED for shortness of breath and leg swelling, beginning today. The complaint has been constant, moderate in severity, and worsened by exertion. The patient is a 53-year-old male with a history of hypertension, hyperlipidemia, type 2 diabetes who presents to the emergency department complaining of shortness of breath and leg swelling. The patient symptoms have been gradual in onset over the past several days, constant, moderate severity, nothing makes it better. It is worse with exertion. The patient states that he was recently admitted from 6/26/2024 for COPD and pneumonia. Patient states that he has been taking his antibiotics at home and was discharged home with nasal cannula oxygen. He has been wearing 2 L of oxygen. EMS states when they arrived the patient was 80% on room air. They placed him on 6 L and he improved to 94%. The patient does also complain that he felt that his speech was slurred this morning. He denies any fever, chills, headaches, dizziness, syncope, fall, trauma, headache, blurry vision, double vision, nausea, vomiting, diarrhea, abdominal pain, numbness, tingling, unilateral weakness, difficulty with coordination, or other acute symptoms or concerns.     Review of Systems:   Pertinent positives and negatives are stated within HPI, all other systems reviewed and are negative.        --------------------------------------------- PAST HISTORY ---------------------------------------------  Past Medical History:  has a past medical history of Back pain, Diabetes mellitus ([facility] DMII (diabetes mellitus, type 2) ([facility] History of cardiovascular stress test, HTN (hypertension), Hyperlipidemia, Hypertension, Lumbar radicular pain, and Type II or unspecified type diabetes mellitus without mention of complication, not stated as uncontrolled. Past Surgical History:  has a past surgical history that includes shoulder surgery; Vein Surgery; and other surgical history (Left, 06/06/14). Social History:  reports that he has quit smoking. He has a 30.00 pack-year smoking history. He has never used smokeless tobacco. He reports that he does not drink alcohol and does not use drugs. Family History: family history is not on file. . Unless otherwise noted, family history is non contributory    The patients home medications have been reviewed. Allergies: Other    I have reviewed the past medical history, past surgical history, social history, and family history    ---------------------------------------------------PHYSICAL EXAM--------------------------------------    Constitutional/General: Alert and oriented x3, chronically ill appearing, no acute distress  Head: Normocephalic and atraumatic  Eyes:  EOMI, sclera non icteric  Mouth: Oropharynx clear, handling secretions, no trismus, no asymmetry of the posterior oropharynx or uvular edema  Neck: Supple, full ROM, no stridor, no meningeal signs  Respiratory: Lungs are diminished bilaterally. There is no wheezing/rales/rhonchi. He is not in any acute respiratory distress. No conversational dyspnea. Cardiovascular:  Regular rate. Regular rhythm. No murmurs, no aortic murmurs, no gallops, or rubs. Chest: No chest wall tenderness  Gastrointestinal:  Abdomen Soft, Non tender, Non distended. No rebound, guarding, or rigidity. No pulsatile masses. Musculoskeletal: Moves all extremities x 4. Warm and well perfused, no clubbing, no cyanosis, +1 edema of the bilateral lower extremities capillary refill <3 seconds  Skin: skin warm and dry. No rashes.    Neurologic: GCS 15, no focal deficits, symmetric strength 5/5 in the upper and lower extremities bilaterally, no ataxia, cranial nerves II through XII are intact, sensation is intact and equal in the bilateral upper and lower extremities. Psychiatric: Normal Affect    -------------------------------------------------- RESULTS -------------------------------------------------  I have personally reviewed all laboratory and imaging results for this patient. Results are listed below.      LABS: (Lab results interpreted by me)  Results for orders placed or performed during the hospital encounter of 06/28/21   COVID-19, Rapid    Specimen: Nasopharyngeal Swab   Result Value Ref Range    SARS-CoV-2, NAAT Not Detected Not Detected   CBC auto differential   Result Value Ref Range    WBC 9.3 4.5 - 11.5 E9/L    RBC 3.53 (L) 3.80 - 5.80 E12/L    Hemoglobin 10.0 (L) 12.5 - 16.5 g/dL    Hematocrit 33.0 (L) 37.0 - 54.0 %    MCV 93.5 80.0 - 99.9 fL    MCH 28.3 26.0 - 35.0 pg    MCHC 30.3 (L) 32.0 - 34.5 %    RDW 18.6 (H) 11.5 - 15.0 fL    Platelets 689 518 - 881 E9/L    MPV 9.8 7.0 - 12.0 fL    Neutrophils % 84.2 (H) 43.0 - 80.0 %    Immature Granulocytes % 0.4 0.0 - 5.0 %    Lymphocytes % 7.1 (L) 20.0 - 42.0 %    Monocytes % 8.1 2.0 - 12.0 %    Eosinophils % 0.1 0.0 - 6.0 %    Basophils % 0.1 0.0 - 2.0 %    Neutrophils Absolute 7.81 (H) 1.80 - 7.30 E9/L    Immature Granulocytes # 0.04 E9/L    Lymphocytes Absolute 0.66 (L) 1.50 - 4.00 E9/L    Monocytes Absolute 0.75 0.10 - 0.95 E9/L    Eosinophils Absolute 0.01 (L) 0.05 - 0.50 E9/L    Basophils Absolute 0.01 0.00 - 0.20 E9/L   Comprehensive Metabolic Panel w/ Reflex to MG   Result Value Ref Range    Sodium 137 132 - 146 mmol/L    Potassium reflex Magnesium 6.4 (H) 3.5 - 5.0 mmol/L    Chloride 98 98 - 107 mmol/L    CO2 24 22 - 29 mmol/L    Anion Gap 15 7 - 16 mmol/L    Glucose 304 (H) 74 - 99 mg/dL    BUN 73 (H) 6 - 23 mg/dL    CREATININE 9.7 (HH) 0.7 - 1.2 mg/dL    GFR Non-African American 7 >=60 mL/min/1.73    GFR African American 7     Calcium 9.5 8.6 - 10.2 mg/dL    Total Protein 7.8 6.4 - 8.3 g/dL    Albumin 3.3 (L) 3.5 - 5.2 g/dL    Total Bilirubin 0.3 0.0 - 1.2 mg/dL    Alkaline Phosphatase 50 40 - 129 U/L    ALT 11 0 - 40 U/L    AST 27 0 - 39 U/L   Troponin   Result Value Ref Range    Troponin, High Sensitivity 100 (H) 0 - 11 ng/L   Brain Natriuretic Peptide   Result Value Ref Range    Pro-BNP 17,059 (H) 0 - 125 pg/mL   Lactic Acid, Plasma   Result Value Ref Range    Lactic Acid 1.9 0.5 - 2.2 mmol/L   C-Reactive Protein   Result Value Ref Range    CRP 3.3 (H) 0.0 - 0.4 mg/dL   Procalcitonin   Result Value Ref Range    Procalcitonin 0.27 (H) 0.00 - 0.08 ng/mL   Troponin   Result Value Ref Range    Troponin, High Sensitivity 91 (H) 0 - 11 ng/L   POCT Glucose   Result Value Ref Range    Meter Glucose 248 (H) 74 - 99 mg/dL   EKG 12 Lead   Result Value Ref Range    Ventricular Rate 83 BPM    Atrial Rate 83 BPM    P-R Interval 154 ms    QRS Duration 86 ms    Q-T Interval 350 ms    QTc Calculation (Bazett) 411 ms    P Axis 20 degrees    R Axis 38 degrees    T Axis 59 degrees   ,       RADIOLOGY:  Interpreted by Radiologist unless otherwise specified  CT HEAD WO CONTRAST   Final Result   No acute cerebral abnormality, no sign of hemorrhage or infarction. Normal brain. XR CHEST (2 VW)   Final Result   1. Multifocal consolidation appears to reflect worsening interstitial edema   an slightly worsened left lower lobe atelectasis. 2. Suspect small right pleural effusion. EKG Interpretation  Interpreted by Dipti Kirby DO    EKG: This EKG is signed and interpreted by me.     Rate: 83  Rhythm: Sinus  Interpretation: Normal sinus rhythm, normal axis, no acute ST elevations or depressions, normals within normal limits, QTC is 411  Comparison: stable as compared to patient's most recent EKG     ------------------------- NURSING NOTES AND VITALS REVIEWED ---------------------------   The nursing notes within the ED encounter and vital signs as below have been reviewed by myself  /61   Pulse 90   Temp 97.8 °F (36.6 °C) (Oral)   Resp 22   Ht 5' 6\" (1.676 m)   Wt 209 lb (94.8 kg)   SpO2 94%   BMI 33.73 kg/m²     Oxygen Saturation Interpretation: 93 % on 4 L NC. Normal    The patients available past medical records and past encounters were reviewed.         ------------------------------ ED COURSE/MEDICAL DECISION MAKING----------------------  Medications   amLODIPine (NORVASC) tablet 5 mg (has no administration in time range)   gabapentin (NEURONTIN) capsule 100 mg (has no administration in time range)   ferrous sulfate (IRON 325) tablet 325 mg (has no administration in time range)   calcitRIOL (ROCALTROL) capsule 0.25 mcg (0.25 mcg Oral Given 6/28/21 1726)   insulin glargine (LANTUS) injection vial 55 Units (has no administration in time range)   sodium bicarbonate tablet 1,300 mg (1,300 mg Oral Given 6/28/21 1719)   ipratropium-albuterol (DUONEB) nebulizer solution 3 mL (has no administration in time range)   metoprolol tartrate (LOPRESSOR) tablet 50 mg (has no administration in time range)   hydrALAZINE (APRESOLINE) tablet 50 mg (has no administration in time range)   sevelamer (RENVELA) tablet 800 mg (800 mg Oral Given 6/28/21 1719)   acetaminophen (TYLENOL) tablet 500 mg (has no administration in time range)   heparin (porcine) injection 5,000 Units (5,000 Units Subcutaneous Given 6/28/21 1722)   ondansetron (ZOFRAN) injection 4 mg (has no administration in time range)   glucose (GLUTOSE) 40 % oral gel 15 g (has no administration in time range)   dextrose 50 % IV solution (has no administration in time range)   glucagon (rDNA) injection 1 mg (has no administration in time range)   dextrose 5 % solution (has no administration in time range)   insulin lispro (HUMALOG) injection vial 0-12 Units (4 Units Subcutaneous Given 6/28/21 1731)   insulin lispro (HUMALOG) injection vial 0-6 Units (has no administration in time range)   cefdinir (OMNICEF) capsule 300 mg (300 mg Oral Given 6/28/21 1743)   doxycycline hyclate (VIBRAMYCIN) capsule 100 mg (has no administration in time range)   ipratropium (ATROVENT) 0.02 % nebulizer solution 0.5 mg (0.5 mg Nebulization Given 6/28/21 1912)   albuterol (PROVENTIL) nebulizer solution 2.5 mg (has no administration in time range)   Arformoterol Tartrate (BROVANA) nebulizer solution 15 mcg (15 mcg Nebulization Given 6/28/21 1913)     And   budesonide (PULMICORT) nebulizer suspension 500 mcg (500 mcg Nebulization Given 6/28/21 1912)   furosemide (LASIX) injection 80 mg (has no administration in time range)   ipratropium-albuterol (DUONEB) nebulizer solution 1 ampule (1 ampule Inhalation Given 6/28/21 1122)   methylPREDNISolone sodium (SOLU-MEDROL) injection 125 mg (125 mg Intravenous Given 6/28/21 1058)           The cardiac monitor revealed NSR with a heart rate in the 80s as interpreted by me. The cardiac monitor was ordered secondary to the patient's shortness of breath and to monitor the patient for dysrhythmia. CPT [id number]           Medical Decision Making:     The patient was seen and evaluated by the Attending Emergency Medicine Physician Dr. Ninoska Nieto. The patient is a 61-year-old male presents the emergency department complaining of shortness of breath and leg swelling. He was hypoxic prior to arrival and only 80% on room air. Patient is supposed to chronically be on 2 L nasal cannula at home. EMS states that the oxygen tubing in his home was extremely long and was not working properly when they arrived. They did place him on 6 L nasal cannula. Patient does have slight increased work of breathing, but no conversational dyspnea. Vitals are otherwise reassuring and he was in no acute distress. Patient did not have any focal neurological deficits on examination, but he states he was having trouble finding words. Did obtain CT scan which was negative for any acute normalities.   His BNP was elevated resting comfortably and in no acute distress. This patient's ED course included: a personal history and physicial examination, re-evaluation prior to disposition, multiple bedside re-evaluations, IV medications, cardiac monitoring, continuous pulse oximetry and complex medical decision making and emergency management    This patient has remained hemodynamically stable during their ED course. Consultations:    Spoke with Dr. Sohail Sevilla (Medicine). Discussed case. They will admit this patient. Counseling: The emergency provider has spoken with the patient and discussed todays results, in addition to providing specific details for the plan of care and counseling regarding the diagnosis and prognosis. Questions are answered at this time and they are agreeable with the plan.       --------------------------------- IMPRESSION AND DISPOSITION ---------------------------------    IMPRESSION  1. Acute respiratory failure with hypoxia ([address]    2. Acute pulmonary edema (HCC)    3. Pleural effusion    4. COPD exacerbation ([facility]    5. ESRD on dialysis [facility])        DISPOSITION  Disposition: Admit to telemetry  Patient condition is serious        NOTE: This report was transcribed using voice recognition software.  Every effort was made to ensure accuracy; however, inadvertent computerized transcription errors may be present        Diptijerry Kirby DO  Resident  06/28/21 2052

## 2021-06-28 NOTE — H&P
Department of Internal Medicine        CHIEF COMPLAINT: Shortness of breath and leg swelling    Reason for Admission: Acute on chronic diastolic CHF, fluid overload with history of renal failure with hemodialysis    HISTORY OF PRESENT ILLNESS:      The patient is a 63 y.o. male who presents with increased shortness of breath along with lower leg swelling which started about 2 to 3 days prior to admission. Patient recently discharged back on 26 for COPD and pneumonia. Patient was discharged home with O2 nasal cannula because of his acute hypoxia and was on .2 L. When the EMS came to the house the patient was on room air and his O2 sat was 80%. Patient also stated that he thought his speech was a bit slurred this morning. Patient denies any problem with fever/chills, dizziness, chest pain, abdominal pain or any unifocal paresthesias. Chest x-ray showed multifocal consolidation appears to reflect worsening interstitial edema. CT the head was unremarkable. Patient states that he does not have any slurred speech and is not sure if he actually did or feels because he was short of breath. Temperature was 98.5 with a heart rate of 79 and blood pressure 132/65. Serum potassium is elevated 6.4 with a random blood sugar 304 and proBNP of 17,000. Troponin was 100 with a WBC 9.3. Patient will be admitted to telemetry floor and consult with nephrology with more aggressive dialysis.     Past Medical History:    Past Medical History:   Diagnosis Date    Back pain     Diabetes mellitus ([facility]     DMII (diabetes mellitus, type 2) ([facility] 7/28/2015    History of cardiovascular stress test 2/16/2015    lexiscan    HTN (hypertension) 7/28/2015    Hyperlipidemia     Hypertension     Lumbar radicular pain 7/28/2015    Type II or unspecified type diabetes mellitus without mention of complication, not stated as uncontrolled      Past Surgical History:    Past Surgical History:   Procedure Laterality Date    OTHER SURGICAL HISTORY Left 06/06/14    cain bunionectomy left foot with osteomed screw x1    SHOULDER SURGERY      Bilateral    VEIN SURGERY         Medications Prior to Admission:    @  Prior to Admission medications    Medication Sig Start Date End Date Taking?  Authorizing Provider   doxycycline hyclate (VIBRAMYCIN) 100 MG capsule Take 1 capsule by mouth 2 times daily for 7 days 6/24/21 7/1/21  Oral Salvia, DO   cefdinir (OMNICEF) 300 MG capsule Take 1 capsule by mouth 2 times daily for 7 days 6/24/21 7/1/21  Oral Salvia, DO   sodium bicarbonate 650 MG tablet Take 2 tablets by mouth 3 times daily 2/13/20   Oral Salvia, DO   ipratropium-albuterol (DUONEB) 0.5-2.5 (3) MG/3ML SOLN nebulizer solution Inhale 3 mLs into the lungs every 4 hours as needed for Shortness of Breath 2/13/20   Oral Salvia, DO   metoprolol tartrate (LOPRESSOR) 50 MG tablet Take 1 tablet by mouth 2 times daily 2/13/20   Oral Salvia, DO   hydrALAZINE (APRESOLINE) 25 MG tablet Take 2 tablets by mouth 2 times daily 2/13/20   Oral Salvia, DO   sevelamer (RENVELA) 800 MG tablet Take 1 tablet by mouth 3 times daily (with meals) 2/13/20   Oral Salvia, DO   tiotropium (SPIRIVA RESPIMAT) 2.5 MCG/ACT AERS inhaler Inhale 2 puffs into the lungs daily 2/13/20   Oral Salvia, DO   albuterol sulfate HFA (PROVENTIL HFA) 108 (90 Base) MCG/ACT inhaler Inhale 2 puffs into the lungs every 4 hours as needed for Wheezing or Shortness of Breath 2/13/20   Oral Salvia, DO   mometasone-formoterol [facility]) 200-5 MCG/ACT inhaler Inhale 2 puffs into the lungs every 12 hours 2/13/20   Oral Salvia, DO   ferrous sulfate 325 (65 Fe) MG tablet Take 325 mg by mouth daily (with breakfast)    Historical Provider, MD   calcitRIOL (ROCALTROL) 0.25 MCG capsule Take 0.25 mcg by mouth Takes 3 times a week    Historical Provider, MD   insulin glargine (LANTUS) 100 UNIT/ML injection vial Inject 55 Units into the skin nightly     Historical Provider, MD   gabapentin (NEURONTIN) 100 MG capsule Take 100 mg by mouth. Historical Provider, MD   amLODIPine (NORVASC) 10 MG tablet Take 5 mg by mouth 2 times daily     Historical Provider, MD   Cholecalciferol (VITAMIN D3) 3000 units TABS Take 150 mcg by mouth daily     Historical Provider, MD       Allergies: Other    Social History:   Social History     Socioeconomic History    Marital status: Single     Spouse name: Not on file    Number of children: Not on file    Years of education: Not on file    Highest education level: Not on file   Occupational History    Not on file   Tobacco Use    Smoking status: Former Smoker     Packs/day: 1.00     Years: 30.00     Pack years: 30.00    Smokeless tobacco: Never Used   Vaping Use    Vaping Use: Never used   Substance and Sexual Activity    Alcohol use: No    Drug use: No    Sexual activity: Not on file   Other Topics Concern    Not on file   Social History Narrative    Not on file     Social Determinants of Health     Financial Resource Strain:     Difficulty of Paying Living Expenses:    Food Insecurity:     Worried About [address] in the Last Year:     [address] in the Last Year:    Transportation Needs:     Lack of Transportation (Medical):  Lack of Transportation (Non-Medical):    Physical Activity:     Days of Exercise per Week:     Minutes of Exercise per Session:    Stress:     Feeling of Stress :    Social Connections:     Frequency of Communication with Friends and Family:     Frequency of Social Gatherings with Friends and Family:     Attends [employer] Services:     Active Member of Clubs or Organizations:     Attends Club or Organization Meetings:     Marital Status:    Intimate Partner Violence:     Fear of Current or Ex-Partner:     Emotionally Abused:     Physically Abused:     Sexually Abused:        Family History:   History reviewed. No pertinent family history.     REVIEW OF SYSTEMS:    Gen: Patient denies any lightheadedness or dizziness. No LOC or syncope. No fevers or chills. HEENT: No earache, sore throat or nasal congestion. Resp: Denies cough, hemoptysis or sputum production. Cardiac: Denies chest pain, +SOB, no diaphoresis or palpitations. GI: No nausea, vomiting, diarrhea or constipation. No melena or hematochezia. : No urinary complaints, dysuria, hematuria or frequency. MSK: + Questionable slurred speech this morning. No extremity weakness, paralysis or paresthesias. PHYSICAL EXAM:    Vitals:  /65   Pulse 79   Temp 98.5 °F (36.9 °C) (Oral)   Resp 28   Ht 5' 6\" (1.676 m)   Wt 209 lb (94.8 kg)   SpO2 97%   BMI 33.73 kg/m²     General:  This is a 63 y.o. yo male who is alert and oriented in NAD  HEENT:  Head is normocephalic and atraumatic, PERRLA, EOMI, mucus membranes moist with no pharyngeal erythema or exudate. Neck:  Supple with no carotid bruits, JVD or thyromegaly.   No cervical adenopathy  CV:  Regular rate and rhythm, no murmurs  Lungs:  + Coarse decreased breath sounds to auscultation bilaterally with no wheezes, rales or rhonchi  Abdomen:  Soft, nontender, nondistended, bowel sounds present  Extremities:  No Lower leg edema, peripheral pulses intact bilaterally  Neuro:  Cranial nerves II-XII grossly intact; motor and sensory function intact with no focal deficits  Skin:  No rashes, lesions or wounds    DATA:  CBC with Differential:    Lab Results   Component Value Date    WBC 9.3 06/28/2021    RBC 3.53 06/28/2021    HGB 10.0 06/28/2021    HCT 33.0 06/28/2021     06/28/2021    MCV 93.5 06/28/2021    MCH 28.3 06/28/2021    MCHC 30.3 06/28/2021    RDW 18.6 06/28/2021    LYMPHOPCT 7.1 06/28/2021    MONOPCT 8.1 06/28/2021    BASOPCT 0.1 06/28/2021    MONOSABS 0.75 06/28/2021    LYMPHSABS 0.66 06/28/2021    EOSABS 0.01 06/28/2021    BASOSABS 0.01 06/28/2021     CMP:    Lab Results   Component Value Date     06/28/2021    K 6.4 06/28/2021    CL 98 failure ([facility] 02/02/2020    Pneumonia 01/28/2020    Acute pancreatitis without necrosis or infection, unspecified 09/23/2018    Idiopathic acute pancreatitis 09/21/2018    B12 deficiency 03/17/2016    DMII (diabetes mellitus, type 2) ([facility] 07/28/2015    HTN (hypertension) 07/28/2015    Lumbar radicular pain 07/28/2015    Spinal stenosis 07/28/2015     Impression:  1. Acute on chronic hypoxic respiratory failure  2. Acute on chronic diastolic congestive heart failure fluid overload  3. Chronic renal failure with hemodialysis  4. Insulin-dependent diabetes mellitus type 2  5. Hypertension  6. Hyper kalemia  7. Elevated troponin  8. Chronic normocytic anemia  9. Questionable slurred speech this morning    Plan:  Admit to telemetry floor  Home medications reviewed  Activity up ad layla.   Diet -carb control, low potassium, no added salt  Glucoscans 4 times daily with sliding scale insulin  Heparin 5000 units subcu every 8  Consult nephrology  Repeat troponin  Continued bronchodilators  O2 nasal cannula  Procalcitonin  Sed rate, CRP            Kike Price DO, D.BENNY  6/28/2021  2:28 PM

## 2021-06-29 ENCOUNTER — APPOINTMENT (OUTPATIENT)
Dept: CT IMAGING | Age: 66
DRG: 291 | End: 2021-06-29
Payer: MEDICARE

## 2021-06-29 LAB
ALBUMIN SERPL-MCNC: 3.2 G/DL (ref 3.5–5.2)
ALP BLD-CCNC: 44 U/L (ref 40–129)
ALT SERPL-CCNC: 7 U/L (ref 0–40)
ANION GAP SERPL CALCULATED.3IONS-SCNC: 15 MMOL/L (ref 7–16)
ANION GAP SERPL CALCULATED.3IONS-SCNC: 16 MMOL/L (ref 7–16)
ANISOCYTOSIS: ABNORMAL
AST SERPL-CCNC: 7 U/L (ref 0–39)
BASOPHILS ABSOLUTE: 0 E9/L (ref 0–0.2)
BASOPHILS RELATIVE PERCENT: 0 % (ref 0–2)
BILIRUB SERPL-MCNC: 0.3 MG/DL (ref 0–1.2)
BUN BLDV-MCNC: 86 MG/DL (ref 6–23)
BUN BLDV-MCNC: 88 MG/DL (ref 6–23)
CALCIUM SERPL-MCNC: 10.1 MG/DL (ref 8.6–10.2)
CALCIUM SERPL-MCNC: 9.9 MG/DL (ref 8.6–10.2)
CHLORIDE BLD-SCNC: 100 MMOL/L (ref 98–107)
CHLORIDE BLD-SCNC: 99 MMOL/L (ref 98–107)
CO2: 24 MMOL/L (ref 22–29)
CO2: 25 MMOL/L (ref 22–29)
CREAT SERPL-MCNC: 11.1 MG/DL (ref 0.7–1.2)
CREAT SERPL-MCNC: 11.1 MG/DL (ref 0.7–1.2)
EKG ATRIAL RATE: 83 BPM
EKG P AXIS: 20 DEGREES
EKG P-R INTERVAL: 154 MS
EKG Q-T INTERVAL: 350 MS
EKG QRS DURATION: 86 MS
EKG QTC CALCULATION (BAZETT): 411 MS
EKG R AXIS: 38 DEGREES
EKG T AXIS: 59 DEGREES
EKG VENTRICULAR RATE: 83 BPM
EOSINOPHILS ABSOLUTE: 0 E9/L (ref 0.05–0.5)
EOSINOPHILS RELATIVE PERCENT: 0 % (ref 0–6)
GFR AFRICAN AMERICAN: 6
GFR AFRICAN AMERICAN: 6
GFR NON-AFRICAN AMERICAN: 6 ML/MIN/1.73
GFR NON-AFRICAN AMERICAN: 6 ML/MIN/1.73
GLUCOSE BLD-MCNC: 193 MG/DL (ref 74–99)
GLUCOSE BLD-MCNC: 250 MG/DL (ref 74–99)
HCT VFR BLD CALC: 30.9 % (ref 37–54)
HEMOGLOBIN: 9 G/DL (ref 12.5–16.5)
HYPOCHROMIA: ABNORMAL
IMMATURE GRANULOCYTES #: 0.03 E9/L
IMMATURE GRANULOCYTES %: 0.4 % (ref 0–5)
LYMPHOCYTES ABSOLUTE: 0.56 E9/L (ref 1.5–4)
LYMPHOCYTES RELATIVE PERCENT: 6.6 % (ref 20–42)
MAGNESIUM: 2.7 MG/DL (ref 1.6–2.6)
MCH RBC QN AUTO: 27.1 PG (ref 26–35)
MCHC RBC AUTO-ENTMCNC: 29.1 % (ref 32–34.5)
MCV RBC AUTO: 93.1 FL (ref 80–99.9)
METER GLUCOSE: 168 MG/DL (ref 74–99)
METER GLUCOSE: 178 MG/DL (ref 74–99)
METER GLUCOSE: 215 MG/DL (ref 74–99)
METER GLUCOSE: 75 MG/DL (ref 74–99)
MONOCYTES ABSOLUTE: 0.47 E9/L (ref 0.1–0.95)
MONOCYTES RELATIVE PERCENT: 5.6 % (ref 2–12)
NEUTROPHILS ABSOLUTE: 7.39 E9/L (ref 1.8–7.3)
NEUTROPHILS RELATIVE PERCENT: 87.4 % (ref 43–80)
PDW BLD-RTO: 18.1 FL (ref 11.5–15)
PHOSPHORUS: 5.3 MG/DL (ref 2.5–4.5)
PLATELET # BLD: 246 E9/L (ref 130–450)
PMV BLD AUTO: 10 FL (ref 7–12)
POLYCHROMASIA: ABNORMAL
POTASSIUM SERPL-SCNC: 6 MMOL/L (ref 3.5–5)
POTASSIUM SERPL-SCNC: 6.6 MMOL/L (ref 3.5–5)
RBC # BLD: 3.32 E12/L (ref 3.8–5.8)
SEDIMENTATION RATE, ERYTHROCYTE: 67 MM/HR (ref 0–15)
SODIUM BLD-SCNC: 139 MMOL/L (ref 132–146)
SODIUM BLD-SCNC: 140 MMOL/L (ref 132–146)
STREP PNEUMONIAE ANTIGEN, URINE: NORMAL
TOTAL PROTEIN: 7.4 G/DL (ref 6.4–8.3)
WBC # BLD: 8.5 E9/L (ref 4.5–11.5)

## 2021-06-29 PROCEDURE — 5A1D70Z PERFORMANCE OF URINARY FILTRATION, INTERMITTENT, LESS THAN 6 HOURS PER DAY: ICD-10-PCS | Performed by: INTERNAL MEDICINE

## 2021-06-29 PROCEDURE — 80048 BASIC METABOLIC PNL TOTAL CA: CPT

## 2021-06-29 PROCEDURE — 82962 GLUCOSE BLOOD TEST: CPT

## 2021-06-29 PROCEDURE — 90935 HEMODIALYSIS ONE EVALUATION: CPT | Performed by: INTERNAL MEDICINE

## 2021-06-29 PROCEDURE — 83735 ASSAY OF MAGNESIUM: CPT

## 2021-06-29 PROCEDURE — 6360000002 HC RX W HCPCS: Performed by: INTERNAL MEDICINE

## 2021-06-29 PROCEDURE — 2700000000 HC OXYGEN THERAPY PER DAY

## 2021-06-29 PROCEDURE — 36415 COLL VENOUS BLD VENIPUNCTURE: CPT

## 2021-06-29 PROCEDURE — 85025 COMPLETE CBC W/AUTO DIFF WBC: CPT

## 2021-06-29 PROCEDURE — 6370000000 HC RX 637 (ALT 250 FOR IP): Performed by: INTERNAL MEDICINE

## 2021-06-29 PROCEDURE — 94640 AIRWAY INHALATION TREATMENT: CPT

## 2021-06-29 PROCEDURE — 85651 RBC SED RATE NONAUTOMATED: CPT

## 2021-06-29 PROCEDURE — 80053 COMPREHEN METABOLIC PANEL: CPT

## 2021-06-29 PROCEDURE — 71250 CT THORAX DX C-: CPT

## 2021-06-29 PROCEDURE — 84100 ASSAY OF PHOSPHORUS: CPT

## 2021-06-29 PROCEDURE — 2060000000 HC ICU INTERMEDIATE R&B

## 2021-06-29 RX ADMIN — SODIUM BICARBONATE 1300 MG: 650 TABLET ORAL at 14:20

## 2021-06-29 RX ADMIN — CEFDINIR 300 MG: 300 CAPSULE ORAL at 09:01

## 2021-06-29 RX ADMIN — SEVELAMER CARBONATE 800 MG: 800 TABLET, FILM COATED ORAL at 09:03

## 2021-06-29 RX ADMIN — HYDRALAZINE HYDROCHLORIDE 50 MG: 25 TABLET, FILM COATED ORAL at 09:05

## 2021-06-29 RX ADMIN — IPRATROPIUM BROMIDE 0.5 MG: 0.5 SOLUTION RESPIRATORY (INHALATION) at 18:24

## 2021-06-29 RX ADMIN — BUDESONIDE 500 MCG: 0.5 INHALANT RESPIRATORY (INHALATION) at 06:23

## 2021-06-29 RX ADMIN — SODIUM BICARBONATE 1300 MG: 650 TABLET ORAL at 21:38

## 2021-06-29 RX ADMIN — INSULIN LISPRO 4 UNITS: 100 INJECTION, SOLUTION INTRAVENOUS; SUBCUTANEOUS at 09:02

## 2021-06-29 RX ADMIN — DOXYCYCLINE HYCLATE 100 MG: 100 CAPSULE ORAL at 09:05

## 2021-06-29 RX ADMIN — SEVELAMER CARBONATE 800 MG: 800 TABLET, FILM COATED ORAL at 18:05

## 2021-06-29 RX ADMIN — DOXYCYCLINE HYCLATE 100 MG: 100 CAPSULE ORAL at 21:36

## 2021-06-29 RX ADMIN — BUDESONIDE 500 MCG: 0.5 INHALANT RESPIRATORY (INHALATION) at 18:23

## 2021-06-29 RX ADMIN — HEPARIN SODIUM 5000 UNITS: 5000 INJECTION INTRAVENOUS; SUBCUTANEOUS at 21:41

## 2021-06-29 RX ADMIN — ARFORMOTEROL TARTRATE 15 MCG: 15 SOLUTION RESPIRATORY (INHALATION) at 18:23

## 2021-06-29 RX ADMIN — METOPROLOL TARTRATE 50 MG: 25 TABLET, FILM COATED ORAL at 09:07

## 2021-06-29 RX ADMIN — GABAPENTIN 100 MG: 100 CAPSULE ORAL at 21:36

## 2021-06-29 RX ADMIN — HEPARIN SODIUM 5000 UNITS: 5000 INJECTION INTRAVENOUS; SUBCUTANEOUS at 06:07

## 2021-06-29 RX ADMIN — HYDRALAZINE HYDROCHLORIDE 50 MG: 25 TABLET, FILM COATED ORAL at 21:37

## 2021-06-29 RX ADMIN — FERROUS SULFATE TAB 325 MG (65 MG ELEMENTAL FE) 325 MG: 325 (65 FE) TAB at 09:01

## 2021-06-29 RX ADMIN — ARFORMOTEROL TARTRATE 15 MCG: 15 SOLUTION RESPIRATORY (INHALATION) at 06:23

## 2021-06-29 RX ADMIN — INSULIN LISPRO 2 UNITS: 100 INJECTION, SOLUTION INTRAVENOUS; SUBCUTANEOUS at 18:05

## 2021-06-29 RX ADMIN — ACETAMINOPHEN 500 MG: 500 TABLET ORAL at 16:12

## 2021-06-29 RX ADMIN — SODIUM BICARBONATE 1300 MG: 650 TABLET ORAL at 09:00

## 2021-06-29 RX ADMIN — HEPARIN SODIUM 5000 UNITS: 5000 INJECTION INTRAVENOUS; SUBCUTANEOUS at 14:21

## 2021-06-29 RX ADMIN — AMLODIPINE BESYLATE 5 MG: 5 TABLET ORAL at 09:04

## 2021-06-29 RX ADMIN — INSULIN LISPRO 1 UNITS: 100 INJECTION, SOLUTION INTRAVENOUS; SUBCUTANEOUS at 22:02

## 2021-06-29 RX ADMIN — INSULIN GLARGINE 55 UNITS: 100 INJECTION, SOLUTION SUBCUTANEOUS at 22:03

## 2021-06-29 RX ADMIN — SEVELAMER CARBONATE 800 MG: 800 TABLET, FILM COATED ORAL at 14:20

## 2021-06-29 RX ADMIN — METOPROLOL TARTRATE 50 MG: 25 TABLET, FILM COATED ORAL at 21:38

## 2021-06-29 RX ADMIN — IPRATROPIUM BROMIDE 0.5 MG: 0.5 SOLUTION RESPIRATORY (INHALATION) at 06:23

## 2021-06-29 RX ADMIN — AMLODIPINE BESYLATE 5 MG: 5 TABLET ORAL at 21:35

## 2021-06-29 ASSESSMENT — PAIN SCALES - GENERAL
PAINLEVEL_OUTOF10: 0
PAINLEVEL_OUTOF10: 5

## 2021-06-29 NOTE — PLAN OF CARE
Problem: Breathing Pattern - Ineffective:  Goal: Ability to achieve and maintain a regular respiratory rate will improve  Description: Ability to achieve and maintain a regular respiratory rate will improve  Outcome: Met This Shift     Problem: Fluid Volume:  Goal: Ability to achieve a balanced intake and output will improve  Description: Ability to achieve a balanced intake and output will improve  Outcome: Met This Shift  Goal: Ability to maintain a balanced intake and output will improve  Description: Ability to maintain a balanced intake and output will improve  Outcome: Met This Shift     Problem: Physical Regulation:  Goal: Ability to maintain clinical measurements within normal limits will improve  Description: Ability to maintain clinical measurements within normal limits will improve  Outcome: Met This Shift  Goal: Will show no signs and symptoms of electrolyte imbalance  Description: Will show no signs and symptoms of electrolyte imbalance  Outcome: Met This Shift  Goal: Complications related to the disease process, condition or treatment will be avoided or minimized  Description: Complications related to the disease process, condition or treatment will be avoided or minimized  Outcome: Met This Shift  Goal: Diagnostic test results will improve  Description: Diagnostic test results will improve  Outcome: Met This Shift     Problem:  Activity:  Goal: Risk for activity intolerance will decrease  Description: Risk for activity intolerance will decrease  Outcome: Met This Shift     Problem: Coping:  Goal: Ability to adjust to condition or change in health will improve  Description: Ability to adjust to condition or change in health will improve  Outcome: Met This Shift     Problem: Nutritional:  Goal: Maintenance of adequate nutrition will improve  Description: Maintenance of adequate nutrition will improve  Outcome: Met This Shift  Goal: Progress toward achieving an optimal weight will improve  Description: Progress toward achieving an optimal weight will improve  Outcome: Met This Shift     Problem: Skin Integrity:  Goal: Risk for impaired skin integrity will decrease  Description: Risk for impaired skin integrity will decrease  Outcome: Met This Shift     Problem: Tissue Perfusion:  Goal: Adequacy of tissue perfusion will improve  Description: Adequacy of tissue perfusion will improve  Outcome: Met This Shift

## 2021-06-29 NOTE — PLAN OF CARE
Problem: Breathing Pattern - Ineffective:  Goal: Ability to achieve and maintain a regular respiratory rate will improve  Description: Ability to achieve and maintain a regular respiratory rate will improve  6/29/2021 1152 by Darleen Lorenzo RN  Outcome: Ongoing  6/29/2021 0134 by Lucrecia Mi RN  Outcome: Met This Shift     Problem: Fluid Volume:  Goal: Ability to achieve a balanced intake and output will improve  Description: Ability to achieve a balanced intake and output will improve  6/29/2021 1152 by Darleen Lorenzo RN  Outcome: Ongoing  6/29/2021 0134 by Lucrecia Mi RN  Outcome: Met This Shift  Goal: Ability to maintain a balanced intake and output will improve  Description: Ability to maintain a balanced intake and output will improve  6/29/2021 1152 by Darleen Lorenzo RN  Outcome: Ongoing  6/29/2021 0134 by Lucrecia Mi RN  Outcome: Met This Shift     Problem: Physical Regulation:  Goal: Ability to maintain clinical measurements within normal limits will improve  Description: Ability to maintain clinical measurements within normal limits will improve  6/29/2021 1152 by Darleen Lorenzo RN  Outcome: Ongoing  6/29/2021 0134 by Lucrecia Mi RN  Outcome: Met This Shift  Goal: Will show no signs and symptoms of electrolyte imbalance  Description: Will show no signs and symptoms of electrolyte imbalance  6/29/2021 1152 by Darleen Lorenzo RN  Outcome: Ongoing  6/29/2021 0134 by Lucrecia Mi RN  Outcome: Met This Shift  Goal: Complications related to the disease process, condition or treatment will be avoided or minimized  Description: Complications related to the disease process, condition or treatment will be avoided or minimized  6/29/2021 1152 by Darleen Lorenzo RN  Outcome: Ongoing  6/29/2021 0134 by Lucrecia Mi RN  Outcome: Met This Shift  Goal: Diagnostic test results will improve  Description: Diagnostic test results will improve  6/29/2021 1152 by Darleen Lorenzo RN  Outcome: Ongoing  6/29/2021 0134 by Michelle Newton RN  Outcome: Met This Shift     Problem:  Activity:  Goal: Risk for activity intolerance will decrease  Description: Risk for activity intolerance will decrease  6/29/2021 1152 by Janee Ivey RN  Outcome: Ongoing  6/29/2021 0134 by Michelle Newton RN  Outcome: Met This Shift     Problem: Coping:  Goal: Ability to adjust to condition or change in health will improve  Description: Ability to adjust to condition or change in health will improve  6/29/2021 1152 by Janee Ivey RN  Outcome: Ongoing  6/29/2021 0134 by Michelle Newton RN  Outcome: Met This Shift     Problem: Nutritional:  Goal: Maintenance of adequate nutrition will improve  Description: Maintenance of adequate nutrition will improve  6/29/2021 1152 by Janee Ivey RN  Outcome: Ongoing  6/29/2021 0134 by Michelle Newton RN  Outcome: Met This Shift  Goal: Progress toward achieving an optimal weight will improve  Description: Progress toward achieving an optimal weight will improve  6/29/2021 1152 by Janee Ivey RN  Outcome: Ongoing  6/29/2021 0134 by Michelle Newton RN  Outcome: Met This Shift     Problem: Skin Integrity:  Goal: Risk for impaired skin integrity will decrease  Description: Risk for impaired skin integrity will decrease  6/29/2021 1152 by Janee Ivey RN  Outcome: Ongoing  6/29/2021 0134 by Michelle Newton RN  Outcome: Met This Shift     Problem: Tissue Perfusion:  Goal: Adequacy of tissue perfusion will improve  Description: Adequacy of tissue perfusion will improve  6/29/2021 1152 by Janee Ivey RN  Outcome: Ongoing  6/29/2021 0134 by Michelle Newton RN  Outcome: Met This Shift

## 2021-06-29 NOTE — FLOWSHEET NOTE
06/29/21 1350   Vital Signs   BP (!) 107/50   Pulse 59   Resp 18   Weight 205 lb 11 oz (93.3 kg)   Weight Method Bed scale   Percent Weight Change -3.12   Pain Assessment   Pain Assessment 0-10   Pain Level 0   Post-Hemodialysis Assessment   Post-Treatment Procedures Blood returned;Catheter capped, clamped with Citrate x 2 ports   Machine Disinfection Process Acid/Vinegar Clean;Heat Disinfect   Rinseback Volume (ml) 300 ml   Total Liters Processed (l/min) 88.7 l/min   Dialyzer Clearance Lightly streaked   Duration of Treatment (minutes) 240 minutes   Hemodialysis Intake (ml) 300 ml   Hemodialysis Output (ml) 3300 ml   NET Removed (ml) 3000 ml   Tolerated Treatment Good   Patient Response to Treatment Tolerated well. VSS   Bilateral Breath Sounds Clear;Diminished   Edema Right lower extremity; Left lower extremity   RLE Edema Trace   LLE Edema Trace

## 2021-06-29 NOTE — CONSULTS
Department of Internal Medicine  Nephrology Attending Progress Note    SUBJECTIVE:  We are following this patient for end-stage renal failure . Full consult is deferred as the patient is followed longitudinally in the PO HD setting and was recently discharged from this hospital.   Briefly:  Sangeetha Dempsey is a 63year old male followed at [place] [address] on MWF schedule for IHD. He presented to the ED yesterday, prior to attending his dialysis treatment, for increased SOB and edema. He was recently treated for pneumonia and was sent home on po antibiotics, which he has been taking per his account. He was discharged with home O2 as well. He is seen on HD, asking when he can go home.      PROBLEM LIST:    Patient Active Problem List   Diagnosis    Hallux valgus, acquired    DMII (diabetes mellitus, type 2) ([address]    HTN (hypertension)    Lumbar radicular pain    Spinal stenosis    B12 deficiency    Idiopathic acute pancreatitis    Acute pancreatitis without necrosis or infection, unspecified    Pneumonia    Respiratory failure ([address]    Acute respiratory failure with hypoxia ([address]        PAST MEDICAL HISTORY:    Past Medical History:   Diagnosis Date    Back pain     Diabetes mellitus ([address]     DMII (diabetes mellitus, type 2) ([address] 7/28/2015    [facility]     History of cardiovascular stress test 2/16/2015    lexiscan    HTN (hypertension) 7/28/2015    Hyperlipidemia     Hypertension     Lumbar radicular pain 7/28/2015    Type II or unspecified type diabetes mellitus without mention of complication, not stated as uncontrolled        MEDS (scheduled):   amLODIPine  5 mg Oral BID    gabapentin  100 mg Oral Nightly    ferrous sulfate  325 mg Oral Daily with breakfast    calcitRIOL  0.25 mcg Oral Q F    insulin glargine  55 Units Subcutaneous Nightly    sodium bicarbonate  1,300 mg Oral TID    metoprolol tartrate  50 mg Oral BID    hydrALAZINE  50 mg Oral BID    sevelamer 800 mg Oral TID     heparin (porcine)  5,000 Units Subcutaneous 3 times per day    insulin lispro  0-12 Units Subcutaneous TID     insulin lispro  0-6 Units Subcutaneous Nightly    cefdinir  300 mg Oral Daily    doxycycline hyclate  100 mg Oral BID    ipratropium  0.5 mg Nebulization 4x daily    Arformoterol Tartrate  15 mcg Nebulization BID    And    budesonide  0.5 mg Nebulization BID       MEDS (infusions):   dextrose         MEDS (prn):  ipratropium-albuterol, acetaminophen, ondansetron, glucose, dextrose, glucagon (rDNA), dextrose, albuterol **AND** Nebulizer tx intermittent    DIET:    ADULT DIET; Regular; No Added Salt (3-4 gm);  Low Potassium (Less than 3000 mg/day)      PHYSICAL EXAM:      Patient Vitals for the past 24 hrs:   BP Temp Temp src Pulse Resp SpO2 Weight   06/29/21 1030 (!) 104/51 -- -- 66 -- -- --   06/29/21 1000 (!) 109/39 -- -- 74 -- -- --   06/29/21 0950 (!) 109/59 -- -- 68 -- -- --   06/29/21 0752 126/68 97.9 °F (36.6 °C) Oral 72 18 97 % --   06/29/21 0103 (!) 150/48 97.7 °F (36.5 °C) Oral 76 20 97 % 209 lb 3 oz (94.9 kg)   06/28/21 2134 (!) 152/59 97.8 °F (36.6 °C) Oral 76 20 -- --   06/28/21 1919 133/61 97.8 °F (36.6 °C) Oral 90 22 94 % --   06/28/21 1840 (!) 157/86 -- -- 89 22 97 % --   06/28/21 1720 (!) 164/72 97.5 °F (36.4 °C) Oral 93 24 98 % --   06/28/21 1308 132/65 98.5 °F (36.9 °C) Oral 79 28 97 % --          Intake/Output Summary (Last 24 hours) at 6/29/2021 1041  Last data filed at 6/29/2021 0845  Gross per 24 hour   Intake 360 ml   Output --   Net 360 ml       Wt Readings from Last 3 Encounters:   06/29/21 209 lb 3 oz (94.9 kg)   06/23/21 209 lb 14.1 oz (95.2 kg)   03/21/21 209 lb (94.8 kg)       Constitutional:  Patient in no acute distress  Head: normocephalic, atraumatic  Cardiovascular: S1 S2 no S3 or rub  Respiratory:  Clear upper, diminished in bases  Gastrointestinal: soft, nontender, nondistended  Ext: trace edema   Neuro: awake, alert and oriented  Skin: dry, no rash      DATA:      Recent Labs     06/28/21  1031 06/29/21  0516   WBC 9.3 8.5   HGB 10.0* 9.0*   HCT 33.0* 30.9*   MCV 93.5 93.1    246     Recent Labs     06/28/21  1031 06/29/21  0516 06/29/21  0703    139 140   K 6.4* 6.6* 6.0*   CL 98 99 100   CO2 24 25 24   BUN 73* 88* 86*   CREATININE 9.7* 11.1* 11.1*   LABGLOM 7 6 6   GLUCOSE 304* 250* 193*   CALCIUM 9.5 9.9 10.1     Recent Labs     06/28/21  1031 06/29/21  0516   ALT 11 7     Lab Results   Component Value Date    LABALBU 3.2 (L) 06/29/2021    LABALBU 3.3 (L) 06/28/2021    LABALBU 3.3 (L) 06/22/2021       Iron studies:  Lab Results   Component Value Date    FERRITIN 188 11/13/2019    IRON 19 (L) 02/09/2020    TIBC 189 (L) 02/09/2020     Vitamin B-12   Date Value Ref Range Status   02/09/2020 320 211 - 946 pg/mL Final     Folate   Date Value Ref Range Status   02/09/2020 9.6 4.8 - 24.2 ng/mL Final       Bone disease:  Lab Results   Component Value Date    MG 2.7 (H) 06/29/2021    PHOS 5.3 (H) 06/29/2021     Vit D, 25-Hydroxy   Date Value Ref Range Status   06/22/2021 40 30 - 100 ng/mL Final     Comment:     <20 ng/mL. ........... Alexi John Deficient  20-30 ng/mL. ......... Alexi John Insufficient   ng/mL. ........ Alexi John Sufficient  >100 ng/mL. .......... Alexi John Toxic       PTH   Date Value Ref Range Status   01/23/2020 154 (H) 15 - 65 pg/mL Final       No components found for: URIC    Lab Results   Component Value Date    COLORU Yellow 02/02/2020    NITRU Negative 02/02/2020    GLUCOSEU 250 02/02/2020    KETUA Negative 02/02/2020    UROBILINOGEN 0.2 02/02/2020    BILIRUBINUR Negative 02/02/2020       No results found for: Jessica Moe        IMPRESSION/RECOMMENDATIONS:    1-ESKD-on IHD MWF via a R IJ TDC  Continue the MWF schedule  Missed treatment 6/28  Seen on treatment today- tolerating well  Plan treatment tomorrow to return him to his chronic schedule.      2-SOB-  Recently treated for pneumonia  Sent home with O2 and po antibiotics.       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 Hyperkalemia-  In the setting of missed dialysis  Will correct with [address], APRN - CNP    Patient seen and examined on dialysis. Dialysis prescription reviewed. . I had a face to face encounter with the patient. Agree with exam.    Agree with  formulation, assessment and plan as outlined above and directed by me. Addition and corrections were done as deemed appropriate. My exam and plan include:     Continue current treatment.       Kami Hodgson MD  Nephrology        Electronically signed by Kami Hodgson MD on 6/29/2021 at 3:50 PM

## 2021-06-29 NOTE — CARE COORDINATION
CM Note: 6/29/2021 at 2:48pm: COVID NEGATIVE - test done on 6/28. CM went to talk to patient for transition of care, but he is currently not in the room. Pr chart review from his admission on 6/21 - patient lives alone and is independent with his ADL;s. Per chart, patient attends dialysis M-W-F at [facility] and has a 12:05pm chair time. Nephrologist is Dr. Sarkia Guerra. Recently discharged on O2 2L and this was provided through [facility]. CM called Jie Harris who states that they are working with patient's PCP Dr. Nano Be to try and get this patient smaller tanks for portability. Currently on 4L NC. Per notes - PATIENT IS ACTIVE WITH [facility] FOR SKILLED NURSING - IF HOME IS THE PLAN - WILL NEED RESUME [id number] TO DISCHARGE.  Luna Mooney RN

## 2021-06-29 NOTE — PROGRESS NOTES
Spoke with Dr. Azam Pyle regarding patient's labs and inability to receive dialysis tonight, Dr. Marisol Parnell consulted via perfect serve. Dr. Marisol Parnell called inquiring on patient's status, all questions answered, no orders received at this time.

## 2021-06-29 NOTE — PROGRESS NOTES
Department of Internal Medicine        CHIEF COMPLAINT: Shortness of breath and leg swelling    Reason for Admission: Acute on chronic diastolic CHF, fluid overload with history of renal failure with hemodialysis    HISTORY OF PRESENT ILLNESS:      The patient is a 63 y.o. male who presents with increased shortness of breath along with lower leg swelling which started about 2 to 3 days prior to admission. Patient recently discharged back on 26 for COPD and pneumonia. Patient was discharged home with O2 nasal cannula because of his acute hypoxia and was on .2 L. When the EMS came to the house the patient was on room air and his O2 sat was 80%. Patient also stated that he thought his speech was a bit slurred this morning. Patient denies any problem with fever/chills, dizziness, chest pain, abdominal pain or any unifocal paresthesias. Chest x-ray showed multifocal consolidation appears to reflect worsening interstitial edema. CT the head was unremarkable. Patient states that he does not have any slurred speech and is not sure if he actually did or feels because he was short of breath. Temperature was 98.5 with a heart rate of 79 and blood pressure 132/65. Serum potassium is elevated 6.4 with a random blood sugar 304 and proBNP of 17,000. Troponin was 100 with a WBC 9.3. Patient will be admitted to telemetry floor and consult with nephrology with more aggressive dialysis. 6/29/2021  Patient seen examined on IMC/dialysis. Patient denies any chest or abdominal pain. Patient feels little bit better today. Serum potassium is 6.0 currently repeat troponin yesterday afternoon was less than initially. Blood sugars ranging 193-250. Hemoglobin 9.0. Sed rate 67. Temperature is 97.9 with heart rate of 63 and blood pressure ranging 99/50 4-152/59. O2 sat 97% on 6 L nasal cannula. CT of the chest tomorrow for better evaluation of the lungs.     Past Medical History:    Past Medical History:   Diagnosis Date  Back pain     Diabetes mellitus ([facility] [address]     DMII (diabetes mellitus, type 2) ([facility] [address] 7/28/2015    Hemod[facility])     History of cardiovascular stress test 2/16/2015    lexiscan    HTN (hypertension) 7/28/2015    Hyperlipidemia     Hypertension     Lumbar radicular pain 7/28/2015    Type II or unspecified type diabetes mellitus without mention of complication, not stated as uncontrolled      Past Surgical History:    Past Surgical History:   Procedure Laterality Date    OTHER SURGICAL HISTORY Left 06/06/14    cain bunionectomy left foot with osteomed screw x1    SHOULDER SURGERY      Bilateral    VEIN SURGERY         Medications Prior to Admission:    @  Prior to Admission medications    Medication Sig Start Date End Date Taking?  Authorizing Provider   hydrALAZINE (APRESOLINE) 50 MG tablet Take 50 mg by mouth 2 times daily   Yes Historical Provider, MD   empagliflozin (JARDIANCE) 10 MG tablet Take 10 mg by mouth daily   Yes Historical Provider, MD   sevelamer (RENVELA) 800 MG tablet Take 2 tablets by mouth 3 times daily (with meals)   Yes Historical Provider, MD   sevelamer (RENVELA) 800 MG tablet Take 1 tablet by mouth 2 times daily as needed (With Snacks)   Yes Historical Provider, MD   OXYGEN Inhale 2 L into the lungs continuous   Yes Historical Provider, MD   iron sucrose (VENOFER) 20 MG/ML injection Infuse 50 mg intravenously once a week Friday   Yes Historical Provider, MD COLEMANID-19 mRNA Vacc, Moderna, 100 MCG/0.5ML SUSP injection Inject 0.5 mLs into the muscle once 4/7/2021 - COMPLETED   Yes Historical Provider, MD   doxycycline hyclate (VIBRAMYCIN) 100 MG capsule Take 1 capsule by mouth 2 times daily for 7 days 6/24/21 7/1/21 Yes Roxy Casillas DO   cefdinir (OMNICEF) 300 MG capsule Take 1 capsule by mouth 2 times daily for 7 days 6/24/21 7/1/21 Yes Georgequpati Casillas DO   sodium bicarbonate 650 MG tablet Take 2 tablets by mouth 3 times daily 2/13/20  Yes Jacquiline Andrewo DO ipratropium-albuterol (DUONEB) 0.5-2.5 (3) MG/3ML SOLN nebulizer solution Inhale 3 mLs into the lungs every 4 hours as needed for Shortness of Breath 20  Yes Manuel Potter, DO   albuterol sulfate HFA (PROVENTIL HFA) 108 (90 Base) MCG/ACT inhaler Inhale 2 puffs into the lungs every 4 hours as needed for Wheezing or Shortness of Breath 20  Yes Manuel Potter DO   calcitRIOL (ROCALTROL) 0.25 MCG capsule Take 1.5 mcg by mouth three times a week Monday, Wednesday, Friday   Yes Historical Provider, MD   insulin glargine (LANTUS) 100 UNIT/ML injection vial Inject 55 Units into the skin nightly    Yes Historical Provider, MD   gabapentin (NEURONTIN) 100 MG capsule Take 100 mg by mouth 2 times daily. Yes Historical Provider, MD   amLODIPine (NORVASC) 10 MG tablet Take 5 mg by mouth 2 times daily    Yes Historical Provider, MD       Allergies: Other    Social History:   Social History     Socioeconomic History    Marital status: Single     Spouse name: Not on file    Number of children: Not on file    Years of education: Not on file    Highest education level: Not on file   Occupational History    Not on file   Tobacco Use    Smoking status: Former Smoker     Packs/day: 1.00     Years: 30.00     Pack years: 30.00     Quit date: 2021     Years since quittin.0    Smokeless tobacco: Never Used   Vaping Use    Vaping Use: Never used   Substance and Sexual Activity    Alcohol use: No    Drug use: No    Sexual activity: Not on file   Other Topics Concern    Not on file   Social History Narrative    Not on file     Social Determinants of Health     Financial Resource Strain: Low Risk     Difficulty of Paying Living Expenses: Not very hard   Food Insecurity: No Food Insecurity    Worried About Running Out of Food in the Last Year: Never true    Alexis of Food in the Last Year: Never true   Transportation Needs: No Transportation Needs    Lack of Transportation (Medical):  No    Lack of Transportation (Non-Medical): No   Physical Activity: Inactive    Days of Exercise per Week: 0 days    Minutes of Exercise per Session: 0 min   Stress: No Stress Concern Present    Feeling of Stress : Only a little   Social Connections: Unknown    Frequency of Communication with Friends and Family: Three times a week    Frequency of Social Gatherings with Friends and Family: Twice a week    Attends [employer] Services: 1 to 4 times per year    Active Member of [employer] Group or Organizations: No    Attends Club or Organization Meetings: Never    Marital Status: Patient refused   Intimate Partner Violence: Unknown    Fear of Current or Ex-Partner: Patient refused    Emotionally Abused: No    Physically Abused: No    Sexually Abused: No       Family History:   Family History   Problem Relation Age of Onset    Kidney Disease Sister        REVIEW OF SYSTEMS:    Gen: Patient denies any lightheadedness or dizziness. No LOC or syncope. No fevers or chills. HEENT: No earache, sore throat or nasal congestion. Resp: Denies cough, hemoptysis or sputum production. Cardiac: Denies chest pain, +SOB, no diaphoresis or palpitations. GI: No nausea, vomiting, diarrhea or constipation. No melena or hematochezia. : No urinary complaints, dysuria, hematuria or frequency. MSK: + Questionable slurred speech this morning. No extremity weakness, paralysis or paresthesias. PHYSICAL EXAM:    Vitals:  BP (!) 99/54   Pulse 63   Temp 97.9 °F (36.6 °C) (Oral)   Resp 18   Ht 5' 6\" (1.676 m)   Wt 212 lb 4.9 oz (96.3 kg)   SpO2 97%   BMI 34.27 kg/m²     General:  This is a 63 y.o. yo male who is alert and oriented in NAD  HEENT:  Head is normocephalic and atraumatic, PERRLA, EOMI, mucus membranes moist with no pharyngeal erythema or exudate. Neck:  Supple with no carotid bruits, JVD or thyromegaly.   No cervical adenopathy  CV:  Regular rate and rhythm, no murmurs  Lungs:  + Coarse decreased breath sounds to auscultation bilaterally with no wheezes, rales or rhonchi  Abdomen:  Soft, nontender, nondistended, bowel sounds present  Extremities:  No Lower leg edema, peripheral pulses intact bilaterally  Neuro:  Cranial nerves II-XII grossly intact; motor and sensory function intact with no focal deficits  Skin:  No rashes, lesions or wounds    DATA:  CBC with Differential:    Lab Results   Component Value Date    WBC 8.5 06/29/2021    RBC 3.32 06/29/2021    HGB 9.0 06/29/2021    HCT 30.9 06/29/2021     06/29/2021    MCV 93.1 06/29/2021    MCH 27.1 06/29/2021    MCHC 29.1 06/29/2021    RDW 18.1 06/29/2021    LYMPHOPCT 6.6 06/29/2021    MONOPCT 5.6 06/29/2021    BASOPCT 0.0 06/29/2021    MONOSABS 0.47 06/29/2021    LYMPHSABS 0.56 06/29/2021    EOSABS 0.00 06/29/2021    BASOSABS 0.00 06/29/2021     CMP:    Lab Results   Component Value Date     06/29/2021    K 6.0 06/29/2021    K 6.4 06/28/2021     06/29/2021    CO2 24 06/29/2021    BUN 86 06/29/2021    CREATININE 11.1 06/29/2021    GFRAA 6 06/29/2021    LABGLOM 6 06/29/2021    GLUCOSE 193 06/29/2021    PROT 7.4 06/29/2021    LABALBU 3.2 06/29/2021    CALCIUM 10.1 06/29/2021    BILITOT 0.3 06/29/2021    ALKPHOS 44 06/29/2021    AST 7 06/29/2021    ALT 7 06/29/2021     Magnesium:    Lab Results   Component Value Date    MG 2.7 06/29/2021     Phosphorus:    Lab Results   Component Value Date    PHOS 5.3 06/29/2021     PT/INR:    Lab Results   Component Value Date    PROTIME 11.8 02/07/2020    INR 1.0 02/07/2020     Troponin:    Lab Results   Component Value Date    TROPONINI 0.10 03/21/2021     U/A:    Lab Results   Component Value Date    COLORU Yellow 02/02/2020    PROTEINU >=300 02/02/2020    PHUR 5.0 02/02/2020    LABCAST RARE 09/21/2018    WBCUA 0-1 02/02/2020    RBCUA NONE 02/02/2020    BACTERIA NONE 02/02/2020    CLARITYU Clear 02/02/2020    SPECGRAV 1.025 02/02/2020    LEUKOCYTESUR Negative 02/02/2020    UROBILINOGEN 0.2 02/02/2020    BILIRUBINUR Negative 02/02/2020    BLOODU TRACE 02/02/2020    GLUCOSEU 250 02/02/2020    AMORPHOUS FEW 10/02/2019     ABG:    Lab Results   Component Value Date    PH 7.419 06/20/2021    PCO2 47.3 02/08/2020    PO2 140.5 02/08/2020    HCO3 26.3 02/08/2020    BE 3.1 06/20/2021    O2SAT 98.5 06/20/2021     HgBA1c:    Lab Results   Component Value Date    LABA1C 7.4 06/28/2021     FLP:    Lab Results   Component Value Date    TRIG 106 03/21/2021    HDL 36 03/21/2021    LDLCALC 66 03/21/2021    LABVLDL 21 03/21/2021     TSH:    Lab Results   Component Value Date    TSH 1.210 06/21/2021     IRON:    Lab Results   Component Value Date    IRON 19 02/09/2020     LIPASE:    Lab Results   Component Value Date    LIPASE 78 09/24/2018       ASSESSMENT AND PLAN:      Patient Active Problem List    Diagnosis Date Noted    Hallux valgus, acquired 06/06/2014    Acute respiratory failure with hypoxia ([facility] [address] 06/20/2021    Respiratory failure ([facility] [address] 02/02/2020    Pneumonia 01/28/2020    Acute pancreatitis without necrosis or infection, unspecified 09/23/2018    Idiopathic acute pancreatitis 09/21/2018    B12 deficiency 03/17/2016    DMII (diabetes mellitus, type 2) ([facility] [address] 07/28/2015    HTN (hypertension) 07/28/2015    Lumbar radicular pain 07/28/2015    Spinal stenosis 07/28/2015     Impression:  1. Acute on chronic hypoxic respiratory failure  2. Acute on chronic diastolic congestive heart failure fluid overload  3. Chronic renal failure with hemodialysis  4. Insulin-dependent diabetes mellitus type 2  5. Hypertension  6. Hyper kalemia  7. Elevated troponin  8. Chronic normocytic anemia  9. Questionable slurred speech this morning    Plan:  Admit to telemetry floor  Home medications reviewed  Activity up ad layla.   Diet -carb control, low potassium, no added salt  Glucoscans 4 times daily with sliding scale insulin  Heparin 5000 units subcu every 8  Consult nephrology  Repeat troponin  Continued bronchodilators  O2 nasal cannula  Procalcitonin  Sed rate-67  CRP-3.3    CT the chest today without contrast      Lulu Martin DO, D.O.  6/29/2021  11:42 AM

## 2021-06-29 NOTE — HOME CARE
Patient is active with [facility] for SN. Patient will need resumption of care orders prior to discharge.  Thank you, [facility]

## 2021-06-30 LAB
ANION GAP SERPL CALCULATED.3IONS-SCNC: 14 MMOL/L (ref 7–16)
BUN BLDV-MCNC: 36 MG/DL (ref 6–23)
CALCIUM SERPL-MCNC: 9.6 MG/DL (ref 8.6–10.2)
CHLORIDE BLD-SCNC: 100 MMOL/L (ref 98–107)
CO2: 28 MMOL/L (ref 22–29)
CREAT SERPL-MCNC: 6.4 MG/DL (ref 0.7–1.2)
GFR AFRICAN AMERICAN: 11
GFR NON-AFRICAN AMERICAN: 11 ML/MIN/1.73
GLUCOSE BLD-MCNC: 40 MG/DL (ref 74–99)
HCT VFR BLD CALC: 33.1 % (ref 37–54)
HEMOGLOBIN: 10 G/DL (ref 12.5–16.5)
MAGNESIUM: 2.2 MG/DL (ref 1.6–2.6)
MCH RBC QN AUTO: 27.6 PG (ref 26–35)
MCHC RBC AUTO-ENTMCNC: 30.2 % (ref 32–34.5)
MCV RBC AUTO: 91.4 FL (ref 80–99.9)
METER GLUCOSE: 108 MG/DL (ref 74–99)
METER GLUCOSE: 153 MG/DL (ref 74–99)
METER GLUCOSE: 157 MG/DL (ref 74–99)
METER GLUCOSE: 224 MG/DL (ref 74–99)
PDW BLD-RTO: 17.8 FL (ref 11.5–15)
PHOSPHORUS: 5.6 MG/DL (ref 2.5–4.5)
PLATELET # BLD: 306 E9/L (ref 130–450)
PMV BLD AUTO: 9.8 FL (ref 7–12)
POTASSIUM SERPL-SCNC: 4.3 MMOL/L (ref 3.5–5)
RBC # BLD: 3.62 E12/L (ref 3.8–5.8)
SODIUM BLD-SCNC: 142 MMOL/L (ref 132–146)
WBC # BLD: 11.6 E9/L (ref 4.5–11.5)

## 2021-06-30 PROCEDURE — 36415 COLL VENOUS BLD VENIPUNCTURE: CPT

## 2021-06-30 PROCEDURE — 94640 AIRWAY INHALATION TREATMENT: CPT

## 2021-06-30 PROCEDURE — 6370000000 HC RX 637 (ALT 250 FOR IP): Performed by: INTERNAL MEDICINE

## 2021-06-30 PROCEDURE — 83735 ASSAY OF MAGNESIUM: CPT

## 2021-06-30 PROCEDURE — 85027 COMPLETE CBC AUTOMATED: CPT

## 2021-06-30 PROCEDURE — 2060000000 HC ICU INTERMEDIATE R&B

## 2021-06-30 PROCEDURE — 84100 ASSAY OF PHOSPHORUS: CPT

## 2021-06-30 PROCEDURE — 90935 HEMODIALYSIS ONE EVALUATION: CPT

## 2021-06-30 PROCEDURE — 6360000002 HC RX W HCPCS: Performed by: INTERNAL MEDICINE

## 2021-06-30 PROCEDURE — 2700000000 HC OXYGEN THERAPY PER DAY

## 2021-06-30 PROCEDURE — 82962 GLUCOSE BLOOD TEST: CPT

## 2021-06-30 PROCEDURE — 80048 BASIC METABOLIC PNL TOTAL CA: CPT

## 2021-06-30 RX ADMIN — ACETAMINOPHEN 500 MG: 500 TABLET ORAL at 20:43

## 2021-06-30 RX ADMIN — CEFDINIR 300 MG: 300 CAPSULE ORAL at 08:28

## 2021-06-30 RX ADMIN — IPRATROPIUM BROMIDE 0.5 MG: 0.5 SOLUTION RESPIRATORY (INHALATION) at 06:42

## 2021-06-30 RX ADMIN — SODIUM BICARBONATE 1300 MG: 650 TABLET ORAL at 20:38

## 2021-06-30 RX ADMIN — INSULIN GLARGINE 55 UNITS: 100 INJECTION, SOLUTION SUBCUTANEOUS at 22:30

## 2021-06-30 RX ADMIN — CALCITRIOL CAPSULES 0.25 MCG 0.25 MCG: 0.25 CAPSULE ORAL at 18:33

## 2021-06-30 RX ADMIN — HEPARIN SODIUM 5000 UNITS: 5000 INJECTION INTRAVENOUS; SUBCUTANEOUS at 22:00

## 2021-06-30 RX ADMIN — SEVELAMER CARBONATE 800 MG: 800 TABLET, FILM COATED ORAL at 12:07

## 2021-06-30 RX ADMIN — IPRATROPIUM BROMIDE 0.5 MG: 0.5 SOLUTION RESPIRATORY (INHALATION) at 10:26

## 2021-06-30 RX ADMIN — INSULIN LISPRO 2 UNITS: 100 INJECTION, SOLUTION INTRAVENOUS; SUBCUTANEOUS at 12:07

## 2021-06-30 RX ADMIN — DOXYCYCLINE HYCLATE 100 MG: 100 CAPSULE ORAL at 08:29

## 2021-06-30 RX ADMIN — AMLODIPINE BESYLATE 5 MG: 5 TABLET ORAL at 20:38

## 2021-06-30 RX ADMIN — HYDRALAZINE HYDROCHLORIDE 50 MG: 25 TABLET, FILM COATED ORAL at 08:29

## 2021-06-30 RX ADMIN — SODIUM BICARBONATE 1300 MG: 650 TABLET ORAL at 08:29

## 2021-06-30 RX ADMIN — IPRATROPIUM BROMIDE 0.5 MG: 0.5 SOLUTION RESPIRATORY (INHALATION) at 14:07

## 2021-06-30 RX ADMIN — ACETAMINOPHEN 500 MG: 500 TABLET ORAL at 12:07

## 2021-06-30 RX ADMIN — METOPROLOL TARTRATE 50 MG: 25 TABLET, FILM COATED ORAL at 20:38

## 2021-06-30 RX ADMIN — HEPARIN SODIUM 5000 UNITS: 5000 INJECTION INTRAVENOUS; SUBCUTANEOUS at 14:51

## 2021-06-30 RX ADMIN — SEVELAMER CARBONATE 800 MG: 800 TABLET, FILM COATED ORAL at 18:33

## 2021-06-30 RX ADMIN — FERROUS SULFATE TAB 325 MG (65 MG ELEMENTAL FE) 325 MG: 325 (65 FE) TAB at 08:29

## 2021-06-30 RX ADMIN — AMLODIPINE BESYLATE 5 MG: 5 TABLET ORAL at 08:29

## 2021-06-30 RX ADMIN — INSULIN LISPRO 2 UNITS: 100 INJECTION, SOLUTION INTRAVENOUS; SUBCUTANEOUS at 18:34

## 2021-06-30 RX ADMIN — HYDRALAZINE HYDROCHLORIDE 50 MG: 25 TABLET, FILM COATED ORAL at 20:38

## 2021-06-30 RX ADMIN — INSULIN LISPRO 2 UNITS: 100 INJECTION, SOLUTION INTRAVENOUS; SUBCUTANEOUS at 22:00

## 2021-06-30 RX ADMIN — ACETAMINOPHEN 500 MG: 500 TABLET ORAL at 04:30

## 2021-06-30 RX ADMIN — BUDESONIDE 500 MCG: 0.5 INHALANT RESPIRATORY (INHALATION) at 06:42

## 2021-06-30 RX ADMIN — GABAPENTIN 100 MG: 100 CAPSULE ORAL at 20:38

## 2021-06-30 RX ADMIN — SEVELAMER CARBONATE 800 MG: 800 TABLET, FILM COATED ORAL at 08:29

## 2021-06-30 RX ADMIN — SODIUM BICARBONATE 1300 MG: 650 TABLET ORAL at 14:51

## 2021-06-30 RX ADMIN — ARFORMOTEROL TARTRATE 15 MCG: 15 SOLUTION RESPIRATORY (INHALATION) at 06:42

## 2021-06-30 RX ADMIN — HEPARIN SODIUM 5000 UNITS: 5000 INJECTION INTRAVENOUS; SUBCUTANEOUS at 05:55

## 2021-06-30 RX ADMIN — ALBUTEROL SULFATE 2.5 MG: 2.5 SOLUTION RESPIRATORY (INHALATION) at 14:07

## 2021-06-30 RX ADMIN — METOPROLOL TARTRATE 50 MG: 25 TABLET, FILM COATED ORAL at 08:29

## 2021-06-30 RX ADMIN — DOXYCYCLINE HYCLATE 100 MG: 100 CAPSULE ORAL at 20:38

## 2021-06-30 ASSESSMENT — PAIN SCALES - GENERAL
PAINLEVEL_OUTOF10: 2
PAINLEVEL_OUTOF10: 0
PAINLEVEL_OUTOF10: 1
PAINLEVEL_OUTOF10: 0
PAINLEVEL_OUTOF10: 3
PAINLEVEL_OUTOF10: 5
PAINLEVEL_OUTOF10: 2

## 2021-06-30 ASSESSMENT — PAIN DESCRIPTION - LOCATION
LOCATION: HEAD
LOCATION: HEAD

## 2021-06-30 NOTE — PROGRESS NOTES
Department of Internal Medicine        CHIEF COMPLAINT: Shortness of breath and leg swelling    Reason for Admission: Acute on chronic diastolic CHF, fluid overload with history of renal failure with hemodialysis    HISTORY OF PRESENT ILLNESS:      The patient is a 63 y.o. male who presents with increased shortness of breath along with lower leg swelling which started about 2 to 3 days prior to admission. Patient recently discharged back on 26 for COPD and pneumonia. Patient was discharged home with O2 nasal cannula because of his acute hypoxia and was on .2 L. When the EMS came to the house the patient was on room air and his O2 sat was 80%. Patient also stated that he thought his speech was a bit slurred this morning. Patient denies any problem with fever/chills, dizziness, chest pain, abdominal pain or any unifocal paresthesias. Chest x-ray showed multifocal consolidation appears to reflect worsening interstitial edema. CT the head was unremarkable. Patient states that he does not have any slurred speech and is not sure if he actually did or feels because he was short of breath. Temperature was 98.5 with a heart rate of 79 and blood pressure 132/65. Serum potassium is elevated 6.4 with a random blood sugar 304 and proBNP of 17,000. Troponin was 100 with a WBC 9.3. Patient will be admitted to telemetry floor and consult with nephrology with more aggressive dialysis. 6/29/2021  Patient seen examined on [facility]/[place]. Patient denies any chest or abdominal pain. Patient feels little bit better today. Serum potassium is 6.0 currently repeat troponin yesterday afternoon was less than initially. Blood sugars ranging 193-250. Hemoglobin 9.0. Sed rate 67. Temperature is 97.9 with heart rate of 63 and blood pressure ranging 99/50 4-152/59. O2 sat 97% on 6 L nasal cannula. CT of the chest tomorrow for better evaluation of the lungs. 06/30/2021  Patient seen examined on [facility].   Patient feels little better today. He denies any chest pain or productive cough. His breathing is improved compared to admission. Repeat lab work tomorrow including a procalcitonin. Temperature is 98.2 with heart rate of 72 and blood pressure 114/59. O2 sat 92% on 3 L today. CT the chest showed diffuse groundglass opacities throughout both lungs and smooth interlobar septal thickening suggestive of interstitial pulmonary edema and right pleural effusion. There is mild mediastinal and hilar lymphadenopathy. Patient is set up for repeat dialysis today. Past Medical History:    Past Medical History:   Diagnosis Date    Back pain     Diabetes mellitus ([facility]     DMII (diabetes mellitus, type 2) ([facility] 7/28/2015    [facility]od[facility] patient ([facility]     History of cardiovascular stress test 2/16/2015    lexiscan    HTN (hypertension) 7/28/2015    Hyperlipidemia     Hypertension     Lumbar radicular pain 7/28/2015    Type II or unspecified type diabetes mellitus without mention of complication, not stated as uncontrolled      Past Surgical History:    Past Surgical History:   Procedure Laterality Date    OTHER SURGICAL HISTORY Left 06/06/14    cain bunionectomy left foot with osteomed screw x1    SHOULDER SURGERY      Bilateral    VEIN SURGERY         Medications Prior to Admission:    @  Prior to Admission medications    Medication Sig Start Date End Date Taking?  Authorizing Provider   hydrALAZINE (APRESOLINE) 50 MG tablet Take 50 mg by mouth 2 times daily   Yes Historical Provider, MD   empagliflozin (JARDIANCE) 10 MG tablet Take 10 mg by mouth daily   Yes Historical Provider, MD   sevelamer (RENVELA) 800 MG tablet Take 2 tablets by mouth 3 times daily (with meals)   Yes Historical Provider, MD   sevelamer (RENVELA) 800 MG tablet Take 1 tablet by mouth 2 times daily as needed (With Snacks)   Yes Historical Provider, MD   OXYGEN Inhale 2 L into the lungs continuous   Yes Historical Provider, MD   iron sucrose (VENOFER) 20 MG/ML injection Infuse 50 mg intravenously once a week Friday   Yes Historical Provider, MD   COVID-19 mRNA Vacc, Moderna, 100 MCG/0.5ML SUSP injection Inject 0.5 mLs into the muscle once 2021 - COMPLETED   Yes Historical Provider, MD   doxycycline hyclate (VIBRAMYCIN) 100 MG capsule Take 1 capsule by mouth 2 times daily for 7 days 21 Yes Ifeoma Steele DO   cefdinir (OMNICEF) 300 MG capsule Take 1 capsule by mouth 2 times daily for 7 days 21 Yes Ifeoma Steele DO   sodium bicarbonate 650 MG tablet Take 2 tablets by mouth 3 times daily 20  Yes Pramod Rolle,    ipratropium-albuterol (DUONEB) 0.5-2.5 (3) MG/3ML SOLN nebulizer solution Inhale 3 mLs into the lungs every 4 hours as needed for Shortness of Breath 20  Yes Ifeoma Steele DO   albuterol sulfate HFA (PROVENTIL HFA) 108 (90 Base) MCG/ACT inhaler Inhale 2 puffs into the lungs every 4 hours as needed for Wheezing or Shortness of Breath 20  Yes Ifeoma Steele DO   calcitRIOL (ROCALTROL) 0.25 MCG capsule Take 1.5 mcg by mouth three times a week Monday, Wednesday, Friday   Yes Historical Provider, MD   insulin glargine (LANTUS) 100 UNIT/ML injection vial Inject 55 Units into the skin nightly    Yes Historical Provider, MD   gabapentin (NEURONTIN) 100 MG capsule Take 100 mg by mouth 2 times daily. Yes Historical Provider, MD   amLODIPine (NORVASC) 10 MG tablet Take 5 mg by mouth 2 times daily    Yes Historical Provider, MD       Allergies:   Other    Social History:   Social History     Socioeconomic History    Marital status: Single     Spouse name: Not on file    Number of children: Not on file    Years of education: Not on file    Highest education level: Not on file   Occupational History    Not on file   Tobacco Use    Smoking status: Former Smoker     Packs/day: 1.00     Years: 30.00     Pack years: 30.00     Quit date: 2021     Years since quittin.0    Smokeless tobacco: Never Used Vaping Use    Vaping Use: Never used   Substance and Sexual Activity    Alcohol use: No    Drug use: No    Sexual activity: Not on file   Other Topics Concern    Not on file   Social History Narrative    Not on file     Social Determinants of Health     Financial Resource Strain: Low Risk     Difficulty of Paying Living Expenses: Not very hard   Food Insecurity: No Food Insecurity    Worried About Running Out of Food in the Last Year: Never true    Alexis of Food in the Last Year: Never true   Transportation Needs: No Transportation Needs    Lack of Transportation (Medical): No    Lack of Transportation (Non-Medical): No   Physical Activity: Inactive    Days of Exercise per Week: 0 days    Minutes of Exercise per Session: 0 min   Stress: No Stress Concern Present    Feeling of Stress : Only a little   Social Connections: Unknown    Frequency of Communication with Friends and Family: Three times a week    Frequency of Social Gatherings with Friends and Family: Twice a week    Attends [employer] Services: 1 to 4 times per year    Active Member of [employer] or Organizations: No    Attends Club or Organization Meetings: Never    Marital Status: Patient refused   Intimate Partner Violence: Unknown    Fear of Current or Ex-Partner: Patient refused    Emotionally Abused: No    Physically Abused: No    Sexually Abused: No       Family History:   Family History   Problem Relation Age of Onset    Kidney Disease Sister        REVIEW OF SYSTEMS:    Gen: Patient denies any lightheadedness or dizziness. No LOC or syncope. No fevers or chills. HEENT: No earache, sore throat or nasal congestion. Resp: Denies cough, hemoptysis or sputum production. Cardiac: Denies chest pain, +SOB, no diaphoresis or palpitations. GI: No nausea, vomiting, diarrhea or constipation. No melena or hematochezia. : No urinary complaints, dysuria, hematuria or frequency.     MSK: + Questionable slurred speech this morning. No extremity weakness, paralysis or paresthesias. PHYSICAL EXAM:    Vitals:  BP (!) 114/59   Pulse 72   Temp 98.2 °F (36.8 °C) (Oral)   Resp 18   Ht 5' 6\" (1.676 m)   Wt 207 lb 4 oz (94 kg)   SpO2 92%   BMI 33.45 kg/m²     General:  This is a 63 y.o. yo male who is alert and oriented in NAD  HEENT:  Head is normocephalic and atraumatic, PERRLA, EOMI, mucus membranes moist with no pharyngeal erythema or exudate. Neck:  Supple with no carotid bruits, JVD or thyromegaly.   No cervical adenopathy  CV:  Regular rate and rhythm, no murmurs  Lungs:  + Coarse decreased breath sounds to auscultation bilaterally with no wheezes, rales or rhonchi  Abdomen:  Soft, nontender, nondistended, bowel sounds present  Extremities:  No Lower leg edema, peripheral pulses intact bilaterally  Neuro:  Cranial nerves II-XII grossly intact; motor and sensory function intact with no focal deficits  Skin:  No rashes, lesions or wounds    DATA:  CBC with Differential:    Lab Results   Component Value Date    WBC 11.6 06/30/2021    RBC 3.62 06/30/2021    HGB 10.0 06/30/2021    HCT 33.1 06/30/2021     06/30/2021    MCV 91.4 06/30/2021    MCH 27.6 06/30/2021    MCHC 30.2 06/30/2021    RDW 17.8 06/30/2021    LYMPHOPCT 6.6 06/29/2021    MONOPCT 5.6 06/29/2021    BASOPCT 0.0 06/29/2021    MONOSABS 0.47 06/29/2021    LYMPHSABS 0.56 06/29/2021    EOSABS 0.00 06/29/2021    BASOSABS 0.00 06/29/2021     CMP:    Lab Results   Component Value Date     06/30/2021    K 4.3 06/30/2021    K 6.4 06/28/2021     06/30/2021    CO2 28 06/30/2021    BUN 36 06/30/2021    CREATININE 6.4 06/30/2021    GFRAA 11 06/30/2021    LABGLOM 11 06/30/2021    GLUCOSE 40 06/30/2021    PROT 7.4 06/29/2021    LABALBU 3.2 06/29/2021    CALCIUM 9.6 06/30/2021    BILITOT 0.3 06/29/2021    ALKPHOS 44 06/29/2021    AST 7 06/29/2021    ALT 7 06/29/2021     Magnesium:    Lab Results   Component Value Date    MG 2.2 06/30/2021     Phosphorus:    Lab Results   Component Value Date    PHOS 5.6 06/30/2021     PT/INR:    Lab Results   Component Value Date    PROTIME 11.8 02/07/2020    INR 1.0 02/07/2020     Troponin:    Lab Results   Component Value Date    TROPONINI 0.10 03/21/2021     U/A:    Lab Results   Component Value Date    COLORU Yellow 02/02/2020    PROTEINU >=300 02/02/2020    PHUR 5.0 02/02/2020    LABCAST RARE 09/21/2018    WBCUA 0-1 02/02/2020    RBCUA NONE 02/02/2020    BACTERIA NONE 02/02/2020    CLARITYU Clear 02/02/2020    SPECGRAV 1.025 02/02/2020    LEUKOCYTESUR Negative 02/02/2020    UROBILINOGEN 0.2 02/02/2020    BILIRUBINUR Negative 02/02/2020    BLOODU TRACE 02/02/2020    GLUCOSEU 250 02/02/2020    AMORPHOUS FEW 10/02/2019     ABG:    Lab Results   Component Value Date    PH 7.419 06/20/2021    PCO2 47.3 02/08/2020    PO2 140.5 02/08/2020    HCO3 26.3 02/08/2020    BE 3.1 06/20/2021    O2SAT 98.5 06/20/2021     HgBA1c:    Lab Results   Component Value Date    LABA1C 7.4 06/28/2021     FLP:    Lab Results   Component Value Date    TRIG 106 03/21/2021    HDL 36 03/21/2021    LDLCALC 66 03/21/2021    LABVLDL 21 03/21/2021     TSH:    Lab Results   Component Value Date    TSH 1.210 06/21/2021     IRON:    Lab Results   Component Value Date    IRON 19 02/09/2020     LIPASE:    Lab Results   Component Value Date    LIPASE 78 09/24/2018       ASSESSMENT AND PLAN:      Patient Active Problem List    Diagnosis Date Noted    Hallux valgus, acquired 06/06/2014    Acute respiratory failure with hypoxia ([facility] 06/20/2021    Respiratory failure ([facility] 02/02/2020    Pneumonia 01/28/2020    Acute pancreatitis without necrosis or infection, unspecified 09/23/2018    Idiopathic acute pancreatitis 09/21/2018    B12 deficiency 03/17/2016    DMII (diabetes mellitus, type 2) ([facility] 07/28/2015    HTN (hypertension) 07/28/2015    Lumbar radicular pain 07/28/2015    Spinal stenosis 07/28/2015     Impression:  1.   Acute on chronic hypoxic respiratory failure  2. Acute on chronic diastolic congestive heart failure fluid overload  3. Chronic renal failure with hemodialysis  4. Insulin-dependent diabetes mellitus type 2  5. Hypertension  6. Hyper kalemia  7. Elevated troponin  8. Chronic normocytic anemia  9. Questionable slurred speech this morning    Plan:  Admit to telemetry floor  Home medications reviewed  Activity up ad layla. Diet -carb control, low potassium, no added salt  Glucoscans 4 times daily with sliding scale insulin  Heparin 5000 units subcu every 8  Consult nephrology  Repeat troponin  Continued bronchodilators  O2 nasal cannula  Procalcitonin  Sed rate-67  CRP-3.3    CT the chest without contrast-diffuse groundglass opacities suggestive of interstitial pulmonary edema    BMP, CBC, procalcitonin in a.m. patient set up for repeat dialysis today.     Tentative discharge home tomorrow      David Block DO, D.O.  6/30/2021  10:51 AM

## 2021-06-30 NOTE — PROGRESS NOTES
Department of Internal Medicine  Nephrology Attending Progress Note    SUBJECTIVE:  We are following this patient for end-stage renal failure . 6/30/21- awake and alert. Still with some SOB.      PROBLEM LIST:    Patient Active Problem List   Diagnosis    Hallux valgus, acquired    DMII (diabetes mellitus, type 2) ([facility]    HTN (hypertension)    Lumbar radicular pain    Spinal stenosis    B12 deficiency    Idiopathic acute pancreatitis    Acute pancreatitis without necrosis or infection, unspecified    Pneumonia    Respiratory failure ([facility]    Acute respiratory failure with hypoxia ([facility]        PAST MEDICAL HISTORY:    Past Medical History:   Diagnosis Date    Back pain     Diabetes mellitus ([facility]     DMII (diabetes mellitus, type 2) ([facility] 7/28/2015    [facility]     History of cardiovascular stress test 2/16/2015    lexiscan    HTN (hypertension) 7/28/2015    Hyperlipidemia     Hypertension     Lumbar radicular pain 7/28/2015    Type II or unspecified type diabetes mellitus without mention of complication, not stated as uncontrolled        MEDS (scheduled):   amLODIPine  5 mg Oral BID    gabapentin  100 mg Oral Nightly    ferrous sulfate  325 mg Oral Daily with breakfast    calcitRIOL  0.25 mcg Oral Q MWF    insulin glargine  55 Units Subcutaneous Nightly    sodium bicarbonate  1,300 mg Oral TID    metoprolol tartrate  50 mg Oral BID    hydrALAZINE  50 mg Oral BID    sevelamer  800 mg Oral TID WC    heparin (porcine)  5,000 Units Subcutaneous 3 times per day    insulin lispro  0-12 Units Subcutaneous TID WC    insulin lispro  0-6 Units Subcutaneous Nightly    cefdinir  300 mg Oral Daily    doxycycline hyclate  100 mg Oral BID    ipratropium  0.5 mg Nebulization 4x daily    Arformoterol Tartrate  15 mcg Nebulization BID    And    budesonide  0.5 mg Nebulization BID       MEDS (infusions):   dextrose         MEDS (prn):  ipratropium-albuterol, acetaminophen, ondansetron, glucose, dextrose, glucagon (rDNA), dextrose, albuterol **AND** Nebulizer tx intermittent    DIET:    ADULT DIET; Regular; No Added Salt (3-4 gm);  Low Potassium (Less than 3000 mg/day)      PHYSICAL EXAM:      Patient Vitals for the past 24 hrs:   BP Temp Temp src Pulse Resp SpO2 Weight   06/30/21 0808 (!) 114/59 98.2 °F (36.8 °C) Oral 72 18 95 % --   06/30/21 0642 -- -- -- -- -- 92 % --   06/30/21 0000 -- -- -- -- -- -- 207 lb 4 oz (94 kg)   06/29/21 2310 (!) 113/57 99.1 °F (37.3 °C) Oral 74 21 97 % --   06/29/21 2137 (!) 113/57 -- -- 74 -- -- --   06/29/21 2135 (!) 113/57 -- -- -- -- -- --   06/29/21 1428 (!) 111/57 97 °F (36.1 °C) Oral 68 18 95 % --   06/29/21 1350 (!) 107/50 -- -- 59 18 -- 205 lb 11 oz (93.3 kg)   06/29/21 1330 (!) 108/55 -- -- 57 -- -- --   06/29/21 1300 (!) 100/51 -- -- 59 -- -- --   06/29/21 1230 126/61 -- -- 60 -- -- --   06/29/21 1200 (!) 105/49 -- -- 60 -- -- --   06/29/21 1130 (!) 106/51 -- -- 63 -- -- --   06/29/21 1100 (!) 99/54 -- -- 63 -- -- --   06/29/21 1030 (!) 104/51 -- -- 66 -- -- --          Intake/Output Summary (Last 24 hours) at 6/30/2021 1026  Last data filed at 6/30/2021 0800  Gross per 24 hour   Intake 840 ml   Output 3500 ml   Net -2660 ml       Wt Readings from Last 3 Encounters:   06/30/21 207 lb 4 oz (94 kg)   06/23/21 209 lb 14.1 oz (95.2 kg)   03/21/21 209 lb (94.8 kg)       Constitutional:  Patient in no acute distress  Head: normocephalic, atraumatic  Cardiovascular: S1 S2 no S3 or rub  Respiratory:  Clear upper, diminished in bases with few fine crackles  Gastrointestinal: soft, nontender, nondistended  Ext: trace edema   Neuro: awake, alert and oriented  Skin: dry, no rash      DATA:      Recent Labs     06/28/21  1031 06/29/21  0516 06/30/21  0445   WBC 9.3 8.5 11.6*   HGB 10.0* 9.0* 10.0*   HCT 33.0* 30.9* 33.1*   MCV 93.5 93.1 91.4    246 306     Recent Labs     06/29/21  0516 06/29/21  0703 06/30/21  0445    140 142   K 6.6* 6.0* 4.3 Collette Ricker - CNP    Patient seen and examined. I had a face to face encounter with the patient. Patient had a CT scan of the chest without contrast yesterday. Patient still with some shortness of breath. Agree with exam.    Agree with  formulation, assessment and plan as outlined above and directed by me. Addition and corrections were done as deemed appropriate. My exam and plan include: We will dialyze the patient today to help with fluid removal and also to get him back on his Monday - Wednesday - Friday schedule.       Cameron Subramanian MD  Nephrology        Electronically signed by Cameron Subramanian MD on 6/30/2021 at 1:08 PM

## 2021-06-30 NOTE — PLAN OF CARE
Problem: Breathing Pattern - Ineffective:  Goal: Ability to achieve and maintain a regular respiratory rate will improve  Description: Ability to achieve and maintain a regular respiratory rate will improve  6/30/2021 1603 by Kari Pedroza RN  Outcome: Ongoing  6/30/2021 0233 by Sydney Henry RN  Outcome: Met This Shift     Problem: Fluid Volume:  Goal: Ability to achieve a balanced intake and output will improve  Description: Ability to achieve a balanced intake and output will improve  6/30/2021 1603 by Kari Pedroza RN  Outcome: Ongoing  6/30/2021 0233 by Sydney Henry RN  Outcome: Met This Shift  Goal: Ability to maintain a balanced intake and output will improve  Description: Ability to maintain a balanced intake and output will improve  6/30/2021 1603 by Kari Pedroza RN  Outcome: Ongoing  6/30/2021 0233 by Sydney Henry RN  Outcome: Met This Shift     Problem: Physical Regulation:  Goal: Ability to maintain clinical measurements within normal limits will improve  Description: Ability to maintain clinical measurements within normal limits will improve  6/30/2021 1603 by Kari Pedroza RN  Outcome: Ongoing  6/30/2021 0233 by Sydney Henry RN  Outcome: Met This Shift  Goal: Will show no signs and symptoms of electrolyte imbalance  Description: Will show no signs and symptoms of electrolyte imbalance  6/30/2021 1603 by Kari Pedroza RN  Outcome: Ongoing  6/30/2021 0233 by Sydney Henry RN  Outcome: Met This Shift  Goal: Complications related to the disease process, condition or treatment will be avoided or minimized  Description: Complications related to the disease process, condition or treatment will be avoided or minimized  6/30/2021 1603 by Kari Pedroza RN  Outcome: Ongoing  6/30/2021 0233 by Sydney Henry RN  Outcome: Met This Shift  Goal: Diagnostic test results will improve  Description: Diagnostic test results will improve  6/30/2021 1603 by Kari Pedroza RN  Outcome:

## 2021-06-30 NOTE — FLOWSHEET NOTE
06/30/21 1800   Vital Signs   /63   Temp 98.2 °F (36.8 °C)   Pulse 67   Resp 18   Weight 206 lb 5.6 oz (93.6 kg)   Weight Method Bed scale   Percent Weight Change -0.43   Post-Hemodialysis Assessment   Post-Treatment Procedures Blood returned;Catheter capped, clamped and heparinized x 2 ports   Machine Disinfection Process Acid/Vinegar Clean;Heat Disinfect   Dialyzer Clearance Clotted   Duration of Treatment (minutes) 220 minutes   NET Removed (ml) 2000 ml   Tolerated Treatment Good   Patient Response to Treatment pt system clooted w/ 20 min remaining. pt tx ended. pt was rinsedback w/ 300 cc nss. cath flushed w/ nss and heparinized x2 ports.  cath capped x2. pt transported back to his room

## 2021-07-01 VITALS
TEMPERATURE: 98.7 F | BODY MASS INDEX: 33.52 KG/M2 | OXYGEN SATURATION: 98 % | WEIGHT: 208.6 LBS | RESPIRATION RATE: 18 BRPM | HEART RATE: 58 BPM | SYSTOLIC BLOOD PRESSURE: 114 MMHG | DIASTOLIC BLOOD PRESSURE: 66 MMHG | HEIGHT: 66 IN

## 2021-07-01 LAB
ANION GAP SERPL CALCULATED.3IONS-SCNC: 12 MMOL/L (ref 7–16)
BASOPHILS ABSOLUTE: 0.01 E9/L (ref 0–0.2)
BASOPHILS RELATIVE PERCENT: 0.2 % (ref 0–2)
BUN BLDV-MCNC: 27 MG/DL (ref 6–23)
CALCIUM SERPL-MCNC: 8.7 MG/DL (ref 8.6–10.2)
CHLORIDE BLD-SCNC: 99 MMOL/L (ref 98–107)
CO2: 27 MMOL/L (ref 22–29)
CREAT SERPL-MCNC: 5.2 MG/DL (ref 0.7–1.2)
EOSINOPHILS ABSOLUTE: 0.14 E9/L (ref 0.05–0.5)
EOSINOPHILS RELATIVE PERCENT: 2.3 % (ref 0–6)
GFR AFRICAN AMERICAN: 14
GFR NON-AFRICAN AMERICAN: 14 ML/MIN/1.73
GLUCOSE BLD-MCNC: 77 MG/DL (ref 74–99)
HCT VFR BLD CALC: 33.4 % (ref 37–54)
HEMOGLOBIN: 9.9 G/DL (ref 12.5–16.5)
IMMATURE GRANULOCYTES #: 0.01 E9/L
IMMATURE GRANULOCYTES %: 0.2 % (ref 0–5)
LYMPHOCYTES ABSOLUTE: 1.7 E9/L (ref 1.5–4)
LYMPHOCYTES RELATIVE PERCENT: 28.5 % (ref 20–42)
MAGNESIUM: 2 MG/DL (ref 1.6–2.6)
MCH RBC QN AUTO: 27.4 PG (ref 26–35)
MCHC RBC AUTO-ENTMCNC: 29.6 % (ref 32–34.5)
MCV RBC AUTO: 92.5 FL (ref 80–99.9)
METER GLUCOSE: 104 MG/DL (ref 74–99)
METER GLUCOSE: 141 MG/DL (ref 74–99)
METER GLUCOSE: 77 MG/DL (ref 74–99)
MONOCYTES ABSOLUTE: 1.02 E9/L (ref 0.1–0.95)
MONOCYTES RELATIVE PERCENT: 17.1 % (ref 2–12)
NEUTROPHILS ABSOLUTE: 3.09 E9/L (ref 1.8–7.3)
NEUTROPHILS RELATIVE PERCENT: 51.7 % (ref 43–80)
PDW BLD-RTO: 17.2 FL (ref 11.5–15)
PHOSPHORUS: 5.3 MG/DL (ref 2.5–4.5)
PLATELET # BLD: 238 E9/L (ref 130–450)
PMV BLD AUTO: 9.7 FL (ref 7–12)
POTASSIUM SERPL-SCNC: 4.4 MMOL/L (ref 3.5–5)
PROCALCITONIN: 0.27 NG/ML (ref 0–0.08)
RBC # BLD: 3.61 E12/L (ref 3.8–5.8)
SODIUM BLD-SCNC: 138 MMOL/L (ref 132–146)
WBC # BLD: 6 E9/L (ref 4.5–11.5)

## 2021-07-01 PROCEDURE — 84145 PROCALCITONIN (PCT): CPT

## 2021-07-01 PROCEDURE — 6370000000 HC RX 637 (ALT 250 FOR IP): Performed by: INTERNAL MEDICINE

## 2021-07-01 PROCEDURE — 36415 COLL VENOUS BLD VENIPUNCTURE: CPT

## 2021-07-01 PROCEDURE — 83735 ASSAY OF MAGNESIUM: CPT

## 2021-07-01 PROCEDURE — 6360000002 HC RX W HCPCS: Performed by: INTERNAL MEDICINE

## 2021-07-01 PROCEDURE — 94640 AIRWAY INHALATION TREATMENT: CPT

## 2021-07-01 PROCEDURE — 85025 COMPLETE CBC W/AUTO DIFF WBC: CPT

## 2021-07-01 PROCEDURE — 2700000000 HC OXYGEN THERAPY PER DAY

## 2021-07-01 PROCEDURE — 84100 ASSAY OF PHOSPHORUS: CPT

## 2021-07-01 PROCEDURE — 82962 GLUCOSE BLOOD TEST: CPT

## 2021-07-01 PROCEDURE — 80048 BASIC METABOLIC PNL TOTAL CA: CPT

## 2021-07-01 RX ORDER — ALBUTEROL SULFATE 90 UG/1
2 AEROSOL, METERED RESPIRATORY (INHALATION) EVERY 4 HOURS PRN
Qty: 1 INHALER | Refills: 3 | Status: SHIPPED | OUTPATIENT
Start: 2021-07-01 | End: 2021-08-10

## 2021-07-01 RX ORDER — CEFDINIR 300 MG/1
300 CAPSULE ORAL DAILY
Qty: 3 CAPSULE | Refills: 0 | Status: ON HOLD | COMMUNITY
Start: 2021-07-01 | End: 2021-07-19 | Stop reason: ALTCHOICE

## 2021-07-01 RX ADMIN — CEFDINIR 300 MG: 300 CAPSULE ORAL at 08:59

## 2021-07-01 RX ADMIN — SEVELAMER CARBONATE 800 MG: 800 TABLET, FILM COATED ORAL at 08:59

## 2021-07-01 RX ADMIN — IPRATROPIUM BROMIDE 0.5 MG: 0.5 SOLUTION RESPIRATORY (INHALATION) at 06:29

## 2021-07-01 RX ADMIN — FERROUS SULFATE TAB 325 MG (65 MG ELEMENTAL FE) 325 MG: 325 (65 FE) TAB at 08:58

## 2021-07-01 RX ADMIN — BUDESONIDE 500 MCG: 0.5 INHALANT RESPIRATORY (INHALATION) at 06:29

## 2021-07-01 RX ADMIN — ARFORMOTEROL TARTRATE 15 MCG: 15 SOLUTION RESPIRATORY (INHALATION) at 06:29

## 2021-07-01 RX ADMIN — SODIUM BICARBONATE 1300 MG: 650 TABLET ORAL at 08:59

## 2021-07-01 RX ADMIN — DOXYCYCLINE HYCLATE 100 MG: 100 CAPSULE ORAL at 08:59

## 2021-07-01 RX ADMIN — HEPARIN SODIUM 5000 UNITS: 5000 INJECTION INTRAVENOUS; SUBCUTANEOUS at 05:54

## 2021-07-01 RX ADMIN — SEVELAMER CARBONATE 800 MG: 800 TABLET, FILM COATED ORAL at 12:03

## 2021-07-01 RX ADMIN — SODIUM BICARBONATE 1300 MG: 650 TABLET ORAL at 14:50

## 2021-07-01 ASSESSMENT — PAIN SCALES - GENERAL
PAINLEVEL_OUTOF10: 0
PAINLEVEL_OUTOF10: 0

## 2021-07-01 NOTE — PROGRESS NOTES
Pulse ox was 92% on room air at rest.  Ambulated patient on room air. Oxygen saturation was  80% on room air while ambulating. Oxygen applied. Recovery pulse ox was 96 % on  2 liters of oxygen while ambulating.

## 2021-07-01 NOTE — PROGRESS NOTES
Department of Internal Medicine  Nephrology Attending Progress Note    SUBJECTIVE:  We are following this patient for end-stage renal failure . 7/1/21- Examined in room. Patient reports he is feeling good, wants to go home. States he is not sure he is getting the correct medications. Medications reviewed with patient.      PROBLEM LIST:    Patient Active Problem List   Diagnosis    Hallux valgus, acquired    DMII [facility]diabetes mellitus, type 2) ([facility]    HTN (hypertension)    Lumbar radicular pain    Spinal stenosis    B12 deficiency    Idiopathic acute pancreatitis    Acute pancreatitis without necrosis or infection, unspecified    Pneumonia    Respiratory failure ([facility]    Acute respiratory failure with hypoxia ([facility]        PAST MEDICAL HISTORY:    Past Medical History:   Diagnosis Date    Back pain     Diabetes mellitus ([facility]     DMII (diabetes mellitus, type 2) ([facility] 7/28/2015    Hemodialysis patient ([facility]     History of cardiovascular stress test 2/16/2015    lexiscan    HTN (hypertension) 7/28/2015    Hyperlipidemia     Hypertension     Lumbar radicular pain 7/28/2015    Type II or unspecified type diabetes mellitus without mention of complication, not stated as uncontrolled        MEDS (scheduled):   amLODIPine  5 mg Oral BID    gabapentin  100 mg Oral Nightly    ferrous sulfate  325 mg Oral Daily with breakfast    calcitRIOL  0.25 mcg Oral Q MWF    insulin glargine  55 Units Subcutaneous Nightly    sodium bicarbonate  1,300 mg Oral TID    metoprolol tartrate  50 mg Oral BID    hydrALAZINE  50 mg Oral BID    sevelamer  800 mg Oral TID WC    heparin (porcine)  5,000 Units Subcutaneous 3 times per day    insulin lispro  0-12 Units Subcutaneous TID     insulin lispro  0-6 Units Subcutaneous Nightly    cefdinir  300 mg Oral Daily    doxycycline hyclate  100 mg Oral BID    ipratropium  0.5 mg Nebulization 4x daily    Arformoterol Tartrate  15 mcg Nebulization BID    And    budesonide  0.5 mg Nebulization BID       MEDS (infusions):   dextrose         MEDS (prn):  ipratropium-albuterol, acetaminophen, ondansetron, glucose, dextrose, glucagon (rDNA), dextrose    DIET:    ADULT DIET; Regular; No Added Salt (3-4 gm);  Low Potassium (Less than 3000 mg/day)      PHYSICAL EXAM:      Patient Vitals for the past 24 hrs:   BP Temp Temp src Pulse Resp SpO2 Weight   07/01/21 0330 121/60 98.6 °F (37 °C) Oral 62 18 100 % --   07/01/21 0015 -- -- -- -- -- -- 208 lb 9.6 oz (94.6 kg)   06/30/21 2015 135/60 99.2 °F (37.3 °C) Oral 67 18 95 % --   06/30/21 1800 130/63 98.2 °F (36.8 °C) -- 67 18 -- 206 lb 5.6 oz (93.6 kg)   06/30/21 1730 (!) 109/53 -- -- 62 -- -- --   06/30/21 1700 (!) 117/52 -- -- 84 -- -- --   06/30/21 1630 (!) 114/55 -- -- 62 -- -- --   06/30/21 1600 110/60 -- -- 60 -- -- --   06/30/21 1530 107/60 -- -- 62 -- -- --   06/30/21 1513 120/61 98.4 °F (36.9 °C) -- 66 20 -- --   06/30/21 1445 132/62 97.7 °F (36.5 °C) Oral 67 18 94 % --   06/30/21 1026 -- -- -- -- -- 92 % --   06/30/21 0808 (!) 114/59 98.2 °F (36.8 °C) Oral 72 18 95 % --          Intake/Output Summary (Last 24 hours) at 7/1/2021 0701  Last data filed at 7/1/2021 3013  Gross per 24 hour   Intake 660 ml   Output --   Net 660 ml       Wt Readings from Last 3 Encounters:   07/01/21 208 lb 9.6 oz (94.6 kg)   06/23/21 209 lb 14.1 oz (95.2 kg)   03/21/21 209 lb (94.8 kg)       General appearance: alert, in no acute distress  Skin: No rashes or lesions on exposed skin  Neck: No JVD  Lungs: Clear upper, diminished lower  Heart: RRR, S1 / S2  Abdomen: Soft, non-tender, + bowel sounds  Extremities: No edema  Neurologic: Mental status: Alert, confused at times      DATA:      Recent Labs     06/29/21  0516 06/30/21  0445 07/01/21  0502   WBC 8.5 11.6* 6.0   HGB 9.0* 10.0* 9.9*   HCT 30.9* 33.1* 33.4*   MCV 93.1 91.4 92.5    306 238     Recent Labs     06/29/21  0703 06/30/21  0445 07/01/21  0502    142 138   K 6.0* 4.3 4.4  100 99   CO2 24 28 27   BUN 86* 36* 27*   CREATININE 11.1* 6.4* 5.2*   LABGLOM 6 11 14   GLUCOSE 193* 40* 77   CALCIUM 10.1 9.6 8.7     Recent Labs     06/28/21  1031 06/29/21  0516   ALT 11 7     Lab Results   Component Value Date    LABALBU 3.2 (L) 06/29/2021    LABALBU 3.3 (L) 06/28/2021    LABALBU 3.3 (L) 06/22/2021       Iron studies:  Lab Results   Component Value Date    FERRITIN 188 11/13/2019    IRON 19 (L) 02/09/2020    TIBC 189 (L) 02/09/2020     Vitamin B-12   Date Value Ref Range Status   02/09/2020 320 211 - 946 pg/mL Final     Folate   Date Value Ref Range Status   02/09/2020 9.6 4.8 - 24.2 ng/mL Final       Bone disease:  Lab Results   Component Value Date    MG 2.0 07/01/2021    PHOS 5.3 (H) 07/01/2021     Vit D, 25-Hydroxy   Date Value Ref Range Status   06/22/2021 40 30 - 100 ng/mL Final     Comment:     <20 ng/mL. ........... Cherl Spinner Deficient  20-30 ng/mL. ......... Cherl Spinner Insufficient   ng/mL. ........ Cherl Spinner Sufficient  >100 ng/mL. .......... Cherl Spinner Toxic       PTH   Date Value Ref Range Status   01/23/2020 154 (H) 15 - 65 pg/mL Final       No components found for: URIC    Lab Results   Component Value Date    COLORU Yellow 02/02/2020    NITRU Negative 02/02/2020    GLUCOSEU 250 02/02/2020    KETUA Negative 02/02/2020    UROBILINOGEN 0.2 02/02/2020    BILIRUBINUR Negative 02/02/2020       No results found for: Teofilo Hutchins        IMPRESSION/RECOMMENDATIONS:    1-ESKD-on IHD MWF via a R IJ TDC  Continue the MWF schedule  HD yesterday - UF 2000 ml     2-SOB-  Recently treated for pneumonia     3-Anemia in CKD  HgB above goal 10  Start MANUEL when HgB <10     4- 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  Hyperkalemia-  In the setting of missed dialysis  Corrected with dialysis.        VENANCIO Lamas - CNS      Electronically signed by Khushboo Stevenson,

## 2021-07-01 NOTE — PROGRESS NOTES
Pt bp low this am 99/54 left arm  Recheck 107/59 right. Morning medication held ( see Mar) Pt upset and would like to speak with Dr Semaj Yi. Staff stated Dr Semaj Yi rounds around 12-1pm pt kept informed.

## 2021-07-01 NOTE — CARE COORDINATION
SS Note: Covid negative 6/28/21. Readmit completed. SW notified Luis Fernando Crispin, of pt's discharge today and Resume HHC order in chart. Pt has Home O2 from [facility], [address] faxed updated O2 DME order to [facility]. SW met with pt, pt verbalized his frustration that when he had Home O2 from [facility] one year ago his home concentrator contained a refill system for smaller portable tanks however this concentrator does not have that feature and company provided E-tanks which pt finds difficult to manage into his car and into dialysis. Pt called and spoke with his PCP Dr. Nano Be' office and they contacted [facility]. SW also spoke with [facility] and arrangements finalized for pt to receive smaller portable O2 tanks. Pt is paying for a taxi to transport him home today.   Electronically signed by FLY Sorensen on 7/1/2021 at 2:46 PM

## 2021-07-01 NOTE — DISCHARGE SUMMARY
Department of Internal Medicine        CHIEF COMPLAINT: Shortness of breath and leg swelling    Reason for Admission: Acute on chronic diastolic CHF, fluid overload with history of renal failure with hemodialysis    HISTORY OF PRESENT ILLNESS:      The patient is a 63 y.o. male who presents with increased shortness of breath along with lower leg swelling which started about 2 to 3 days prior to admission. Patient recently discharged back on 26 for COPD and pneumonia. Patient was discharged home with O2 nasal cannula because of his acute hypoxia and was on .2 L. When the EMS came to the house the patient was on room air and his O2 sat was 80%. Patient also stated that he thought his speech was a bit slurred this morning. Patient denies any problem with fever/chills, dizziness, chest pain, abdominal pain or any unifocal paresthesias. Chest x-ray showed multifocal consolidation appears to reflect worsening interstitial edema. CT the head was unremarkable. Patient states that he does not have any slurred speech and is not sure if he actually did or feels because he was short of breath. Temperature was 98.5 with a heart rate of 79 and blood pressure 132/65. Serum potassium is elevated 6.4 with a random blood sugar 304 and proBNP of 17,000. Troponin was 100 with a WBC 9.3. Patient will be admitted to telemetry floor and consult with nephrology with more aggressive dialysis. 6/29/2021  Patient seen examined on IM/dialysis. Patient denies any chest or abdominal pain. Patient feels little bit better today. Serum potassium is 6.0 currently repeat troponin yesterday afternoon was less than initially. Blood sugars ranging 193-250. Hemoglobin 9.0. Sed rate 67. Temperature is 97.9 with heart rate of 63 and blood pressure ranging 99/50 4-152/59. O2 sat 97% on 6 L nasal cannula. CT of the chest tomorrow for better evaluation of the lungs. 06/30/2021  Patient seen examined on [address]   Patient feels little SURGERY      Bilateral    VEIN SURGERY         Medications Prior to Admission:    @  Prior to Admission medications    Medication Sig Start Date End Date Taking?  Authorizing Provider   hydrALAZINE (APRESOLINE) 50 MG tablet Take 50 mg by mouth 2 times daily   Yes Historical Provider, MD   empagliflozin (JARDIANCE) 10 MG tablet Take 10 mg by mouth daily   Yes Historical Provider, MD   sevelamer (RENVELA) 800 MG tablet Take 2 tablets by mouth 3 times daily (with meals)   Yes Historical Provider, MD   sevelamer (RENVELA) 800 MG tablet Take 1 tablet by mouth 2 times daily as needed (With Snacks)   Yes Historical Provider, MD   OXYGEN Inhale 2 L into the lungs continuous   Yes Historical Provider, MD   iron sucrose (VENOFER) 20 MG/ML injection Infuse 50 mg intravenously once a week Friday   Yes Historical Provider, MD COLEMANID-19 mRNA Vacc, Moderna, 100 MCG/0.5ML SUSP injection Inject 0.5 mLs into the muscle once 4/7/2021 - COMPLETED   Yes Historical Provider, MD   doxycycline hyclate (VIBRAMYCIN) 100 MG capsule Take 1 capsule by mouth 2 times daily for 7 days 6/24/21 7/1/21 Yes Kike Price,    cefdinir (OMNICEF) 300 MG capsule Take 1 capsule by mouth 2 times daily for 7 days 6/24/21 7/1/21 Yes Kike Price DO   sodium bicarbonate 650 MG tablet Take 2 tablets by mouth 3 times daily 2/13/20  Yes Pramod Rolle, DO   ipratropium-albuterol (DUONEB) 0.5-2.5 (3) MG/3ML SOLN nebulizer solution Inhale 3 mLs into the lungs every 4 hours as needed for Shortness of Breath 2/13/20  Yes Kike Price DO   albuterol sulfate HFA (PROVENTIL HFA) 108 (90 Base) MCG/ACT inhaler Inhale 2 puffs into the lungs every 4 hours as needed for Wheezing or Shortness of Breath 2/13/20  Yes Kike Price DO   calcitRIOL (ROCALTROL) 0.25 MCG capsule Take 1.5 mcg by mouth three times a week Monday, Wednesday, Friday   Yes Historical Provider, MD   insulin glargine (LANTUS) 100 UNIT/ML injection vial Inject 55 Units into the skin nightly    Yes Historical Provider, MD   gabapentin (NEURONTIN) 100 MG capsule Take 100 mg by mouth 2 times daily. Yes Historical Provider, MD   amLODIPine (NORVASC) 10 MG tablet Take 5 mg by mouth 2 times daily    Yes Historical Provider, MD       Allergies: Other    Social History:   Social History     Socioeconomic History    Marital status: Single     Spouse name: Not on file    Number of children: Not on file    Years of education: Not on file    Highest education level: Not on file   Occupational History    Not on file   Tobacco Use    Smoking status: Former Smoker     Packs/day: 1.00     Years: 30.00     Pack years: 30.00     Quit date: 2021     Years since quittin.0    Smokeless tobacco: Never Used   Vaping Use    Vaping Use: Never used   Substance and Sexual Activity    Alcohol use: No    Drug use: No    Sexual activity: Not on file   Other Topics Concern    Not on file   Social History Narrative    Not on file     Social Determinants of Health     Financial Resource Strain: Low Risk     Difficulty of Paying Living Expenses: Not very hard   Food Insecurity: No Food Insecurity    Worried About Running Out of Food in the Last Year: Never true    Alexis of Food in the Last Year: Never true   Transportation Needs: No Transportation Needs    Lack of Transportation (Medical): No    Lack of Transportation (Non-Medical): No   Physical Activity: Inactive    Days of Exercise per Week: 0 days    Minutes of Exercise per Session: 0 min   Stress: No Stress Concern Present    Feeling of Stress : Only a little   Social Connections: Unknown    Frequency of Communication with Friends and Family:  Three times a week    Frequency of Social Gatherings with Friends and Family: Twice a week    Attends Buddhist Services: 1 to 4 times per year    Active Member of [employer] or Organizations: No    Attends Club or Organization Meetings: Never    Marital Status: Patient refused   Intimate Partner Violence: Unknown    Fear of Current or Ex-Partner: Patient refused    Emotionally Abused: No    Physically Abused: No    Sexually Abused: No       Family History:   Family History   Problem Relation Age of Onset    Kidney Disease Sister        REVIEW OF SYSTEMS:    Gen: Patient denies any lightheadedness or dizziness. No LOC or syncope. No fevers or chills. HEENT: No earache, sore throat or nasal congestion. Resp: Denies cough, hemoptysis or sputum production. Cardiac: Denies chest pain, +SOB, no diaphoresis or palpitations. GI: No nausea, vomiting, diarrhea or constipation. No melena or hematochezia. : No urinary complaints, dysuria, hematuria or frequency. MSK: + Questionable slurred speech this morning. No extremity weakness, paralysis or paresthesias. PHYSICAL EXAM:    Vitals:  BP (!) 107/59   Pulse 67   Temp 98.7 °F (37.1 °C) (Oral)   Resp 18   Ht 5' 6\" (1.676 m)   Wt 208 lb 9.6 oz (94.6 kg)   SpO2 100%   BMI 33.67 kg/m²     General:  This is a 63 y.o. yo male who is alert and oriented in NAD  HEENT:  Head is normocephalic and atraumatic, PERRLA, EOMI, mucus membranes moist with no pharyngeal erythema or exudate. Neck:  Supple with no carotid bruits, JVD or thyromegaly.   No cervical adenopathy  CV:  Regular rate and rhythm, no murmurs  Lungs:  + Coarse decreased breath sounds to auscultation bilaterally with no wheezes, rales or rhonchi  Abdomen:  Soft, nontender, nondistended, bowel sounds present  Extremities:  No Lower leg edema, peripheral pulses intact bilaterally  Neuro:  Cranial nerves II-XII grossly intact; motor and sensory function intact with no focal deficits  Skin:  No rashes, lesions or wounds    DATA:  CBC with Differential:    Lab Results   Component Value Date    WBC 6.0 07/01/2021    RBC 3.61 07/01/2021    HGB 9.9 07/01/2021    HCT 33.4 07/01/2021     07/01/2021    MCV 92.5 07/01/2021    MCH 27.4 07/01/2021    MCHC 29.6 07/01/2021    RDW 17.2 07/01/2021    LYMPHOPCT 28.5 07/01/2021    MONOPCT 17.1 07/01/2021    BASOPCT 0.2 07/01/2021    MONOSABS 1.02 07/01/2021    LYMPHSABS 1.70 07/01/2021    EOSABS 0.14 07/01/2021    BASOSABS 0.01 07/01/2021     CMP:    Lab Results   Component Value Date     07/01/2021    K 4.4 07/01/2021    K 6.4 06/28/2021    CL 99 07/01/2021    CO2 27 07/01/2021    BUN 27 07/01/2021    CREATININE 5.2 07/01/2021    GFRAA 14 07/01/2021    LABGLOM 14 07/01/2021    GLUCOSE 77 07/01/2021    PROT 7.4 06/29/2021    LABALBU 3.2 06/29/2021    CALCIUM 8.7 07/01/2021    BILITOT 0.3 06/29/2021    ALKPHOS 44 06/29/2021    AST 7 06/29/2021    ALT 7 06/29/2021     Magnesium:    Lab Results   Component Value Date    MG 2.0 07/01/2021     Phosphorus:    Lab Results   Component Value Date    PHOS 5.3 07/01/2021     PT/INR:    Lab Results   Component Value Date    PROTIME 11.8 02/07/2020    INR 1.0 02/07/2020     Troponin:    Lab Results   Component Value Date    TROPONINI 0.10 03/21/2021     U/A:    Lab Results   Component Value Date    COLORU Yellow 02/02/2020    PROTEINU >=300 02/02/2020    PHUR 5.0 02/02/2020    LABCAST RARE 09/21/2018    WBCUA 0-1 02/02/2020    RBCUA NONE 02/02/2020    BACTERIA NONE 02/02/2020    CLARITYU Clear 02/02/2020    SPECGRAV 1.025 02/02/2020    LEUKOCYTESUR Negative 02/02/2020    UROBILINOGEN 0.2 02/02/2020    BILIRUBINUR Negative 02/02/2020    BLOODU TRACE 02/02/2020    GLUCOSEU 250 02/02/2020    AMORPHOUS FEW 10/02/2019     ABG:    Lab Results   Component Value Date    PH 7.419 06/20/2021    PCO2 47.3 02/08/2020    PO2 140.5 02/08/2020    HCO3 26.3 02/08/2020    BE 3.1 06/20/2021    O2SAT 98.5 06/20/2021     HgBA1c:    Lab Results   Component Value Date    LABA1C 7.4 06/28/2021     FLP:    Lab Results   Component Value Date    TRIG 106 03/21/2021    HDL 36 03/21/2021    LDLCALC 66 03/21/2021    LABVLDL 21 03/21/2021     TSH:    Lab Results   Component Value Date    TSH 1.210 06/21/2021     IRON:    Lab Results

## 2021-07-07 NOTE — PROGRESS NOTES
Physician Progress Note      PATIENT:               Indiana Wild  CSN #:                  [id number]  :                       1955  ADMIT DATE:       2021 9:48 AM  DISCH DATE:        2021 2:26 PM  RESPONDING  PROVIDER #:        Carter JACKSON DO          QUERY TEXT:    Pt admitted with acute hypoxic respiratory failure. Pt noted to have   documentation of \"Possible pneumonia\" on H and P and progress notes. If   possible, please document in the progress notes and discharge summary if you   are evaluating and/or treating any of the following:    Note: CAP and HCAP indicate where the pneumonia was acquired, not a specific   type. The medical record reflects the following:  Risk Factors: per documentation diagnosis of Possible HCAP, renal failure with   dialysis, acute respiratory failure  Clinical Indicators: per CXR report  Diffuse multifocal bilateral   pulmonary opacities, per CXR report  No change in diffuse bilateral   airspace opacities. ? Finding may represent pneumonia versus pulmonary edema   proper setting. ,  per H and P Possible HCAP, per medicine progress note Acute   hypoxic respiratory failure-possibly from HCAP versus fluid overload from his   chronic renal failure,  per Renal progress note  CXR  suggesting pneumonia   vs pulmonary edema, Procalcitonin . 75, admission wbc 11.7, repeat wbc 9.6,   temp max 98.6.   Treatment:  Repeat CXR, IV Cefepime, IV Vancomycin, supplemental oxygen    Thank you  Ursula Mendoza RN CCDS  [phone]  Options provided:  -- Gram negative pneumonia  -- Gram positive pneumonia  -- Bacterial pneumonia  -- Viral pneumonia  -- Pneumonia ruled out  -- Other - I will add my own diagnosis  -- Disagree - Not applicable / Not valid  -- Disagree - Clinically unable to determine / Unknown  -- Refer to Clinical Documentation Reviewer    PROVIDER RESPONSE TEXT:    See discharge summary    Query created by: Lisa Hirsch on 2021 11:18 AM      Electronically signed by:  Jennifer Sevilla DO 7/7/2021 9:37 AM

## 2021-07-19 ENCOUNTER — HOSPITAL ENCOUNTER (INPATIENT)
Age: 66
LOS: 6 days | Discharge: HOME HEALTH | DRG: 640 | End: 2021-07-25
Attending: EMERGENCY MEDICINE | Admitting: INTERNAL MEDICINE
Payer: MEDICARE

## 2021-07-19 ENCOUNTER — APPOINTMENT (OUTPATIENT)
Dept: GENERAL RADIOLOGY | Age: 66
DRG: 640 | End: 2021-07-19
Payer: MEDICARE

## 2021-07-19 ENCOUNTER — TELEPHONE (OUTPATIENT)
Dept: OTHER | Facility: CLINIC | Age: 66
End: 2021-07-19

## 2021-07-19 ENCOUNTER — APPOINTMENT (OUTPATIENT)
Dept: CT IMAGING | Age: 66
DRG: 640 | End: 2021-07-19
Payer: MEDICARE

## 2021-07-19 DIAGNOSIS — E87.5 HYPERKALEMIA: Primary | ICD-10-CM

## 2021-07-19 DIAGNOSIS — R29.898 LEFT LEG WEAKNESS: ICD-10-CM

## 2021-07-19 LAB
ANION GAP SERPL CALCULATED.3IONS-SCNC: 17 MMOL/L (ref 7–16)
ANISOCYTOSIS: ABNORMAL
BASOPHILS ABSOLUTE: 0 E9/L (ref 0–0.2)
BASOPHILS RELATIVE PERCENT: 0.1 % (ref 0–2)
BETA-HYDROXYBUTYRATE: 0.1 MMOL/L (ref 0.02–0.27)
BUN BLDV-MCNC: 56 MG/DL (ref 6–23)
CALCIUM SERPL-MCNC: 10 MG/DL (ref 8.6–10.2)
CHLORIDE BLD-SCNC: 91 MMOL/L (ref 98–107)
CO2: 22 MMOL/L (ref 22–29)
CREAT SERPL-MCNC: 12 MG/DL (ref 0.7–1.2)
EKG ATRIAL RATE: 42 BPM
EKG P AXIS: 39 DEGREES
EKG P-R INTERVAL: 274 MS
EKG Q-T INTERVAL: 400 MS
EKG QRS DURATION: 104 MS
EKG QTC CALCULATION (BAZETT): 334 MS
EKG R AXIS: 57 DEGREES
EKG T AXIS: 59 DEGREES
EKG VENTRICULAR RATE: 42 BPM
EOSINOPHILS ABSOLUTE: 0.41 E9/L (ref 0.05–0.5)
EOSINOPHILS RELATIVE PERCENT: 5.1 % (ref 0–6)
GFR AFRICAN AMERICAN: 5
GFR NON-AFRICAN AMERICAN: 5 ML/MIN/1.73
GLUCOSE BLD-MCNC: 232 MG/DL (ref 74–99)
HCT VFR BLD CALC: 34.3 % (ref 37–54)
HEMOGLOBIN: 10.5 G/DL (ref 12.5–16.5)
HYPOCHROMIA: ABNORMAL
INR BLD: 0.9
LYMPHOCYTES ABSOLUTE: 1.38 E9/L (ref 1.5–4)
LYMPHOCYTES RELATIVE PERCENT: 17.1 % (ref 20–42)
MCH RBC QN AUTO: 28 PG (ref 26–35)
MCHC RBC AUTO-ENTMCNC: 30.6 % (ref 32–34.5)
MCV RBC AUTO: 91.5 FL (ref 80–99.9)
METER GLUCOSE: 235 MG/DL (ref 74–99)
METER GLUCOSE: 249 MG/DL (ref 74–99)
METER GLUCOSE: 84 MG/DL (ref 74–99)
METER GLUCOSE: 98 MG/DL (ref 74–99)
MONOCYTES ABSOLUTE: 0.57 E9/L (ref 0.1–0.95)
MONOCYTES RELATIVE PERCENT: 6.8 % (ref 2–12)
NEUTROPHILS ABSOLUTE: 5.75 E9/L (ref 1.8–7.3)
NEUTROPHILS RELATIVE PERCENT: 70.9 % (ref 43–80)
OVALOCYTES: ABNORMAL
PDW BLD-RTO: 19.1 FL (ref 11.5–15)
PLATELET # BLD: 140 E9/L (ref 130–450)
PMV BLD AUTO: 9.7 FL (ref 7–12)
POIKILOCYTES: ABNORMAL
POLYCHROMASIA: ABNORMAL
POTASSIUM REFLEX MAGNESIUM: 8.7 MMOL/L (ref 3.5–5)
PROTHROMBIN TIME: 10.8 SEC (ref 9.3–12.4)
RBC # BLD: 3.75 E12/L (ref 3.8–5.8)
SODIUM BLD-SCNC: 130 MMOL/L (ref 132–146)
TARGET CELLS: ABNORMAL
TEAR DROP CELLS: ABNORMAL
TROPONIN, HIGH SENSITIVITY: 108 NG/L (ref 0–11)
TROPONIN, HIGH SENSITIVITY: 112 NG/L (ref 0–11)
WBC # BLD: 8.1 E9/L (ref 4.5–11.5)

## 2021-07-19 PROCEDURE — 96375 TX/PRO/DX INJ NEW DRUG ADDON: CPT

## 2021-07-19 PROCEDURE — 2700000000 HC OXYGEN THERAPY PER DAY

## 2021-07-19 PROCEDURE — 93010 ELECTROCARDIOGRAM REPORT: CPT | Performed by: INTERNAL MEDICINE

## 2021-07-19 PROCEDURE — 93005 ELECTROCARDIOGRAM TRACING: CPT | Performed by: EMERGENCY MEDICINE

## 2021-07-19 PROCEDURE — 6370000000 HC RX 637 (ALT 250 FOR IP): Performed by: EMERGENCY MEDICINE

## 2021-07-19 PROCEDURE — 2000000000 HC ICU R&B

## 2021-07-19 PROCEDURE — 71046 X-RAY EXAM CHEST 2 VIEWS: CPT

## 2021-07-19 PROCEDURE — 84484 ASSAY OF TROPONIN QUANT: CPT

## 2021-07-19 PROCEDURE — 2580000003 HC RX 258: Performed by: EMERGENCY MEDICINE

## 2021-07-19 PROCEDURE — 85610 PROTHROMBIN TIME: CPT

## 2021-07-19 PROCEDURE — 5A1D70Z PERFORMANCE OF URINARY FILTRATION, INTERMITTENT, LESS THAN 6 HOURS PER DAY: ICD-10-PCS | Performed by: INTERNAL MEDICINE

## 2021-07-19 PROCEDURE — 36415 COLL VENOUS BLD VENIPUNCTURE: CPT

## 2021-07-19 PROCEDURE — 96374 THER/PROPH/DIAG INJ IV PUSH: CPT

## 2021-07-19 PROCEDURE — 99285 EMERGENCY DEPT VISIT HI MDM: CPT

## 2021-07-19 PROCEDURE — 70450 CT HEAD/BRAIN W/O DYE: CPT

## 2021-07-19 PROCEDURE — 87081 CULTURE SCREEN ONLY: CPT

## 2021-07-19 PROCEDURE — 82010 KETONE BODYS QUAN: CPT

## 2021-07-19 PROCEDURE — 80048 BASIC METABOLIC PNL TOTAL CA: CPT

## 2021-07-19 PROCEDURE — 90935 HEMODIALYSIS ONE EVALUATION: CPT

## 2021-07-19 PROCEDURE — 82962 GLUCOSE BLOOD TEST: CPT

## 2021-07-19 PROCEDURE — 6370000000 HC RX 637 (ALT 250 FOR IP): Performed by: INTERNAL MEDICINE

## 2021-07-19 PROCEDURE — 85025 COMPLETE CBC W/AUTO DIFF WBC: CPT

## 2021-07-19 PROCEDURE — 2500000003 HC RX 250 WO HCPCS: Performed by: EMERGENCY MEDICINE

## 2021-07-19 PROCEDURE — 6360000002 HC RX W HCPCS: Performed by: INTERNAL MEDICINE

## 2021-07-19 PROCEDURE — 6360000002 HC RX W HCPCS: Performed by: EMERGENCY MEDICINE

## 2021-07-19 PROCEDURE — 2580000003 HC RX 258: Performed by: INTERNAL MEDICINE

## 2021-07-19 RX ORDER — INSULIN GLARGINE 100 [IU]/ML
55 INJECTION, SOLUTION SUBCUTANEOUS NIGHTLY
Status: DISCONTINUED | OUTPATIENT
Start: 2021-07-19 | End: 2021-07-20

## 2021-07-19 RX ORDER — ALBUTEROL SULFATE 90 UG/1
2 AEROSOL, METERED RESPIRATORY (INHALATION) EVERY 4 HOURS PRN
Status: DISCONTINUED | OUTPATIENT
Start: 2021-07-19 | End: 2021-07-19 | Stop reason: CLARIF

## 2021-07-19 RX ORDER — GABAPENTIN 100 MG/1
100 CAPSULE ORAL 2 TIMES DAILY
Status: DISCONTINUED | OUTPATIENT
Start: 2021-07-19 | End: 2021-07-25 | Stop reason: HOSPADM

## 2021-07-19 RX ORDER — AMLODIPINE BESYLATE 5 MG/1
5 TABLET ORAL 2 TIMES DAILY
Status: DISCONTINUED | OUTPATIENT
Start: 2021-07-19 | End: 2021-07-19 | Stop reason: SDUPTHER

## 2021-07-19 RX ORDER — INSULIN GLARGINE 100 [IU]/ML
55 INJECTION, SOLUTION SUBCUTANEOUS NIGHTLY
Status: DISCONTINUED | OUTPATIENT
Start: 2021-07-19 | End: 2021-07-19

## 2021-07-19 RX ORDER — HYDRALAZINE HYDROCHLORIDE 50 MG/1
50 TABLET, FILM COATED ORAL 2 TIMES DAILY
Status: DISCONTINUED | OUTPATIENT
Start: 2021-07-19 | End: 2021-07-25 | Stop reason: HOSPADM

## 2021-07-19 RX ORDER — NICOTINE POLACRILEX 4 MG
15 LOZENGE BUCCAL PRN
Status: DISCONTINUED | OUTPATIENT
Start: 2021-07-19 | End: 2021-07-25 | Stop reason: HOSPADM

## 2021-07-19 RX ORDER — CALCIUM GLUCONATE 94 MG/ML
1000 INJECTION, SOLUTION INTRAVENOUS ONCE
Status: DISCONTINUED | OUTPATIENT
Start: 2021-07-19 | End: 2021-07-19 | Stop reason: CLARIF

## 2021-07-19 RX ORDER — IPRATROPIUM BROMIDE AND ALBUTEROL SULFATE 2.5; .5 MG/3ML; MG/3ML
3 SOLUTION RESPIRATORY (INHALATION) EVERY 4 HOURS PRN
Status: DISCONTINUED | OUTPATIENT
Start: 2021-07-19 | End: 2021-07-25 | Stop reason: HOSPADM

## 2021-07-19 RX ORDER — SODIUM BICARBONATE 650 MG/1
1300 TABLET ORAL 3 TIMES DAILY
Status: DISCONTINUED | OUTPATIENT
Start: 2021-07-19 | End: 2021-07-25 | Stop reason: HOSPADM

## 2021-07-19 RX ORDER — SEVELAMER CARBONATE 800 MG/1
1600 TABLET, FILM COATED ORAL
Status: DISCONTINUED | OUTPATIENT
Start: 2021-07-19 | End: 2021-07-25 | Stop reason: HOSPADM

## 2021-07-19 RX ORDER — ALBUTEROL SULFATE 2.5 MG/3ML
2.5 SOLUTION RESPIRATORY (INHALATION) EVERY 4 HOURS PRN
Status: DISCONTINUED | OUTPATIENT
Start: 2021-07-19 | End: 2021-07-19

## 2021-07-19 RX ORDER — AMLODIPINE BESYLATE 10 MG/1
10 TABLET ORAL DAILY
Status: DISCONTINUED | OUTPATIENT
Start: 2021-07-19 | End: 2021-07-23

## 2021-07-19 RX ORDER — DEXTROSE MONOHYDRATE 25 G/50ML
12.5 INJECTION, SOLUTION INTRAVENOUS PRN
Status: DISCONTINUED | OUTPATIENT
Start: 2021-07-19 | End: 2021-07-25 | Stop reason: HOSPADM

## 2021-07-19 RX ORDER — CEFDINIR 300 MG/1
300 CAPSULE ORAL DAILY
Status: DISCONTINUED | OUTPATIENT
Start: 2021-07-19 | End: 2021-07-25 | Stop reason: HOSPADM

## 2021-07-19 RX ORDER — DEXTROSE MONOHYDRATE 25 G/50ML
25 INJECTION, SOLUTION INTRAVENOUS ONCE
Status: COMPLETED | OUTPATIENT
Start: 2021-07-19 | End: 2021-07-19

## 2021-07-19 RX ORDER — CALCITRIOL 0.25 UG/1
1.5 CAPSULE, LIQUID FILLED ORAL
Status: DISCONTINUED | OUTPATIENT
Start: 2021-07-19 | End: 2021-07-25 | Stop reason: HOSPADM

## 2021-07-19 RX ORDER — DEXTROSE MONOHYDRATE 50 MG/ML
100 INJECTION, SOLUTION INTRAVENOUS PRN
Status: DISCONTINUED | OUTPATIENT
Start: 2021-07-19 | End: 2021-07-25 | Stop reason: HOSPADM

## 2021-07-19 RX ORDER — SODIUM CHLORIDE 0.9 % (FLUSH) 0.9 %
5-40 SYRINGE (ML) INJECTION PRN
Status: DISCONTINUED | OUTPATIENT
Start: 2021-07-19 | End: 2021-07-25 | Stop reason: HOSPADM

## 2021-07-19 RX ORDER — SODIUM CHLORIDE 0.9 % (FLUSH) 0.9 %
5-40 SYRINGE (ML) INJECTION 2 TIMES DAILY
Status: DISCONTINUED | OUTPATIENT
Start: 2021-07-19 | End: 2021-07-25 | Stop reason: HOSPADM

## 2021-07-19 RX ORDER — HEPARIN SODIUM 1000 [USP'U]/ML
4700 INJECTION, SOLUTION INTRAVENOUS; SUBCUTANEOUS PRN
Status: DISCONTINUED | OUTPATIENT
Start: 2021-07-19 | End: 2021-07-25 | Stop reason: HOSPADM

## 2021-07-19 RX ORDER — HEPARIN SODIUM 5000 [USP'U]/ML
5000 INJECTION, SOLUTION INTRAVENOUS; SUBCUTANEOUS EVERY 8 HOURS SCHEDULED
Status: DISCONTINUED | OUTPATIENT
Start: 2021-07-19 | End: 2021-07-23

## 2021-07-19 RX ADMIN — SODIUM BICARBONATE 1300 MG: 650 TABLET ORAL at 23:27

## 2021-07-19 RX ADMIN — CALCITRIOL 1.5 MCG: 0.25 CAPSULE ORAL at 18:52

## 2021-07-19 RX ADMIN — INSULIN HUMAN 10 UNITS: 100 INJECTION, SOLUTION PARENTERAL at 13:25

## 2021-07-19 RX ADMIN — SODIUM CHLORIDE, PRESERVATIVE FREE 10 ML: 5 INJECTION INTRAVENOUS at 20:50

## 2021-07-19 RX ADMIN — SEVELAMER CARBONATE 1600 MG: 800 TABLET, FILM COATED ORAL at 18:51

## 2021-07-19 RX ADMIN — HEPARIN SODIUM 5000 UNITS: 5000 INJECTION INTRAVENOUS; SUBCUTANEOUS at 15:12

## 2021-07-19 RX ADMIN — GABAPENTIN 100 MG: 100 CAPSULE ORAL at 20:50

## 2021-07-19 RX ADMIN — HEPARIN SODIUM 5000 UNITS: 5000 INJECTION INTRAVENOUS; SUBCUTANEOUS at 21:41

## 2021-07-19 RX ADMIN — SODIUM BICARBONATE 50 MEQ: 84 INJECTION, SOLUTION INTRAVENOUS at 13:18

## 2021-07-19 RX ADMIN — SODIUM BICARBONATE 1300 MG: 650 TABLET ORAL at 18:50

## 2021-07-19 RX ADMIN — SODIUM CHLORIDE, PRESERVATIVE FREE 10 ML: 5 INJECTION INTRAVENOUS at 20:58

## 2021-07-19 RX ADMIN — HYDRALAZINE HYDROCHLORIDE 50 MG: 50 TABLET, FILM COATED ORAL at 20:50

## 2021-07-19 RX ADMIN — DEXTROSE MONOHYDRATE 25 G: 25 INJECTION, SOLUTION INTRAVENOUS at 13:23

## 2021-07-19 RX ADMIN — CALCIUM GLUCONATE 1000 MG: 98 INJECTION, SOLUTION INTRAVENOUS at 13:27

## 2021-07-19 ASSESSMENT — ENCOUNTER SYMPTOMS
ABDOMINAL PAIN: 0
DIARRHEA: 0
RHINORRHEA: 0
SHORTNESS OF BREATH: 0
BACK PAIN: 0
COUGH: 0
VOMITING: 0
NAUSEA: 0

## 2021-07-19 ASSESSMENT — PAIN SCALES - GENERAL
PAINLEVEL_OUTOF10: 0

## 2021-07-19 NOTE — FLOWSHEET NOTE
07/19/21 1830   Vital Signs   /68   Temp 98 °F (36.7 °C)   Pulse 64   Resp 18   SpO2 100 %   Weight 198 lb 6.6 oz (90 kg)   Weight Method Bed scale   Percent Weight Change -3.68   Pain Assessment   Pain Assessment 0-10   Pain Level 0   Post-Hemodialysis Assessment   Post-Treatment Procedures Blood returned;Catheter capped, clamped and heparinized x 2 ports   Machine Disinfection Process Acid/Vinegar Clean;Heat Disinfect; Exterior Machine Disinfection   Rinseback Volume (ml) 300 ml   Total Liters Processed (l/min) 97 l/min   Dialyzer Clearance Clotted   Duration of Treatment (minutes) 255 minutes   Hemodialysis Intake (ml) 300 ml   Hemodialysis Output (ml) 3400 ml   NET Removed (ml) 3100 ml   Tolerated Treatment Good   Patient Response to Treatment Tolerated treatment well   Bilateral Breath Sounds Diminished   Edema Generalized

## 2021-07-19 NOTE — H&P
Department of Internal Medicine        CHIEF COMPLAINT: Left lower extremity weakness and a fall    Reason for Admission: Severe hyperkalemia    HISTORY OF PRESENT ILLNESS:      The patient is a 63 y.o. male who presents with increased weakness that started his morning involving right lower extremity. Patient also has some numbness. The ER note stated that it was his left lower extremity. Patient went to go walk one of his  doors and then he fell. Patient denied any problem with dizziness, chest pain, palpitations or syncope. Patient was supposed to go to dialysis this morning but he did not because of the above event. Patient stated that he checked his blood sugar and it was 80. He came to the emergency room and patient was bradycardic with heart rate of 46 blood pressure was normal 123/58 and normal temperature. Potassium was 8.7 with a sodium 130. Random blood sugar 235 in the ED. the troponin was 108. Hemoglobin 10.5. With all the above problems patient was admitted to the ICU for further evaluation. Past Medical History:    Past Medical History:   Diagnosis Date    Back pain     Diabetes mellitus ([facility]     DMII (diabetes mellitus, type 2) ([facility] [address] 7/28/2015    Hemodialysis patient ([facility] [address] [facility]     History of cardiovascular stress test 2/16/2015    lexiscan    HTN (hypertension) 7/28/2015    Hyperlipidemia     Hypertension     Lumbar radicular pain 7/28/2015    Type II or unspecified type diabetes mellitus without mention of complication, not stated as uncontrolled      Past Surgical History:    Past Surgical History:   Procedure Laterality Date    OTHER SURGICAL HISTORY Left 06/06/14    cain bunionectomy left foot with osteomed screw x1    SHOULDER SURGERY      Bilateral    VEIN SURGERY         Medications Prior to Admission:    @  Prior to Admission medications    Medication Sig Start Date End Date Taking?  Authorizing Provider   cefdinir (OMNICEF) 300 MG capsule Take 1 capsule by mouth daily 7/1/21   Roxy Moraleso, DO   albuterol sulfate HFA (PROVENTIL HFA) 108 (90 Base) MCG/ACT inhaler Inhale 2 puffs into the lungs every 4 hours as needed for Wheezing or Shortness of Breath 7/1/21   Roxy Moraleso, DO   hydrALAZINE (APRESOLINE) 50 MG tablet Take 50 mg by mouth 2 times daily    Historical Provider, MD   empagliflozin (JARDIANCE) 10 MG tablet Take 10 mg by mouth daily    Historical Provider, MD   sevelamer (RENVELA) 800 MG tablet Take 2 tablets by mouth 3 times daily (with meals)    Historical Provider, MD   sevelamer (RENVELA) 800 MG tablet Take 1 tablet by mouth 2 times daily as needed (With Snacks)    Historical Provider, MD   OXYGEN Inhale 2 L into the lungs continuous    Historical Provider, MD   iron sucrose (VENOFER) 20 MG/ML injection Infuse 50 mg intravenously once a week Friday    Historical Provider, MD   COVID-19 mRNA Vacc, Moderna, 100 MCG/0.5ML SUSP injection Inject 0.5 mLs into the muscle once 4/7/2021 - COMPLETED    Historical Provider, MD   sodium bicarbonate 650 MG tablet Take 2 tablets by mouth 3 times daily 2/13/20   Roxy Moraleso, DO   ipratropium-albuterol (DUONEB) 0.5-2.5 (3) MG/3ML SOLN nebulizer solution Inhale 3 mLs into the lungs every 4 hours as needed for Shortness of Breath 2/13/20   Roxy Moraleso, DO   calcitRIOL (ROCALTROL) 0.25 MCG capsule Take 1.5 mcg by mouth three times a week Monday, Wednesday, Friday    Historical Provider, MD   insulin glargine (LANTUS) 100 UNIT/ML injection vial Inject 55 Units into the skin nightly     Historical Provider, MD   gabapentin (NEURONTIN) 100 MG capsule Take 100 mg by mouth 2 times daily. Historical Provider, MD   amLODIPine (NORVASC) 10 MG tablet Take 5 mg by mouth 2 times daily     Historical Provider, MD       Allergies:   Other    Social History:   Social History     Socioeconomic History    Marital status: Single     Spouse name: Not on file    Number of children: Not on file    Years of education: Not on file    Highest education level: Not on file   Occupational History    Not on file   Tobacco Use    Smoking status: Former Smoker     Packs/day: 1.00     Years: 30.00     Pack years: 30.00     Quit date: 2021     Years since quittin.0    Smokeless tobacco: Never Used   Vaping Use    Vaping Use: Never used   Substance and Sexual Activity    Alcohol use: No    Drug use: No    Sexual activity: Not on file   Other Topics Concern    Not on file   Social History Narrative    Not on file     Social Determinants of Health     Financial Resource Strain: Low Risk     Difficulty of Paying Living Expenses: Not very hard   Food Insecurity: No Food Insecurity    Worried About Running Out of Food in the Last Year: Never true    Alexis of Food in the Last Year: Never true   Transportation Needs: No Transportation Needs    Lack of Transportation (Medical): No    Lack of Transportation (Non-Medical): No   Physical Activity: Inactive    Days of Exercise per Week: 0 days    Minutes of Exercise per Session: 0 min   Stress: No Stress Concern Present    Feeling of Stress : Only a little   Social Connections: Unknown    Frequency of Communication with Friends and Family: Three times a week    Frequency of Social Gatherings with Friends and Family: Twice a week    Attends Yazdanism Services: 1 to 4 times per year    Active Member of [employer] or Organizations: No    Attends Club or Organization Meetings: Never    Marital Status: Patient refused   Intimate Partner Violence: Unknown    Fear of Current or Ex-Partner: Patient refused    Emotionally Abused: No    Physically Abused: No    Sexually Abused: No       Family History:   Family History   Problem Relation Age of Onset    Kidney Disease Sister        REVIEW OF SYSTEMS:    Gen: Patient denies any lightheadedness or dizziness. No LOC or syncope. No fevers or chills. HEENT: No earache, sore throat or nasal congestion.     Resp: Denies cough, hemoptysis or sputum production. Cardiac: Denies chest pain, SOB, diaphoresis or palpitations. GI: No nausea, vomiting, diarrhea or constipation. No melena or hematochezia. : No urinary complaints, dysuria, hematuria or frequency. MSK: + Right lower leg weakness, no paralysis      PHYSICAL EXAM:    Vitals:  BP (!) 123/58   Pulse (!) 46   Temp 97.9 °F (36.6 °C)   Resp 20   SpO2 97%     General:  This is a 63 y.o. yo male who is alert and oriented in NAD  HEENT:  Head is normocephalic and atraumatic, PERRLA, EOMI, mucus membranes moist with no pharyngeal erythema or exudate. Neck:  Supple with no carotid bruits, JVD or thyromegaly.   No cervical adenopathy  CV:  Regular rate and rhythm, no murmurs  Lungs:  Clear to auscultation bilaterally with no wheezes, rales or rhonchi  Abdomen:  Soft, nontender, + abdominal fat, nondistended, bowel sounds present  Extremities:  No edema, peripheral pulses intact bilaterally  Neuro:  Cranial nerves II-XII grossly intact; motor and sensory function intact with no focal deficits  Skin:  No rashes, lesions or wounds    DATA:  CBC with Differential:    Lab Results   Component Value Date    WBC 8.1 07/19/2021    RBC 3.75 07/19/2021    HGB 10.5 07/19/2021    HCT 34.3 07/19/2021     07/19/2021    MCV 91.5 07/19/2021    MCH 28.0 07/19/2021    MCHC 30.6 07/19/2021    RDW 19.1 07/19/2021    LYMPHOPCT 28.5 07/01/2021    MONOPCT 17.1 07/01/2021    BASOPCT 0.2 07/01/2021    MONOSABS 1.02 07/01/2021    LYMPHSABS 1.70 07/01/2021    EOSABS 0.14 07/01/2021    BASOSABS 0.01 07/01/2021     CMP:    Lab Results   Component Value Date     07/19/2021    K 8.7 07/19/2021    CL 91 07/19/2021    CO2 22 07/19/2021    BUN 56 07/19/2021    CREATININE 12.0 07/19/2021    GFRAA 5 07/19/2021    LABGLOM 5 07/19/2021    GLUCOSE 232 07/19/2021    PROT 7.4 06/29/2021    LABALBU 3.2 06/29/2021    CALCIUM 10.0 07/19/2021    BILITOT 0.3 06/29/2021    ALKPHOS 44 06/29/2021    AST 7 06/29/2021    ALT 7 06/29/2021     Magnesium:    Lab Results   Component Value Date    MG 2.0 07/01/2021     Phosphorus:    Lab Results   Component Value Date    PHOS 5.3 07/01/2021     PT/INR:    Lab Results   Component Value Date    PROTIME 10.8 07/19/2021    INR 0.9 07/19/2021     Troponin:    Lab Results   Component Value Date    TROPONINI 0.10 03/21/2021     U/A:    Lab Results   Component Value Date    COLORU Yellow 02/02/2020    PROTEINU >=300 02/02/2020    PHUR 5.0 02/02/2020    LABCAST RARE 09/21/2018    WBCUA 0-1 02/02/2020    RBCUA NONE 02/02/2020    BACTERIA NONE 02/02/2020    CLARITYU Clear 02/02/2020    SPECGRAV 1.025 02/02/2020    LEUKOCYTESUR Negative 02/02/2020    UROBILINOGEN 0.2 02/02/2020    BILIRUBINUR Negative 02/02/2020    BLOODU TRACE 02/02/2020    GLUCOSEU 250 02/02/2020    AMORPHOUS FEW 10/02/2019     ABG:    Lab Results   Component Value Date    PH 7.419 06/20/2021    PCO2 47.3 02/08/2020    PO2 140.5 02/08/2020    HCO3 26.3 02/08/2020    BE 3.1 06/20/2021    O2SAT 98.5 06/20/2021     HgBA1c:    Lab Results   Component Value Date    LABA1C 7.4 06/28/2021     FLP:    Lab Results   Component Value Date    TRIG 106 03/21/2021    HDL 36 03/21/2021    LDLCALC 66 03/21/2021    LABVLDL 21 03/21/2021     TSH:    Lab Results   Component Value Date    TSH 1.210 06/21/2021     IRON:    Lab Results   Component Value Date    IRON 19 02/09/2020     LIPASE:    Lab Results   Component Value Date    LIPASE 78 09/24/2018       ASSESSMENT AND PLAN:      Patient Active Problem List    Diagnosis Date Noted    Hallux valgus, acquired 06/06/2014    Hyperkalemia 07/19/2021    Acute respiratory failure with hypoxia ([facility] [address] 06/20/2021    Respiratory failure ([facility] [address] 02/02/2020    Pneumonia 01/28/2020    Acute pancreatitis without necrosis or infection, unspecified 09/23/2018    Idiopathic acute pancreatitis 09/21/2018    B12 deficiency 03/17/2016    [facility], [facility] 07/28/2015    HTN (hypertension) 07/28/2015    Lumbar radicular pain 07/28/2015    Spinal stenosis 07/28/2015     Impression:  1. Severe hyperkalemia  2. History of fall with associated left leg weakness  3. Chronic renal failure with hemodialysis  4. Insulin-dependent diabetes mellitus type 2 with possible hypoglycemic reaction in the morning  5. Hypertension  6. Elevated troponin  7. Normocytic anemia  8. History of lumbar spinal stenosis  9. Hyperlipidemia    Plan:  Patient admitted to the ICU  Home medications reviewed  Consult nephrology  Consult intensivist  Glucoscans 4 times daily with sliding scale insulin  Patient received IV bicarb and calcium in the ED  Renal diet  Activity up with assistance  Routine labs in a.m.   Repeat BMP this afternoon      Deborah Earl DO, D.O.  7/19/2021  1:34 PM

## 2021-07-19 NOTE — CONSULTS
CRITICAL CARE PROGRESS NOTE    The patient's case was discussed in multidisciplinary rounds including critical care specialist, nursing, RT and pharmacy. His evaluation is as follows:     63year old man with PMH of CKD, DM, HTN, hyperlipiemia, and as described below admitted to ICU for management of hyperkalemia. Mr [facility] was found to have hyperkalemia, hyperglycemia. Treated with IV insulin and glucose.  [facility] states he felt unwell today after waking up, with weakness of his right leg, unbalanced and dizzy, ended up in the floor and was assisted by his neighbor; his glucose was low at 80 this AM.   He ate a bowl of cereal and drank orange juice, his weakness improved. Of note, he drinks 2 cartons of orange juice per week. Currently he feels well, no weakness, no chest pain/abdominal pain//dyspnea/nausea/vomiting. Social:  Lives alone at home, no pets, quit smoking few months ago, used to smoke a pack/day.      Review of Systems - History obtained from the patient  General ROS: positive for  - malaise  Psychological ROS: negative  Ophthalmic ROS: negative for - decreased vision  ENT ROS: negative for - nasal congestion  Allergy and Immunology ROS: negative for - postnasal drip  Hematological and Lymphatic ROS: negative for - bleeding problems  Endocrine ROS: negative for - polydipsia/polyuria  Breast ROS: negative for breast lumps  Respiratory ROS: no cough, shortness of breath, or wheezing  Cardiovascular ROS: no chest pain or dyspnea on exertion  Gastrointestinal ROS: no abdominal pain, change in bowel habits, or black or bloody stools  Genito-Urinary ROS: no dysuria, trouble voiding, or hematuria  Musculoskeletal ROS: negative  negative for - muscular weakness  Neurological ROS: positive for - weakness of right leg  Dermatological ROS: negative       /60   Pulse 53   Temp 98.4 °F (36.9 °C)   Resp 22   SpO2 98%   General: Awake, oriented to place, time and person  HEENT: No head lesions, PERRL, EOMI, mouth without lesions, no nasal lesions, no cervical adenopathy palpated  Respiratory: Lungs with equal breath sounds bilaterally, no adventitious sounds auscultated, no accessory muscle use  CV: Regular rate, no murmurs, no JVD, no leg edema  Abdomen: Soft, non tender, + bowel sounds, no lesions  Skin: Hydrated, adequate turgor, no rash, capillary refill <2 seconds  Extremities: Muscular strength 5/5 in 4 limbs, moves 4 limbs spontaneously, distal pulses present  Neurology: Awake and alert, follows commands, moves 4 limbs on command and spontaneously,  neck is supple, no meningitic signs present. A/P:  Chronic kidney disease V with severe hyperkalemia  --Avoid nephrotoxins and dose medications according GFR  --Urgent dialysis as per nephrology    Type 2 AMI vs NSTEMI   --ASA 81 mg daily  --Management of inciting disease process as described  --Echocardiogram ordered    Diabetes mellitus  --Management with insulin administration     Hypertension  --On antihypertensives    DVT prophylaxis with heparin SQ  Nutrition: PO  Vascular catheters: HD cath on right  Goals of care: Full code    Last 3 CMP:    Recent Labs     07/19/21  1139   *   K 8.7*   CL 91*   CO2 22   BUN 56*   CREATININE 12.0*   GLUCOSE 232*   CALCIUM 10.0       Recent Labs     07/19/21  1139   WBC 8.1   RBC 3.75*   HGB 10.5*   HCT 34.3*   MCV 91.5   MCH 28.0   MCHC 30.6*   RDW 19.1*      MPV 9.7       No results for input(s): BC in the last 72 hours. No results for input(s): Magdaleno Valdivia in the last 72 hours.     24 HR INTAKE/OUTPUT:  No intake or output data in the 24 hours ending 07/19/21 0203  MEDICATIONS:   gabapentin  100 mg Oral BID    calcitRIOL  1.5 mcg Oral Once per day on Mon Wed Fri    insulin glargine  55 Units Subcutaneous Nightly    sodium bicarbonate  1,300 mg Oral TID    hydrALAZINE  50 mg Oral BID    empagliflozin  10 mg Oral Daily    sevelamer  1,600 mg Oral TID     cefdinir  300 mg - 5.0 mmol/L Final     Comment:     Specimen is moderately Hemolyzed. Result may be artificially increased. 06/20/2021 4.5 3.5 - 5.0 mmol/L Final     Chloride   Date Value Ref Range Status   07/19/2021 91 (L) 98 - 107 mmol/L Final   07/01/2021 99 98 - 107 mmol/L Final   06/30/2021 100 98 - 107 mmol/L Final     CO2   Date Value Ref Range Status   07/19/2021 22 22 - 29 mmol/L Final   07/01/2021 27 22 - 29 mmol/L Final   06/30/2021 28 22 - 29 mmol/L Final     BUN   Date Value Ref Range Status   07/19/2021 56 (H) 6 - 23 mg/dL Final   07/01/2021 27 (H) 6 - 23 mg/dL Final   06/30/2021 36 (H) 6 - 23 mg/dL Final     CREATININE   Date Value Ref Range Status   07/19/2021 12.0 (HH) 0.7 - 1.2 mg/dL Final   07/01/2021 5.2 (H) 0.7 - 1.2 mg/dL Final   06/30/2021 6.4 (H) 0.7 - 1.2 mg/dL Final     Glucose   Date Value Ref Range Status   07/19/2021 232 (H) 74 - 99 mg/dL Final   07/01/2021 77 74 - 99 mg/dL Final   06/30/2021 40 (L) 74 - 99 mg/dL Final     Calcium   Date Value Ref Range Status   07/19/2021 10.0 8.6 - 10.2 mg/dL Final   07/01/2021 8.7 8.6 - 10.2 mg/dL Final   06/30/2021 9.6 8.6 - 10.2 mg/dL Final     Total Protein   Date Value Ref Range Status   06/29/2021 7.4 6.4 - 8.3 g/dL Final   06/28/2021 7.8 6.4 - 8.3 g/dL Final   06/22/2021 7.7 6.4 - 8.3 g/dL Final     Albumin   Date Value Ref Range Status   06/29/2021 3.2 (L) 3.5 - 5.2 g/dL Final   06/28/2021 3.3 (L) 3.5 - 5.2 g/dL Final   06/22/2021 3.3 (L) 3.5 - 5.2 g/dL Final     Total Bilirubin   Date Value Ref Range Status   06/29/2021 0.3 0.0 - 1.2 mg/dL Final   06/28/2021 0.3 0.0 - 1.2 mg/dL Final   06/22/2021 0.3 0.0 - 1.2 mg/dL Final     Alkaline Phosphatase   Date Value Ref Range Status   06/29/2021 44 40 - 129 U/L Final   06/28/2021 50 40 - 129 U/L Final   06/22/2021 62 40 - 129 U/L Final     AST   Date Value Ref Range Status   06/29/2021 7 0 - 39 U/L Final     Comment:     Specimen is slightly Hemolyzed. Result may be artificially increased.    06/28/2021 27 0 - 39 U/L Final     Comment:     Specimen is moderately Hemolyzed. Result may be artificially increased. 06/22/2021 11 0 - 39 U/L Final     ALT   Date Value Ref Range Status   06/29/2021 7 0 - 40 U/L Final   06/28/2021 11 0 - 40 U/L Final   06/22/2021 <5 0 - 40 U/L Final     GFR Non-   Date Value Ref Range Status   07/19/2021 5 >=60 mL/min/1.73 Final     Comment:     Chronic Kidney Disease: less than 60 ml/min/1.73 sq.m. Kidney Failure: less than 15 ml/min/1.73 sq.m. Results valid for patients 18 years and older. 07/01/2021 14 >=60 mL/min/1.73 Final     Comment:     Chronic Kidney Disease: less than 60 ml/min/1.73 sq.m. Kidney Failure: less than 15 ml/min/1.73 sq.m. Results valid for patients 18 years and older. 06/30/2021 11 >=60 mL/min/1.73 Final     Comment:     Chronic Kidney Disease: less than 60 ml/min/1.73 sq.m. Kidney Failure: less than 15 ml/min/1.73 sq.m. Results valid for patients 18 years and older. GFR    Date Value Ref Range Status   07/19/2021 5  Final   07/01/2021 14  Final   06/30/2021 11  Final     Magnesium   Date Value Ref Range Status   07/01/2021 2.0 1.6 - 2.6 mg/dL Final   06/30/2021 2.2 1.6 - 2.6 mg/dL Final   06/29/2021 2.7 (H) 1.6 - 2.6 mg/dL Final     Phosphorus   Date Value Ref Range Status   07/01/2021 5.3 (H) 2.5 - 4.5 mg/dL Final   06/30/2021 5.6 (H) 2.5 - 4.5 mg/dL Final   06/29/2021 5.3 (H) 2.5 - 4.5 mg/dL Final     No results for input(s): PH, PO2, PCO2, HCO3, BE, O2SAT in the last 72 hours. RADIOLOGY:  XR CHEST (2 VW)   Final Result   1. Hazy bilateral pulmonary infiltration which could represent fluid   overload/CHF. 2. Cardiomegaly         CT HEAD WO CONTRAST   Final Result   No significant abnormal findings. PROBLEM LIST:  Active Problems:    Hyperkalemia  Resolved Problems:    * No resolved hospital problems.  *      ATTESTATION:  ICU Staff Physician note of personal involvement in Care  As the attending physician, I certify that I personally reviewed the patients history and personnally examined the patient to confirm the physical findings described above,  And that I reviewed the relevant imaging studies and available reports. I also discussed the differential diagnosis and all of the proposed management plans with the patient and individuals accompanying the patient to this visit. They had the opportunity to ask questions about the proposed management plans and to have those questions answered. This patient has a high probability of sudden, clinically significant deterioration, which requires the highest level of physician preparedness to intervene urgently. I managed/supervised life or organ supporting interventions that required frequent physician assessment. I devoted my full attention to the direct care of this patient for the amount of time indicated below. Time I spent with the family or surrogate(s) is included only if the patient was incapable of providing the necessary information or participating in medical decisions  Time devoted to teaching and to any procedures I billed separately is not included.      CRITICAL CARE TIME:  30 minutes    Shirley Mathews MD  Pulmonary and Critical Care Medicine

## 2021-07-19 NOTE — TELEPHONE ENCOUNTER
Writer contacted ED provider Hetal Leyva   to inform of 30 day readmission risk. Writer's attempt to contact ED provider was unsuccessful. No Decision on disposition at this time.

## 2021-07-19 NOTE — ED PROVIDER NOTES
Rhythm: Normal rate and regular rhythm. Heart sounds: Normal heart sounds. No murmur heard. Pulmonary:      Effort: Pulmonary effort is normal. No respiratory distress. Breath sounds: Normal breath sounds. No wheezing or rales. Abdominal:      General: Bowel sounds are normal.      Palpations: Abdomen is soft. Tenderness: There is no abdominal tenderness. There is no guarding or rebound. Musculoskeletal:         General: No tenderness ( ) or signs of injury. Cervical back: Normal range of motion and neck supple. No tenderness ( No tenderness to palpation of the cervical spine. ). Comments: There is no pretibial edema nor calf tenderness bilaterally      Skin:     General: Skin is warm and dry. Coloration: Skin is not pale. Neurological:      Mental Status: He is alert and oriented to person, place, and time. Comments: Please refer to NIH stroke scale below. Procedures     MDM   Patient presented to the ED for evaluation of a fall. He also had associated left leg weakness which he thinks may have caused his fall. Patient's CBC showed no evidence of leukocytosis and chronic anemia. His BMP did show him to be hyperkalemic with a potassium of 8.7. Creatinine of 12 distant with history of CKD. At this time patient was treated with insulin, dextrose therapy, calcium gluconate, and bicarb. Consult was placed to his nephrologist.  These findings are consistent with his EKG that shows sinus bradycardia with prolonged MS interval and hyperacute T waves. His troponin was not elevated 108 but this is most likely secondary to chronic kidney disease and is similar to previous values. Patient is going to be admitted to the ICU where he will have dialysis. NIH Stroke Scale/Score at time of initial evaluation: 1115  1A: Level of Consciousness 0 - alert; keenly responsive   1B: Ask Month and Age 0 - answers both questions correctly   1C:  Tell Patient To Open and Close Eyes, then Hand  Squeeze 0 - performs both tasks correctly   2: Test Horizontal Extraocular Movements 0 - normal   3: Test Visual Fields 0 - no visual loss   4: Test Facial Palsy 0 - normal symmetric movement   5A: Test Left Arm Motor Drift 0 - no drift, limb holds 90 (or 45) degrees for full 10 seconds   5B: Test Right Arm Motor Drift 0 - no drift, limb holds 90 (or 45) degrees for full 10 seconds   6A: Test Left Leg Motor Drift 1 - drift; leg falls by the end of the 5 second period but does not hit bed   6B: Test Right Leg Motor Drift 0 - no drift; leg holds 30 degree position for full 5 seconds   7: Test Limb Ataxia   (FNF/Heel-Shin) 0 - absent   8: Test Sensation 0 - normal; no sensory loss   9: Test Language/Aphasia 0 - no aphasia, normal   10: Test Dysarthria 0 - normal   11: Test Extinction/Inattention 0 - no abnormality   Total 1       EKG Interpretation    Interpreted by emergency department physician    Rhythm: sinus bradycardia  Rate: 42  Axis: normal  Ectopy: none  Conduction: 1st degree AV block  ST Segments: no acute change  T Waves: peaked in  elizabeth-lateral leads  Q Waves: none    Clinical Impression: EKG shows sinus bradycardia with first-degree block and peak T waves. These changes are new from prior EKG. Ismael Villalba DO     ED Course as of Jul 19 1304   Mon Jul 19, 2021   1257 Patient's potassium is 8.7.   Treatments for hyperkalemia have been ordered and I will place a call to his nephrologist.    [MS]      ED Course User Index  [MS] Ismael Villalba DO       --------------------------------------------- PAST HISTORY ---------------------------------------------  Past Medical History:  has a past medical history of Back pain, Diabetes mellitus ([facility] DMII (diabetes mellitus, type 2) ([facility].), Hemodialysis patient ([facility] 75.), History of cardiovascular stress test, HTN (hypertension), Hyperlipidemia, Hypertension, Lumbar radicular pain, and Type II or unspecified type diabetes mellitus without mention of complication, not stated as uncontrolled. Past Surgical History:  has a past surgical history that includes shoulder surgery; Vein Surgery; and other surgical history (Left, 06/06/14). Social History:  reports that he quit smoking about 4 weeks ago. He has a 30.00 pack-year smoking history. He has never used smokeless tobacco. He reports that he does not drink alcohol and does not use drugs. Family History: family history includes Kidney Disease in his sister. The patients home medications have been reviewed. Allergies:  Other    -------------------------------------------------- RESULTS -------------------------------------------------    LABS:  Results for orders placed or performed during the hospital encounter of 07/19/21   CBC Auto Differential   Result Value Ref Range    WBC 8.1 4.5 - 11.5 E9/L    RBC 3.75 (L) 3.80 - 5.80 E12/L    Hemoglobin 10.5 (L) 12.5 - 16.5 g/dL    Hematocrit 34.3 (L) 37.0 - 54.0 %    MCV 91.5 80.0 - 99.9 fL    MCH 28.0 26.0 - 35.0 pg    MCHC 30.6 (L) 32.0 - 34.5 %    RDW 19.1 (H) 11.5 - 15.0 fL    Platelets 836 043 - 751 E9/L    MPV 9.7 7.0 - 12.0 fL   Basic Metabolic Panel w/ Reflex to MG   Result Value Ref Range    Sodium 130 (L) 132 - 146 mmol/L    Potassium reflex Magnesium 8.7 (HH) 3.5 - 5.0 mmol/L    Chloride 91 (L) 98 - 107 mmol/L    CO2 22 22 - 29 mmol/L    Anion Gap 17 (H) 7 - 16 mmol/L    Glucose 232 (H) 74 - 99 mg/dL    BUN 56 (H) 6 - 23 mg/dL    CREATININE 12.0 (HH) 0.7 - 1.2 mg/dL    GFR Non-African American 5 >=60 mL/min/1.73    GFR African American 5     Calcium 10.0 8.6 - 10.2 mg/dL   Troponin   Result Value Ref Range    Troponin, High Sensitivity 108 (H) 0 - 11 ng/L   Protime-INR   Result Value Ref Range    Protime 10.8 9.3 - 12.4 sec    INR 0.9    POCT Glucose   Result Value Ref Range    Meter Glucose 235 (H) 74 - 99 mg/dL   EKG 12 Lead   Result Value Ref Range    Ventricular Rate 42 BPM    Atrial Rate 42 BPM    P-R Interval 274 ms    QRS Duration 104 ms    Q-T Interval 400 ms    QTc Calculation (Bazett) 334 ms    P Axis 39 degrees    R Axis 57 degrees    T Axis 59 degrees       RADIOLOGY:  XR CHEST (2 VW)   Final Result   1. Hazy bilateral pulmonary infiltration which could represent fluid   overload/CHF. 2. Cardiomegaly         CT HEAD WO CONTRAST   Final Result   No significant abnormal findings. ------------------------- NURSING NOTES AND VITALS REVIEWED ---------------------------  Date / Time Roomed:  7/19/2021 11:04 AM  ED Bed Assignment:  [place] [id number]    The nursing notes within the ED encounter and vital signs as below have been reviewed. Patient Vitals for the past 24 hrs:   BP Temp Pulse Resp SpO2   07/19/21 1114 134/60 97.9 °F (36.6 °C) 51 20 96 %       Oxygen Saturation Interpretation: Normal    ------------------------------------------ PROGRESS NOTES ------------------------------------------  Re-evaluation(s):  Please see ED course above. Counseling:  I have spoken with the patient and discussed todays results, in addition to providing specific details for the plan of care and counseling regarding the diagnosis and prognosis. Their questions are answered at this time and they are agreeable with the plan of admission.    --------------------------------- ADDITIONAL PROVIDER NOTES ---------------------------------  Consultations:  Time: 1303. Spoke with Dr. Doris Will. Discussed case. He will consult the patient. Agrees to the hyperkalemia treatment with meds in the ED and he will arrange for dialysis today. Time: 1329. Spoke with Dr. Tila Gauthier (Medicine). Discussed case. He will admit this patient. Time: 1331. Spoke with Dr. Avery Brandon (intensivist). Discussed case. She will provide consultation.       This patient's ED course included: a personal history and physicial examination, re-evaluation prior to disposition, multiple bedside re-evaluations, IV medications, cardiac monitoring, continuous pulse oximetry and complex medical decision making and emergency management      Critical care:  Please note that the withdrawal or failure to initiate urgent interventions for this patient would likely result in a life threatening deterioration or permanent disability. Systems at risk for deterioration include: Hyperkalemia requiring treatment with insulin, glucose therapy, bicarb, and calcium gluconate. ICU admission. Accordingly this patient received 35 minutes of critical care time, excluding separately billable procedures. This patient has remained hemodynamically stable during their ED course. Diagnosis:  1. Hyperkalemia    2. Left leg weakness        Disposition:  Patient's disposition: Admit to CCU/ICU  Patient's condition is fair.            Ismael Villalba,   07/19/21 [id number]

## 2021-07-20 LAB
ALBUMIN SERPL-MCNC: 3.2 G/DL (ref 3.5–5.2)
ALP BLD-CCNC: 53 U/L (ref 40–129)
ALT SERPL-CCNC: 6 U/L (ref 0–40)
ANION GAP SERPL CALCULATED.3IONS-SCNC: 10 MMOL/L (ref 7–16)
ANION GAP SERPL CALCULATED.3IONS-SCNC: 10 MMOL/L (ref 7–16)
ANION GAP SERPL CALCULATED.3IONS-SCNC: 11 MMOL/L (ref 7–16)
AST SERPL-CCNC: 12 U/L (ref 0–39)
BASOPHILS ABSOLUTE: 0.01 E9/L (ref 0–0.2)
BASOPHILS RELATIVE PERCENT: 0.2 % (ref 0–2)
BILIRUB SERPL-MCNC: 0.3 MG/DL (ref 0–1.2)
BUN BLDV-MCNC: 19 MG/DL (ref 6–23)
BUN BLDV-MCNC: 21 MG/DL (ref 6–23)
BUN BLDV-MCNC: 6 MG/DL (ref 6–23)
CALCIUM SERPL-MCNC: 8.9 MG/DL (ref 8.6–10.2)
CALCIUM SERPL-MCNC: 9.3 MG/DL (ref 8.6–10.2)
CALCIUM SERPL-MCNC: 9.3 MG/DL (ref 8.6–10.2)
CHLORIDE BLD-SCNC: 95 MMOL/L (ref 98–107)
CO2: 29 MMOL/L (ref 22–29)
CO2: 30 MMOL/L (ref 22–29)
CO2: 30 MMOL/L (ref 22–29)
CREAT SERPL-MCNC: 3.5 MG/DL (ref 0.7–1.2)
CREAT SERPL-MCNC: 6.1 MG/DL (ref 0.7–1.2)
CREAT SERPL-MCNC: 7 MG/DL (ref 0.7–1.2)
EOSINOPHILS ABSOLUTE: 0.39 E9/L (ref 0.05–0.5)
EOSINOPHILS RELATIVE PERCENT: 6.3 % (ref 0–6)
GFR AFRICAN AMERICAN: 10
GFR AFRICAN AMERICAN: 11
GFR AFRICAN AMERICAN: 21
GFR NON-AFRICAN AMERICAN: 10 ML/MIN/1.73
GFR NON-AFRICAN AMERICAN: 11 ML/MIN/1.73
GFR NON-AFRICAN AMERICAN: 21 ML/MIN/1.73
GLUCOSE BLD-MCNC: 122 MG/DL (ref 74–99)
GLUCOSE BLD-MCNC: 180 MG/DL (ref 74–99)
GLUCOSE BLD-MCNC: 52 MG/DL (ref 74–99)
HCT VFR BLD CALC: 36.7 % (ref 37–54)
HEMOGLOBIN: 11 G/DL (ref 12.5–16.5)
IMMATURE GRANULOCYTES #: 0.01 E9/L
IMMATURE GRANULOCYTES %: 0.2 % (ref 0–5)
LV EF: 60 %
LVEF MODALITY: NORMAL
LYMPHOCYTES ABSOLUTE: 1.63 E9/L (ref 1.5–4)
LYMPHOCYTES RELATIVE PERCENT: 26.2 % (ref 20–42)
MAGNESIUM: 2.4 MG/DL (ref 1.6–2.6)
MCH RBC QN AUTO: 27.8 PG (ref 26–35)
MCHC RBC AUTO-ENTMCNC: 30 % (ref 32–34.5)
MCV RBC AUTO: 92.9 FL (ref 80–99.9)
METER GLUCOSE: 139 MG/DL (ref 74–99)
METER GLUCOSE: 190 MG/DL (ref 74–99)
METER GLUCOSE: 76 MG/DL (ref 74–99)
METER GLUCOSE: 80 MG/DL (ref 74–99)
METER GLUCOSE: 93 MG/DL (ref 74–99)
METER GLUCOSE: 95 MG/DL (ref 74–99)
MONOCYTES ABSOLUTE: 0.99 E9/L (ref 0.1–0.95)
MONOCYTES RELATIVE PERCENT: 15.9 % (ref 2–12)
NEUTROPHILS ABSOLUTE: 3.19 E9/L (ref 1.8–7.3)
NEUTROPHILS RELATIVE PERCENT: 51.2 % (ref 43–80)
PDW BLD-RTO: 19 FL (ref 11.5–15)
PHOSPHORUS: 5.1 MG/DL (ref 2.5–4.5)
PLATELET # BLD: 149 E9/L (ref 130–450)
PMV BLD AUTO: 9.7 FL (ref 7–12)
POTASSIUM SERPL-SCNC: 5.8 MMOL/L (ref 3.5–5)
POTASSIUM SERPL-SCNC: 6.3 MMOL/L (ref 3.5–5)
POTASSIUM SERPL-SCNC: 6.5 MMOL/L (ref 3.5–5)
PROCALCITONIN: 0.33 NG/ML (ref 0–0.08)
RBC # BLD: 3.95 E12/L (ref 3.8–5.8)
SODIUM BLD-SCNC: 135 MMOL/L (ref 132–146)
TOTAL CK: 41 U/L (ref 20–200)
TOTAL PROTEIN: 7.7 G/DL (ref 6.4–8.3)
TROPONIN, HIGH SENSITIVITY: 106 NG/L (ref 0–11)
TROPONIN, HIGH SENSITIVITY: 117 NG/L (ref 0–11)
WBC # BLD: 6.2 E9/L (ref 4.5–11.5)

## 2021-07-20 PROCEDURE — 82550 ASSAY OF CK (CPK): CPT

## 2021-07-20 PROCEDURE — 83735 ASSAY OF MAGNESIUM: CPT

## 2021-07-20 PROCEDURE — 84100 ASSAY OF PHOSPHORUS: CPT

## 2021-07-20 PROCEDURE — 94640 AIRWAY INHALATION TREATMENT: CPT

## 2021-07-20 PROCEDURE — 6360000002 HC RX W HCPCS: Performed by: INTERNAL MEDICINE

## 2021-07-20 PROCEDURE — 90935 HEMODIALYSIS ONE EVALUATION: CPT

## 2021-07-20 PROCEDURE — 80053 COMPREHEN METABOLIC PANEL: CPT

## 2021-07-20 PROCEDURE — 1200000000 HC SEMI PRIVATE

## 2021-07-20 PROCEDURE — 6360000002 HC RX W HCPCS: Performed by: NURSE PRACTITIONER

## 2021-07-20 PROCEDURE — 2700000000 HC OXYGEN THERAPY PER DAY

## 2021-07-20 PROCEDURE — 87040 BLOOD CULTURE FOR BACTERIA: CPT

## 2021-07-20 PROCEDURE — 2580000003 HC RX 258: Performed by: INTERNAL MEDICINE

## 2021-07-20 PROCEDURE — 6370000000 HC RX 637 (ALT 250 FOR IP): Performed by: INTERNAL MEDICINE

## 2021-07-20 PROCEDURE — 36415 COLL VENOUS BLD VENIPUNCTURE: CPT

## 2021-07-20 PROCEDURE — 2500000003 HC RX 250 WO HCPCS: Performed by: NURSE PRACTITIONER

## 2021-07-20 PROCEDURE — 84145 PROCALCITONIN (PCT): CPT

## 2021-07-20 PROCEDURE — 85025 COMPLETE CBC W/AUTO DIFF WBC: CPT

## 2021-07-20 PROCEDURE — 6370000000 HC RX 637 (ALT 250 FOR IP): Performed by: NURSE PRACTITIONER

## 2021-07-20 PROCEDURE — 80048 BASIC METABOLIC PNL TOTAL CA: CPT

## 2021-07-20 PROCEDURE — C8929 TTE W OR WO FOL WCON,DOPPLER: HCPCS

## 2021-07-20 PROCEDURE — 82962 GLUCOSE BLOOD TEST: CPT

## 2021-07-20 PROCEDURE — 84484 ASSAY OF TROPONIN QUANT: CPT

## 2021-07-20 RX ORDER — DEXTROSE MONOHYDRATE 25 G/50ML
25 INJECTION, SOLUTION INTRAVENOUS ONCE
Status: COMPLETED | OUTPATIENT
Start: 2021-07-20 | End: 2021-07-20

## 2021-07-20 RX ORDER — INSULIN GLARGINE 100 [IU]/ML
20 INJECTION, SOLUTION SUBCUTANEOUS NIGHTLY
Status: DISCONTINUED | OUTPATIENT
Start: 2021-07-20 | End: 2021-07-20

## 2021-07-20 RX ORDER — ALBUTEROL SULFATE 2.5 MG/3ML
7.5 SOLUTION RESPIRATORY (INHALATION) ONCE
Status: COMPLETED | OUTPATIENT
Start: 2021-07-20 | End: 2021-07-20

## 2021-07-20 RX ORDER — INSULIN GLARGINE 100 [IU]/ML
9 INJECTION, SOLUTION SUBCUTANEOUS NIGHTLY
Status: DISCONTINUED | OUTPATIENT
Start: 2021-07-20 | End: 2021-07-22

## 2021-07-20 RX ADMIN — GABAPENTIN 100 MG: 100 CAPSULE ORAL at 21:23

## 2021-07-20 RX ADMIN — SODIUM CHLORIDE, PRESERVATIVE FREE 10 ML: 5 INJECTION INTRAVENOUS at 21:28

## 2021-07-20 RX ADMIN — SEVELAMER CARBONATE 1600 MG: 800 TABLET, FILM COATED ORAL at 14:19

## 2021-07-20 RX ADMIN — HEPARIN SODIUM 5000 UNITS: 5000 INJECTION INTRAVENOUS; SUBCUTANEOUS at 05:28

## 2021-07-20 RX ADMIN — SODIUM BICARBONATE 1300 MG: 650 TABLET ORAL at 08:12

## 2021-07-20 RX ADMIN — SODIUM BICARBONATE 1300 MG: 650 TABLET ORAL at 14:19

## 2021-07-20 RX ADMIN — SEVELAMER CARBONATE 1600 MG: 800 TABLET, FILM COATED ORAL at 10:16

## 2021-07-20 RX ADMIN — SODIUM BICARBONATE 1300 MG: 650 TABLET ORAL at 21:23

## 2021-07-20 RX ADMIN — INSULIN LISPRO 2 UNITS: 100 INJECTION, SOLUTION INTRAVENOUS; SUBCUTANEOUS at 17:07

## 2021-07-20 RX ADMIN — HEPARIN SODIUM 5000 UNITS: 5000 INJECTION INTRAVENOUS; SUBCUTANEOUS at 14:19

## 2021-07-20 RX ADMIN — SODIUM CHLORIDE, PRESERVATIVE FREE 10 ML: 5 INJECTION INTRAVENOUS at 08:12

## 2021-07-20 RX ADMIN — SEVELAMER CARBONATE 1600 MG: 800 TABLET, FILM COATED ORAL at 17:08

## 2021-07-20 RX ADMIN — INSULIN HUMAN 10 UNITS: 100 INJECTION, SOLUTION PARENTERAL at 04:12

## 2021-07-20 RX ADMIN — DEXTROSE MONOHYDRATE 25 G: 25 INJECTION, SOLUTION INTRAVENOUS at 04:12

## 2021-07-20 RX ADMIN — HYDRALAZINE HYDROCHLORIDE 50 MG: 50 TABLET, FILM COATED ORAL at 21:23

## 2021-07-20 RX ADMIN — INSULIN GLARGINE 9 UNITS: 100 INJECTION, SOLUTION SUBCUTANEOUS at 21:26

## 2021-07-20 RX ADMIN — GABAPENTIN 100 MG: 100 CAPSULE ORAL at 08:12

## 2021-07-20 RX ADMIN — ALBUTEROL SULFATE 7.5 MG: 2.5 SOLUTION RESPIRATORY (INHALATION) at 04:17

## 2021-07-20 RX ADMIN — HEPARIN SODIUM 5000 UNITS: 5000 INJECTION INTRAVENOUS; SUBCUTANEOUS at 21:23

## 2021-07-20 RX ADMIN — CEFDINIR 300 MG: 300 CAPSULE ORAL at 10:16

## 2021-07-20 ASSESSMENT — PAIN SCALES - GENERAL
PAINLEVEL_OUTOF10: 0

## 2021-07-20 NOTE — PLAN OF CARE
Problem: Falls - Risk of:  Goal: Will remain free from falls  Description: Will remain free from falls  Outcome: Met This Shift  Goal: Absence of physical injury  Description: Absence of physical injury  Outcome: Met This Shift     Problem: Tissue Perfusion - Renal, Altered:  Goal: Electrolytes within specified parameters  Description: Electrolytes within specified parameters  Outcome: Met This Shift  Goal: Serum creatinine will be within specified parameters  Description: Serum creatinine will be within specified parameters  Outcome: Met This Shift  Goal: Ability to achieve a balanced intake and output will improve  Description: Ability to achieve a balanced intake and output will improve  Outcome: Met This Shift

## 2021-07-20 NOTE — FLOWSHEET NOTE
07/20/21 1236   Vital Signs   /64   Pulse 75   Resp 20   SpO2 (!) 70 %   Post-Hemodialysis Assessment   Post-Treatment Procedures Blood returned;Catheter capped, clamped and heparinized x 2 ports   Machine Disinfection Process Acid/Vinegar Clean;Heat Disinfect; Exterior Machine Disinfection   Rinseback Volume (ml) 300 ml   Total Liters Processed (l/min) 82.8 l/min   Dialyzer Clearance Moderately streaked   Duration of Treatment (minutes) 240 minutes   Heparin amount administered during treatment (units) 0 units   Hemodialysis Intake (ml) 300 ml   Hemodialysis Output (ml) 2300 ml   NET Removed (ml) 2000 ml   Tolerated Treatment Good   Patient Response to Treatment tolerated well

## 2021-07-20 NOTE — PROGRESS NOTES
CRITICAL CARE PROGRESS NOTE    The patient's case was discussed in multidisciplinary rounds including critical care specialist, nursing, RT and pharmacy. His evaluation is as follows:     63year old man with PMH of CKD, DM, HTN, hyperlipiemia, and as described below admitted to ICU for management of hyperkalemia. Mr Radha Neri was found to have hyperkalemia, hyperglycemia. Treated with IV insulin and glucose and dialysis. Today remains hyperkalemic, received another dose of IV insulin/D50W    Has been hypoglycemic; did not receive lantus last night. --Blood and urine cultures to be obtained today  --Getting dialysis today for management of hyperkalemia  --Lantus dose decreased to 1 unit/kg + SSI  --Troponins are elevated, ordered echocardiogram today      BP (!) 101/58   Pulse 78   Temp 97.9 °F (36.6 °C) (Oral)   Resp 14   Ht 5' 6\" (1.676 m)   Wt 198 lb 6.6 oz (90 kg)   SpO2 95%   BMI 32.02 kg/m²   General: Awake, oriented to place, time and person  HEENT: No head lesions, PERRL, EOMI, mouth without lesions, no nasal lesions, no cervical adenopathy palpated  Respiratory: Lungs with equal breath sounds bilaterally, no adventitious sounds auscultated, no accessory muscle use  CV: Regular rate, no murmurs, JVD, no leg edema  Abdomen: Soft, non tender, + bowel sounds, no lesions  Skin: Hydrated, adequate turgor, no rash, capillary refill <2 seconds  Extremities: Muscular strength 4/5 in 4 limbs, moves 4 limbs spontaneously, distal pulses present  Neurology: Awake and alert, follows commands, moves 4 limbs on command and spontaneously,  neck is supple, no meningitic signs present.       A/P:  Chronic kidney disease V with severe hyperkalemia  --Avoid nephrotoxins and dose medications according GFR  --Dialysis as per nephrology    Type 2 AMI vs NSTEMI   --ASA 81 mg daily  --Management of inciting disease process as described  --Echocardiogram ordered    Diabetes mellitus  --Now with hypoglycemia  --Management with insulin administration     Hypertension  --On antihypertensives    DVT prophylaxis with heparin SQ  Nutrition: PO  Vascular catheters: HD cath on right  Goals of care: Full code    Last 3 CMP:    Recent Labs     07/19/21  1139 07/19/21  2333 07/20/21  0526   * 135 135   K 8.7* 6.5* 6.3*   CL 91* 95* 95*   CO2 22 29 30*   BUN 56* 19 21   CREATININE 12.0* 6.1* 7.0*   GLUCOSE 232* 52* 122*   CALCIUM 10.0 9.3 8.9   PROT  --   --  7.7   LABALBU  --   --  3.2*   BILITOT  --   --  0.3   ALKPHOS  --   --  53   AST  --   --  12   ALT  --   --  6       Recent Labs     07/20/21  0526   WBC 6.2   RBC 3.95   HGB 11.0*   HCT 36.7*   MCV 92.9   MCH 27.8   MCHC 30.0*   RDW 19.0*      MPV 9.7       No results for input(s): BC in the last 72 hours. No results for input(s): Andrés Colunga in the last 72 hours.     24 HR INTAKE/OUTPUT:      Intake/Output Summary (Last 24 hours) at 7/20/2021 0917  Last data filed at 7/19/2021 2351  Gross per 24 hour   Intake 831 ml   Output 3400 ml   Net -2569 ml     MEDICATIONS:   calcium gluconate IVPB  1,000 mg Intravenous Once    gabapentin  100 mg Oral BID    calcitRIOL  1.5 mcg Oral Once per day on Mon Wed Fri    insulin glargine  55 Units Subcutaneous Nightly    sodium bicarbonate  1,300 mg Oral TID    hydrALAZINE  50 mg Oral BID    empagliflozin  10 mg Oral Daily    sevelamer  1,600 mg Oral TID WC    cefdinir  300 mg Oral Daily    insulin lispro  0-12 Units Subcutaneous TID WC    insulin lispro  0-6 Units Subcutaneous Nightly    amLODIPine  10 mg Oral Daily    heparin (porcine)  5,000 Units Subcutaneous 3 times per day    [START ON 7/23/2021] ferric gluconate (FERRLECIT) IVPB  62.5 mg Intravenous Weekly    sodium chloride flush  5-40 mL Intravenous BID      dextrose       glucose, dextrose, glucagon (rDNA), dextrose, ipratropium-albuterol, trimethobenzamide, sodium chloride flush, heparin (porcine)    OBJECTIVE:  Vitals:    07/20/21 0900 BP: (!) 113/55   Pulse: 68   Resp:    Temp:    SpO2:         O2 Flow Rate (L/min): 4 L/min  O2 Device: Nasal cannula        LABS:  WBC   Date Value Ref Range Status   07/20/2021 6.2 4.5 - 11.5 E9/L Final   07/19/2021 8.1 4.5 - 11.5 E9/L Final   07/01/2021 6.0 4.5 - 11.5 E9/L Final     Hemoglobin   Date Value Ref Range Status   07/20/2021 11.0 (L) 12.5 - 16.5 g/dL Final   07/19/2021 10.5 (L) 12.5 - 16.5 g/dL Final   07/01/2021 9.9 (L) 12.5 - 16.5 g/dL Final     Hematocrit   Date Value Ref Range Status   07/20/2021 36.7 (L) 37.0 - 54.0 % Final   07/19/2021 34.3 (L) 37.0 - 54.0 % Final   07/01/2021 33.4 (L) 37.0 - 54.0 % Final     MCV   Date Value Ref Range Status   07/20/2021 92.9 80.0 - 99.9 fL Final   07/19/2021 91.5 80.0 - 99.9 fL Final   07/01/2021 92.5 80.0 - 99.9 fL Final     Platelets   Date Value Ref Range Status   07/20/2021 149 130 - 450 E9/L Final   07/19/2021 140 130 - 450 E9/L Final   07/01/2021 238 130 - 450 E9/L Final     Sodium   Date Value Ref Range Status   07/20/2021 135 132 - 146 mmol/L Final   07/19/2021 135 132 - 146 mmol/L Final   07/19/2021 130 (L) 132 - 146 mmol/L Final     Potassium   Date Value Ref Range Status   07/20/2021 6.3 (H) 3.5 - 5.0 mmol/L Final   07/19/2021 6.5 (H) 3.5 - 5.0 mmol/L Final   07/01/2021 4.4 3.5 - 5.0 mmol/L Final     Potassium reflex Magnesium   Date Value Ref Range Status   07/19/2021 8.7 (HH) 3.5 - 5.0 mmol/L Final   06/28/2021 6.4 (H) 3.5 - 5.0 mmol/L Final     Comment:     Specimen is moderately Hemolyzed. Result may be artificially increased.    06/20/2021 4.5 3.5 - 5.0 mmol/L Final     Chloride   Date Value Ref Range Status   07/20/2021 95 (L) 98 - 107 mmol/L Final   07/19/2021 95 (L) 98 - 107 mmol/L Final   07/19/2021 91 (L) 98 - 107 mmol/L Final     CO2   Date Value Ref Range Status   07/20/2021 30 (H) 22 - 29 mmol/L Final   07/19/2021 29 22 - 29 mmol/L Final   07/19/2021 22 22 - 29 mmol/L Final     BUN   Date Value Ref Range Status   07/20/2021 21 6 - 23 mg/dL Final   07/19/2021 19 6 - 23 mg/dL Final   07/19/2021 56 (H) 6 - 23 mg/dL Final     CREATININE   Date Value Ref Range Status   07/20/2021 7.0 (H) 0.7 - 1.2 mg/dL Final   07/19/2021 6.1 (H) 0.7 - 1.2 mg/dL Final   07/19/2021 12.0 (HH) 0.7 - 1.2 mg/dL Final     Glucose   Date Value Ref Range Status   07/20/2021 122 (H) 74 - 99 mg/dL Final   07/19/2021 52 (L) 74 - 99 mg/dL Final   07/19/2021 232 (H) 74 - 99 mg/dL Final     Calcium   Date Value Ref Range Status   07/20/2021 8.9 8.6 - 10.2 mg/dL Final   07/19/2021 9.3 8.6 - 10.2 mg/dL Final   07/19/2021 10.0 8.6 - 10.2 mg/dL Final     Total Protein   Date Value Ref Range Status   07/20/2021 7.7 6.4 - 8.3 g/dL Final   06/29/2021 7.4 6.4 - 8.3 g/dL Final   06/28/2021 7.8 6.4 - 8.3 g/dL Final     Albumin   Date Value Ref Range Status   07/20/2021 3.2 (L) 3.5 - 5.2 g/dL Final   06/29/2021 3.2 (L) 3.5 - 5.2 g/dL Final   06/28/2021 3.3 (L) 3.5 - 5.2 g/dL Final     Total Bilirubin   Date Value Ref Range Status   07/20/2021 0.3 0.0 - 1.2 mg/dL Final   06/29/2021 0.3 0.0 - 1.2 mg/dL Final   06/28/2021 0.3 0.0 - 1.2 mg/dL Final     Alkaline Phosphatase   Date Value Ref Range Status   07/20/2021 53 40 - 129 U/L Final   06/29/2021 44 40 - 129 U/L Final   06/28/2021 50 40 - 129 U/L Final     AST   Date Value Ref Range Status   07/20/2021 12 0 - 39 U/L Final     Comment:     Specimen is slightly Hemolyzed. Result may be artificially increased. 06/29/2021 7 0 - 39 U/L Final     Comment:     Specimen is slightly Hemolyzed. Result may be artificially increased. 06/28/2021 27 0 - 39 U/L Final     Comment:     Specimen is moderately Hemolyzed. Result may be artificially increased.      ALT   Date Value Ref Range Status   07/20/2021 6 0 - 40 U/L Final   06/29/2021 7 0 - 40 U/L Final   06/28/2021 11 0 - 40 U/L Final     GFR Non-   Date Value Ref Range Status   07/20/2021 10 >=60 mL/min/1.73 Final     Comment:     Chronic Kidney Disease: less than 60 ml/min/1.73 sq.m.          Kidney Failure: less than 15 ml/min/1.73 sq.m. Results valid for patients 18 years and older. 07/19/2021 11 >=60 mL/min/1.73 Final     Comment:     Chronic Kidney Disease: less than 60 ml/min/1.73 sq.m. Kidney Failure: less than 15 ml/min/1.73 sq.m. Results valid for patients 18 years and older. 07/19/2021 5 >=60 mL/min/1.73 Final     Comment:     Chronic Kidney Disease: less than 60 ml/min/1.73 sq.m. Kidney Failure: less than 15 ml/min/1.73 sq.m. Results valid for patients 18 years and older. GFR    Date Value Ref Range Status   07/20/2021 10  Final   07/19/2021 11  Final   07/19/2021 5  Final     Magnesium   Date Value Ref Range Status   07/20/2021 2.4 1.6 - 2.6 mg/dL Final   07/01/2021 2.0 1.6 - 2.6 mg/dL Final   06/30/2021 2.2 1.6 - 2.6 mg/dL Final     Phosphorus   Date Value Ref Range Status   07/20/2021 5.1 (H) 2.5 - 4.5 mg/dL Final   07/01/2021 5.3 (H) 2.5 - 4.5 mg/dL Final   06/30/2021 5.6 (H) 2.5 - 4.5 mg/dL Final     No results for input(s): PH, PO2, PCO2, HCO3, BE, O2SAT in the last 72 hours. RADIOLOGY:  XR CHEST (2 VW)   Final Result   1. Hazy bilateral pulmonary infiltration which could represent fluid   overload/CHF. 2. Cardiomegaly         CT HEAD WO CONTRAST   Final Result   No significant abnormal findings. PROBLEM LIST:  Active Problems:    Hyperkalemia  Resolved Problems:    * No resolved hospital problems. *      ATTESTATION:  ICU Staff Physician note of personal involvement in Care  As the attending physician, I certify that I personally reviewed the patients history and personnally examined the patient to confirm the physical findings described above,  And that I reviewed the relevant imaging studies and available reports. I also discussed the differential diagnosis and all of the proposed management plans with the patient and individuals accompanying the patient to this visit.   They had the opportunity to ask questions about the proposed management plans and to have those questions answered. This patient has a high probability of sudden, clinically significant deterioration, which requires the highest level of physician preparedness to intervene urgently. I managed/supervised life or organ supporting interventions that required frequent physician assessment. I devoted my full attention to the direct care of this patient for the amount of time indicated below. Time I spent with the family or surrogate(s) is included only if the patient was incapable of providing the necessary information or participating in medical decisions  Time devoted to teaching and to any procedures I billed separately is not included.      CRITICAL CARE TIME:  30 minutes    Mabel Christensen MD  Pulmonary and Critical Care Medicine

## 2021-07-20 NOTE — CONSULTS
Department of Internal Medicine  Nephrology Attending Progress Note    SUBJECTIVE:  We are following this patient for end-stage renal failure . Full consult deferred as he is well known to us thru OP IHD rounds at [facility]. Briefly Jordan Carpenter is a 63year old male we follow for ESKD. He came to the ED yesterday with right sided weakness and a fall. He missed his dialysis treatment as an OP yesterday but did have treatment here due to hyperkalemia K at presentation was 8.7 and was 6.3 this am. He admits to drinking orange juice over the weekend. I discussed with him the need to remove OJ from his diet as his K was direly elevated at admission. Additional  treatment today for management of hyperkalemia. He is seen in the ICU this am and is tolerating dialysis well.      PROBLEM LIST:    Patient Active Problem List   Diagnosis    Hallux valgus, acquired    DMII (diabetes mellitus, type 2) ([address]    HTN (hypertension)    Lumbar radicular pain    Spinal stenosis    B12 deficiency    Idiopathic acute pancreatitis    Acute pancreatitis without necrosis or infection, unspecified    Pneumonia    Respiratory failure ([address]    Acute respiratory failure with hypoxia ([address]    Hyperkalemia        PAST MEDICAL HISTORY:    Past Medical History:   Diagnosis Date    Back pain     Diabetes mellitus ([address]     DMII (diabetes mellitus, type 2) ([address] 7/28/2015    [facility] [address] [address]     History of cardiovascular stress test 2/16/2015    lexiscan    HTN (hypertension) 7/28/2015    Hyperlipidemia     Hypertension     Lumbar radicular pain 7/28/2015    Oxygen dependent     wears 2 liters at home    Type II or unspecified type diabetes mellitus without mention of complication, not stated as uncontrolled        MEDS (scheduled):   calcium gluconate IVPB  1,000 mg Intravenous Once    insulin glargine  9 Units Subcutaneous Nightly    gabapentin  100 mg Oral BID    calcitRIOL  1.5 mcg Oral Once per day on Mon Wed Fri    sodium bicarbonate  1,300 mg Oral TID    hydrALAZINE  50 mg Oral BID    empagliflozin  10 mg Oral Daily    sevelamer  1,600 mg Oral TID WC    cefdinir  300 mg Oral Daily    insulin lispro  0-12 Units Subcutaneous TID WC    insulin lispro  0-6 Units Subcutaneous Nightly    amLODIPine  10 mg Oral Daily    heparin (porcine)  5,000 Units Subcutaneous 3 times per day    [START ON 7/23/2021] ferric gluconate (FERRLECIT) IVPB  62.5 mg Intravenous Weekly    sodium chloride flush  5-40 mL Intravenous BID       MEDS (infusions):   dextrose         MEDS (prn):  perflutren lipid microspheres, glucose, dextrose, glucagon (rDNA), dextrose, ipratropium-albuterol, trimethobenzamide, sodium chloride flush, heparin (porcine)    DIET:    ADULT DIET; Regular; Low Potassium (Less than 3000 mg/day);  Low Phosphorus (Less than 1000 mg)      PHYSICAL EXAM:      Patient Vitals for the past 24 hrs:   BP Temp Temp src Pulse Resp SpO2 Height Weight   07/20/21 1045 118/64   78       07/20/21 1030 (!) 111/58   79       07/20/21 1015 (!) 102/57   79       07/20/21 1000 116/64   76 16 95 %     07/20/21 0945 (!) 111/57   81       07/20/21 0930 (!) 103/57   76       07/20/21 0915 (!) 101/58   78       07/20/21 0900 (!) 113/55   68 16 98 %     07/20/21 0845 (!) 110/56   75       07/20/21 0800 (!) 96/51 97.9 °F (36.6 °C) Oral 71 14 95 %     07/20/21 0700 (!) 118/55   72 15 94 %     07/20/21 0600 (!) 105/53   73 17 92 %     07/20/21 0500 (!) 113/49   70 23 92 %     07/20/21 0428     16      07/20/21 0400 (!) 101/56   68 16 98 %     07/20/21 0300 (!) 95/55   64 14 99 %     07/20/21 0200 (!) 104/51   62 22 96 %     07/20/21 0100 94/61   63 14 97 %     07/19/21 2342    64       07/19/21 2328  97.9 °F (36.6 °C) Oral 62 18 96 %     07/19/21 2300 114/62   60 14 96 %     07/19/21 2100 (!) 119/59   61 24 94 %     07/19/21 2000 114/64 97.5 °F (36.4 °C) Oral 70 29 95 %     07/19/21 1830 122/68 98 °F (36.7 °C)  64 18 100 %  198 lb 6.6 oz (90 kg)   07/19/21 1800 109/67   64 25 99 %     07/19/21 1730 108/65   63       07/19/21 1700 98/63   66 22 99 %     07/19/21 1630 106/64   68       07/19/21 1600 107/65   62 11 98 %     07/19/21 1559 99/60   64       07/19/21 1530 105/66   60       07/19/21 1500 (!) 99/58   60 22 98 %     07/19/21 1447 (!) 113/57 96.6 °F (35.9 °C) Infrared 59 20  5' 6\" (1.676 m)    07/19/21 1445 (!) 113/57   60       07/19/21 1442    56       07/19/21 1430 124/83   59       07/19/21 1425 124/83   60 23 97 %     07/19/21 1420 (!) 125/59 96.6 °F (35.9 °C) Infrared 62 28 94 %  206 lb (93.4 kg)   07/19/21 1410 125/80 98 °F (36.7 °C)  65 18 95 %  206 lb 2.1 oz (93.5 kg)   07/19/21 1355 105/60 98.4 °F (36.9 °C)  53 22 98 %     07/19/21 1316 (!) 123/58   (!) 46 20 97 %     07/19/21 1114 134/60 97.9 °F (36.6 °C)  51 20 96 %            Intake/Output Summary (Last 24 hours) at 7/20/2021 1054  Last data filed at 7/19/2021 2351  Gross per 24 hour   Intake 831 ml   Output 3400 ml   Net -2569 ml       Wt Readings from Last 3 Encounters:   07/19/21 198 lb 6.6 oz (90 kg)   07/01/21 208 lb 9.6 oz (94.6 kg)   06/23/21 209 lb 14.1 oz (95.2 kg)       Constitutional:  Patient in no acute distress  Head: normocephalic, atraumatic  Cardiovascular: S1 S2 no S3 or rub  Respiratory:  Clear upper, diminished in bsases  Gastrointestinal: soft, nontender, nondistended  Ext: trace edema   Neuro: awake and alert        DATA:      Recent Labs     07/19/21  1139 07/20/21  0526   WBC 8.1 6.2   HGB 10.5* 11.0*   HCT 34.3* 36.7*   MCV 91.5 92.9    149     Recent Labs     07/19/21  1139 07/19/21  2333 07/20/21  0526   * 135 135   K 8.7* 6.5* 6.3*   CL 91* 95* 95*   CO2 22 29 30*   BUN 56* 19 21   CREATININE 12.0* 6.1* 7.0*   LABGLOM 5 11 10   GLUCOSE 232* 52* 122* CALCIUM 10.0 9.3 8.9     Recent Labs     07/20/21  0526   ALT 6     Lab Results   Component Value Date    LABALBU 3.2 (L) 07/20/2021    LABALBU 3.2 (L) 06/29/2021    LABALBU 3.3 (L) 06/28/2021       Iron studies:  Lab Results   Component Value Date    FERRITIN 188 11/13/2019    IRON 19 (L) 02/09/2020    TIBC 189 (L) 02/09/2020     Vitamin B-12   Date Value Ref Range Status   02/09/2020 320 211 - 946 pg/mL Final     Folate   Date Value Ref Range Status   02/09/2020 9.6 4.8 - 24.2 ng/mL Final       Bone disease:  Lab Results   Component Value Date    MG 2.4 07/20/2021    PHOS 5.1 (H) 07/20/2021     Vit D, 25-Hydroxy   Date Value Ref Range Status   06/22/2021 40 30 - 100 ng/mL Final     Comment:     <20 ng/mL. ........... Carlos Gravelly Deficient  20-30 ng/mL. ......... Carlos Gravelly Insufficient   ng/mL. ........ Carlos Gravelly Sufficient  >100 ng/mL. .......... Carlos Gravelly Toxic       PTH   Date Value Ref Range Status   01/23/2020 154 (H) 15 - 65 pg/mL Final       No components found for: URIC    Lab Results   Component Value Date    COLORU Yellow 02/02/2020    NITRU Negative 02/02/2020    GLUCOSEU 250 02/02/2020    KETUA Negative 02/02/2020    UROBILINOGEN 0.2 02/02/2020    BILIRUBINUR Negative 02/02/2020       No results found for: Abimael Acuña        IMPRESSION/RECOMMENDATIONS:      1. ESKD-on IHD MWF via a R IJ TDC  Continue the MWF schedule at [facility]  Additional treatment today as K still significantly elevated at 6.3     2. Weakness-   CT negative for mass/bleed or midline shift  ? 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 Hyperkalemia-  Missed treatment as OP 7/19  Had stat treatment here for K 8.7-->6.3 this am  Treatment today as well.    Correct with dialysis.        Linda Peterson A Radha Ahmadi - CNP     Patient seen and examined. I had a face to face encounter with the patient. Agree with exam.    Agree with  formulation, assessment and plan as outlined above and directed by me. Addition and corrections were done as deemed appropriate. My exam and plan include:     Continue current treatment.       Nadia Mcnair MD  Nephrology        Electronically signed by Nadia Mcnair MD on 7/20/2021 at 4:26 PM

## 2021-07-20 NOTE — CARE COORDINATION
7/20/21 No covid testing. CM transition of care: pt lives in an apartment alone. He has sisters listed as legal nok. Pt receiving HD at [facility] in [place]- attempted to reach them at [phone]. Per pt he goes MWF at 11:05 chair time- transportation by Stephen Laughlin. Active with [facility]- need orders at discharge, Has 2lnc oxygen through [employer]. Pt paid $7 for taxi last time he was discharged from [facility]- he will have someone bring his money when he is being discharged to pay for a taxi home. CM/SS to follow.  Electronically signed by Priya Sanchez RN-BC on 7/20/2021 at 11:32 AM

## 2021-07-20 NOTE — PLAN OF CARE
Problem: Falls - Risk of:  Goal: Will remain free from falls  Description: Will remain free from falls  7/20/2021 0703 by Charito Smith RN  Outcome: Met This Shift     Problem: Tissue Perfusion - Renal, Altered:  Goal: Ability to achieve a balanced intake and output will improve  Description: Ability to achieve a balanced intake and output will improve  7/20/2021 0703 by Charito Smith RN  Outcome: Met This Shift     Problem: Fluid Volume:  Goal: Ability to achieve a balanced intake and output will improve  Description: Ability to achieve a balanced intake and output will improve  7/20/2021 0703 by Charito Smith RN  Outcome: Met This Shift     Problem: Fluid Volume - Imbalance:  Goal: Absence of imbalanced fluid volume signs and symptoms  Description: Absence of imbalanced fluid volume signs and symptoms  Outcome: Met This Shift     Problem: Tissue Perfusion - Renal, Altered:  Goal: Electrolytes within specified parameters  Description: Electrolytes within specified parameters  7/20/2021 0703 by Charito Smith RN  Outcome: Not Met This Shift     Problem: Tissue Perfusion - Renal, Altered:  Goal: Serum creatinine will be within specified parameters  Description: Serum creatinine will be within specified parameters  7/20/2021 0703 by Charito Smith RN  Outcome: Not Met This Shift

## 2021-07-20 NOTE — HOME CARE
Patient is active with [facility] for skilled nursing. Will need home care orders at discharge.  Jd Will lpn

## 2021-07-20 NOTE — PROGRESS NOTES
type 2) ([facility] [address] 7/28/2015    Hemodialysis patient [facility])     History of cardiovascular stress test 2/16/2015    lexiscan    HTN (hypertension) 7/28/2015    Hyperlipidemia     Hypertension     Lumbar radicular pain 7/28/2015    Oxygen dependent     wears 2 liters at home    Type II or unspecified type diabetes mellitus without mention of complication, not stated as uncontrolled      Past Surgical History:    Past Surgical History:   Procedure Laterality Date    OTHER SURGICAL HISTORY Left 06/06/14    cain bunionectomy left foot with osteomed screw x1    SHOULDER SURGERY      Bilateral    VEIN SURGERY         Medications Prior to Admission:    @  Prior to Admission medications    Medication Sig Start Date End Date Taking?  Authorizing Provider   albuterol sulfate HFA (PROVENTIL HFA) 108 (90 Base) MCG/ACT inhaler Inhale 2 puffs into the lungs every 4 hours as needed for Wheezing or Shortness of Breath 7/1/21  Yes Yesy Lieu, DO   hydrALAZINE (APRESOLINE) 50 MG tablet Take 50 mg by mouth 2 times daily   Yes Historical Provider, MD   empagliflozin (JARDIANCE) 10 MG tablet Take 10 mg by mouth daily   Yes Historical Provider, MD   sevelamer (RENVELA) 800 MG tablet Take 2 tablets by mouth 3 times daily (with meals)   Yes Historical Provider, MD   sevelamer (RENVELA) 800 MG tablet Take 1 tablet by mouth 2 times daily as needed (With Snacks)   Yes Historical Provider, MD   OXYGEN Inhale 2 L into the lungs continuous   Yes Historical Provider, MD   iron sucrose (VENOFER) 20 MG/ML injection Infuse 50 mg intravenously once a week Friday   Yes Historical Provider, MD   ipratropium-albuterol (DUONEB) 0.5-2.5 (3) MG/3ML SOLN nebulizer solution Inhale 3 mLs into the lungs every 4 hours as needed for Shortness of Breath 2/13/20  Yes Yesy Lieu, DO   calcitRIOL (ROCALTROL) 0.25 MCG capsule Take 1.5 mcg by mouth three times a week Monday, Wednesday, Friday   Yes Historical Provider, MD   insulin glargine (LANTUS) 100 UNIT/ML injection vial Inject 55 Units into the skin nightly    Yes Historical Provider, MD   amLODIPine (NORVASC) 10 MG tablet Take 5 mg by mouth 2 times daily    Yes Historical Provider, MD   COVID-19 mRNA Vacc, Moderna, 100 MCG/0.5ML SUSP injection Inject 0.5 mLs into the muscle once 2021 - COMPLETED    Historical Provider, MD   sodium bicarbonate 650 MG tablet Take 2 tablets by mouth 3 times daily 20   Candido Lizarraga DO   gabapentin (NEURONTIN) 100 MG capsule Take 100 mg by mouth 2 times daily. Historical Provider, MD       Allergies: Other    Social History:   Social History     Socioeconomic History    Marital status: Single     Spouse name: Not on file    Number of children: Not on file    Years of education: Not on file    Highest education level: Not on file   Occupational History    Not on file   Tobacco Use    Smoking status: Former Smoker     Packs/day: 1.00     Years: 30.00     Pack years: 30.00     Quit date: 2021     Years since quittin.0    Smokeless tobacco: Never Used   Vaping Use    Vaping Use: Never used   Substance and Sexual Activity    Alcohol use: No    Drug use: No    Sexual activity: Not on file   Other Topics Concern    Not on file   Social History Narrative    Not on file     Social Determinants of Health     Financial Resource Strain: Low Risk     Difficulty of Paying Living Expenses: Not very hard   Food Insecurity: No Food Insecurity    Worried About Running Out of Food in the Last Year: Never true    Alexis of Food in the Last Year: Never true   Transportation Needs: No Transportation Needs    Lack of Transportation (Medical): No    Lack of Transportation (Non-Medical): No   Physical Activity: Inactive    Days of Exercise per Week: 0 days    Minutes of Exercise per Session: 0 min   Stress: No Stress Concern Present    Feeling of Stress :  Only a little   Social Connections: Unknown    Frequency of Communication with Friends and Family: Three times a week    Frequency of Social Gatherings with Friends and Family: Twice a week    Attends [employer] Services: 1 to 4 times per year    Active Member of [employer] or Organizations: No    Attends Club or Organization Meetings: Never    Marital Status: Patient refused   Intimate Partner Violence: Unknown    Fear of Current or Ex-Partner: Patient refused    Emotionally Abused: No    Physically Abused: No    Sexually Abused: No       Family History:   Family History   Problem Relation Age of Onset    Kidney Disease Sister        REVIEW OF SYSTEMS:    Gen: Patient denies any lightheadedness or dizziness. No LOC or syncope. No fevers or chills. HEENT: No earache, sore throat or nasal congestion. Resp: Denies cough, hemoptysis or sputum production. Cardiac: Denies chest pain, SOB, diaphoresis or palpitations. GI: No nausea, vomiting, diarrhea or constipation. No melena or hematochezia. : No urinary complaints, dysuria, hematuria or frequency. MSK: + Right lower leg weakness, no paralysis      PHYSICAL EXAM:    Vitals:  /64   Pulse 78   Temp 97.9 °F (36.6 °C) (Oral)   Resp 16   Ht 5' 6\" (1.676 m)   Wt 198 lb 6.6 oz (90 kg)   SpO2 95%   BMI 32.02 kg/m²     General:  This is a 63 y.o. yo male who is alert and oriented in NAD  HEENT:  Head is normocephalic and atraumatic, PERRLA, EOMI, mucus membranes moist with no pharyngeal erythema or exudate. Neck:  Supple with no carotid bruits, JVD or thyromegaly.   No cervical adenopathy  CV:  Regular rate and rhythm, no murmurs  Lungs:  Clear to auscultation bilaterally with no wheezes, rales or rhonchi  Abdomen:  Soft, nontender, + abdominal fat, nondistended, bowel sounds present  Extremities:  No edema, peripheral pulses intact bilaterally  Neuro:  Cranial nerves II-XII grossly intact; motor and sensory function intact with no focal deficits  Skin:  No rashes, lesions or wounds    DATA:  CBC with Differential:    Lab Results   Component Value Date    WBC 6.2 07/20/2021    RBC 3.95 07/20/2021    HGB 11.0 07/20/2021    HCT 36.7 07/20/2021     07/20/2021    MCV 92.9 07/20/2021    MCH 27.8 07/20/2021    MCHC 30.0 07/20/2021    RDW 19.0 07/20/2021    LYMPHOPCT 26.2 07/20/2021    MONOPCT 15.9 07/20/2021    BASOPCT 0.2 07/20/2021    MONOSABS 0.99 07/20/2021    LYMPHSABS 1.63 07/20/2021    EOSABS 0.39 07/20/2021    BASOSABS 0.01 07/20/2021     CMP:    Lab Results   Component Value Date     07/20/2021    K 6.3 07/20/2021    K 8.7 07/19/2021    CL 95 07/20/2021    CO2 30 07/20/2021    BUN 21 07/20/2021    CREATININE 7.0 07/20/2021    GFRAA 10 07/20/2021    LABGLOM 10 07/20/2021    GLUCOSE 122 07/20/2021    PROT 7.7 07/20/2021    LABALBU 3.2 07/20/2021    CALCIUM 8.9 07/20/2021    BILITOT 0.3 07/20/2021    ALKPHOS 53 07/20/2021    AST 12 07/20/2021    ALT 6 07/20/2021     Magnesium:    Lab Results   Component Value Date    MG 2.4 07/20/2021     Phosphorus:    Lab Results   Component Value Date    PHOS 5.1 07/20/2021     PT/INR:    Lab Results   Component Value Date    PROTIME 10.8 07/19/2021    INR 0.9 07/19/2021     Troponin:    Lab Results   Component Value Date    TROPONINI 0.10 03/21/2021     U/A:    Lab Results   Component Value Date    COLORU Yellow 02/02/2020    PROTEINU >=300 02/02/2020    PHUR 5.0 02/02/2020    LABCAST RARE 09/21/2018    WBCUA 0-1 02/02/2020    RBCUA NONE 02/02/2020    BACTERIA NONE 02/02/2020    CLARITYU Clear 02/02/2020    SPECGRAV 1.025 02/02/2020    LEUKOCYTESUR Negative 02/02/2020    UROBILINOGEN 0.2 02/02/2020    BILIRUBINUR Negative 02/02/2020    BLOODU TRACE 02/02/2020    GLUCOSEU 250 02/02/2020    AMORPHOUS FEW 10/02/2019     ABG:    Lab Results   Component Value Date    PH 7.419 06/20/2021    PCO2 47.3 02/08/2020    PO2 140.5 02/08/2020    HCO3 26.3 02/08/2020    BE 3.1 06/20/2021    O2SAT 98.5 06/20/2021     HgBA1c:    Lab Results   Component Value Date    LABA1C 7.4 06/28/2021     FLP: Lab Results   Component Value Date    TRIG 106 03/21/2021    HDL 36 03/21/2021    LDLCALC 66 03/21/2021    LABVLDL 21 03/21/2021     TSH:    Lab Results   Component Value Date    TSH 1.210 06/21/2021     IRON:    Lab Results   Component Value Date    IRON 19 02/09/2020     LIPASE:    Lab Results   Component Value Date    LIPASE 78 09/24/2018       ASSESSMENT AND PLAN:      Patient Active Problem List    Diagnosis Date Noted    Hallux valgus, acquired 06/06/2014    Hyperkalemia 07/19/2021    Acute respiratory failure with hypoxia ([facility] [address] 06/20/2021    Respiratory failure ([facility] [address] 02/02/2020    Pneumonia 01/28/2020    Acute pancreatitis without necrosis or infection, unspecified 09/23/2018    Idiopathic acute pancreatitis 09/21/2018    B12 deficiency 03/17/2016    DMII (diabetes mellitus, type 2) ([facility] [address] 07/28/2015    HTN (hypertension) 07/28/2015    Lumbar radicular pain 07/28/2015    Spinal stenosis 07/28/2015     Impression:  1. Severe hyperkalemia  2. History of fall with associated left leg weakness  3. Chronic renal failure with hemodialysis  4. Insulin-dependent diabetes mellitus type 2 with possible hypoglycemic reaction in the morning  5. Hypertension  6. Elevated troponin  7. Normocytic anemia  8. History of lumbar spinal stenosis  9. Hyperlipidemia  10. Acute on subacute hypoxic respiratory failure    Plan:  Patient admitted to the ICU  Home medications reviewed  Consult nephrology  Consult intensivist  Glucoscans 4 times daily with sliding scale insulin  Patient received IV bicarb and calcium in the ED    Renal diet-patient noncompliant with renal diet outpatient  Activity up with assistance  Dialysis 7/19, 7/20    O2 saturation on 2-4 L nasal cannula with activity. Titrate O2 nasal cannula to keep O2 saturation greater than 90% with activity. Transfer to telemetry floor when okay with nephrology, pulmonology  Routine labs in a.m.       Leslie Nguyen DO, D.O.  7/20/2021  10:51 AM

## 2021-07-21 LAB
ANION GAP SERPL CALCULATED.3IONS-SCNC: 11 MMOL/L (ref 7–16)
BUN BLDV-MCNC: 23 MG/DL (ref 6–23)
CALCIUM SERPL-MCNC: 9.6 MG/DL (ref 8.6–10.2)
CHLORIDE BLD-SCNC: 96 MMOL/L (ref 98–107)
CO2: 29 MMOL/L (ref 22–29)
CREAT SERPL-MCNC: 6.3 MG/DL (ref 0.7–1.2)
GFR AFRICAN AMERICAN: 11
GFR NON-AFRICAN AMERICAN: 11 ML/MIN/1.73
GLUCOSE BLD-MCNC: 121 MG/DL (ref 74–99)
HCT VFR BLD CALC: 33.9 % (ref 37–54)
HEMOGLOBIN: 10.3 G/DL (ref 12.5–16.5)
MAGNESIUM: 2.7 MG/DL (ref 1.6–2.6)
MCH RBC QN AUTO: 28.1 PG (ref 26–35)
MCHC RBC AUTO-ENTMCNC: 30.4 % (ref 32–34.5)
MCV RBC AUTO: 92.6 FL (ref 80–99.9)
METER GLUCOSE: 136 MG/DL (ref 74–99)
METER GLUCOSE: 138 MG/DL (ref 74–99)
METER GLUCOSE: 221 MG/DL (ref 74–99)
MRSA CULTURE ONLY: NORMAL
PDW BLD-RTO: 18.9 FL (ref 11.5–15)
PHOSPHORUS: 6.5 MG/DL (ref 2.5–4.5)
PLATELET # BLD: 110 E9/L (ref 130–450)
PMV BLD AUTO: 11.3 FL (ref 7–12)
POTASSIUM SERPL-SCNC: 6.6 MMOL/L (ref 3.5–5)
RBC # BLD: 3.66 E12/L (ref 3.8–5.8)
SODIUM BLD-SCNC: 136 MMOL/L (ref 132–146)
WBC # BLD: 6.9 E9/L (ref 4.5–11.5)

## 2021-07-21 PROCEDURE — 2700000000 HC OXYGEN THERAPY PER DAY

## 2021-07-21 PROCEDURE — 84100 ASSAY OF PHOSPHORUS: CPT

## 2021-07-21 PROCEDURE — 6360000002 HC RX W HCPCS: Performed by: INTERNAL MEDICINE

## 2021-07-21 PROCEDURE — 36415 COLL VENOUS BLD VENIPUNCTURE: CPT

## 2021-07-21 PROCEDURE — 1200000000 HC SEMI PRIVATE

## 2021-07-21 PROCEDURE — 6370000000 HC RX 637 (ALT 250 FOR IP): Performed by: INTERNAL MEDICINE

## 2021-07-21 PROCEDURE — 85027 COMPLETE CBC AUTOMATED: CPT

## 2021-07-21 PROCEDURE — 83735 ASSAY OF MAGNESIUM: CPT

## 2021-07-21 PROCEDURE — 2580000003 HC RX 258: Performed by: INTERNAL MEDICINE

## 2021-07-21 PROCEDURE — 90935 HEMODIALYSIS ONE EVALUATION: CPT

## 2021-07-21 PROCEDURE — 80048 BASIC METABOLIC PNL TOTAL CA: CPT

## 2021-07-21 PROCEDURE — 82962 GLUCOSE BLOOD TEST: CPT

## 2021-07-21 RX ORDER — ACETAMINOPHEN 325 MG/1
650 TABLET ORAL EVERY 4 HOURS PRN
Status: DISCONTINUED | OUTPATIENT
Start: 2021-07-21 | End: 2021-07-25 | Stop reason: HOSPADM

## 2021-07-21 RX ADMIN — HEPARIN SODIUM 5000 UNITS: 5000 INJECTION INTRAVENOUS; SUBCUTANEOUS at 20:49

## 2021-07-21 RX ADMIN — SODIUM BICARBONATE 1300 MG: 650 TABLET ORAL at 20:46

## 2021-07-21 RX ADMIN — SODIUM BICARBONATE 1300 MG: 650 TABLET ORAL at 14:33

## 2021-07-21 RX ADMIN — CALCITRIOL 1.5 MCG: 0.25 CAPSULE ORAL at 14:35

## 2021-07-21 RX ADMIN — HEPARIN SODIUM 5000 UNITS: 5000 INJECTION INTRAVENOUS; SUBCUTANEOUS at 14:48

## 2021-07-21 RX ADMIN — ACETAMINOPHEN 650 MG: 325 TABLET ORAL at 20:55

## 2021-07-21 RX ADMIN — SEVELAMER CARBONATE 1600 MG: 800 TABLET, FILM COATED ORAL at 14:33

## 2021-07-21 RX ADMIN — HYDRALAZINE HYDROCHLORIDE 50 MG: 50 TABLET, FILM COATED ORAL at 20:46

## 2021-07-21 RX ADMIN — AMLODIPINE BESYLATE 10 MG: 10 TABLET ORAL at 14:33

## 2021-07-21 RX ADMIN — SEVELAMER CARBONATE 1600 MG: 800 TABLET, FILM COATED ORAL at 16:57

## 2021-07-21 RX ADMIN — SODIUM CHLORIDE, PRESERVATIVE FREE 10 ML: 5 INJECTION INTRAVENOUS at 14:33

## 2021-07-21 RX ADMIN — INSULIN GLARGINE 9 UNITS: 100 INJECTION, SOLUTION SUBCUTANEOUS at 20:48

## 2021-07-21 RX ADMIN — GABAPENTIN 100 MG: 100 CAPSULE ORAL at 14:33

## 2021-07-21 RX ADMIN — HYDRALAZINE HYDROCHLORIDE 50 MG: 50 TABLET, FILM COATED ORAL at 14:33

## 2021-07-21 RX ADMIN — SODIUM CHLORIDE, PRESERVATIVE FREE 10 ML: 5 INJECTION INTRAVENOUS at 20:56

## 2021-07-21 RX ADMIN — GABAPENTIN 100 MG: 100 CAPSULE ORAL at 20:46

## 2021-07-21 RX ADMIN — INSULIN LISPRO 4 UNITS: 100 INJECTION, SOLUTION INTRAVENOUS; SUBCUTANEOUS at 16:57

## 2021-07-21 RX ADMIN — HEPARIN SODIUM 5000 UNITS: 5000 INJECTION INTRAVENOUS; SUBCUTANEOUS at 05:46

## 2021-07-21 ASSESSMENT — PAIN DESCRIPTION - PROGRESSION: CLINICAL_PROGRESSION: NOT CHANGED

## 2021-07-21 ASSESSMENT — PAIN SCALES - GENERAL
PAINLEVEL_OUTOF10: 0
PAINLEVEL_OUTOF10: 10

## 2021-07-21 ASSESSMENT — PAIN DESCRIPTION - LOCATION: LOCATION: BACK

## 2021-07-21 ASSESSMENT — PAIN DESCRIPTION - ORIENTATION: ORIENTATION: MID

## 2021-07-21 ASSESSMENT — PAIN DESCRIPTION - FREQUENCY: FREQUENCY: INTERMITTENT

## 2021-07-21 ASSESSMENT — PAIN DESCRIPTION - ONSET: ONSET: ON-GOING

## 2021-07-21 ASSESSMENT — PAIN DESCRIPTION - DESCRIPTORS: DESCRIPTORS: ACHING

## 2021-07-21 NOTE — PROGRESS NOTES
Patient denies any right or left leg weakness. BUN/creatinine 23/6.3 with potassium still elevated at 6.6. Blood sugars ranging 121139. WBC 6.9 with hemoglobin 10.3. Temperature is 98.1 with heart rate in 96 and blood pressure 114/66. O2 sats 94% on room air at rest.      Past Medical History:    Past Medical History:   Diagnosis Date    Back pain     Diabetes mellitus ([facility]     DMII (diabetes mellitus, type 2) ([facility] 7/28/2015    Hemodialysis patient ([facility]     History of cardiovascular stress test 2/16/2015    lexiscan    HTN (hypertension) 7/28/2015    Hyperlipidemia     Hypertension     Lumbar radicular pain 7/28/2015    Oxygen dependent     wears 2 liters at home    Type II or unspecified type diabetes mellitus without mention of complication, not stated as uncontrolled      Past Surgical History:    Past Surgical History:   Procedure Laterality Date    OTHER SURGICAL HISTORY Left 06/06/14    cain bunionectomy left foot with osteomed screw x1    SHOULDER SURGERY      Bilateral    VEIN SURGERY         Medications Prior to Admission:    @  Prior to Admission medications    Medication Sig Start Date End Date Taking?  Authorizing Provider   albuterol sulfate HFA (PROVENTIL HFA) 108 (90 Base) MCG/ACT inhaler Inhale 2 puffs into the lungs every 4 hours as needed for Wheezing or Shortness of Breath 7/1/21  Yes Roxy aCsillas DO   hydrALAZINE (APRESOLINE) 50 MG tablet Take 50 mg by mouth 2 times daily   Yes Historical Provider, MD   empagliflozin (JARDIANCE) 10 MG tablet Take 10 mg by mouth daily   Yes Historical Provider, MD   sevelamer (RENVELA) 800 MG tablet Take 2 tablets by mouth 3 times daily (with meals)   Yes Historical Provider, MD   sevelamer (RENVELA) 800 MG tablet Take 1 tablet by mouth 2 times daily as needed (With Snacks)   Yes Historical Provider, MD   OXYGEN Inhale 2 L into the lungs continuous   Yes Historical Provider, MD   iron sucrose (VENOFER) 20 MG/ML injection Infuse 50 mg intravenously once a week Friday   Yes Historical Provider, MD   ipratropium-albuterol (DUONEB) 0.5-2.5 (3) MG/3ML SOLN nebulizer solution Inhale 3 mLs into the lungs every 4 hours as needed for Shortness of Breath 20  Yes Rayne Bajwa, DO   calcitRIOL (ROCALTROL) 0.25 MCG capsule Take 1.5 mcg by mouth three times a week Monday, Wednesday, Friday   Yes Historical Provider, MD   insulin glargine (LANTUS) 100 UNIT/ML injection vial Inject 55 Units into the skin nightly    Yes Historical Provider, MD   amLODIPine (NORVASC) 10 MG tablet Take 5 mg by mouth 2 times daily    Yes Historical Provider, MD   COVID-19 mRNA Vacc, Moderna, 100 MCG/0.5ML SUSP injection Inject 0.5 mLs into the muscle once 2021 - COMPLETED    Historical Provider, MD   sodium bicarbonate 650 MG tablet Take 2 tablets by mouth 3 times daily 20   Rayne Bajwa DO   gabapentin (NEURONTIN) 100 MG capsule Take 100 mg by mouth 2 times daily. Historical Provider, MD       Allergies:   Other    Social History:   Social History     Socioeconomic History    Marital status: Single     Spouse name: Not on file    Number of children: Not on file    Years of education: Not on file    Highest education level: Not on file   Occupational History    Not on file   Tobacco Use    Smoking status: Former Smoker     Packs/day: 1.00     Years: 30.00     Pack years: 30.00     Quit date: 2021     Years since quittin.0    Smokeless tobacco: Never Used   Vaping Use    Vaping Use: Never used   Substance and Sexual Activity    Alcohol use: No    Drug use: No    Sexual activity: Not on file   Other Topics Concern    Not on file   Social History Narrative    Not on file     Social Determinants of Health     Financial Resource Strain: Low Risk     Difficulty of Paying Living Expenses: Not very hard   Food Insecurity: No Food Insecurity    Worried About Running Out of Food in the Last Year: Never true    Alexis of Food in the Last murmurs  Lungs:  Clear to auscultation bilaterally with no wheezes, rales or rhonchi  Abdomen:  Soft, nontender, + abdominal fat, nondistended, bowel sounds present  Extremities:  No edema, peripheral pulses intact bilaterally  Neuro:  Cranial nerves II-XII grossly intact; motor and sensory function intact with no focal deficits  Skin:  No rashes, lesions or wounds    DATA:  CBC with Differential:    Lab Results   Component Value Date    WBC 6.9 07/21/2021    RBC 3.66 07/21/2021    HGB 10.3 07/21/2021    HCT 33.9 07/21/2021     07/21/2021    MCV 92.6 07/21/2021    MCH 28.1 07/21/2021    MCHC 30.4 07/21/2021    RDW 18.9 07/21/2021    LYMPHOPCT 26.2 07/20/2021    MONOPCT 15.9 07/20/2021    BASOPCT 0.2 07/20/2021    MONOSABS 0.99 07/20/2021    LYMPHSABS 1.63 07/20/2021    EOSABS 0.39 07/20/2021    BASOSABS 0.01 07/20/2021     CMP:    Lab Results   Component Value Date     07/21/2021    K 6.6 07/21/2021    K 8.7 07/19/2021    CL 96 07/21/2021    CO2 29 07/21/2021    BUN 23 07/21/2021    CREATININE 6.3 07/21/2021    GFRAA 11 07/21/2021    LABGLOM 11 07/21/2021    GLUCOSE 121 07/21/2021    PROT 7.7 07/20/2021    LABALBU 3.2 07/20/2021    CALCIUM 9.6 07/21/2021    BILITOT 0.3 07/20/2021    ALKPHOS 53 07/20/2021    AST 12 07/20/2021    ALT 6 07/20/2021     Magnesium:    Lab Results   Component Value Date    MG 2.7 07/21/2021     Phosphorus:    Lab Results   Component Value Date    PHOS 6.5 07/21/2021     PT/INR:    Lab Results   Component Value Date    PROTIME 10.8 07/19/2021    INR 0.9 07/19/2021     Troponin:    Lab Results   Component Value Date    TROPONINI 0.10 03/21/2021     U/A:    Lab Results   Component Value Date    COLORU Yellow 02/02/2020    PROTEINU >=300 02/02/2020    PHUR 5.0 02/02/2020    LABCAST RARE 09/21/2018    WBCUA 0-1 02/02/2020    RBCUA NONE 02/02/2020    BACTERIA NONE 02/02/2020    CLARITYU Clear 02/02/2020    SPECGRAV 1.025 02/02/2020    LEUKOCYTESUR Negative 02/02/2020    UROBILINOGEN 0.2 02/02/2020    BILIRUBINUR Negative 02/02/2020    BLOODU TRACE 02/02/2020    GLUCOSEU 250 02/02/2020    AMORPHOUS FEW 10/02/2019     ABG:    Lab Results   Component Value Date    PH 7.419 06/20/2021    PCO2 47.3 02/08/2020    PO2 140.5 02/08/2020    HCO3 26.3 02/08/2020    BE 3.1 06/20/2021    O2SAT 98.5 06/20/2021     HgBA1c:    Lab Results   Component Value Date    LABA1C 7.4 06/28/2021     FLP:    Lab Results   Component Value Date    TRIG 106 03/21/2021    HDL 36 03/21/2021    LDLCALC 66 03/21/2021    LABVLDL 21 03/21/2021     TSH:    Lab Results   Component Value Date    TSH 1.210 06/21/2021     IRON:    Lab Results   Component Value Date    IRON 19 02/09/2020     LIPASE:    Lab Results   Component Value Date    LIPASE 78 09/24/2018       ASSESSMENT AND PLAN:      Patient Active Problem List    Diagnosis Date Noted    Hallux valgus, acquired 06/06/2014    Hyperkalemia 07/19/2021    Acute respiratory failure with hypoxia ([facility] 06/20/2021    Respiratory failure ([facility] 02/02/2020    Pneumonia 01/28/2020    Acute pancreatitis without necrosis or infection, unspecified 09/23/2018    Idiopathic acute pancreatitis 09/21/2018    B12 deficiency 03/17/2016    DMII (diabetes mellitus, type 2) ([facility] 07/28/2015    HTN (hypertension) 07/28/2015    Lumbar radicular pain 07/28/2015    Spinal stenosis 07/28/2015     Impression:  1. Severe hyperkalemia  2. History of fall with associated left leg weakness  3. Chronic renal failure with hemodialysis  4. Insulin-dependent diabetes mellitus type 2 with possible hypoglycemic reaction in the morning  5. Hypertension  6. Elevated troponin  7. Normocytic anemia  8. History of lumbar spinal stenosis  9. Hyperlipidemia  10.  Acute on subacute hypoxic respiratory failure    Plan:  Patient admitted to the ICU  Home medications reviewed  Consult nephrology  Consult intensivist  Glucoscans 4 times daily with sliding scale insulin  Patient received IV bicarb and calcium in the ED    Renal diet-patient noncompliant with renal diet outpatient  Activity up with assistance  Dialysis 7/19, 7/20    O2 saturation on 2-4 L nasal cannula with activity. Titrate O2 nasal cannula to keep O2 saturation greater than 90% with activity. Consult dietitian to see patient regarding renallow potassium diet  Routine labs in a.m.        Gilmar Chamorro DO, D.O.  7/21/2021  10:02 AM

## 2021-07-21 NOTE — PROGRESS NOTES
Department of Internal Medicine  Nephrology Attending Progress Note    SUBJECTIVE:  We are following this patient for end-stage renal failure . Full consult deferred as he is well known to us thru OP IHD rounds at [facility]. Briefly Gurpreet Mcallister is a 63year old male we follow for ESKD. He came to the ED yesterday with right sided weakness and a fall. He missed his dialysis treatment as an OP yesterday but did have treatment here due to hyperkalemia K at presentation was 8.7 and was 6.3 this am. He admits to drinking orange juice over the weekend. I discussed with him the need to remove OJ from his diet as his K was direly elevated at admission. Additional  treatment today for management of hyperkalemia. He is seen in the ICU this am and is tolerating dialysis well.     7/21/21- transferred out of ICU- seen on HD, tolerating well.  Discussed veltassa and need to take on non-dialysis days    PROBLEM LIST:    Patient Active Problem List   Diagnosis    Hallux valgus, acquired    DMII (diabetes mellitus, type 2) ([address]    HTN (hypertension)    Lumbar radicular pain    Spinal stenosis    B12 deficiency    Idiopathic acute pancreatitis    Acute pancreatitis without necrosis or infection, unspecified    Pneumonia    Respiratory failure ([address]    Acute respiratory failure with hypoxia ([address]    Hyperkalemia        PAST MEDICAL HISTORY:    Past Medical History:   Diagnosis Date    Back pain     Diabetes mellitus ([address]     DMII (diabetes mellitus, type 2) ([address] 7/28/2015    Hemodialysis patient ([facility]     History of cardiovascular stress test 2/16/2015    lexiscan    HTN (hypertension) 7/28/2015    Hyperlipidemia     Hypertension     Lumbar radicular pain 7/28/2015    Oxygen dependent     wears 2 liters at home    Type II or unspecified type diabetes mellitus without mention of complication, not stated as uncontrolled        MEDS (scheduled):   calcium gluconate IVPB  1,000 mg Intravenous Once  insulin glargine  9 Units Subcutaneous Nightly    gabapentin  100 mg Oral BID    calcitRIOL  1.5 mcg Oral Once per day on Mon Wed Fri    sodium bicarbonate  1,300 mg Oral TID    hydrALAZINE  50 mg Oral BID    empagliflozin  10 mg Oral Daily    sevelamer  1,600 mg Oral TID WC    cefdinir  300 mg Oral Daily    insulin lispro  0-12 Units Subcutaneous TID WC    insulin lispro  0-6 Units Subcutaneous Nightly    amLODIPine  10 mg Oral Daily    heparin (porcine)  5,000 Units Subcutaneous 3 times per day    [START ON 7/23/2021] ferric gluconate (FERRLECIT) IVPB  62.5 mg Intravenous Weekly    sodium chloride flush  5-40 mL Intravenous BID       MEDS (infusions):   dextrose         MEDS (prn):  perflutren lipid microspheres, glucose, dextrose, glucagon (rDNA), dextrose, ipratropium-albuterol, trimethobenzamide, sodium chloride flush, heparin (porcine)    DIET:    ADULT DIET; Regular; Low Potassium (Less than 3000 mg/day);  Low Phosphorus (Less than 1000 mg)      PHYSICAL EXAM:      Patient Vitals for the past 24 hrs:   BP Temp Temp src Pulse Resp SpO2 Weight   07/21/21 1000 114/66   96      07/21/21 0923 (!) 124/58 98.1 °F (36.7 °C)  70 18  201 lb 8 oz (91.4 kg)   07/21/21 0832 130/63   71      07/21/21 0821 133/66 97.3 °F (36.3 °C) Oral 76 18 94 %    07/20/21 1930 (!) 125/59 98.6 °F (37 °C) Oral 86 20 96 %    07/20/21 1800 120/60   79 18 96 %    07/20/21 1700 (!) 117/56   85 20 92 %    07/20/21 1600 110/60 97.8 °F (36.6 °C) Oral 83 19 94 %    07/20/21 1500 102/61   75 15 93 %    07/20/21 1400 110/60   80 22 96 %    07/20/21 1300 108/61   73 22 92 %    07/20/21 1236 119/64   75 20 (!) 70 %    07/20/21 1215 107/62   82      07/20/21 1200 117/68 97.8 °F (36.6 °C) Axillary 76 14 97 %    07/20/21 1145 108/62   80      07/20/21 1130 113/64   78      07/20/21 1115 (!) 107/52   76      07/20/21 1100 (!) 107/55   74 16 100 %    07/20/21 1045 118/64   78    07/20/21 1030 (!) 111/58   79             Intake/Output Summary (Last 24 hours) at 7/21/2021 1025  Last data filed at 7/20/2021 1236  Gross per 24 hour   Intake 300 ml   Output 2300 ml   Net -2000 ml       Wt Readings from Last 3 Encounters:   07/21/21 201 lb 8 oz (91.4 kg)   07/01/21 208 lb 9.6 oz (94.6 kg)   06/23/21 209 lb 14.1 oz (95.2 kg)       Constitutional:  Patient in no acute distress  Head: normocephalic, atraumatic  Cardiovascular: S1 S2 no S3 or rub  Respiratory:  Clear upper, diminished in bsases  Gastrointestinal: soft, nontender, nondistended  Ext: trace edema   Neuro: awake and alert        DATA:      Recent Labs     07/19/21  1139 07/20/21  0526 07/21/21  0558   WBC 8.1 6.2 6.9   HGB 10.5* 11.0* 10.3*   HCT 34.3* 36.7* 33.9*   MCV 91.5 92.9 92.6    149 110*     Recent Labs     07/20/21  0526 07/20/21  1345 07/21/21  0558    135 136   K 6.3* 5.8* 6.6*   CL 95* 95* 96*   CO2 30* 30* 29   BUN 21 6 23   CREATININE 7.0* 3.5* 6.3*   LABGLOM 10 21 11   GLUCOSE 122* 180* 121*   CALCIUM 8.9 9.3 9.6     Recent Labs     07/20/21  0526   ALT 6     Lab Results   Component Value Date    LABALBU 3.2 (L) 07/20/2021    LABALBU 3.2 (L) 06/29/2021    LABALBU 3.3 (L) 06/28/2021       Iron studies:  Lab Results   Component Value Date    FERRITIN 188 11/13/2019    IRON 19 (L) 02/09/2020    TIBC 189 (L) 02/09/2020     Vitamin B-12   Date Value Ref Range Status   02/09/2020 320 211 - 946 pg/mL Final     Folate   Date Value Ref Range Status   02/09/2020 9.6 4.8 - 24.2 ng/mL Final       Bone disease:  Lab Results   Component Value Date    MG 2.7 (H) 07/21/2021    PHOS 6.5 (H) 07/21/2021     Vit D, 25-Hydroxy   Date Value Ref Range Status   06/22/2021 40 30 - 100 ng/mL Final     Comment:     <20 ng/mL. ........... Rafael Confer Deficient  20-30 ng/mL. ......... Rafael Confer Insufficient   ng/mL. ........ Rafael Confer Sufficient  >100 ng/mL. .......... Rafael Confer Toxic       PTH   Date Value Ref Range Status   01/23/2020 154 (H) 15 - 65 pg/mL Final No components found for: URIC    Lab Results   Component Value Date    COLORU Yellow 02/02/2020    NITRU Negative 02/02/2020    GLUCOSEU 250 02/02/2020    KETUA Negative 02/02/2020    UROBILINOGEN 0.2 02/02/2020    BILIRUBINUR Negative 02/02/2020       No results found for: Long Garnica        IMPRESSION/RECOMMENDATIONS:      1. ESKD-on IHD MWF via a R IJ TDC  Continue the MWF schedule at [address] on treatment this am, tolerating well.      2. Weakness-   CT negative for mass/bleed or midline shift  ? 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 Hyperkalemia-  Missed treatment as OP 7/19  Had stat treatment here for K 8.7-->6.3-->6.6 this am  Treatment today as well. Rx sent to [facility] on [address] for Veltassa 8.4 grams, one packet on non-dialysis days and will follow in the OP setting.        VENANCIO De La Paz - CNP    Patient seen and examined on dialysis. He has no complaints. . I had a face to face encounter with the patient. Patient remains with persistent hyperkalemia. Agree with exam.    Agree with  formulation, assessment and plan as outlined above and directed by me. Addition and corrections were done as deemed appropriate. My exam and plan include: We will have the patient started on Veltassa as an outpatient to be taken on nondialysis days. Before discharge we'll also have the patient be seen by dietitian to help him to follow a low potassium diet. Continue current treatment.       Handy Aguilera MD  Nephrology        Electronically signed by Handy Aguilera MD on 7/21/2021 at 4:13 PM

## 2021-07-22 LAB
ALBUMIN SERPL-MCNC: 3.6 G/DL (ref 3.5–5.2)
ALP BLD-CCNC: 59 U/L (ref 40–129)
ALT SERPL-CCNC: 7 U/L (ref 0–40)
ANION GAP SERPL CALCULATED.3IONS-SCNC: 11 MMOL/L (ref 7–16)
AST SERPL-CCNC: 11 U/L (ref 0–39)
BILIRUB SERPL-MCNC: 0.3 MG/DL (ref 0–1.2)
BUN BLDV-MCNC: 24 MG/DL (ref 6–23)
CALCIUM SERPL-MCNC: 10.2 MG/DL (ref 8.6–10.2)
CHLORIDE BLD-SCNC: 94 MMOL/L (ref 98–107)
CO2: 28 MMOL/L (ref 22–29)
CREAT SERPL-MCNC: 5.7 MG/DL (ref 0.7–1.2)
EKG ATRIAL RATE: 312 BPM
EKG Q-T INTERVAL: 304 MS
EKG QRS DURATION: 92 MS
EKG QTC CALCULATION (BAZETT): 416 MS
EKG R AXIS: 49 DEGREES
EKG T AXIS: 164 DEGREES
EKG VENTRICULAR RATE: 113 BPM
GFR AFRICAN AMERICAN: 12
GFR NON-AFRICAN AMERICAN: 12 ML/MIN/1.73
GLUCOSE BLD-MCNC: 120 MG/DL (ref 74–99)
METER GLUCOSE: 137 MG/DL (ref 74–99)
METER GLUCOSE: 144 MG/DL (ref 74–99)
METER GLUCOSE: 146 MG/DL (ref 74–99)
METER GLUCOSE: 181 MG/DL (ref 74–99)
POTASSIUM SERPL-SCNC: 5.5 MMOL/L (ref 3.5–5)
SODIUM BLD-SCNC: 133 MMOL/L (ref 132–146)
TOTAL PROTEIN: 8.3 G/DL (ref 6.4–8.3)

## 2021-07-22 PROCEDURE — 36415 COLL VENOUS BLD VENIPUNCTURE: CPT

## 2021-07-22 PROCEDURE — 93005 ELECTROCARDIOGRAM TRACING: CPT | Performed by: INTERNAL MEDICINE

## 2021-07-22 PROCEDURE — 2580000003 HC RX 258: Performed by: INTERNAL MEDICINE

## 2021-07-22 PROCEDURE — 6360000002 HC RX W HCPCS: Performed by: INTERNAL MEDICINE

## 2021-07-22 PROCEDURE — 2500000003 HC RX 250 WO HCPCS: Performed by: INTERNAL MEDICINE

## 2021-07-22 PROCEDURE — 80053 COMPREHEN METABOLIC PANEL: CPT

## 2021-07-22 PROCEDURE — 6370000000 HC RX 637 (ALT 250 FOR IP): Performed by: INTERNAL MEDICINE

## 2021-07-22 PROCEDURE — 1200000000 HC SEMI PRIVATE

## 2021-07-22 PROCEDURE — 2700000000 HC OXYGEN THERAPY PER DAY

## 2021-07-22 PROCEDURE — 82962 GLUCOSE BLOOD TEST: CPT

## 2021-07-22 PROCEDURE — 90935 HEMODIALYSIS ONE EVALUATION: CPT

## 2021-07-22 RX ORDER — INSULIN GLARGINE 100 [IU]/ML
6 INJECTION, SOLUTION SUBCUTANEOUS NIGHTLY
Status: DISCONTINUED | OUTPATIENT
Start: 2021-07-22 | End: 2021-07-25 | Stop reason: HOSPADM

## 2021-07-22 RX ORDER — METOPROLOL TARTRATE 5 MG/5ML
2.5 INJECTION INTRAVENOUS ONCE
Status: COMPLETED | OUTPATIENT
Start: 2021-07-22 | End: 2021-07-22

## 2021-07-22 RX ORDER — METOPROLOL SUCCINATE 50 MG/1
50 TABLET, EXTENDED RELEASE ORAL DAILY
Status: DISCONTINUED | OUTPATIENT
Start: 2021-07-22 | End: 2021-07-25 | Stop reason: HOSPADM

## 2021-07-22 RX ADMIN — SODIUM CHLORIDE, PRESERVATIVE FREE 10 ML: 5 INJECTION INTRAVENOUS at 09:01

## 2021-07-22 RX ADMIN — INSULIN GLARGINE 6 UNITS: 100 INJECTION, SOLUTION SUBCUTANEOUS at 21:23

## 2021-07-22 RX ADMIN — METOPROLOL TARTRATE 25 MG: 25 TABLET, FILM COATED ORAL at 17:26

## 2021-07-22 RX ADMIN — GABAPENTIN 100 MG: 100 CAPSULE ORAL at 21:22

## 2021-07-22 RX ADMIN — INSULIN LISPRO 2 UNITS: 100 INJECTION, SOLUTION INTRAVENOUS; SUBCUTANEOUS at 12:14

## 2021-07-22 RX ADMIN — HEPARIN SODIUM 5000 UNITS: 5000 INJECTION INTRAVENOUS; SUBCUTANEOUS at 17:26

## 2021-07-22 RX ADMIN — SEVELAMER CARBONATE 1600 MG: 800 TABLET, FILM COATED ORAL at 09:00

## 2021-07-22 RX ADMIN — SODIUM CHLORIDE, PRESERVATIVE FREE 10 ML: 5 INJECTION INTRAVENOUS at 21:25

## 2021-07-22 RX ADMIN — SEVELAMER CARBONATE 1600 MG: 800 TABLET, FILM COATED ORAL at 17:25

## 2021-07-22 RX ADMIN — SODIUM BICARBONATE 1300 MG: 650 TABLET ORAL at 17:26

## 2021-07-22 RX ADMIN — INSULIN LISPRO 1 UNITS: 100 INJECTION, SOLUTION INTRAVENOUS; SUBCUTANEOUS at 21:23

## 2021-07-22 RX ADMIN — HEPARIN SODIUM 5000 UNITS: 5000 INJECTION INTRAVENOUS; SUBCUTANEOUS at 05:40

## 2021-07-22 RX ADMIN — SODIUM BICARBONATE 1300 MG: 650 TABLET ORAL at 09:00

## 2021-07-22 RX ADMIN — GABAPENTIN 100 MG: 100 CAPSULE ORAL at 09:00

## 2021-07-22 RX ADMIN — ACETAMINOPHEN 650 MG: 325 TABLET ORAL at 23:16

## 2021-07-22 RX ADMIN — METOPROLOL SUCCINATE 50 MG: 50 TABLET, EXTENDED RELEASE ORAL at 10:03

## 2021-07-22 RX ADMIN — SODIUM BICARBONATE 1300 MG: 650 TABLET ORAL at 21:22

## 2021-07-22 RX ADMIN — SEVELAMER CARBONATE 1600 MG: 800 TABLET, FILM COATED ORAL at 12:14

## 2021-07-22 RX ADMIN — METOPROLOL TARTRATE 2.5 MG: 5 INJECTION INTRAVENOUS at 09:00

## 2021-07-22 RX ADMIN — HEPARIN SODIUM 5000 UNITS: 5000 INJECTION INTRAVENOUS; SUBCUTANEOUS at 21:22

## 2021-07-22 ASSESSMENT — PAIN DESCRIPTION - PROGRESSION
CLINICAL_PROGRESSION: NOT CHANGED
CLINICAL_PROGRESSION: NOT CHANGED

## 2021-07-22 ASSESSMENT — PAIN SCALES - GENERAL
PAINLEVEL_OUTOF10: 9
PAINLEVEL_OUTOF10: 0
PAINLEVEL_OUTOF10: 0

## 2021-07-22 NOTE — PLAN OF CARE
Problem: Falls - Risk of:  Goal: Will remain free from falls  Description: Will remain free from falls  Outcome: Met This Shift     Problem: Falls - Risk of:  Goal: Absence of physical injury  Description: Absence of physical injury  Outcome: Met This Shift     Problem: Tissue Perfusion - Renal, Altered:  Goal: Electrolytes within specified parameters  Description: Electrolytes within specified parameters  Outcome: Met This Shift     Problem: Tissue Perfusion - Renal, Altered:  Goal: Serum creatinine will be within specified parameters  Description: Serum creatinine will be within specified parameters  Outcome: Met This Shift     Problem: Tissue Perfusion - Renal, Altered:  Goal: Ability to achieve a balanced intake and output will improve  Description: Ability to achieve a balanced intake and output will improve  Outcome: Met This Shift

## 2021-07-22 NOTE — CARE COORDINATION
Ss note:7/22/2021.11:54 AM No covid testing. Pt resides home alone in apt. Attends HD at [facility]. MWF at 11:05 am, [facility] transports. Pt is Active with [facility], WILL NEED RESUME ORDER. Pt has 02 at 2 liters provided by [employer], pt relays he needs a portable tank to go home on, alecia Erickson to bring tank, she will bring the small tank if they have one. Pt relays he will pay for a taxi home which costs $7.00 via [place] [employer] states he paid for the last ride home when he arrived home. SW will follow.  FLY Rg

## 2021-07-22 NOTE — FLOWSHEET NOTE
07/22/21 1630   Vital Signs   BP 94/63   Temp 98.3 °F (36.8 °C)   Pulse 91   Resp 16   Weight 198 lb 6.6 oz (90 kg)   Weight Method Bed scale   Percent Weight Change 2.04   Post-Hemodialysis Assessment   Post-Treatment Procedures Blood returned;Catheter capped, clamped with Citrate x 2 ports   Machine Disinfection Process Acid/Vinegar Clean;Heat Disinfect; Exterior Machine Disinfection   Rinseback Volume (ml) 300 ml   Total Liters Processed (l/min) 56.3 l/min   Dialyzer Clearance Moderately streaked   Duration of Treatment (minutes) 180 minutes   Heparin amount administered during treatment (units) 0 units   Hemodialysis Intake (ml) 600 ml   Hemodialysis Output (ml) 1100 ml   NET Removed (ml) 500 ml   Tolerated Treatment Good   Patient Response to Treatment Tolerated tx. w/o c/o with lower bp's (upper 55R systolic most of tx), improved to 90's once tx. ended.

## 2021-07-22 NOTE — CONSULTS
Nutrition Education  Pt provided with renal, low sodium, low potassium, carbohydrate controlled diet handouts. Handouts include recommended/not recommended foods and beverages, sample menus, and importance of eat portion of diet. · Verbally reviewed information with Patient  · Educated on Renal, DM, low sodium, low potassium diet. · Written educational materials provided. · Contact name and number provided.     Electronically signed by Lissette Kilpatrick RD, ODILON on 7/22/21 at 2:30 PM EDT    Contact: [phone]

## 2021-07-22 NOTE — PROGRESS NOTES
Department of Internal Medicine        CHIEF COMPLAINT: Left lower extremity weakness and a fall    Reason for Admission: Severe hyperkalemia    HISTORY OF PRESENT ILLNESS:      The patient is a 63 y.o. male who presents with increased weakness that started his morning involving right lower extremity. Patient also has some numbness. The ER note stated that it was his left lower extremity. Patient went to go walk one of his  doors and then he fell. Patient denied any problem with dizziness, chest pain, palpitations or syncope. Patient was supposed to go to dialysis this morning but he did not because of the above event. Patient stated that he checked his blood sugar and it was 80. He came to the emergency room and patient was bradycardic with heart rate of 46 blood pressure was normal 123/58 and normal temperature. Potassium was 8.7 with a sodium 130. Random blood sugar 235 in the ED. the troponin was 108. Hemoglobin 10.5. With all the above problems patient was admitted to the ICU for further evaluation. 7/20/2021  Patient seen examined on the ICU. Patient states he feels a bit better again today. He denies any chest pain, abdominal pain, nausea/vomiting, leg pain or unusual shortness of breath at rest.  Case discussed with patient's nurse at the bedside. Potassium is down to 6.3 today with a CO2 of 30. Blood sugars ranging 7698. WBC 6.2 with hemoglobin 11.0. Temperature is 97.9 with a heart rate of 78 blood pressure 118/64. O2 sat 95% on 4 L nasal cannula. Patient currently receiving dialysis again. Will increase activity today and if doing okay with vital signs stable and O2 sats stable patient be transferred to telemetry floor. Patient needs O2 saturations measured with activity on 4 L with the patient be discharged home last time on 2 L with activity. 7/21/2021  Patient seen examined and telemetry floor. Patient states he feels better today. Patient denies any chest or abdominal pain. Patient denies any right or left leg weakness. BUN/creatinine 23/6.3 with potassium still elevated at 6.6. Blood sugars ranging 121139. WBC 6.9 with hemoglobin 10.3. Temperature is 98.1 with heart rate in 96 and blood pressure 114/66. O2 sats 94% on room air at rest.    7/22/2021  Patient seen examined on telemetry floor. Patient went into atrial fibrillation with RVR this morning. Patient has no history of atrial fib in the past. Patient denies any chest pain and had questionable palpitations. Patient blood pressure was 105/64 with temperature 98.1 and O2 sat 94% on room air. Blood pressures over the last 24 hours ranges from 89/62113/60. Patient was given metoprolol 2.9 mg IV push and started on 50 mg Toprol-XL daily. With his low blood pressure we will hold his amlodipine and Apresoline. Patient had echocardiogram the other day and had normal EF with normal right and left atrium with no significant aortic or mitral valve disease. Past Medical History:    Past Medical History:   Diagnosis Date    Back pain     Diabetes mellitus ([facility]     DMII (diabetes mellitus, type 2) ([facility] 7/28/2015    [facility]od[facility] ([facility]     History of cardiovascular stress test 2/16/2015    lexiscan    HTN (hypertension) 7/28/2015    Hyperlipidemia     Hypertension     Lumbar radicular pain 7/28/2015    Oxygen dependent     wears 2 liters at home    Type II or unspecified type diabetes mellitus without mention of complication, not stated as uncontrolled      Past Surgical History:    Past Surgical History:   Procedure Laterality Date    OTHER SURGICAL HISTORY Left 06/06/14    cain bunionectomy left foot with osteomed screw x1    SHOULDER SURGERY      Bilateral    VEIN SURGERY         Medications Prior to Admission:    @  Prior to Admission medications    Medication Sig Start Date End Date Taking?  Authorizing Provider   albuterol sulfate HFA (PROVENTIL HFA) 108 (90 Base) MCG/ACT inhaler Inhale 2 puffs into the lungs every 4 hours as needed for Wheezing or Shortness of Breath 7/1/21  Yes Shana Neri, DO   hydrALAZINE (APRESOLINE) 50 MG tablet Take 50 mg by mouth 2 times daily   Yes Historical Provider, MD   empagliflozin (JARDIANCE) 10 MG tablet Take 10 mg by mouth daily   Yes Historical Provider, MD   sevelamer (RENVELA) 800 MG tablet Take 2 tablets by mouth 3 times daily (with meals)   Yes Historical Provider, MD   sevelamer (RENVELA) 800 MG tablet Take 1 tablet by mouth 2 times daily as needed (With Snacks)   Yes Historical Provider, MD   OXYGEN Inhale 2 L into the lungs continuous   Yes Historical Provider, MD   iron sucrose (VENOFER) 20 MG/ML injection Infuse 50 mg intravenously once a week Friday   Yes Historical Provider, MD   ipratropium-albuterol (DUONEB) 0.5-2.5 (3) MG/3ML SOLN nebulizer solution Inhale 3 mLs into the lungs every 4 hours as needed for Shortness of Breath 2/13/20  Yes Shana Neri DO   calcitRIOL (ROCALTROL) 0.25 MCG capsule Take 1.5 mcg by mouth three times a week Monday, Wednesday, Friday   Yes Historical Provider, MD   insulin glargine (LANTUS) 100 UNIT/ML injection vial Inject 55 Units into the skin nightly    Yes Historical Provider, MD   amLODIPine (NORVASC) 10 MG tablet Take 5 mg by mouth 2 times daily    Yes Historical Provider, MD   COVID-19 mRNA Vacc, Moderna, 100 MCG/0.5ML SUSP injection Inject 0.5 mLs into the muscle once 4/7/2021 - COMPLETED    Historical Provider, MD   sodium bicarbonate 650 MG tablet Take 2 tablets by mouth 3 times daily 2/13/20   Shana Neri DO   gabapentin (NEURONTIN) 100 MG capsule Take 100 mg by mouth 2 times daily. Historical Provider, MD       Allergies:   Other    Social History:   Social History     Socioeconomic History    Marital status: Single     Spouse name: Not on file    Number of children: Not on file    Years of education: Not on file    Highest education level: Not on file   Occupational History    Not on file   Tobacco Use    Smoking status: Former Smoker     Packs/day: 1.00     Years: 30.00     Pack years: 30.00     Quit date: 2021     Years since quittin.0    Smokeless tobacco: Never Used   Vaping Use    Vaping Use: Never used   Substance and Sexual Activity    Alcohol use: No    Drug use: No    Sexual activity: Not on file   Other Topics Concern    Not on file   Social History Narrative    Not on file     Social Determinants of Health     Financial Resource Strain: Low Risk     Difficulty of Paying Living Expenses: Not very hard   Food Insecurity: No Food Insecurity    Worried About Running Out of Food in the Last Year: Never true    Alexis of Food in the Last Year: Never true   Transportation Needs: No Transportation Needs    Lack of Transportation (Medical): No    Lack of Transportation (Non-Medical): No   Physical Activity: Inactive    Days of Exercise per Week: 0 days    Minutes of Exercise per Session: 0 min   Stress: No Stress Concern Present    Feeling of Stress : Only a little   Social Connections: Unknown    Frequency of Communication with Friends and Family: Three times a week    Frequency of Social Gatherings with Friends and Family: Twice a week    Attends [employer]: 1 to 4 times per year    Active Member of [employer] or Organizations: No    Attends Club or Organization Meetings: Never    Marital Status: Patient refused   Intimate Partner Violence: Unknown    Fear of Current or Ex-Partner: Patient refused    Emotionally Abused: No    Physically Abused: No    Sexually Abused: No       Family History:   Family History   Problem Relation Age of Onset    Kidney Disease Sister        REVIEW OF SYSTEMS:    Gen: Patient denies any lightheadedness or dizziness. No LOC or syncope. No fevers or chills. HEENT: No earache, sore throat or nasal congestion. Resp: Denies cough, hemoptysis or sputum production. Cardiac: Denies chest pain, SOB, diaphoresis or palpitations.     GI: No nausea, vomiting, diarrhea or constipation. No melena or hematochezia. : No urinary complaints, dysuria, hematuria or frequency. MSK: + Right lower leg weakness, no paralysis      PHYSICAL EXAM:    Vitals:  /64   Pulse 99   Temp 98.1 °F (36.7 °C) (Oral)   Resp 18   Ht 5' 6\" (1.676 m)   Wt 194 lb 7.1 oz (88.2 kg)   SpO2 94%   BMI 31.38 kg/m²     General:  This is a 63 y.o. yo male who is alert and oriented in NAD  HEENT:  Head is normocephalic and atraumatic, PERRLA, EOMI, mucus membranes moist with no pharyngeal erythema or exudate. Neck:  Supple with no carotid bruits, JVD or thyromegaly.   No cervical adenopathy  CV:  Regular rate and rhythm, no murmurs  Lungs:  Clear to auscultation bilaterally with no wheezes, rales or rhonchi  Abdomen:  Soft, nontender, + abdominal fat, nondistended, bowel sounds present  Extremities:  No edema, peripheral pulses intact bilaterally  Neuro:  Cranial nerves II-XII grossly intact; motor and sensory function intact with no focal deficits  Skin:  No rashes, lesions or wounds    DATA:  CBC with Differential:    Lab Results   Component Value Date    WBC 6.9 07/21/2021    RBC 3.66 07/21/2021    HGB 10.3 07/21/2021    HCT 33.9 07/21/2021     07/21/2021    MCV 92.6 07/21/2021    MCH 28.1 07/21/2021    MCHC 30.4 07/21/2021    RDW 18.9 07/21/2021    LYMPHOPCT 26.2 07/20/2021    MONOPCT 15.9 07/20/2021    BASOPCT 0.2 07/20/2021    MONOSABS 0.99 07/20/2021    LYMPHSABS 1.63 07/20/2021    EOSABS 0.39 07/20/2021    BASOSABS 0.01 07/20/2021     CMP:    Lab Results   Component Value Date     07/22/2021    K 5.5 07/22/2021    K 8.7 07/19/2021    CL 94 07/22/2021    CO2 28 07/22/2021    BUN 24 07/22/2021    CREATININE 5.7 07/22/2021    GFRAA 12 07/22/2021    LABGLOM 12 07/22/2021    GLUCOSE 120 07/22/2021    PROT 8.3 07/22/2021    LABALBU 3.6 07/22/2021    CALCIUM 10.2 07/22/2021    BILITOT 0.3 07/22/2021    ALKPHOS 59 07/22/2021    AST 11 07/22/2021    ALT 7 07/22/2021     Magnesium:    Lab Results   Component Value Date    MG 2.7 07/21/2021     Phosphorus:    Lab Results   Component Value Date    PHOS 6.5 07/21/2021     PT/INR:    Lab Results   Component Value Date    PROTIME 10.8 07/19/2021    INR 0.9 07/19/2021     Troponin:    Lab Results   Component Value Date    TROPONINI 0.10 03/21/2021     U/A:    Lab Results   Component Value Date    COLORU Yellow 02/02/2020    PROTEINU >=300 02/02/2020    PHUR 5.0 02/02/2020    LABCAST RARE 09/21/2018    WBCUA 0-1 02/02/2020    RBCUA NONE 02/02/2020    BACTERIA NONE 02/02/2020    CLARITYU Clear 02/02/2020    SPECGRAV 1.025 02/02/2020    LEUKOCYTESUR Negative 02/02/2020    UROBILINOGEN 0.2 02/02/2020    BILIRUBINUR Negative 02/02/2020    BLOODU TRACE 02/02/2020    GLUCOSEU 250 02/02/2020    AMORPHOUS FEW 10/02/2019     ABG:    Lab Results   Component Value Date    PH 7.419 06/20/2021    PCO2 47.3 02/08/2020    PO2 140.5 02/08/2020    HCO3 26.3 02/08/2020    BE 3.1 06/20/2021    O2SAT 98.5 06/20/2021     HgBA1c:    Lab Results   Component Value Date    LABA1C 7.4 06/28/2021     FLP:    Lab Results   Component Value Date    TRIG 106 03/21/2021    HDL 36 03/21/2021    LDLCALC 66 03/21/2021    LABVLDL 21 03/21/2021     TSH:    Lab Results   Component Value Date    TSH 1.210 06/21/2021     IRON:    Lab Results   Component Value Date    IRON 19 02/09/2020     LIPASE:    Lab Results   Component Value Date    LIPASE 78 09/24/2018       ASSESSMENT AND PLAN:      Patient Active Problem List    Diagnosis Date Noted    Hallux valgus, acquired 06/06/2014    Hyperkalemia 07/19/2021    Acute respiratory failure with hypoxia ([facility] 06/20/2021    Respiratory failure ([facility] 02/02/2020    Pneumonia 01/28/2020    Acute pancreatitis without necrosis or infection, unspecified 09/23/2018    Idiopathic acute pancreatitis 09/21/2018    B12 deficiency 03/17/2016    DMII [facility]di[facility] mellitus, [facility]) [facility] 07/28/2015    HTN (hypertension) 07/28/2015    Lumbar radicular pain 07/28/2015    Spinal stenosis 07/28/2015     Impression:  1. Severe hyperkalemia  2. History of fall with associated left leg weakness  3. Chronic renal failure with hemodialysis  4. Insulin-dependent diabetes mellitus type 2 with possible hypoglycemic reaction in the morning  5. Hypertension  6. Elevated troponin  7. Normocytic anemia  8. History of lumbar spinal stenosis  9. Hyperlipidemia  10. Acute on subacute hypoxic respiratory failure  11. New onset atrial fibrillation with RVR    Plan:  Patient admitted to the ICU  Home medications reviewed  Consult nephrology  Consult intensivist  Glucoscans 4 times daily with sliding scale insulin  Patient received IV bicarb and calcium in the ED    Renal diet-patient noncompliant with renal diet outpatient  Activity up with assistance  Dialysis 7/19, 7/20    O2 saturation on 2-4 L nasal cannula with activity. Titrate O2 nasal cannula to keep O2 saturation greater than 90% with activity. Consult dietitian to see patient regarding renallow potassium diet    Lopressor 2.5 mg IV push x1  Start Toprol-XL 50 mg p.o. daily  Hold amlodipine, Apresoline  Echocardiogram 7/19/2021 shows EF 60% with stage I diastolic dysfunction, trace MR with normal left/right atrium. Routine labs in a.m.        Vivienne Terrazas DO, D.O.  7/22/2021  9:11 AM

## 2021-07-22 NOTE — PROGRESS NOTES
Department of Internal Medicine  Nephrology Attending Progress Note    SUBJECTIVE:  We are following this patient for end-stage renal failure with hyperkalemia    7/22/2021: Resting in bed, states he is feeling better. Denies sob, dizziness, pain. Bert Valente    PROBLEM LIST:    Patient Active Problem List   Diagnosis    Hallux valgus, acquired    DMII (diabetes mellitus, type 2) ([facility]    HTN (hypertension)    Lumbar radicular pain    Spinal stenosis    B12 deficiency    Idiopathic acute pancreatitis    Acute pancreatitis without necrosis or infection, unspecified    Pneumonia    Respiratory failure ([facility]    Acute respiratory failure with hypoxia ([address]    Hyperkalemia        PAST MEDICAL HISTORY:    Past Medical History:   Diagnosis Date    Back pain     Diabetes mellitus ([facility]     DMII (diabetes mellitus, type 2) ([facility] 7/28/2015    Hemodialysis patient ([facility]     History of cardiovascular stress test 2/16/2015    lexiscan    HTN (hypertension) 7/28/2015    Hyperlipidemia     Hypertension     Lumbar radicular pain 7/28/2015    Oxygen dependent     wears 2 liters at home    Type II or unspecified type diabetes mellitus without mention of complication, not stated as uncontrolled        MEDS (scheduled):   calcium gluconate IVPB  1,000 mg Intravenous Once    insulin glargine  9 Units Subcutaneous Nightly    gabapentin  100 mg Oral BID    calcitRIOL  1.5 mcg Oral Once per day on Mon Wed Fri    sodium bicarbonate  1,300 mg Oral TID    hydrALAZINE  50 mg Oral BID    empagliflozin  10 mg Oral Daily    sevelamer  1,600 mg Oral TID WC    cefdinir  300 mg Oral Daily    insulin lispro  0-12 Units Subcutaneous TID     insulin lispro  0-6 Units Subcutaneous Nightly    amLODIPine  10 mg Oral Daily    heparin (porcine)  5,000 Units Subcutaneous 3 times per day    [START ON 7/23/2021] ferric gluconate (FERRLECIT) IVPB  62.5 mg Intravenous Weekly    sodium chloride flush  5-40 mL Intravenous BID MEDS (infusions):   dextrose         MEDS (prn):  acetaminophen, perflutren lipid microspheres, glucose, dextrose, glucagon (rDNA), dextrose, ipratropium-albuterol, trimethobenzamide, sodium chloride flush, heparin (porcine)    DIET:    ADULT DIET; Regular; Low Potassium (Less than 3000 mg/day);  Low Phosphorus (Less than 1000 mg)      PHYSICAL EXAM:      Patient Vitals for the past 24 hrs:   BP Temp Temp src Pulse Resp SpO2 Weight   07/21/21 2030 (!) 113/58 98.6 °F (37 °C) Oral 109 18 99 %    07/21/21 1400 108/64 98.3 °F (36.8 °C)  95 18  194 lb 7.1 oz (88.2 kg)   07/21/21 1357 94/61   101      07/21/21 1330 109/63   96      07/21/21 1305 102/62   98      07/21/21 1300 89/62   103      07/21/21 1230 106/65   95      07/21/21 1215 114/67   88      07/21/21 1200 95/66   99      07/21/21 1130 104/63   84      07/21/21 1100 (!) 111/59   85      07/21/21 1030 124/62   83      07/21/21 1000 114/66   96      07/21/21 0923 (!) 124/58 98.1 °F (36.7 °C)  70 18  201 lb 8 oz (91.4 kg)   07/21/21 0832 130/63   71      07/21/21 0821 133/66 97.3 °F (36.3 °C) Oral 76 18 94 %           Intake/Output Summary (Last 24 hours) at 7/22/2021 0703  Last data filed at 7/21/2021 1400  Gross per 24 hour   Intake 2160 ml   Output 4800 ml   Net -2640 ml       Wt Readings from Last 3 Encounters:   07/21/21 194 lb 7.1 oz (88.2 kg)   07/01/21 208 lb 9.6 oz (94.6 kg)   06/23/21 209 lb 14.1 oz (95.2 kg)       Constitutional:  Patient in no acute distress  Head: normocephalic, atraumatic  Cardiovascular: RRR, no rub  Respiratory:  Lungs Clear upper, diminished in bsases  Gastrointestinal: soft, nontender, nondistended, + bowel sounds  Ext: neg. edema   Neuro: awake and alert        DATA:      Recent Labs     07/19/21  1139 07/20/21  0526 07/21/21  0558   WBC 8.1 6.2 6.9   HGB 10.5* 11.0* 10.3*   HCT 34.3* 36.7* 33.9*   MCV 91.5 92.9 92.6    149 110*     Recent Labs goal <140/90  Continue to monitor     6. Hyperkalemia-  Missed treatment as OP 7/19  S/p stat HD treatment 7/19 for K 8.7  HD treatment on 7/20 and again today  Correct with dialysis. Low K+ diet     7. New onset Afib with RVR  Toprol added per IM  Amlodipine and apresoline on hold      VENANCIO Hartman       Electronically signed by VENANCIO Hartman on 7/22/2021 at 7:03 AM  Patient seen and examined. I had a face to face encounter with the patient. Agree with exam.    Agree with  formulation, assessment and plan as outlined above and directed by me. Addition and corrections were done as deemed appropriate. My exam and plan include:  Patient with recurrent hyperkalemia. We'll dialyze again. Continue current treatment.       Phil Rodriguez MD  Nephrology        Electronically signed by Phil Rodriguez MD on 7/22/2021 at 2:21 PM

## 2021-07-23 LAB
ALBUMIN SERPL-MCNC: 3.4 G/DL (ref 3.5–5.2)
ALP BLD-CCNC: 54 U/L (ref 40–129)
ALT SERPL-CCNC: 7 U/L (ref 0–40)
ANION GAP SERPL CALCULATED.3IONS-SCNC: 12 MMOL/L (ref 7–16)
AST SERPL-CCNC: 11 U/L (ref 0–39)
BILIRUB SERPL-MCNC: 0.2 MG/DL (ref 0–1.2)
BUN BLDV-MCNC: 30 MG/DL (ref 6–23)
CALCIUM SERPL-MCNC: 9.6 MG/DL (ref 8.6–10.2)
CHLORIDE BLD-SCNC: 91 MMOL/L (ref 98–107)
CHOLESTEROL, TOTAL: 141 MG/DL (ref 0–199)
CO2: 27 MMOL/L (ref 22–29)
CREAT SERPL-MCNC: 6.5 MG/DL (ref 0.7–1.2)
GFR AFRICAN AMERICAN: 11
GFR NON-AFRICAN AMERICAN: 11 ML/MIN/1.73
GLUCOSE BLD-MCNC: 129 MG/DL (ref 74–99)
HDLC SERPL-MCNC: 60 MG/DL
LDL CHOLESTEROL CALCULATED: 65 MG/DL (ref 0–99)
METER GLUCOSE: 132 MG/DL (ref 74–99)
METER GLUCOSE: 140 MG/DL (ref 74–99)
METER GLUCOSE: 181 MG/DL (ref 74–99)
METER GLUCOSE: 216 MG/DL (ref 74–99)
POTASSIUM SERPL-SCNC: 5.2 MMOL/L (ref 3.5–5)
SODIUM BLD-SCNC: 130 MMOL/L (ref 132–146)
TOTAL PROTEIN: 7.5 G/DL (ref 6.4–8.3)
TRIGL SERPL-MCNC: 79 MG/DL (ref 0–149)
VLDLC SERPL CALC-MCNC: 16 MG/DL

## 2021-07-23 PROCEDURE — 36415 COLL VENOUS BLD VENIPUNCTURE: CPT

## 2021-07-23 PROCEDURE — 6360000002 HC RX W HCPCS: Performed by: INTERNAL MEDICINE

## 2021-07-23 PROCEDURE — 6370000000 HC RX 637 (ALT 250 FOR IP): Performed by: INTERNAL MEDICINE

## 2021-07-23 PROCEDURE — 2700000000 HC OXYGEN THERAPY PER DAY

## 2021-07-23 PROCEDURE — 90935 HEMODIALYSIS ONE EVALUATION: CPT

## 2021-07-23 PROCEDURE — 2580000003 HC RX 258: Performed by: INTERNAL MEDICINE

## 2021-07-23 PROCEDURE — 1200000000 HC SEMI PRIVATE

## 2021-07-23 PROCEDURE — 99222 1ST HOSP IP/OBS MODERATE 55: CPT | Performed by: INTERNAL MEDICINE

## 2021-07-23 PROCEDURE — 80053 COMPREHEN METABOLIC PANEL: CPT

## 2021-07-23 PROCEDURE — 80061 LIPID PANEL: CPT

## 2021-07-23 PROCEDURE — 82962 GLUCOSE BLOOD TEST: CPT

## 2021-07-23 RX ADMIN — HEPARIN SODIUM 5000 UNITS: 5000 INJECTION INTRAVENOUS; SUBCUTANEOUS at 05:52

## 2021-07-23 RX ADMIN — INSULIN GLARGINE 6 UNITS: 100 INJECTION, SOLUTION SUBCUTANEOUS at 21:57

## 2021-07-23 RX ADMIN — SODIUM BICARBONATE 1300 MG: 650 TABLET ORAL at 08:33

## 2021-07-23 RX ADMIN — SEVELAMER CARBONATE 1600 MG: 800 TABLET, FILM COATED ORAL at 17:54

## 2021-07-23 RX ADMIN — GABAPENTIN 100 MG: 100 CAPSULE ORAL at 08:33

## 2021-07-23 RX ADMIN — SODIUM CHLORIDE 62.5 MG: 9 INJECTION, SOLUTION INTRAVENOUS at 19:04

## 2021-07-23 RX ADMIN — METOPROLOL SUCCINATE 50 MG: 50 TABLET, EXTENDED RELEASE ORAL at 17:54

## 2021-07-23 RX ADMIN — SODIUM CHLORIDE, PRESERVATIVE FREE 10 ML: 5 INJECTION INTRAVENOUS at 21:58

## 2021-07-23 RX ADMIN — SODIUM BICARBONATE 1300 MG: 650 TABLET ORAL at 21:52

## 2021-07-23 RX ADMIN — INSULIN LISPRO 1 UNITS: 100 INJECTION, SOLUTION INTRAVENOUS; SUBCUTANEOUS at 21:57

## 2021-07-23 RX ADMIN — APIXABAN 5 MG: 5 TABLET, FILM COATED ORAL at 21:52

## 2021-07-23 RX ADMIN — GABAPENTIN 100 MG: 100 CAPSULE ORAL at 21:52

## 2021-07-23 RX ADMIN — SEVELAMER CARBONATE 1600 MG: 800 TABLET, FILM COATED ORAL at 08:32

## 2021-07-23 RX ADMIN — SODIUM CHLORIDE, PRESERVATIVE FREE 10 ML: 5 INJECTION INTRAVENOUS at 08:32

## 2021-07-23 RX ADMIN — CALCITRIOL 1.5 MCG: 0.25 CAPSULE ORAL at 08:32

## 2021-07-23 ASSESSMENT — PAIN DESCRIPTION - PROGRESSION
CLINICAL_PROGRESSION: NOT CHANGED
CLINICAL_PROGRESSION: NOT CHANGED

## 2021-07-23 ASSESSMENT — PAIN SCALES - GENERAL
PAINLEVEL_OUTOF10: 0

## 2021-07-23 NOTE — CARE COORDINATION
SS Note: No covid testing. Pt attends HD at [facility]. MWF at 11:05 am. Pt is active with [facility], [address] [facility] [place]. Pt has 02 at 2 liters provided by [employer], pt relays he needs a portable tank to go home on, liaison Bernice Chávez to bring tank. SW met with pt, stated [employer] did not deliver a tank yesterday as planned, SW spoke to Bernice Chávez at [facility] and a tank will be delivered to pt's room this evening. Pt relays he will pay for a taxi home which costs $7.00 via [employer] states he paid for the last ride home when he arrived home. JEANA/NIKI will continue to follow for transition of care planning.   Electronically signed by FLY Love on 7/23/2021 at 3:46 PM

## 2021-07-23 NOTE — PLAN OF CARE
Problem: Falls - Risk of:  Goal: Will remain free from falls  Description: Will remain free from falls  Outcome: Met This Shift  Goal: Absence of physical injury  Description: Absence of physical injury  Outcome: Met This Shift     Problem: Tissue Perfusion - Renal, Altered:  Goal: Electrolytes within specified parameters  Description: Electrolytes within specified parameters  Outcome: Met This Shift  Goal: Serum creatinine will be within specified parameters  Description: Serum creatinine will be within specified parameters  Outcome: Met This Shift  Goal: Ability to achieve a balanced intake and output will improve  Description: Ability to achieve a balanced intake and output will improve  Outcome: Met This Shift     Problem: Fluid Volume:  Goal: Ability to achieve a balanced intake and output will improve  Description: Ability to achieve a balanced intake and output will improve  Outcome: Met This Shift     Problem: Physical Regulation:  Goal: Ability to maintain clinical measurements within normal limits will improve  Description: Ability to maintain clinical measurements within normal limits will improve  Outcome: Met This Shift  Goal: Will show no signs and symptoms of electrolyte imbalance  Description: Will show no signs and symptoms of electrolyte imbalance  Outcome: Met This Shift     Problem: Fluid Volume - Imbalance:  Goal: Absence of imbalanced fluid volume signs and symptoms  Description: Absence of imbalanced fluid volume signs and symptoms  Outcome: Met This Shift     Problem: Pain:  Goal: Pain level will decrease  Description: Pain level will decrease  Outcome: Met This Shift  Goal: Control of acute pain  Description: Control of acute pain  Outcome: Met This Shift  Goal: Control of chronic pain  Description: Control of chronic pain  Outcome: Met This Shift

## 2021-07-23 NOTE — PROGRESS NOTES
Department of Internal Medicine  Nephrology Attending Progress Note    SUBJECTIVE:  We are following this patient for end-stage renal failure with hyperkalemia    7/23/2021: Examined during HD treatment. States he feels good, denies weakness. Roxie Reveles    PROBLEM LIST:    Patient Active Problem List   Diagnosis    Hallux valgus, acquired    [facility]di[facility] 2) [facility]    HTN (hypertension)    Lumbar radicular pain    Spinal stenosis    B12 deficiency    Idiopathic acute pancreatitis    Acute pancreatitis without necrosis or infection, unspecified    Pneumonia    Respiratory failure ([facility]    Acute respiratory failure with hypoxia ([facility]    Hyperkalemia        PAST MEDICAL HISTORY:    Past Medical History:   Diagnosis Date    Back pain     Diabetes mellitus ([facility]     DMII (diabetes mellitus, type 2) ([facility] 7/28/2015    Hemodialysis patient ([facility]     History of cardiovascular stress test 2/16/2015    lexiscan    HTN (hypertension) 7/28/2015    Hyperlipidemia     Hypertension     Lumbar radicular pain 7/28/2015    Oxygen dependent     wears 2 liters at home    Type II or unspecified type diabetes mellitus without mention of complication, not stated as uncontrolled        MEDS (scheduled):   insulin glargine  6 Units Subcutaneous Nightly    metoprolol succinate  50 mg Oral Daily    calcium gluconate IVPB  1,000 mg Intravenous Once    gabapentin  100 mg Oral BID    calcitRIOL  1.5 mcg Oral Once per day on Mon Wed Fri    sodium bicarbonate  1,300 mg Oral TID    [Held by provider] hydrALAZINE  50 mg Oral BID    empagliflozin  10 mg Oral Daily    sevelamer  1,600 mg Oral TID WC    cefdinir  300 mg Oral Daily    insulin lispro  0-12 Units Subcutaneous TID WC    insulin lispro  0-6 Units Subcutaneous Nightly    [Held by provider] amLODIPine  10 mg Oral Daily    heparin (porcine)  5,000 Units Subcutaneous 3 times per day    ferric gluconate (FERRLECIT) IVPB  62.5 mg Intravenous Weekly  sodium chloride flush  5-40 mL Intravenous BID       MEDS (infusions):   dextrose         MEDS (prn):  acetaminophen, perflutren lipid microspheres, glucose, dextrose, glucagon (rDNA), dextrose, ipratropium-albuterol, trimethobenzamide, sodium chloride flush, heparin (porcine)    DIET:    ADULT DIET; Regular; Low Potassium (Less than 3000 mg/day);  Low Phosphorus (Less than 1000 mg)      PHYSICAL EXAM:      Patient Vitals for the past 24 hrs:   BP Temp Temp src Pulse Resp SpO2 Weight   07/23/21 1130 (!) 107/58 98.4 °F (36.9 °C) Oral 67 18  202 lb 3.2 oz (91.7 kg)   07/23/21 1115 102/61 98.4 °F (36.9 °C)  67 16     07/23/21 0807 126/80 98.4 °F (36.9 °C) Oral 65 18 96 %    07/22/21 2030 (!) 86/57 98.2 °F (36.8 °C) Oral 91 17 96 %    07/22/21 1645 115/65   84 16 97 %    07/22/21 1630 94/63 98.3 °F (36.8 °C)  91 16  198 lb 6.6 oz (90 kg)   07/22/21 1600 (!) 88/51   86      07/22/21 1500 90/63   72      07/22/21 1430 (!) 87/52   68      07/22/21 1400 (!) 88/55   76      07/22/21 1330 (!) 93/53   62             Intake/Output Summary (Last 24 hours) at 7/23/2021 1258  Last data filed at 7/23/2021 1206  Gross per 24 hour   Intake 1200 ml   Output 1100 ml   Net 100 ml       Wt Readings from Last 3 Encounters:   07/23/21 202 lb 3.2 oz (91.7 kg)   07/01/21 208 lb 9.6 oz (94.6 kg)   06/23/21 209 lb 14.1 oz (95.2 kg)       Constitutional:  Patient in no acute distress  Head: normocephalic, atraumatic  Cardiovascular: RRR, no rub  Respiratory:  Lungs Clear upper, diminished in bsases  Gastrointestinal: soft, nontender, nondistended, + bowel sounds  Ext: neg. edema   Neuro: awake and alert        DATA:      Recent Labs     07/21/21  0558   WBC 6.9   HGB 10.3*   HCT 33.9*   MCV 92.6   *     Recent Labs     07/21/21  0558 07/22/21  0635 07/23/21  0724    133 130*   K 6.6* 5.5* 5.2*   CL 96* 94* 91*   CO2 29 28 27   BUN 23 24* 30*   CREATININE 6.3* 5.7* 6.5*   LABGLOM 11 12 11 GLUCOSE 121* 120* 129*   CALCIUM 9.6 10.2 9.6     Recent Labs     07/22/21  0635 07/23/21  0724   ALT 7 7     Lab Results   Component Value Date    LABALBU 3.4 (L) 07/23/2021    LABALBU 3.6 07/22/2021    LABALBU 3.2 (L) 07/20/2021       Iron studies:  Lab Results   Component Value Date    FERRITIN 188 11/13/2019    IRON 19 (L) 02/09/2020    TIBC 189 (L) 02/09/2020     Vitamin B-12   Date Value Ref Range Status   02/09/2020 320 211 - 946 pg/mL Final     Folate   Date Value Ref Range Status   02/09/2020 9.6 4.8 - 24.2 ng/mL Final       Bone disease:  Lab Results   Component Value Date    MG 2.7 (H) 07/21/2021    PHOS 6.5 (H) 07/21/2021     Vit D, 25-Hydroxy   Date Value Ref Range Status   06/22/2021 40 30 - 100 ng/mL Final     Comment:     <20 ng/mL. ........... Marnell Abdirizak Deficient  20-30 ng/mL. ......... Marnell Abdirizak Insufficient   ng/mL. ........ Marnell Abdirizak Sufficient  >100 ng/mL. .......... Marnell Abdirizak Toxic       PTH   Date Value Ref Range Status   01/23/2020 154 (H) 15 - 65 pg/mL Final       No components found for: URIC    Lab Results   Component Value Date    COLORU Yellow 02/02/2020    NITRU Negative 02/02/2020    GLUCOSEU 250 02/02/2020    KETUA Negative 02/02/2020    UROBILINOGEN 0.2 02/02/2020    BILIRUBINUR Negative 02/02/2020       No results found for: Nancy Gastelum        IMPRESSION/RECOMMENDATIONS:      1. ESKD-on IHD MWF via a R IJ TDC  Continue MWF schedule as at [facility]  HD today      2. 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 Hyperkalemia-  Missed treatment as OP 7/19  S/p stat HD treatment 7/19 for K 8.7  HD treatment on 7/20 and again today  Correct with dialysis. Low K+ diet     7. New onset Afib with RVR  Toprol added per IM  Amlodipine and apresoline on hold  Cardiac consulted      VENANCIO Damian         Electronically signed by VENANCIO Damian on 7/23/2021 at 12:58 PM     Patient seen and examined on dialysis. Feeling better. . I had a face to face encounter with the patient. Agree with exam.    Agree with  formulation, assessment and plan as outlined above and directed by me. Addition and corrections were done as deemed appropriate. My exam and plan include:     Continue current treatment.       Hilda Berger MD  Nephrology        Electronically signed by Hilda Berger MD on 7/23/2021 at 8:12 PM

## 2021-07-23 NOTE — PROGRESS NOTES
Department of Internal Medicine        CHIEF COMPLAINT: Left lower extremity weakness and a fall    Reason for Admission: Severe hyperkalemia    HISTORY OF PRESENT ILLNESS:      The patient is a 63 y.o. male who presents with increased weakness that started his morning involving right lower extremity. Patient also has some numbness. The ER note stated that it was his left lower extremity. Patient went to go walk one of his  doors and then he fell. Patient denied any problem with dizziness, chest pain, palpitations or syncope. Patient was supposed to go to dialysis this morning but he did not because of the above event. Patient stated that he checked his blood sugar and it was 80. He came to the emergency room and patient was bradycardic with heart rate of 46 blood pressure was normal 123/58 and normal temperature. Potassium was 8.7 with a sodium 130. Random blood sugar 235 in the ED. the troponin was 108. Hemoglobin 10.5. With all the above problems patient was admitted to the ICU for further evaluation. 7/20/2021  Patient seen examined on the ICU. Patient states he feels a bit better again today. He denies any chest pain, abdominal pain, nausea/vomiting, leg pain or unusual shortness of breath at rest.  Case discussed with patient's nurse at the bedside. Potassium is down to 6.3 today with a CO2 of 30. Blood sugars ranging 7698. WBC 6.2 with hemoglobin 11.0. Temperature is 97.9 with a heart rate of 78 blood pressure 118/64. O2 sat 95% on 4 L nasal cannula. Patient currently receiving dialysis again. Will increase activity today and if doing okay with vital signs stable and O2 sats stable patient be transferred to telemetry floor. Patient needs O2 saturations measured with activity on 4 L with the patient be discharged home last time on 2 L with activity. 7/21/2021  Patient seen examined and telemetry floor. Patient states he feels better today. Patient denies any chest or abdominal pain. Patient denies any right or left leg weakness. BUN/creatinine 23/6.3 with potassium still elevated at 6.6. Blood sugars ranging 121139. WBC 6.9 with hemoglobin 10.3. Temperature is 98.1 with heart rate in 96 and blood pressure 114/66. O2 sats 94% on room air at rest.    7/22/2021  Patient seen examined on telemetry floor. Patient went into atrial fibrillation with RVR this morning. Patient has no history of atrial fib in the past. Patient denies any chest pain and had questionable palpitations. Patient blood pressure was 105/64 with temperature 98.1 and O2 sat 94% on room air. Blood pressures over the last 24 hours ranges from 89/62113/60. Patient was given metoprolol 2.9 mg IV push and started on 50 mg Toprol-XL daily. With his low blood pressure we will hold his amlodipine and Apresoline. Patient had echocardiogram the other day and had normal EF with normal right and left atrium with no significant aortic or mitral valve disease. 7/23/2021  Patient seen examined on telemetry floor. Patient denies any problem chest pain, abdominal pain, dizziness, or unusual shortness of breath. Patient converted to sinus rhythm about 730 this morning. Potassium still 5.2 with blood sugars ranging 129181. Temperature 98.4 with heart rate of 65 blood pressure 126/80. O2 sat 96% on room air at rest.  Consult cardiology regarding the proximal atrial fib.       Past Medical History:    Past Medical History:   Diagnosis Date    Back pain     Diabetes mellitus ([facility]     DMII (diabetes mellitus, type 2) ([facility] 7/28/2015    Hemodialysis patient [facility])     History of cardiovascular stress test 2/16/2015    lexiscan    HTN (hypertension) 7/28/2015    Hyperlipidemia     Hypertension     Lumbar radicular pain 7/28/2015    Oxygen dependent     wears 2 liters at home    Type II or unspecified type diabetes mellitus without mention of complication, not stated as uncontrolled      Past Surgical History:    Past Surgical History:   Procedure Laterality Date    OTHER SURGICAL HISTORY Left 06/06/14    cain bunionectomy left foot with osteomed screw x1    SHOULDER SURGERY      Bilateral    VEIN SURGERY         Medications Prior to Admission:    @  Prior to Admission medications    Medication Sig Start Date End Date Taking?  Authorizing Provider   albuterol sulfate HFA (PROVENTIL HFA) 108 (90 Base) MCG/ACT inhaler Inhale 2 puffs into the lungs every 4 hours as needed for Wheezing or Shortness of Breath 7/1/21  Yes Oral Salvia, DO   hydrALAZINE (APRESOLINE) 50 MG tablet Take 50 mg by mouth 2 times daily   Yes Historical Provider, MD   empagliflozin (JARDIANCE) 10 MG tablet Take 10 mg by mouth daily   Yes Historical Provider, MD   sevelamer (RENVELA) 800 MG tablet Take 2 tablets by mouth 3 times daily (with meals)   Yes Historical Provider, MD   sevelamer (RENVELA) 800 MG tablet Take 1 tablet by mouth 2 times daily as needed (With Snacks)   Yes Historical Provider, MD   OXYGEN Inhale 2 L into the lungs continuous   Yes Historical Provider, MD   iron sucrose (VENOFER) 20 MG/ML injection Infuse 50 mg intravenously once a week Friday   Yes Historical Provider, MD   ipratropium-albuterol (DUONEB) 0.5-2.5 (3) MG/3ML SOLN nebulizer solution Inhale 3 mLs into the lungs every 4 hours as needed for Shortness of Breath 2/13/20  Yes Oral Salvia, DO   calcitRIOL (ROCALTROL) 0.25 MCG capsule Take 1.5 mcg by mouth three times a week Monday, Wednesday, Friday   Yes Historical Provider, MD   insulin glargine (LANTUS) 100 UNIT/ML injection vial Inject 55 Units into the skin nightly    Yes Historical Provider, MD   amLODIPine (NORVASC) 10 MG tablet Take 5 mg by mouth 2 times daily    Yes Historical Provider, MD   COVID-19 mRNA Vacc, Moderna, 100 MCG/0.5ML SUSP injection Inject 0.5 mLs into the muscle once 4/7/2021 - COMPLETED    Historical Provider, MD   sodium bicarbonate 650 MG tablet Take 2 tablets by mouth 3 times daily 2/13/20   Franci Dela Cruz KELECHI Rolle DO   gabapentin (NEURONTIN) 100 MG capsule Take 100 mg by mouth 2 times daily. Historical Provider, MD       Allergies: Other    Social History:   Social History     Socioeconomic History    Marital status: Single     Spouse name: Not on file    Number of children: Not on file    Years of education: Not on file    Highest education level: Not on file   Occupational History    Not on file   Tobacco Use    Smoking status: Former Smoker     Packs/day: 1.00     Years: 30.00     Pack years: 30.00     Quit date: 2021     Years since quittin.0    Smokeless tobacco: Never Used   Vaping Use    Vaping Use: Never used   Substance and Sexual Activity    Alcohol use: No    Drug use: No    Sexual activity: Not on file   Other Topics Concern    Not on file   Social History Narrative    Not on file     Social Determinants of Health     Financial Resource Strain: Low Risk     Difficulty of Paying Living Expenses: Not very hard   Food Insecurity: No Food Insecurity    Worried About Running Out of Food in the Last Year: Never true    Alexis of Food in the Last Year: Never true   Transportation Needs: No Transportation Needs    Lack of Transportation (Medical): No    Lack of Transportation (Non-Medical): No   Physical Activity: Inactive    Days of Exercise per Week: 0 days    Minutes of Exercise per Session: 0 min   Stress: No Stress Concern Present    Feeling of Stress : Only a little   Social Connections: Unknown    Frequency of Communication with Friends and Family:  Three times a week    Frequency of Social Gatherings with Friends and Family: Twice a week    Attends Spiritism Services: 1 to 4 times per year    Active Member of [employer] or Organizations: No    Attends Club or Organization Meetings: Never    Marital Status: Patient refused   Intimate Partner Violence: Unknown    Fear of Current or Ex-Partner: Patient refused    Emotionally Abused: No    Physically Abused: No    Sexually Abused: No       Family History:   Family History   Problem Relation Age of Onset    Kidney Disease Sister        REVIEW OF SYSTEMS:    Gen: Patient denies any lightheadedness or dizziness. No LOC or syncope. No fevers or chills. HEENT: No earache, sore throat or nasal congestion. Resp: Denies cough, hemoptysis or sputum production. Cardiac: Denies chest pain, SOB, diaphoresis or palpitations. GI: No nausea, vomiting, diarrhea or constipation. No melena or hematochezia. : No urinary complaints, dysuria, hematuria or frequency. MSK: + Right lower leg weakness, no paralysis      PHYSICAL EXAM:    Vitals:  /80   Pulse 65   Temp 98.4 °F (36.9 °C) (Oral)   Resp 18   Ht 5' 6\" (1.676 m)   Wt 198 lb 6.6 oz (90 kg)   SpO2 96%   BMI 32.02 kg/m²     General:  This is a 63 y.o. yo male who is alert and oriented in NAD  HEENT:  Head is normocephalic and atraumatic, PERRLA, EOMI, mucus membranes moist with no pharyngeal erythema or exudate. Neck:  Supple with no carotid bruits, JVD or thyromegaly.   No cervical adenopathy  CV:  Regular rate and rhythm, no murmurs  Lungs:  Clear to auscultation bilaterally with no wheezes, rales or rhonchi  Abdomen:  Soft, nontender, + abdominal fat, nondistended, bowel sounds present  Extremities:  No edema, peripheral pulses intact bilaterally  Neuro:  Cranial nerves II-XII grossly intact; motor and sensory function intact with no focal deficits  Skin:  No rashes, lesions or wounds    DATA:  CBC with Differential:    Lab Results   Component Value Date    WBC 6.9 07/21/2021    RBC 3.66 07/21/2021    HGB 10.3 07/21/2021    HCT 33.9 07/21/2021     07/21/2021    MCV 92.6 07/21/2021    MCH 28.1 07/21/2021    MCHC 30.4 07/21/2021    RDW 18.9 07/21/2021    LYMPHOPCT 26.2 07/20/2021    MONOPCT 15.9 07/20/2021    BASOPCT 0.2 07/20/2021    MONOSABS 0.99 07/20/2021    LYMPHSABS 1.63 07/20/2021    EOSABS 0.39 07/20/2021    BASOSABS 0.01 07/20/2021 CMP:    Lab Results   Component Value Date     07/23/2021    K 5.2 07/23/2021    K 8.7 07/19/2021    CL 91 07/23/2021    CO2 27 07/23/2021    BUN 30 07/23/2021    CREATININE 6.5 07/23/2021    GFRAA 11 07/23/2021    LABGLOM 11 07/23/2021    GLUCOSE 129 07/23/2021    PROT 7.5 07/23/2021    LABALBU 3.4 07/23/2021    CALCIUM 9.6 07/23/2021    BILITOT 0.2 07/23/2021    ALKPHOS 54 07/23/2021    AST 11 07/23/2021    ALT 7 07/23/2021     Magnesium:    Lab Results   Component Value Date    MG 2.7 07/21/2021     Phosphorus:    Lab Results   Component Value Date    PHOS 6.5 07/21/2021     PT/INR:    Lab Results   Component Value Date    PROTIME 10.8 07/19/2021    INR 0.9 07/19/2021     Troponin:    Lab Results   Component Value Date    TROPONINI 0.10 03/21/2021     U/A:    Lab Results   Component Value Date    COLORU Yellow 02/02/2020    PROTEINU >=300 02/02/2020    PHUR 5.0 02/02/2020    LABCAST RARE 09/21/2018    WBCUA 0-1 02/02/2020    RBCUA NONE 02/02/2020    BACTERIA NONE 02/02/2020    CLARITYU Clear 02/02/2020    SPECGRAV 1.025 02/02/2020    LEUKOCYTESUR Negative 02/02/2020    UROBILINOGEN 0.2 02/02/2020    BILIRUBINUR Negative 02/02/2020    BLOODU TRACE 02/02/2020    GLUCOSEU 250 02/02/2020    AMORPHOUS FEW 10/02/2019     ABG:    Lab Results   Component Value Date    PH 7.419 06/20/2021    PCO2 47.3 02/08/2020    PO2 140.5 02/08/2020    HCO3 26.3 02/08/2020    BE 3.1 06/20/2021    O2SAT 98.5 06/20/2021     HgBA1c:    Lab Results   Component Value Date    LABA1C 7.4 06/28/2021     FLP:    Lab Results   Component Value Date    TRIG 79 07/23/2021    HDL 60 07/23/2021    LDLCALC 65 07/23/2021    LABVLDL 16 07/23/2021     TSH:    Lab Results   Component Value Date    TSH 1.210 06/21/2021     IRON:    Lab Results   Component Value Date    IRON 19 02/09/2020     LIPASE:    Lab Results   Component Value Date    LIPASE 78 09/24/2018       ASSESSMENT AND PLAN:      Patient Active Problem List    Diagnosis Date Noted    Hallux valgus, acquired 06/06/2014    Hyperkalemia 07/19/2021    [facility] 06/20/2021    Respiratory failure ([facility] 02/02/2020    Pneumonia 01/28/2020    Acute pancreatitis without necrosis or infection, unspecified 09/23/2018    Idiopathic acute pancreatitis 09/21/2018    B12 deficiency 03/17/2016    DMII (diabetes mellitus, type 2) ([facility] 07/28/2015    HTN (hypertension) 07/28/2015    Lumbar radicular pain 07/28/2015    Spinal stenosis 07/28/2015     Impression:  1. Severe hyperkalemia  2. History of fall with associated left leg weakness  3. Chronic renal failure with hemodialysis  4. Insulin-dependent diabetes mellitus type 2 with possible hypoglycemic reaction in the morning  5. Hypertension  6. Elevated troponin  7. Normocytic anemia  8. History of lumbar spinal stenosis  9. Hyperlipidemia  10. Acute on subacute hypoxic respiratory failure  11. New onset atrial fibrillation with RVR    Plan:  Patient admitted to the ICU  Home medications reviewed  Consult nephrology  Consult intensivist  Glucoscans 4 times daily with sliding scale insulin  Patient received IV bicarb and calcium in the ED    Renal diet-patient noncompliant with renal diet outpatient  Activity up with assistance  Dialysis 7/19, 7/20    O2 saturation on 2-4 L nasal cannula with activity. Titrate O2 nasal cannula to keep O2 saturation greater than 90% with activity. Consult dietitian to see patient regarding renallow potassium diet    Lopressor 2.5 mg IV push x1  Start Toprol-XL 50 mg p.o. daily  Hold amlodipine, Apresoline  Echocardiogram 7/19/2021 shows EF 60% with stage I diastolic dysfunction, trace MR with normal left/right atrium. Consult cardiology regarding paroxysmal atrial fib  Routine labs in a.m.        Fransisco DO Elaine, D.O.  7/23/2021  8:27 AM

## 2021-07-24 LAB
ALBUMIN SERPL-MCNC: 3.5 G/DL (ref 3.5–5.2)
ALP BLD-CCNC: 53 U/L (ref 40–129)
ALT SERPL-CCNC: 8 U/L (ref 0–40)
ANION GAP SERPL CALCULATED.3IONS-SCNC: 12 MMOL/L (ref 7–16)
AST SERPL-CCNC: 12 U/L (ref 0–39)
BILIRUB SERPL-MCNC: <0.2 MG/DL (ref 0–1.2)
BUN BLDV-MCNC: 30 MG/DL (ref 6–23)
CALCIUM SERPL-MCNC: 9.8 MG/DL (ref 8.6–10.2)
CHLORIDE BLD-SCNC: 95 MMOL/L (ref 98–107)
CO2: 27 MMOL/L (ref 22–29)
CREAT SERPL-MCNC: 6.2 MG/DL (ref 0.7–1.2)
GFR AFRICAN AMERICAN: 11
GFR NON-AFRICAN AMERICAN: 11 ML/MIN/1.73
GLUCOSE BLD-MCNC: 126 MG/DL (ref 74–99)
HCT VFR BLD CALC: 37.3 % (ref 37–54)
HEMOGLOBIN: 11.3 G/DL (ref 12.5–16.5)
METER GLUCOSE: 122 MG/DL (ref 74–99)
METER GLUCOSE: 149 MG/DL (ref 74–99)
METER GLUCOSE: 188 MG/DL (ref 74–99)
METER GLUCOSE: 195 MG/DL (ref 74–99)
POTASSIUM SERPL-SCNC: 4.8 MMOL/L (ref 3.5–5)
SODIUM BLD-SCNC: 134 MMOL/L (ref 132–146)
TOTAL PROTEIN: 7.7 G/DL (ref 6.4–8.3)

## 2021-07-24 PROCEDURE — 80053 COMPREHEN METABOLIC PANEL: CPT

## 2021-07-24 PROCEDURE — 2580000003 HC RX 258: Performed by: INTERNAL MEDICINE

## 2021-07-24 PROCEDURE — 85018 HEMOGLOBIN: CPT

## 2021-07-24 PROCEDURE — 82962 GLUCOSE BLOOD TEST: CPT

## 2021-07-24 PROCEDURE — 6370000000 HC RX 637 (ALT 250 FOR IP): Performed by: INTERNAL MEDICINE

## 2021-07-24 PROCEDURE — 99233 SBSQ HOSP IP/OBS HIGH 50: CPT | Performed by: INTERNAL MEDICINE

## 2021-07-24 PROCEDURE — 1200000000 HC SEMI PRIVATE

## 2021-07-24 PROCEDURE — 36415 COLL VENOUS BLD VENIPUNCTURE: CPT

## 2021-07-24 PROCEDURE — 85014 HEMATOCRIT: CPT

## 2021-07-24 RX ADMIN — SODIUM CHLORIDE, PRESERVATIVE FREE 10 ML: 5 INJECTION INTRAVENOUS at 08:11

## 2021-07-24 RX ADMIN — INSULIN LISPRO 2 UNITS: 100 INJECTION, SOLUTION INTRAVENOUS; SUBCUTANEOUS at 12:02

## 2021-07-24 RX ADMIN — SODIUM BICARBONATE 1300 MG: 650 TABLET ORAL at 13:22

## 2021-07-24 RX ADMIN — GABAPENTIN 100 MG: 100 CAPSULE ORAL at 08:09

## 2021-07-24 RX ADMIN — APIXABAN 5 MG: 5 TABLET, FILM COATED ORAL at 21:14

## 2021-07-24 RX ADMIN — SEVELAMER CARBONATE 1600 MG: 800 TABLET, FILM COATED ORAL at 08:08

## 2021-07-24 RX ADMIN — SODIUM BICARBONATE 1300 MG: 650 TABLET ORAL at 21:14

## 2021-07-24 RX ADMIN — SEVELAMER CARBONATE 1600 MG: 800 TABLET, FILM COATED ORAL at 12:03

## 2021-07-24 RX ADMIN — APIXABAN 5 MG: 5 TABLET, FILM COATED ORAL at 08:09

## 2021-07-24 RX ADMIN — SODIUM CHLORIDE, PRESERVATIVE FREE 10 ML: 5 INJECTION INTRAVENOUS at 21:14

## 2021-07-24 RX ADMIN — METOPROLOL SUCCINATE 50 MG: 50 TABLET, EXTENDED RELEASE ORAL at 08:09

## 2021-07-24 RX ADMIN — SEVELAMER CARBONATE 1600 MG: 800 TABLET, FILM COATED ORAL at 16:16

## 2021-07-24 RX ADMIN — SODIUM BICARBONATE 1300 MG: 650 TABLET ORAL at 08:09

## 2021-07-24 RX ADMIN — INSULIN GLARGINE 6 UNITS: 100 INJECTION, SOLUTION SUBCUTANEOUS at 21:15

## 2021-07-24 RX ADMIN — INSULIN LISPRO 2 UNITS: 100 INJECTION, SOLUTION INTRAVENOUS; SUBCUTANEOUS at 16:16

## 2021-07-24 RX ADMIN — INSULIN LISPRO 1 UNITS: 100 INJECTION, SOLUTION INTRAVENOUS; SUBCUTANEOUS at 21:15

## 2021-07-24 RX ADMIN — GABAPENTIN 100 MG: 100 CAPSULE ORAL at 21:14

## 2021-07-24 ASSESSMENT — PAIN SCALES - GENERAL
PAINLEVEL_OUTOF10: 0

## 2021-07-24 ASSESSMENT — PAIN DESCRIPTION - PROGRESSION
CLINICAL_PROGRESSION: NOT CHANGED
CLINICAL_PROGRESSION: NOT CHANGED

## 2021-07-24 NOTE — PROGRESS NOTES
[facility]) Physicians        CARDIOLOGY                 INPATIENT PROGRESS NOTE          PATIENT SEEN IN FOLLOW UP FOR: A [facility]    Hospital Day: 6     Irasema Feliciano is a 63year old patient previously not known to [facility] cardiology.      HISTORY OF PRESENT ILLNESS:  The patient is a 63 y. o. male with history of ESRD - On HD, HTN, Diabetes presented with increased weakness and fall    SUBJECTIVE: Denies any chest pain or SOB. No palpitations or dizzy. No Orthopnea    ROS: Review of rest of 10 systems negative except as mentioned above    OBJECTIVE: No acute distress. See Assessment     Diagnostics:       Telemetry: Reviewed          Intake/Output Summary (Last 24 hours) at 7/24/2021 1051  Last data filed at 7/23/2021 1514  Gross per 24 hour   Intake 540 ml   Output 2500 ml   Net -1960 ml       Labs:   CBC:   Recent Labs     07/24/21  0734   HGB 11.3*   HCT 37.3     BMP:   Recent Labs     07/23/21  0724 07/24/21  0734   * 134   K 5.2* 4.8   CO2 27 27   BUN 30* 30*   CREATININE 6.5* 6.2*   LABGLOM 11 11   CALCIUM 9.6 9.8     Mag: No results for input(s): MG in the last 72 hours. Phos: No results for input(s): PHOS in the last 72 hours. TSH: No results for input(s): TSH in the last 72 hours. HgA1c:     BNP: No results for input(s): BNP in the last 72 hours. PT/INR: No results for input(s): PROTIME, INR in the last 72 hours. APTT:No results for input(s): APTT in the last 72 hours. CARDIAC ENZYMES:No results for input(s): CKTOTAL, CKMB, CKMBINDEX, TROPONINI in the last 72 hours.   FASTING LIPID PANEL:  Lab Results   Component Value Date    CHOL 141 07/23/2021    HDL 60 07/23/2021    LDLCALC 65 07/23/2021    TRIG 79 07/23/2021     LIVER PROFILE:  Recent Labs     07/23/21  0724 07/24/21  0734   AST 11 12   ALT 7 8   LABALBU 3.4* 3.5       Current Inpatient Medications:   apixaban  5 mg Oral BID    insulin glargine  6 Units Subcutaneous Nightly    metoprolol succinate  50 mg Oral Daily    calcium gluconate IVPB  1,000 mg Intravenous Once    gabapentin  100 mg Oral BID    calcitRIOL  1.5 mcg Oral Once per day on     sodium bicarbonate  1,300 mg Oral TID    [Held by provider] hydrALAZINE  50 mg Oral BID    empagliflozin  10 mg Oral Daily    sevelamer  1,600 mg Oral TID     cefdinir  300 mg Oral Daily    insulin lispro  0-12 Units Subcutaneous TID     insulin lispro  0-6 Units Subcutaneous Nightly    ferric gluconate (FERRLECIT) IVPB  62.5 mg Intravenous Weekly    sodium chloride flush  5-40 mL Intravenous BID       IV Infusions (if any):   dextrose           PHYSICAL EXAM:     CONSTITUTIONAL:   BP (!) 112/90   Pulse 72   Temp 99.1 °F (37.3 °C) (Oral)   Resp 18   Ht 5' 6\" (1.676 m)   Wt 197 lb 5 oz (89.5 kg)   SpO2 99%   BMI 31.85 kg/m²   Pulse  Av.8  Min: 47  Max: 81  Systolic (93PJT), NDA:740 , Min:92 , GRV:472    Diastolic (11XLZ), QJP:06, Min:52, Max:90      In general, this is a well developed, well nourished who appears stated age. awake, alert, cooperative, no apparent distress  HEENT: eyes -conjunctivae pink,  Throat - Oral mucosa moist.   Neck-  no stridor, no noted enlargement of the thyroid, no carotid bruit. no jugular venous distention   RESPIRATORY: Chest symmetrical and non-tender to palpation. No accessory muscle use. Lung auscultation - few rhonchi  CARDIOVASCULAR:     Heart Palpation - no palpable thrills   Heart Ausculation - Regular rate and rhythm, 1/6 systolic murmur, No s3 or rub. No lower extremity edema, no varicosities. Distal pulses palpable, no clubbing or cyanosis   ABDOMEN: Soft, nontender, nondistended. Bowel sounds present. no abdominal pulsations  MS: good muscle strength and tone.   : Deferred  Rectal Exam: Deferred  SKIN: warm and dry no statis dermatitis or ulcers   NEURO / PSYCH: oriented to person, place           ASSESSMENT/PLAN:        A Fib - NEW - CLP6TP1 VASc score 2 - Rates controlled with BB; Risk benefits of 4 Attapulgus Pin or Peg discussed - Agreeable to take Eliquis. CT head negative for bleed.  Echo done 7/20/21     Bradycardia - Likely from Hyperkalemia - Resolved.     Hyperkalemia - Better now, On HD     Chronic HFpEF - -ve 1960cc, LV EF Normal on Echo; Continue Dialysis for fluid management     Borderline elevated Troponin - Likely from ESRD, No  CP; Flat pattern does not consistent with ACS  - Recommend Lexiscan stress test due to CAD risk factors - Agreeable     ESRD - On HD     Essential HTN - Controlled     Diabetes - per Dr Rody Miles   d/w Dr Prerna Chavis     Above recommendations discussed with him       Electronically signed by Diane Hernandez MD on 7/24/2021 at 10:51 AM  [facility]) Cardiology

## 2021-07-24 NOTE — PROGRESS NOTES
Department of Internal Medicine  Nephrology Attending Progress Note    SUBJECTIVE:  We are following this patient for end-stage renal failure with hyperkalemia    7/24/2021: States he feels good, no complaints    .    PROBLEM LIST:    Patient Active Problem List   Diagnosis    Hallux valgus, acquired    [facility]II [facility]abetes mellitus, type 2) [facility]    HTN (hypertension)    Lumbar radicular pain    Spinal stenosis    B12 deficiency    Idiopathic acute pancreatitis    Acute pancreatitis without necrosis or infection, unspecified    Pneumonia    Respiratory failure ([facility]    Acute respiratory failure with hypoxia ([facility]    Hyperkalemia        PAST MEDICAL HISTORY:    Past Medical History:   Diagnosis Date    Back pain     Diabetes mellitus ([facility]     DMII (diabetes mellitus, type 2) ([facility] [facility] 7/28/2015    Hemodialysis patient ([facility]     History of cardiovascular stress test 2/16/2015    lexiscan    HTN (hypertension) 7/28/2015    Hyperlipidemia     Hypertension     Lumbar radicular pain 7/28/2015    Oxygen dependent     wears 2 liters at home    Type II or unspecified type diabetes mellitus without mention of complication, not stated as uncontrolled        MEDS (scheduled):   apixaban  5 mg Oral BID    insulin glargine  6 Units Subcutaneous Nightly    metoprolol succinate  50 mg Oral Daily    calcium gluconate IVPB  1,000 mg Intravenous Once    gabapentin  100 mg Oral BID    calcitRIOL  1.5 mcg Oral Once per day on Mon Wed Fri    sodium bicarbonate  1,300 mg Oral TID    [Held by provider] hydrALAZINE  50 mg Oral BID    empagliflozin  10 mg Oral Daily    sevelamer  1,600 mg Oral TID WC    cefdinir  300 mg Oral Daily    insulin lispro  0-12 Units Subcutaneous TID     insulin lispro  0-6 Units Subcutaneous Nightly    ferric gluconate (FERRLECIT) IVPB  62.5 mg Intravenous Weekly    sodium chloride flush  5-40 mL Intravenous BID       MEDS (infusions):   dextrose         MEDS (prn):  acetaminophen, perflutren lipid microspheres, glucose, dextrose, glucagon (rDNA), dextrose, ipratropium-albuterol, trimethobenzamide, sodium chloride flush, heparin (porcine)    DIET:    ADULT DIET; Regular; Low Potassium (Less than 3000 mg/day);  Low Phosphorus (Less than 1000 mg)      PHYSICAL EXAM:      Patient Vitals for the past 24 hrs:   BP Temp Temp src Pulse Resp SpO2 Weight   07/24/21 0804 (!) 112/90 99.1 °F (37.3 °C) Oral 72 18 99 %    07/23/21 2315 (!) 96/54 98.3 °F (36.8 °C) Oral 81 18 93 %    07/23/21 1602 101/68 96.7 °F (35.9 °C) Axillary 68 16     07/23/21 1514 119/60 98.2 °F (36.8 °C)  58 18  197 lb 5 oz (89.5 kg)   07/23/21 1500 116/67   60      07/23/21 1430 113/63   64      07/23/21 1400 105/60 96.8 °F (36 °C)  58      07/23/21 1330 100/60   54      07/23/21 1300 112/65   54      07/23/21 1230 (!) 99/55   58      07/23/21 1200 (!) 92/52   69             Intake/Output Summary (Last 24 hours) at 7/24/2021 1140  Last data filed at 7/23/2021 1514  Gross per 24 hour   Intake 540 ml   Output 2500 ml   Net -1960 ml       Wt Readings from Last 3 Encounters:   07/23/21 197 lb 5 oz (89.5 kg)   07/01/21 208 lb 9.6 oz (94.6 kg)   06/23/21 209 lb 14.1 oz (95.2 kg)       Constitutional:  Patient in no acute distress  Head: normocephalic, atraumatic  Cardiovascular: RRR, no rub  Respiratory:  Lungs Clear upper, diminished in bsases  Gastrointestinal: soft, nontender, nondistended, + bowel sounds  Ext: neg. edema   Neuro: awake and alert        DATA:      Recent Labs     07/24/21  0734   HGB 11.3*   HCT 37.3     Recent Labs     07/22/21  0635 07/23/21  0724 07/24/21  0734    130* 134   K 5.5* 5.2* 4.8   CL 94* 91* 95*   CO2 28 27 27   BUN 24* 30* 30*   CREATININE 5.7* 6.5* 6.2*   LABGLOM 12 11 11   GLUCOSE 120* 129* 126*   CALCIUM 10.2 9.6 9.8     Recent Labs     07/22/21  0635 07/23/21  0724 07/24/21  0734   ALT 7 7 8     Lab Results   Component Value Date    LABALBU 3.5 07/24/2021 LABALBU 3.4 (L) 07/23/2021    LABALBU 3.6 07/22/2021       Iron studies:  Lab Results   Component Value Date    FERRITIN 188 11/13/2019    IRON 19 (L) 02/09/2020    TIBC 189 (L) 02/09/2020     Vitamin B-12   Date Value Ref Range Status   02/09/2020 320 211 - 946 pg/mL Final     Folate   Date Value Ref Range Status   02/09/2020 9.6 4.8 - 24.2 ng/mL Final       Bone disease:  Lab Results   Component Value Date    MG 2.7 (H) 07/21/2021    PHOS 6.5 (H) 07/21/2021     Vit D, 25-Hydroxy   Date Value Ref Range Status   06/22/2021 40 30 - 100 ng/mL Final     Comment:     <20 ng/mL. ........... [place] Deficient  20-30 ng/mL. ......... [place] Insufficient   ng/mL. ........ [place] Sufficient  >100 ng/mL. .......... [place] Toxic       PTH   Date Value Ref Range Status   01/23/2020 154 (H) 15 - 65 pg/mL Final       No components found for: URIC    Lab Results   Component Value Date    COLORU Yellow 02/02/2020    NITRU Negative 02/02/2020    GLUCOSEU 250 02/02/2020    KETUA Negative 02/02/2020    UROBILINOGEN 0.2 02/02/2020    BILIRUBINUR Negative 02/02/2020       No results found for: Sofia Gore        IMPRESSION/RECOMMENDATIONS:      1. ESKD-on IHD MWF via a R IJ TDC  Continue MWF schedule as at [facility]  HD yesterday - tolerated well     2. 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 Hyperkalemia-  Missed treatment as OP 7/19  S/p stat HD treatment 7/19 for K 8.7  HD treatment on 7/20 and again today  Correct with dialysis. Low K+ diet     7.  New onset Afib with RVR  Toprol added per IM  Amlodipine and apresoline on hold  Cardiac consulted - added yamileth Albrecht

## 2021-07-24 NOTE — PROGRESS NOTES
Department of Internal Medicine        CHIEF COMPLAINT: Left lower extremity weakness and a fall    Reason for Admission: Severe hyperkalemia    HISTORY OF PRESENT ILLNESS:      The patient is a 63 y.o. male who presents with increased weakness that started his morning involving right lower extremity. Patient also has some numbness. The ER note stated that it was his left lower extremity. Patient went to go walk one of his  doors and then he fell. Patient denied any problem with dizziness, chest pain, palpitations or syncope. Patient was supposed to go to dialysis this morning but he did not because of the above event. Patient stated that he checked his blood sugar and it was 80. He came to the emergency room and patient was bradycardic with heart rate of 46 blood pressure was normal 123/58 and normal temperature. Potassium was 8.7 with a sodium 130. Random blood sugar 235 in the ED. the troponin was 108. Hemoglobin 10.5. With all the above problems patient was admitted to the ICU for further evaluation. 7/20/2021  Patient seen examined on the ICU. Patient states he feels a bit better again today. He denies any chest pain, abdominal pain, nausea/vomiting, leg pain or unusual shortness of breath at rest.  Case discussed with patient's nurse at the bedside. Potassium is down to 6.3 today with a CO2 of 30. Blood sugars ranging 7698. WBC 6.2 with hemoglobin 11.0. Temperature is 97.9 with a heart rate of 78 blood pressure 118/64. O2 sat 95% on 4 L nasal cannula. Patient currently receiving dialysis again. Will increase activity today and if doing okay with vital signs stable and O2 sats stable patient be transferred to telemetry floor. Patient needs O2 saturations measured with activity on 4 L with the patient be discharged home last time on 2 L with activity. 7/21/2021  Patient seen examined and telemetry floor. Patient states he feels better today. Patient denies any chest or abdominal pain. Patient denies any right or left leg weakness. BUN/creatinine 23/6.3 with potassium still elevated at 6.6. Blood sugars ranging 121139. WBC 6.9 with hemoglobin 10.3. Temperature is 98.1 with heart rate in 96 and blood pressure 114/66. O2 sats 94% on room air at rest.    7/22/2021  Patient seen examined on telemetry floor. Patient went into atrial fibrillation with RVR this morning. Patient has no history of atrial fib in the past. Patient denies any chest pain and had questionable palpitations. Patient blood pressure was 105/64 with temperature 98.1 and O2 sat 94% on room air. Blood pressures over the last 24 hours ranges from 89/62113/60. Patient was given metoprolol 2.9 mg IV push and started on 50 mg Toprol-XL daily. With his low blood pressure we will hold his amlodipine and Apresoline. Patient had echocardiogram the other day and had normal EF with normal right and left atrium with no significant aortic or mitral valve disease. 7/23/2021  Patient seen examined on telemetry floor. Patient denies any problem chest pain, abdominal pain, dizziness, or unusual shortness of breath. Patient converted to sinus rhythm about 730 this morning. Potassium still 5.2 with blood sugars ranging 129181. Temperature 98.4 with heart rate of 65 blood pressure 126/80. O2 sat 96% on room air at rest.  Consult cardiology regarding the proximal atrial fib.    7/24/2021  Patient seen examined on telemetry floor. Patient denies any problem chest pain, abdominal pain, nausea or vomiting today. Case discussed with cardiology today. BUN/creatinine 30/6.2, potassium 4.8 today. Blood sugars range 126195. Hemoglobin is 11.3. Temperature is 96.799.1 with heart rate 72 and blood pressure currently 112/90. Patient will be set up for IV Lexiscan stress test tomorrow.       Past Medical History:    Past Medical History:   Diagnosis Date    Back pain     [facility]     DMII (diabetes mellitus, type 2) ([facility] 7/28/2015    [facility]od[facility])     History of cardiovascular stress test 2/16/2015    lexiscan    HTN (hypertension) 7/28/2015    Hyperlipidemia     Hypertension     Lumbar radicular pain 7/28/2015    Oxygen dependent     wears 2 liters at home    Type II or unspecified type diabetes mellitus without mention of complication, not stated as uncontrolled      Past Surgical History:    Past Surgical History:   Procedure Laterality Date    OTHER SURGICAL HISTORY Left 06/06/14    cain bunionectomy left foot with osteomed screw x1    SHOULDER SURGERY      Bilateral    VEIN SURGERY         Medications Prior to Admission:    @  Prior to Admission medications    Medication Sig Start Date End Date Taking?  Authorizing Provider   albuterol sulfate HFA (PROVENTIL HFA) 108 (90 Base) MCG/ACT inhaler Inhale 2 puffs into the lungs every 4 hours as needed for Wheezing or Shortness of Breath 7/1/21  Yes Jose G Gong,    hydrALAZINE (APRESOLINE) 50 MG tablet Take 50 mg by mouth 2 times daily   Yes Historical Provider, MD   empagliflozin (JARDIANCE) 10 MG tablet Take 10 mg by mouth daily   Yes Historical Provider, MD   sevelamer (RENVELA) 800 MG tablet Take 2 tablets by mouth 3 times daily (with meals)   Yes Historical Provider, MD   sevelamer (RENVELA) 800 MG tablet Take 1 tablet by mouth 2 times daily as needed (With Snacks)   Yes Historical Provider, MD   OXYGEN Inhale 2 L into the lungs continuous   Yes Historical Provider, MD   iron sucrose (VENOFER) 20 MG/ML injection Infuse 50 mg intravenously once a week Friday   Yes Historical Provider, MD   ipratropium-albuterol (DUONEB) 0.5-2.5 (3) MG/3ML SOLN nebulizer solution Inhale 3 mLs into the lungs every 4 hours as needed for Shortness of Breath 2/13/20  Yes Jose G Gong DO   calcitRIOL (ROCALTROL) 0.25 MCG capsule Take 1.5 mcg by mouth three times a week Monday, Wednesday, Friday   Yes Historical Provider, MD   insulin glargine (LANTUS) 100 UNIT/ML injection vial Inject 55 Units into the skin nightly    Yes Historical Provider, MD   amLODIPine (NORVASC) 10 MG tablet Take 5 mg by mouth 2 times daily    Yes Historical Provider, MD COLEMANID-19 mRNA Vacc, Moderna, 100 MCG/0.5ML SUSP injection Inject 0.5 mLs into the muscle once 2021 - COMPLETED    Historical Provider, MD   sodium bicarbonate 650 MG tablet Take 2 tablets by mouth 3 times daily 20   Jose G Savemike,    gabapentin (NEURONTIN) 100 MG capsule Take 100 mg by mouth 2 times daily. Historical Provider, MD       Allergies: Other    Social History:   Social History     Socioeconomic History    Marital status: Single     Spouse name: Not on file    Number of children: Not on file    Years of education: Not on file    Highest education level: Not on file   Occupational History    Not on file   Tobacco Use    Smoking status: Former Smoker     Packs/day: 1.00     Years: 30.00     Pack years: 30.00     Quit date: 2021     Years since quittin.0    Smokeless tobacco: Never Used   Vaping Use    Vaping Use: Never used   Substance and Sexual Activity    Alcohol use: No    Drug use: No    Sexual activity: Not on file   Other Topics Concern    Not on file   Social History Narrative    Not on file     Social Determinants of Health     Financial Resource Strain: Low Risk     Difficulty of Paying Living Expenses: Not very hard   Food Insecurity: No Food Insecurity    Worried About Running Out of Food in the Last Year: Never true    Alexis of Food in the Last Year: Never true   Transportation Needs: No Transportation Needs    Lack of Transportation (Medical): No    Lack of Transportation (Non-Medical): No   Physical Activity: Inactive    Days of Exercise per Week: 0 days    Minutes of Exercise per Session: 0 min   Stress: No Stress Concern Present    Feeling of Stress : Only a little   Social Connections: Unknown    Frequency of Communication with Friends and Family:  Three times a week    Frequency of Social Gatherings with Friends and Family: Twice a week    Attends [employer] Services: 1 to 4 times per year    Active Member of [employer] or Organizations: No    Attends Club or Organization Meetings: Never    Marital Status: Patient refused   Intimate Partner Violence: Unknown    Fear of Current or Ex-Partner: Patient refused    Emotionally Abused: No    Physically Abused: No    Sexually Abused: No       Family History:   Family History   Problem Relation Age of Onset    Kidney Disease Sister        REVIEW OF SYSTEMS:    Gen: Patient denies any lightheadedness or dizziness. No LOC or syncope. No fevers or chills. HEENT: No earache, sore throat or nasal congestion. Resp: Denies cough, hemoptysis or sputum production. Cardiac: Denies chest pain, SOB, diaphoresis or palpitations. GI: No nausea, vomiting, diarrhea or constipation. No melena or hematochezia. : No urinary complaints, dysuria, hematuria or frequency. MSK: + Right lower leg weakness, no paralysis      PHYSICAL EXAM:    Vitals:  BP (!) 112/90   Pulse 72   Temp 99.1 °F (37.3 °C) (Oral)   Resp 18   Ht 5' 6\" (1.676 m)   Wt 197 lb 5 oz (89.5 kg)   SpO2 99%   BMI 31.85 kg/m²     General:  This is a 63 y.o. yo male who is alert and oriented in NAD  HEENT:  Head is normocephalic and atraumatic, PERRLA, EOMI, mucus membranes moist with no pharyngeal erythema or exudate. Neck:  Supple with no carotid bruits, JVD or thyromegaly.   No cervical adenopathy  CV:  Regular rate and rhythm, no murmurs  Lungs:  Clear to auscultation bilaterally with no wheezes, rales or rhonchi  Abdomen:  Soft, nontender, + abdominal fat, nondistended, bowel sounds present  Extremities:  No edema, peripheral pulses intact bilaterally  Neuro:  Cranial nerves II-XII grossly intact; motor and sensory function intact with no focal deficits  Skin:  No rashes, lesions or wounds    DATA:  CBC with Differential:    Lab Results Component Value Date    WBC 6.9 07/21/2021    RBC 3.66 07/21/2021    HGB 11.3 07/24/2021    HCT 37.3 07/24/2021     07/21/2021    MCV 92.6 07/21/2021    MCH 28.1 07/21/2021    MCHC 30.4 07/21/2021    RDW 18.9 07/21/2021    LYMPHOPCT 26.2 07/20/2021    MONOPCT 15.9 07/20/2021    BASOPCT 0.2 07/20/2021    MONOSABS 0.99 07/20/2021    LYMPHSABS 1.63 07/20/2021    EOSABS 0.39 07/20/2021    BASOSABS 0.01 07/20/2021     CMP:    Lab Results   Component Value Date     07/24/2021    K 4.8 07/24/2021    K 8.7 07/19/2021    CL 95 07/24/2021    CO2 27 07/24/2021    BUN 30 07/24/2021    CREATININE 6.2 07/24/2021    GFRAA 11 07/24/2021    LABGLOM 11 07/24/2021    GLUCOSE 126 07/24/2021    PROT 7.7 07/24/2021    LABALBU 3.5 07/24/2021    CALCIUM 9.8 07/24/2021    BILITOT <0.2 07/24/2021    ALKPHOS 53 07/24/2021    AST 12 07/24/2021    ALT 8 07/24/2021     Magnesium:    Lab Results   Component Value Date    MG 2.7 07/21/2021     Phosphorus:    Lab Results   Component Value Date    PHOS 6.5 07/21/2021     PT/INR:    Lab Results   Component Value Date    PROTIME 10.8 07/19/2021    INR 0.9 07/19/2021     Troponin:    Lab Results   Component Value Date    TROPONINI 0.10 03/21/2021     U/A:    Lab Results   Component Value Date    COLORU Yellow 02/02/2020    PROTEINU >=300 02/02/2020    PHUR 5.0 02/02/2020    LABCAST RARE 09/21/2018    WBCUA 0-1 02/02/2020    RBCUA NONE 02/02/2020    BACTERIA NONE 02/02/2020    CLARITYU Clear 02/02/2020    SPECGRAV 1.025 02/02/2020    LEUKOCYTESUR Negative 02/02/2020    UROBILINOGEN 0.2 02/02/2020    BILIRUBINUR Negative 02/02/2020    BLOODU TRACE 02/02/2020    GLUCOSEU 250 02/02/2020    AMORPHOUS FEW 10/02/2019     ABG:    Lab Results   Component Value Date    PH 7.419 06/20/2021    PCO2 47.3 02/08/2020    PO2 140.5 02/08/2020    HCO3 26.3 02/08/2020    BE 3.1 06/20/2021    O2SAT 98.5 06/20/2021     HgBA1c:    Lab Results   Component Value Date    LABA1C 7.4 06/28/2021     FLP:    Lab Results   Component Value Date    TRIG 79 07/23/2021    HDL 60 07/23/2021    LDLCALC 65 07/23/2021    LABVLDL 16 07/23/2021     TSH:    Lab Results   Component Value Date    TSH 1.210 06/21/2021     IRON:    Lab Results   Component Value Date    IRON 19 02/09/2020     LIPASE:    Lab Results   Component Value Date    LIPASE 78 09/24/2018       ASSESSMENT AND PLAN:      Patient Active Problem List    Diagnosis Date Noted    Hallux valgus, acquired 06/06/2014    Hyperkalemia 07/19/2021    Acute respiratory failure with hypoxia ([facility] 06/20/2021    Respiratory failure ([facility] 02/02/2020    Pneumonia 01/28/2020    Acute pancreatitis without necrosis or infection, unspecified 09/23/2018    Idiopathic acute pancreatitis 09/21/2018    B12 deficiency 03/17/2016    DMII (diabetes mellitus, type 2) ([facility] 07/28/2015    HTN (hypertension) 07/28/2015    Lumbar radicular pain 07/28/2015    Spinal stenosis 07/28/2015     Impression:  1. Severe hyperkalemia  2. History of fall with associated left leg weakness  3. Chronic renal failure with hemodialysis  4. Insulin-dependent diabetes mellitus type 2 with possible hypoglycemic reaction in the morning  5. Hypertension  6. Elevated troponin  7. Normocytic anemia  8. History of lumbar spinal stenosis  9. Hyperlipidemia  10. Acute on subacute hypoxic respiratory failure  11. New onset atrial fibrillation with RVR    Plan:  Patient admitted to the ICU  Home medications reviewed  Consult nephrology  Consult intensivist  Glucoscans 4 times daily with sliding scale insulin  Patient received IV bicarb and calcium in the ED    Renal diet-patient noncompliant with renal diet outpatient  Activity up with assistance  Dialysis 7/19, 7/20    O2 saturation on 2-4 L nasal cannula with activity. Titrate O2 nasal cannula to keep O2 saturation greater than 90% with activity.     Consult dietitian to see patient regarding renallow potassium diet    Lopressor 2.5 mg IV push x1  Start Toprol-XL 50 mg p.o. daily  Hold amlodipine, Apresoline  Echocardiogram 7/19/2021 shows EF 60% with stage I diastolic dysfunction, trace MR with normal left/right atrium. Consult cardiology regarding paroxysmal atrial fib  IV Lexiscan stress test tomorrow  Routine labs in a..        Bert Chiang DO, D.O.  7/24/2021  11:37 AM

## 2021-07-25 ENCOUNTER — APPOINTMENT (OUTPATIENT)
Dept: NUCLEAR MEDICINE | Age: 66
DRG: 640 | End: 2021-07-25
Payer: MEDICARE

## 2021-07-25 VITALS
WEIGHT: 197.31 LBS | RESPIRATION RATE: 16 BRPM | BODY MASS INDEX: 31.71 KG/M2 | OXYGEN SATURATION: 94 % | DIASTOLIC BLOOD PRESSURE: 37 MMHG | HEART RATE: 65 BPM | HEIGHT: 66 IN | SYSTOLIC BLOOD PRESSURE: 134 MMHG | TEMPERATURE: 97.9 F

## 2021-07-25 LAB
ALBUMIN SERPL-MCNC: 3.1 G/DL (ref 3.5–5.2)
ALP BLD-CCNC: 54 U/L (ref 40–129)
ALT SERPL-CCNC: 7 U/L (ref 0–40)
ANION GAP SERPL CALCULATED.3IONS-SCNC: 14 MMOL/L (ref 7–16)
AST SERPL-CCNC: 11 U/L (ref 0–39)
BILIRUB SERPL-MCNC: <0.2 MG/DL (ref 0–1.2)
BLOOD CULTURE, ROUTINE: NORMAL
BUN BLDV-MCNC: 51 MG/DL (ref 6–23)
CALCIUM SERPL-MCNC: 9.5 MG/DL (ref 8.6–10.2)
CHLORIDE BLD-SCNC: 92 MMOL/L (ref 98–107)
CO2: 27 MMOL/L (ref 22–29)
CREAT SERPL-MCNC: 8.9 MG/DL (ref 0.7–1.2)
CULTURE, BLOOD 2: NORMAL
GFR AFRICAN AMERICAN: 7
GFR NON-AFRICAN AMERICAN: 7 ML/MIN/1.73
GLUCOSE BLD-MCNC: 129 MG/DL (ref 74–99)
HCT VFR BLD CALC: 33.5 % (ref 37–54)
HEMOGLOBIN: 10.5 G/DL (ref 12.5–16.5)
LV EF: 7 %
LVEF MODALITY: NORMAL
MCH RBC QN AUTO: 28.4 PG (ref 26–35)
MCHC RBC AUTO-ENTMCNC: 31.3 % (ref 32–34.5)
MCV RBC AUTO: 90.5 FL (ref 80–99.9)
METER GLUCOSE: 136 MG/DL (ref 74–99)
METER GLUCOSE: 245 MG/DL (ref 74–99)
PDW BLD-RTO: 17.2 FL (ref 11.5–15)
PLATELET # BLD: 115 E9/L (ref 130–450)
PMV BLD AUTO: 10.3 FL (ref 7–12)
POTASSIUM SERPL-SCNC: 4.8 MMOL/L (ref 3.5–5)
RBC # BLD: 3.7 E12/L (ref 3.8–5.8)
SODIUM BLD-SCNC: 133 MMOL/L (ref 132–146)
TOTAL PROTEIN: 7.1 G/DL (ref 6.4–8.3)
WBC # BLD: 6.3 E9/L (ref 4.5–11.5)

## 2021-07-25 PROCEDURE — A9500 TC99M SESTAMIBI: HCPCS | Performed by: RADIOLOGY

## 2021-07-25 PROCEDURE — 6370000000 HC RX 637 (ALT 250 FOR IP): Performed by: INTERNAL MEDICINE

## 2021-07-25 PROCEDURE — 80053 COMPREHEN METABOLIC PANEL: CPT

## 2021-07-25 PROCEDURE — 3430000000 HC RX DIAGNOSTIC RADIOPHARMACEUTICAL: Performed by: RADIOLOGY

## 2021-07-25 PROCEDURE — 93016 CV STRESS TEST SUPVJ ONLY: CPT | Performed by: INTERNAL MEDICINE

## 2021-07-25 PROCEDURE — 85027 COMPLETE CBC AUTOMATED: CPT

## 2021-07-25 PROCEDURE — 36415 COLL VENOUS BLD VENIPUNCTURE: CPT

## 2021-07-25 PROCEDURE — 82962 GLUCOSE BLOOD TEST: CPT

## 2021-07-25 PROCEDURE — 99232 SBSQ HOSP IP/OBS MODERATE 35: CPT | Performed by: INTERNAL MEDICINE

## 2021-07-25 PROCEDURE — 93018 CV STRESS TEST I&R ONLY: CPT | Performed by: INTERNAL MEDICINE

## 2021-07-25 PROCEDURE — 78452 HT MUSCLE IMAGE SPECT MULT: CPT

## 2021-07-25 PROCEDURE — 2580000003 HC RX 258: Performed by: INTERNAL MEDICINE

## 2021-07-25 PROCEDURE — 78452 HT MUSCLE IMAGE SPECT MULT: CPT | Performed by: INTERNAL MEDICINE

## 2021-07-25 PROCEDURE — 93017 CV STRESS TEST TRACING ONLY: CPT

## 2021-07-25 PROCEDURE — 6360000002 HC RX W HCPCS: Performed by: INTERNAL MEDICINE

## 2021-07-25 PROCEDURE — 2700000000 HC OXYGEN THERAPY PER DAY

## 2021-07-25 RX ORDER — INSULIN GLARGINE 100 [IU]/ML
20 INJECTION, SOLUTION SUBCUTANEOUS NIGHTLY
Qty: 1 VIAL | Refills: 3 | COMMUNITY
Start: 2021-07-25

## 2021-07-25 RX ORDER — AMLODIPINE BESYLATE 10 MG/1
5 TABLET ORAL DAILY
Qty: 30 TABLET | Refills: 3 | COMMUNITY
Start: 2021-07-25

## 2021-07-25 RX ORDER — METOPROLOL SUCCINATE 50 MG/1
50 TABLET, EXTENDED RELEASE ORAL DAILY
Qty: 30 TABLET | Refills: 3 | Status: SHIPPED | OUTPATIENT
Start: 2021-07-26

## 2021-07-25 RX ORDER — HYDRALAZINE HYDROCHLORIDE 50 MG/1
25 TABLET, FILM COATED ORAL 2 TIMES DAILY
Qty: 90 TABLET | Refills: 3 | COMMUNITY
Start: 2021-07-25 | End: 2021-08-10

## 2021-07-25 RX ADMIN — GABAPENTIN 100 MG: 100 CAPSULE ORAL at 11:42

## 2021-07-25 RX ADMIN — SEVELAMER CARBONATE 1600 MG: 800 TABLET, FILM COATED ORAL at 11:42

## 2021-07-25 RX ADMIN — SODIUM CHLORIDE, PRESERVATIVE FREE 10 ML: 5 INJECTION INTRAVENOUS at 11:42

## 2021-07-25 RX ADMIN — APIXABAN 5 MG: 5 TABLET, FILM COATED ORAL at 11:42

## 2021-07-25 RX ADMIN — Medication 10 MILLICURIE: at 08:12

## 2021-07-25 RX ADMIN — REGADENOSON 0.4 MG: 0.08 INJECTION, SOLUTION INTRAVENOUS at 09:34

## 2021-07-25 RX ADMIN — INSULIN LISPRO 4 UNITS: 100 INJECTION, SOLUTION INTRAVENOUS; SUBCUTANEOUS at 11:41

## 2021-07-25 RX ADMIN — METOPROLOL SUCCINATE 50 MG: 50 TABLET, EXTENDED RELEASE ORAL at 11:42

## 2021-07-25 RX ADMIN — Medication 30 MILLICURIE: at 09:38

## 2021-07-25 RX ADMIN — SODIUM BICARBONATE 1300 MG: 650 TABLET ORAL at 13:21

## 2021-07-25 NOTE — PROGRESS NOTES
Lexiscan Stress Test:    Reason for study: A Fib, Elevated Troponin    Resting EKG showed Sinus rhythm    Exam: heart - Regular, Lungs -Clear  Patient received 0.4mg Lexiscan per protocol    Symptoms: No chest pain, No short of breath    EKG:  No ischemia or arrhythmia during Lexiscan infusion. Maximal heart rate 81        Peak /52 mmHg       Post test complications: None      SPECT image report pending.     Electronically signed by Nadir Diaz MD on 7/25/2021 at 11:04 AM

## 2021-07-25 NOTE — PROGRESS NOTES
Nephrology Progress Note  7/25/2021 11:09 AM  Subjective:   Admit Date: 7/19/2021  PCP: Lissette Yost DO    Interval History: Patient was not seen or examined today. He was in the cardiology lab for stress test.  I reviewed vital signs, current medications and labs as below. Diet: ADULT DIET; Regular; Low Potassium (Less than 3000 mg/day); Low Phosphorus (Less than 1000 mg)    Data:     Scheduled Meds:   apixaban  5 mg Oral BID    insulin glargine  6 Units Subcutaneous Nightly    metoprolol succinate  50 mg Oral Daily    calcium gluconate IVPB  1,000 mg Intravenous Once    gabapentin  100 mg Oral BID    calcitRIOL  1.5 mcg Oral Once per day on Mon Wed Fri    sodium bicarbonate  1,300 mg Oral TID    [Held by provider] hydrALAZINE  50 mg Oral BID    sevelamer  1,600 mg Oral TID WC    cefdinir  300 mg Oral Daily    insulin lispro  0-12 Units Subcutaneous TID WC    insulin lispro  0-6 Units Subcutaneous Nightly    ferric gluconate (FERRLECIT) IVPB  62.5 mg Intravenous Weekly    sodium chloride flush  5-40 mL Intravenous BID     Continuous Infusions:   dextrose       PRN Meds:acetaminophen, perflutren lipid microspheres, glucose, dextrose, glucagon (rDNA), dextrose, ipratropium-albuterol, trimethobenzamide, sodium chloride flush, heparin (porcine)  I/O last 3 completed shifts:   In: 360 [P.O.:360]  Out: -   I/O this shift:  In: 120 [P.O.:120]  Out: -     Intake/Output Summary (Last 24 hours) at 7/25/2021 1109  Last data filed at 7/25/2021 0925  Gross per 24 hour   Intake 360 ml   Output    Net 360 ml     CBC:   Recent Labs     07/24/21  0734 07/25/21  0511   WBC  --  6.3   HGB 11.3* 10.5*   PLT  --  115*     BMP:    Recent Labs     07/23/21  0724 07/24/21  0734 07/25/21  0511   * 134 133   K 5.2* 4.8 4.8   CL 91* 95* 92*   CO2 27 27 27   BUN 30* 30* 51*   CREATININE 6.5* 6.2* 8.9*   GLUCOSE 129* 126* 129*     Hepatic:   Recent Labs     07/23/21  0724 07/24/21  0734 07/25/21  0511   AST 11 12 11   ALT 7 8 7   BILITOT 0.2 <0.2 <0.2   ALKPHOS 54 53 54     Protein/ Albumin:    Lab Results   Component Value Date    LABPROT 3.6 (H) 01/23/2020    LABPROT 3.6 01/23/2020    LABALBU 3.1 (L) 07/25/2021      Echocardiogram 7/20/202   Technically difficult study / Definity contrast used.   Normal left ventricular systolic function.   Ejection fraction is visually estimated at > 60%.   Normal right ventricular size and function.   There is doppler evidence of stage I diastolic dysfunction. Objective:     Vitals: BP (!) 134/37 Comment: 1 minute post Lexiscan injection  Pulse 65   Temp 97.9 °F (36.6 °C) (Oral)   Resp 16   Ht 5' 6\" (1.676 m)   Wt 197 lb 5 oz (89.5 kg)   SpO2 94%   BMI 31.85 kg/m²     Patient was not seen or examined today. He was in the cardiology lab for stress test.  I reviewed vital signs, current medications and labs as above. Assessment & Plan:     ESRD: Next hemodialysis is tomorrow via right IJ tunneled dialysis catheter     Controlled blood pressure and anemia with ESRD     Resolved hyperkalemia     Discontinued Jardiance as patient is on hemodialysis 3 times a week    This note was created using voice recognition software.     Electronically signed by Noy Barrios MD on 7/25/2021 at 11:09 AM

## 2021-07-25 NOTE — PROGRESS NOTES
[facility]) Physicians        CARDIOLOGY                 INPATIENT PROGRESS NOTE          PATIENT SEEN IN FOLLOW UP FOR: A fib    Hospital Day: 6     Dixie Hyde is a 63year old patient previously not known to [facility] cardiology.      HISTORY OF PRESENT ILLNESS:  The patient is a 63 y. o. male with history of ESRD - On HD, HTN, Diabetes presented with increased weakness and fall. Went in to A fib, cardiology consulted for A fib. Started on Eliquis for [address]. SUBJECTIVE: Denies any chest pain or SOB. No orthopnea. No palpitations or dizzy. Seen in the stress lab area    ROS: Review of rest of 8 systems negative except as mentioned above    OBJECTIVE: No acute distress. See Assessment     Diagnostics:       Telemetry: Reviewed      Intake/Output Summary (Last 24 hours) at 7/25/2021 0814  Last data filed at 7/25/2021 0545  Gross per 24 hour   Intake 240 ml   Output    Net 240 ml       Labs:   CBC:   Recent Labs     07/24/21  0734 07/25/21  0511   WBC  --  6.3   HGB 11.3* 10.5*   HCT 37.3 33.5*   PLT  --  115*     BMP:   Recent Labs     07/24/21  0734 07/25/21  0511    133   K 4.8 4.8   CO2 27 27   BUN 30* 51*   CREATININE 6.2* 8.9*   LABGLOM 11 7   CALCIUM 9.8 9.5     Mag: No results for input(s): MG in the last 72 hours. Phos: No results for input(s): PHOS in the last 72 hours. TSH: No results for input(s): TSH in the last 72 hours. HgA1c:     BNP: No results for input(s): BNP in the last 72 hours. PT/INR: No results for input(s): PROTIME, INR in the last 72 hours. APTT:No results for input(s): APTT in the last 72 hours. CARDIAC ENZYMES:No results for input(s): CKTOTAL, CKMB, CKMBINDEX, TROPONINI in the last 72 hours.   FASTING LIPID PANEL:  Lab Results   Component Value Date    CHOL 141 07/23/2021    HDL 60 07/23/2021    LDLCALC 65 07/23/2021    TRIG 79 07/23/2021     LIVER PROFILE:  Recent Labs     07/24/21  0734 07/25/21  0511   AST 12 11   ALT 8 7   LABALBU 3.5 3.1*       Current Inpatient Medications:   apixaban  5 mg Oral BID    insulin glargine  6 Units Subcutaneous Nightly    metoprolol succinate  50 mg Oral Daily    calcium gluconate IVPB  1,000 mg Intravenous Once    gabapentin  100 mg Oral BID    calcitRIOL  1.5 mcg Oral Once per day on     sodium bicarbonate  1,300 mg Oral TID    [Held by provider] hydrALAZINE  50 mg Oral BID    sevelamer  1,600 mg Oral TID     cefdinir  300 mg Oral Daily    insulin lispro  0-12 Units Subcutaneous TID     insulin lispro  0-6 Units Subcutaneous Nightly    ferric gluconate (FERRLECIT) IVPB  62.5 mg Intravenous Weekly    sodium chloride flush  5-40 mL Intravenous BID       IV Infusions (if any):   dextrose           PHYSICAL EXAM:     CONSTITUTIONAL:   BP (!) 106/58   Pulse 60   Temp 97.9 °F (36.6 °C) (Oral)   Resp 16   Ht 5' 6\" (1.676 m)   Wt 197 lb 5 oz (89.5 kg)   SpO2 100%   BMI 31.85 kg/m²   Pulse  Av.7  Min: 60  Max: 62  Systolic (14CPE), BNK:369 , Min:102 , MTP:452    Diastolic (76TML), OPY:61, Min:53, Max:58      In general, this is a well developed, well nourished who appears stated age. awake, alert, cooperative, no apparent distress  HEENT: eyes -conjunctivae pink,  Throat - Oral mucosa moist.   Neck-  no stridor, no carotid bruit. no jugular venous distention   RESPIRATORY: Chest symmetrical.  No accessory muscle use. Lung auscultation - few rhonchi  CARDIOVASCULAR:     Heart Palpation - no palpable thrills   Heart Ausculation - Regular rate and rhythm, 1/6 systolic murmur, No s3 or rub. No lower extremity edema, no varicosities. Distal pulses palpable, no clubbing or cyanosis   ABDOMEN: Soft, nontender, Bowel sounds present. no abdominal pulsations  MS: good muscle strength and tone.   : Deferred  Rectal Exam: Deferred  SKIN: warm and dry  NEURO / PSYCH: oriented to person, place           ASSESSMENT/PLAN:        A Fib - NEW - [id number] VASc score 2 - Rates controlled with BB; Risk benefits of [address] discussed - Agreeable to take Eliquis. CT head negative for bleed.  Echo done 7/20/21     Bradycardia - Likely from Hyperkalemia - Resolved, No recurrence.     Hyperkalemia - Better now, On HD     Chronic HFpEF - LV EF Normal on Echo; Continue Dialysis for fluid management     Borderline elevated Troponin - Likely from ESRD, No  CP; Flat pattern does not consistent with ACS  - Recommend Lexiscan stress test today due to CAD risk factors      ESRD - On HD     Essential HTN - Controlled     Diabetes - per Dr Poly Alejo               If stress test nonischemic, home later today       Above recommendations discussed with him       F/u by [facility] 3-4 weeks    Electronically signed by Yusuf Espinosa MD on 7/25/2021 at [address] [facility] [place] [facility]

## 2021-07-25 NOTE — CARE COORDINATION
CM Note: 7/25/2021 at 2:40pm: CM received a call from charge nurse stating that patient was going home on Eliquis and was asking about free coupon. Per Department Protocol, Free 30 day trial card for Eliquis          explained and given to patient. Patient instructed to take this card with  prescription to retail pharmacy to obtain 30 day supply for free. Instructed patient to follow-up with PCP within 1-2 weeks in order to obtain subsequent prescription for this medication at which time PCP will complete prior authorization if required. Patient verbalized understanding. Also noted that per SW note on 7/23- patient was active with [facility] and needed resume [address] orders - these were obtained from Dr. Semaj Yi. Tereza from [facility] called and informed orders were obtained. Also, per SW note on 7/23, patient will be going home by taxi and relayed to her that he would pay for the cost. Mr. Julieta Thayer also states he did not receive the portable tank from [employer] that was to be delivered on Friday evening, as per SW note and per patient, he needs a portable tank to go home with. CM called Daljit Cerda from [facility] - who states that they delivered a portable tank on Thursday and again on Friday and that patient signed for it. Patient states he never received it and did not sign for anything. Per Janay Llamas from [facility] - she states they will deliver an E tank for the patient today to go home with. Mr. Julieta Thayer informed and is agreeable. She states [employer] is going to deliver to the nurses station this afternoon and have the nurses sign for it. Charge Nurse [facility] informed of this.  Shanique Nagel RN

## 2021-07-25 NOTE — DISCHARGE SUMMARY
Department of Internal Medicine        CHIEF COMPLAINT: Left lower extremity weakness and a fall    Reason for Admission: Severe hyperkalemia    HISTORY OF PRESENT ILLNESS:      The patient is a 63 y.o. male who presents with increased weakness that started his morning involving right lower extremity. Patient also has some numbness. The ER note stated that it was his left lower extremity. Patient went to go walk one of his  doors and then he fell. Patient denied any problem with dizziness, chest pain, palpitations or syncope. Patient was supposed to go to dialysis this morning but he did not because of the above event. Patient stated that he checked his blood sugar and it was 80. He came to the emergency room and patient was bradycardic with heart rate of 46 blood pressure was normal 123/58 and normal temperature. Potassium was 8.7 with a sodium 130. Random blood sugar 235 in the ED. the troponin was 108. Hemoglobin 10.5. With all the above problems patient was admitted to the ICU for further evaluation. 7/20/2021  Patient seen examined on the ICU. Patient states he feels a bit better again today. He denies any chest pain, abdominal pain, nausea/vomiting, leg pain or unusual shortness of breath at rest.  Case discussed with patient's nurse at the bedside. Potassium is down to 6.3 today with a CO2 of 30. Blood sugars ranging 7698. WBC 6.2 with hemoglobin 11.0. Temperature is 97.9 with a heart rate of 78 blood pressure 118/64. O2 sat 95% on 4 L nasal cannula. Patient currently receiving dialysis again. Will increase activity today and if doing okay with vital signs stable and O2 sats stable patient be transferred to telemetry floor. Patient needs O2 saturations measured with activity on 4 L with the patient be discharged home last time on 2 L with activity. 7/21/2021  Patient seen examined and telemetry floor. Patient states he feels better today. Patient denies any chest or abdominal pain. Patient denies any right or left leg weakness. BUN/creatinine 23/6.3 with potassium still elevated at 6.6. Blood sugars ranging 121139. WBC 6.9 with hemoglobin 10.3. Temperature is 98.1 with heart rate in 96 and blood pressure 114/66. O2 sats 94% on room air at rest.    7/22/2021  Patient seen examined on telemetry floor. Patient went into atrial fibrillation with RVR this morning. Patient has no history of atrial fib in the past. Patient denies any chest pain and had questionable palpitations. Patient blood pressure was 105/64 with temperature 98.1 and O2 sat 94% on room air. Blood pressures over the last 24 hours ranges from 89/62113/60. Patient was given metoprolol 2.9 mg IV push and started on 50 mg Toprol-XL daily. With his low blood pressure we will hold his amlodipine and Apresoline. Patient had echocardiogram the other day and had normal EF with normal right and left atrium with no significant aortic or mitral valve disease. 7/23/2021  Patient seen examined on telemetry floor. Patient denies any problem chest pain, abdominal pain, dizziness, or unusual shortness of breath. Patient converted to sinus rhythm about 730 this morning. Potassium still 5.2 with blood sugars ranging 129181. Temperature 98.4 with heart rate of 65 blood pressure 126/80. O2 sat 96% on room air at rest.  Consult cardiology regarding the proximal atrial fib.    7/24/2021  Patient seen examined on telemetry floor. Patient denies any problem chest pain, abdominal pain, nausea or vomiting today. Case discussed with cardiology today. BUN/creatinine 30/6.2, potassium 4.8 today. Blood sugars range 126195. Hemoglobin is 11.3. Temperature is 96.799.1 with heart rate 72 and blood pressure currently 112/90. Patient will be set up for IV Lexiscan stress test tomorrow. 7/25/2021  Patient seen examined on telemetry floor.   Patient denies any problem with chest pain, abdominal pain, nausea/vomiting or unusual shortness of breath. Patient still in sinus rhythm. Patient had a IV Lexiscan stress test today. IV Lexiscan portion was unremarkable and pending nuclear images. Blood pressure range 102/53134/37. Temperature 97.9 with heart rate 65. Blood sugars range 512407. Liver enzymes are normal with hemoglobin 10.5. Patient will be discharged home if the nuclear images are negative. Nuclear stress test was normal    Patient stable for discharge      Past Medical History:    Past Medical History:   Diagnosis Date    Back pain     Diabetes mellitus ([facility] [address] [facility]     DMII (diabetes mellitus, type 2) ([facility] [address] 7/28/2015    Hemodialysis patient ([facility]     History of cardiovascular stress test 2/16/2015    lexiscan    HTN (hypertension) 7/28/2015    Hyperlipidemia     Hypertension     Lumbar radicular pain 7/28/2015    Oxygen dependent     wears 2 liters at home    Type II or unspecified type diabetes mellitus without mention of complication, not stated as uncontrolled      Past Surgical History:    Past Surgical History:   Procedure Laterality Date    OTHER SURGICAL HISTORY Left 06/06/14    cain bunionectomy left foot with osteomed screw x1    SHOULDER SURGERY      Bilateral    VEIN SURGERY         Medications Prior to Admission:    @  Prior to Admission medications    Medication Sig Start Date End Date Taking?  Authorizing Provider   albuterol sulfate HFA (PROVENTIL HFA) 108 (90 Base) MCG/ACT inhaler Inhale 2 puffs into the lungs every 4 hours as needed for Wheezing or Shortness of Breath 7/1/21  Yes Agustina Lemme, DO   hydrALAZINE (APRESOLINE) 50 MG tablet Take 50 mg by mouth 2 times daily   Yes Historical Provider, MD   empagliflozin (JARDIANCE) 10 MG tablet Take 10 mg by mouth daily   Yes Historical Provider, MD   sevelamer (RENVELA) 800 MG tablet Take 2 tablets by mouth 3 times daily (with meals)   Yes Historical Provider, MD   sevelamer (RENVELA) 800 MG tablet Take 1 tablet by mouth 2 times daily as needed (With Snacks) Expenses: Not very hard   Food Insecurity: No Food Insecurity    Worried About Running Out of Food in the Last Year: Never true    Alexis of Food in the Last Year: Never true   Transportation Needs: No Transportation Needs    Lack of Transportation (Medical): No    Lack of Transportation (Non-Medical): No   Physical Activity: Inactive    Days of Exercise per Week: 0 days    Minutes of Exercise per Session: 0 min   Stress: No Stress Concern Present    Feeling of Stress : Only a little   Social Connections: Unknown    Frequency of Communication with Friends and Family: Three times a week    Frequency of Social Gatherings with Friends and Family: Twice a week    Attends Zoroastrian Services: 1 to 4 times per year    Active Member of [employer] or Organizations: No    Attends Club or Organization Meetings: Never    Marital Status: Patient refused   Intimate Partner Violence: Unknown    Fear of Current or Ex-Partner: Patient refused    Emotionally Abused: No    Physically Abused: No    Sexually Abused: No       Family History:   Family History   Problem Relation Age of Onset    Kidney Disease Sister        REVIEW OF SYSTEMS:    Gen: Patient denies any lightheadedness or dizziness. No LOC or syncope. No fevers or chills. HEENT: No earache, sore throat or nasal congestion. Resp: Denies cough, hemoptysis or sputum production. Cardiac: Denies chest pain, SOB, diaphoresis or palpitations. GI: No nausea, vomiting, diarrhea or constipation. No melena or hematochezia. : No urinary complaints, dysuria, hematuria or frequency.     MSK: + Right lower leg weakness, no paralysis      PHYSICAL EXAM:    Vitals:  BP (!) 134/37 Comment: 1 minute post Lexiscan injection  Pulse 65   Temp 97.9 °F (36.6 °C) (Oral)   Resp 16   Ht 5' 6\" (1.676 m)   Wt 197 lb 5 oz (89.5 kg)   SpO2 94%   BMI 31.85 kg/m²     General:  This is a 63 y.o. yo male who is alert and oriented in NAD  HEENT:  Head is normocephalic and atraumatic, PERRLA, EOMI, mucus membranes moist with no pharyngeal erythema or exudate. Neck:  Supple with no carotid bruits, JVD or thyromegaly.   No cervical adenopathy  CV:  Regular rate and rhythm, no murmurs  Lungs:  Clear to auscultation bilaterally with no wheezes, rales or rhonchi  Abdomen:  Soft, nontender, + abdominal fat, nondistended, bowel sounds present  Extremities:  No edema, peripheral pulses intact bilaterally  Neuro:  Cranial nerves II-XII grossly intact; motor and sensory function intact with no focal deficits  Skin:  No rashes, lesions or wounds    DATA:  CBC with Differential:    Lab Results   Component Value Date    WBC 6.3 07/25/2021    RBC 3.70 07/25/2021    HGB 10.5 07/25/2021    HCT 33.5 07/25/2021     07/25/2021    MCV 90.5 07/25/2021    MCH 28.4 07/25/2021    MCHC 31.3 07/25/2021    RDW 17.2 07/25/2021    LYMPHOPCT 26.2 07/20/2021    MONOPCT 15.9 07/20/2021    BASOPCT 0.2 07/20/2021    MONOSABS 0.99 07/20/2021    LYMPHSABS 1.63 07/20/2021    EOSABS 0.39 07/20/2021    BASOSABS 0.01 07/20/2021     CMP:    Lab Results   Component Value Date     07/25/2021    K 4.8 07/25/2021    K 8.7 07/19/2021    CL 92 07/25/2021    CO2 27 07/25/2021    BUN 51 07/25/2021    CREATININE 8.9 07/25/2021    GFRAA 7 07/25/2021    LABGLOM 7 07/25/2021    GLUCOSE 129 07/25/2021    PROT 7.1 07/25/2021    LABALBU 3.1 07/25/2021    CALCIUM 9.5 07/25/2021    BILITOT <0.2 07/25/2021    ALKPHOS 54 07/25/2021    AST 11 07/25/2021    ALT 7 07/25/2021     Magnesium:    Lab Results   Component Value Date    MG 2.7 07/21/2021     Phosphorus:    Lab Results   Component Value Date    PHOS 6.5 07/21/2021     PT/INR:    Lab Results   Component Value Date    PROTIME 10.8 07/19/2021    INR 0.9 07/19/2021     Troponin:    Lab Results   Component Value Date    TROPONINI 0.10 03/21/2021     U/A:    Lab Results   Component Value Date    COLORU Yellow 02/02/2020    PROTEINU >=300 02/02/2020    PHUR 5.0 02/02/2020 LABCAST RARE 09/21/2018    WBCUA 0-1 02/02/2020    RBCUA NONE 02/02/2020    BACTERIA NONE 02/02/2020    CLARITYU Clear 02/02/2020    SPECGRAV 1.025 02/02/2020    LEUKOCYTESUR Negative 02/02/2020    UROBILINOGEN 0.2 02/02/2020    BILIRUBINUR Negative 02/02/2020    BLOODU TRACE 02/02/2020    GLUCOSEU 250 02/02/2020    AMORPHOUS FEW 10/02/2019     ABG:    Lab Results   Component Value Date    PH 7.419 06/20/2021    PCO2 47.3 02/08/2020    PO2 140.5 02/08/2020    HCO3 26.3 02/08/2020    BE 3.1 06/20/2021    O2SAT 98.5 06/20/2021     HgBA1c:    Lab Results   Component Value Date    LABA1C 7.4 06/28/2021     FLP:    Lab Results   Component Value Date    TRIG 79 07/23/2021    HDL 60 07/23/2021    LDLCALC 65 07/23/2021    LABVLDL 16 07/23/2021     TSH:    Lab Results   Component Value Date    TSH 1.210 06/21/2021     IRON:    Lab Results   Component Value Date    IRON 19 02/09/2020     LIPASE:    Lab Results   Component Value Date    LIPASE 78 09/24/2018       ASSESSMENT AND PLAN:      Patient Active Problem List    Diagnosis Date Noted    Hallux valgus, acquired 06/06/2014    [facility]oxysmal [facility] fibrillation [facility] [address] [facility]     ESRD on hemodialysis ([facility] [address] [facility]     Hyperkalemia 07/19/2021    Acute respiratory failure with hypoxia ([facility] [address] 06/20/2021    Respiratory failure ([facility] [address] [facility] 02/02/2020    Pneumonia 01/28/2020    Acute pancreatitis without necrosis or infection, unspecified 09/23/2018    Idiopathic acute pancreatitis 09/21/2018    B12 deficiency 03/17/2016    DMII (diabetes mellitus, type 2) ([facility] [address] 07/28/2015    HTN (hypertension) 07/28/2015    Lumbar radicular pain 07/28/2015    Spinal stenosis 07/28/2015     Impression:  1. Severe hyperkalemia  2. History of fall with associated left leg weakness  3. Chronic renal failure with hemodialysis  4. Insulin-dependent diabetes mellitus type 2 with possible hypoglycemic reaction in the morning  5. Hypertension  6. Elevated troponin  7. Normocytic anemia  8.   History of lumbar spinal stenosis  9. Hyperlipidemia  10. Acute on subacute hypoxic respiratory failure  11.  New onset atrial fibrillation with RVR    Plan:  Discharge home today    Prescription for Toprol XL 50 mg daily    Prescription for Eliquis 5 mg twice daily    Amlodipine 10 mgone half tab daily  Apresoline 50 mgone half tab twice daily   Lantus decreased to 20 units subcu daily at night    Continue other home meds    Patient to follow-up primary care physician in 1 week    Patient follow-up with cardiology as directed    Patient needs close follow-up with blood sugars with patient having significant reduction in his dosage with patient having problem with low blood sugars      Gilmar Chamorro DO, D.O.  7/25/2021  11:09 AM

## 2021-07-25 NOTE — PROGRESS NOTES
Stress test normal    Ok to discharge home.     F/u [facility] 3-4 weeks    Annamarie Cali MD  7/25/2021  12:49 PM

## 2021-07-25 NOTE — PROCEDURES
Dr. Henrietta Milton  present, Completed Lexiscan Protocol 0.4 mg IV per Left antecubital vein. Patient  experienced   mild increased breathing. Symptom resolved.

## 2021-07-27 ENCOUNTER — TELEPHONE (OUTPATIENT)
Dept: CARDIOLOGY CLINIC | Age: 66
End: 2021-07-27

## 2021-07-27 NOTE — TELEPHONE ENCOUNTER
When I last saw him on 7/25/21 in hospital, he was on Metoprolol 50mg ONCE daily    No need for  Norvasc and Hydralazine.       Tell him to see his family doctor or or Nephrologist    OV with me 2-4 weeks for A fib

## 2021-07-27 NOTE — TELEPHONE ENCOUNTER
Pharmacist is calling in - patient is confused on some of his medications    Please clarify if patient should be Metoprolol, Norvasc and hydralazine    Please review and advise    [phone]

## 2021-07-29 NOTE — PROGRESS NOTES
Outpatient Cardiology - Office Visit Follow-up    Date of Consultation: 8/10/2021    HISTORY OF PRESENT ILLNESS:   Patient is a 63year old AAM known to Dr. Cynthia Rondon. He is here today for hospital follow up. Patient states he has been doing well since hospital discharge. He denies any complaints of chest discomfort at rest or on exertion, shortness of breath at rest or on exertion, nausea, emesis, diaphoresis, palpitations, near-syncope or syncope. He denies paroxysmal nocturnal dyspnea, orthopnea or peripheral edema. He denies any subjective fever, chills, cough or any known recent sick contacts. He has received his COVID-19 vaccination. He has been compliant with his medications since hospital discharge, and denies any issues with bleeding (hemoptysis, hematemesis or dark/bloody stools) after initiation of oral anticoagulation. No new complaints at this time. Please note: past medical records were reviewed per electronic medical record (EMR) - see detailed reports under Past Medical/ Surgical History. PAST MEDICAL HISTORY:    1. Obesity. 2. Hypertension. 3. End stage renal disease, on hemodialysis. 4. Paroxysmal atrial fibrillation, newly diagnosed July 2021. On chronic Eliquis therapy. 5. History of hyperkalemia. 6. Diabetes mellitus type II, insulin requiring. Peripheral neuropathy. 7. History of asymptomatic sinus bradycardia. 8. Borderline elevated troponin during July 2021 admission. 9. Chronic heart failure with preserved ejection fraction. 8. Former tobacco smoker. CARDIAC TESTING    Lexiscan Stress Test (2/2015)  IMPRESSION:  No evidence of Lexiscan induced ischemia, infarct, or  scar. IMPRESSION:  The ejection fraction is 64 percent. Gated wall motion study:  Evaluation of left ventricular contractility following the stress  portion of the examination reveals no global or segmental  abnormality of kinesis. The left ventricle is not dilated.   IMPRESSION: No significant abnormality seen in left ventricular  contractility and wall motion evaluation. TTE (Dr. Nikki Victoria, 2/2020)  IMPRESSION:  1. The left and right atria are both mildly dilated. The remaining  cardiac chamber sizes including aortic root are within normal limits. 2.  The left ventricle shows asymmetric septal hypertrophy at 1.2 cm. Systolic function is well preserved with ejection fraction of 66%. No  focal wall motion abnormality is seen. No definite diastolic filling  dysfunction is seen. 3.  The mitral valve appears structurally normal, shows a maximal  gradient of 3.2 mmHg. Mitral valve area 6 sq. cm by pressure half time. There is no significant mitral stenosis nor regurgitation. 4.  The aortic valve is mildly sclerotic, leaflets open well. Maximal  gradient 10.8 mmHg. Aortic valve area 2 sq. cm. There is no  significant aortic stenosis nor insufficiency identified. 5.  There is no pulmonic stenosis nor insufficiency. There is trace to  1+ tricuspid regurgitation with pulmonary hypertension at 64 mmHg. 6.  There is no significant pericardial effusion nor any definite  intracavitary mass or thrombus or shunt identified on this study.     TTE (Dr. Mk Soni, 7/20/2021)   Summary   Technically difficult study / Definity contrast used. Normal left ventricular systolic function. Ejection fraction is visually estimated at > 60%. Normal right ventricular size and function. There is doppler evidence of stage I diastolic dysfunction. Mild LVH. Lexiscan Stress Test (7/2021)  FINDINGS: The overall quality of the study was adequate.   Left ventricular cavity size was noted to be normal.  Rotational analog analysis demonstrated no motion artifacts  The gated SPECT stress imaging in the short, vertical long, and  horizontal long axis demonstrated normal homogeneous tracer  distribution throughout the myocardium during stress and rest images  Gated SPECT left ventricular ejection fraction was calculated to be  70%, with normal Myocardial wall motion. Impression: The myocardial perfusion imaging was normal  Overall left ventricular systolic function was normal, LVEF 70%  Compared to previous study from February 2015 no significant changes  noted. Low risk myocardial perfusion study. PAST SURGICAL HISTORY:    Past Surgical History:   Procedure Laterality Date    OTHER SURGICAL HISTORY Left 06/06/14    cain bunionectomy left foot with osteomed screw x1    SHOULDER SURGERY      Bilateral    VEIN SURGERY         HOME MEDICATIONS:  Prior to Admission medications    Medication Sig Start Date End Date Taking?  Authorizing Provider   metoprolol succinate (TOPROL XL) 50 MG extended release tablet Take 1 tablet by mouth daily 7/26/21   Alphonza Socks, DO   amLODIPine (NORVASC) 10 MG tablet Take 0.5 tablets by mouth daily 7/25/21   Alphonza Socks, DO   hydrALAZINE (APRESOLINE) 50 MG tablet Take 0.5 tablets by mouth 2 times daily 7/25/21   Alphonza Socks, DO   apixaban (ELIQUIS) 5 MG TABS tablet Take 1 tablet by mouth 2 times daily 7/25/21   Alphonza Socks, DO   insulin glargine (LANTUS) 100 UNIT/ML injection vial Inject 20 Units into the skin nightly 7/25/21   Alphonza Socks, DO   albuterol sulfate HFA (PROVENTIL HFA) 108 (90 Base) MCG/ACT inhaler Inhale 2 puffs into the lungs every 4 hours as needed for Wheezing or Shortness of Breath 7/1/21   Alphonza Socks, DO   empagliflozin (JARDIANCE) 10 MG tablet Take 10 mg by mouth daily    Historical Provider, MD   sevelamer (RENVELA) 800 MG tablet Take 2 tablets by mouth 3 times daily (with meals)    Historical Provider, MD   sevelamer (RENVELA) 800 MG tablet Take 1 tablet by mouth 2 times daily as needed (With Snacks)    Historical Provider, MD   OXYGEN Inhale 2 L into the lungs continuous    Historical Provider, MD   iron sucrose (VENOFER) 20 MG/ML injection Infuse 50 mg intravenously once a week Friday    Historical Provider, MD   sodium bicarbonate 650 MG tablet Take 2 tablets by mouth 3 times daily 2/13/20   Sri Gustafson, DO   ipratropium-albuterol (DUONEB) 0.5-2.5 (3) MG/3ML SOLN nebulizer solution Inhale 3 mLs into the lungs every 4 hours as needed for Shortness of Breath 2/13/20   Sri Gustafson, DO   calcitRIOL (ROCALTROL) 0.25 MCG capsule Take 1.5 mcg by mouth three times a week Monday, Wednesday, Friday    Historical Provider, MD   gabapentin (NEURONTIN) 100 MG capsule Take 100 mg by mouth 2 times daily.      Historical Provider, MD       CURRENT MEDICATIONS:      Current Outpatient Medications:     metoprolol succinate (TOPROL XL) 50 MG extended release tablet, Take 1 tablet by mouth daily, Disp: 30 tablet, Rfl: 3    amLODIPine (NORVASC) 10 MG tablet, Take 0.5 tablets by mouth daily, Disp: 30 tablet, Rfl: 3    hydrALAZINE (APRESOLINE) 50 MG tablet, Take 0.5 tablets by mouth 2 times daily, Disp: 90 tablet, Rfl: 3    apixaban (ELIQUIS) 5 MG TABS tablet, Take 1 tablet by mouth 2 times daily, Disp: 60 tablet, Rfl: 3    insulin glargine (LANTUS) 100 UNIT/ML injection vial, Inject 20 Units into the skin nightly, Disp: 1 vial, Rfl: 3    albuterol sulfate HFA (PROVENTIL HFA) 108 (90 Base) MCG/ACT inhaler, Inhale 2 puffs into the lungs every 4 hours as needed for Wheezing or Shortness of Breath, Disp: 1 Inhaler, Rfl: 3    empagliflozin (JARDIANCE) 10 MG tablet, Take 10 mg by mouth daily, Disp: , Rfl:     sevelamer (RENVELA) 800 MG tablet, Take 2 tablets by mouth 3 times daily (with meals), Disp: , Rfl:     sevelamer (RENVELA) 800 MG tablet, Take 1 tablet by mouth 2 times daily as needed (With Snacks), Disp: , Rfl:     OXYGEN, Inhale 2 L into the lungs continuous, Disp: , Rfl:     iron sucrose (VENOFER) 20 MG/ML injection, Infuse 50 mg intravenously once a week Friday, Disp: , Rfl:     sodium bicarbonate 650 MG tablet, Take 2 tablets by mouth 3 times daily, Disp: 90 tablet, Rfl: 3    ipratropium-albuterol (DUONEB) 0.5-2.5 (3) MG/3ML SOLN nebulizer solution, Inhale 3 mLs into the lungs every 4 hours as needed for Shortness of Breath, Disp: 360 mL, Rfl: 1    calcitRIOL (ROCALTROL) 0.25 MCG capsule, Take 1.5 mcg by mouth three times a week Monday, Wednesday, Friday, Disp: , Rfl:     gabapentin (NEURONTIN) 100 MG capsule, Take 100 mg by mouth 2 times daily. , Disp: , Rfl:       ALLERGIES:  Other    SOCIAL HISTORY:    Social History     Socioeconomic History    Marital status: Single     Spouse name: Not on file    Number of children: Not on file    Years of education: Not on file    Highest education level: Not on file   Occupational History    Not on file   Tobacco Use    Smoking status: Former Smoker     Packs/day: 1.00     Years: 30.00     Pack years: 30.     Quit date: 2021     Years since quittin.1    Smokeless tobacco: Never Used   Vaping Use    Vaping Use: Never used   Substance and Sexual Activity    Alcohol use: No    Drug use: No    Sexual activity: Not on file   Other Topics Concern    Not on file   Social History Narrative    Not on file     Social Determinants of Health     Financial Resource Strain: Low Risk     Difficulty of Paying Living Expenses: Not very hard   Food Insecurity: No Food Insecurity    Worried About Running Out of Food in the Last Year: Never true    Alexis of Food in the Last Year: Never true   Transportation Needs: No Transportation Needs    Lack of Transportation (Medical): No    Lack of Transportation (Non-Medical): No   Physical Activity: Inactive    Days of Exercise per Week: 0 days    Minutes of Exercise per Session: 0 min   Stress: No Stress Concern Present    Feeling of Stress : Only a little   Social Connections: Unknown    Frequency of Communication with Friends and Family:  Three times a week    Frequency of Social Gatherings with Friends and Family: Twice a week    Attends [employer] Services: 1 to 4 times per year   [employer] of Clubs or Organizations: No   Diane Rainey Attends Club or Organization Meetings: Never    Marital Status: Patient refused   Intimate Partner Violence: Unknown    Fear of Current or Ex-Partner: Patient refused    Emotionally Abused: No    Physically Abused: No    Sexually Abused: No       FAMILY HISTORY:   Family History   Problem Relation Age of Onset    Kidney Disease Sister          REVIEW OF SYSTEMS:     · Constitutional: +weight gain noted (197 lbs 7/25 --> 203 lbs today). Denies fevers, chills, night sweats, and generalized fatigue. · HEENT: Denies headaches, nose bleeds, rhinorrhea, sore throat. Denies blurred vision. Denies dysphagia, odynophagia. · Musculoskeletal: Denies falls, pain to BLE with ambulation. Denies muscle weakness. · Neurological: Denies dizziness and lightheadedness, numbness and tingling. Denies focal neurological deficits. · Cardiovascular: Denies chest pain, palpitations, diaphoresis. Denies near syncope, syncope. Denies PND, orthopnea, peripheral edema. · Respiratory: Denies shortness of breath at rest or with exertion. Denies cough, hemoptysis. · Gastrointestinal: Denies abdominal pain, nausea/vomiting, diarrhea and constipation, black/bloody, and tarry stools. · Genitourinary: Denies dysuria and hematuria. · Hematologic: Denies excessive bruising or bleeding. · Endocrine: Denies excessive thirst. Denies intolerance to hot and cold. PHYSICAL EXAM:   BP (!) 100/56 (Site: Right Upper Arm, Position: Sitting, Cuff Size: Large Adult)   Pulse 58   Ht 5' 6\" (1.676 m)   Wt 203 lb (92.1 kg)   BMI 32.77 kg/m²   CONST:  Well developed, well nourished AAM who appears stated age. Awake, alert, cooperative, no apparent distress. HEENT:   Head- Normocephalic, atraumatic. Eyes- Conjunctivae pink, anicteric. Neck-  No stridor, trachea midline, no apparent jugular venous distention. CHEST: Chest symmetrical and non-tender to palpation. No accessory muscle use or intercostal retractions.   RESPIRATORY: Lung sounds -

## 2021-08-10 ENCOUNTER — OFFICE VISIT (OUTPATIENT)
Dept: CARDIOLOGY CLINIC | Age: 66
End: 2021-08-10
Payer: MEDICARE

## 2021-08-10 VITALS
DIASTOLIC BLOOD PRESSURE: 56 MMHG | HEIGHT: 66 IN | BODY MASS INDEX: 32.62 KG/M2 | WEIGHT: 203 LBS | HEART RATE: 58 BPM | SYSTOLIC BLOOD PRESSURE: 100 MMHG

## 2021-08-10 DIAGNOSIS — I48.91 ATRIAL FIBRILLATION, UNSPECIFIED TYPE (HCC): Primary | ICD-10-CM

## 2021-08-10 PROCEDURE — 1111F DSCHRG MED/CURRENT MED MERGE: CPT | Performed by: PHYSICIAN ASSISTANT

## 2021-08-10 PROCEDURE — 99212 OFFICE O/P EST SF 10 MIN: CPT | Performed by: PHYSICIAN ASSISTANT

## 2021-08-10 PROCEDURE — G8427 DOCREV CUR MEDS BY ELIG CLIN: HCPCS | Performed by: PHYSICIAN ASSISTANT

## 2021-08-10 PROCEDURE — G8417 CALC BMI ABV UP PARAM F/U: HCPCS | Performed by: PHYSICIAN ASSISTANT

## 2021-08-10 PROCEDURE — 3017F COLORECTAL CA SCREEN DOC REV: CPT | Performed by: PHYSICIAN ASSISTANT

## 2021-08-10 PROCEDURE — 93000 ELECTROCARDIOGRAM COMPLETE: CPT | Performed by: PHYSICIAN ASSISTANT

## 2021-08-10 PROCEDURE — 1036F TOBACCO NON-USER: CPT | Performed by: PHYSICIAN ASSISTANT

## 2021-08-10 RX ORDER — CHLORAL HYDRATE 500 MG
CAPSULE ORAL 2 TIMES DAILY
COMMUNITY

## 2021-08-10 RX ORDER — BUPROPION HYDROCHLORIDE 150 MG/1
TABLET, EXTENDED RELEASE ORAL
COMMUNITY
Start: 2021-08-02

## 2021-08-10 RX ORDER — PATIROMER 8.4 G/1
1 POWDER, FOR SUSPENSION ORAL
COMMUNITY
Start: 2021-07-21

## 2021-08-11 ENCOUNTER — TELEPHONE (OUTPATIENT)
Dept: SLEEP CENTER | Age: 66
End: 2021-08-11

## 2021-09-23 ENCOUNTER — HOSPITAL ENCOUNTER (OUTPATIENT)
Dept: CT IMAGING | Age: 66
Discharge: HOME | End: 2021-09-23
Payer: MEDICARE

## 2021-09-23 DIAGNOSIS — R91.8 ABNORMAL CT SCAN, LUNG: ICD-10-CM

## 2021-09-23 PROCEDURE — 71250 CT THORAX DX C-: CPT

## 2021-10-14 ENCOUNTER — HOSPITAL ENCOUNTER (OUTPATIENT)
Dept: SLEEP CENTER | Age: 66
Discharge: HOME | End: 2021-10-14
Payer: MEDICARE

## 2021-10-14 DIAGNOSIS — G47.33 OSA (OBSTRUCTIVE SLEEP APNEA): Primary | ICD-10-CM

## 2021-10-14 PROCEDURE — 95811 POLYSOM 6/>YRS CPAP 4/> PARM: CPT

## 2021-10-22 NOTE — PROGRESS NOTES
[facility] [address]                               SLEEP STUDY REPORT    PATIENT NAME: Darshan Dotson                    :        1955  MED REC NO:   [id number]                            ROOM:  ACCOUNT NO:   [de-identified]                           ADMIT DATE: 10/14/2021  PROVIDER:     Nichole Starr MD    DATE OF STUDY:  10/14/2021    INDICATION OF THE STUDY:  Evaluation for LISA. The patient is with  history of hypertension, fatigue, loud snoring, and restless sleep. Neck circumference 17 inches, BMI 31, Esparto score 7/24. This study was performed with recording of bilateral six EEGs, sleep  apnea protocol, bilateral EOGs, bilateral chin EMGs, bilateral anterior  tibialis EMGs, chest and abdominal movements, continuous pulse oximetry  and EKGs, nasal pressure and airflows. Medication list was reviewed as well by myself. the patient was  found to have a moderate-to-severe snoring. Later was found to have  mild-to-moderate snoring. Due to the severity of number of respiratory  events, the decision was to split into diagnostic and therapeutic  portion. DIAGNOSTIC ANALYSIS:  Total recording time was 182 minutes, total sleep  time was 148 minutes, sleep efficiency was 81%, and the sleep latency  was 8 minutes. The REM latency was 76 minutes. The patient had one  episode of REM. SLEEP STAGES:  11% in stage 1, 49% in stage 2, delta sleep 17%, and REM  sleep was 22%. RESPIRATORY EVENTS:  Two episodes of apneas, 0 episode of obstructive  apneas, and 44 episodes of hypopneas with the mean duration 20 seconds  with longest event of 46 seconds. The combined apnea-hypopnea index was  45. Snoring was mild. BODY POSITION:  The patient slept in supine position 20% of the time,  the rest in the left side position.     AROUSALS:  57 episodes of arousals for an index of 23, the majority of  those were spontaneous for an index of 16. LIMB MOVEMENTS:  There was no limb movement associated independent to  arousals. OXYGEN SATURATION:  The oxygen saturation index was 44.4; however, the  patient's saturation fluctuates between 91% to 71% with a mean sat of  83%. This suggested baseline hypoxemia. The patient has spent more  than 70 minutes with saturations equal or less than 88%. HEART RATE:  The heart rate fluctuates between 62 to 87 beats per minute  with an average heart of 68 beats per minute. Due to severity of underlying sleep apnea, the decision was to split the  study. TREATMENT ANALYSIS:  Showed a total recording time of 262 minutes, total  sleep time 199 minutes, sleep efficiency of 74%, and sleep latency was  55 minutes. The patient spent 8.6 minutes awake after sleep onset. The  REM latency was 44 minutes. SLEEP STAGE:  8.1% in stage 1, 58% in stage 2, 6% delta sleep, and 27%  REM sleep. RESPIRATORY EVENTS:  Decreased number of respiratory events noticed. Decreased snoring. BODY POSITION:  The patient slept in the supine position 82% of the  time, the rest was in the right side position. AROUSALS:  Decreased number of arousals. LIMB IMPROVEMENTS:  No limb movements associated independent to  arousals. O2 improved, was to be marginal.    HEART RATE:  Heart rate fluctuates between 57 to 81 beats per minute  with an average heart rate of 62 beats per minute. CPAP ANALYSIS:  The patient was started on CPAP of 5 cm water pressure  and titrated all the way up to 15 cm water pressure, tried to abolish  all the respiratory events and snoring. With a setup of 15 cm water  pressure, total recording time was 42 minutes, REM time was 22 minutes,  and no REM time 16 minutes. The apnea-hypopnea index was 9.1. Mean  saturation was 88% with the lowest saturation was 81%, 20 minutes  saturation below 88%. SUMMARY:  The patient presents with the followin.   Decreased sleep efficiency. 2.  Mild-to-moderate snoring. 3.  Severe obstructive sleep apnea. 4.  Slight improvement with the use of positive pressure, however,  titration was found to be suboptimal based on amount of time, increased  number of residual events, more than 5 events when asleep with  hypoxemia, not associated to underlying respiratory events. Considering above findings, favor to repeat in-lab study; The patient will need titration with  higher pressure/settings like, BiPAP. The patient also may need supplemental O2 due to severe hypoxemia, at least 2 liters  of oxygen to suppress the severe hypoxemia. The patient used as  interface, a DreamWear nasal mask, size medium. Job Munoz MD    D: 10/21/2021 22:29:17       T: 10/22/2021 4:39:51     [id number]  Job#: [id number]     Doc#: [id number]  CC:     VENANCIO Mclean-CNP

## 2021-11-01 ENCOUNTER — TELEPHONE (OUTPATIENT)
Dept: SLEEP CENTER | Age: 66
End: 2021-11-01

## 2021-12-15 ENCOUNTER — TELEPHONE (OUTPATIENT)
Dept: SLEEP CENTER | Age: 66
End: 2021-12-15

## 2021-12-16 ENCOUNTER — HOSPITAL ENCOUNTER (OUTPATIENT)
Dept: SLEEP CENTER | Age: 66
Discharge: HOME | End: 2021-12-16
Payer: MEDICARE

## 2021-12-16 DIAGNOSIS — G47.33 OSA (OBSTRUCTIVE SLEEP APNEA): Primary | ICD-10-CM

## 2021-12-16 PROCEDURE — 2700000000 HC OXYGEN THERAPY PER DAY

## 2021-12-16 PROCEDURE — 95811 POLYSOM 6/>YRS CPAP 4/> PARM: CPT

## 2022-01-14 NOTE — PROGRESS NOTES
[address]                               SLEEP STUDY REPORT    PATIENT NAME: Aura Samson                    :        1955  MED REC NO:   [id number]                            ROOM:  ACCOUNT NO:   [de-identified]                           ADMIT DATE: 2021  PROVIDER:     Erum Robles MD    DATE OF STUDY:  2021    PRIMARY DOCTOR:  Joey Gutierrez. [facility]    ORDERING PROVIDER:  Chapo Huitron NP    AGE:  61years old. HEIGHT:  67 inches. WEIGHT:  203 pounds. BMI:  31.8. MEDICATIONS:  Omega 3, BuSpar, metoprolol, amlodipine, insulin, iron,  medications for diabetes. INDICATIONS:  Napping, witnessed apneas. [place] Sleepiness Scale 7/24. The patient has had a split study performed on 10/14/2021, with  apnea-hypopnea index of 45.3. REM index was 61.8. Minimal saturation  of 71%. The patient was titrated as high as 15 cm of water pressure. He continued to have hypopneas on his final titration during his split  study. Therefore, the patient was brought in for a full-night up  titration. REPORT IN DETAIL:  Nocturnal polysomnography was done utilizing a  standard 6 EEG sleep apnea protocol. Raw data were reviewed in detail. Total recording time was 497 minutes, out of which total sleep time was  283 minutes, resulting in a poor sleep efficiency of 57%. The patient  had a prolonged sleep latency of 55 minutes and was awake for another  159 minutes after sleep onset. REM latency was also prolonged at 204  minutes. The patient spent 10% of the study in N1 sleep, 59% in N2  sleep, 8% in N3 sleep, and 24% in REM sleep. SLEEP-DISORDERED BREATHING:  The patient had 29 central apneas, 1  obstructive apnea for an apnea index of 6.4. The patient had 50  hypopneas for hypopnea index of 10.6. Total apnea-hypopnea index was 17.   These events lasted anywhere from 18 seconds to 42 seconds in duration. REM index of 10.7. Events were noted in the supine position  with the index of 10.5 where the patient spent the majority of the night, on the left side was 35.6 and the right side 36.9. The patient had 15 periodic limb movements for an index of 3.2. The patient had 85 desaturations for desaturation index of 10.3. Lowest  saturation recorded was 72% in REM sleep. Saturations were above 90%  only 37% of the night. Saturations were below 88% for 162 minutes. Heart rate was between 47 to 100 with the mean of 67. The patient was started on a bilevel titration at 8/4 and titrated to 12/8. Then the  patient was switched back to continuous active airway pressure. Oxygen bleed in was added due to a severe desaturation. The final pressure of CPAP of 14 cm of water pressure with 2 L bleed in. The patient did have 30 minutes in REM sleep and apnea-hypopnea index was down to 2.5 and  saturations were maintained above 86%. At this final pressure, the patient did have REM sleep. This is an optimal titration. IMPRESSION:  Final pressure of 14 cm of water pressure with 2 L oxygen  bleed in using a medium-wide Respironics under-the-nose nasal cushion  with medium headgear. The patient tolerated the study well. The  patient did have limb movements noted. There is no bruxism. There were  no significant arrhythmias. The patient tolerated the study well.         Alex Perez MD    D: 01/13/2022 23:57:42       T: 01/14/2022 2:46:48     [id number]  Job#: [id number]     Doc#: [id number]    CC:

## 2022-04-04 ENCOUNTER — APPOINTMENT (OUTPATIENT)
Dept: GENERAL RADIOLOGY | Age: 67
End: 2022-04-04
Payer: MEDICARE

## 2022-04-04 ENCOUNTER — HOSPITAL ENCOUNTER (EMERGENCY)
Age: 67
Discharge: HOME | End: 2022-04-04
Attending: STUDENT IN AN ORGANIZED HEALTH CARE EDUCATION/TRAINING PROGRAM
Payer: MEDICARE

## 2022-04-04 VITALS
HEART RATE: 80 BPM | RESPIRATION RATE: 20 BRPM | SYSTOLIC BLOOD PRESSURE: 119 MMHG | DIASTOLIC BLOOD PRESSURE: 72 MMHG | OXYGEN SATURATION: 95 %

## 2022-04-04 DIAGNOSIS — I50.9 CONGESTIVE HEART FAILURE, UNSPECIFIED HF CHRONICITY, UNSPECIFIED HEART FAILURE TYPE (HCC): Primary | ICD-10-CM

## 2022-04-04 LAB
ANION GAP SERPL CALCULATED.3IONS-SCNC: 14 MMOL/L (ref 7–16)
B.E.: 7.7 MMOL/L (ref -3–3)
BASOPHILS ABSOLUTE: 0.01 E9/L (ref 0–0.2)
BASOPHILS RELATIVE PERCENT: 0.1 % (ref 0–2)
BUN BLDV-MCNC: 14 MG/DL (ref 6–23)
CALCIUM SERPL-MCNC: 8.6 MG/DL (ref 8.6–10.2)
CHLORIDE BLD-SCNC: 96 MMOL/L (ref 98–107)
CO2: 28 MMOL/L (ref 22–29)
COHB: 1.7 % (ref 0–1.5)
CREAT SERPL-MCNC: 4.9 MG/DL (ref 0.7–1.2)
CRITICAL: ABNORMAL
DATE ANALYZED: ABNORMAL
DATE OF COLLECTION: ABNORMAL
EOSINOPHILS ABSOLUTE: 0.05 E9/L (ref 0.05–0.5)
EOSINOPHILS RELATIVE PERCENT: 0.5 % (ref 0–6)
GFR AFRICAN AMERICAN: 15
GFR NON-AFRICAN AMERICAN: 15 ML/MIN/1.73
GLUCOSE BLD-MCNC: 131 MG/DL (ref 74–99)
HCO3: 31.5 MMOL/L (ref 22–26)
HCT VFR BLD CALC: 39.6 % (ref 37–54)
HEMOGLOBIN: 12.6 G/DL (ref 12.5–16.5)
HHB: 5.2 % (ref 0–5)
IMMATURE GRANULOCYTES #: 0.03 E9/L
IMMATURE GRANULOCYTES %: 0.3 % (ref 0–5)
LAB: ABNORMAL
LYMPHOCYTES ABSOLUTE: 1.94 E9/L (ref 1.5–4)
LYMPHOCYTES RELATIVE PERCENT: 19.2 % (ref 20–42)
Lab: ABNORMAL
MAGNESIUM: 2 MG/DL (ref 1.6–2.6)
MCH RBC QN AUTO: 29.8 PG (ref 26–35)
MCHC RBC AUTO-ENTMCNC: 31.8 % (ref 32–34.5)
MCV RBC AUTO: 93.6 FL (ref 80–99.9)
METHB: 0.2 % (ref 0–1.5)
MODE: ABNORMAL
MONOCYTES ABSOLUTE: 1.25 E9/L (ref 0.1–0.95)
MONOCYTES RELATIVE PERCENT: 12.4 % (ref 2–12)
NEUTROPHILS ABSOLUTE: 6.81 E9/L (ref 1.8–7.3)
NEUTROPHILS RELATIVE PERCENT: 67.5 % (ref 43–80)
O2 CONTENT: 17.8 ML/DL
O2 SATURATION: 94.7 % (ref 92–98.5)
O2HB: 92.9 % (ref 94–97)
OPERATOR ID: 1821
PATIENT TEMP: 37 C
PCO2: 41 MMHG (ref 35–45)
PDW BLD-RTO: 17.5 FL (ref 11.5–15)
PH BLOOD GAS: 7.5 (ref 7.35–7.45)
PLATELET # BLD: 231 E9/L (ref 130–450)
PMV BLD AUTO: 10 FL (ref 7–12)
PO2: 73.5 MMHG (ref 75–100)
POTASSIUM REFLEX MAGNESIUM: 3.3 MMOL/L (ref 3.5–5)
PRO-BNP: ABNORMAL PG/ML (ref 0–125)
RBC # BLD: 4.23 E12/L (ref 3.8–5.8)
SODIUM BLD-SCNC: 138 MMOL/L (ref 132–146)
SOURCE, BLOOD GAS: ABNORMAL
THB: 13.6 G/DL (ref 11.5–16.5)
TIME ANALYZED: 1445
TROPONIN, HIGH SENSITIVITY: 233 NG/L (ref 0–11)
WBC # BLD: 10.1 E9/L (ref 4.5–11.5)

## 2022-04-04 PROCEDURE — 96374 THER/PROPH/DIAG INJ IV PUSH: CPT

## 2022-04-04 PROCEDURE — 93005 ELECTROCARDIOGRAM TRACING: CPT | Performed by: STUDENT IN AN ORGANIZED HEALTH CARE EDUCATION/TRAINING PROGRAM

## 2022-04-04 PROCEDURE — 82805 BLOOD GASES W/O2 SATURATION: CPT

## 2022-04-04 PROCEDURE — 83880 ASSAY OF NATRIURETIC PEPTIDE: CPT

## 2022-04-04 PROCEDURE — 85025 COMPLETE CBC W/AUTO DIFF WBC: CPT

## 2022-04-04 PROCEDURE — 99284 EMERGENCY DEPT VISIT MOD MDM: CPT

## 2022-04-04 PROCEDURE — 6370000000 HC RX 637 (ALT 250 FOR IP): Performed by: STUDENT IN AN ORGANIZED HEALTH CARE EDUCATION/TRAINING PROGRAM

## 2022-04-04 PROCEDURE — 6360000002 HC RX W HCPCS: Performed by: STUDENT IN AN ORGANIZED HEALTH CARE EDUCATION/TRAINING PROGRAM

## 2022-04-04 PROCEDURE — 80048 BASIC METABOLIC PNL TOTAL CA: CPT

## 2022-04-04 PROCEDURE — 71045 X-RAY EXAM CHEST 1 VIEW: CPT

## 2022-04-04 PROCEDURE — 83735 ASSAY OF MAGNESIUM: CPT

## 2022-04-04 PROCEDURE — 84484 ASSAY OF TROPONIN QUANT: CPT

## 2022-04-04 RX ORDER — POTASSIUM CHLORIDE 20 MEQ/1
40 TABLET, EXTENDED RELEASE ORAL ONCE
Status: COMPLETED | OUTPATIENT
Start: 2022-04-04 | End: 2022-04-04

## 2022-04-04 RX ORDER — FUROSEMIDE 10 MG/ML
40 INJECTION INTRAMUSCULAR; INTRAVENOUS ONCE
Status: COMPLETED | OUTPATIENT
Start: 2022-04-04 | End: 2022-04-04

## 2022-04-04 RX ADMIN — FUROSEMIDE 40 MG: 10 INJECTION, SOLUTION INTRAMUSCULAR; INTRAVENOUS at 16:08

## 2022-04-04 RX ADMIN — POTASSIUM CHLORIDE 40 MEQ: 20 TABLET, EXTENDED RELEASE ORAL at 16:07

## 2022-04-04 NOTE — ED PROVIDER NOTES
ED  Provider Note  Admit Date/RoomTime: 4/4/2022  1:39 PM  ED Room: 10/10      History of Present Illness:  4/4/22, Time: 1:43 PM EDT  Chief Complaint   Patient presents with    Shortness of Breath     Pt from dialysis, has been SOB since before his tx, pulse ox 87% on RA. Pt had full tx.  has recenltly been on PRN oxygen. Lionel Alan is a 61 y.o. male presenting to the ED for SOB and hypoxia at HD. Received full treatment today. Generalized fatigue. PRN O2 2L. Worsening SOB today, felt fine 1d PTA. Cough with phlegm, no blood, worse than usual, gradual onset, congestion and rhinorrhea for multiple days. Moderate relief with nebs and inhalers at home PTA not using more than usual. No f/c, no cp/arm/jaw/back pain. Reportedly mid to low 80s at HD. Arrives on O2 2L sats 97%. Patient has no personal history of DVT/PE, no unilateral leg swelling or edema/erythema, no recent surgeries or immobilizations, no active cancer. On Eliquis. No abd pain, N/V/D/C. Dry mouth. Sx moderate, exac by exertion, relieved with nebs. Review of Systems:   A complete review of systems was performed and pertinent positives and negatives are stated within HPI, all other systems reviewed and are negative.        --------------------------------------------- PATIENT HISTORY--------------------------------------------  Past Medical History:  has a past medical history of Back pain, Diabetes mellitus ([facility] DMII (diabetes mellitus, type 2) ([facility] History of cardiovascular stress test, HTN (hypertension), Hyperlipidemia, Hypertension, Lumbar radicular pain, Oxygen dependent, and Type II or unspecified type diabetes mellitus without mention of complication, not stated as uncontrolled. Past Surgical History:  has a past surgical history that includes shoulder surgery; Vein Surgery; and other surgical history (Left, 06/06/14).     Family History: family history includes Diabetes in his father and mother; Kidney Disease in his sister. . Unless otherwise noted, family history is non contributory    Social History:  reports that he quit smoking about 9 months ago. He has a 30.00 pack-year smoking history. He has never used smokeless tobacco. He reports that he does not drink alcohol and does not use drugs. The patients home medications have been reviewed. Allergies: Other    I have reviewed the past medical history, past surgical history, social history, and family history    ---------------------------------------------------PHYSICAL EXAM--------------------------------------    Constitutional/General: Alert and oriented x3  Head: Normocephalic and atraumatic  Eyes: PERRL, EOMI, sclera non icteric  ENT: Oropharynx clear, handling secretions, no trismus  Neck: Supple, full ROM, no stridor, no meningismus  Respiratory: lctab  Cardiovascular: RRR, no R/G/M, 2+ peripheral pulses  Chest: No chest wall tenderness, equal chest rise  Gastrointestinal:  sntnd  Musculoskeletal: Extremities warm and well perfused, moving all extremities  Skin: skin warm and dry. No rashes. Neurologic: No focal deficits, strength and sensation grossly intact   Psychiatric: Normal Affect, behavior normal           -------------------------------------------------- RESULTS -------------------------------------------------  I have personally reviewed all laboratory and imaging results for this patient. Results are listed below.      LABS: (Lab results interpreted by me)  Results for orders placed or performed during the hospital encounter of 04/04/22   CBC with Auto Differential   Result Value Ref Range    WBC 10.1 4.5 - 11.5 E9/L    RBC 4.23 3.80 - 5.80 E12/L    Hemoglobin 12.6 12.5 - 16.5 g/dL    Hematocrit 39.6 37.0 - 54.0 %    MCV 93.6 80.0 - 99.9 fL    MCH 29.8 26.0 - 35.0 pg    MCHC 31.8 (L) 32.0 - 34.5 %    RDW 17.5 (H) 11.5 - 15.0 fL    Platelets 270 339 - 165 E9/L    MPV 10.0 7.0 - 12.0 fL    Neutrophils % 67.5 43.0 - 80.0 % Immature Granulocytes % 0.3 0.0 - 5.0 %    Lymphocytes % 19.2 (L) 20.0 - 42.0 %    Monocytes % 12.4 (H) 2.0 - 12.0 %    Eosinophils % 0.5 0.0 - 6.0 %    Basophils % 0.1 0.0 - 2.0 %    Neutrophils Absolute 6.81 1.80 - 7.30 E9/L    Immature Granulocytes # 0.03 E9/L    Lymphocytes Absolute 1.94 1.50 - 4.00 E9/L    Monocytes Absolute 1.25 (H) 0.10 - 0.95 E9/L    Eosinophils Absolute 0.05 0.05 - 0.50 E9/L    Basophils Absolute 0.01 0.00 - 0.20 O9/C   Basic Metabolic Panel w/ Reflex to MG   Result Value Ref Range    Sodium 138 132 - 146 mmol/L    Potassium reflex Magnesium 3.3 (L) 3.5 - 5.0 mmol/L    Chloride 96 (L) 98 - 107 mmol/L    CO2 28 22 - 29 mmol/L    Anion Gap 14 7 - 16 mmol/L    Glucose 131 (H) 74 - 99 mg/dL    BUN 14 6 - 23 mg/dL    CREATININE 4.9 (H) 0.7 - 1.2 mg/dL    GFR Non-African American 15 >=60 mL/min/1.73    GFR African American 15     Calcium 8.6 8.6 - 10.2 mg/dL   Brain Natriuretic Peptide   Result Value Ref Range    Pro-BNP 40,650 (H) 0 - 125 pg/mL   Troponin   Result Value Ref Range    Troponin, High Sensitivity 233 (H) 0 - 11 ng/L   Blood Gas, Arterial   Result Value Ref Range    Date Analyzed [id number]     Time Analyzed 1440     Source: Blood Arterial     pH, Blood Gas 7.503 (H) 7.350 - 7.450    PCO2 41.0 35.0 - 45.0 mmHg    PO2 73.5 (L) 75.0 - 100.0 mmHg    HCO3 31.5 (H) 22.0 - 26.0 mmol/L    B.E. 7.7 (H) -3.0 - 3.0 mmol/L    O2 Sat 94.7 92.0 - 98.5 %    O2Hb 92.9 (L) 94.0 - 97.0 %    COHb 1.7 (H) 0.0 - 1.5 %    MetHb 0.2 0.0 - 1.5 %    O2 Content 17.8 mL/dL    HHb 5.2 (H) 0.0 - 5.0 %    tHb (est) 13.6 11.5 - 16.5 g/dL    Mode NC-2 L     Date Of Collection      Time Collected      Pt Temp 37.0 C     ID [id number]     Lab [id number]     Critical(s) Notified .  No Critical Values    Magnesium   Result Value Ref Range    Magnesium 2.0 1.6 - 2.6 mg/dL   EKG 12 Lead   Result Value Ref Range    Ventricular Rate 82 BPM    Atrial Rate 82 BPM    P-R Interval 156 ms    QRS Duration 90 ms    Q-T Interval 392 ms    QTc Calculation (Bazett) 457 ms    P Axis 36 degrees    R Axis 27 degrees    T Axis 25 degrees   ,       RADIOLOGY:  Imaging interpreted by Radiologist unless otherwise specified  XR CHEST PORTABLE   Final Result   No interval change             ------------------------- NURSING NOTES AND VITALS REVIEWED ---------------------------  The nursing notes within the ED encounter and vital signs as below have been reviewed by myself  /72   Pulse 80   Resp 20   SpO2 95%      The patients available past medical records and past encounters were reviewed. ------------------------------ ED COURSE/MEDICAL DECISION MAKING----------------------  Medications   potassium chloride (KLOR-CON M) extended release tablet 40 mEq (40 mEq Oral Given 4/4/22 1607)   furosemide (LASIX) injection 40 mg (40 mg IntraVENous Given 4/4/22 1608)       IDr. Tomeka, am the primary provider of record    Medical Decision Making:   Danay Delong is a 61 y.o. male presenting to the ED for SOB. Suspect COPD exacerbation vs need for HD due to exam and history. Differential includes but not limited to PNA, CHF, COPD, Asthma, PTX, pulmonary edema,doubt PE by exam and risk factors and on AC, electrolyte abnormality, metabolic derangement, ACS/MI, arrhythmia   Workup: CBC, BMP, CXR, EKG, troponin  Supplemental oxygen: 92% on RA at rest initially but quickly desats to 87% off O2    Patient has no personal history of DVT/PE, no unilateral leg swelling or edema/erythema, no recent surgeries or immobilizations, no active cancer. Patient troponinemia likely 2/2 ESRD status, especially as has no CP and SOB has resolved. Patient reports that he has sufficient O2 at home, and will use O2 for the next day or so and then f/u with PCP. He does make some urine, so lasix given. Patient feels back to baseline and requests discharge. ED Counseling:     This emergency provider has spoken with the patient and any family present to discuss clinical status, results, plan of care, diagnosis and prognosis as able to be determined at this time. Any questions were answered and patient and/or family/POA are agreeable with the plan.       --------------------------------- IMPRESSION AND DISPOSITION ---------------------------------    IMPRESSION  1. Congestive heart failure, unspecified HF chronicity, unspecified [facility]        DISPOSITION  Disposition: Discharge to home  Patient condition is stable      This report was transcribed using voice recognition software. Every effort was made to ensure accuracy; however, transcription errors may be present.          Ruddy Hirsch MD  04/05/22 1823

## 2022-04-05 LAB
EKG ATRIAL RATE: 82 BPM
EKG P AXIS: 36 DEGREES
EKG P-R INTERVAL: 156 MS
EKG Q-T INTERVAL: 392 MS
EKG QRS DURATION: 90 MS
EKG QTC CALCULATION (BAZETT): 457 MS
EKG R AXIS: 27 DEGREES
EKG T AXIS: 25 DEGREES
EKG VENTRICULAR RATE: 82 BPM

## 2022-04-05 PROCEDURE — 93010 ELECTROCARDIOGRAM REPORT: CPT | Performed by: INTERNAL MEDICINE

## 2022-04-25 ENCOUNTER — APPOINTMENT (OUTPATIENT)
Dept: GENERAL RADIOLOGY | Age: 67
End: 2022-04-25
Payer: MEDICARE

## 2022-04-25 ENCOUNTER — HOSPITAL ENCOUNTER (EMERGENCY)
Age: 67
Discharge: HOME | End: 2022-04-25
Attending: EMERGENCY MEDICINE
Payer: MEDICARE

## 2022-04-25 ENCOUNTER — APPOINTMENT (OUTPATIENT)
Dept: CT IMAGING | Age: 67
End: 2022-04-25
Payer: MEDICARE

## 2022-04-25 VITALS
OXYGEN SATURATION: 92 % | BODY MASS INDEX: 29.7 KG/M2 | DIASTOLIC BLOOD PRESSURE: 81 MMHG | RESPIRATION RATE: 20 BRPM | WEIGHT: 184 LBS | TEMPERATURE: 97.3 F | HEART RATE: 72 BPM | SYSTOLIC BLOOD PRESSURE: 186 MMHG
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
## 2022-04-25 DIAGNOSIS — S16.1XXA ACUTE STRAIN OF NECK MUSCLE, INITIAL ENCOUNTER: ICD-10-CM

## 2022-04-25 DIAGNOSIS — S39.012A STRAIN OF LUMBAR REGION, INITIAL ENCOUNTER: ICD-10-CM

## 2022-04-25 PROCEDURE — 99284 EMERGENCY DEPT VISIT MOD MDM: CPT

## 2022-04-25 PROCEDURE — 72125 CT NECK SPINE W/O DYE: CPT

## 2022-04-25 PROCEDURE — 72100 X-RAY EXAM L-S SPINE 2/3 VWS: CPT

## 2022-04-25 ASSESSMENT — ENCOUNTER SYMPTOMS
COLOR CHANGE: 0
ABDOMINAL PAIN: 0
BACK PAIN: 1
SHORTNESS OF BREATH: 0

## 2022-04-25 ASSESSMENT — PAIN DESCRIPTION - LOCATION: LOCATION: BACK

## 2022-04-25 ASSESSMENT — PAIN SCALES - GENERAL: PAINLEVEL_OUTOF10: 9

## 2022-04-25 ASSESSMENT — PAIN - FUNCTIONAL ASSESSMENT: PAIN_FUNCTIONAL_ASSESSMENT: 0-10
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
## 2022-06-19 ENCOUNTER — HOSPITAL ENCOUNTER (OUTPATIENT)
Age: 67
Setting detail: OBSERVATION
Discharge: HOME | End: 2022-06-22
Attending: EMERGENCY MEDICINE | Admitting: INTERNAL MEDICINE
Payer: MEDICARE

## 2022-06-19 DIAGNOSIS — K85.90 ACUTE PANCREATITIS WITHOUT INFECTION OR NECROSIS, UNSPECIFIED PANCREATITIS TYPE: Primary | ICD-10-CM

## 2022-06-19 DIAGNOSIS — N18.6 END STAGE RENAL FAILURE ON DIALYSIS (HCC): ICD-10-CM

## 2022-06-19 DIAGNOSIS — Z99.2 END STAGE RENAL FAILURE ON DIALYSIS (HCC): ICD-10-CM

## 2022-06-19 PROCEDURE — C9113 INJ PANTOPRAZOLE SODIUM, VIA: HCPCS | Performed by: EMERGENCY MEDICINE

## 2022-06-19 PROCEDURE — 36415 COLL VENOUS BLD VENIPUNCTURE: CPT

## 2022-06-19 PROCEDURE — 85025 COMPLETE CBC W/AUTO DIFF WBC: CPT

## 2022-06-19 PROCEDURE — 80053 COMPREHEN METABOLIC PANEL: CPT

## 2022-06-19 PROCEDURE — 83690 ASSAY OF LIPASE: CPT

## 2022-06-19 PROCEDURE — 83605 ASSAY OF LACTIC ACID: CPT

## 2022-06-19 PROCEDURE — 6360000002 HC RX W HCPCS: Performed by: EMERGENCY MEDICINE

## 2022-06-19 PROCEDURE — 99285 EMERGENCY DEPT VISIT HI MDM: CPT

## 2022-06-19 PROCEDURE — 93005 ELECTROCARDIOGRAM TRACING: CPT | Performed by: EMERGENCY MEDICINE

## 2022-06-19 PROCEDURE — 84484 ASSAY OF TROPONIN QUANT: CPT

## 2022-06-19 PROCEDURE — 96374 THER/PROPH/DIAG INJ IV PUSH: CPT

## 2022-06-19 PROCEDURE — 6370000000 HC RX 637 (ALT 250 FOR IP): Performed by: EMERGENCY MEDICINE

## 2022-06-19 RX ORDER — PANTOPRAZOLE SODIUM 40 MG/10ML
40 INJECTION, POWDER, LYOPHILIZED, FOR SOLUTION INTRAVENOUS ONCE
Status: COMPLETED | OUTPATIENT
Start: 2022-06-19 | End: 2022-06-19

## 2022-06-19 RX ADMIN — PANTOPRAZOLE SODIUM 40 MG: 40 INJECTION, POWDER, FOR SOLUTION INTRAVENOUS at 23:51

## 2022-06-19 RX ADMIN — ALUMINUM HYDROXIDE, MAGNESIUM HYDROXIDE, AND SIMETHICONE: 200; 200; 20 SUSPENSION ORAL at 23:50

## 2022-06-19 ASSESSMENT — ENCOUNTER SYMPTOMS
DIARRHEA: 0
SORE THROAT: 0
BACK PAIN: 0
COUGH: 0
SHORTNESS OF BREATH: 0
EYE PAIN: 0
ABDOMINAL PAIN: 1
WHEEZING: 0
EYE REDNESS: 0
NAUSEA: 0
VOMITING: 0
SINUS PRESSURE: 0
EYE DISCHARGE: 0

## 2022-06-19 ASSESSMENT — PAIN DESCRIPTION - LOCATION: LOCATION: ABDOMEN;STERNUM

## 2022-06-19 ASSESSMENT — PAIN - FUNCTIONAL ASSESSMENT: PAIN_FUNCTIONAL_ASSESSMENT: 0-10

## 2022-06-19 ASSESSMENT — PAIN SCALES - GENERAL: PAINLEVEL_OUTOF10: 4

## 2022-06-20 ENCOUNTER — APPOINTMENT (OUTPATIENT)
Dept: CT IMAGING | Age: 67
End: 2022-06-20
Payer: MEDICARE

## 2022-06-20 PROBLEM — K85.90 ACUTE PANCREATITIS: Status: ACTIVE | Noted: 2022-06-20

## 2022-06-20 LAB
ALBUMIN SERPL-MCNC: 3.9 G/DL (ref 3.5–5.2)
ALP BLD-CCNC: 55 U/L (ref 40–129)
ALT SERPL-CCNC: 6 U/L (ref 0–40)
ANION GAP SERPL CALCULATED.3IONS-SCNC: 13 MMOL/L (ref 7–16)
AST SERPL-CCNC: 9 U/L (ref 0–39)
BACTERIA: ABNORMAL /HPF
BASOPHILS ABSOLUTE: 0.02 E9/L (ref 0–0.2)
BASOPHILS RELATIVE PERCENT: 0.3 % (ref 0–2)
BILIRUB SERPL-MCNC: 0.3 MG/DL (ref 0–1.2)
BILIRUBIN URINE: NEGATIVE
BLOOD, URINE: ABNORMAL
BUN BLDV-MCNC: 44 MG/DL (ref 6–23)
CALCIUM SERPL-MCNC: 10.2 MG/DL (ref 8.6–10.2)
CHLORIDE BLD-SCNC: 91 MMOL/L (ref 98–107)
CLARITY: CLEAR
CO2: 28 MMOL/L (ref 22–29)
COLOR: YELLOW
CREAT SERPL-MCNC: 10.6 MG/DL (ref 0.7–1.2)
EKG ATRIAL RATE: 66 BPM
EKG P AXIS: 34 DEGREES
EKG P-R INTERVAL: 144 MS
EKG Q-T INTERVAL: 382 MS
EKG QRS DURATION: 90 MS
EKG QTC CALCULATION (BAZETT): 400 MS
EKG R AXIS: 54 DEGREES
EKG T AXIS: 55 DEGREES
EKG VENTRICULAR RATE: 66 BPM
EOSINOPHILS ABSOLUTE: 0.13 E9/L (ref 0.05–0.5)
EOSINOPHILS RELATIVE PERCENT: 2.1 % (ref 0–6)
EPITHELIAL CELLS, UA: ABNORMAL /HPF
GFR AFRICAN AMERICAN: 6
GFR NON-AFRICAN AMERICAN: 6 ML/MIN/1.73
GLUCOSE BLD-MCNC: 169 MG/DL (ref 74–99)
GLUCOSE URINE: 250 MG/DL
HBA1C MFR BLD: 7.9 % (ref 4–5.6)
HCT VFR BLD CALC: 41.7 % (ref 37–54)
HEMOGLOBIN: 13.3 G/DL (ref 12.5–16.5)
IMMATURE GRANULOCYTES #: 0.01 E9/L
IMMATURE GRANULOCYTES %: 0.2 % (ref 0–5)
KETONES, URINE: NEGATIVE MG/DL
LACTIC ACID: 1.1 MMOL/L (ref 0.5–2.2)
LEUKOCYTE ESTERASE, URINE: NEGATIVE
LIPASE: 154 U/L (ref 13–60)
LYMPHOCYTES ABSOLUTE: 1.67 E9/L (ref 1.5–4)
LYMPHOCYTES RELATIVE PERCENT: 27.4 % (ref 20–42)
MCH RBC QN AUTO: 29.3 PG (ref 26–35)
MCHC RBC AUTO-ENTMCNC: 31.9 % (ref 32–34.5)
MCV RBC AUTO: 91.9 FL (ref 80–99.9)
METER GLUCOSE: 120 MG/DL (ref 74–99)
METER GLUCOSE: 73 MG/DL (ref 74–99)
METER GLUCOSE: 93 MG/DL (ref 74–99)
MONOCYTES ABSOLUTE: 0.95 E9/L (ref 0.1–0.95)
MONOCYTES RELATIVE PERCENT: 15.6 % (ref 2–12)
NEUTROPHILS ABSOLUTE: 3.32 E9/L (ref 1.8–7.3)
NEUTROPHILS RELATIVE PERCENT: 54.4 % (ref 43–80)
NITRITE, URINE: NEGATIVE
PDW BLD-RTO: 14.5 FL (ref 11.5–15)
PH UA: 7 (ref 5–9)
PLATELET # BLD: 231 E9/L (ref 130–450)
PMV BLD AUTO: 9.5 FL (ref 7–12)
POTASSIUM SERPL-SCNC: 4.5 MMOL/L (ref 3.5–5)
PROTEIN UA: 100 MG/DL
RBC # BLD: 4.54 E12/L (ref 3.8–5.8)
RBC UA: ABNORMAL /HPF (ref 0–2)
SODIUM BLD-SCNC: 132 MMOL/L (ref 132–146)
SPECIFIC GRAVITY UA: 1.01 (ref 1–1.03)
TOTAL PROTEIN: 8 G/DL (ref 6.4–8.3)
TROPONIN, HIGH SENSITIVITY: 124 NG/L (ref 0–11)
TROPONIN, HIGH SENSITIVITY: 136 NG/L (ref 0–11)
UROBILINOGEN, URINE: 0.2 E.U./DL
WBC # BLD: 6.1 E9/L (ref 4.5–11.5)
WBC UA: ABNORMAL /HPF (ref 0–5)

## 2022-06-20 PROCEDURE — 6360000002 HC RX W HCPCS: Performed by: INTERNAL MEDICINE

## 2022-06-20 PROCEDURE — 90935 HEMODIALYSIS ONE EVALUATION: CPT

## 2022-06-20 PROCEDURE — G0378 HOSPITAL OBSERVATION PER HR: HCPCS

## 2022-06-20 PROCEDURE — 36415 COLL VENOUS BLD VENIPUNCTURE: CPT

## 2022-06-20 PROCEDURE — 83036 HEMOGLOBIN GLYCOSYLATED A1C: CPT

## 2022-06-20 PROCEDURE — 84484 ASSAY OF TROPONIN QUANT: CPT

## 2022-06-20 PROCEDURE — 74176 CT ABD & PELVIS W/O CONTRAST: CPT

## 2022-06-20 PROCEDURE — 82962 GLUCOSE BLOOD TEST: CPT

## 2022-06-20 PROCEDURE — 2580000003 HC RX 258: Performed by: INTERNAL MEDICINE

## 2022-06-20 PROCEDURE — 81001 URINALYSIS AUTO W/SCOPE: CPT

## 2022-06-20 PROCEDURE — 96375 TX/PRO/DX INJ NEW DRUG ADDON: CPT

## 2022-06-20 RX ORDER — SODIUM BICARBONATE 650 MG/1
1300 TABLET ORAL 3 TIMES DAILY
Status: DISCONTINUED | OUTPATIENT
Start: 2022-06-20 | End: 2022-06-22 | Stop reason: HOSPADM

## 2022-06-20 RX ORDER — INSULIN GLARGINE 100 [IU]/ML
8 INJECTION, SOLUTION SUBCUTANEOUS NIGHTLY
Status: DISCONTINUED | OUTPATIENT
Start: 2022-06-20 | End: 2022-06-22 | Stop reason: HOSPADM

## 2022-06-20 RX ORDER — SODIUM CHLORIDE 9 MG/ML
INJECTION, SOLUTION INTRAVENOUS CONTINUOUS
Status: DISCONTINUED | OUTPATIENT
Start: 2022-06-20 | End: 2022-06-20

## 2022-06-20 RX ORDER — SODIUM CHLORIDE 9 MG/ML
INJECTION, SOLUTION INTRAVENOUS CONTINUOUS
Status: DISCONTINUED | OUTPATIENT
Start: 2022-06-20 | End: 2022-06-22 | Stop reason: HOSPADM

## 2022-06-20 RX ORDER — AMLODIPINE BESYLATE 5 MG/1
5 TABLET ORAL DAILY
Status: DISCONTINUED | OUTPATIENT
Start: 2022-06-20 | End: 2022-06-22 | Stop reason: HOSPADM

## 2022-06-20 RX ORDER — INSULIN GLARGINE 100 [IU]/ML
20 INJECTION, SOLUTION SUBCUTANEOUS NIGHTLY
Status: DISCONTINUED | OUTPATIENT
Start: 2022-06-20 | End: 2022-06-20

## 2022-06-20 RX ORDER — INSULIN LISPRO 100 [IU]/ML
0-12 INJECTION, SOLUTION INTRAVENOUS; SUBCUTANEOUS
Status: DISCONTINUED | OUTPATIENT
Start: 2022-06-20 | End: 2022-06-22 | Stop reason: HOSPADM

## 2022-06-20 RX ORDER — CALCITRIOL 0.25 UG/1
1.5 CAPSULE, LIQUID FILLED ORAL
Status: DISCONTINUED | OUTPATIENT
Start: 2022-06-20 | End: 2022-06-22 | Stop reason: HOSPADM

## 2022-06-20 RX ORDER — INSULIN LISPRO 100 [IU]/ML
0-6 INJECTION, SOLUTION INTRAVENOUS; SUBCUTANEOUS NIGHTLY
Status: DISCONTINUED | OUTPATIENT
Start: 2022-06-20 | End: 2022-06-22 | Stop reason: HOSPADM

## 2022-06-20 RX ORDER — BUPROPION HYDROCHLORIDE 150 MG/1
150 TABLET, EXTENDED RELEASE ORAL DAILY
Status: DISCONTINUED | OUTPATIENT
Start: 2022-06-20 | End: 2022-06-21 | Stop reason: CLARIF

## 2022-06-20 RX ORDER — FENTANYL CITRATE 0.05 MG/ML
50 INJECTION, SOLUTION INTRAMUSCULAR; INTRAVENOUS EVERY 4 HOURS PRN
Status: DISCONTINUED | OUTPATIENT
Start: 2022-06-20 | End: 2022-06-22 | Stop reason: HOSPADM

## 2022-06-20 RX ORDER — ACETAMINOPHEN 500 MG
500 TABLET ORAL EVERY 6 HOURS PRN
Status: DISCONTINUED | OUTPATIENT
Start: 2022-06-20 | End: 2022-06-22 | Stop reason: HOSPADM

## 2022-06-20 RX ORDER — DEXTROSE MONOHYDRATE 50 MG/ML
100 INJECTION, SOLUTION INTRAVENOUS PRN
Status: DISCONTINUED | OUTPATIENT
Start: 2022-06-20 | End: 2022-06-22 | Stop reason: HOSPADM

## 2022-06-20 RX ORDER — OMEGA-3-ACID ETHYL ESTERS 1 G/1
1000 CAPSULE, LIQUID FILLED ORAL 2 TIMES DAILY
Status: DISCONTINUED | OUTPATIENT
Start: 2022-06-20 | End: 2022-06-22 | Stop reason: HOSPADM

## 2022-06-20 RX ORDER — PROCHLORPERAZINE EDISYLATE 5 MG/ML
10 INJECTION INTRAMUSCULAR; INTRAVENOUS EVERY 6 HOURS PRN
Status: DISCONTINUED | OUTPATIENT
Start: 2022-06-20 | End: 2022-06-22 | Stop reason: HOSPADM

## 2022-06-20 RX ORDER — POLYETHYLENE GLYCOL 3350 17 G/17G
17 POWDER, FOR SOLUTION ORAL DAILY PRN
Status: DISCONTINUED | OUTPATIENT
Start: 2022-06-20 | End: 2022-06-22 | Stop reason: HOSPADM

## 2022-06-20 RX ORDER — METOPROLOL SUCCINATE 50 MG/1
50 TABLET, EXTENDED RELEASE ORAL DAILY
Status: DISCONTINUED | OUTPATIENT
Start: 2022-06-20 | End: 2022-06-22 | Stop reason: HOSPADM

## 2022-06-20 RX ORDER — SEVELAMER CARBONATE 800 MG/1
1600 TABLET, FILM COATED ORAL
Status: DISCONTINUED | OUTPATIENT
Start: 2022-06-20 | End: 2022-06-22 | Stop reason: HOSPADM

## 2022-06-20 RX ADMIN — PROCHLORPERAZINE EDISYLATE 10 MG: 5 INJECTION INTRAMUSCULAR; INTRAVENOUS at 20:52

## 2022-06-20 RX ADMIN — SODIUM CHLORIDE: 9 INJECTION, SOLUTION INTRAVENOUS at 16:28

## 2022-06-20 ASSESSMENT — PAIN DESCRIPTION - ONSET: ONSET: ON-GOING

## 2022-06-20 ASSESSMENT — PAIN DESCRIPTION - DESCRIPTORS: DESCRIPTORS: BURNING;DISCOMFORT

## 2022-06-20 ASSESSMENT — PAIN SCALES - GENERAL
PAINLEVEL_OUTOF10: 1
PAINLEVEL_OUTOF10: 3

## 2022-06-20 ASSESSMENT — PAIN DESCRIPTION - LOCATION: LOCATION: ABDOMEN

## 2022-06-20 ASSESSMENT — PAIN DESCRIPTION - PAIN TYPE: TYPE: ACUTE PAIN

## 2022-06-20 ASSESSMENT — PAIN DESCRIPTION - FREQUENCY: FREQUENCY: CONTINUOUS

## 2022-06-20 NOTE — CONSULTS
Department of Internal Medicine  Nephrology Attending Progress Note    SUBJECTIVE:  Full consult deferred as pt followed longitudinally in dialysis. We are following this patient for end-stage kidney disease. Pt states he at some BBQ chicken over the weekend and developed abd pain. He believes he set off his pancreatitis     .    PROBLEM LIST:    Patient Active Problem List   Diagnosis    Hallux valgus, acquired    DMII (diabetes mellitus, type 2) ([address]    HTN (hypertension)    Lumbar radicular pain    Spinal stenosis    B12 deficiency    Idiopathic acute pancreatitis    Acute pancreatitis without necrosis or infection, unspecified    Pneumonia    Respiratory failure ([address]    Acute respiratory failure with hypoxia (HCC)    Hyperkalemia    Paroxysmal atrial fibrillation ([address]    ESRD on hemodialysis ([facility] [address]    Acute pancreatitis        PAST MEDICAL HISTORY:    Past Medical History:   Diagnosis Date    Back pain     Diabetes mellitus ([address]     DMII (diabetes mellitus, type 2) ([facility] 7/28/2015    Hemodialysis patient ([facility]     History of cardiovascular stress test 2/16/2015    lexiscan    HTN (hypertension) 7/28/2015    Hyperlipidemia     Hypertension     Lumbar radicular pain 7/28/2015    Oxygen dependent     wears 2 liters at home    Type II or unspecified type diabetes mellitus without mention of complication, not stated as uncontrolled        MEDS (scheduled):   calcitRIOL  1.5 mcg Oral Once per day on Mon Wed Fri    sodium bicarbonate  1,300 mg Oral TID    sevelamer  1,600 mg Oral TID     metoprolol succinate  50 mg Oral Daily    amLODIPine  5 mg Oral Daily    omega-3 acid ethyl esters  1,000 mg Oral BID    buPROPion  150 mg Oral Daily    patiromer sorbitex calcium  1 packet Oral Daily    insulin lispro  0-12 Units SubCUTAneous TID     insulin lispro  0-6 Units SubCUTAneous Nightly    insulin glargine  8 Units SubCUTAneous Nightly    [START ON 6/24/2022] ferric gluconate (FERRLECIT) IVPB  62.5 mg IntraVENous Weekly       MEDS (infusions):   dextrose      sodium chloride         MEDS (prn):  acetaminophen, prochlorperazine, polyethylene glycol, glucose, dextrose bolus **OR** dextrose bolus, glucagon (rDNA), dextrose, fentanNYL    DIET:    Diet NPO Exceptions are: Ice Chips, Sips of Water with Meds, Popsicles, Sips of Clear Liquids      PHYSICAL EXAM:      Patient Vitals for the past 24 hrs:   BP Temp Temp src Pulse Resp SpO2 Height Weight   06/20/22 1030 (!) 152/76 -- -- 62 -- -- -- --   06/20/22 1000 (!) 150/84 -- -- 56 -- -- -- --   06/20/22 0930 (!) 158/73 -- -- 53 -- -- -- --   06/20/22 0900 (!) 156/76 -- -- 59 -- -- -- --   06/20/22 0855 (!) 156/80 98.2 °F (36.8 °C) -- 59 18 -- -- 189 lb 6 oz (85.9 kg)   06/20/22 0715 (!) 171/78 97.9 °F (36.6 °C) Oral 65 18 95 % -- --   06/20/22 0707 (!) 177/102 97.5 °F (36.4 °C) Oral -- 22 97 % -- --   06/20/22 0600 (!) 155/76 -- -- 58 14 97 % -- --   06/20/22 0500 (!) 162/85 -- -- 65 26 99 % -- --   06/20/22 0400 (!) 168/76 -- -- 62 17 100 % -- --   06/20/22 0230 -- -- -- 63 15 100 % -- --   06/20/22 0200 (!) 172/92 -- -- 62 27 98 % -- --   06/20/22 0145 (!) 151/77 -- -- 58 18 100 % -- --   06/20/22 0100 (!) 160/84 -- -- 59 16 99 % -- --   06/19/22 2321 (!) 157/85 -- -- 63 18 97 % -- --   06/19/22 2217 (!) 181/86 97.9 °F (36.6 °C) Oral 76 18 93 % 5' 7\" (1.702 m) 189 lb (85.7 kg)   06/19/22 2214 -- -- -- 88 -- 97 % -- --        No intake or output data in the 24 hours ending 06/20/22 1043    Wt Readings from Last 3 Encounters:   06/20/22 189 lb 6 oz (85.9 kg)   04/25/22 184 lb (83.5 kg)   08/10/21 203 lb (92.1 kg)       Constitutional:  Patient in no acute distress  Head: normocephalic, atraumatic, R IJ TDC  Cardiovascular: RRR, no rub  Respiratory:  Lungs Clear upper, diminished in bsases  Gastrointestinal: soft, tender in the midepigastrium, nondistended, + bowel sounds  Ext: neg. edema   Neuro: awake and alert        DATA:      Recent Labs 06/19/22 2330   WBC 6.1   HGB 13.3   HCT 41.7   MCV 91.9        Recent Labs     06/19/22 2330      K 4.5   CL 91*   CO2 28   BUN 44*   CREATININE 10.6*   LABGLOM 6   GLUCOSE 169*   CALCIUM 10.2     Recent Labs     06/19/22 2330   ALT 6     Lab Results   Component Value Date    LABALBU 3.9 06/19/2022    LABALBU 3.1 (L) 07/25/2021    LABALBU 3.5 07/24/2021       Iron studies:  Lab Results   Component Value Date    FERRITIN 188 11/13/2019    IRON 19 (L) 02/09/2020    TIBC 189 (L) 02/09/2020     Vitamin B-12   Date Value Ref Range Status   02/09/2020 320 211 - 946 pg/mL Final     Folate   Date Value Ref Range Status   02/09/2020 9.6 4.8 - 24.2 ng/mL Final       Bone disease:  Lab Results   Component Value Date    MG 2.0 04/04/2022    PHOS 6.5 (H) 07/21/2021     Vit D, 25-Hydroxy   Date Value Ref Range Status   06/22/2021 40 30 - 100 ng/mL Final     Comment:     <20 ng/mL. ........... Fabi Efrain Deficient  20-30 ng/mL. ......... Fabi Efrain Insufficient   ng/mL. ........ Fabi Efrain Sufficient  >100 ng/mL. .......... Fabi Efrain Toxic       PTH   Date Value Ref Range Status   01/23/2020 154 (H) 15 - 65 pg/mL Final       No components found for: URIC    Lab Results   Component Value Date    COLORU Yellow 06/20/2022    NITRU Negative 06/20/2022    GLUCOSEU 250 06/20/2022    KETUA Negative 06/20/2022    UROBILINOGEN 0.2 06/20/2022    BILIRUBINUR Negative 06/20/2022       No results found for: Luna Rivero        IMPRESSION/RECOMMENDATIONS:      1. ESKD-on IHD MWF via a R IJ TDC  Continue MWF schedule as at [address]:  1. Seen on IHD today with 1.8L vol removal targeted  2. Follow Labs     2. Abd Pain-acute pancreatitis  6/20/22 CT Scan with enlarged and edmatous pancreas without   loculated peripancreatic fluid collection  PLAN:  1. Defer to Hospital Service    3-Anemia in CKD  HgB above goal 10  PLAN:  1. Ferrlecit per IM  2. 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 Follow on the calcitriol and sevelamer  2. Recheck Vit D     5- HTN with CKD 5/ESKD  BP at goal <140/90  PLAN:  1. Continue to monitor     6. Hyperkalemia-  PLAN:  1. Follow K+  2.  Control with a 2K+ bath on dialysis      Electronically signed by Siobhan Pollock MD on 6/20/2022 at 10:43 AM

## 2022-06-20 NOTE — ED PROVIDER NOTES
Patient is a 60 y/o male who presents to the ED with abdominal pain. Patient states he had onset of epigastric abdominal pain after eating spicy food four days prior to arrival. The pain has been constant and is worse when he eats. Currently, it is 10/10. He denies any chest pain or shortness of breath. He denies any fever, nausea, vomiting, diarrhea, dysuria or gross hematuria. He states that he took \"gas pills\" and a laxative without relief. He did have a bowel movement. Review of Systems   Constitutional: Negative for chills and fever. HENT: Negative for ear pain, sinus pressure and sore throat. Eyes: Negative for pain, discharge and redness. Respiratory: Negative for cough, shortness of breath and wheezing. Cardiovascular: Negative for chest pain. Gastrointestinal: Positive for abdominal pain. Negative for diarrhea, nausea and vomiting. Genitourinary: Negative for dysuria and frequency. Musculoskeletal: Negative for arthralgias and back pain. Skin: Negative for rash and wound. Neurological: Negative for weakness and headaches. Hematological: Negative for adenopathy. All other systems reviewed and are negative. Physical Exam  Vitals and nursing note reviewed. Constitutional:       General: He is not in acute distress. HENT:      Head: Normocephalic and atraumatic. Right Ear: External ear normal.      Left Ear: External ear normal.      Nose: Nose normal.      Mouth/Throat:      Mouth: Mucous membranes are moist.   Eyes:      Conjunctiva/sclera: Conjunctivae normal.      Pupils: Pupils are equal, round, and reactive to light. Cardiovascular:      Rate and Rhythm: Normal rate and regular rhythm. Heart sounds: No murmur heard. Pulmonary:      Effort: Pulmonary effort is normal. No respiratory distress. Breath sounds: Normal breath sounds. No stridor. No wheezing, rhonchi or rales.    Abdominal:      General: Bowel sounds are normal. There is no distension. Palpations: Abdomen is soft. Tenderness: There is abdominal tenderness (Epigastric). There is no guarding. Musculoskeletal:         General: Normal range of motion. Cervical back: Normal range of motion and neck supple. Skin:     General: Skin is warm and dry. Findings: No rash. Neurological:      Mental Status: He is alert and oriented to person, place, and time. Procedures     [facility]             --------------------------------------------- PAST HISTORY ---------------------------------------------  Past Medical History:  has a past medical history of Back pain, [facility] ([facility] DMII (diabetes mellitus, type 2) ([facility] Hemodialysis patient ([facility] History of cardiovascular stress test, HTN (hypertension), Hyperlipidemia, Hypertension, Lumbar radicular pain, Oxygen dependent, and Type II or unspecified type diabetes mellitus without mention of complication, not stated as uncontrolled. Past Surgical History:  has a past surgical history that includes shoulder surgery; Vein Surgery; and other surgical history (Left, 06/06/14). Social History:  reports that he quit smoking about a year ago. He has a 30.00 pack-year smoking history. He has never used smokeless tobacco. He reports that he does not drink alcohol and does not use drugs. Family History: family history includes Diabetes in his father and mother; Kidney Disease in his sister. The patients home medications have been reviewed. Allergies:  Other    -------------------------------------------------- RESULTS -------------------------------------------------    LABS:  Results for orders placed or performed during the hospital encounter of 06/19/22   CBC with Auto Differential   Result Value Ref Range    WBC 6.1 4.5 - 11.5 E9/L    RBC 4.54 3.80 - 5.80 E12/L    Hemoglobin 13.3 12.5 - 16.5 g/dL    Hematocrit 41.7 37.0 - 54.0 %    MCV 91.9 80.0 - 99.9 fL    MCH 29.3 26.0 - 35.0 pg    MCHC 31.9 (L) 32.0 - 34.5 %    RDW 14.5 11.5 - 15.0 fL    Platelets 399 637 - 085 E9/L    MPV 9.5 7.0 - 12.0 fL    Neutrophils % 54.4 43.0 - 80.0 %    Immature Granulocytes % 0.2 0.0 - 5.0 %    Lymphocytes % 27.4 20.0 - 42.0 %    Monocytes % 15.6 (H) 2.0 - 12.0 %    Eosinophils % 2.1 0.0 - 6.0 %    Basophils % 0.3 0.0 - 2.0 %    Neutrophils Absolute 3.32 1.80 - 7.30 E9/L    Immature Granulocytes # 0.01 E9/L    Lymphocytes Absolute 1.67 1.50 - 4.00 E9/L    Monocytes Absolute 0.95 0.10 - 0.95 E9/L    Eosinophils Absolute 0.13 0.05 - 0.50 E9/L    Basophils Absolute 0.02 0.00 - 0.20 E9/L   Comprehensive Metabolic Panel   Result Value Ref Range    Sodium 132 132 - 146 mmol/L    Potassium 4.5 3.5 - 5.0 mmol/L    Chloride 91 (L) 98 - 107 mmol/L    CO2 28 22 - 29 mmol/L    Anion Gap 13 7 - 16 mmol/L    Glucose 169 (H) 74 - 99 mg/dL    BUN 44 (H) 6 - 23 mg/dL    CREATININE 10.6 (HH) 0.7 - 1.2 mg/dL    GFR Non-African American 6 >=60 mL/min/1.73    GFR African American 6     Calcium 10.2 8.6 - 10.2 mg/dL    Total Protein 8.0 6.4 - 8.3 g/dL    Albumin 3.9 3.5 - 5.2 g/dL    Total Bilirubin 0.3 0.0 - 1.2 mg/dL    Alkaline Phosphatase 55 40 - 129 U/L    ALT 6 0 - 40 U/L    AST 9 0 - 39 U/L   Lipase   Result Value Ref Range    Lipase 154 (H) 13 - 60 U/L   Lactic Acid   Result Value Ref Range    Lactic Acid 1.1 0.5 - 2.2 mmol/L   Urinalysis   Result Value Ref Range    Color, UA Yellow Straw/Yellow    Clarity, UA Clear Clear    Glucose, Ur 250 (A) Negative mg/dL    Bilirubin Urine Negative Negative    Ketones, Urine Negative Negative mg/dL    Specific Gravity, UA 1.010 1.005 - 1.030    Blood, Urine SMALL (A) Negative    pH, UA 7.0 5.0 - 9.0    Protein,  (A) Negative mg/dL    Urobilinogen, Urine 0.2 <2.0 E.U./dL    Nitrite, Urine Negative Negative    Leukocyte Esterase, Urine Negative Negative   Troponin   Result Value Ref Range    Troponin, High Sensitivity 136 (H) 0 - 11 ng/L   Troponin   Result Value Ref Range    Troponin, High Sensitivity 124 (H) 0 - 11 ng/L   Microscopic Urinalysis   Result Value Ref Range    WBC, UA 2-5 0 - 5 /HPF    RBC, UA 1-3 0 - 2 /HPF    Epithelial Cells, UA FEW /HPF    Bacteria, UA FEW (A) None Seen /HPF   EKG 12 Lead   Result Value Ref Range    Ventricular Rate 66 BPM    Atrial Rate 66 BPM    P-R Interval 144 ms    QRS Duration 90 ms    Q-T Interval 382 ms    QTc Calculation (Bazett) 400 ms    P Axis 34 degrees    R Axis 54 degrees    T Axis 55 degrees       RADIOLOGY:  CT ABDOMEN PELVIS WO CONTRAST Additional Contrast? None   Final Result   Findings suggestive of acute pancreatitis, without evidence of a discrete   loculated peripancreatic fluid collection. Correlation with pancreatic   enzyme levels is suggested. Colonic diverticulosis, without diverticulitis. No bowel obstruction, free   air or acute appendicitis. EKG:  This EKG is signed and interpreted by me. Rate: 66  Rhythm: Sinus  Interpretation: non-specific EKG  Comparison: no previous EKG available      ------------------------- NURSING NOTES AND VITALS REVIEWED ---------------------------  Date / Time Roomed:  6/19/2022 10:55 PM  ED Bed Assignment:  08/08    The nursing notes within the ED encounter and vital signs as below have been reviewed. Patient Vitals for the past 24 hrs:   BP Temp Temp src Pulse Resp SpO2 Height Weight   06/20/22 0400 (!) 168/76 -- -- 62 17 100 % -- --   06/20/22 0230 -- -- -- 63 15 100 % -- --   06/20/22 0200 (!) 172/92 -- -- 62 27 98 % -- --   06/20/22 0145 (!) 151/77 -- -- 58 18 100 % -- --   06/20/22 0100 (!) 160/84 -- -- 59 16 99 % -- --   06/19/22 2321 (!) 157/85 -- -- 63 18 97 % -- --   06/19/22 2217 (!) 181/86 97.9 °F (36.6 °C) Oral 76 18 93 % 5' 7\" (1.702 m) 189 lb (85.7 kg)   06/19/22 2214 -- -- -- 88 -- 97 % -- --       Oxygen Saturation Interpretation: Normal    ------------------------------------------ PROGRESS NOTES ------------------------------------------  Re-evaluation(s):  Time: 0600.   Patients symptoms show no change  Repeat physical examination is not changed    Counseling:  I have spoken with the patient and discussed todays results, in addition to providing specific details for the plan of care and counseling regarding the diagnosis and prognosis. Their questions are answered at this time and they are agreeable with the plan of admission.    --------------------------------- ADDITIONAL PROVIDER NOTES ---------------------------------  Consultations:  Time: 0615. Spoke with Dr. Kolton Brown. Discussed case. They will admit the patient. This patient's ED course included: a personal history and physicial examination, re-evaluation prior to disposition, IV medications, cardiac monitoring and continuous pulse oximetry    This patient has remained hemodynamically stable during their ED course. Diagnosis:  1. Acute pancreatitis without infection or necrosis, unspecified pancreatitis type    2. End stage renal failure on dialysis [facility])        Disposition:  Patient's disposition: Admit to med/surg floor  Patient's condition is stable.          Archana Mcgregor DO  06/20/22 9250

## 2022-06-20 NOTE — FLOWSHEET NOTE
06/20/22 1307   Vital Signs   BP (!) 157/77   Temp 98 °F (36.7 °C)   Heart Rate 57   Resp 18   Weight 186 lb 1.1 oz (84.4 kg)   Weight Method Bed scale   Percent Weight Change -1.75   Post-Hemodialysis Assessment   Post-Treatment Procedures Blood returned;Catheter Capped, clamped with Saline x2 ports   Machine Disinfection Process Acid/Vinegar Clean;Exterior Machine Disinfection; Heat Disinfect   Rinseback Volume (ml) 300 ml   Total Liters Processed (l/min) 85.3 l/min   Dialyzer Clearance Clear   Duration of Treatment (minutes) 240 minutes   Hemodialysis Intake (ml) 300 ml   Hemodialysis Output (ml) 1800 ml   NET Removed (ml) 1500   Tolerated Treatment Good   Patient Response to Treatment Cath care per policy and procedure stasis achieved pt has no complaints and is being transported back to room   Bilateral Breath Sounds Clear;Diminished   Time Off 1300   Patient Disposition Return to room

## 2022-06-20 NOTE — H&P
Department of Internal Medicine        CHIEF COMPLAINT: Abdominal pain    Reason for Admission: Acute pancreatitis    HISTORY OF PRESENT ILLNESS:      The patient is a 61 y.o. male who presents with abdominal pain that started epigastric area about 4 days prior to admission. The patient is constant and worse when he eats. He denies any problem with any chest pain, nausea/vomiting, fever/chills, diarrhea, melena or hematochezia. Patient did have a bowel movement without any problems. He came to the ED with the patient's lipase 154. WBC 6.1 hemoglobin 13.3. Temperature is 90.2 with heart rate currently 58 with blood pressure 154/80 and O2 sat 95% on 2 L. CT of the abdomen pelvis showed evidence of possible acute pancreatitis without evidence of discrete loculated peripancreatic fluid collection. Patient does have diverticulosis. Patient currently getting dialysis and still having the abdominal pain which is slightly improved with pain medication. He states he had a similar episode years ago and he was told it was from certain foods that he ate. Past Medical History:    Past Medical History:   Diagnosis Date    Back pain     Diabetes mellitus ([facility]     DMII (diabetes mellitus, type 2) ([facility] 7/28/2015    Hemodialysis patient ([facility]     History of cardiovascular stress test 2/16/2015    lexiscan    HTN (hypertension) 7/28/2015    Hyperlipidemia     Hypertension     Lumbar radicular pain 7/28/2015    Oxygen dependent     wears 2 liters at home    Type II or unspecified type diabetes mellitus without mention of complication, not stated as uncontrolled      Past Surgical History:    Past Surgical History:   Procedure Laterality Date    OTHER SURGICAL HISTORY Left 06/06/14    cain bunionectomy left foot with osteomed screw x1    SHOULDER SURGERY      Bilateral    VEIN SURGERY         Medications Prior to Admission:    @  Prior to Admission medications    Medication Sig Start Date End Date Taking? Authorizing Provider   Omega-3 1000 MG CAPS Take by mouth 2 times daily    Historical Provider, MD   buPROPion [facility] SR) 150 MG extended release tablet  21   Historical Provider, MD   patiromer sorbitex calcium (VELTASSA) 8.4 g PACK packet Take 1 packet by mouth  Patient not taking: Reported on 2022   Historical Provider, MD   metoprolol succinate (TOPROL XL) 50 MG extended release tablet Take 1 tablet by mouth daily 21   Agustina Hirsch DO   amLODIPine (NORVASC) 10 MG tablet Take 0.5 tablets by mouth daily  Patient not taking: Reported on 2022   Agustina Hirsch DO   insulin glargine (LANTUS) 100 UNIT/ML injection vial Inject 20 Units into the skin nightly 21   Agustina Hirsch DO   empagliflozin (JARDIANCE) 10 MG tablet Take 10 mg by mouth daily    Historical Provider, MD   sevelamer (RENVELA) 800 MG tablet Take 2 tablets by mouth 3 times daily (with meals)    Historical Provider, MD   OXYGEN Inhale 2 L into the lungs as needed     Historical Provider, MD   iron sucrose (VENOFER) 20 MG/ML injection Infuse 50 mg intravenously once a week Friday    Historical Provider, MD   sodium bicarbonate 650 MG tablet Take 2 tablets by mouth 3 times daily  Patient not taking: Reported on 2022   Agustina Hirsch DO   calcitRIOL (ROCALTROL) 0.25 MCG capsule Take 1.5 mcg by mouth three times a week Monday, Wednesday, Friday    Historical Provider, MD       Allergies:   Other    Social History:   Social History     Socioeconomic History    Marital status: Single     Spouse name: Not on file    Number of children: Not on file    Years of education: Not on file    Highest education level: Not on file   Occupational History    Not on file   Tobacco Use    Smoking status: Former Smoker     Packs/day: 1.00     Years: 30.00     Pack years: 30.00     Quit date: 2021     Years since quittin.0    Smokeless tobacco: Never Used   Vaping Use    Vaping Use: Never used   Substance and Sexual Activity    Alcohol use: No    Drug use: No    Sexual activity: Not on file   Other Topics Concern    Not on file   Social History Narrative    Not on file     Social Determinants of Health     Financial Resource Strain: Low Risk     Difficulty of Paying Living Expenses: Not very hard   Food Insecurity: No Food Insecurity    Worried About Running Out of Food in the Last Year: Never true    Alexis of Food in the Last Year: Never true   Transportation Needs: No Transportation Needs    Lack of Transportation (Medical): No    Lack of Transportation (Non-Medical): No   Physical Activity: Inactive    Days of Exercise per Week: 0 days    Minutes of Exercise per Session: 0 min   Stress: No Stress Concern Present    Feeling of Stress : Only a little   Social Connections: Unknown    Frequency of Communication with Friends and Family: Three times a week    Frequency of Social Gatherings with Friends and Family: Twice a week    Attends Sikhism Services: 1 to 4 times per year    Active Member of [address] [place] [address] or Organizations: No    Attends Club or Organization Meetings: Never    Marital Status: Patient refused   Intimate Partner Violence: Unknown    Fear of Current or Ex-Partner: Patient refused    Emotionally Abused: No    Physically Abused: No    Sexually Abused: No   Housing Stability: Unknown    Unable to Pay for Housing in the Last Year: No    Number of [place] in the Last Year: Not on file    Unstable Housing in the Last Year: No       Family History:   Family History   Problem Relation Age of Onset    Kidney Disease Sister     Diabetes Mother     Diabetes Father        REVIEW OF SYSTEMS:    Gen: Patient denies any lightheadedness or dizziness. No LOC or syncope. No fevers or chills. HEENT: No earache, sore throat or nasal congestion. Resp: Denies cough, hemoptysis or sputum production. Cardiac: Denies chest pain, SOB, diaphoresis or palpitations.     GI: + Epigastric abdominal pain. No nausea, vomiting, diarrhea or constipation. No melena or hematochezia. : No urinary complaints, dysuria, hematuria or frequency. MSK: No extremity weakness, paralysis or paresthesias. PHYSICAL EXAM:    Vitals:  BP (!) 154/80   Pulse 55   Temp 98.2 °F (36.8 °C)   Resp 18   Ht 5' 7\" (1.702 m)   Wt 189 lb 6 oz (85.9 kg)   SpO2 95%   BMI 29.66 kg/m²     General:  This is a 61 y.o. yo male who is alert and oriented in mild distress secondary to abdominal pain. HEENT:  Head is normocephalic and atraumatic, PERRLA, EOMI, mucus membranes moist with no pharyngeal erythema or exudate. Neck:  Supple with no carotid bruits, JVD or thyromegaly.   No cervical adenopathy  CV:  Regular rate and rhythm, no murmurs  Lungs:  Clear to auscultation bilaterally with no wheezes, rales or rhonchi  Abdomen:  Soft,+ mildly tender upper abdomen, nondistended, bowel sounds present  Extremities:  No edema, peripheral pulses intact bilaterally  Neuro:  Cranial nerves II-XII grossly intact; motor and sensory function intact with no focal deficits  Skin:  No rashes, lesions or wounds    DATA:  CBC with Differential:    Lab Results   Component Value Date    WBC 6.1 06/19/2022    RBC 4.54 06/19/2022    HGB 13.3 06/19/2022    HCT 41.7 06/19/2022     06/19/2022    MCV 91.9 06/19/2022    MCH 29.3 06/19/2022    MCHC 31.9 06/19/2022    RDW 14.5 06/19/2022    LYMPHOPCT 27.4 06/19/2022    MONOPCT 15.6 06/19/2022    BASOPCT 0.3 06/19/2022    MONOSABS 0.95 06/19/2022    LYMPHSABS 1.67 06/19/2022    EOSABS 0.13 06/19/2022    BASOSABS 0.02 06/19/2022     CMP:    Lab Results   Component Value Date     06/19/2022    K 4.5 06/19/2022    K 3.3 04/04/2022    CL 91 06/19/2022    CO2 28 06/19/2022    BUN 44 06/19/2022    CREATININE 10.6 06/19/2022    GFRAA 6 06/19/2022    LABGLOM 6 06/19/2022    GLUCOSE 169 06/19/2022    PROT 8.0 06/19/2022    LABALBU 3.9 06/19/2022    CALCIUM 10.2 06/19/2022 BILITOT 0.3 06/19/2022    ALKPHOS 55 06/19/2022    AST 9 06/19/2022    ALT 6 06/19/2022     Magnesium:    Lab Results   Component Value Date    MG 2.0 04/04/2022     Phosphorus:    Lab Results   Component Value Date    PHOS 6.5 07/21/2021     PT/INR:    Lab Results   Component Value Date    PROTIME 10.8 07/19/2021    INR 0.9 07/19/2021     Troponin:    Lab Results   Component Value Date    TROPONINI 0.10 03/21/2021     U/A:    Lab Results   Component Value Date    COLORU Yellow 06/20/2022    PROTEINU 100 06/20/2022    PHUR 7.0 06/20/2022    LABCAST RARE 09/21/2018    WBCUA 2-5 06/20/2022    RBCUA 1-3 06/20/2022    BACTERIA FEW 06/20/2022    CLARITYU Clear 06/20/2022    SPECGRAV 1.010 06/20/2022    LEUKOCYTESUR Negative 06/20/2022    UROBILINOGEN 0.2 06/20/2022    BILIRUBINUR Negative 06/20/2022    BLOODU SMALL 06/20/2022    GLUCOSEU 250 06/20/2022    AMORPHOUS FEW 10/02/2019     ABG:    Lab Results   Component Value Date    PH 7.503 04/04/2022    PCO2 41.0 04/04/2022    PO2 73.5 04/04/2022    HCO3 31.5 04/04/2022    BE 7.7 04/04/2022    O2SAT 94.7 04/04/2022     HgBA1c:    Lab Results   Component Value Date    LABA1C 7.4 06/28/2021     FLP:    Lab Results   Component Value Date    TRIG 79 07/23/2021    HDL 60 07/23/2021    LDLCALC 65 07/23/2021    LABVLDL 16 07/23/2021     TSH:    Lab Results   Component Value Date    TSH 1.210 06/21/2021     IRON:    Lab Results   Component Value Date    IRON 19 02/09/2020     LIPASE:    Lab Results   Component Value Date    LIPASE 154 06/19/2022       ASSESSMENT AND PLAN:      Patient Active Problem List    Diagnosis Date Noted    Acute pancreatitis 06/20/2022    Hallux valgus, acquired 06/06/2014    [facility]ox[facility]     ESRD on hemodialysis ([facility]     Hyperkalemia 07/19/2021    [facility] with [facility] 06/20/2021    Respiratory failure ([facility] 02/02/2020    Pneumonia 01/28/2020    Acute pancreatitis without necrosis or infection, unspecified 09/23/2018    Idiopathic acute pancreatitis 09/21/2018    B12 deficiency 03/17/2016    DMII (diabetes mellitus, type 2) ([facility] 07/28/2015    HTN (hypertension) 07/28/2015    Lumbar radicular pain 07/28/2015    Spinal stenosis 07/28/2015     Impression:  1. Acute pancreatitis-etiology unknown  2. History of chronic renal failure with hemodialysis  3. History of paroxysmal atrial fib-currently sinus rhythm  4. History hypertension  5. History of lumbar spinal stenosis    Plan:  Admit to medical surgical floor. Monitor heart rate, blood pressure, O2 saturations  Home medications reviewed  Heparin 5000 units subcu every 8 hours  N.p.o. except sips of fluids with meds  IV fluids normal saline at 65 cc an hour  CT of the abdomen with oral contrast only now  Fentanyl 50 mcg IV push every 4 hours as needed for moderate-severe pain  Lipid panel, NKECHI now    CMP, CBC, lipase in a.m.     Kala Painter DO, D.O.  6/20/2022  11:35 AM

## 2022-06-20 NOTE — FLOWSHEET NOTE
06/20/22 1307   Vital Signs   BP (!) 157/77   Temp 98 °F (36.7 °C)   Heart Rate 57   Resp 18   Weight 186 lb 1.1 oz (84.4 kg)   Weight Method Bed scale   Percent Weight Change -1.75   Post-Hemodialysis Assessment   Post-Treatment Procedures Blood returned;Catheter Capped, clamped with Saline x2 ports   Machine Disinfection Process Acid/Vinegar Clean;Exterior Machine Disinfection; Heat Disinfect   Rinseback Volume (ml) 300 ml   Total Liters Processed (l/min) 85.3 l/min   Dialyzer Clearance Clear   Duration of Treatment (minutes) 240 minutes   Hemodialysis Intake (ml) 300 ml   Hemodialysis Output (ml) 1800 ml   NET Removed (ml) 1500   Tolerated Treatment Good   Patient Response to Treatment Cath care per policy and procedure stasis achieved pt has no complaints and is being transported back to room   Time Off 1300   Patient Disposition Return to room

## 2022-06-21 ENCOUNTER — APPOINTMENT (OUTPATIENT)
Dept: MRI IMAGING | Age: 67
End: 2022-06-21
Payer: MEDICARE

## 2022-06-21 LAB
ALBUMIN SERPL-MCNC: 3.3 G/DL (ref 3.5–5.2)
ALP BLD-CCNC: 49 U/L (ref 40–129)
ALT SERPL-CCNC: 6 U/L (ref 0–40)
ANION GAP SERPL CALCULATED.3IONS-SCNC: 12 MMOL/L (ref 7–16)
ANTI-NUCLEAR ANTIBODY (ANA): NEGATIVE
AST SERPL-CCNC: 10 U/L (ref 0–39)
BASOPHILS ABSOLUTE: 0.01 E9/L (ref 0–0.2)
BASOPHILS RELATIVE PERCENT: 0.2 % (ref 0–2)
BILIRUB SERPL-MCNC: 0.4 MG/DL (ref 0–1.2)
BUN BLDV-MCNC: 24 MG/DL (ref 6–23)
CALCIUM IONIZED: 1.26 MMOL/L (ref 1.15–1.33)
CALCIUM SERPL-MCNC: 9.4 MG/DL (ref 8.6–10.2)
CHLORIDE BLD-SCNC: 96 MMOL/L (ref 98–107)
CHOLESTEROL, TOTAL: 136 MG/DL (ref 0–199)
CO2: 26 MMOL/L (ref 22–29)
CREAT SERPL-MCNC: 7.1 MG/DL (ref 0.7–1.2)
EOSINOPHILS ABSOLUTE: 0.14 E9/L (ref 0.05–0.5)
EOSINOPHILS RELATIVE PERCENT: 3.1 % (ref 0–6)
GFR AFRICAN AMERICAN: 9
GFR NON-AFRICAN AMERICAN: 9 ML/MIN/1.73
GLUCOSE BLD-MCNC: 123 MG/DL (ref 74–99)
HCT VFR BLD CALC: 40.9 % (ref 37–54)
HDLC SERPL-MCNC: 43 MG/DL
HEMOGLOBIN: 12.8 G/DL (ref 12.5–16.5)
IMMATURE GRANULOCYTES #: 0.01 E9/L
IMMATURE GRANULOCYTES %: 0.2 % (ref 0–5)
LDL CHOLESTEROL CALCULATED: 71 MG/DL (ref 0–99)
LIPASE: 85 U/L (ref 13–60)
LYMPHOCYTES ABSOLUTE: 1.26 E9/L (ref 1.5–4)
LYMPHOCYTES RELATIVE PERCENT: 27.6 % (ref 20–42)
MAGNESIUM: 2.5 MG/DL (ref 1.6–2.6)
MCH RBC QN AUTO: 29 PG (ref 26–35)
MCHC RBC AUTO-ENTMCNC: 31.3 % (ref 32–34.5)
MCV RBC AUTO: 92.7 FL (ref 80–99.9)
METER GLUCOSE: 131 MG/DL (ref 74–99)
METER GLUCOSE: 165 MG/DL (ref 74–99)
METER GLUCOSE: 209 MG/DL (ref 74–99)
METER GLUCOSE: 93 MG/DL (ref 74–99)
MONOCYTES ABSOLUTE: 0.8 E9/L (ref 0.1–0.95)
MONOCYTES RELATIVE PERCENT: 17.5 % (ref 2–12)
NEUTROPHILS ABSOLUTE: 2.35 E9/L (ref 1.8–7.3)
NEUTROPHILS RELATIVE PERCENT: 51.4 % (ref 43–80)
PARATHYROID HORMONE INTACT: 98 PG/ML (ref 15–65)
PDW BLD-RTO: 14.4 FL (ref 11.5–15)
PHOSPHORUS: 4.8 MG/DL (ref 2.5–4.5)
PLATELET # BLD: 182 E9/L (ref 130–450)
PMV BLD AUTO: 9.5 FL (ref 7–12)
POTASSIUM SERPL-SCNC: 4.6 MMOL/L (ref 3.5–5)
POTASSIUM SERPL-SCNC: 5.1 MMOL/L (ref 3.5–5)
RBC # BLD: 4.41 E12/L (ref 3.8–5.8)
SODIUM BLD-SCNC: 134 MMOL/L (ref 132–146)
TOTAL PROTEIN: 7.4 G/DL (ref 6.4–8.3)
TRIGL SERPL-MCNC: 109 MG/DL (ref 0–149)
TSH SERPL DL<=0.05 MIU/L-ACNC: 1.18 UIU/ML (ref 0.27–4.2)
VITAMIN D 25-HYDROXY: 30 NG/ML (ref 30–100)
VLDLC SERPL CALC-MCNC: 22 MG/DL
WBC # BLD: 4.6 E9/L (ref 4.5–11.5)

## 2022-06-21 PROCEDURE — 84443 ASSAY THYROID STIM HORMONE: CPT

## 2022-06-21 PROCEDURE — 83970 ASSAY OF PARATHORMONE: CPT

## 2022-06-21 PROCEDURE — 82962 GLUCOSE BLOOD TEST: CPT

## 2022-06-21 PROCEDURE — G0378 HOSPITAL OBSERVATION PER HR: HCPCS

## 2022-06-21 PROCEDURE — 84132 ASSAY OF SERUM POTASSIUM: CPT

## 2022-06-21 PROCEDURE — 86038 ANTINUCLEAR ANTIBODIES: CPT

## 2022-06-21 PROCEDURE — 83735 ASSAY OF MAGNESIUM: CPT

## 2022-06-21 PROCEDURE — 36415 COLL VENOUS BLD VENIPUNCTURE: CPT

## 2022-06-21 PROCEDURE — 82330 ASSAY OF CALCIUM: CPT

## 2022-06-21 PROCEDURE — 74181 MRI ABDOMEN W/O CONTRAST: CPT

## 2022-06-21 PROCEDURE — 82306 VITAMIN D 25 HYDROXY: CPT

## 2022-06-21 PROCEDURE — 85025 COMPLETE CBC W/AUTO DIFF WBC: CPT

## 2022-06-21 PROCEDURE — 84100 ASSAY OF PHOSPHORUS: CPT

## 2022-06-21 PROCEDURE — 80053 COMPREHEN METABOLIC PANEL: CPT

## 2022-06-21 PROCEDURE — 80061 LIPID PANEL: CPT

## 2022-06-21 PROCEDURE — 83690 ASSAY OF LIPASE: CPT

## 2022-06-21 PROCEDURE — 6370000000 HC RX 637 (ALT 250 FOR IP): Performed by: INTERNAL MEDICINE

## 2022-06-21 RX ADMIN — ACETAMINOPHEN 500 MG: 500 TABLET ORAL at 20:28

## 2022-06-21 RX ADMIN — INSULIN LISPRO 2 UNITS: 100 INJECTION, SOLUTION INTRAVENOUS; SUBCUTANEOUS at 16:56

## 2022-06-21 RX ADMIN — INSULIN LISPRO 2 UNITS: 100 INJECTION, SOLUTION INTRAVENOUS; SUBCUTANEOUS at 21:10

## 2022-06-21 RX ADMIN — SEVELAMER CARBONATE 1600 MG: 800 TABLET, FILM COATED ORAL at 12:19

## 2022-06-21 RX ADMIN — INSULIN GLARGINE 8 UNITS: 100 INJECTION, SOLUTION SUBCUTANEOUS at 21:11

## 2022-06-21 RX ADMIN — AMLODIPINE BESYLATE 5 MG: 5 TABLET ORAL at 12:19

## 2022-06-21 RX ADMIN — METOPROLOL SUCCINATE 50 MG: 50 TABLET, EXTENDED RELEASE ORAL at 12:19

## 2022-06-21 RX ADMIN — SEVELAMER CARBONATE 1600 MG: 800 TABLET, FILM COATED ORAL at 16:55

## 2022-06-21 ASSESSMENT — PAIN SCALES - GENERAL: PAINLEVEL_OUTOF10: 0

## 2022-06-21 NOTE — PROGRESS NOTES
Department of Internal Medicine        CHIEF COMPLAINT: Abdominal pain    Reason for Admission: Acute pancreatitis    HISTORY OF PRESENT ILLNESS:      The patient is a 61 y.o. male who presents with abdominal pain that started epigastric area about 4 days prior to admission. The patient is constant and worse when he eats. He denies any problem with any chest pain, nausea/vomiting, fever/chills, diarrhea, melena or hematochezia. Patient did have a bowel movement without any problems. He came to the ED with the patient's lipase 154. WBC 6.1 hemoglobin 13.3. Temperature is 90.2 with heart rate currently 58 with blood pressure 154/80 and O2 sat 95% on 2 L. CT of the abdomen pelvis showed evidence of possible acute pancreatitis without evidence of discrete loculated peripancreatic fluid collection. Patient does have diverticulosis. Patient currently getting dialysis and still having the abdominal pain which is slightly improved with pain medication. He states he had a similar episode years ago and he was told it was from certain foods that he ate.    6/21/2022  Patient seen examined on medical surgical floor. Patient states that he feels better today. He denies any chest or abdominal pain. He denies any nausea vomiting or unusual shortness of breath. Serum creatinine improved to 7.1. Patient lipase is down to 85 with normal transaminase. Blood sugars range . WBC 4.6 hemoglobin 12.8. Temperature 98.2 with heart rate of 64 blood pressure 130/63. O2 sat 94% on 2 L nasal cannula.     Past Medical History:    Past Medical History:   Diagnosis Date    Back pain     [facility]     DMII (diabetes mellitus, type 2) ([facility] [address] 7/28/2015    Hemodialysis patient ([facility] [address] [facility]     History of cardiovascular stress test 2/16/2015    lexiscan    HTN (hypertension) 7/28/2015    Hyperlipidemia     Hypertension     Lumbar radicular pain 7/28/2015    Oxygen dependent     wears 2 liters at home    Type II or unspecified type diabetes mellitus without mention of complication, not stated as uncontrolled      Past Surgical History:    Past Surgical History:   Procedure Laterality Date    OTHER SURGICAL HISTORY Left 06/06/14    cain bunionectomy left foot with osteomed screw x1    SHOULDER SURGERY      Bilateral    VEIN SURGERY         Medications Prior to Admission:    @  Prior to Admission medications    Medication Sig Start Date End Date Taking? Authorizing Provider   Omega-3 1000 MG CAPS Take by mouth 2 times daily    Historical Provider, MD   buPROPion [facility] SR) 150 MG extended release tablet  8/2/21   Historical Provider, MD   patiromer sorbitex calcium (VELTASSA) 8.4 g PACK packet Take 1 packet by mouth  Patient not taking: Reported on 6/20/2022 7/21/21   Historical Provider, MD   metoprolol succinate (TOPROL XL) 50 MG extended release tablet Take 1 tablet by mouth daily 7/26/21   Donatoldine , DO   amLODIPine (NORVASC) 10 MG tablet Take 0.5 tablets by mouth daily  Patient not taking: Reported on 6/20/2022 7/25/21   Stiven , DO   insulin glargine (LANTUS) 100 UNIT/ML injection vial Inject 20 Units into the skin nightly 7/25/21   Donatoldine , DO   empagliflozin (JARDIANCE) 10 MG tablet Take 10 mg by mouth daily    Historical Provider, MD   sevelamer (RENVELA) 800 MG tablet Take 2 tablets by mouth 3 times daily (with meals)    Historical Provider, MD   OXYGEN Inhale 2 L into the lungs as needed     Historical Provider, MD   iron sucrose (VENOFER) 20 MG/ML injection Infuse 50 mg intravenously once a week Friday    Historical Provider, MD   sodium bicarbonate 650 MG tablet Take 2 tablets by mouth 3 times daily  Patient not taking: Reported on 6/20/2022 2/13/20   Stiven , DO   calcitRIOL (ROCALTROL) 0.25 MCG capsule Take 1.5 mcg by mouth three times a week Monday, Wednesday, Friday    Historical Provider, MD       Allergies:   Other    Social History:   Social History the Last Year: No       Family History:   Family History   Problem Relation Age of Onset    Kidney Disease Sister     Diabetes Mother     Diabetes Father        REVIEW OF SYSTEMS:    Gen: Patient denies any lightheadedness or dizziness. No LOC or syncope. No fevers or chills. HEENT: No earache, sore throat or nasal congestion. Resp: Denies cough, hemoptysis or sputum production. Cardiac: Denies chest pain, SOB, diaphoresis or palpitations. GI: + Epigastric abdominal pain. No nausea, vomiting, diarrhea or constipation. No melena or hematochezia. : No urinary complaints, dysuria, hematuria or frequency. MSK: No extremity weakness, paralysis or paresthesias. PHYSICAL EXAM:    Vitals:  /63   Pulse 64   Temp 98.2 °F (36.8 °C) (Oral)   Resp 20   Ht 5' 7\" (1.702 m)   Wt 186 lb 1.1 oz (84.4 kg)   SpO2 94%   BMI 29.14 kg/m²     General:  This is a 61 y.o. yo male who is alert and oriented in mild distress secondary to abdominal pain. HEENT:  Head is normocephalic and atraumatic, PERRLA, EOMI, mucus membranes moist with no pharyngeal erythema or exudate. Neck:  Supple with no carotid bruits, JVD or thyromegaly.   No cervical adenopathy  CV:  Regular rate and rhythm, no murmurs  Lungs:  Clear to auscultation bilaterally with no wheezes, rales or rhonchi  Abdomen:  Soft,+ mildly tender upper abdomen, nondistended, bowel sounds present  Extremities:  No edema, peripheral pulses intact bilaterally  Neuro:  Cranial nerves II-XII grossly intact; motor and sensory function intact with no focal deficits  Skin:  No rashes, lesions or wounds    DATA:  CBC with Differential:    Lab Results   Component Value Date    WBC 4.6 06/21/2022    RBC 4.41 06/21/2022    HGB 12.8 06/21/2022    HCT 40.9 06/21/2022     06/21/2022    MCV 92.7 06/21/2022    MCH 29.0 06/21/2022    MCHC 31.3 06/21/2022    RDW 14.4 06/21/2022    LYMPHOPCT 27.6 06/21/2022    MONOPCT 17.5 06/21/2022    BASOPCT 0.2 06/21/2022    MONOSABS 0.80 06/21/2022    LYMPHSABS 1.26 06/21/2022    EOSABS 0.14 06/21/2022    BASOSABS 0.01 06/21/2022     CMP:    Lab Results   Component Value Date     06/21/2022    K 5.1 06/21/2022    K 3.3 04/04/2022    CL 96 06/21/2022    CO2 26 06/21/2022    BUN 24 06/21/2022    CREATININE 7.1 06/21/2022    GFRAA 9 06/21/2022    LABGLOM 9 06/21/2022    GLUCOSE 123 06/21/2022    PROT 7.4 06/21/2022    LABALBU 3.3 06/21/2022    CALCIUM 9.4 06/21/2022    BILITOT 0.4 06/21/2022    ALKPHOS 49 06/21/2022    AST 10 06/21/2022    ALT 6 06/21/2022     Magnesium:    Lab Results   Component Value Date    MG 2.5 06/21/2022     Phosphorus:    Lab Results   Component Value Date    PHOS 4.8 06/21/2022     PT/INR:    Lab Results   Component Value Date    PROTIME 10.8 07/19/2021    INR 0.9 07/19/2021     Troponin:    Lab Results   Component Value Date    TROPONINI 0.10 03/21/2021     U/A:    Lab Results   Component Value Date    COLORU Yellow 06/20/2022    PROTEINU 100 06/20/2022    PHUR 7.0 06/20/2022    LABCAST RARE 09/21/2018    WBCUA 2-5 06/20/2022    RBCUA 1-3 06/20/2022    BACTERIA FEW 06/20/2022    CLARITYU Clear 06/20/2022    SPECGRAV 1.010 06/20/2022    LEUKOCYTESUR Negative 06/20/2022    UROBILINOGEN 0.2 06/20/2022    BILIRUBINUR Negative 06/20/2022    BLOODU SMALL 06/20/2022    GLUCOSEU 250 06/20/2022    AMORPHOUS FEW 10/02/2019     ABG:    Lab Results   Component Value Date    PH 7.503 04/04/2022    PCO2 41.0 04/04/2022    PO2 73.5 04/04/2022    HCO3 31.5 04/04/2022    BE 7.7 04/04/2022    O2SAT 94.7 04/04/2022     HgBA1c:    Lab Results   Component Value Date    LABA1C 7.9 06/20/2022     FLP:    Lab Results   Component Value Date    TRIG 109 06/21/2022    HDL 43 06/21/2022    LDLCALC 71 06/21/2022    LABVLDL 22 06/21/2022     TSH:    Lab Results   Component Value Date    TSH 1.180 06/21/2022     IRON:    Lab Results   Component Value Date    IRON 19 02/09/2020     LIPASE:    Lab Results   Component Value Date    LIPASE 85 06/21/2022       ASSESSMENT AND PLAN:      Patient Active Problem List    Diagnosis Date Noted    Acute pancreatitis 06/20/2022    Hallux valgus, acquired 06/06/2014    [facility]oxysmal [facility] fibrillation [facility] [address]     ESRD on hemodialysis ([facility] [address] [facility]     Hyperkalemia 07/19/2021    Acute respiratory failure with hypoxia ([address] 06/20/2021    Respiratory failure ([facility] [address] 02/02/2020    Pneumonia 01/28/2020    Acute pancreatitis without necrosis or infection, unspecified 09/23/2018    Idiopathic acute pancreatitis 09/21/2018    B12 deficiency 03/17/2016    DMII (diabetes mellitus, type 2) ([facility] [address] 07/28/2015    HTN (hypertension) 07/28/2015    Lumbar radicular pain 07/28/2015    Spinal stenosis 07/28/2015     Impression:  1. Acute pancreatitis-etiology unknown  2. History of chronic renal failure with hemodialysis  3. History of paroxysmal atrial fib-currently sinus rhythm  4. History hypertension  5. History of lumbar spinal stenosis    Plan:  Admit to medical surgical floor. Monitor heart rate, blood pressure, O2 saturations  Home medications reviewed  Heparin 5000 units subcu every 8 hours  IV fluids normal saline at 65 cc an hour  CT of the abdomen with oral contrast only now  Fentanyl 50 mcg IV push every 4 hours as needed for moderate-severe pain    Increase diet to no added salt low-fat    BMP, lipase in a.m.     Kike Price DO, D.OHenri  6/21/2022  10:15 AM

## 2022-06-21 NOTE — CARE COORDINATION
6/21/2022 Cm transition of care: Cm met with pt at bedside gen/medical unit. Pt lives alone in single story apartment. His car is in parking lot and he doesn't use oxygen all the time and declines to allow CM to arrange a portable tank- and he doesn't want to ask his family to get his portable because he doesn't use it all the time, anyways he states. [facility] oxygen supplier, has no other DME that he uses. If HHC needed at discharge he would like [facility]- but not sure he needs this. Referral placed to  of [facility]- incase needed at discharge- will need orders if recommended. New PT/OT consults placed. HD MWF 11 am- at [address] [facility] in [place], [place]- [facility] [address] [facility]. CM/SS assigned to follow.  Electronically signed by Vishnu Anderson RN-BC on 6/21/2022 at 1:27 PM

## 2022-06-21 NOTE — PROGRESS NOTES
Department of Internal Medicine  Nephrology Attending Progress Note    SUBJECTIVE:  Full consult deferred as pt followed longitudinally in dialysis. We are following this patient for end-stage kidney disease. Pt states he at some BBQ chicken over the weekend and developed abd pain. He believes he set off his pancreatitis    6/21/22: Pt awake alert and appropriate. He states his abd pain has improved and he is hungry     .    PROBLEM LIST:    Patient Active Problem List   Diagnosis    Hallux valgus, acquired    DMII (diabetes mellitus, type 2) ([address]    HTN (hypertension)    Lumbar radicular pain    Spinal stenosis    B12 deficiency    Idiopathic acute pancreatitis    Acute pancreatitis without necrosis or infection, unspecified    Pneumonia    Respiratory failure ([address]    Acute respiratory failure with hypoxia (HCC)    Hyperkalemia    Paroxysmal atrial fibrillation ([address]    ESRD on hemodialysis ([address]    Acute pancreatitis        PAST MEDICAL HISTORY:    Past Medical History:   Diagnosis Date    Back pain     Diabetes mellitus ([address]     DMII [facility]di[facility] mellitus, type 2) [facility] [address] 7/28/2015    Hemodialysis patient ([facility] [address]     History of cardiovascular stress test 2/16/2015    lexiscan    HTN (hypertension) 7/28/2015    Hyperlipidemia     Hypertension     Lumbar radicular pain 7/28/2015    Oxygen dependent     wears 2 liters at home    Type II or unspecified type diabetes mellitus without mention of complication, not stated as uncontrolled        MEDS (scheduled):   calcitRIOL  1.5 mcg Oral Once per day on Mon Wed Fri    sodium bicarbonate  1,300 mg Oral TID    sevelamer  1,600 mg Oral TID WC    metoprolol succinate  50 mg Oral Daily    amLODIPine  5 mg Oral Daily    omega-3 acid ethyl esters  1,000 mg Oral BID    buPROPion  150 mg Oral Daily    patiromer sorbitex calcium  1 packet Oral Daily    insulin lispro  0-12 Units SubCUTAneous TID WC    insulin lispro  0-6 Units SubCUTAneous Nightly  insulin glargine  8 Units SubCUTAneous Nightly    [START ON 6/24/2022] ferric gluconate (FERRLECIT) IVPB  62.5 mg IntraVENous Weekly       MEDS (infusions):   dextrose      sodium chloride 55 mL/hr at 06/20/22 1838       MEDS (prn):  acetaminophen, prochlorperazine, polyethylene glycol, glucose, dextrose bolus **OR** dextrose bolus, glucagon (rDNA), dextrose, fentanNYL    DIET:    ADULT DIET;  Regular; Low Fat/Low Chol/High Fiber/JUNI      PHYSICAL EXAM:      Patient Vitals for the past 24 hrs:   BP Temp Temp src Pulse Resp SpO2 Weight   06/21/22 0545 130/63 98.2 °F (36.8 °C) Oral 64 20 94 % --   06/20/22 1715 129/61 98.4 °F (36.9 °C) Oral 65 18 93 % --   06/20/22 1307 (!) 157/77 98 °F (36.7 °C) -- 57 18 -- 186 lb 1.1 oz (84.4 kg)   06/20/22 1230 (!) 149/80 -- -- 58 -- -- --   06/20/22 1200 (!) 153/80 -- -- 54 -- -- --   06/20/22 1130 (!) 154/80 -- -- 55 -- -- --   06/20/22 1100 (!) 159/79 -- -- 55 -- -- --          Intake/Output Summary (Last 24 hours) at 6/21/2022 1050  Last data filed at 6/21/2022 0719  Gross per 24 hour   Intake 415.27 ml   Output 1800 ml   Net -1384.73 ml       Wt Readings from Last 3 Encounters:   06/20/22 186 lb 1.1 oz (84.4 kg)   04/25/22 184 lb (83.5 kg)   08/10/21 203 lb (92.1 kg)       Constitutional:  Patient in no acute distress  Head: normocephalic, atraumatic, R IJ TDC  Cardiovascular: RRR, no rub  Respiratory:  Lungs Clear upper, diminished in bsases  Gastrointestinal: soft, nontender in the midepigastrium, nondistended, + bowel sounds  Ext: neg. edema   Neuro: awake and alert        DATA:      Recent Labs     06/19/22 2330 06/21/22 0457   WBC 6.1 4.6   HGB 13.3 12.8   HCT 41.7 40.9   MCV 91.9 92.7    182     Recent Labs     06/19/22 2330 06/21/22 0457    134   K 4.5 5.1*   CL 91* 96*   CO2 28 26   BUN 44* 24*   CREATININE 10.6* 7.1*   LABGLOM 6 9   GLUCOSE 169* 123*   CALCIUM 10.2 9.4     Recent Labs     06/19/22 2330 06/21/22 0457   ALT 6 6     Lab Results Component Value Date    LABALBU 3.3 (L) 06/21/2022    LABALBU 3.9 06/19/2022    LABALBU 3.1 (L) 07/25/2021       Iron studies:  Lab Results   Component Value Date    FERRITIN 188 11/13/2019    IRON 19 (L) 02/09/2020    TIBC 189 (L) 02/09/2020     Vitamin B-12   Date Value Ref Range Status   02/09/2020 320 211 - 946 pg/mL Final     Folate   Date Value Ref Range Status   02/09/2020 9.6 4.8 - 24.2 ng/mL Final       Bone disease:  Lab Results   Component Value Date    MG 2.5 06/21/2022    PHOS 4.8 (H) 06/21/2022     Vit D, 25-Hydroxy   Date Value Ref Range Status   06/21/2022 30 30 - 100 ng/mL Final     Comment:     <20 ng/mL. ........... Derek Rathke Deficient  20-30 ng/mL. ......... Derek Rathke Insufficient   ng/mL. ........ Derek Rathke Sufficient  >100 ng/mL. .......... Derek Rathke Toxic       PTH   Date Value Ref Range Status   06/21/2022 98 (H) 15 - 65 pg/mL Final       No components found for: URIC    Lab Results   Component Value Date    COLORU Yellow 06/20/2022    NITRU Negative 06/20/2022    GLUCOSEU 250 06/20/2022    KETUA Negative 06/20/2022    UROBILINOGEN 0.2 06/20/2022    BILIRUBINUR Negative 06/20/2022       No results found for: Diane Ramírez        IMPRESSION/RECOMMENDATIONS:      1. ESKD-on IHD MWF via a R IJ TDC  Continue MWF schedule as at [address]:  1. Next IHD 6/22/22  2. Follow Labs     2. Abd Pain-acute pancreatitis  6/20/22 CT Scan with enlarged and edmatous pancreas without   loculated peripancreatic fluid collection  PLAN:  1. Defer to Hospital Service    3-Anemia in CKD  HgB above goal 10  PLAN:  1. Ferrlecit at the outpt unit as needed  2. Start MANUEL when HgB <10  3. 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 Follow on the calcitriol and sevelamer     5- HTN with CKD 5/ESKD  BP at goal <140/90  PLAN:  1. Continue to monitor     6. Hyperkalemia-  K+ 5.1  PLAN:  1. Follow K+-recheck this PM  2.  Control K+ with a 2K+ bath      Electronically signed by Loida Zelaya MD Norma on 6/21/2022 at 10:50 AM

## 2022-06-21 NOTE — CARE COORDINATION
6/21/2022 [facility] unable to accept [employer] coverage at this time.  Electronically signed by SHALA Petersen on 6/21/2022 at 3:02 PM

## 2022-06-22 VITALS
SYSTOLIC BLOOD PRESSURE: 118 MMHG | RESPIRATION RATE: 18 BRPM | OXYGEN SATURATION: 92 % | HEART RATE: 65 BPM | BODY MASS INDEX: 29.24 KG/M2 | WEIGHT: 186.29 LBS | DIASTOLIC BLOOD PRESSURE: 64 MMHG | HEIGHT: 67 IN | TEMPERATURE: 98 F

## 2022-06-22 LAB
ANION GAP SERPL CALCULATED.3IONS-SCNC: 12 MMOL/L (ref 7–16)
BUN BLDV-MCNC: 38 MG/DL (ref 6–23)
CALCIUM IONIZED: 1.25 MMOL/L (ref 1.15–1.33)
CALCIUM SERPL-MCNC: 9.2 MG/DL (ref 8.6–10.2)
CHLORIDE BLD-SCNC: 98 MMOL/L (ref 98–107)
CO2: 23 MMOL/L (ref 22–29)
CREAT SERPL-MCNC: 8.6 MG/DL (ref 0.7–1.2)
GFR AFRICAN AMERICAN: 8
GFR NON-AFRICAN AMERICAN: 8 ML/MIN/1.73
GLUCOSE BLD-MCNC: 140 MG/DL (ref 74–99)
HCT VFR BLD CALC: 39 % (ref 37–54)
HEMOGLOBIN: 12.4 G/DL (ref 12.5–16.5)
LIPASE: 130 U/L (ref 13–60)
MAGNESIUM: 2.6 MG/DL (ref 1.6–2.6)
MCH RBC QN AUTO: 29 PG (ref 26–35)
MCHC RBC AUTO-ENTMCNC: 31.8 % (ref 32–34.5)
MCV RBC AUTO: 91.3 FL (ref 80–99.9)
METER GLUCOSE: 139 MG/DL (ref 74–99)
METER GLUCOSE: 270 MG/DL (ref 74–99)
PDW BLD-RTO: 14.1 FL (ref 11.5–15)
PHOSPHORUS: 5 MG/DL (ref 2.5–4.5)
PLATELET # BLD: 173 E9/L (ref 130–450)
PMV BLD AUTO: 9.4 FL (ref 7–12)
POTASSIUM SERPL-SCNC: 4.7 MMOL/L (ref 3.5–5)
RBC # BLD: 4.27 E12/L (ref 3.8–5.8)
SEDIMENTATION RATE, ERYTHROCYTE: 56 MM/HR (ref 0–15)
SODIUM BLD-SCNC: 133 MMOL/L (ref 132–146)
WBC # BLD: 5.4 E9/L (ref 4.5–11.5)

## 2022-06-22 PROCEDURE — 85027 COMPLETE CBC AUTOMATED: CPT

## 2022-06-22 PROCEDURE — 6370000000 HC RX 637 (ALT 250 FOR IP): Performed by: INTERNAL MEDICINE

## 2022-06-22 PROCEDURE — 85651 RBC SED RATE NONAUTOMATED: CPT

## 2022-06-22 PROCEDURE — 90935 HEMODIALYSIS ONE EVALUATION: CPT

## 2022-06-22 PROCEDURE — 82962 GLUCOSE BLOOD TEST: CPT

## 2022-06-22 PROCEDURE — 96376 TX/PRO/DX INJ SAME DRUG ADON: CPT

## 2022-06-22 PROCEDURE — 97161 PT EVAL LOW COMPLEX 20 MIN: CPT | Performed by: PHYSICAL THERAPIST

## 2022-06-22 PROCEDURE — 36415 COLL VENOUS BLD VENIPUNCTURE: CPT

## 2022-06-22 PROCEDURE — 2580000003 HC RX 258: Performed by: INTERNAL MEDICINE

## 2022-06-22 PROCEDURE — 82330 ASSAY OF CALCIUM: CPT

## 2022-06-22 PROCEDURE — 80048 BASIC METABOLIC PNL TOTAL CA: CPT

## 2022-06-22 PROCEDURE — 83690 ASSAY OF LIPASE: CPT

## 2022-06-22 PROCEDURE — G0378 HOSPITAL OBSERVATION PER HR: HCPCS

## 2022-06-22 PROCEDURE — 84100 ASSAY OF PHOSPHORUS: CPT

## 2022-06-22 PROCEDURE — 83735 ASSAY OF MAGNESIUM: CPT

## 2022-06-22 PROCEDURE — 97165 OT EVAL LOW COMPLEX 30 MIN: CPT

## 2022-06-22 PROCEDURE — 6360000002 HC RX W HCPCS: Performed by: INTERNAL MEDICINE

## 2022-06-22 RX ADMIN — AMLODIPINE BESYLATE 5 MG: 5 TABLET ORAL at 08:30

## 2022-06-22 RX ADMIN — METOPROLOL SUCCINATE 50 MG: 50 TABLET, EXTENDED RELEASE ORAL at 08:30

## 2022-06-22 RX ADMIN — PROCHLORPERAZINE EDISYLATE 10 MG: 5 INJECTION INTRAMUSCULAR; INTRAVENOUS at 02:06

## 2022-06-22 RX ADMIN — OMEGA-3-ACID ETHYL ESTERS 1000 MG: 1 CAPSULE, LIQUID FILLED ORAL at 11:48

## 2022-06-22 RX ADMIN — SEVELAMER CARBONATE 1600 MG: 800 TABLET, FILM COATED ORAL at 08:28

## 2022-06-22 RX ADMIN — SODIUM BICARBONATE 1300 MG: 650 TABLET ORAL at 08:29

## 2022-06-22 RX ADMIN — SEVELAMER CARBONATE 1600 MG: 800 TABLET, FILM COATED ORAL at 11:46

## 2022-06-22 RX ADMIN — SODIUM CHLORIDE: 9 INJECTION, SOLUTION INTRAVENOUS at 02:09

## 2022-06-22 RX ADMIN — INSULIN LISPRO 6 UNITS: 100 INJECTION, SOLUTION INTRAVENOUS; SUBCUTANEOUS at 11:40

## 2022-06-22 RX ADMIN — CALCITRIOL 1.5 MCG: 0.25 CAPSULE ORAL at 11:44

## 2022-06-22 NOTE — CARE COORDINATION
6/22/2022 Discharge to home after hd- no needs identified at this time. pts car is in parking lot.  Electronically signed by SHALA Worthy on 6/22/2022 at 10:16 AM

## 2022-06-22 NOTE — PROGRESS NOTES
Coordination  [] ROM  [] Delirium                   [] Motor Control     OT PLAN OF CARE   OT POC based on physician orders, patient diagnosis and results of clinical assessment    Frequency/Duration 1-3 days/wk for 2 weeks PRN     Specific OT Treatment Interventions to include: none, discharging from OT    Recommended Adaptive Equipment: none      Home Living: alone; 2nd floor apartment, 1 story, 4 steps to enter with rail, 5 steps up to 2nd floor with rail, tub shower. Equipment owned: [employer]     Prior Level of Function: Independent with ADLs , Independent with IADLs; ambulated with no device    Driving: yes   Occupation: part time  at [employer]     Pain Level: no pain at time of evaluation. Cognition: A&O: 4/4; Follows 3 step directions   Memory: good    Sequencing: good    Problem solving: good    Judgement/safety: good     [facility]   AM-PAC Daily Activity Inpatient   How much help for putting on and taking off regular lower body clothing?: None  How much help for Bathing?: None  How much help for Toileting?: None  How much help for putting on and taking off regular upper body clothing?: None  How much help for taking care of personal grooming?: None  How much help for eating meals?: None  AM-[facility] Inpatient Daily Activity Raw Score: 24  AM-PAC Inpatient ADL T-Scale Score : 57.54  ADL Inpatient CMS 0-100% Score: 0  ADL Inpatient CMS G-Code Modifier : CH     Functional Assessment:    Initial Eval Status  Date: 6/22/22   Feeding Independent    Grooming Independent    UB Dressing Independent    LB Dressing Independent    Bathing Independent   Toileting Independent    Bed Mobility  Supine to sit: Independent   Sit to supine: N/T as pt was seated in bedside chair   Functional Transfers Independent from EOB. Independent for transfer to and from low commode   Transfer training with verbal cues for hand placement throughout session to improve safety.     Functional Mobility Independent with no device to and from bathroom and within the room   Balance Sitting:     Static: good     Dynamic: good   Standing: good with no device   Activity Tolerance good    Visual/  Perceptual Glasses: yes             Hand Dominance: left      AROM (PROM) Strength Additional Info:    RUE  WFL 5/5 good  and wfl FMC/dexterity noted during ADL tasks     LUE WFL 5/5 good  and wfl FMC/dexterity noted during ADL tasks       Hearing: [facility]   Sensation:  No c/o numbness or tingling  Tone: WFL   Edema: no     Comments: Upon arrival the patient was supine. At end of session, patient was up in chair with call light and phone within reach, all lines and tubes intact. Treatment: OT treatment provided this date includes: none     Rehab Potential: Good for established goals. Patient / Family Goal: return home, contact [facility] to move forward on a kidney transplant       Patient and/or family were instructed on functional diagnosis, prognosis/goals and OT plan of care. Demonstrated good understanding. Eval Complexity: Low    Time In: 9:25am   Time Out: 9:40am    Total Treatment Time: 0      Min Units   OT Eval Low 97165  X  1    OT Eval Medium 34740      OT Eval High 14514      OT Re-Eval [id number]            ADL/Self Care 23392     Therapeutic Activities 15168       Therapeutic Ex 21004       Orthotic Management 59227       Manual [id number]     Neuro Re-Ed [id number]       Non-Billable Time        Evaluation Time additionally includes thorough review of current medical information, gathering information on past medical history/social history and prior level of function, interpretation of standardized testing/informal observation of tasks, assessment of data and development of plan of care and goals.         Evaluating OT: Pineda Casillas OTR/L; [id number]

## 2022-06-22 NOTE — PROGRESS NOTES
Physical Therapy Initial Evaluation/Plan of Care    Room #:  0324/0324-01  Patient Name: Sangeetha Dempsey  YOB: 1955  MRN: [id number]    Date of Service: 6/22/2022     Tentative placement recommendation: Home  Equipment recommendation: None      Evaluating Physical Therapist: Ruslan Grace PT #[id number]      Specific Provider Orders/Date/Referring Provider :  06/21/22 1330   PT eval and treat Start: 06/21/22 1330, End: 06/21/22 1330, ONE TIME, Standing Count: 1 Occurrences, R    Shana Neri,       Admitting Diagnosis:   End stage renal failure on dialysis ([address] [N18.6, Z99.2]  Acute pancreatitis, unspecified complication status, unspecified pancreatitis type [K85.90]  Acute pancreatitis without infection or necrosis, unspecified pancreatitis type [K85.90]     abdominal pain    Surgery: none  Visit Diagnoses       Codes    End stage renal failure on dialysis ([address]     N18.6, Z99.2          Patient Active Problem List   Diagnosis    Hallux valgus, acquired    DMII (diabetes mellitus, type 2) ([address]    HTN (hypertension)    Lumbar radicular pain    Spinal stenosis    B12 deficiency    Idiopathic acute pancreatitis    Acute pancreatitis without necrosis or infection, unspecified    Pneumonia    Respiratory failure ([address]    Acute respiratory failure with hypoxia ([address]    Hyperkalemia    Paroxysmal atrial fibrillation ([address]    ESRD on hemodialysis ([address]    Acute pancreatitis      ASSESSMENT of Current Deficits  No PT needs at this time. Nursing to ambulate patient every shift. Patient and or family understand(s) diagnosis, prognosis, and plan of care.     Past medical history:   Past Medical History:   Diagnosis Date    Back pain     Diabetes mellitus ([address]     DMII (diabetes mellitus, type 2) ([address] 7/28/2015    [facility])     History of cardiovascular stress test 2/16/2015    lexiscan    HTN (hypertension) 7/28/2015    Hyperlipidemia     Hypertension     of Physical Therapy, risks of immobility, safety and plan of care,  importance of mobility while in hospital  and purse lip breathing     Patient response to education:   Pt verbalized understanding Pt demonstrated skill Pt requires further education in this area   Yes Yes No      Treatment:  Patient practiced and was instructed/facilitated in the following treatment: Patient ambulated in hallway, performed stairs, shortness of breath decreased O2 saturation 85% standing rest break with cues purse lip breathing and pulling mask down for increased Oxygen exchange, improved to 95% 1-2 minutes. ambulated in hallway,  returned to room, pulse ox 88% cues purse lip breathing improved to 90% ~1 min     Therapeutic Exercises:  not performed  x   reps. At end of session, patient in chair with   call light and phone within reach,  all lines and tubes intact, nursing notified. Patient would benefit from continued skilled Physical Therapy to improve functional independence and quality of life. Patient's/ family goals   home    Time in  0943  Time out  1003    Total Treatment Time  0 minutes    Evaluation time includes thorough review of current medical information, gathering information on past medical history/social history and prior level of function, completion of standardized testing/informal observation of tasks, assessment of data, and development of Plan of care and goals.      CPT codes:  Low Complexity PT evaluation (02801)  No treatment billed    Aminah Forrest PT

## 2022-06-22 NOTE — DISCHARGE SUMMARY
Department of Internal Medicine        CHIEF COMPLAINT: Abdominal pain    Reason for Admission: Acute pancreatitis    HISTORY OF PRESENT ILLNESS:      The patient is a 61 y.o. male who presents with abdominal pain that started epigastric area about 4 days prior to admission. The patient is constant and worse when he eats. He denies any problem with any chest pain, nausea/vomiting, fever/chills, diarrhea, melena or hematochezia. Patient did have a bowel movement without any problems. He came to the ED with the patient's lipase 154. WBC 6.1 hemoglobin 13.3. Temperature is 90.2 with heart rate currently 58 with blood pressure 154/80 and O2 sat 95% on 2 L. CT of the abdomen pelvis showed evidence of possible acute pancreatitis without evidence of discrete loculated peripancreatic fluid collection. Patient does have diverticulosis. Patient currently getting dialysis and still having the abdominal pain which is slightly improved with pain medication. He states he had a similar episode years ago and he was told it was from certain foods that he ate.    6/21/2022  Patient seen examined on medical surgical floor. Patient states that he feels better today. He denies any chest or abdominal pain. He denies any nausea vomiting or unusual shortness of breath. Serum creatinine improved to 7.1. Patient lipase is down to 85 with normal transaminase. Blood sugars range . WBC 4.6 hemoglobin 12.8. Temperature 98.2 with heart rate of 64 blood pressure 130/63. O2 sat 94% on 2 L nasal cannula. 6/22/2022  Patient seen examined on medical surgical floor. Patient denies any abdominal pain today. Patient tolerated his low-fat diet without any problems. He denies any nausea/vomiting or diarrhea. Blood sugars ranging . Serum lipase elevated 131. MRCP showed acute interstitial edematous pancreatitis without evidence of necrosis or focal fluid collection.   No evidence of cholelithiasis or anatomy abnormality noted. The patient's triglycerides are normal with no history of alcohol abuse and MRCP did not show any evidence of cholelithiasis or ductal dilation. There is no anatomical abnormality noted on MRCP. Reviewed the medications does not appear to show any obvious medication that can cause pancreatitis. The patient will be discharged home today after dialysis. Patient stable for discharge    Past Medical History:    Past Medical History:   Diagnosis Date    Back pain     Diabetes mellitus ([address]     DMII (diabetes mellitus, type 2) ([address] 7/28/2015    [facility]     History of cardiovascular stress test 2/16/2015    lexiscan    HTN (hypertension) 7/28/2015    Hyperlipidemia     Hypertension     Lumbar radicular pain 7/28/2015    Oxygen dependent     wears 2 liters at home    Type II or unspecified type diabetes mellitus without mention of complication, not stated as uncontrolled      Past Surgical History:    Past Surgical History:   Procedure Laterality Date    OTHER SURGICAL HISTORY Left 06/06/14    cain bunionectomy left foot with osteomed screw x1    SHOULDER SURGERY      Bilateral    VEIN SURGERY         Medications Prior to Admission:    @  Prior to Admission medications    Medication Sig Start Date End Date Taking?  Authorizing Provider   Omega-3 1000 MG CAPS Take by mouth 2 times daily    Historical Provider, MD   buPROPion [facility] SR) 150 MG extended release tablet  8/2/21   Historical Provider, MD   patiromer sorbitex calcium (VELTASSA) 8.4 g PACK packet Take 1 packet by mouth  Patient not taking: Reported on 6/20/2022 7/21/21   Historical Provider, MD   metoprolol succinate (TOPROL XL) 50 MG extended release tablet Take 1 tablet by mouth daily 7/26/21   Fransisco Cease, DO   amLODIPine (NORVASC) 10 MG tablet Take 0.5 tablets by mouth daily  Patient not taking: Reported on 6/20/2022 7/25/21   Fransisco Cease, DO   insulin glargine (LANTUS) 100 UNIT/ML injection vial Inject 20 Units into the skin nightly 21   Yesy Gates DO   empagliflozin (JARDIANCE) 10 MG tablet Take 10 mg by mouth daily    Historical Provider, MD   sevelamer (RENVELA) 800 MG tablet Take 2 tablets by mouth 3 times daily (with meals)    Historical Provider, MD   OXYGEN Inhale 2 L into the lungs as needed     Historical Provider, MD   iron sucrose (VENOFER) 20 MG/ML injection Infuse 50 mg intravenously once a week Friday    Historical Provider, MD   sodium bicarbonate 650 MG tablet Take 2 tablets by mouth 3 times daily  Patient not taking: Reported on 2022   Yesy Gates DO   calcitRIOL (ROCALTROL) 0.25 MCG capsule Take 1.5 mcg by mouth three times a week Monday, Wednesday, Friday    Historical Provider, MD       Allergies: Other    Social History:   Social History     Socioeconomic History    Marital status: Single     Spouse name: Not on file    Number of children: Not on file    Years of education: Not on file    Highest education level: Not on file   Occupational History    Not on file   Tobacco Use    Smoking status: Former Smoker     Packs/day: 1.00     Years: 30.00     Pack years: 30.00     Quit date: 2021     Years since quittin.0    Smokeless tobacco: Never Used   Vaping Use    Vaping Use: Never used   Substance and Sexual Activity    Alcohol use: No    Drug use: No    Sexual activity: Not on file   Other Topics Concern    Not on file   Social History Narrative    Not on file     Social Determinants of Health     Financial Resource Strain: Low Risk     Difficulty of Paying Living Expenses: Not very hard   Food Insecurity: No Food Insecurity    Worried About Running Out of Food in the Last Year: Never true    Alexis of Food in the Last Year: Never true   Transportation Needs: No Transportation Needs    Lack of Transportation (Medical): No    Lack of Transportation (Non-Medical):  No   Physical Activity: Inactive    Days of Exercise per Week: 0 days    Minutes of Exercise per Session: 0 min   Stress: No Stress Concern Present    Feeling of Stress : Only a little   Social Connections: Unknown    Frequency of Communication with Friends and Family: Three times a week    Frequency of Social Gatherings with Friends and Family: Twice a week    Attends Taoism Services: 1 to 4 times per year    Active Member of [address] [place] [address] [employer] or Organizations: No    Attends Club or Organization Meetings: Never    Marital Status: Patient refused   Intimate Partner Violence: Unknown    Fear of Current or Ex-Partner: Patient refused    Emotionally Abused: No    Physically Abused: No    Sexually Abused: No   Housing Stability: Unknown    Unable to Pay for Housing in the Last Year: No    Number of [place] in the Last Year: Not on file    Unstable Housing in the Last Year: No       Family History:   Family History   Problem Relation Age of Onset    Kidney Disease Sister     Diabetes Mother     Diabetes Father        REVIEW OF SYSTEMS:    Gen: Patient denies any lightheadedness or dizziness. No LOC or syncope. No fevers or chills. HEENT: No earache, sore throat or nasal congestion. Resp: Denies cough, hemoptysis or sputum production. Cardiac: Denies chest pain, SOB, diaphoresis or palpitations. GI: + Epigastric abdominal pain. No nausea, vomiting, diarrhea or constipation. No melena or hematochezia. : No urinary complaints, dysuria, hematuria or frequency. MSK: No extremity weakness, paralysis or paresthesias. PHYSICAL EXAM:    Vitals:  /69   Pulse 70   Temp 98 °F (36.7 °C) (Oral)   Resp 16   Ht 5' 7\" (1.702 m)   Wt 186 lb 2 oz (84.4 kg)   SpO2 92%   BMI 29.15 kg/m²     General:  This is a 61 y.o. yo male who is alert and oriented in mild distress secondary to abdominal pain. HEENT:  Head is normocephalic and atraumatic, PERRLA, EOMI, mucus membranes moist with no pharyngeal erythema or exudate.   Neck:  Supple with no carotid bruits, JVD or thyromegaly.   No cervical adenopathy  CV:  Regular rate and rhythm, no murmurs  Lungs:  Clear to auscultation bilaterally with no wheezes, rales or rhonchi  Abdomen:  Soft,+ mildly tender upper abdomen, nondistended, bowel sounds present  Extremities:  No edema, peripheral pulses intact bilaterally  Neuro:  Cranial nerves II-XII grossly intact; motor and sensory function intact with no focal deficits  Skin:  No rashes, lesions or wounds    DATA:  CBC with Differential:    Lab Results   Component Value Date    WBC 5.4 06/22/2022    RBC 4.27 06/22/2022    HGB 12.4 06/22/2022    HCT 39.0 06/22/2022     06/22/2022    MCV 91.3 06/22/2022    MCH 29.0 06/22/2022    MCHC 31.8 06/22/2022    RDW 14.1 06/22/2022    LYMPHOPCT 27.6 06/21/2022    MONOPCT 17.5 06/21/2022    BASOPCT 0.2 06/21/2022    MONOSABS 0.80 06/21/2022    LYMPHSABS 1.26 06/21/2022    EOSABS 0.14 06/21/2022    BASOSABS 0.01 06/21/2022     CMP:    Lab Results   Component Value Date     06/22/2022    K 4.7 06/22/2022    K 3.3 04/04/2022    CL 98 06/22/2022    CO2 23 06/22/2022    BUN 38 06/22/2022    CREATININE 8.6 06/22/2022    GFRAA 8 06/22/2022    LABGLOM 8 06/22/2022    GLUCOSE 140 06/22/2022    PROT 7.4 06/21/2022    LABALBU 3.3 06/21/2022    CALCIUM 9.2 06/22/2022    BILITOT 0.4 06/21/2022    ALKPHOS 49 06/21/2022    AST 10 06/21/2022    ALT 6 06/21/2022     Magnesium:    Lab Results   Component Value Date    MG 2.6 06/22/2022     Phosphorus:    Lab Results   Component Value Date    PHOS 5.0 06/22/2022     PT/INR:    Lab Results   Component Value Date    PROTIME 10.8 07/19/2021    INR 0.9 07/19/2021     Troponin:    Lab Results   Component Value Date    TROPONINI 0.10 03/21/2021     U/A:    Lab Results   Component Value Date    COLORU Yellow 06/20/2022    PROTEINU 100 06/20/2022    PHUR 7.0 06/20/2022    LABCAST RARE 09/21/2018    WBCUA 2-5 06/20/2022    RBCUA 1-3 06/20/2022    BACTERIA FEW 06/20/2022    CLARITYU Clear 06/20/2022    SPECGRAV 1.010 06/20/2022    LEUKOCYTESUR Negative 06/20/2022    UROBILINOGEN 0.2 06/20/2022    BILIRUBINUR Negative 06/20/2022    BLOODU SMALL 06/20/2022    GLUCOSEU 250 06/20/2022    AMORPHOUS FEW 10/02/2019     ABG:    Lab Results   Component Value Date    PH 7.503 04/04/2022    PCO2 41.0 04/04/2022    PO2 73.5 04/04/2022    HCO3 31.5 04/04/2022    BE 7.7 04/04/2022    O2SAT 94.7 04/04/2022     HgBA1c:    Lab Results   Component Value Date    LABA1C 7.9 06/20/2022     FLP:    Lab Results   Component Value Date    TRIG 109 06/21/2022    HDL 43 06/21/2022    LDLCALC 71 06/21/2022    LABVLDL 22 06/21/2022     TSH:    Lab Results   Component Value Date    TSH 1.180 06/21/2022     IRON:    Lab Results   Component Value Date    IRON 19 02/09/2020     LIPASE:    Lab Results   Component Value Date    LIPASE 130 06/22/2022       ASSESSMENT AND PLAN:      Patient Active Problem List    Diagnosis Date Noted    Acute pancreatitis 06/20/2022    Hallux valgus, acquired 06/06/2014    Paroxysmal atrial fibrillation ([address]     [facility]     Hyperkalemia 07/19/2021    Acute respiratory failure with hypoxia ([address] 06/20/2021    Respiratory failure ([address] 02/02/2020    Pneumonia 01/28/2020    Acute pancreatitis without necrosis or infection, unspecified 09/23/2018    Idiopathic acute pancreatitis 09/21/2018    B12 deficiency 03/17/2016    DMII (diabetes mellitus, type 2) ([address] 07/28/2015    HTN (hypertension) 07/28/2015    Lumbar radicular pain 07/28/2015    Spinal stenosis 07/28/2015     Impression:  1. Acute interstitial pancreatitis-etiology unknown  2. History of chronic renal failure with hemodialysis  3. History of paroxysmal atrial fib-currently sinus rhythm  4. History hypertension  5. History of lumbar spinal stenosis    Plan:  Discharge home today    Resume home medication    Recommend low-fat diet for another week or 2.     Patient follow-up with primary care physician in 1 week or as directed    Recommend outpatient follow-up with GI if patient's symptoms continue.     Christiano Gutiérrez DO, D.O.  6/22/2022  9:42 AM

## 2022-06-22 NOTE — PLAN OF CARE
Problem: Discharge Planning  Goal: Discharge to home or other facility with appropriate resources  6/22/2022 1019 by Brooks Mojica RN  Outcome: Adequate for Discharge  6/22/2022 0229 by Oz Villalobos RN  Outcome: Progressing     Problem: Pain  Goal: Verbalizes/displays adequate comfort level or baseline comfort level  6/22/2022 1019 by Brooks Mojica RN  Outcome: Adequate for Discharge  6/22/2022 0229 by Oz Villalobos RN  Outcome: Progressing     Problem: ABCDS Injury Assessment  Goal: Absence of physical injury  6/22/2022 1019 by Brooks Mojica RN  Outcome: Adequate for Discharge  6/22/2022 0229 by Oz Villalobos RN  Outcome: Progressing     Problem: Safety - Adult  Goal: Free from fall injury  6/22/2022 1019 by Brooks Mojica RN  Outcome: Adequate for Discharge  6/22/2022 0229 by Oz Villalobos RN  Outcome: Progressing

## 2022-06-22 NOTE — PLAN OF CARE
Problem: Discharge Planning  Goal: Discharge to home or other facility with appropriate resources  6/22/2022 0229 by Bertrand Llamas RN  Outcome: Progressing     Problem: Pain  Goal: Verbalizes/displays adequate comfort level or baseline comfort level  6/22/2022 0229 by Bertrand Llamas RN  Outcome: Progressing     Problem: ABCDS Injury Assessment  Goal: Absence of physical injury  6/22/2022 0229 by Bertrand Llamas RN  Outcome: Progressing     Problem: Safety - Adult  Goal: Free from fall injury  6/22/2022 0229 by Bertrand Llamas RN  Outcome: Progressing

## 2022-06-22 NOTE — PROGRESS NOTES
Department of Internal Medicine  Nephrology Attending Progress Note    SUBJECTIVE:  Full consult deferred as pt followed longitudinally in dialysis. We are following this patient for end-stage kidney disease. Pt states he at some BBQ chicken over the weekend and developed abd pain. He believes he set off his pancreatitis    6/22/22: Pt awake alert and appropriate. He states he tolerated po well and for D/C post dialysis today     .    PROBLEM LIST:    Patient Active Problem List   Diagnosis    Hallux valgus, acquired    DMII (diabetes mellitus, type 2) ([address]    HTN (hypertension)    Lumbar radicular pain    Spinal stenosis    B12 deficiency    Idiopathic acute pancreatitis    Acute pancreatitis without necrosis or infection, unspecified    Pneumonia    Respiratory failure ([address]    Acute respiratory failure with hypoxia (HCC)    Hyperkalemia    Paroxysmal atrial fibrillation ([address]    ESRD on hemodialysis ([address]    Acute pancreatitis        PAST MEDICAL HISTORY:    Past Medical History:   Diagnosis Date    Back pain     Diabetes mellitus ([address]     DMII (diabetes mellitus, type 2) ([address] 7/28/2015    [facility]     History of cardiovascular stress test 2/16/2015    lexiscan    HTN (hypertension) 7/28/2015    Hyperlipidemia     Hypertension     Lumbar radicular pain 7/28/2015    Oxygen dependent     wears 2 liters at home    Type II or unspecified type diabetes mellitus without mention of complication, not stated as uncontrolled        MEDS (scheduled):   calcitRIOL  1.5 mcg Oral Once per day on Mon Wed Fri    sodium bicarbonate  1,300 mg Oral TID    sevelamer  1,600 mg Oral TID     metoprolol succinate  50 mg Oral Daily    amLODIPine  5 mg Oral Daily    omega-3 acid ethyl esters  1,000 mg Oral BID    insulin lispro  0-12 Units SubCUTAneous TID     insulin lispro  0-6 Units SubCUTAneous Nightly    insulin glargine  8 Units SubCUTAneous Nightly    [START ON 6/24/2022] ferric gluconate (FERRLECIT) IVPB  62.5 mg IntraVENous Weekly       MEDS (infusions):   dextrose      sodium chloride 55 mL/hr at 06/22/22 0647       MEDS (prn):  acetaminophen, prochlorperazine, polyethylene glycol, glucose, dextrose bolus **OR** dextrose bolus, glucagon (rDNA), dextrose, fentanNYL    DIET:    ADULT DIET; Regular; Low Fat/Low Chol/High Fiber/JUNI      PHYSICAL EXAM:      Patient Vitals for the past 24 hrs:   BP Temp Temp src Pulse Resp SpO2 Weight   06/22/22 0630 133/69 98 °F (36.7 °C) Oral 70 16 92 % --   06/22/22 0600 -- -- -- -- -- -- 186 lb 2 oz (84.4 kg)   06/21/22 1645 (!) 144/66 98 °F (36.7 °C) -- 77 18 95 % --          Intake/Output Summary (Last 24 hours) at 6/22/2022 1128  Last data filed at 6/22/2022 0841  Gross per 24 hour   Intake 2259.42 ml   Output --   Net 2259.42 ml       Wt Readings from Last 3 Encounters:   06/22/22 186 lb 2 oz (84.4 kg)   04/25/22 184 lb (83.5 kg)   08/10/21 203 lb (92.1 kg)       Constitutional:  Patient in no acute distress  Head: normocephalic, atraumatic, R IJ TDC  Cardiovascular: RRR, no rub  Respiratory:  Lungs Clear upper, diminished in bsases  Gastrointestinal: soft, nontender in the midepigastrium, nondistended, + bowel sounds  Ext: neg. edema   Neuro: awake and alert        DATA:      Recent Labs     06/19/22  2330 06/21/22  0457 06/22/22  0512   WBC 6.1 4.6 5.4   HGB 13.3 12.8 12.4*   HCT 41.7 40.9 39.0   MCV 91.9 92.7 91.3    182 173     Recent Labs     06/19/22  2330 06/19/22  2330 06/21/22  0457 06/21/22  1608 06/22/22  0512     --  134  --  133   K 4.5   < > 5.1* 4.6 4.7   CL 91*  --  96*  --  98   CO2 28  --  26  --  23   BUN 44*  --  24*  --  38*   CREATININE 10.6*  --  7.1*  --  8.6*   LABGLOM 6  --  9  --  8   GLUCOSE 169*  --  123*  --  140*   CALCIUM 10.2  --  9.4  --  9.2    < > = values in this interval not displayed.      Recent Labs     06/19/22  2330 06/21/22  0457   ALT 6 6     Lab Results   Component Value Date    LABAL 3.3 (L) 06/21/2022    LABALBU 3.9 06/19/2022    LABALBU 3.1 (L) 07/25/2021       Iron studies:  Lab Results   Component Value Date    FERRITIN 188 11/13/2019    IRON 19 (L) 02/09/2020    TIBC 189 (L) 02/09/2020     Vitamin B-12   Date Value Ref Range Status   02/09/2020 320 211 - 946 pg/mL Final     Folate   Date Value Ref Range Status   02/09/2020 9.6 4.8 - 24.2 ng/mL Final       Bone disease:  Lab Results   Component Value Date    MG 2.6 06/22/2022    PHOS 5.0 (H) 06/22/2022     Vit D, 25-Hydroxy   Date Value Ref Range Status   06/21/2022 30 30 - 100 ng/mL Final     Comment:     <20 ng/mL. ........... Pamalee Bucker Deficient  20-30 ng/mL. ......... Pamalee Bucker Insufficient   ng/mL. ........ Pamalee Bucker Sufficient  >100 ng/mL. .......... Pamalee Bucker Toxic       PTH   Date Value Ref Range Status   06/21/2022 98 (H) 15 - 65 pg/mL Final       No components found for: URIC    Lab Results   Component Value Date    COLORU Yellow 06/20/2022    NITRU Negative 06/20/2022    GLUCOSEU 250 06/20/2022    KETUA Negative 06/20/2022    UROBILINOGEN 0.2 06/20/2022    BILIRUBINUR Negative 06/20/2022       No results found for: Shravan Oviedo        IMPRESSION/RECOMMENDATIONS:      1. ESKD-on IHD MWF via a R IJ TDC  Continue MWF schedule as at [address]:  1. Next IHD 6/22/22     2. Abd Pain-acute pancreatitis  6/20/22 CT Scan with enlarged and edmatous pancreas without   loculated peripancreatic fluid collection  PLAN:  1. Defer to Hospital Service    3-Anemia in CKD  HgB above goal 10  PLAN:  1. Ferrlecit at the outpt unit as needed  2. Start MANUEL when HgB <10  3. 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 Follow on the calcitriol and sevelamer     5- HTN with CKD 5/ESKD  BP at goal <140/90  PLAN:  1. Continue to monitor     6. Hyperkalemia-  K+ 4.7 pre IHD this AM  PLAN:  1.  Control K+ with a 2K+ bath      Electronically signed by Dinora Johns MD on 6/22/2022 at 11:28 AM

## 2022-07-24 ENCOUNTER — APPOINTMENT (OUTPATIENT)
Dept: CT IMAGING | Age: 67
End: 2022-07-24
Payer: MEDICARE

## 2022-07-24 ENCOUNTER — HOSPITAL ENCOUNTER (EMERGENCY)
Age: 67
Discharge: HOME | End: 2022-07-25
Attending: EMERGENCY MEDICINE
Payer: MEDICARE

## 2022-07-24 VITALS
DIASTOLIC BLOOD PRESSURE: 65 MMHG | WEIGHT: 182 LBS | HEART RATE: 75 BPM | BODY MASS INDEX: 28.51 KG/M2 | SYSTOLIC BLOOD PRESSURE: 128 MMHG | TEMPERATURE: 98.9 F | OXYGEN SATURATION: 95 %

## 2022-07-24 DIAGNOSIS — K59.00 CONSTIPATION, UNSPECIFIED CONSTIPATION TYPE: ICD-10-CM

## 2022-07-24 DIAGNOSIS — K85.90 ACUTE PANCREATITIS WITHOUT INFECTION OR NECROSIS, UNSPECIFIED PANCREATITIS TYPE: Primary | ICD-10-CM

## 2022-07-24 LAB
ALBUMIN SERPL-MCNC: 4.2 G/DL (ref 3.5–5.2)
ALP BLD-CCNC: 53 U/L (ref 40–129)
ALT SERPL-CCNC: 7 U/L (ref 0–40)
ANION GAP SERPL CALCULATED.3IONS-SCNC: 17 MMOL/L (ref 7–16)
AST SERPL-CCNC: 9 U/L (ref 0–39)
BACTERIA: ABNORMAL /HPF
BASOPHILS ABSOLUTE: 0.01 E9/L (ref 0–0.2)
BASOPHILS RELATIVE PERCENT: 0.1 % (ref 0–2)
BILIRUB SERPL-MCNC: 0.3 MG/DL (ref 0–1.2)
BILIRUBIN URINE: ABNORMAL
BLOOD, URINE: ABNORMAL
BUN BLDV-MCNC: 68 MG/DL (ref 6–23)
CALCIUM SERPL-MCNC: 10.8 MG/DL (ref 8.6–10.2)
CHLORIDE BLD-SCNC: 90 MMOL/L (ref 98–107)
CLARITY: ABNORMAL
CO2: 28 MMOL/L (ref 22–29)
COLOR: YELLOW
CREAT SERPL-MCNC: 12.4 MG/DL (ref 0.7–1.2)
EOSINOPHILS ABSOLUTE: 0.17 E9/L (ref 0.05–0.5)
EOSINOPHILS RELATIVE PERCENT: 2.2 % (ref 0–6)
EPITHELIAL CELLS, UA: ABNORMAL /HPF
GFR AFRICAN AMERICAN: 5
GFR NON-AFRICAN AMERICAN: 5 ML/MIN/1.73
GLUCOSE BLD-MCNC: 191 MG/DL (ref 74–99)
GLUCOSE URINE: 500 MG/DL
HCT VFR BLD CALC: 43.2 % (ref 37–54)
HEMOGLOBIN: 13.8 G/DL (ref 12.5–16.5)
HYALINE CASTS: ABNORMAL /LPF (ref 0–2)
IMMATURE GRANULOCYTES #: 0.02 E9/L
IMMATURE GRANULOCYTES %: 0.3 % (ref 0–5)
KETONES, URINE: ABNORMAL MG/DL
LEUKOCYTE ESTERASE, URINE: ABNORMAL
LIPASE: 202 U/L (ref 13–60)
LYMPHOCYTES ABSOLUTE: 1.72 E9/L (ref 1.5–4)
LYMPHOCYTES RELATIVE PERCENT: 22.5 % (ref 20–42)
MCH RBC QN AUTO: 29.4 PG (ref 26–35)
MCHC RBC AUTO-ENTMCNC: 31.9 % (ref 32–34.5)
MCV RBC AUTO: 91.9 FL (ref 80–99.9)
MONOCYTES ABSOLUTE: 0.96 E9/L (ref 0.1–0.95)
MONOCYTES RELATIVE PERCENT: 12.6 % (ref 2–12)
NEUTROPHILS ABSOLUTE: 4.75 E9/L (ref 1.8–7.3)
NEUTROPHILS RELATIVE PERCENT: 62.3 % (ref 43–80)
NITRITE, URINE: NEGATIVE
PDW BLD-RTO: 14.7 FL (ref 11.5–15)
PH UA: 5.5 (ref 5–9)
PLATELET # BLD: 188 E9/L (ref 130–450)
PMV BLD AUTO: 9.3 FL (ref 7–12)
POTASSIUM REFLEX MAGNESIUM: 4.4 MMOL/L (ref 3.5–5)
PROTEIN UA: 100 MG/DL
RBC # BLD: 4.7 E12/L (ref 3.8–5.8)
RBC UA: ABNORMAL /HPF (ref 0–2)
SODIUM BLD-SCNC: 135 MMOL/L (ref 132–146)
SPECIFIC GRAVITY UA: 1.02 (ref 1–1.03)
TOTAL PROTEIN: 8.7 G/DL (ref 6.4–8.3)
UROBILINOGEN, URINE: 0.2 E.U./DL
WBC # BLD: 7.6 E9/L (ref 4.5–11.5)
WBC UA: ABNORMAL /HPF (ref 0–5)

## 2022-07-24 PROCEDURE — 80053 COMPREHEN METABOLIC PANEL: CPT

## 2022-07-24 PROCEDURE — 74176 CT ABD & PELVIS W/O CONTRAST: CPT

## 2022-07-24 PROCEDURE — 83605 ASSAY OF LACTIC ACID: CPT

## 2022-07-24 PROCEDURE — 85025 COMPLETE CBC W/AUTO DIFF WBC: CPT

## 2022-07-24 PROCEDURE — 83690 ASSAY OF LIPASE: CPT

## 2022-07-24 PROCEDURE — 96374 THER/PROPH/DIAG INJ IV PUSH: CPT

## 2022-07-24 PROCEDURE — 6360000002 HC RX W HCPCS: Performed by: EMERGENCY MEDICINE

## 2022-07-24 PROCEDURE — 99284 EMERGENCY DEPT VISIT MOD MDM: CPT

## 2022-07-24 PROCEDURE — 81001 URINALYSIS AUTO W/SCOPE: CPT

## 2022-07-24 RX ORDER — FENTANYL CITRATE 0.05 MG/ML
50 INJECTION, SOLUTION INTRAMUSCULAR; INTRAVENOUS ONCE
Status: COMPLETED | OUTPATIENT
Start: 2022-07-24 | End: 2022-07-24

## 2022-07-24 RX ADMIN — FENTANYL CITRATE 50 MCG: 50 INJECTION INTRAMUSCULAR; INTRAVENOUS at 23:52

## 2022-07-24 ASSESSMENT — PAIN SCALES - GENERAL: PAINLEVEL_OUTOF10: 8

## 2022-07-24 ASSESSMENT — ENCOUNTER SYMPTOMS
DIARRHEA: 0
BACK PAIN: 0
ABDOMINAL PAIN: 1
EYE PAIN: 0
EYE REDNESS: 0
EYE DISCHARGE: 0
SORE THROAT: 0
SINUS PRESSURE: 0
WHEEZING: 0
VOMITING: 0
SHORTNESS OF BREATH: 0
NAUSEA: 0
COUGH: 0

## 2022-07-24 ASSESSMENT — LIFESTYLE VARIABLES: HOW OFTEN DO YOU HAVE A DRINK CONTAINING ALCOHOL: NEVER

## 2022-07-24 ASSESSMENT — PAIN - FUNCTIONAL ASSESSMENT: PAIN_FUNCTIONAL_ASSESSMENT: 0-10

## 2022-07-25 LAB — LACTIC ACID: 1 MMOL/L (ref 0.5–2.2)

## 2022-07-25 PROCEDURE — 2580000003 HC RX 258: Performed by: EMERGENCY MEDICINE

## 2022-07-25 PROCEDURE — 6360000002 HC RX W HCPCS: Performed by: EMERGENCY MEDICINE

## 2022-07-25 PROCEDURE — 96375 TX/PRO/DX INJ NEW DRUG ADDON: CPT

## 2022-07-25 RX ORDER — HYDROCODONE BITARTRATE AND ACETAMINOPHEN 5; 325 MG/1; MG/1
1 TABLET ORAL EVERY 6 HOURS PRN
Qty: 12 TABLET | Refills: 0 | Status: SHIPPED | OUTPATIENT
Start: 2022-07-25 | End: 2022-07-28

## 2022-07-25 RX ORDER — MAGNESIUM CARB/ALUMINUM HYDROX 105-160MG
150 TABLET,CHEWABLE ORAL DAILY
Qty: 300 ML | Refills: 0 | Status: SHIPPED | OUTPATIENT
Start: 2022-07-25 | End: 2022-07-27

## 2022-07-25 RX ADMIN — CEFTRIAXONE SODIUM 1000 MG: 1 INJECTION, POWDER, FOR SOLUTION INTRAMUSCULAR; INTRAVENOUS at 01:32

## 2022-07-25 NOTE — ED NOTES
Department of Emergency Medicine  FIRST PROVIDER TRIAGE NOTE             Independent MLP           7/24/22  10:02 PM EDT    Date of Encounter: 7/24/22   MRN: [id number]      HPI: Bobbi Rapp is a 61 y.o. male who presents to the ED for abdominal pain    ROS: Negative for vomiting or diarrhea. PE: Gen Appearance/Constitutional: alert  HEENT: NC/NT. PERRLA,  Airway patent. CV: regular rate  Pulm: respirations easy and unlabored  GI: bsx4q  Musculoskeletal: moves all extremities x 4    Vitals:    07/24/22 2128   BP: 128/65   Pulse: 75   Temp: 98.9 °F (37.2 °C)   SpO2: 95%       Past medical history, surgical history, and medications reviewed. Initial Plan of Care: All treatment areas within department are currently occupied. Plan to order/initiate the following while awaiting opening in ED: labs. Initiate treatment/testing, proceed to treatment area when bed available for ED Attending/MLP to continue care.     Electronically signed by VENANCIO Pak CNP   DD: 7/24/22           VENANCIO Pak CNP  07/24/22 4816

## 2022-07-25 NOTE — ED PROVIDER NOTES
Patient is a 62 y/o male who presents to the ED with abdominal pain. Patient states that he has had left sided abdominal pain for the past week. The pain has been constant and is worse when he lies on his left side and after he eats. Currently, his pain is 10/10. He states that he cannot sleep due to the pain. He denies any fever, nausea, vomiting, diarrhea, dysuria or gross hematuria. Review of Systems   Constitutional:  Negative for chills and fever. HENT:  Negative for ear pain, sinus pressure and sore throat. Eyes:  Negative for pain, discharge and redness. Respiratory:  Negative for cough, shortness of breath and wheezing. Cardiovascular:  Negative for chest pain. Gastrointestinal:  Positive for abdominal pain. Negative for diarrhea, nausea and vomiting. Genitourinary:  Negative for dysuria and frequency. Musculoskeletal:  Negative for arthralgias and back pain. Skin:  Negative for rash and wound. Neurological:  Negative for weakness and headaches. Hematological:  Negative for adenopathy. All other systems reviewed and are negative. Physical Exam  Vitals and nursing note reviewed. Constitutional:       General: He is not in acute distress. HENT:      Head: Normocephalic and atraumatic. Mouth/Throat:      Mouth: Mucous membranes are moist.   Eyes:      Pupils: Pupils are equal, round, and reactive to light. Cardiovascular:      Rate and Rhythm: Normal rate and regular rhythm. Heart sounds: No murmur heard. Pulmonary:      Effort: Pulmonary effort is normal. No respiratory distress. Breath sounds: Normal breath sounds. No stridor. No wheezing, rhonchi or rales. Abdominal:      General: Abdomen is flat. Bowel sounds are increased. Palpations: Abdomen is soft. Tenderness: There is abdominal tenderness in the left upper quadrant and left lower quadrant. There is no guarding. Negative signs include Apodaca's sign and McBurney's sign.    Skin: General: Skin is warm and dry. Findings: No rash. Neurological:      Mental Status: He is alert and oriented to person, place, and time. Procedures     MDM            Time: 0100. Re-evaluation. Patients symptoms are improving  Repeat physical examination is improved  Patient is feeling much better. Wants to go home. He is advised to return to the ED should symptoms worsen and avoid alcohol, fatty foods and red meats. --------------------------------------------- PAST HISTORY ---------------------------------------------  Past Medical History:  has a past medical history of Back pain, Diabetes mellitus ([facility] [address] DMII (diabetes mellitus, type 2) ([facility] Hemodialysis patient ([facility] History of cardiovascular stress test, HTN (hypertension), Hyperlipidemia, Hypertension, Lumbar radicular pain, Oxygen dependent, and Type II or unspecified type diabetes mellitus without mention of complication, not stated as uncontrolled. Past Surgical History:  has a past surgical history that includes shoulder surgery; Vein Surgery; and other surgical history (Left, 06/06/14). Social History:  reports that he quit smoking about 13 months ago. He has a 30.00 pack-year smoking history. He has never used smokeless tobacco. He reports that he does not drink alcohol and does not use drugs. Family History: family history includes Diabetes in his father and mother; Kidney Disease in his sister. The patients home medications have been reviewed. Allergies:  Other    -------------------------------------------------- RESULTS -------------------------------------------------  Labs:  Results for orders placed or performed during the hospital encounter of 07/24/22   CBC with Auto Differential   Result Value Ref Range    WBC 7.6 4.5 - 11.5 E9/L    RBC 4.70 3.80 - 5.80 E12/L    Hemoglobin 13.8 12.5 - 16.5 g/dL    Hematocrit 43.2 37.0 - 54.0 %    MCV 91.9 80.0 - 99.9 fL    MCH 29.4 26.0 - 35.0 pg    MCHC 31.9 (L) 32.0 - 34.5 %    RDW 14.7 11.5 - 15.0 fL    Platelets 376 158 - 588 E9/L    MPV 9.3 7.0 - 12.0 fL    Neutrophils % 62.3 43.0 - 80.0 %    Immature Granulocytes % 0.3 0.0 - 5.0 %    Lymphocytes % 22.5 20.0 - 42.0 %    Monocytes % 12.6 (H) 2.0 - 12.0 %    Eosinophils % 2.2 0.0 - 6.0 %    Basophils % 0.1 0.0 - 2.0 %    Neutrophils Absolute 4.75 1.80 - 7.30 E9/L    Immature Granulocytes # 0.02 E9/L    Lymphocytes Absolute 1.72 1.50 - 4.00 E9/L    Monocytes Absolute 0.96 (H) 0.10 - 0.95 E9/L    Eosinophils Absolute 0.17 0.05 - 0.50 E9/L    Basophils Absolute 0.01 0.00 - 0.20 E9/L   Comprehensive Metabolic Panel w/ Reflex to MG   Result Value Ref Range    Sodium 135 132 - 146 mmol/L    Potassium reflex Magnesium 4.4 3.5 - 5.0 mmol/L    Chloride 90 (L) 98 - 107 mmol/L    CO2 28 22 - 29 mmol/L    Anion Gap 17 (H) 7 - 16 mmol/L    Glucose 191 (H) 74 - 99 mg/dL    BUN 68 (H) 6 - 23 mg/dL    Creatinine 12.4 (HH) 0.7 - 1.2 mg/dL    GFR Non-African American 5 >=60 mL/min/1.73    GFR African American 5     Calcium 10.8 (H) 8.6 - 10.2 mg/dL    Total Protein 8.7 (H) 6.4 - 8.3 g/dL    Albumin 4.2 3.5 - 5.2 g/dL    Total Bilirubin 0.3 0.0 - 1.2 mg/dL    Alkaline Phosphatase 53 40 - 129 U/L    ALT 7 0 - 40 U/L    AST 9 0 - 39 U/L   Lipase   Result Value Ref Range    Lipase 202 (H) 13 - 60 U/L   Urinalysis with Microscopic   Result Value Ref Range    Color, UA Yellow Straw/Yellow    Clarity, UA SLCLOUDY Clear    Glucose, Ur 500 (A) Negative mg/dL    Bilirubin Urine SMALL (A) Negative    Ketones, Urine TRACE (A) Negative mg/dL    Specific Gravity, UA 1.025 1.005 - 1.030    Blood, Urine MODERATE (A) Negative    pH, UA 5.5 5.0 - 9.0    Protein,  (A) Negative mg/dL    Urobilinogen, Urine 0.2 <2.0 E.U./dL    Nitrite, Urine Negative Negative    Leukocyte Esterase, Urine TRACE (A) Negative    Hyaline Casts, UA 3-5 (A) 0 - 2 /LPF    WBC, UA 5-10 (A) 0 - 5 /HPF    RBC, UA 10-20 (A) 0 - 2 /HPF    Epithelial Cells, UA FEW /HPF    Bacteria, UA MODERATE (A) None Seen /HPF   Lactic Acid   Result Value Ref Range    Lactic Acid 1.0 0.5 - 2.2 mmol/L       Radiology:  CT ABDOMEN PELVIS WO CONTRAST Additional Contrast? None   Final Result   1. Findings are suggestive of mild acute pancreatitis   2. Prominent colonic stool noted             ------------------------- NURSING NOTES AND VITALS REVIEWED ---------------------------  Date / Time Roomed:  7/24/2022 10:46 PM  ED Bed Assignment:  04/04    The nursing notes within the ED encounter and vital signs as below have been reviewed. /65   Pulse 75   Temp 98.9 °F (37.2 °C)   Wt 182 lb (82.6 kg)   SpO2 95%   BMI 28.51 kg/m²   Oxygen Saturation Interpretation: Normal      ------------------------------------------ PROGRESS NOTES ------------------------------------------  I have spoken with the patient and discussed todays results, in addition to providing specific details for the plan of care and counseling regarding the diagnosis and prognosis. Their questions are answered at this time and they are agreeable with the plan. I discussed at length with them reasons for immediate return here for re evaluation. They will followup with primary care by calling their office tomorrow. --------------------------------- ADDITIONAL PROVIDER NOTES ---------------------------------  At this time the patient is without objective evidence of an acute process requiring hospitalization or inpatient management. They have remained hemodynamically stable throughout their entire ED visit and are stable for discharge with outpatient follow-up. The plan has been discussed in detail and they are aware of the specific conditions for emergent return, as well as the importance of follow-up. New Prescriptions    HYDROCODONE-ACETAMINOPHEN (NORCO) 5-325 MG PER TABLET    Take 1 tablet by mouth every 6 hours as needed for Pain for up to 3 days. Intended supply: 3 days.  Take lowest dose possible to manage pain MAGNESIUM CITRATE 1.745 GM/30ML SOLUTION    Take 150 mLs by mouth in the morning for 2 days. 150ML PO QD AS NEEDED FOR CONSTIPATION. .       Diagnosis:  1. Acute pancreatitis without infection or necrosis, unspecified pancreatitis type    2. Constipation, unspecified constipation type        Disposition:  Patient's disposition: Discharge to home  Patient's condition is stable.               [address] [facility],   07/25/22 0107

## 2022-08-16 ENCOUNTER — HOSPITAL ENCOUNTER (OUTPATIENT)
Age: 67
Discharge: HOME | End: 2022-08-16
Payer: MEDICARE

## 2022-08-16 LAB
ALBUMIN SERPL-MCNC: 3.5 G/DL (ref 3.5–5.2)
ALP BLD-CCNC: 58 U/L (ref 40–129)
ALT SERPL-CCNC: 14 U/L (ref 0–40)
AMYLASE: 138 U/L (ref 20–100)
ANION GAP SERPL CALCULATED.3IONS-SCNC: 14 MMOL/L (ref 7–16)
AST SERPL-CCNC: 14 U/L (ref 0–39)
BASOPHILS ABSOLUTE: 0.01 E9/L (ref 0–0.2)
BASOPHILS RELATIVE PERCENT: 0.2 % (ref 0–2)
BILIRUB SERPL-MCNC: 0.3 MG/DL (ref 0–1.2)
BUN BLDV-MCNC: 53 MG/DL (ref 6–23)
CALCIUM SERPL-MCNC: 9.7 MG/DL (ref 8.6–10.2)
CHLORIDE BLD-SCNC: 94 MMOL/L (ref 98–107)
CO2: 30 MMOL/L (ref 22–29)
CREAT SERPL-MCNC: 8.7 MG/DL (ref 0.7–1.2)
EOSINOPHILS ABSOLUTE: 0.15 E9/L (ref 0.05–0.5)
EOSINOPHILS RELATIVE PERCENT: 2.5 % (ref 0–6)
GFR AFRICAN AMERICAN: 7
GFR NON-AFRICAN AMERICAN: 7 ML/MIN/1.73
GLUCOSE BLD-MCNC: 272 MG/DL (ref 74–99)
HCT VFR BLD CALC: 41.3 % (ref 37–54)
HEMOGLOBIN: 12.9 G/DL (ref 12.5–16.5)
IMMATURE GRANULOCYTES #: 0.01 E9/L
IMMATURE GRANULOCYTES %: 0.2 % (ref 0–5)
LIPASE: 62 U/L (ref 13–60)
LYMPHOCYTES ABSOLUTE: 1.93 E9/L (ref 1.5–4)
LYMPHOCYTES RELATIVE PERCENT: 32.1 % (ref 20–42)
MCH RBC QN AUTO: 28.9 PG (ref 26–35)
MCHC RBC AUTO-ENTMCNC: 31.2 % (ref 32–34.5)
MCV RBC AUTO: 92.6 FL (ref 80–99.9)
MONOCYTES ABSOLUTE: 0.74 E9/L (ref 0.1–0.95)
MONOCYTES RELATIVE PERCENT: 12.3 % (ref 2–12)
NEUTROPHILS ABSOLUTE: 3.17 E9/L (ref 1.8–7.3)
NEUTROPHILS RELATIVE PERCENT: 52.7 % (ref 43–80)
PDW BLD-RTO: 15.5 FL (ref 11.5–15)
PLATELET # BLD: 140 E9/L (ref 130–450)
PMV BLD AUTO: 9.6 FL (ref 7–12)
POTASSIUM SERPL-SCNC: 4.8 MMOL/L (ref 3.5–5)
RBC # BLD: 4.46 E12/L (ref 3.8–5.8)
SODIUM BLD-SCNC: 138 MMOL/L (ref 132–146)
TOTAL PROTEIN: 7.8 G/DL (ref 6.4–8.3)
WBC # BLD: 6 E9/L (ref 4.5–11.5)

## 2022-08-16 PROCEDURE — 80053 COMPREHEN METABOLIC PANEL: CPT

## 2022-08-16 PROCEDURE — 36415 COLL VENOUS BLD VENIPUNCTURE: CPT

## 2022-08-16 PROCEDURE — 82787 IGG 1 2 3 OR 4 EACH: CPT

## 2022-08-16 PROCEDURE — 82150 ASSAY OF AMYLASE: CPT

## 2022-08-16 PROCEDURE — 85025 COMPLETE CBC W/AUTO DIFF WBC: CPT

## 2022-08-16 PROCEDURE — 83690 ASSAY OF LIPASE: CPT

## 2022-08-20 LAB
Lab: NORMAL
REPORT: NORMAL
THIS TEST SENT TO: NORMAL

## 2022-10-27 ENCOUNTER — HOSPITAL ENCOUNTER (EMERGENCY)
Age: 67
Discharge: LEFT BEFORE DISCHARGE | End: 2022-10-28
Attending: STUDENT IN AN ORGANIZED HEALTH CARE EDUCATION/TRAINING PROGRAM
Payer: MEDICARE

## 2022-10-27 ENCOUNTER — APPOINTMENT (OUTPATIENT)
Dept: CT IMAGING | Age: 67
End: 2022-10-27
Payer: MEDICARE

## 2022-10-27 VITALS
SYSTOLIC BLOOD PRESSURE: 138 MMHG | TEMPERATURE: 97.5 F | WEIGHT: 195 LBS | DIASTOLIC BLOOD PRESSURE: 76 MMHG | HEART RATE: 62 BPM | RESPIRATION RATE: 18 BRPM | BODY MASS INDEX: 30.54 KG/M2 | OXYGEN SATURATION: 100 %

## 2022-10-27 DIAGNOSIS — E87.5 HYPERKALEMIA: Primary | ICD-10-CM

## 2022-10-27 DIAGNOSIS — K85.90 ACUTE PANCREATITIS, UNSPECIFIED COMPLICATION STATUS, UNSPECIFIED PANCREATITIS TYPE: ICD-10-CM

## 2022-10-27 LAB
ALBUMIN SERPL-MCNC: 4 G/DL (ref 3.5–5.2)
ALP BLD-CCNC: 71 U/L (ref 40–129)
ALT SERPL-CCNC: 12 U/L (ref 0–40)
ANION GAP SERPL CALCULATED.3IONS-SCNC: 13 MMOL/L (ref 7–16)
ANION GAP SERPL CALCULATED.3IONS-SCNC: 15 MMOL/L (ref 7–16)
AST SERPL-CCNC: 15 U/L (ref 0–39)
BACTERIA: ABNORMAL /HPF
BASOPHILS ABSOLUTE: 0.01 E9/L (ref 0–0.2)
BASOPHILS RELATIVE PERCENT: 0.1 % (ref 0–2)
BILIRUB SERPL-MCNC: <0.2 MG/DL (ref 0–1.2)
BILIRUBIN URINE: NEGATIVE
BLOOD, URINE: ABNORMAL
BUN BLDV-MCNC: 45 MG/DL (ref 6–23)
BUN BLDV-MCNC: 45 MG/DL (ref 6–23)
CALCIUM SERPL-MCNC: 9.5 MG/DL (ref 8.6–10.2)
CALCIUM SERPL-MCNC: 9.6 MG/DL (ref 8.6–10.2)
CHLORIDE BLD-SCNC: 94 MMOL/L (ref 98–107)
CHLORIDE BLD-SCNC: 96 MMOL/L (ref 98–107)
CLARITY: CLEAR
CO2: 27 MMOL/L (ref 22–29)
CO2: 30 MMOL/L (ref 22–29)
COLOR: YELLOW
CREAT SERPL-MCNC: 9.5 MG/DL (ref 0.7–1.2)
CREAT SERPL-MCNC: 9.5 MG/DL (ref 0.7–1.2)
EOSINOPHILS ABSOLUTE: 0.23 E9/L (ref 0.05–0.5)
EOSINOPHILS RELATIVE PERCENT: 3.1 % (ref 0–6)
EPITHELIAL CELLS, UA: ABNORMAL /HPF
GFR SERPL CREATININE-BSD FRML MDRD: 6 ML/MIN/1.73
GFR SERPL CREATININE-BSD FRML MDRD: 6 ML/MIN/1.73
GLUCOSE BLD-MCNC: 124 MG/DL (ref 74–99)
GLUCOSE BLD-MCNC: 193 MG/DL (ref 74–99)
GLUCOSE BLD-MCNC: 90 MG/DL
GLUCOSE URINE: 250 MG/DL
HCT VFR BLD CALC: 43.8 % (ref 37–54)
HEMOGLOBIN: 13.9 G/DL (ref 12.5–16.5)
IMMATURE GRANULOCYTES #: 0.02 E9/L
IMMATURE GRANULOCYTES %: 0.3 % (ref 0–5)
KETONES, URINE: NEGATIVE MG/DL
LACTIC ACID: 1.6 MMOL/L (ref 0.5–2.2)
LEUKOCYTE ESTERASE, URINE: NEGATIVE
LIPASE: 191 U/L (ref 13–60)
LYMPHOCYTES ABSOLUTE: 2.08 E9/L (ref 1.5–4)
LYMPHOCYTES RELATIVE PERCENT: 28.5 % (ref 20–42)
MCH RBC QN AUTO: 29.9 PG (ref 26–35)
MCHC RBC AUTO-ENTMCNC: 31.7 % (ref 32–34.5)
MCV RBC AUTO: 94.2 FL (ref 80–99.9)
METER GLUCOSE: 90 MG/DL (ref 74–99)
MONOCYTES ABSOLUTE: 0.98 E9/L (ref 0.1–0.95)
MONOCYTES RELATIVE PERCENT: 13.4 % (ref 2–12)
NEUTROPHILS ABSOLUTE: 3.99 E9/L (ref 1.8–7.3)
NEUTROPHILS RELATIVE PERCENT: 54.6 % (ref 43–80)
NITRITE, URINE: NEGATIVE
PDW BLD-RTO: 15.5 FL (ref 11.5–15)
PH UA: 7 (ref 5–9)
PLATELET # BLD: 165 E9/L (ref 130–450)
PMV BLD AUTO: 10.5 FL (ref 7–12)
POTASSIUM REFLEX MAGNESIUM: 5.1 MMOL/L (ref 3.5–5)
POTASSIUM REFLEX MAGNESIUM: 6.2 MMOL/L (ref 3.5–5)
PROTEIN UA: 100 MG/DL
RBC # BLD: 4.65 E12/L (ref 3.8–5.8)
RBC UA: ABNORMAL /HPF (ref 0–2)
SODIUM BLD-SCNC: 137 MMOL/L (ref 132–146)
SODIUM BLD-SCNC: 138 MMOL/L (ref 132–146)
SPECIFIC GRAVITY UA: 1.01 (ref 1–1.03)
TOTAL PROTEIN: 8.4 G/DL (ref 6.4–8.3)
TROPONIN, HIGH SENSITIVITY: 195 NG/L (ref 0–11)
TROPONIN, HIGH SENSITIVITY: 201 NG/L (ref 0–11)
UROBILINOGEN, URINE: 0.2 E.U./DL
WBC # BLD: 7.3 E9/L (ref 4.5–11.5)
WBC UA: ABNORMAL /HPF (ref 0–5)

## 2022-10-27 PROCEDURE — 36415 COLL VENOUS BLD VENIPUNCTURE: CPT

## 2022-10-27 PROCEDURE — 6370000000 HC RX 637 (ALT 250 FOR IP): Performed by: STUDENT IN AN ORGANIZED HEALTH CARE EDUCATION/TRAINING PROGRAM

## 2022-10-27 PROCEDURE — 84484 ASSAY OF TROPONIN QUANT: CPT

## 2022-10-27 PROCEDURE — A4216 STERILE WATER/SALINE, 10 ML: HCPCS | Performed by: STUDENT IN AN ORGANIZED HEALTH CARE EDUCATION/TRAINING PROGRAM

## 2022-10-27 PROCEDURE — 85025 COMPLETE CBC W/AUTO DIFF WBC: CPT

## 2022-10-27 PROCEDURE — 6360000002 HC RX W HCPCS: Performed by: STUDENT IN AN ORGANIZED HEALTH CARE EDUCATION/TRAINING PROGRAM

## 2022-10-27 PROCEDURE — 81001 URINALYSIS AUTO W/SCOPE: CPT

## 2022-10-27 PROCEDURE — 99284 EMERGENCY DEPT VISIT MOD MDM: CPT

## 2022-10-27 PROCEDURE — 82962 GLUCOSE BLOOD TEST: CPT

## 2022-10-27 PROCEDURE — 74176 CT ABD & PELVIS W/O CONTRAST: CPT

## 2022-10-27 PROCEDURE — 80048 BASIC METABOLIC PNL TOTAL CA: CPT

## 2022-10-27 PROCEDURE — 2580000003 HC RX 258: Performed by: STUDENT IN AN ORGANIZED HEALTH CARE EDUCATION/TRAINING PROGRAM

## 2022-10-27 PROCEDURE — 96365 THER/PROPH/DIAG IV INF INIT: CPT

## 2022-10-27 PROCEDURE — 93005 ELECTROCARDIOGRAM TRACING: CPT | Performed by: STUDENT IN AN ORGANIZED HEALTH CARE EDUCATION/TRAINING PROGRAM

## 2022-10-27 PROCEDURE — 80053 COMPREHEN METABOLIC PANEL: CPT

## 2022-10-27 PROCEDURE — 83605 ASSAY OF LACTIC ACID: CPT

## 2022-10-27 PROCEDURE — 96375 TX/PRO/DX INJ NEW DRUG ADDON: CPT

## 2022-10-27 PROCEDURE — 83690 ASSAY OF LIPASE: CPT

## 2022-10-27 PROCEDURE — C9113 INJ PANTOPRAZOLE SODIUM, VIA: HCPCS | Performed by: STUDENT IN AN ORGANIZED HEALTH CARE EDUCATION/TRAINING PROGRAM

## 2022-10-27 RX ORDER — FENTANYL CITRATE 0.05 MG/ML
50 INJECTION, SOLUTION INTRAMUSCULAR; INTRAVENOUS ONCE
Status: COMPLETED | OUTPATIENT
Start: 2022-10-27 | End: 2022-10-27

## 2022-10-27 RX ORDER — PANTOPRAZOLE SODIUM 40 MG/1
40 TABLET, DELAYED RELEASE ORAL DAILY
Qty: 20 TABLET | Refills: 0 | Status: SHIPPED | OUTPATIENT
Start: 2022-10-27 | End: 2022-11-16

## 2022-10-27 RX ORDER — DEXTROSE MONOHYDRATE 100 MG/ML
INJECTION, SOLUTION INTRAVENOUS CONTINUOUS PRN
Status: DISCONTINUED | OUTPATIENT
Start: 2022-10-27 | End: 2022-10-28 | Stop reason: HOSPADM

## 2022-10-27 RX ORDER — CALCIUM GLUCONATE 94 MG/ML
1000 INJECTION, SOLUTION INTRAVENOUS ONCE
Status: COMPLETED | OUTPATIENT
Start: 2022-10-27 | End: 2022-10-27

## 2022-10-27 RX ORDER — HYDROCODONE BITARTRATE AND ACETAMINOPHEN 5; 325 MG/1; MG/1
1 TABLET ORAL EVERY 6 HOURS PRN
Qty: 12 TABLET | Refills: 0 | Status: SHIPPED | OUTPATIENT
Start: 2022-10-27 | End: 2022-10-30

## 2022-10-27 RX ADMIN — SODIUM ZIRCONIUM CYCLOSILICATE 10 G: 10 POWDER, FOR SUSPENSION ORAL at 22:15

## 2022-10-27 RX ADMIN — Medication: at 20:52

## 2022-10-27 RX ADMIN — SODIUM CHLORIDE 40 MG: 9 INJECTION INTRAMUSCULAR; INTRAVENOUS; SUBCUTANEOUS at 21:11

## 2022-10-27 RX ADMIN — INSULIN HUMAN 5 UNITS: 100 INJECTION, SOLUTION PARENTERAL at 22:02

## 2022-10-27 RX ADMIN — DEXTROSE MONOHYDRATE 250 ML: 100 INJECTION, SOLUTION INTRAVENOUS at 22:11

## 2022-10-27 RX ADMIN — FENTANYL CITRATE 50 MCG: 50 INJECTION INTRAMUSCULAR; INTRAVENOUS at 22:00

## 2022-10-27 RX ADMIN — CALCIUM GLUCONATE 1000 MG: 98 INJECTION, SOLUTION INTRAVENOUS at 22:01

## 2022-10-27 ASSESSMENT — ENCOUNTER SYMPTOMS
CHEST TIGHTNESS: 0
COUGH: 0
ABDOMINAL PAIN: 1
DIARRHEA: 0
SHORTNESS OF BREATH: 0
VOMITING: 0
NAUSEA: 1
BACK PAIN: 0
ABDOMINAL DISTENTION: 0
PHOTOPHOBIA: 0

## 2022-10-27 ASSESSMENT — PAIN SCALES - GENERAL: PAINLEVEL_OUTOF10: 10

## 2022-10-27 ASSESSMENT — PAIN DESCRIPTION - LOCATION: LOCATION: ABDOMEN

## 2022-10-27 ASSESSMENT — PAIN DESCRIPTION - DESCRIPTORS: DESCRIPTORS: SHARP;STABBING

## 2022-10-28 LAB
EKG ATRIAL RATE: 92 BPM
EKG P AXIS: 31 DEGREES
EKG P-R INTERVAL: 152 MS
EKG Q-T INTERVAL: 334 MS
EKG QRS DURATION: 80 MS
EKG QTC CALCULATION (BAZETT): 413 MS
EKG R AXIS: 37 DEGREES
EKG T AXIS: 52 DEGREES
EKG VENTRICULAR RATE: 92 BPM

## 2022-10-28 PROCEDURE — 93010 ELECTROCARDIOGRAM REPORT: CPT | Performed by: INTERNAL MEDICINE

## 2022-10-28 NOTE — ED PROVIDER NOTES
Gallo Handy is a 61year old male with PMH of DM, HTN, pancreatitis, ESRD on HD (MWF) last HD yesterday, who presented to ED with concern for abdominal pain. Patient has a history of pancreatitis and PUD who presented to ED with stabbing epigastric abdominal pain patient has had symptoms for several days. Patient has similar symptoms about a year ago patient was found to pancreatitis patient was also found to have peptic ulcer disease after EGD with Dr Rey Collier . Patient denies fever, chills, nausea, vomiting. Patient has not tried thing for symptoms other make symptoms better or worse symptoms are moderate severity constant patient was previously on Protonix but not currently taking Protonix patient denies any history of abdominal surgeries. Patient denies nausea, vomiting, diarrhea. Patient does not use tobacco, denies alcohol use. Patient follows with Dr. Oneil Jacob for nephrology. The history is provided by the patient and medical records. Review of Systems   Constitutional:  Negative for chills, diaphoresis, fatigue and fever. Eyes:  Negative for photophobia and visual disturbance. Respiratory:  Negative for cough, chest tightness and shortness of breath. Cardiovascular:  Negative for chest pain, palpitations and leg swelling. Gastrointestinal:  Positive for abdominal pain and nausea. Negative for abdominal distention, diarrhea and vomiting. Genitourinary:  Negative for dysuria. Musculoskeletal:  Negative for back pain, neck pain and neck stiffness. Skin:  Negative for pallor and rash. Neurological:  Negative for headaches. Psychiatric/Behavioral:  Negative for confusion. Physical Exam  Vitals and nursing note reviewed. Constitutional:       General: He is in acute distress. Appearance: He is not ill-appearing. HENT:      Head: Normocephalic and atraumatic. Eyes:      General: No scleral icterus.      Conjunctiva/sclera: Conjunctivae normal.      Pupils: Pupils are equal, round, and reactive to light. Cardiovascular:      Rate and Rhythm: Normal rate and regular rhythm. Pulmonary:      Effort: Pulmonary effort is normal.      Breath sounds: Normal breath sounds. Abdominal:      General: Bowel sounds are normal. There is no distension. Palpations: Abdomen is soft. Tenderness: There is abdominal tenderness. There is no guarding or rebound. Musculoskeletal:      Cervical back: Normal range of motion and neck supple. No rigidity. No muscular tenderness. Right lower leg: No edema. Left lower leg: No edema. Skin:     General: Skin is warm and dry. Capillary Refill: Capillary refill takes less than 2 seconds. Coloration: Skin is not pale. Findings: No erythema or rash. Neurological:      Mental Status: He is alert and oriented to person, place, and time. Psychiatric:         Mood and Affect: Mood normal.        Procedures     MDM  Number of Diagnoses or Management Options  Acute pancreatitis, unspecified complication status, unspecified pancreatitis type  Hyperkalemia  Diagnosis management comments: Nallely Dodson is a 61year old male who presented to ED with concern for abdominal pain   Patient initially treated with protonix and GI cocktail without improvement of symptoms  Patient found to have hyperkalemia and given calcium, insulin, potassium binder   Patient would like to leave AMA  Patient understands the risk of leaving AMA which would result in disability or death patient was advised to return to emergency department if he has any worsening of symptoms or he changes his mind and would like to present for further evaluation or admission patient advised to go to dialysis tomorrow repeat BMP did show improvement of hyperkalemia patient is stable and able to make his own decisions and agreeable to plan         ED Course as of 10/28/22 0011   Thu Oct 27, 2022   6851 EKG: This EKG is signed and interpreted by me.     Rate: 92  Rhythm: Sinus  Interpretation: non-specific EKG, no St elevation ,left atrial enlargement  Comparison: changes compared to previous EKG   [SS]      ED Course User Index  [SS] Gil Lucero MD              --------------------------------------------- PAST HISTORY ---------------------------------------------  Past Medical History:  has a past medical history of Back pain, Diabetes mellitus ([facility] [address] DMII (diabetes mellitus, type 2) ([facility] Hemodialysis patient ([facility] History of cardiovascular stress test, HTN (hypertension), Hyperlipidemia, Hypertension, Lumbar radicular pain, Oxygen dependent, and Type II or unspecified type diabetes mellitus without mention of complication, not stated as uncontrolled. Past Surgical History:  has a past surgical history that includes shoulder surgery; Vein Surgery; and other surgical history (Left, 06/06/14). Social History:  reports that he quit smoking about 16 months ago. He has a 30.00 pack-year smoking history. He has never used smokeless tobacco. He reports that he does not drink alcohol and does not use drugs. Family History: family history includes Diabetes in his father and mother; Kidney Disease in his sister. The patients home medications have been reviewed. Allergies:  Other    -------------------------------------------------- RESULTS -------------------------------------------------  Labs:  Results for orders placed or performed during the hospital encounter of 10/27/22   CBC with Auto Differential   Result Value Ref Range    WBC 7.3 4.5 - 11.5 E9/L    RBC 4.65 3.80 - 5.80 E12/L    Hemoglobin 13.9 12.5 - 16.5 g/dL    Hematocrit 43.8 37.0 - 54.0 %    MCV 94.2 80.0 - 99.9 fL    MCH 29.9 26.0 - 35.0 pg    MCHC 31.7 (L) 32.0 - 34.5 %    RDW 15.5 (H) 11.5 - 15.0 fL    Platelets 796 125 - 413 E9/L    MPV 10.5 7.0 - 12.0 fL    Neutrophils % 54.6 43.0 - 80.0 %    Immature Granulocytes % 0.3 0.0 - 5.0 %    Lymphocytes % 28.5 20.0 - 42.0 %    Monocytes % 13. 4 (H) 2.0 - 12.0 %    Eosinophils % 3.1 0.0 - 6.0 %    Basophils % 0.1 0.0 - 2.0 %    Neutrophils Absolute 3.99 1.80 - 7.30 E9/L    Immature Granulocytes # 0.02 E9/L    Lymphocytes Absolute 2.08 1.50 - 4.00 E9/L    Monocytes Absolute 0.98 (H) 0.10 - 0.95 E9/L    Eosinophils Absolute 0.23 0.05 - 0.50 E9/L    Basophils Absolute 0.01 0.00 - 0.20 E9/L   Comprehensive Metabolic Panel w/ Reflex to MG   Result Value Ref Range    Sodium 137 132 - 146 mmol/L    Potassium reflex Magnesium 6.2 (H) 3.5 - 5.0 mmol/L    Chloride 94 (L) 98 - 107 mmol/L    CO2 30 (H) 22 - 29 mmol/L    Anion Gap 13 7 - 16 mmol/L    Glucose 193 (H) 74 - 99 mg/dL    BUN 45 (H) 6 - 23 mg/dL    Creatinine 9.5 (HH) 0.7 - 1.2 mg/dL    Est, Glom Filt Rate 6 >=60 mL/min/1.73    Calcium 9.6 8.6 - 10.2 mg/dL    Total Protein 8.4 (H) 6.4 - 8.3 g/dL    Albumin 4.0 3.5 - 5.2 g/dL    Total Bilirubin <0.2 0.0 - 1.2 mg/dL    Alkaline Phosphatase 71 40 - 129 U/L    ALT 12 0 - 40 U/L    AST 15 0 - 39 U/L   Troponin   Result Value Ref Range    Troponin, High Sensitivity 201 (H) 0 - 11 ng/L   Lipase   Result Value Ref Range    Lipase 191 (H) 13 - 60 U/L   Lactic Acid   Result Value Ref Range    Lactic Acid 1.6 0.5 - 2.2 mmol/L   Urinalysis with Microscopic   Result Value Ref Range    Color, UA Yellow Straw/Yellow    Clarity, UA Clear Clear    Glucose, Ur 250 (A) Negative mg/dL    Bilirubin Urine Negative Negative    Ketones, Urine Negative Negative mg/dL    Specific Gravity, UA 1.010 1.005 - 1.030    Blood, Urine MODERATE (A) Negative    pH, UA 7.0 5.0 - 9.0    Protein,  (A) Negative mg/dL    Urobilinogen, Urine 0.2 <2.0 E.U./dL    Nitrite, Urine Negative Negative    Leukocyte Esterase, Urine Negative Negative    WBC, UA 0-1 0 - 5 /HPF    RBC, UA 0-1 0 - 2 /HPF    Epithelial Cells, UA FEW /HPF    Bacteria, UA RARE (A) None Seen /HPF   Troponin   Result Value Ref Range    Troponin, High Sensitivity 195 (H) 0 - 11 ng/L   Basic Metabolic Panel w/ Reflex to MG Result Value Ref Range    Sodium 138 132 - 146 mmol/L    Potassium reflex Magnesium 5.1 (H) 3.5 - 5.0 mmol/L    Chloride 96 (L) 98 - 107 mmol/L    CO2 27 22 - 29 mmol/L    Anion Gap 15 7 - 16 mmol/L    Glucose 124 (H) 74 - 99 mg/dL    BUN 45 (H) 6 - 23 mg/dL    Creatinine 9.5 (HH) 0.7 - 1.2 mg/dL    Est, Glom Filt Rate 6 >=60 mL/min/1.73    Calcium 9.5 8.6 - 10.2 mg/dL   POCT Glucose   Result Value Ref Range    Glucose 90 mg/dL   POCT Glucose   Result Value Ref Range    Meter Glucose 90 74 - 99 mg/dL   EKG 12 Lead   Result Value Ref Range    Ventricular Rate 92 BPM    Atrial Rate 92 BPM    P-R Interval 152 ms    QRS Duration 80 ms    Q-T Interval 334 ms    QTc Calculation (Bazett) 413 ms    P Axis 31 degrees    R Axis 37 degrees    T Axis 52 degrees       Radiology:  CT ABDOMEN PELVIS WO CONTRAST Additional Contrast? None    Result Date: 10/27/2022  EXAMINATION: CT OF THE ABDOMEN AND PELVIS WITHOUT CONTRAST 10/27/2022 9:00 pm TECHNIQUE: CT of the abdomen and pelvis was performed without the administration of intravenous contrast. Multiplanar reformatted images are provided for review. Automated exposure control, iterative reconstruction, and/or weight based adjustment of the mA/kV was utilized to reduce the radiation dose to as low as reasonably achievable. COMPARISON: None. HISTORY: ORDERING SYSTEM PROVIDED HISTORY: pancreaitis TECHNOLOGIST PROVIDED HISTORY: Reason for exam:->pancreaitis Additional Contrast?->None Decision Support Exception - unselect if not a suspected or confirmed emergency medical condition->Emergency Medical Condition (MA) FINDINGS: Lower Chest: There is mild atelectasis/scar. Motion artifact limits the exam. Organs:  Indistinct appearance at the uncinate process region in particular of the pancreas with subtle adjacent fat blurring and fullness within the parenchyma Suspect renal cysts more to the right but organs are not well evaluated without contrast. GI/Bowel: Nonspecific appearance without obstruction. Diverticulosis coli noted at moderate to severe degree. Air-fluid levels may be reactive through the pancreas Pelvis: Enlarged prostate, bladder not distended. Inguinal fat hernias Peritoneum/Retroperitoneum: There are moderate atherosclerotic calcifications present. Bones/Soft Tissues: Small umbilicus fat hernia. Overall mild spinal degenerative changes. Findings consistent with mild acute pancreatitis.       ------------------------- NURSING NOTES AND VITALS REVIEWED ---------------------------  Date / Time Roomed:  10/27/2022  8:22 PM  ED Bed Assignment:  MACARENA/MACRAENA    The nursing notes within the ED encounter and vital signs as below have been reviewed. /76   Pulse 62   Temp 97.5 °F (36.4 °C)   Resp 18   Wt 195 lb (88.5 kg)   SpO2 100%   BMI 30.54 kg/m²   Oxygen Saturation Interpretation: Normal      ------------------------------------------ PROGRESS NOTES ------------------------------------------  Time: 1130pm  Re-evaluation. Patients symptoms show no change  Repeat physical examination is not changed    Spoke with  for dr. adams(Medicine). Discussed case. They will admit this patient. I have spoken with the patient and discussed todays availabe results to this point, in addition to providing specific details for the plan of care and counseling regarding the diagnosis and prognosis. Their questions are answered, however they are not agreeable to admission.     --------------------------------- ADDITIONAL PROVIDER NOTES ---------------------------------  This patient has chosen to leave against medical advice. I have personally explained to them that choosing to do so may result in permanent bodily harm or death. I discussed at length that without further evaluation and monitoring there may be unforeseen circumstances and deterioration resulting in permanent bodily harm or death as a result of their choice.   They are alert, oriented, and competent at this time. They state that they are aware of the serious risks as explained, but they continue to wish to leave against medical   advice. In light of their decision to leave against medical advice, follow-up has been arranged and they are aware of the importance of following up as instructed. They have been advised that they should return to the ED immediately if they change their mind at any time, or if their condition begins to change or worsen. The follow up plan has been discussed in detail and they are aware of the specific conditions for emergent return, as well as the importance of follow-up. Discharge Medication List as of 10/28/2022 12:02 AM        START taking these medications    Details   HYDROcodone-acetaminophen (NORCO) 5-325 MG per tablet Take 1 tablet by mouth every 6 hours as needed for Pain for up to 3 days. Intended supply: 3 days. Take lowest dose possible to manage pain, Disp-12 tablet, R-0Normal      pantoprazole (PROTONIX) 40 MG tablet Take 1 tablet by mouth daily for 20 days, Disp-20 tablet, R-0Normal             Diagnosis:  1. Hyperkalemia    2. Acute pancreatitis, unspecified complication status, unspecified pancreatitis type        Disposition:  Patient's disposition: Left against medical advice. Patient's condition is stable.                Kala Phillip MD  10/28/22 2016

## 2022-12-08 ENCOUNTER — HOSPITAL ENCOUNTER (OUTPATIENT)
Dept: INTERVENTIONAL RADIOLOGY/VASCULAR | Age: 67
Discharge: HOME | End: 2022-12-10
Payer: MEDICARE

## 2022-12-08 DIAGNOSIS — I73.9 PERIPHERAL VASCULAR DISEASE, UNSPECIFIED (HCC): ICD-10-CM

## 2022-12-08 PROCEDURE — 93922 UPR/L XTREMITY ART 2 LEVELS: CPT

## 2022-12-30 ENCOUNTER — TELEPHONE (OUTPATIENT)
Dept: CARDIOLOGY CLINIC | Age: 67
End: 2022-12-30

## 2023-01-24 ENCOUNTER — OFFICE VISIT (OUTPATIENT)
Dept: VASCULAR SURGERY | Age: 68
End: 2023-01-24
Payer: MEDICARE

## 2023-01-24 ENCOUNTER — PREP FOR PROCEDURE (OUTPATIENT)
Dept: VASCULAR SURGERY | Age: 68
End: 2023-01-24

## 2023-01-24 DIAGNOSIS — N18.6 ENCOUNTER REGARDING VASCULAR ACCESS FOR DIALYSIS FOR END-STAGE RENAL DISEASE (HCC): ICD-10-CM

## 2023-01-24 DIAGNOSIS — Z99.2 ENCOUNTER REGARDING VASCULAR ACCESS FOR DIALYSIS FOR END-STAGE RENAL DISEASE (HCC): ICD-10-CM

## 2023-01-24 PROCEDURE — 99204 OFFICE O/P NEW MOD 45 MIN: CPT | Performed by: PHYSICIAN ASSISTANT

## 2023-01-24 PROCEDURE — 1123F ACP DISCUSS/DSCN MKR DOCD: CPT | Performed by: PHYSICIAN ASSISTANT

## 2023-01-24 NOTE — PROGRESS NOTES
Vascular Surgery Outpatient Consultation      Chief Complaint   Patient presents with    Surgical Consult     Evaluate for AVF, patient is left handed, dialysis days M-W-F. Reason for Consult:  Hemodialysis access. Requesting Physician:  Dr. Elsi Mcdaniel:                The patient is a 74 y.o. male who is referred for evaluation of hemodialysis access. He is currently dialyzing through a left sided tunneled HD catheter. He has had vein mapping. He is left handed. Denies history of DVT, pacer/defibrillator, PICC line or other CVCs. He denies claudication, rest pain or ulcerations. He follows with Dr Judd Pretty for general foot care. Past Medical History:        Diagnosis Date    Back pain     Diabetes mellitus ([facility]     DMII (diabetes mellitus, type 2) ([facility] 7/28/2015    [facility]     History of cardiovascular stress test 2/16/2015    lexiscan    HTN (hypertension) 7/28/2015    Hyperlipidemia     Hypertension     Lumbar radicular pain 7/28/2015    Oxygen dependent     wears 2 liters at home    Type II or unspecified type diabetes mellitus without mention of complication, not stated as uncontrolled      Past Surgical History:        Procedure Laterality Date    OTHER SURGICAL HISTORY Left 06/06/14    cain bunionectomy left foot with osteomed screw x1    SHOULDER SURGERY      Bilateral    VEIN SURGERY       Current Medications:   Prior to Admission medications    Medication Sig Start Date End Date Taking?  Authorizing Provider   pantoprazole (PROTONIX) 40 MG tablet Take 1 tablet by mouth daily for 20 days 10/27/22 1/24/23 Yes Elizabeth Musa MD   Omega-3 1000 MG CAPS Take by mouth 2 times daily   Yes Historical Provider, MD   patiromer sorbitex calcium (VELTASSA) 8.4 g PACK packet Take 1 packet by mouth 7/21/21  Yes Historical Provider, MD   metoprolol succinate (TOPROL XL) 50 MG extended release tablet Take 1 tablet by mouth daily 7/26/21  Yes Melvina LORENZ DO [place]   amLODIPine (NORVASC) 10 MG tablet Take 5 mg by mouth daily 21  Yes Ericka Kirby DO   insulin glargine (LANTUS) 100 UNIT/ML injection vial Inject 20 Units into the skin nightly 21  Yes Ericka Kirby DO   empagliflozin (JARDIANCE) 10 MG tablet Take 10 mg by mouth daily   Yes Historical Provider, MD   sevelamer (RENVELA) 800 MG tablet Take 2 tablets by mouth 3 times daily (with meals)   Yes Historical Provider, MD   OXYGEN Inhale 2 L into the lungs as needed    Yes Historical Provider, MD   iron sucrose (VENOFER) 20 MG/ML injection Infuse 50 mg intravenously once a week Friday   Yes Historical Provider, MD   calcitRIOL (ROCALTROL) 0.25 MCG capsule Take 1.5 mcg by mouth three times a week Monday, Wednesday, Friday   Yes Historical Provider, MD   buPROPion [facility]) 150 MG extended release tablet  21   Historical Provider, MD   sodium bicarbonate 650 MG tablet Take 2 tablets by mouth 3 times daily  Patient not taking: No sig reported 20   Ericka Kirby DO     Allergies:   Other    Social History     Socioeconomic History    Marital status: Single     Spouse name: Not on file    Number of children: Not on file    Years of education: Not on file    Highest education level: Not on file   Occupational History    Not on file   Tobacco Use    Smoking status: Former     Packs/day: 1.00     Years: 30.00     Pack years: 30.00     Types: Cigarettes     Quit date: 2021     Years since quittin.6    Smokeless tobacco: Never   Vaping Use    Vaping Use: Never used   Substance and Sexual Activity    Alcohol use: No    Drug use: No    Sexual activity: Not on file   Other Topics Concern    Not on file   Social History Narrative    Not on file     Social Determinants of Health     Financial Resource Strain: Not on file   Food Insecurity: Not on file   Transportation Needs: Not on file   Physical Activity: Not on file   Stress: Not on file   Social Connections: Not on file Intimate Partner Violence: Not on file   Housing Stability: Not on file        Family History   Problem Relation Age of Onset    Kidney Disease Sister     Diabetes Mother     Diabetes Father        REVIEW OF SYSTEMS (New symptoms):    Eyes:      Blurred vision:  No [x]/Yes []               Diplopia:   No [x]/Yes []               Vision loss:       No [x]/Yes []   Ears, nose, throat:             Hearing loss:    No [x]/Yes []      Vertigo:   No [x]/Yes []                       Swallowing problem:  No [x]/Yes []               Nose bleeds:   No [x]/Yes []      Voice hoarseness:  No [x]/Yes []  Respiratory:             Cough:   No [x]/Yes []      Pleuritic chest pain:  No [x]/Yes []                        Dyspnea:   No [x]/Yes []      Wheezing:   No [x]/Yes []  Cardiovascular:             Angina:   No [x]/Yes []      Palpitations:   No [x]/Yes []          Claudication:    No [x]/Yes []      Leg swelling:   No [x]/Yes []  Gastrointestinal:             Nausea or vomiting:  No [x]/Yes []               Abdominal pain:  No [x]/Yes []                     Intestinal bleeding: No [x]/Yes []  Musculoskeletal:             Leg pain:   No [x]/Yes []      Back pain:   No [x]/Yes []                    Weakness:   No [x]/Yes []  Neurologic:             Numbness:   No [x]/Yes []      Paralysis:   No [x]/Yes []                       Headaches:   No [x]/Yes []  Hematologic, lymphatic:   Anemia:   No [x]/Yes []              Bleeding or bruising:  No [x]/Yes []              Fevers or chills: No [x]/Yes []  Endocrine:             Temp intolerance:   No [x]/Yes []                       Polydipsia, polyuria:  No [x]/Yes []  Skin:              Rash:    No [x]/Yes []      Ulcers:   No [x]/Yes []              Abnorm pigment: No [x]/Yes []  :              Frequency/urgency:  No [x]/Yes []      Hematuria:    No [x]/Yes []                      Incontinence:    No [x]/Yes []    PHYSICAL EXAM:  There were no vitals filed for this visit.   General Appearance: alert and oriented to person, place and time, well developed and well- nourished, in no acute distress  Skin: warm and dry, no rash or erythema  Head: normocephalic and atraumatic  Eyes: extraocular eye movements intact, conjunctivae normal  ENT: external ear and ear canal normal bilaterally, nose without deformity  Pulmonary/Chest: clear to auscultation bilaterally- no wheezes, rales or rhonchi, normal air movement, no respiratory distress  Cardiovascular: normal rate, regular rhythm, normal S1 and S2, no murmurs, no carotid bruits  Abdomen: soft, non-tender, non-distended, normal bowel sounds, no masses or organomegaly  Musculoskeletal: normal range of motion, no joint swelling, deformity or tenderness  Neurologic: no cranial nerve deficit, gait, coordination and speech normal  Extremities: no upper extremity or leg edema bilaterally. Adequate size cephalic vein noted at the wrist and forearm    PULSE EXAM      Right      Left   Brachial 2    Radial 2    Femoral     Popliteal     Dorsalis Pedis     Posterior Tibial 0 0   (3=normal, 2=diminished, 1=barely palpable, 4=widened)      Problem List Items Addressed This Visit          Genitourinary    Encounter regarding vascular access for dialysis for end-stage renal disease ([facility]     I reviewed the options for access for hemodialysis with the patient. I discussed fistula vs graft including risks, benefits, alternatives. His sister has history of an AVG with what he describes to be steal ultimately resulting in ligation of the graft. He is concerned about this risk and I was clear that this is a real possibility but all precautions are taken to prevent this from happening. We discussed that AV access often requires maintenance procedures or revisions. We discussed the time period it takes for maturation of AVF and AVG as well as infection risks. He has already had one catheter infection so is concerned about this as well.  After proper education, patient would like to proceed. Based on venogram and exam, patient may have a right radiocephalic option at the wrist.     Pt seen and plan reviewed with Dr. Kasey Frost. Ayde Cadena PA-C      No follow-ups on file.

## 2023-02-16 NOTE — PROGRESS NOTES
[address]   PRE-ADMISSION TESTING GENERAL INSTRUCTIONS  PAT Phone Number: [phone]      GENERAL INSTRUCTIONS:    [x] Antibacterial Soap Shower Night before and/or AM of surgery. [x] Do not wear makeup, lotions, powders, deodorant. [x] Nothing by mouth after midnight; including gum, candy, mints, or water. [x] You may brush your teeth, gargle, but do NOT swallow water. [x] No tobacco products, illegal drugs, or alcohol within 24 hours of your surgery. [x] Jewelry or valuables should not be brought to the hospital. All body and/or tongue piercing's must be removed prior to arriving to hospital. No contact lens or hair pins. [x] Arrange transportation with a responsible adult  to and from the hospital. Arrange for someone to be with you for the remainder of the day and for 24 hours after your procedure due to having had anesthesia. -Who will be your  for transportation? [employer] bringing pt. Pt instructed he would need someone to take him home other than the transportation company!!  -Who will be staying with you for 24 hrs after your procedure? Pt instructed he would need someone to stay with him. Stated he would find someone. [x] Bring insurance card and photo ID. [x] Bring copy of living will or healthcare power of  papers to be placed in your electronic record. PARKING INSTRUCTIONS:     [x] ARRIVAL DATE & TIME: 2/23/23 @ 10:00am.  [x] Enter into the [facility]. Two people may accompany you. Masks are not required but are recommended. [x] Parking Lot \"I\" is where you will park. It is located on the corner of [address] and [address]. The entrance is on [address]. Upon entering the parking lot, a voucher ticket will print    EDUCATION INSTRUCTIONS:        [] Knee or Hip replacement booklet & exercise pamphlets given. [] Nani Rizzo placed in chart.    [] Pre-admission Testing educational folder given  [] Incentive Spirometry,coughing & deep breathing exercises reviewed. [] Medication information sheet(s)   [] Fluoroscopy-Xray used in surgery reviewed with patient. Educational pamphlet placed in chart. [x] Pain: Post-op pain is normal and to be expected. You will be asked to rate your pain from 0-10. [] Joint camp offered. [] Joint replacement booklets given.  [] Spine Navigator to see in PAT. [] Other:_________________    MEDICATION INSTRUCTIONS:    [x] Bring a complete list of your medications, please write the last time you took the medicine, give this list to the nurse in Pre-Op. [x] Take only the following medications the morning of surgery with 1-2 ounces of water: metoprolol succinate (Toprol XL), amlodipine (Norvasc) and pantoprazole (Protonix). [x] Stop all herbal supplements and vitamins 5 days before surgery. Stop NSAIDS 7 days before surgery. [x] DO NOT take any diabetic medicine the morning of surgery. Follow instructions for insulin the day before surgery.   -Instructed to only take HALF dose of Lantus (17units) night before.   -Instructed to start holding Jardiance on 2/20  [x] If you are diabetic and your blood sugar is low or you feel symptomatic, you may drink 1-2 ounces of apple juice or take a glucose tablet.            -The morning of your procedure, you may call the pre-op area if you have concerns about your blood sugar [phone]. [x] Use your inhalers the morning of surgery. Bring your emergency inhaler with you day of surgery. [x] Follow physician instructions regarding any blood thinners you may be taking. WHAT TO EXPECT:    [x] The day of surgery you will be greeted and checked in by the [employer].  In addition, you will be registered in the [place] by a Patient Access Representative. Please bring your photo ID and insurance card.  A nurse will greet you in accordance to the time you are needed in the pre-op area to prepare you for surgery. Please do not be discouraged if you are not greeted in the order you arrive as there are many variables that are involved in patient preparation. Your patience is greatly appreciated as you wait for your nurse. Please bring in items such as: books, magazines, newspapers, electronics, or any other items  to occupy your time in the waiting area. [x]  Delays may occur with surgery and staff will make a sincere effort to keep you informed of delays. If any delays occur with your procedure, we apologize ahead of time for your inconvenience as we recognize the value of your time.

## 2023-02-23 ENCOUNTER — HOSPITAL ENCOUNTER (OUTPATIENT)
Age: 68
Setting detail: OUTPATIENT SURGERY
Discharge: HOME | End: 2023-02-23
Attending: SURGERY | Admitting: SURGERY
Payer: MEDICARE

## 2023-02-23 ENCOUNTER — ANESTHESIA (OUTPATIENT)
Dept: OPERATING ROOM | Age: 68
End: 2023-02-23
Payer: MEDICARE

## 2023-02-23 ENCOUNTER — ANESTHESIA EVENT (OUTPATIENT)
Dept: OPERATING ROOM | Age: 68
End: 2023-02-23
Payer: MEDICARE

## 2023-02-23 VITALS
SYSTOLIC BLOOD PRESSURE: 122 MMHG | OXYGEN SATURATION: 94 % | BODY MASS INDEX: 32.49 KG/M2 | HEART RATE: 87 BPM | TEMPERATURE: 97 F | HEIGHT: 67 IN | WEIGHT: 207 LBS | DIASTOLIC BLOOD PRESSURE: 97 MMHG | RESPIRATION RATE: 24 BRPM

## 2023-02-23 DIAGNOSIS — N18.6 ENCOUNTER REGARDING VASCULAR ACCESS FOR DIALYSIS FOR END-STAGE RENAL DISEASE (HCC): Primary | ICD-10-CM

## 2023-02-23 DIAGNOSIS — Z99.2 ENCOUNTER REGARDING VASCULAR ACCESS FOR DIALYSIS FOR END-STAGE RENAL DISEASE (HCC): Primary | ICD-10-CM

## 2023-02-23 LAB
ANION GAP SERPL CALCULATED.3IONS-SCNC: 17 MMOL/L (ref 7–16)
BUN BLDV-MCNC: 49 MG/DL (ref 6–23)
CALCIUM SERPL-MCNC: 9.7 MG/DL (ref 8.6–10.2)
CHLORIDE BLD-SCNC: 91 MMOL/L (ref 98–107)
CO2: 26 MMOL/L (ref 22–29)
CREAT SERPL-MCNC: 10 MG/DL (ref 0.7–1.2)
GFR SERPL CREATININE-BSD FRML MDRD: 5 ML/MIN/1.73
GLUCOSE BLD-MCNC: 131 MG/DL (ref 74–99)
HCT VFR BLD CALC: 45 % (ref 37–54)
HEMOGLOBIN: 14.4 G/DL (ref 12.5–16.5)
MCH RBC QN AUTO: 29.8 PG (ref 26–35)
MCHC RBC AUTO-ENTMCNC: 32 % (ref 32–34.5)
MCV RBC AUTO: 93 FL (ref 80–99.9)
METER GLUCOSE: 123 MG/DL (ref 74–99)
PDW BLD-RTO: 15.6 FL (ref 11.5–15)
PLATELET # BLD: 191 E9/L (ref 130–450)
PMV BLD AUTO: 10.7 FL (ref 7–12)
POTASSIUM REFLEX MAGNESIUM: 4.6 MMOL/L (ref 3.5–5)
RBC # BLD: 4.84 E12/L (ref 3.8–5.8)
SODIUM BLD-SCNC: 134 MMOL/L (ref 132–146)
WBC # BLD: 8.4 E9/L (ref 4.5–11.5)

## 2023-02-23 PROCEDURE — 3700000001 HC ADD 15 MINUTES (ANESTHESIA): Performed by: SURGERY

## 2023-02-23 PROCEDURE — 6360000002 HC RX W HCPCS: Performed by: ANESTHESIOLOGY

## 2023-02-23 PROCEDURE — 85027 COMPLETE CBC AUTOMATED: CPT

## 2023-02-23 PROCEDURE — 7100000010 HC PHASE II RECOVERY - FIRST 15 MIN: Performed by: SURGERY

## 2023-02-23 PROCEDURE — 80048 BASIC METABOLIC PNL TOTAL CA: CPT

## 2023-02-23 PROCEDURE — 3600000002 HC SURGERY LEVEL 2 BASE: Performed by: SURGERY

## 2023-02-23 PROCEDURE — 82962 GLUCOSE BLOOD TEST: CPT

## 2023-02-23 PROCEDURE — 7100000011 HC PHASE II RECOVERY - ADDTL 15 MIN: Performed by: SURGERY

## 2023-02-23 PROCEDURE — 36415 COLL VENOUS BLD VENIPUNCTURE: CPT

## 2023-02-23 PROCEDURE — 2580000003 HC RX 258: Performed by: SURGERY

## 2023-02-23 PROCEDURE — 3700000000 HC ANESTHESIA ATTENDED CARE: Performed by: SURGERY

## 2023-02-23 PROCEDURE — 6360000002 HC RX W HCPCS: Performed by: SURGERY

## 2023-02-23 PROCEDURE — A4217 STERILE WATER/SALINE, 500 ML: HCPCS | Performed by: SURGERY

## 2023-02-23 PROCEDURE — 2709999900 HC NON-CHARGEABLE SUPPLY: Performed by: SURGERY

## 2023-02-23 PROCEDURE — 36821 AV FUSION DIRECT ANY SITE: CPT | Performed by: SURGERY

## 2023-02-23 PROCEDURE — 64415 NJX AA&/STRD BRCH PLXS IMG: CPT | Performed by: ANESTHESIOLOGY

## 2023-02-23 PROCEDURE — 2580000003 HC RX 258: Performed by: ANESTHESIOLOGIST ASSISTANT

## 2023-02-23 PROCEDURE — 6370000000 HC RX 637 (ALT 250 FOR IP): Performed by: ANESTHESIOLOGY

## 2023-02-23 PROCEDURE — 6360000002 HC RX W HCPCS: Performed by: ANESTHESIOLOGIST ASSISTANT

## 2023-02-23 PROCEDURE — 3600000012 HC SURGERY LEVEL 2 ADDTL 15MIN: Performed by: SURGERY

## 2023-02-23 PROCEDURE — 2500000003 HC RX 250 WO HCPCS: Performed by: SURGERY

## 2023-02-23 RX ORDER — OXYCODONE HYDROCHLORIDE AND ACETAMINOPHEN 5; 325 MG/1; MG/1
2 TABLET ORAL EVERY 4 HOURS PRN
Status: DISCONTINUED | OUTPATIENT
Start: 2023-02-23 | End: 2023-02-23 | Stop reason: HOSPADM

## 2023-02-23 RX ORDER — FENTANYL CITRATE 50 UG/ML
100 INJECTION, SOLUTION INTRAMUSCULAR; INTRAVENOUS ONCE
Status: COMPLETED | OUTPATIENT
Start: 2023-02-23 | End: 2023-02-23

## 2023-02-23 RX ORDER — CEFAZOLIN SODIUM 1 G/3ML
INJECTION, POWDER, FOR SOLUTION INTRAMUSCULAR; INTRAVENOUS PRN
Status: DISCONTINUED | OUTPATIENT
Start: 2023-02-23 | End: 2023-02-23 | Stop reason: SDUPTHER

## 2023-02-23 RX ORDER — SODIUM CHLORIDE 0.9 % (FLUSH) 0.9 %
5-40 SYRINGE (ML) INJECTION PRN
Status: DISCONTINUED | OUTPATIENT
Start: 2023-02-23 | End: 2023-02-23 | Stop reason: HOSPADM

## 2023-02-23 RX ORDER — METOPROLOL SUCCINATE 50 MG/1
50 TABLET, EXTENDED RELEASE ORAL ONCE
Status: COMPLETED | OUTPATIENT
Start: 2023-02-23 | End: 2023-02-23

## 2023-02-23 RX ORDER — FENTANYL CITRATE 50 UG/ML
25 INJECTION, SOLUTION INTRAMUSCULAR; INTRAVENOUS ONCE
Status: COMPLETED | OUTPATIENT
Start: 2023-02-23 | End: 2023-02-23

## 2023-02-23 RX ORDER — MIDAZOLAM HYDROCHLORIDE 2 MG/2ML
2 INJECTION, SOLUTION INTRAMUSCULAR; INTRAVENOUS EVERY 10 MIN PRN
Status: DISCONTINUED | OUTPATIENT
Start: 2023-02-23 | End: 2023-02-23 | Stop reason: HOSPADM

## 2023-02-23 RX ORDER — LIDOCAINE HYDROCHLORIDE 20 MG/ML
INJECTION, SOLUTION INTRAVENOUS PRN
Status: DISCONTINUED | OUTPATIENT
Start: 2023-02-23 | End: 2023-02-23 | Stop reason: SDUPTHER

## 2023-02-23 RX ORDER — PROPOFOL 10 MG/ML
INJECTION, EMULSION INTRAVENOUS PRN
Status: DISCONTINUED | OUTPATIENT
Start: 2023-02-23 | End: 2023-02-23 | Stop reason: SDUPTHER

## 2023-02-23 RX ORDER — SODIUM CHLORIDE 9 MG/ML
INJECTION, SOLUTION INTRAVENOUS CONTINUOUS
Status: DISCONTINUED | OUTPATIENT
Start: 2023-02-23 | End: 2023-02-23 | Stop reason: HOSPADM

## 2023-02-23 RX ORDER — HEPARIN SODIUM 1000 [USP'U]/ML
INJECTION, SOLUTION INTRAVENOUS; SUBCUTANEOUS PRN
Status: DISCONTINUED | OUTPATIENT
Start: 2023-02-23 | End: 2023-02-23 | Stop reason: SDUPTHER

## 2023-02-23 RX ORDER — ALBUTEROL SULFATE 90 UG/1
2 AEROSOL, METERED RESPIRATORY (INHALATION) EVERY 6 HOURS PRN
Status: DISCONTINUED | OUTPATIENT
Start: 2023-02-23 | End: 2023-02-23 | Stop reason: HOSPADM

## 2023-02-23 RX ORDER — ACETAMINOPHEN 325 MG/1
650 TABLET ORAL EVERY 4 HOURS PRN
Status: DISCONTINUED | OUTPATIENT
Start: 2023-02-23 | End: 2023-02-23 | Stop reason: HOSPADM

## 2023-02-23 RX ORDER — ROPIVACAINE HYDROCHLORIDE 5 MG/ML
INJECTION, SOLUTION EPIDURAL; INFILTRATION; PERINEURAL
Status: COMPLETED | OUTPATIENT
Start: 2023-02-23 | End: 2023-02-23

## 2023-02-23 RX ORDER — OXYCODONE HYDROCHLORIDE AND ACETAMINOPHEN 5; 325 MG/1; MG/1
1 TABLET ORAL EVERY 4 HOURS PRN
Status: DISCONTINUED | OUTPATIENT
Start: 2023-02-23 | End: 2023-02-23 | Stop reason: HOSPADM

## 2023-02-23 RX ORDER — OXYCODONE HYDROCHLORIDE 5 MG/1
5 TABLET ORAL EVERY 6 HOURS PRN
Qty: 12 TABLET | Refills: 0 | Status: SHIPPED | OUTPATIENT
Start: 2023-02-23 | End: 2023-02-26

## 2023-02-23 RX ORDER — SODIUM CHLORIDE 9 MG/ML
INJECTION, SOLUTION INTRAVENOUS CONTINUOUS PRN
Status: DISCONTINUED | OUTPATIENT
Start: 2023-02-23 | End: 2023-02-23 | Stop reason: SDUPTHER

## 2023-02-23 RX ORDER — SODIUM CHLORIDE 0.9 % (FLUSH) 0.9 %
5-40 SYRINGE (ML) INJECTION EVERY 12 HOURS SCHEDULED
Status: DISCONTINUED | OUTPATIENT
Start: 2023-02-23 | End: 2023-02-23 | Stop reason: HOSPADM

## 2023-02-23 RX ORDER — ONDANSETRON 2 MG/ML
4 INJECTION INTRAMUSCULAR; INTRAVENOUS EVERY 8 HOURS PRN
Status: DISCONTINUED | OUTPATIENT
Start: 2023-02-23 | End: 2023-02-23 | Stop reason: HOSPADM

## 2023-02-23 RX ORDER — ROPIVACAINE HYDROCHLORIDE 5 MG/ML
30 INJECTION, SOLUTION EPIDURAL; INFILTRATION; PERINEURAL ONCE
Status: COMPLETED | OUTPATIENT
Start: 2023-02-23 | End: 2023-02-23

## 2023-02-23 RX ORDER — SODIUM CHLORIDE 9 MG/ML
INJECTION, SOLUTION INTRAVENOUS PRN
Status: DISCONTINUED | OUTPATIENT
Start: 2023-02-23 | End: 2023-02-23 | Stop reason: HOSPADM

## 2023-02-23 RX ADMIN — MIDAZOLAM 2 MG: 1 INJECTION INTRAMUSCULAR; INTRAVENOUS at 11:50

## 2023-02-23 RX ADMIN — SODIUM CHLORIDE: 9 INJECTION, SOLUTION INTRAVENOUS at 12:43

## 2023-02-23 RX ADMIN — METOPROLOL SUCCINATE 50 MG: 50 TABLET, EXTENDED RELEASE ORAL at 11:32

## 2023-02-23 RX ADMIN — PROPOFOL 50 MCG/KG/MIN: 10 INJECTION, EMULSION INTRAVENOUS at 12:44

## 2023-02-23 RX ADMIN — PROPOFOL 40 MG: 10 INJECTION, EMULSION INTRAVENOUS at 12:43

## 2023-02-23 RX ADMIN — FENTANYL CITRATE 100 MCG: 50 INJECTION, SOLUTION INTRAMUSCULAR; INTRAVENOUS at 11:50

## 2023-02-23 RX ADMIN — ROPIVACAINE HYDROCHLORIDE 30 ML: 5 INJECTION EPIDURAL; INFILTRATION; PERINEURAL at 11:45

## 2023-02-23 RX ADMIN — LIDOCAINE HYDROCHLORIDE 40 MG: 20 INJECTION, SOLUTION INTRAVENOUS at 12:43

## 2023-02-23 RX ADMIN — HEPARIN SODIUM 5000 UNITS: 1000 INJECTION INTRAVENOUS; SUBCUTANEOUS at 13:05

## 2023-02-23 RX ADMIN — PHENYLEPHRINE HYDROCHLORIDE 25 MCG/MIN: 10 INJECTION INTRAVENOUS at 12:53

## 2023-02-23 RX ADMIN — ROPIVACAINE HYDROCHLORIDE 30 ML: 5 INJECTION, SOLUTION EPIDURAL; INFILTRATION; PERINEURAL at 11:55

## 2023-02-23 RX ADMIN — CEFAZOLIN 2 G: 1 INJECTION, POWDER, FOR SOLUTION INTRAMUSCULAR; INTRAVENOUS at 12:53

## 2023-02-23 ASSESSMENT — PAIN - FUNCTIONAL ASSESSMENT: PAIN_FUNCTIONAL_ASSESSMENT: 0-10

## 2023-02-23 ASSESSMENT — PAIN SCALES - GENERAL
PAINLEVEL_OUTOF10: 0

## 2023-02-23 ASSESSMENT — LIFESTYLE VARIABLES: SMOKING_STATUS: 1

## 2023-02-23 ASSESSMENT — ENCOUNTER SYMPTOMS: SHORTNESS OF BREATH: 1

## 2023-02-23 NOTE — ANESTHESIA PROCEDURE NOTES
Peripheral Block    Patient location during procedure: pre-op  Reason for block: post-op pain management and at surgeon's request  Start time: 2/23/2023 11:45 AM  End time: 2/23/2023 11:58 AM  Staffing  Performed: anesthesiologist   Anesthesiologist: Maryuri Birmingham MD  Preanesthetic Checklist  Completed: patient identified, IV checked, site marked, risks and benefits discussed, surgical/procedural consents, equipment checked, pre-op evaluation, timeout performed, anesthesia consent given, oxygen available, monitors applied/VS acknowledged, fire risk safety assessment completed and verbalized and blood product R/B/A discussed and consented  Peripheral Block   Patient position: supine  Prep: ChloraPrep  Provider prep: mask and sterile gloves  Patient monitoring: cardiac monitor, continuous pulse ox, frequent blood pressure checks and IV access  Block type: Brachial plexus  Supraclavicular  Laterality: right  Injection technique: single-shot  Guidance: ultrasound guided    Needle   Needle type: combined needle/nerve stimulator   Needle gauge: 22 G  Needle localization: anatomical landmarks and ultrasound guidance  Needle length: 5 cm  Assessment   Injection assessment: negative aspiration for heme, no paresthesia on injection, local visualized surrounding nerve on ultrasound and no intravascular symptoms  Paresthesia pain: none  Slow fractionated injection: yes  Hemodynamics: stable  Real-time US image taken/store: yes  Outcomes: uncomplicated and patient tolerated procedure well    Additional Notes  Time out performed. Local anesthetic injected easily in incremental doses and without paresthesia.   Well tolerated  Medications Administered  ropivacaine (NAROPIN) injection 0.5% - Perineural   30 mL - 2/23/2023 11:45:00 AM

## 2023-02-23 NOTE — OP NOTE
Operative Note      Patient: Dai Romero  YOB: 1955  MRN: [id number]    Date of Procedure: 2/23/2023    Pre-Op Diagnosis: End stage renal disease ([facility] [N18.6]    Post-Op Diagnosis: Same       Procedure(s):  AV FISTULA CREATION RIGHT ARM    Surgeon(s):  Nicolette Garces MD    Assistant:   Surgical Assistant: VENANCIO Harry CNP    Anesthesia: Regional    Estimated Blood Loss (mL): Minimal    Complications: None    Specimens:   * No specimens in log *    Implants:  * No implants in log *      Drains: * No LDAs found *    Findings:     Detailed Description of Procedure: The patient was identified and the procedure was confirmed. The right arm was prepped and draped in the usual sterile fashion. Lidocaine 1% mixed with 0.25% Marcaine was used for local anesthesia. A transverse skin incision was made over the lateral forearm and carried down through the subcutaneous tissue. The cephalic vein identified and dissected free from the surrounding tissues. The vein appeared to be good quality for the fistula. It was marked for orientation and branches were divided between silk ties. It was ligated distally and divided. Medially, the radial artery was identified and freed from the surrounding tissues. It was surrounded proximally and distally with vessel loops for control. The patient was heparinized and the artery was clamped proximally and distally. A longitudinal arteriotomy measuring 5 mm was made and the vein was cut to the appropriate length and spatulated and anastomosed to the side of the artery using a running 7-0 Prolene suture. After completing the anastomosis, the clamps were released and a thrill was appreciated through the fistula. A radial pulse was appreciated distal in the wrist.   A second small incision was made to ligate a large branch. Hemostasis was obtained and the incision was irrigated with saline solution.  The incisions were approximated with Vicryl suture and skin adhesive was applied to the incision in the operating room. Alphonso Hartmann CNP was scrubbed and assisted with the entire procedure. She assisted with opening and closure of the incision, handling of tissues, anastomosis of the vessels. Needle, sponge and instrument counts were reported as correct x2. The patient tolerated the procedure and was transferred to the recovery area in satisfactory condition.       Electronically signed by Jacky Lorenzo MD on 2/23/2023 at 1:42 PM

## 2023-02-23 NOTE — H&P
Vascular Surgery History & Physical Exam      Chief Complaint: CRF    HISTORY OF PRESENT ILLNESS:                The patient is a 74 y.o. male who presents to the hospital for elective creation of a arteriovenous fistula. The patient denies any problems since the last office visit. IMPRESSION:   Active Hospital Problems    Diagnosis     Encounter regarding vascular access for dialysis for end-stage renal disease ([facility] [N18.6, Z99.2]      Priority: Medium       PLAN:  Creation of a right arteriovenous fistula, possible graft insertion. I reviewed the procedure with the patient. I discussed the risks, benefits, and alternatives of the procedure. The patient understands and consents. All questions were answered.     Patient Active Problem List   Diagnosis Code    Hallux valgus, acquired M20.10    DMII (diabetes mellitus, type 2) ([facility] E11.9    HTN (hypertension) I10    Lumbar radicular pain M54.16    Spinal stenosis M48.00    B12 deficiency E53.8    Idiopathic acute pancreatitis K85.00    Acute pancreatitis without necrosis or infection, unspecified K85.90    Pneumonia J18.9    Respiratory failure ([facility] J96.90    Acute respiratory failure with hypoxia (HCC) J96.01    Hyperkalemia E87.5    Paroxysmal atrial fibrillation (HCC) I48.0    ESRD on hemodialysis ([facility] N18.6, Z99.2    Acute pancreatitis K85.90    Encounter regarding vascular access for dialysis for end-stage renal disease ([facility] N18.6, Z99.2       Past Medical History:   Diagnosis Date    Back pain     Diabetes mellitus ([facility]     DMII (diabetes mellitus, type 2) ([facility] 7/28/2015    Hemodialysis patient ([facility]     History of cardiovascular stress test 2/16/2015    lexiscan    HTN (hypertension) 7/28/2015    Hyperlipidemia     Hypertension     Lumbar radicular pain 7/28/2015    Oxygen dependent     wears 2 liters at home    Type II or unspecified type diabetes mellitus without mention of complication, not stated as uncontrolled         Past Surgical History:   Procedure Laterality Date    OTHER SURGICAL HISTORY Left 06/06/14    cain bunionectomy left foot with osteomed screw x1    SHOULDER SURGERY      Bilateral    VEIN SURGERY         Current Medications:     Current Facility-Administered Medications:     0.9 % sodium chloride infusion, , IntraVENous, Continuous, Arias Yoon APRN - YOANA    sodium chloride flush 0.9 % injection 5-40 mL, 5-40 mL, IntraVENous, 2 times per day, Arias Russellice, APRN - CNP    sodium chloride flush 0.9 % injection 5-40 mL, 5-40 mL, IntraVENous, PRN, Arias Yoon, APRN - CNP    0.9 % sodium chloride infusion, , IntraVENous, PRN, Arias Yoon, APRN - CNP    ceFAZolin (ANCEF) 2,000 mg in sterile water 20 mL IV syringe, 2,000 mg, IntraVENous, On Call to OR, VENANCIO Lara CNP    fentaNYL (SUBLIMAZE) injection 25 mcg, 25 mcg, IntraVENous, Once **OR** fentaNYL (SUBLIMAZE) injection 100 mcg, 100 mcg, IntraVENous, Once, Cody Moran MD    sodium chloride flush 0.9 % injection 5-40 mL, 5-40 mL, IntraVENous, 2 times per day, Cody Moran MD    sodium chloride flush 0.9 % injection 5-40 mL, 5-40 mL, IntraVENous, PRN, Anita Brown MD    0.9 % sodium chloride infusion, , IntraVENous, PRN, Anita Brown MD    albuterol sulfate HFA (PROVENTIL;VENTOLIN;PROAIR) 108 (90 Base) MCG/ACT inhaler 2 puff, 2 puff, Inhalation, Q6H PRN, Anita Brown MD    midazolam PF (VERSED) injection 2 mg, 2 mg, IntraVENous, Q10 Min PRN, Anita Brown MD    ropivacaine (NAROPIN) 0.5% injection 30 mL, 30 mL, Infiltration, Once, Cody Moran MD    Allergies:   Other    Social History     Socioeconomic History    Marital status: Single     Spouse name: Not on file    Number of children: Not on file    Years of education: Not on file    Highest education level: Not on file   Occupational History    Not on file   Tobacco Use    Smoking status: Former     Packs/day: 1.00     Years: 30.00     Pack years: 30.00     Types: Cigarettes Quit date: 2021     Years since quittin.6    Smokeless tobacco: Never   Vaping Use    Vaping Use: Never used   Substance and Sexual Activity    Alcohol use: No    Drug use: No    Sexual activity: Not on file   Other Topics Concern    Not on file   Social History Narrative    Not on file     Social Determinants of Health     Financial Resource Strain: Not on file   Food Insecurity: Not on file   Transportation Needs: Not on file   Physical Activity: Not on file   Stress: Not on file   Social Connections: Not on file   Intimate Partner Violence: Not on file   Housing Stability: Not on file        Family History   Problem Relation Age of Onset    Kidney Disease Sister     Diabetes Mother     Diabetes Father        REVIEW OF SYSTEMS:  The chart was reviewed.     PHYSICAL EXAM:    Vitals:    23 1048   BP: 129/85   Pulse: 94   Resp: 20   Temp: 97.1 °F (36.2 °C)   SpO2: 94%     CONSTITUTIONAL:  awake, alert, cooperative, no apparent distress  LUNGS:  no increased work of breathing, good air exchange and clear to auscultation  CARDIOVASCULAR:  regular rate and rhythm and radial pulses 3+ bilaterally  ABDOMEN:  soft, non-distended and non-tender    LABS:    Lab Results   Component Value Date    WBC 7.3 10/27/2022    HGB 13.9 10/27/2022    HCT 43.8 10/27/2022     10/27/2022    PROTIME 10.8 2021    INR 0.9 2021    APTT 26.8 2020    K 5.1 (H) 10/27/2022    BUN 45 (H) 10/27/2022    CREATININE 9.5 ([place]) 10/27/2022       RADIOLOGY:

## 2023-02-23 NOTE — ANESTHESIA PRE PROCEDURE
Department of Anesthesiology  Preprocedure Note       Name:  Linda Hernandez   Age:  74 y.o.  :  1955                                          MRN:  [id number]         Date:  2023      Surgeon: Norma Levi):  Conrado Donald MD    Procedure: Procedure(s):  AV FISTULA CREATION RIGHT ARM    Medications prior to admission:   Prior to Admission medications    Medication Sig Start Date End Date Taking?  Authorizing Provider   pantoprazole (PROTONIX) 40 MG tablet Take 1 tablet by mouth daily for 20 days 10/27/22 1/24/23  Jade Vu MD   Omega-3 1000 MG CAPS Take by mouth 2 times daily    Historical Provider, MD   patiromer sorbitex calcium (VELTASSA) 8.4 g PACK packet Take 1 packet by mouth 21   Historical Provider, MD   metoprolol succinate (TOPROL XL) 50 MG extended release tablet Take 1 tablet by mouth daily 21   Soto Corbin DO   amLODIPine (NORVASC) 10 MG tablet Take 5 mg by mouth daily 21   Soto Corbin DO   insulin glargine (LANTUS) 100 UNIT/ML injection vial Inject 34 Units into the skin nightly 21   Soto Corbin DO   empagliflozin (JARDIANCE) 10 MG tablet Take 10 mg by mouth daily    Historical Provider, MD   sevelamer (RENVELA) 800 MG tablet Take 2 tablets by mouth 3 times daily (with meals)    Historical Provider, MD   OXYGEN Inhale 2 L into the lungs as needed     Historical Provider, MD   iron sucrose (VENOFER) 20 MG/ML injection Infuse 50 mg intravenously once a week Friday    Historical Provider, MD   calcitRIOL (ROCALTROL) 0.25 MCG capsule Take 1.5 mcg by mouth three times a week Monday, Wednesday, Friday    Historical Provider, MD       Current medications:    Current Facility-Administered Medications   Medication Dose Route Frequency Provider Last Rate Last Admin    0.9 % sodium chloride infusion   IntraVENous Continuous VENANCIO Jack - CNP        sodium chloride flush 0.9 % injection 5-40 mL  5-40 mL IntraVENous 2 times per day Ade Mcclendon Juliocesar Ansari, APRN - CNP        sodium chloride flush 0.9 % injection 5-40 mL  5-40 mL IntraVENous PRN VENANCIO Rizzo - CNP        0.9 % sodium chloride infusion   IntraVENous PRN VENANCIO Rizzo - CNP        ceFAZolin (ANCEF) 2,000 mg in sterile water 20 mL IV syringe  2,000 mg IntraVENous On Call to [address] [facility], VENANCIO - CNP        sodium chloride flush 0.9 % injection 5-40 mL  5-40 mL IntraVENous 2 times per day Aurelia Barber MD        sodium chloride flush 0.9 % injection 5-40 mL  5-40 mL IntraVENous PRN Anita Calderon MD        0.9 % sodium chloride infusion   IntraVENous PRN Anita Calderon MD        albuterol sulfate HFA (PROVENTIL;VENTOLIN;PROAIR) 108 (90 Base) MCG/ACT inhaler 2 puff  2 puff Inhalation Q6H PRN Anita Brown MD        midazolam PF (VERSED) injection 2 mg  2 mg IntraVENous Q10 Min PRN Anita rBown MD   2 mg at 02/23/23 1150       Allergies:     Allergies   Allergen Reactions    Other Other (See Comments)     environmental       Problem List:    Patient Active Problem List   Diagnosis Code    Hallux valgus, acquired M20.10    DMII (diabetes mellitus, type 2) ([facility] [address] E11.9    HTN (hypertension) I10    Lumbar radicular pain M54.16    Spinal stenosis M48.00    B12 deficiency E53.8    Idiopathic acute pancreatitis K85.00    Acute pancreatitis without necrosis or infection, unspecified K85.90    Pneumonia J18.9    Respiratory failure ([facility] [address] J96.90    Acute respiratory failure with hypoxia ([facility] [address] J96.01    Hyperkalemia E87.5    Paroxysmal atrial fibrillation (HCC) I48.0    ESRD on hemodialysis ([facility] [address] N18.6, Z99.2    Acute pancreatitis K85.90    Encounter regarding vascular access for dialysis for end-stage renal disease ([facility] [address] N18.6, Z99.2       Past Medical History:        Diagnosis Date    Back pain     Diabetes mellitus ([facility] [address]     DMII (diabetes mellitus, type 2) ([facility] [address] 7/28/2015    Hemodialysis patient ([facility] [address]     History of cardiovascular stress test 2015    lexiscan    HTN (hypertension) 2015    Hyperlipidemia     Hypertension     Lumbar radicular pain 2015    Oxygen dependent     wears 2 liters at home    Type II or unspecified type diabetes mellitus without mention of complication, not stated as uncontrolled        Past Surgical History:        Procedure Laterality Date    OTHER SURGICAL HISTORY Left 14    cain bunionectomy left foot with osteomed screw x1    SHOULDER SURGERY      Bilateral    VEIN SURGERY         Social History:    Social History     Tobacco Use    Smoking status: Former     Packs/day: 1.00     Years: 30.00     Pack years: 30.00     Types: Cigarettes     Quit date: 2021     Years since quittin.6    Smokeless tobacco: Never   Substance Use Topics    Alcohol use: No                                Counseling given: Not Answered      Vital Signs (Current):   Vitals:    23 1505 23 1048 23 1100 23 1132   BP:  129/85  134/84   Pulse:  (!) 121 (!) 112 (!) 106   Resp:  20     Temp:  97.1 °F (36.2 °C)     TempSrc:  Temporal     SpO2:  94% 95%    Weight: 207 lb (93.9 kg) 207 lb (93.9 kg)     Height: 5' 7\" (1.702 m) 5' 7\" (1.702 m)                                                BP Readings from Last 3 Encounters:   23 134/84   10/27/22 138/76   22 128/65       NPO Status: Time of last liquid consumption: 0900                        Time of last solid consumption: 0900                        Date of last liquid consumption: 23                        Date of last solid food consumption: 23    BMI:   Wt Readings from Last 3 Encounters:   23 207 lb (93.9 kg)   10/27/22 195 lb (88.5 kg)   22 182 lb (82.6 kg)     Body mass index is 32.42 kg/m².     CBC:   Lab Results   Component Value Date/Time    WBC 8.4 2023 10:41 AM    RBC 4.84 2023 10:41 AM    HGB 14.4 2023 10:41 AM    HCT 45.0 2023 10:41 AM    MCV 93.0 2023 10:41 AM    RDW 15.6 02/23/2023 10:41 AM     02/23/2023 10:41 AM       CMP:   Lab Results   Component Value Date/Time     10/27/2022 11:00 PM    K 5.1 10/27/2022 11:00 PM    CL 96 10/27/2022 11:00 PM    CO2 27 10/27/2022 11:00 PM    BUN 45 10/27/2022 11:00 PM    CREATININE 9.5 10/27/2022 11:00 PM    GFRAA 7 08/16/2022 02:08 PM    LABGLOM 6 10/27/2022 11:00 PM    GLUCOSE 90 10/27/2022 11:16 PM    PROT 8.4 10/27/2022 08:35 PM    CALCIUM 9.5 10/27/2022 11:00 PM    BILITOT <0.2 10/27/2022 08:35 PM    ALKPHOS 71 10/27/2022 08:35 PM    AST 15 10/27/2022 08:35 PM    ALT 12 10/27/2022 08:35 PM       POC Tests: No results for input(s): POCGLU, POCNA, POCK, POCCL, POCBUN, POCHEMO, POCHCT in the last 72 hours. Coags:   Lab Results   Component Value Date/Time    PROTIME 10.8 07/19/2021 11:39 AM    INR 0.9 07/19/2021 11:39 AM    APTT 26.8 02/07/2020 06:25 AM       HCG (If Applicable): No results found for: PREGTESTUR, PREGSERUM, HCG, HCGQUANT     ABGs:   Lab Results   Component Value Date/Time    PO2ART 113.7 06/20/2021 09:57 AM    SIK4HYU 43.3 06/20/2021 09:57 AM    WUW5QNX 28.0 06/20/2021 09:57 AM        Type & Screen (If Applicable):  No results found for: LABABO, LABRH    Drug/Infectious Status (If Applicable):  No results found for: HIV, HEPCAB    COVID-19 Screening (If Applicable):   Lab Results   Component Value Date/Time    COVID19 Not Detected 06/28/2021 10:31 AM           Anesthesia Evaluation  Patient summary reviewed and Nursing notes reviewed no history of anesthetic complications:   Airway: Mallampati: II  TM distance: >3 FB   Neck ROM: full  Mouth opening: > = 3 FB   Dental:          Pulmonary:   (+) COPD:  shortness of breath:  decreased breath sounds current smoker          Patient did not smoke on day of surgery.                 ROS comment: Home oxygen prn   Cardiovascular:    (+) hypertension:, dysrhythmias: atrial fibrillation, LEVY:,       ECG reviewed  Rhythm: regular  Rate: normal  Echocardiogram reviewed  Stress test reviewed       Beta Blocker:  Dose within 24 Hrs      ROS comment: History of atrial fibrillation     Neuro/Psych:                ROS comment: Back pain GI/Hepatic/Renal:   (+) renal disease: dialysis and ESRD,          ROS comment: Dialysis yesterday. Endo/Other:    (+) DiabetesType II DM, well controlled, using insulin, . Abdominal:             Vascular: negative vascular ROS. Other Findings:       Summary 2021   Technically difficult study / Definity contrast used. Normal left ventricular systolic function. Ejection fraction is visually estimated at > 60%. Normal right ventricular size and function. There is doppler evidence of stage I diastolic dysfunction. The myocardial perfusion imaging was normal       Overall left ventricular systolic function was normal, LVEF 70%       Compared to previous study from February 2015 no significant changes   noted.       Low risk myocardial perfusion study. Anesthesia Plan      MAC and regional     ASA 3       Induction: intravenous. Anesthetic plan and risks discussed with patient. Plan discussed with CRNA. Post-op pain plan if not by surgeon: single peripheral nerve block            Yesenia Benavides MD   2/23/2023      DOS STAFF ADDENDUM:    Patient seen and chart reviewed. Physical exam and history updated as indicated. NPO status confirmed. Anesthesia options and plan discussed including risks benefits with patient/legal guardian and family as available. Concerns and questions addressed. Consent verbalized to proceed.   Anesthesia plan, options and intraoperative/postoperative concerns discussed with care team.    Yesenia Benavides MD, MD  2/23/2023  12:01 PM

## 2023-02-23 NOTE — PROGRESS NOTES
CLINICAL PHARMACY NOTE: MEDS TO BEDS    Total # of Prescriptions Filled: 1   The following medications were delivered to the patient:  Oxycodone 5mg     Additional Documentation:  Delivered to pt

## 2023-02-24 NOTE — ANESTHESIA POSTPROCEDURE EVALUATION
Department of Anesthesiology  Postprocedure Note    Patient: Jace Bauer  MRN: [id number]  YOB: 1955  Date of evaluation: 2/24/2023      Procedure Summary     Date: 02/23/23 Room / Location: [facility] / [facility]    Anesthesia Start: 1236 Anesthesia Stop: 3315    Procedure: AV FISTULA CREATION RIGHT ARM (Right) Diagnosis:       End stage renal disease ([address]      (End stage renal disease ([address] [N18.6])    Surgeons: Khushbu Briscoe MD Responsible Provider: Isabel Ansari MD    Anesthesia Type: MAC ASA Status: 3          Anesthesia Type: MAC    Nadeem Phase I: Nademe Score: 10    Nadeem Phase II: Nadeem Score: 10      Anesthesia Post Evaluation    Patient location during evaluation: PACU  Patient participation: complete - patient participated  Level of consciousness: awake and alert  Airway patency: patent  Nausea & Vomiting: no nausea and no vomiting  Complications: no  Cardiovascular status: blood pressure returned to baseline and hemodynamically stable  Respiratory status: acceptable and spontaneous ventilation  Hydration status: euvolemic  Multimodal analgesia pain management approach

## 2023-03-01 ENCOUNTER — TELEPHONE (OUTPATIENT)
Dept: SLEEP CENTER | Age: 68
End: 2023-03-01

## 2023-03-01 NOTE — TELEPHONE ENCOUNTER
Called the patient to schedule CPAP, patient states that he does not want the CPAP test, he cannot wear mask.   He was going to talk to the doctor and will call back if he decides to have the test.

## 2023-03-05 ENCOUNTER — APPOINTMENT (OUTPATIENT)
Dept: ULTRASOUND IMAGING | Age: 68
DRG: 438 | End: 2023-03-05
Payer: MEDICARE

## 2023-03-05 ENCOUNTER — APPOINTMENT (OUTPATIENT)
Dept: CT IMAGING | Age: 68
DRG: 438 | End: 2023-03-05
Payer: MEDICARE

## 2023-03-05 ENCOUNTER — HOSPITAL ENCOUNTER (INPATIENT)
Age: 68
LOS: 2 days | Discharge: HOME | DRG: 438 | End: 2023-03-07
Attending: EMERGENCY MEDICINE | Admitting: INTERNAL MEDICINE
Payer: MEDICARE

## 2023-03-05 DIAGNOSIS — K85.90 ACUTE RECURRENT PANCREATITIS: Primary | ICD-10-CM

## 2023-03-05 LAB
ALBUMIN SERPL-MCNC: 3.9 G/DL (ref 3.5–5.2)
ALP BLD-CCNC: 73 U/L (ref 40–129)
ALT SERPL-CCNC: 19 U/L (ref 0–40)
ANION GAP SERPL CALCULATED.3IONS-SCNC: 17 MMOL/L (ref 7–16)
AST SERPL-CCNC: 19 U/L (ref 0–39)
BASOPHILS ABSOLUTE: 0.01 E9/L (ref 0–0.2)
BASOPHILS RELATIVE PERCENT: 0.1 % (ref 0–2)
BILIRUB SERPL-MCNC: 0.4 MG/DL (ref 0–1.2)
BUN BLDV-MCNC: 53 MG/DL (ref 6–23)
CALCIUM SERPL-MCNC: 10 MG/DL (ref 8.6–10.2)
CHLORIDE BLD-SCNC: 91 MMOL/L (ref 98–107)
CO2: 27 MMOL/L (ref 22–29)
CREAT SERPL-MCNC: 11.2 MG/DL (ref 0.7–1.2)
EKG ATRIAL RATE: 82 BPM
EKG P AXIS: 16 DEGREES
EKG P-R INTERVAL: 150 MS
EKG Q-T INTERVAL: 362 MS
EKG QRS DURATION: 88 MS
EKG QTC CALCULATION (BAZETT): 422 MS
EKG R AXIS: 26 DEGREES
EKG T AXIS: 59 DEGREES
EKG VENTRICULAR RATE: 82 BPM
EOSINOPHILS ABSOLUTE: 0.17 E9/L (ref 0.05–0.5)
EOSINOPHILS RELATIVE PERCENT: 2.2 % (ref 0–6)
GFR SERPL CREATININE-BSD FRML MDRD: 5 ML/MIN/1.73
GLUCOSE BLD-MCNC: 102 MG/DL (ref 74–99)
HBA1C MFR BLD: 8.7 % (ref 4–5.6)
HCT VFR BLD CALC: 42.8 % (ref 37–54)
HEMOGLOBIN: 13.8 G/DL (ref 12.5–16.5)
IMMATURE GRANULOCYTES #: 0.02 E9/L
IMMATURE GRANULOCYTES %: 0.3 % (ref 0–5)
LACTIC ACID: 1.2 MMOL/L (ref 0.5–2.2)
LIPASE: 450 U/L (ref 13–60)
LYMPHOCYTES ABSOLUTE: 1.16 E9/L (ref 1.5–4)
LYMPHOCYTES RELATIVE PERCENT: 15.3 % (ref 20–42)
MCH RBC QN AUTO: 29.9 PG (ref 26–35)
MCHC RBC AUTO-ENTMCNC: 32.2 % (ref 32–34.5)
MCV RBC AUTO: 92.6 FL (ref 80–99.9)
METER GLUCOSE: 84 MG/DL (ref 74–99)
METER GLUCOSE: 94 MG/DL (ref 74–99)
MONOCYTES ABSOLUTE: 0.81 E9/L (ref 0.1–0.95)
MONOCYTES RELATIVE PERCENT: 10.7 % (ref 2–12)
NEUTROPHILS ABSOLUTE: 5.39 E9/L (ref 1.8–7.3)
NEUTROPHILS RELATIVE PERCENT: 71.4 % (ref 43–80)
PDW BLD-RTO: 15.8 FL (ref 11.5–15)
PLATELET # BLD: 186 E9/L (ref 130–450)
PMV BLD AUTO: 10.2 FL (ref 7–12)
POTASSIUM REFLEX MAGNESIUM: 4.8 MMOL/L (ref 3.5–5)
PROCALCITONIN: 11.84 NG/ML (ref 0–0.08)
RBC # BLD: 4.62 E12/L (ref 3.8–5.8)
SODIUM BLD-SCNC: 135 MMOL/L (ref 132–146)
TOTAL PROTEIN: 8.4 G/DL (ref 6.4–8.3)
WBC # BLD: 7.6 E9/L (ref 4.5–11.5)

## 2023-03-05 PROCEDURE — 6360000004 HC RX CONTRAST MEDICATION: Performed by: RADIOLOGY

## 2023-03-05 PROCEDURE — 99285 EMERGENCY DEPT VISIT HI MDM: CPT

## 2023-03-05 PROCEDURE — 80053 COMPREHEN METABOLIC PANEL: CPT

## 2023-03-05 PROCEDURE — 82962 GLUCOSE BLOOD TEST: CPT

## 2023-03-05 PROCEDURE — 83690 ASSAY OF LIPASE: CPT

## 2023-03-05 PROCEDURE — 1200000000 HC SEMI PRIVATE

## 2023-03-05 PROCEDURE — 6370000000 HC RX 637 (ALT 250 FOR IP): Performed by: INTERNAL MEDICINE

## 2023-03-05 PROCEDURE — 93010 ELECTROCARDIOGRAM REPORT: CPT | Performed by: INTERNAL MEDICINE

## 2023-03-05 PROCEDURE — 6360000002 HC RX W HCPCS

## 2023-03-05 PROCEDURE — 84145 PROCALCITONIN (PCT): CPT

## 2023-03-05 PROCEDURE — 93005 ELECTROCARDIOGRAM TRACING: CPT

## 2023-03-05 PROCEDURE — 83036 HEMOGLOBIN GLYCOSYLATED A1C: CPT

## 2023-03-05 PROCEDURE — 76705 ECHO EXAM OF ABDOMEN: CPT

## 2023-03-05 PROCEDURE — 36415 COLL VENOUS BLD VENIPUNCTURE: CPT

## 2023-03-05 PROCEDURE — 2580000003 HC RX 258

## 2023-03-05 PROCEDURE — 2580000003 HC RX 258: Performed by: INTERNAL MEDICINE

## 2023-03-05 PROCEDURE — 6360000002 HC RX W HCPCS: Performed by: INTERNAL MEDICINE

## 2023-03-05 PROCEDURE — 85025 COMPLETE CBC W/AUTO DIFF WBC: CPT

## 2023-03-05 PROCEDURE — 96374 THER/PROPH/DIAG INJ IV PUSH: CPT

## 2023-03-05 PROCEDURE — 74177 CT ABD & PELVIS W/CONTRAST: CPT

## 2023-03-05 PROCEDURE — 83605 ASSAY OF LACTIC ACID: CPT

## 2023-03-05 PROCEDURE — 86038 ANTINUCLEAR ANTIBODIES: CPT

## 2023-03-05 RX ORDER — CALCITRIOL 0.25 UG/1
1.5 CAPSULE, LIQUID FILLED ORAL
Status: DISCONTINUED | OUTPATIENT
Start: 2023-03-06 | End: 2023-03-07 | Stop reason: HOSPADM

## 2023-03-05 RX ORDER — SODIUM CHLORIDE 9 MG/ML
INJECTION, SOLUTION INTRAVENOUS CONTINUOUS
Status: DISCONTINUED | OUTPATIENT
Start: 2023-03-05 | End: 2023-03-06

## 2023-03-05 RX ORDER — HEPARIN SODIUM 5000 [USP'U]/ML
5000 INJECTION, SOLUTION INTRAVENOUS; SUBCUTANEOUS EVERY 8 HOURS SCHEDULED
Status: DISCONTINUED | OUTPATIENT
Start: 2023-03-05 | End: 2023-03-07 | Stop reason: HOSPADM

## 2023-03-05 RX ORDER — INSULIN LISPRO 100 [IU]/ML
0-4 INJECTION, SOLUTION INTRAVENOUS; SUBCUTANEOUS NIGHTLY
Status: DISCONTINUED | OUTPATIENT
Start: 2023-03-05 | End: 2023-03-07 | Stop reason: HOSPADM

## 2023-03-05 RX ORDER — AMLODIPINE BESYLATE 5 MG/1
5 TABLET ORAL DAILY
Status: DISCONTINUED | OUTPATIENT
Start: 2023-03-05 | End: 2023-03-07 | Stop reason: HOSPADM

## 2023-03-05 RX ORDER — FENTANYL CITRATE 0.05 MG/ML
50 INJECTION, SOLUTION INTRAMUSCULAR; INTRAVENOUS ONCE
Status: COMPLETED | OUTPATIENT
Start: 2023-03-05 | End: 2023-03-05

## 2023-03-05 RX ORDER — IPRATROPIUM BROMIDE AND ALBUTEROL SULFATE 2.5; .5 MG/3ML; MG/3ML
1 SOLUTION RESPIRATORY (INHALATION) EVERY 4 HOURS PRN
Status: DISCONTINUED | OUTPATIENT
Start: 2023-03-05 | End: 2023-03-05 | Stop reason: RX

## 2023-03-05 RX ORDER — DEXTROSE MONOHYDRATE 100 MG/ML
INJECTION, SOLUTION INTRAVENOUS CONTINUOUS PRN
Status: DISCONTINUED | OUTPATIENT
Start: 2023-03-05 | End: 2023-03-07 | Stop reason: HOSPADM

## 2023-03-05 RX ORDER — METOPROLOL SUCCINATE 50 MG/1
50 TABLET, EXTENDED RELEASE ORAL DAILY
Status: DISCONTINUED | OUTPATIENT
Start: 2023-03-05 | End: 2023-03-07 | Stop reason: HOSPADM

## 2023-03-05 RX ORDER — POLYETHYLENE GLYCOL 3350 17 G/17G
17 POWDER, FOR SOLUTION ORAL DAILY PRN
Status: DISCONTINUED | OUTPATIENT
Start: 2023-03-05 | End: 2023-03-07 | Stop reason: HOSPADM

## 2023-03-05 RX ORDER — INSULIN GLARGINE 100 [IU]/ML
10 INJECTION, SOLUTION SUBCUTANEOUS NIGHTLY
Status: DISCONTINUED | OUTPATIENT
Start: 2023-03-05 | End: 2023-03-07 | Stop reason: HOSPADM

## 2023-03-05 RX ORDER — SODIUM CHLORIDE, SODIUM LACTATE, POTASSIUM CHLORIDE, AND CALCIUM CHLORIDE .6; .31; .03; .02 G/100ML; G/100ML; G/100ML; G/100ML
1000 INJECTION, SOLUTION INTRAVENOUS ONCE
Status: COMPLETED | OUTPATIENT
Start: 2023-03-05 | End: 2023-03-05

## 2023-03-05 RX ORDER — HYDROMORPHONE HYDROCHLORIDE 1 MG/ML
0.5 INJECTION, SOLUTION INTRAMUSCULAR; INTRAVENOUS; SUBCUTANEOUS EVERY 8 HOURS PRN
Status: DISCONTINUED | OUTPATIENT
Start: 2023-03-05 | End: 2023-03-07 | Stop reason: HOSPADM

## 2023-03-05 RX ORDER — SEVELAMER CARBONATE 800 MG/1
1600 TABLET, FILM COATED ORAL
Status: DISCONTINUED | OUTPATIENT
Start: 2023-03-05 | End: 2023-03-07 | Stop reason: HOSPADM

## 2023-03-05 RX ORDER — ALBUTEROL SULFATE 2.5 MG/3ML
2.5 SOLUTION RESPIRATORY (INHALATION) EVERY 4 HOURS PRN
Status: DISCONTINUED | OUTPATIENT
Start: 2023-03-05 | End: 2023-03-07 | Stop reason: HOSPADM

## 2023-03-05 RX ORDER — PROCHLORPERAZINE EDISYLATE 5 MG/ML
10 INJECTION INTRAMUSCULAR; INTRAVENOUS EVERY 6 HOURS PRN
Status: DISCONTINUED | OUTPATIENT
Start: 2023-03-05 | End: 2023-03-07 | Stop reason: HOSPADM

## 2023-03-05 RX ORDER — ACETAMINOPHEN 500 MG
500 TABLET ORAL EVERY 6 HOURS PRN
Status: DISCONTINUED | OUTPATIENT
Start: 2023-03-05 | End: 2023-03-07 | Stop reason: HOSPADM

## 2023-03-05 RX ORDER — INSULIN LISPRO 100 [IU]/ML
0-8 INJECTION, SOLUTION INTRAVENOUS; SUBCUTANEOUS
Status: DISCONTINUED | OUTPATIENT
Start: 2023-03-05 | End: 2023-03-07 | Stop reason: HOSPADM

## 2023-03-05 RX ADMIN — FENTANYL CITRATE 50 MCG: 0.05 INJECTION, SOLUTION INTRAMUSCULAR; INTRAVENOUS at 08:17

## 2023-03-05 RX ADMIN — SODIUM CHLORIDE, POTASSIUM CHLORIDE, SODIUM LACTATE AND CALCIUM CHLORIDE 1000 ML: 600; 310; 30; 20 INJECTION, SOLUTION INTRAVENOUS at 10:49

## 2023-03-05 RX ADMIN — SODIUM CHLORIDE: 9 INJECTION, SOLUTION INTRAVENOUS at 14:45

## 2023-03-05 RX ADMIN — IOPAMIDOL 93 ML: 755 INJECTION, SOLUTION INTRAVENOUS at 09:51

## 2023-03-05 RX ADMIN — HEPARIN SODIUM 5000 UNITS: 5000 INJECTION INTRAVENOUS; SUBCUTANEOUS at 21:27

## 2023-03-05 RX ADMIN — METOPROLOL SUCCINATE 50 MG: 50 TABLET, EXTENDED RELEASE ORAL at 14:47

## 2023-03-05 RX ADMIN — HYDROMORPHONE HYDROCHLORIDE 0.5 MG: 1 INJECTION, SOLUTION INTRAMUSCULAR; INTRAVENOUS; SUBCUTANEOUS at 19:30

## 2023-03-05 RX ADMIN — ACETAMINOPHEN 500 MG: 500 TABLET, FILM COATED ORAL at 21:34

## 2023-03-05 RX ADMIN — SEVELAMER CARBONATE 1600 MG: 800 TABLET, FILM COATED ORAL at 18:42

## 2023-03-05 RX ADMIN — SODIUM ZIRCONIUM CYCLOSILICATE 5 G: 5 POWDER, FOR SUSPENSION ORAL at 18:42

## 2023-03-05 RX ADMIN — POLYETHYLENE GLYCOL 3350 17 G: 17 POWDER, FOR SOLUTION ORAL at 21:34

## 2023-03-05 RX ADMIN — AMLODIPINE BESYLATE 5 MG: 5 TABLET ORAL at 14:47

## 2023-03-05 RX ADMIN — HEPARIN SODIUM 5000 UNITS: 5000 INJECTION INTRAVENOUS; SUBCUTANEOUS at 14:49

## 2023-03-05 ASSESSMENT — PAIN SCALES - GENERAL
PAINLEVEL_OUTOF10: 6
PAINLEVEL_OUTOF10: 8
PAINLEVEL_OUTOF10: 10
PAINLEVEL_OUTOF10: 10

## 2023-03-05 ASSESSMENT — PAIN DESCRIPTION - DESCRIPTORS
DESCRIPTORS: ACHING
DESCRIPTORS: ACHING;SHARP

## 2023-03-05 ASSESSMENT — PAIN DESCRIPTION - LOCATION
LOCATION: ABDOMEN
LOCATION: ABDOMEN

## 2023-03-05 ASSESSMENT — PAIN DESCRIPTION - ORIENTATION: ORIENTATION: MID

## 2023-03-05 NOTE — H&P
Department of Internal Medicine        CHIEF COMPLAINT: Upper mid abdominal pain    Reason for Admission: Recurrent pancreatitis    HISTORY OF PRESENT ILLNESS:      The patient is a 74 y.o. male who presents with central abdominal pain which started yesterday. The pain is nonradiating. Pain is severe. Patient had the pain throughout most yesterday and Tylenol seem to help and when he went to sleep the pain seemed subside but he woke up the pain was recurring. Patient has occasional radiation of pain to his back. Patient does have history of constipation and has not had a bowel movement for 4 days. The patient denies any fever/chills, unusual shortness of breath, chest pain, nausea/vomiting. Serum lipase is 450 with normal transaminases. Patient has chronic renal failure with a CO2 electrolytes of 27. WBC 7.6 hemoglobin 13.8. Temperature was 98.4 with heart rate 76 blood pressure 130/67 O2 sat 95% on room air at rest.  CT of the abdomen pelvis showed persistent pattern of mild edematous pancreatitis throughout which is similar to studies back in June and October. Patient has had a history of pancreatitis 3 other episodes in the past.  These are usually short cases that resolved quickly. Patient had an MRCP in the past which showed no obvious etiology of the pancreatitis. CT of the abdomen was in the past have been unremarkable. Patient denies any alcohol abuse and denies any illicit drug abuse.     Past Medical History:    Past Medical History:   Diagnosis Date    Back pain     Diabetes mellitus ([facility] [address]     DMII (diabetes mellitus, type 2) ([facility] [address] 7/28/2015    Hemodialysis patient ([facility] [address]     History of cardiovascular stress test 2/16/2015    lexiscan    HTN (hypertension) 7/28/2015    Hyperlipidemia     Hypertension     Lumbar radicular pain 7/28/2015    Oxygen dependent     wears 2 liters at home    Type II or unspecified type diabetes mellitus without mention of complication, not stated as uncontrolled Past Surgical History:    Past Surgical History:   Procedure Laterality Date    DIALYSIS FISTULA CREATION Right 2/23/2023    AV FISTULA CREATION RIGHT ARM performed by Benito Maritn MD at [facility] Left 06/06/14    cain bunionectomy left foot with osteomed screw x1    SHOULDER SURGERY      Bilateral    VEIN SURGERY         Medications Prior to Admission:    @  Prior to Admission medications    Medication Sig Start Date End Date Taking? Authorizing Provider   pantoprazole (PROTONIX) 40 MG tablet Take 1 tablet by mouth daily for 20 days 10/27/22 1/24/23  McBainterri Garrido MD   Omega-3 1000 MG CAPS Take by mouth 2 times daily    Historical Provider, MD   patiromer sorbitex calcium (VELTASSA) 8.4 g PACK packet Take 1 packet by mouth 7/21/21   Historical Provider, MD   metoprolol succinate (TOPROL XL) 50 MG extended release tablet Take 1 tablet by mouth daily 7/26/21   Manny Falk,    amLODIPine (NORVASC) 10 MG tablet Take 5 mg by mouth daily 7/25/21   Manny Falk DO   insulin glargine (LANTUS) 100 UNIT/ML injection vial Inject 34 Units into the skin nightly 7/25/21   Manny Falk DO   empagliflozin (JARDIANCE) 10 MG tablet Take 10 mg by mouth daily    Historical Provider, MD   sevelamer (RENVELA) 800 MG tablet Take 2 tablets by mouth 3 times daily (with meals)    Historical Provider, MD   OXYGEN Inhale 2 L into the lungs as needed     Historical Provider, MD   iron sucrose (VENOFER) 20 MG/ML injection Infuse 50 mg intravenously once a week Friday    Historical Provider, MD   calcitRIOL (ROCALTROL) 0.25 MCG capsule Take 1.5 mcg by mouth three times a week Monday, Wednesday, Friday    Historical Provider, MD       Allergies:   Other    Social History:   Social History     Socioeconomic History    Marital status: Single     Spouse name: Not on file    Number of children: Not on file    Years of education: Not on file    Highest education level: Not on file   Occupational History    Not on file   Tobacco Use    Smoking status: Former     Packs/day: 1.00     Years: 30.00     Pack years: 30.00     Types: Cigarettes     Quit date: 2021     Years since quittin.7    Smokeless tobacco: Never   Vaping Use    Vaping Use: Never used   Substance and Sexual Activity    Alcohol use: No    Drug use: No    Sexual activity: Not on file   Other Topics Concern    Not on file   Social History Narrative    Not on file     Social Determinants of Health     Financial Resource Strain: Not on file   Food Insecurity: Not on file   Transportation Needs: Not on file   Physical Activity: Not on file   Stress: Not on file   Social Connections: Not on file   Intimate Partner Violence: Not on file   Housing Stability: Not on file       Family History:   Family History   Problem Relation Age of Onset    Kidney Disease Sister     Diabetes Mother     Diabetes Father        REVIEW OF SYSTEMS:    Gen: Patient denies any lightheadedness or dizziness. No LOC or syncope. No fevers or chills. HEENT: No earache, sore throat or nasal congestion. Resp: Denies cough, hemoptysis or sputum production. Cardiac: Denies chest pain, SOB, diaphoresis or palpitations. GI: + Mid abdominal pain. No nausea, vomiting, diarrhea or constipation. No melena or hematochezia. : No urinary complaints, dysuria, hematuria or frequency. MSK: No extremity weakness, paralysis or paresthesias. PHYSICAL EXAM:    Vitals:  /67   Pulse 76   Temp 98.4 °F (36.9 °C)   Resp 18   Wt 207 lb (93.9 kg)   SpO2 95%   BMI 32.42 kg/m²     General:  This is a 74 y.o. yo male who is alert and oriented in mild distress secondary to above  HEENT:  Head is normocephalic and atraumatic, PERRLA, EOMI, mucus membranes moist with no pharyngeal erythema or exudate. Neck:  Supple with no carotid bruits, JVD or thyromegaly.   No cervical adenopathy  CV:  Regular rate and rhythm, no murmurs  Lungs:  Clear to auscultation bilaterally with no wheezes, rales or rhonchi  Abdomen:  + Upper mid abdominal tenderness, nondistended, bowel sounds present  Extremities: + AV fistula right upper arm.   No edema, peripheral pulses intact bilaterally  Neuro:  Cranial nerves II-XII grossly intact; motor and sensory function intact with no focal deficits  Skin:  No rashes, lesions or wounds    DATA:  CBC with Differential:    Lab Results   Component Value Date/Time    WBC 7.6 03/05/2023 08:11 AM    RBC 4.62 03/05/2023 08:11 AM    HGB 13.8 03/05/2023 08:11 AM    HCT 42.8 03/05/2023 08:11 AM     03/05/2023 08:11 AM    MCV 92.6 03/05/2023 08:11 AM    MCH 29.9 03/05/2023 08:11 AM    MCHC 32.2 03/05/2023 08:11 AM    RDW 15.8 03/05/2023 08:11 AM    LYMPHOPCT 15.3 03/05/2023 08:11 AM    MONOPCT 10.7 03/05/2023 08:11 AM    BASOPCT 0.1 03/05/2023 08:11 AM    MONOSABS 0.81 03/05/2023 08:11 AM    LYMPHSABS 1.16 03/05/2023 08:11 AM    EOSABS 0.17 03/05/2023 08:11 AM    BASOSABS 0.01 03/05/2023 08:11 AM     CMP:    Lab Results   Component Value Date/Time     03/05/2023 08:11 AM    K 4.8 03/05/2023 08:11 AM    CL 91 03/05/2023 08:11 AM    CO2 27 03/05/2023 08:11 AM    BUN 53 03/05/2023 08:11 AM    CREATININE 11.2 03/05/2023 08:11 AM    GFRAA 7 08/16/2022 02:08 PM    LABGLOM 5 03/05/2023 08:11 AM    GLUCOSE 102 03/05/2023 08:11 AM    PROT 8.4 03/05/2023 08:11 AM    LABALBU 3.9 03/05/2023 08:11 AM    CALCIUM 10.0 03/05/2023 08:11 AM    BILITOT 0.4 03/05/2023 08:11 AM    ALKPHOS 73 03/05/2023 08:11 AM    AST 19 03/05/2023 08:11 AM    ALT 19 03/05/2023 08:11 AM     Magnesium:    Lab Results   Component Value Date/Time    MG 2.6 06/22/2022 05:12 AM     Phosphorus:    Lab Results   Component Value Date/Time    PHOS 5.0 06/22/2022 05:12 AM     PT/INR:    Lab Results   Component Value Date/Time    PROTIME 10.8 07/19/2021 11:39 AM    INR 0.9 07/19/2021 11:39 AM     Troponin:    Lab Results   Component Value Date/Time    TROPONINI 0.10 03/21/2021 05:30 AM U/A:    Lab Results   Component Value Date/Time    COLORU Yellow 10/27/2022 08:59 PM    PROTEINU 100 10/27/2022 08:59 PM    PHUR 7.0 10/27/2022 08:59 PM    LABCAST RARE 09/21/2018 10:30 AM    WBCUA 0-1 10/27/2022 08:59 PM    RBCUA 0-1 10/27/2022 08:59 PM    BACTERIA RARE 10/27/2022 08:59 PM    CLARITYU Clear 10/27/2022 08:59 PM    SPECGRAV 1.010 10/27/2022 08:59 PM    LEUKOCYTESUR Negative 10/27/2022 08:59 PM    UROBILINOGEN 0.2 10/27/2022 08:59 PM    BILIRUBINUR Negative 10/27/2022 08:59 PM    BLOODU MODERATE 10/27/2022 08:59 PM    GLUCOSEU 250 10/27/2022 08:59 PM    AMORPHOUS FEW 10/02/2019 12:25 PM     ABG:    Lab Results   Component Value Date/Time    PH 7.503 04/04/2022 02:45 PM    PCO2 41.0 04/04/2022 02:45 PM    PO2 73.5 04/04/2022 02:45 PM    HCO3 31.5 04/04/2022 02:45 PM    BE 7.7 04/04/2022 02:45 PM    O2SAT 94.7 04/04/2022 02:45 PM     HgBA1c:    Lab Results   Component Value Date/Time    LABA1C 7.9 06/20/2022 08:38 AM     FLP:    Lab Results   Component Value Date/Time    TRIG 109 06/21/2022 04:57 AM    HDL 43 06/21/2022 04:57 AM    LDLCALC 71 06/21/2022 04:57 AM    LABVLDL 22 06/21/2022 04:57 AM     TSH:    Lab Results   Component Value Date/Time    TSH 1.180 06/21/2022 04:57 AM     IRON:    Lab Results   Component Value Date/Time    IRON 19 02/09/2020 05:25 AM     LIPASE:    Lab Results   Component Value Date/Time    LIPASE 450 03/05/2023 08:11 AM       ASSESSMENT AND PLAN:      Patient Active Problem List    Diagnosis Date Noted    Acute recurrent pancreatitis 03/05/2023    Encounter regarding vascular access for dialysis for end-stage renal disease ([facility] [address] 01/24/2023    Acute pancreatitis 06/20/2022    Hallux valgus, acquired 06/06/2014    Paroxysmal atrial fibrillation ([facility] [address]     ESRD on hemodialysis ([facility] [address]     Hyperkalemia 07/19/2021    Acute respiratory failure with hypoxia ([facility] [address] 06/20/2021    Respiratory failure ([facility] [address] 02/02/2020    Pneumonia 01/28/2020    Acute pancreatitis without necrosis or infection, unspecified 09/23/2018    Idiopathic acute pancreatitis 09/21/2018    B12 deficiency 03/17/2016    DMII (diabetes mellitus, type 2) ([facility] [address] 07/28/2015    HTN (hypertension) 07/28/2015    Lumbar radicular pain 07/28/2015    Spinal stenosis 07/28/2015     Impression:  1. Acute recurrent interstitial pancreatitis  2. Chronic renal failure with hemodialysis  3. Insulin-dependent diabetes mellitus type 2  4. History hypertension  5. History hyperlipidemia  6. History of prior tobacco abuse with possible underlying COPD with the patient wearing O2 nasal cannula as needed    Plan:  Admit to medical floor  Home medications reviewed  Monitor heart rate, blood pressure, O2 saturations  Heparin 5000 units subcu every 8  Glucoscans 4 times daily with sliding scale insulin  Diet-n.p.o. except for meds and sips  Decrease Lantus 10 units subcu daily at at bedtime  Activity up ad layla. Compazine 10 mg IV push every 6 hours as needed  IV fluids normal saline 65 cc an hour  TSH, lipid panel, ESR in a.m. NKECHI  Gallbladder ultrasound     CMP, CBC, lipase in a.m.       Rocio Luu DO, D.O.  3/5/2023  12:13 PM

## 2023-03-05 NOTE — PROGRESS NOTES
Patient to have dialysis on Monday, March 6 per Dr. Kyara Cartagena. Dialysis nurse notified. Added to schedule.

## 2023-03-05 NOTE — ED PROVIDER NOTES
[address] [facility]        Pt Name: Bobbi Rapp  MRN: [id number]  [place] 1955  Date of evaluation: 3/5/2023  Provider: Anette Seo MD  PCP: Shyam Cartwright DO  Note Started: 7:56 AM EST 3/5/23    CHIEF COMPLAINT       Chief Complaint   Patient presents with    Abdominal Pain     Onset yesterday intermittent constipation    Dental Pain     Left lower jaw onset several weeks       HISTORY OF PRESENT ILLNESS: 1 or more Elements   History From: patient    Limitations to history : None    Bobbi Rapp is a 74 y.o. male who presents for central abdominal pain that started yesterday. He states his pain is nonradiating, constant, sharp, 10/10 in severity. He states he took Tylenol throughout the day with some improvement and was able to sleep however this morning his pain was worse. He states burping seems to help his pain. He endorses constipation and states his last bowel movement was Wednesday. He states he is still passing gas most recently yesterday. He is a dialysis patient on Monday Wednesday Friday and was last dialyzed on Friday. He also states he has a history of pancreatitis and feels that the pain is slightly similar. Denies fever, chills, shortness of breath, chest pain, nausea, vomiting, diarrhea, back pain, leg swelling. Denies prior abdominal surgeries. Patient did not mention anything about dental pain at the time of my exam.    Nursing Notes were all reviewed and agreed with or any disagreements were addressed in the HPI. REVIEW OF EXTERNAL NOTE :       Patient had an AV fistula created in the right arm on 2/23/2023    REVIEW OF SYSTEMS :           Positives and Pertinent negatives as per HPI.      SURGICAL HISTORY     Past Surgical History:   Procedure Laterality Date    DIALYSIS FISTULA CREATION Right 2/23/2023    AV FISTULA CREATION RIGHT ARM performed by Caro Calloway MD at [address] [place] [address] Left 06/06/14 cain bunionectomy left foot with osteomed screw x1    SHOULDER SURGERY      Bilateral    VEIN SURGERY         CURRENTMEDICATIONS       Discharge Medication List as of 3/7/2023  4:25 PM        CONTINUE these medications which have NOT CHANGED    Details   pantoprazole (PROTONIX) 40 MG tablet Take 1 tablet by mouth daily for 20 days, Disp-20 tablet, R-0Normal      Omega-3 1000 MG CAPS Take by mouth 2 times dailyHistorical Med      patiromer sorbitex calcium (VELTASSA) 8.4 g PACK packet Take 1 packet by mouth Not on dialysis daysHistorical Med      metoprolol succinate (TOPROL XL) 50 MG extended release tablet Take 1 tablet by mouth daily, Disp-30 tablet, R-3Normal      amLODIPine (NORVASC) 10 MG tablet Take 5 mg by mouth daily, Disp-30 tablet, R-3Historical Med      insulin glargine (LANTUS) 100 UNIT/ML injection vial Inject 34 Units into the skin nightly, Disp-1 vial, R-3Historical Med      empagliflozin (JARDIANCE) 10 MG tablet Take 10 mg by mouth dailyHistorical Med      sevelamer (RENVELA) 800 MG tablet Take 2 tablets by mouth 3 times daily (with meals)Historical Med      OXYGEN Inhale 2 L into the lungs as needed Historical Med      iron sucrose (VENOFER) 20 MG/ML injection Infuse 50 mg intravenously once a week FridayHistorical Med      calcitRIOL (ROCALTROL) 0.25 MCG capsule Take 1.5 mcg by mouth three times a week Monday, Wednesday, FridayHistorical Med             ALLERGIES     Other    FAMILYHISTORY       Family History   Problem Relation Age of Onset    Kidney Disease Sister     Diabetes Mother     Diabetes Father         SOCIAL HISTORY       Social History     Tobacco Use    Smoking status: Former     Packs/day: 1.00     Years: 30.00     Pack years: 30.00     Types: Cigarettes     Quit date: 2021     Years since quittin.7    Smokeless tobacco: Never   Vaping Use    Vaping Use: Never used   Substance Use Topics    Alcohol use: No    Drug use: No       SCREENINGS        [place] Coma Scale  Eye Opening: Spontaneous  Best Verbal Response: Oriented  Best Motor Response: Obeys commands  Thuan Coma Scale Score: 15                CIWA Assessment  BP: 108/66  Heart Rate: 73           PHYSICAL EXAM  1 or more Elements     ED Triage Vitals [03/05/23 0719]   BP Temp Temp src Heart Rate Resp SpO2 Height Weight   130/75 98.4 °F (36.9 °C) -- 92 20 98 % -- 207 lb (93.9 kg)       Constitutional/General: Alert and oriented x3  Head: Normocephalic and atraumatic  Eyes: PERRL, EOMI, conjunctiva normal, sclera non icteric  ENT:  Oropharynx clear, handling secretions, no trismus  Neck: Supple, full ROM, no stridor, no meningeal signs  Respiratory: Lungs clear to auscultation bilaterally, no wheezes, rales, or rhonchi. Not in respiratory distress  Cardiovascular:  Regular rate. Regular rhythm. No murmurs, no gallops, no rubs. 2+ distal pulses. Equal extremity pulses. GI:  Abdomen Soft, Diffuse abdominal tenderness, Non distended. No rebound, guarding, or rigidity. No pulsatile masses. Musculoskeletal: Moves all extremities x 4. Warm and well perfused, no clubbing, no cyanosis, no edema. Capillary refill <3 seconds  Integument: skin warm and dry. No rashes.    Neurologic: GCS 15, no focal deficits, symmetric strength 5/5 in the upper and lower extremities bilaterally  Psychiatric: Normal Affect            DIAGNOSTIC RESULTS   LABS:    Labs Reviewed   CBC WITH AUTO DIFFERENTIAL - Abnormal; Notable for the following components:       Result Value    RDW 15.8 (*)     Lymphocytes % 15.3 (*)     Lymphocytes Absolute 1.16 (*)     All other components within normal limits   COMPREHENSIVE METABOLIC PANEL W/ REFLEX TO MG FOR LOW K - Abnormal; Notable for the following components:    Chloride 91 (*)     Anion Gap 17 (*)     Glucose 102 (*)     BUN 53 (*)     Creatinine 11.2 (*)     Total Protein 8.4 (*)     All other components within normal limits    Narrative:     Edmond Alfaro tel. [id number],  Chemistry results called to and read back by Flo Acevedo RN, 03/05/2023  09:37, by JESS   LIPASE - Abnormal; Notable for the following components:    Lipase 450 (*)     All other components within normal limits    Narrative:     Wendiroland Velozlo tel. [phone],  Chemistry results called to and read back by Flo Acevedo RN, 03/05/2023  09:37, by Jin Horta   HEMOGLOBIN A1C - Abnormal; Notable for the following components:    Hemoglobin A1C 8.7 (*)     All other components within normal limits   PROCALCITONIN - Abnormal; Notable for the following components:    Procalcitonin 11.84 (*)     All other components within normal limits   COMPREHENSIVE METABOLIC PANEL - Abnormal; Notable for the following components:    Chloride 90 (*)     Anion Gap 17 (*)     Glucose 71 (*)     BUN 58 (*)     Creatinine 12.0 (*)     Albumin 3.1 (*)     All other components within normal limits    Narrative:     CALL  [place]  [facility]J tel. ,  Chemistry results called to and read back by [address] [place], [place] [address]se [address] RN, 03/06/2023  05:28, by Jon Garcias   CBC WITH AUTO DIFFERENTIAL - Abnormal; Notable for the following components:    Hemoglobin 12.2 (*)     MCHC 31.7 (*)     RDW 15.5 (*)     Lymphocytes % 18.9 (*)     Lymphocytes Absolute 1.25 (*)     All other components within normal limits   PHOSPHORUS - Abnormal; Notable for the following components:    Phosphorus 5.4 (*)     All other components within normal limits    Narrative:     CALL  [place]  [facility]SJ tel. ,  Chemistry results called to and read back by [facility] EARNEST COMBS, 03/06/2023  05:28, by Mireya Garcia - Abnormal; Notable for the following components:    Lipase 193 (*)     All other components within normal limits    Narrative:     CALL  [place]  [facility]3SJ tel. ,  Chemistry results called to and read back by [facility] Alexisayse Lane RN, 03/06/2023  05:28, by Valeria - Abnormal; Notable for the following components:    Sodium 130 (*)     Chloride 92 (*)     Glucose 192 (*)     BUN 31 (*)     Creatinine 8.4 (*)     Calcium 8.4 (*) Albumin 3.0 (*)     All other components within normal limits    Narrative:     CALL  [place]  H3S tel. ,  Chemistry results called to and read back by Heather Gutierrez RN, 03/07/2023  05:22, by ALEXANDREA   CBC WITH AUTO DIFFERENTIAL - Abnormal; Notable for the following components:    Hemoglobin 12.2 (*)     RDW 15.8 (*)     Monocytes % 13.5 (*)     Lymphocytes Absolute 1.33 (*)     All other components within normal limits   LIPASE - Abnormal; Notable for the following components:    Lipase 198 (*)     All other components within normal limits    Narrative:     CALL  [place] [id number] tel. ,  Chemistry results called to and read back by Heather Gutierrez RN, 03/07/2023  05:22, by ALEXANDREA   IRON AND TIBC - Abnormal; Notable for the following components:    Iron 44 (*)     TIBC 171 (*)     All other components within normal limits   POCT GLUCOSE - Abnormal; Notable for the following components:    Meter Glucose 269 (*)     All other components within normal limits   POCT GLUCOSE - Abnormal; Notable for the following components:    Meter Glucose 176 (*)     All other components within normal limits   POCT GLUCOSE - Abnormal; Notable for the following components:    Meter Glucose 227 (*)     All other components within normal limits   POCT GLUCOSE - Abnormal; Notable for the following components:    Meter Glucose 168 (*)     All other components within normal limits   POCT GLUCOSE - Abnormal; Notable for the following components:    Meter Glucose 239 (*)     All other components within normal limits   POCT GLUCOSE - Abnormal; Notable for the following components:    Meter Glucose 126 (*)     All other components within normal limits   LACTIC ACID   NKECHI   TSH    Narrative:     CALL  [place] [id number] tel. ,  Chemistry results called to and read back by [facility] EARNEST COMBS, 03/06/2023  05:28, by Lei Beckett 1137    Narrative:     CALL  [place]  H3S tel. ,  Chemistry results called to and read back by [facility] EAMON COMBS, 03/06/2023  05:28, by Justin Gates MAGNESIUM    Narrative:     CALL  [place]  [facility]3SJ tel. ,  Chemistry results called to and read back by [facility] EAMON COMBS, 03/06/2023  05:28, by Magy 6 BLOOD DIAGNOSTIC   POCT GLUCOSE   POCT GLUCOSE   POCT GLUCOSE   POCT GLUCOSE   POCT GLUCOSE   POCT GLUCOSE   POCT GLUCOSE   POCT GLUCOSE   POCT GLUCOSE   POCT GLUCOSE   POCT GLUCOSE   POCT GLUCOSE       As interpreted by me, the above displayed labs are abnormal. All other labs obtained during this visit were within normal range or not returned as of this dictation. EKG Interpretation  Interpreted by emergency department physician, Lawanda Bajwa MD    EKG: This EKG is signed and interpreted by me. Rate: 82  Rhythm: Sinus  Interpretation: Normal sinus rhythm, normal NC interval, normal QRS, normal QT interval, no acute ST or T wave changes  Comparison: stable as compared to patient's most recent EKG 10/27/22       RADIOLOGY:   Non-plain film images such as CT, Ultrasound and MRI are read by the radiologist. Plain radiographic images are visualized and preliminarily interpreted by the ED Provider with the below findings:    NA    Interpretation per the Radiologist below, if available at the time of this note:    [address] [place] [address]   Final Result   Diffuse fatty infiltration of the liver. The pancreas is suboptimally visualized. CT ABDOMEN PELVIS W IV CONTRAST Additional Contrast? Oral   Final Result   1. There is a persistent pattern of mild edematous pancreatitis throughout   the pancreas as observed on previous studies of October, July in June 2022. intermittent/recurrent pancreatitis is considered. Cannot exclude the   possibility for ongoing active pancreatitis in mild degree on chronic basis. 2.  No indication for small-bowel obstruction. No results found. No results found.     PROCEDURES   Unless otherwise noted below, none          CRITICAL CARE TIME (.cct)       PAST MEDICAL HISTORY/Chronic Conditions Affecting Care      has a past medical history of Back pain, Diabetes mellitus ([facility] [address] DMII (diabetes mellitus, type 2) ([facility] [address] (7/28/2015), Hemodialysis patient [facility]), History of cardiovascular stress test (2/16/2015), HTN (hypertension) (7/28/2015), Hyperlipidemia, Hypertension, Lumbar radicular pain (7/28/2015), Oxygen dependent, and Type II or unspecified type diabetes mellitus without mention of complication, not stated as uncontrolled. EMERGENCY DEPARTMENT COURSE    Vitals:    Vitals:    03/06/23 1805 03/06/23 1842 03/07/23 0516 03/07/23 0928   BP: 106/63 118/65 112/61 108/66   Pulse: 63 68 81 73   Resp: 18 28 17 20   Temp: 98 °F (36.7 °C) 97.9 °F (36.6 °C) 98.4 °F (36.9 °C) 98.3 °F (36.8 °C)   TempSrc:  Oral Oral Oral   SpO2:  100% 95% 97%   Weight: 198 lb 6.6 oz (90 kg)          Patient was given the following medications:  Medications   fentaNYL (SUBLIMAZE) injection 50 mcg (50 mcg IntraVENous Given 3/5/23 0817)   iopamidol (ISOVUE-370) 76 % injection 93 mL (93 mLs IntraVENous Given 3/5/23 0951)   lactated ringers bolus (0 mLs IntraVENous Stopped 3/5/23 1212)           Is this patient to be included in the SEP-1 Core Measure due to severe sepsis or septic shock? No   Exclusion criteria - the patient is NOT to be included for SEP-1 Core Measure due to: Infection is not suspected        Medical Decision Making/Differential Diagnosis:    CC/HPI Summary, Social Determinants of health, Records Reviewed, DDx, testing done/not done, ED Course, Reassessment, disposition considerations/shared decision making with patient, consults, disposition:      He is a 80-year-old male who is a dialysis patient on Monday Wednesday Friday as well as a history of pancreatitis and no prior history of abdominal surgeries presenting for central abdominal pain that started yesterday. He also endorses constipation since Wednesday, but is still passing gas.   Differentials include but not limited to SBO, pancreatitis. Less likely cholecystitis given negative Apodaca sign and no significant pain in the right upper quadrant as well as no fever. Less likely appendicitis given negative McBurney's point tenderness as well as afebrile. He is resting comfortable in bed and his vitals are stable. He does have tenderness to his abdomen diffusely. His CBC is unremarkable and shows no elevation in WBC. His BMP is unremarkable and stable with prior. Creatinine is elevated as he is a dialysis patient. His potassium was 4.8 today with no acute elevation of BUN. Lactic acid was normal.  His lipase was elevated to 450. His CT abdomen showed mild edematous pancreatitis throughout the pancreas with no indication for small bowel obstruction. The patient was initially given fentanyl 50 mcg with improvement of his pain. He was given 1 L bolus of lactated Ringer's. He was given fentanyl for pain control. I discussed the plan for admission with the patient and he agrees with the plan. I discussed the case with hospitalist who agrees to accept the patient for admission. EKG is ordered to have documentation of patient's current rhythm, and to rule out any obvious acute cardiac illnesses such as ACS. Additionally, QT interval may be of use in decision making regarding any medications administered here in the ED. CBC is ordered to evaluate for any signs of infection or inflammation by obtaining a WBC count, or any signs of acute anemia by interpreting hemoglobin. CMP was ordered to evaluate for any electrolyte imbalances, kidney function, or any elevations in anion gap. Lipase levels were ordered to evaluate for possible elevations which suggest pancreatic etiology of symptoms. Lactic acid levels were ordered to evaluate for signs of ischemia or decrease perfusion to organ systems.  A CT abdomen with IV contrast was ordered to evaluate for, but without limitation, constipation, small bowel obstruction, bowel ischemia, pneumoperitoneum, diverticulitis, cholecystitis, appendicitis, perforation. CONSULTS: (Who and What was discussed)  IP CONSULT TO INTERNAL MEDICINE  IP CONSULT TO NEPHROLOGY  IP CONSULT TO GI      I am the Primary Clinician of Record. FINAL IMPRESSION      1.  Acute recurrent pancreatitis          DISPOSITION/PLAN     DISPOSITION Admitted 03/05/2023 11:41:57 AM      PATIENT REFERRED TO:  Ben Woo DO  [address] [place] [address]  [phone]    Schedule an appointment as soon as possible for a visit in 1 week(s)      Ewelina Lundberg MD  [address] [facility] [phone]    Schedule an appointment as soon as possible for a visit in 2 week(s)      DISCHARGE MEDICATIONS:  Discharge Medication List as of 3/7/2023  4:25 PM          DISCONTINUED MEDICATIONS:  Discharge Medication List as of 3/7/2023  4:25 PM                 (Please note that portions of this note were completed with a voice recognition program.  Efforts were made to edit the dictations but occasionally words are mis-transcribed.)    Neela Mayorga MD (electronically signed)            Neela Mayorga MD  Resident  03/08/23 3058

## 2023-03-06 LAB
ALBUMIN SERPL-MCNC: 3.1 G/DL (ref 3.5–5.2)
ALP BLD-CCNC: 60 U/L (ref 40–129)
ALT SERPL-CCNC: 11 U/L (ref 0–40)
ANION GAP SERPL CALCULATED.3IONS-SCNC: 17 MMOL/L (ref 7–16)
ANTI-NUCLEAR ANTIBODY (ANA): NEGATIVE
AST SERPL-CCNC: 12 U/L (ref 0–39)
BASOPHILS ABSOLUTE: 0.01 E9/L (ref 0–0.2)
BASOPHILS RELATIVE PERCENT: 0.2 % (ref 0–2)
BILIRUB SERPL-MCNC: 0.3 MG/DL (ref 0–1.2)
BUN BLDV-MCNC: 58 MG/DL (ref 6–23)
CALCIUM SERPL-MCNC: 8.9 MG/DL (ref 8.6–10.2)
CHLORIDE BLD-SCNC: 90 MMOL/L (ref 98–107)
CHOLESTEROL, TOTAL: 138 MG/DL (ref 0–199)
CO2: 26 MMOL/L (ref 22–29)
CREAT SERPL-MCNC: 12 MG/DL (ref 0.7–1.2)
EOSINOPHILS ABSOLUTE: 0.2 E9/L (ref 0.05–0.5)
EOSINOPHILS RELATIVE PERCENT: 3 % (ref 0–6)
GFR SERPL CREATININE-BSD FRML MDRD: 4 ML/MIN/1.73
GLUCOSE BLD-MCNC: 71 MG/DL (ref 74–99)
HCT VFR BLD CALC: 38.5 % (ref 37–54)
HDLC SERPL-MCNC: 38 MG/DL
HEMOGLOBIN: 12.2 G/DL (ref 12.5–16.5)
IMMATURE GRANULOCYTES #: 0.02 E9/L
IMMATURE GRANULOCYTES %: 0.3 % (ref 0–5)
LDL CHOLESTEROL CALCULATED: 80 MG/DL (ref 0–99)
LIPASE: 193 U/L (ref 13–60)
LYMPHOCYTES ABSOLUTE: 1.25 E9/L (ref 1.5–4)
LYMPHOCYTES RELATIVE PERCENT: 18.9 % (ref 20–42)
MAGNESIUM: 2.3 MG/DL (ref 1.6–2.6)
MCH RBC QN AUTO: 28.8 PG (ref 26–35)
MCHC RBC AUTO-ENTMCNC: 31.7 % (ref 32–34.5)
MCV RBC AUTO: 90.8 FL (ref 80–99.9)
METER GLUCOSE: 176 MG/DL (ref 74–99)
METER GLUCOSE: 227 MG/DL (ref 74–99)
METER GLUCOSE: 269 MG/DL (ref 74–99)
METER GLUCOSE: 83 MG/DL (ref 74–99)
MONOCYTES ABSOLUTE: 0.7 E9/L (ref 0.1–0.95)
MONOCYTES RELATIVE PERCENT: 10.6 % (ref 2–12)
NEUTROPHILS ABSOLUTE: 4.43 E9/L (ref 1.8–7.3)
NEUTROPHILS RELATIVE PERCENT: 67 % (ref 43–80)
PDW BLD-RTO: 15.5 FL (ref 11.5–15)
PHOSPHORUS: 5.4 MG/DL (ref 2.5–4.5)
PLATELET # BLD: 200 E9/L (ref 130–450)
PMV BLD AUTO: 10.5 FL (ref 7–12)
POTASSIUM SERPL-SCNC: 4.8 MMOL/L (ref 3.5–5)
RBC # BLD: 4.24 E12/L (ref 3.8–5.8)
SODIUM BLD-SCNC: 133 MMOL/L (ref 132–146)
TOTAL PROTEIN: 7 G/DL (ref 6.4–8.3)
TRIGL SERPL-MCNC: 99 MG/DL (ref 0–149)
TSH SERPL DL<=0.05 MIU/L-ACNC: 0.72 UIU/ML (ref 0.27–4.2)
VLDLC SERPL CALC-MCNC: 20 MG/DL
WBC # BLD: 6.6 E9/L (ref 4.5–11.5)

## 2023-03-06 PROCEDURE — 5A1D70Z PERFORMANCE OF URINARY FILTRATION, INTERMITTENT, LESS THAN 6 HOURS PER DAY: ICD-10-PCS | Performed by: INTERNAL MEDICINE

## 2023-03-06 PROCEDURE — 6360000002 HC RX W HCPCS: Performed by: INTERNAL MEDICINE

## 2023-03-06 PROCEDURE — 85025 COMPLETE CBC W/AUTO DIFF WBC: CPT

## 2023-03-06 PROCEDURE — 80061 LIPID PANEL: CPT

## 2023-03-06 PROCEDURE — 84443 ASSAY THYROID STIM HORMONE: CPT

## 2023-03-06 PROCEDURE — 84100 ASSAY OF PHOSPHORUS: CPT

## 2023-03-06 PROCEDURE — 80053 COMPREHEN METABOLIC PANEL: CPT

## 2023-03-06 PROCEDURE — 90935 HEMODIALYSIS ONE EVALUATION: CPT

## 2023-03-06 PROCEDURE — 82962 GLUCOSE BLOOD TEST: CPT

## 2023-03-06 PROCEDURE — 36415 COLL VENOUS BLD VENIPUNCTURE: CPT

## 2023-03-06 PROCEDURE — 83735 ASSAY OF MAGNESIUM: CPT

## 2023-03-06 PROCEDURE — 1200000000 HC SEMI PRIVATE

## 2023-03-06 PROCEDURE — 83690 ASSAY OF LIPASE: CPT

## 2023-03-06 PROCEDURE — 6370000000 HC RX 637 (ALT 250 FOR IP): Performed by: INTERNAL MEDICINE

## 2023-03-06 RX ADMIN — CALCITRIOL 1.5 MCG: 0.25 CAPSULE ORAL at 19:03

## 2023-03-06 RX ADMIN — METOPROLOL SUCCINATE 50 MG: 50 TABLET, EXTENDED RELEASE ORAL at 19:05

## 2023-03-06 RX ADMIN — HEPARIN SODIUM 5000 UNITS: 5000 INJECTION INTRAVENOUS; SUBCUTANEOUS at 21:39

## 2023-03-06 RX ADMIN — HYDROMORPHONE HYDROCHLORIDE 0.5 MG: 1 INJECTION, SOLUTION INTRAMUSCULAR; INTRAVENOUS; SUBCUTANEOUS at 19:20

## 2023-03-06 RX ADMIN — POLYETHYLENE GLYCOL 3350 17 G: 17 POWDER, FOR SOLUTION ORAL at 21:47

## 2023-03-06 RX ADMIN — EMPAGLIFLOZIN 10 MG: 10 TABLET, FILM COATED ORAL at 19:04

## 2023-03-06 RX ADMIN — INSULIN GLARGINE 10 UNITS: 100 INJECTION, SOLUTION SUBCUTANEOUS at 21:39

## 2023-03-06 RX ADMIN — SEVELAMER CARBONATE 1600 MG: 800 TABLET, FILM COATED ORAL at 19:05

## 2023-03-06 RX ADMIN — HEPARIN SODIUM 5000 UNITS: 5000 INJECTION INTRAVENOUS; SUBCUTANEOUS at 05:22

## 2023-03-06 RX ADMIN — HYDROMORPHONE HYDROCHLORIDE 0.5 MG: 1 INJECTION, SOLUTION INTRAMUSCULAR; INTRAVENOUS; SUBCUTANEOUS at 05:22

## 2023-03-06 RX ADMIN — AMLODIPINE BESYLATE 5 MG: 5 TABLET ORAL at 19:03

## 2023-03-06 RX ADMIN — INSULIN LISPRO 4 UNITS: 100 INJECTION, SOLUTION INTRAVENOUS; SUBCUTANEOUS at 11:50

## 2023-03-06 ASSESSMENT — PAIN DESCRIPTION - FREQUENCY: FREQUENCY: CONTINUOUS

## 2023-03-06 ASSESSMENT — PAIN - FUNCTIONAL ASSESSMENT: PAIN_FUNCTIONAL_ASSESSMENT: PREVENTS OR INTERFERES SOME ACTIVE ACTIVITIES AND ADLS

## 2023-03-06 ASSESSMENT — PAIN DESCRIPTION - LOCATION
LOCATION: ABDOMEN
LOCATION: ABDOMEN

## 2023-03-06 ASSESSMENT — PAIN DESCRIPTION - PAIN TYPE: TYPE: ACUTE PAIN

## 2023-03-06 ASSESSMENT — PAIN DESCRIPTION - ONSET: ONSET: ON-GOING

## 2023-03-06 ASSESSMENT — PAIN SCALES - GENERAL
PAINLEVEL_OUTOF10: 0
PAINLEVEL_OUTOF10: 10
PAINLEVEL_OUTOF10: 8
PAINLEVEL_OUTOF10: 0

## 2023-03-06 ASSESSMENT — PAIN DESCRIPTION - DESCRIPTORS
DESCRIPTORS: ACHING;SHARP
DESCRIPTORS: ACHING;DISCOMFORT;TENDER;SORE

## 2023-03-06 NOTE — PROGRESS NOTES
Department of Internal Medicine        CHIEF COMPLAINT: Upper mid abdominal pain    Reason for Admission: Recurrent pancreatitis    HISTORY OF PRESENT ILLNESS:      The patient is a 74 y.o. male who presents with central abdominal pain which started yesterday. The pain is nonradiating. Pain is severe. Patient had the pain throughout most yesterday and Tylenol seem to help and when he went to sleep the pain seemed subside but he woke up the pain was recurring. Patient has occasional radiation of pain to his back. Patient does have history of constipation and has not had a bowel movement for 4 days. The patient denies any fever/chills, unusual shortness of breath, chest pain, nausea/vomiting. Serum lipase is 450 with normal transaminases. Patient has chronic renal failure with a CO2 electrolytes of 27. WBC 7.6 hemoglobin 13.8. Temperature was 98.4 with heart rate 76 blood pressure 130/67 O2 sat 95% on room air at rest.  CT of the abdomen pelvis showed persistent pattern of mild edematous pancreatitis throughout which is similar to studies back in June and October. Patient has had a history of pancreatitis 3 other episodes in the past.  These are usually short cases that resolved quickly. Patient had an MRCP in the past which showed no obvious etiology of the pancreatitis. CT of the abdomen was in the past have been unremarkable. Patient denies any alcohol abuse and denies any illicit drug abuse. 3/6/2023  Patient seen examined on medical surgical floor. Patient denies any abdominal pain or back pain today. There is no nausea vomiting or unusual shortness of breath. Electrolytes essentially normal BUN/creatinine 58/12.0. Procalcitonin was 11.8 with blood sugars ranging 71-94. Transaminases normal with lipase improved to 193. WBC 6.6 with hemoglobin 12.2. Temperature 97.5 heart rate 84 blood pressure 118/72.   O2 sat 94% room air at rest.  Gallbladder ultrasound did not show any evidence of cholecystitis or cholelithiasis. With recurrent pancreatitis we will consult GI for further input. We will increase the diet. Past Medical History:    Past Medical History:   Diagnosis Date    Back pain     Diabetes mellitus ([facility]     DMII (diabetes mellitus, type 2) ([facility] 7/28/2015    [facility]     History of cardiovascular stress test 2/16/2015    lexiscan    HTN (hypertension) 7/28/2015    Hyperlipidemia     Hypertension     Lumbar radicular pain 7/28/2015    Oxygen dependent     wears 2 liters at home    Type II or unspecified type diabetes mellitus without mention of complication, not stated as uncontrolled      Past Surgical History:    Past Surgical History:   Procedure Laterality Date    DIALYSIS FISTULA CREATION Right 2/23/2023    AV FISTULA CREATION RIGHT ARM performed by Keila Sampson MD at [facility] Left 06/06/14    cain bunionectomy left foot with osteomed screw x1    SHOULDER SURGERY      Bilateral    VEIN SURGERY         Medications Prior to Admission:    @  Prior to Admission medications    Medication Sig Start Date End Date Taking?  Authorizing Provider   pantoprazole (PROTONIX) 40 MG tablet Take 1 tablet by mouth daily for 20 days 10/27/22 1/24/23  Nory Mo MD   Omega-3 1000 MG CAPS Take by mouth 2 times daily    Historical Provider, MD   patiromer sorbitex calcium (VELTASSA) 8.4 g PACK packet Take 1 packet by mouth Not on dialysis days 7/21/21   Historical Provider, MD   metoprolol succinate (TOPROL XL) 50 MG extended release tablet Take 1 tablet by mouth daily 7/26/21   Jessica Servant, DO   amLODIPine (NORVASC) 10 MG tablet Take 5 mg by mouth daily 7/25/21   Jessica Servtomas, DO   insulin glargine (LANTUS) 100 UNIT/ML injection vial Inject 34 Units into the skin nightly 7/25/21   Jessica Servtomas, DO   empagliflozin (JARDIANCE) 10 MG tablet Take 10 mg by mouth daily    Historical Provider, MD   sevelamer (RENVELA) 800 MG tablet Take 2 tablets by mouth 3 times daily (with meals)    Historical Provider, MD   OXYGEN Inhale 2 L into the lungs as needed     Historical Provider, MD   iron sucrose (VENOFER) 20 MG/ML injection Infuse 50 mg intravenously once a week Friday    Historical Provider, MD   calcitRIOL (ROCALTROL) 0.25 MCG capsule Take 1.5 mcg by mouth three times a week Monday, Wednesday, Friday    Historical Provider, MD       Allergies: Other    Social History:   Social History     Socioeconomic History    Marital status: Single     Spouse name: Not on file    Number of children: Not on file    Years of education: Not on file    Highest education level: Not on file   Occupational History    Not on file   Tobacco Use    Smoking status: Former     Packs/day: 1.00     Years: 30.00     Pack years: 30.00     Types: Cigarettes     Quit date: 2021     Years since quittin.7    Smokeless tobacco: Never   Vaping Use    Vaping Use: Never used   Substance and Sexual Activity    Alcohol use: No    Drug use: No    Sexual activity: Not on file   Other Topics Concern    Not on file   Social History Narrative    Not on file     Social Determinants of Health     Financial Resource Strain: Not on file   Food Insecurity: Not on file   Transportation Needs: Not on file   Physical Activity: Not on file   Stress: Not on file   Social Connections: Not on file   Intimate Partner Violence: Not on file   Housing Stability: Not on file       Family History:   Family History   Problem Relation Age of Onset    Kidney Disease Sister     Diabetes Mother     Diabetes Father        REVIEW OF SYSTEMS:    Gen: Patient denies any lightheadedness or dizziness. No LOC or syncope. No fevers or chills. HEENT: No earache, sore throat or nasal congestion. Resp: Denies cough, hemoptysis or sputum production. Cardiac: Denies chest pain, SOB, diaphoresis or palpitations. GI: + Mid abdominal pain. No nausea, vomiting, diarrhea or constipation.   No melena or hematochezia. : No urinary complaints, dysuria, hematuria or frequency. MSK: No extremity weakness, paralysis or paresthesias. PHYSICAL EXAM:    Vitals:  /72   Pulse 84   Temp 97.5 °F (36.4 °C) (Oral)   Resp 18   Wt 207 lb (93.9 kg)   SpO2 94%   BMI 32.42 kg/m²     General:  This is a 74 y.o. yo male who is alert and oriented in mild distress secondary to above  HEENT:  Head is normocephalic and atraumatic, PERRLA, EOMI, mucus membranes moist with no pharyngeal erythema or exudate. Neck:  Supple with no carotid bruits, JVD or thyromegaly. No cervical adenopathy  CV:  Regular rate and rhythm, no murmurs  Lungs:  Clear to auscultation bilaterally with no wheezes, rales or rhonchi  Abdomen:  + Upper mid abdominal tenderness, nondistended, bowel sounds present  Extremities: + AV fistula right upper arm.   No edema, peripheral pulses intact bilaterally  Neuro:  Cranial nerves II-XII grossly intact; motor and sensory function intact with no focal deficits  Skin:  No rashes, lesions or wounds    DATA:  CBC with Differential:    Lab Results   Component Value Date/Time    WBC 6.6 03/06/2023 04:23 AM    RBC 4.24 03/06/2023 04:23 AM    HGB 12.2 03/06/2023 04:23 AM    HCT 38.5 03/06/2023 04:23 AM     03/06/2023 04:23 AM    MCV 90.8 03/06/2023 04:23 AM    MCH 28.8 03/06/2023 04:23 AM    MCHC 31.7 03/06/2023 04:23 AM    RDW 15.5 03/06/2023 04:23 AM    LYMPHOPCT 18.9 03/06/2023 04:23 AM    MONOPCT 10.6 03/06/2023 04:23 AM    BASOPCT 0.2 03/06/2023 04:23 AM    MONOSABS 0.70 03/06/2023 04:23 AM    LYMPHSABS 1.25 03/06/2023 04:23 AM    EOSABS 0.20 03/06/2023 04:23 AM    BASOSABS 0.01 03/06/2023 04:23 AM     CMP:    Lab Results   Component Value Date/Time     03/06/2023 04:23 AM    K 4.8 03/06/2023 04:23 AM    K 4.8 03/05/2023 08:11 AM    CL 90 03/06/2023 04:23 AM    CO2 26 03/06/2023 04:23 AM    BUN 58 03/06/2023 04:23 AM    CREATININE 12.0 03/06/2023 04:23 AM    GFRAA 7 08/16/2022 02:08 PM LABGLOM 4 03/06/2023 04:23 AM    GLUCOSE 71 03/06/2023 04:23 AM    PROT 7.0 03/06/2023 04:23 AM    LABALBU 3.1 03/06/2023 04:23 AM    CALCIUM 8.9 03/06/2023 04:23 AM    BILITOT 0.3 03/06/2023 04:23 AM    ALKPHOS 60 03/06/2023 04:23 AM    AST 12 03/06/2023 04:23 AM    ALT 11 03/06/2023 04:23 AM     Magnesium:    Lab Results   Component Value Date/Time    MG 2.3 03/06/2023 04:23 AM     Phosphorus:    Lab Results   Component Value Date/Time    PHOS 5.4 03/06/2023 04:23 AM     PT/INR:    Lab Results   Component Value Date/Time    PROTIME 10.8 07/19/2021 11:39 AM    INR 0.9 07/19/2021 11:39 AM     Troponin:    Lab Results   Component Value Date/Time    TROPONINI 0.10 03/21/2021 05:30 AM     U/A:    Lab Results   Component Value Date/Time    COLORU Yellow 10/27/2022 08:59 PM    PROTEINU 100 10/27/2022 08:59 PM    PHUR 7.0 10/27/2022 08:59 PM    LABCAST RARE 09/21/2018 10:30 AM    WBCUA 0-1 10/27/2022 08:59 PM    RBCUA 0-1 10/27/2022 08:59 PM    BACTERIA RARE 10/27/2022 08:59 PM    CLARITYU Clear 10/27/2022 08:59 PM    SPECGRAV 1.010 10/27/2022 08:59 PM    LEUKOCYTESUR Negative 10/27/2022 08:59 PM    UROBILINOGEN 0.2 10/27/2022 08:59 PM    BILIRUBINUR Negative 10/27/2022 08:59 PM    BLOODU MODERATE 10/27/2022 08:59 PM    GLUCOSEU 250 10/27/2022 08:59 PM    AMORPHOUS FEW 10/02/2019 12:25 PM     ABG:    Lab Results   Component Value Date/Time    PH 7.503 04/04/2022 02:45 PM    PCO2 41.0 04/04/2022 02:45 PM    PO2 73.5 04/04/2022 02:45 PM    HCO3 31.5 04/04/2022 02:45 PM    BE 7.7 04/04/2022 02:45 PM    O2SAT 94.7 04/04/2022 02:45 PM     HgBA1c:    Lab Results   Component Value Date/Time    LABA1C 8.7 03/05/2023 02:33 PM     FLP:    Lab Results   Component Value Date/Time    TRIG 99 03/06/2023 04:23 AM    HDL 38 03/06/2023 04:23 AM    LDLCALC 80 03/06/2023 04:23 AM    LABVLDL 20 03/06/2023 04:23 AM     TSH:    Lab Results   Component Value Date/Time    TSH 0.716 03/06/2023 04:23 AM     IRON:    Lab Results   Component Value Date/Time    IRON 19 02/09/2020 05:25 AM     LIPASE:    Lab Results   Component Value Date/Time    LIPASE 193 03/06/2023 04:23 AM       ASSESSMENT AND PLAN:      Patient Active Problem List    Diagnosis Date Noted    Acute recurrent pancreatitis 03/05/2023    Encounter regarding vascular access for dialysis for end-stage renal disease ([facility] 01/24/2023    Acute pancreatitis 06/20/2022    Hallux valgus, acquired 06/06/2014    Paroxysmal atrial fibrillation ([facility]     ESRD on hemodialysis ([facility]     Hyperkalemia 07/19/2021    Acute respiratory failure with hypoxia ([facility] 06/20/2021    [facility] 02/02/2020    Pneumonia 01/28/2020    Acute pancreatitis without necrosis or infection, unspecified 09/23/2018    Idiopathic acute pancreatitis 09/21/2018    B12 deficiency 03/17/2016    DMII (diabetes mellitus, type 2) [facility] 07/28/2015    HTN (hypertension) 07/28/2015    Lumbar radicular pain 07/28/2015    Spinal stenosis 07/28/2015     Impression:  1. Acute recurrent interstitial pancreatitis  2. Chronic renal failure with hemodialysis  3. Insulin-dependent diabetes mellitus type 2  4. History hypertension  5. History hyperlipidemia  6. History of prior tobacco abuse with possible underlying COPD with the patient wearing O2 nasal cannula as needed    Plan:  Admit to medical floor  Home medications reviewed  Monitor heart rate, blood pressure, O2 saturations  Heparin 5000 units subcu every 8  Glucoscans 4 times daily with sliding scale insulin  Diet-n.p.o. except for meds and sips  Decrease Lantus 10 units subcu daily at at bedtime  Activity up ad layla. Compazine 10 mg IV push every 6 hours as needed  IV fluids normal saline 65 cc an hour  TSH, lipid panel, ESR in a.m. NKECHI  Gallbladder ultrasound     Increase diet to low-fat  Consult GI    CMP, CBC, lipase in a.m.       Parker Champion DO, MELONIE  3/6/2023  8:30 AM

## 2023-03-06 NOTE — CONSULTS
Patient seen and examined. Consult dictated. Dr. Nilo Arredondo, Thank You for allowing me to participate in the care of this patient. Will follow the patient with you.     Louise Dowell MD  Nephrology    Electronically signed by Esperanza May MD on 3/6/2023 at 3:00 PM

## 2023-03-06 NOTE — CONSULTS
Winnie Dupree M.D. [facility]  Dr. Nory Hay M.D.,  Dr. Raman Luther M.D.,  Dr. Radha Ho D.O.,  Dr. Eduardo Caballero D.O. ,  Dr. Lelia Harrison M.D.,          Marysol Peña  74 y.o.  male      Re: Recurrent pancreatitis  Requesting physician: Dr. Ponce First  Date:11:05 AM 3/6/2023          HPI: 62-year-old male patient seen in the hospital for above-described issue. He presents with abdominal pain which he localizes in the mid upper abdomen. Patient reports pain starting on Saturday. Patient reports some constipation prior to this. He denies nausea vomiting. Occasional radiation to the back but not consistent. Patient denies any significant nausea vomiting currently and was able to tolerate a regular low-fat diet. Patient reports no new medications and has been taking small amount of Tylenol for his pain. Patient denies fever or chills. He denies dietary indiscretion including denies alcohol abuse currently or in the past.  Patient reports previous episodes of pancreatitis however does not recall further evaluation or definitive diagnosis of pancreatitis etiology. Upon presentation patient was noted to have elevated lipase of 450 decreasing to 193 today. Liver profile shows nonelevated transaminases, nonelevated alkaline phosphatase and nonelevated bilirubin. Imaging has been obtained with CT scan with IV contrast reported to show pancreatic changes consistent with pancreatitis.     Information sources:   -Patient  -medical record  -health care team    PMHx:  Past Medical History:   Diagnosis Date    Back pain     Diabetes mellitus ([facility]     DMII (diabetes mellitus, type 2) ([facility] 7/28/2015    Hemodialysis patient ([facility]     History of cardiovascular stress test 2/16/2015    lexiscan    HTN (hypertension) 7/28/2015    Hyperlipidemia     Hypertension     Lumbar radicular pain 7/28/2015    Oxygen dependent     wears 2 liters at home    Type II or unspecified type diabetes mellitus without mention of complication, not stated as uncontrolled        PSHx:  Past Surgical History:   Procedure Laterality Date    DIALYSIS FISTULA CREATION Right 2/23/2023    AV FISTULA CREATION RIGHT ARM performed by Charlie Lockett MD at [address]  [facility] Left 06/06/14    cain bunionectomy left foot with osteomed screw x1    SHOULDER SURGERY      Bilateral    VEIN SURGERY         Meds:  Current Facility-Administered Medications   Medication Dose Route Frequency Provider Last Rate Last Admin    calcitRIOL (ROCALTROL) capsule 1.5 mcg  1.5 mcg Oral Once per day on Mon Wed Fri Pramod KELECHI Rolle, DO        empagliflozin (JARDIANCE) tablet 10 mg  10 mg Oral Daily Geofm Decree, DO        sevelamer (RENVELA) tablet 1,600 mg  1,600 mg Oral TID WC Geofm Decree, DO   1,600 mg at 03/05/23 1842    metoprolol succinate (TOPROL XL) extended release tablet 50 mg  50 mg Oral Daily Geofm Decree, DO   50 mg at 03/05/23 1447    amLODIPine (NORVASC) tablet 5 mg  5 mg Oral Daily Geofm Decree, DO   5 mg at 03/05/23 1447    insulin glargine (LANTUS) injection vial 10 Units  10 Units SubCUTAneous Nightly Geofm Decree, DO        acetaminophen (TYLENOL) tablet 500 mg  500 mg Oral Q6H PRN Geofm Decree, DO   500 mg at 03/05/23 2134    heparin (porcine) injection 5,000 Units  5,000 Units SubCUTAneous 3 times per day Geofm Decree, DO   5,000 Units at 03/06/23 0522    prochlorperazine (COMPAZINE) injection 10 mg  10 mg IntraVENous Q6H PRN Geofm Decree, DO        polyethylene glycol (GLYCOLAX) packet 17 g  17 g Oral Daily PRN Geofm Decree, DO   17 g at 03/05/23 2134    0.9 % sodium chloride infusion   IntraVENous Continuous Geofm Decree, DO 63 mL/hr at 03/05/23 1445 New Bag at 03/05/23 1445    glucose chewable tablet 16 g  4 tablet Oral PRN Geofm Decree, DO        dextrose bolus 10% 125 mL  125 mL IntraVENous PRN Geofm Decree, DO        Or    dextrose bolus 10% 250 mL  250 mL IntraVENous PRN Jyoti Knife, DO        glucagon (rDNA) injection 1 mg  1 mg SubCUTAneous PRN Jyoti Knife, DO        dextrose 10 % infusion   IntraVENous Continuous PRN Jyoti Knife, DO        insulin lispro (HUMALOG) injection vial 0-8 Units  0-8 Units SubCUTAneous TID  Pramod KELECHI Rolle, DO        insulin lispro (HUMALOG) injection vial 0-4 Units  0-4 Units SubCUTAneous Nightly Jyoti Knife, DO        albuterol (PROVENTIL) nebulizer solution 2.5 mg  2.5 mg Nebulization Q4H PRN Jyoti Knife, DO        And    ipratropium (ATROVENT) 0.02 % nebulizer solution 0.5 mg  0.5 mg Nebulization Q4H PRN Jyoti Knife, DO        sodium zirconium cyclosilicate (LOKELMA) oral suspension 5 g  5 g Oral Daily Jyoti Knife, DO   5 g at 23 1842    HYDROmorphone HCl PF (DILAUDID) injection 0.5 mg  0.5 mg IntraVENous Q8H PRN Derik Webster, DO   0.5 mg at 23 0522        SocHx:  Social History     Socioeconomic History    Marital status: Single     Spouse name: Not on file    Number of children: Not on file    Years of education: Not on file    Highest education level: Not on file   Occupational History    Not on file   Tobacco Use    Smoking status: Former     Packs/day: 1.00     Years: 30.00     Pack years: 30.00     Types: Cigarettes     Quit date: 2021     Years since quittin.7    Smokeless tobacco: Never   Vaping Use    Vaping Use: Never used   Substance and Sexual Activity    Alcohol use: No    Drug use: No    Sexual activity: Not on file   Other Topics Concern    Not on file   Social History Narrative    Not on file     Social Determinants of Health     Financial Resource Strain: Not on file   Food Insecurity: Not on file   Transportation Needs: Not on file   Physical Activity: Not on file   Stress: Not on file   Social Connections: Not on file   Intimate Partner Violence: Not on file   Housing Stability: Not on file       FamHx:  Family History   Problem Relation Age of Onset Kidney Disease Sister     Diabetes Mother     Diabetes Father        Allergy:  Allergies   Allergen Reactions    Other Other (See Comments)     environmental         ROS: As described in the HPI and in addition is negative upon detailed review of systems or unobtainable unless otherwise stated in this dictation. PE:  /72   Pulse 84   Temp 97.5 °F (36.4 °C) (Oral)   Resp 18   Wt 207 lb (93.9 kg)   SpO2 94%   BMI 32.42 kg/m²     Gen.: NAD/-American male. AAOx3  Head: Atraumatic/normocephalic  Eyes: EOMI/anicteric sclera  ENT: Moist oral mucosa/no discharge no seizures  Neck: Supple with trachea midline  Chest: Symmetric excursions. Tunneled HD catheter L  Cor: Regular  Abd.: Soft and obese. Nontender  Extr.:  Trace lower extremity edema  Muscles:  Tone and bulk, consistent with age and condition  Skin: Warm and dry/anicteric        DATA:     Lab Results   Component Value Date/Time    WBC 6.6 03/06/2023 04:23 AM    RBC 4.24 03/06/2023 04:23 AM    HGB 12.2 03/06/2023 04:23 AM    HCT 38.5 03/06/2023 04:23 AM    MCV 90.8 03/06/2023 04:23 AM    MCH 28.8 03/06/2023 04:23 AM    MCHC 31.7 03/06/2023 04:23 AM    RDW 15.5 03/06/2023 04:23 AM     03/06/2023 04:23 AM    MPV 10.5 03/06/2023 04:23 AM     Lab Results   Component Value Date/Time     03/06/2023 04:23 AM    K 4.8 03/06/2023 04:23 AM    K 4.8 03/05/2023 08:11 AM    CL 90 03/06/2023 04:23 AM    CO2 26 03/06/2023 04:23 AM    BUN 58 03/06/2023 04:23 AM    CREATININE 12.0 03/06/2023 04:23 AM    CALCIUM 8.9 03/06/2023 04:23 AM    PROT 7.0 03/06/2023 04:23 AM    LABALBU 3.1 03/06/2023 04:23 AM    BILITOT 0.3 03/06/2023 04:23 AM    ALKPHOS 60 03/06/2023 04:23 AM    AST 12 03/06/2023 04:23 AM    ALT 11 03/06/2023 04:23 AM     Lab Results   Component Value Date/Time    LIPASE 193 03/06/2023 04:23 AM     Lab Results   Component Value Date/Time    AMYLASE 138 08/16/2022 02:08 PM         ASSESSMENT/PLAN:  Patient Active Problem List Diagnosis    Hallux valgus, acquired    DMII (diabetes mellitus, type 2) (HCC)    HTN (hypertension)    Lumbar radicular pain    Spinal stenosis    B12 deficiency    Idiopathic acute pancreatitis    Acute pancreatitis without necrosis or infection, unspecified    Pneumonia    Respiratory failure (HCC)    Acute respiratory failure with hypoxia (HCC)    Hyperkalemia    Paroxysmal atrial fibrillation (HCC)    ESRD on hemodialysis ([facility]    Acute pancreatitis    Encounter regarding vascular access for dialysis for end-stage renal disease ([facility]    Acute recurrent pancreatitis     1. Pancreatitis  -Acute/recurrent  -Nonobstructive liver profile  -Will require further evaluation with endoscopic ultrasound  -Patient tolerating diet -consider EUS as outpatient    2. Anemia  -Mild  -Obtain iron studies and FOBT    3. End-stage renal disease  -Per admitting/pertinent consultants    Thank you for the opportunity to see this patient in consultation      David Ramirez MD  3/6/2023  11:05 AM    NOTE:  This report was transcribed using voice recognition software. Every effort was made to ensure accuracy; however, inadvertent computerized transcription errors may be present.

## 2023-03-06 NOTE — CARE COORDINATION
Case Management Assessment  Initial Evaluation    Date/Time of Evaluation: 3/6/2023 2:07 PM  Assessment Completed by: Misty Coon RN    If patient is discharged prior to next notation, then this note serves as note for discharge by case management. Patient Name: Sarah Medley                   YOB: 1955  Diagnosis: Acute recurrent pancreatitis [K85.90]                   Date / Time: 3/5/2023  7:38 AM    Patient Admission Status: Inpatient   Readmission Risk (Low < 19, Mod (19-27), High > 27): Readmission Risk Score: 17.9    Current PCP: Allyson Bragg, DO  PCP verified by CM? Yes    Chart Reviewed: Yes      History Provided by: Patient  Patient Orientation: Alert and Oriented    Patient Cognition: Alert    Hospitalization in the last 30 days (Readmission):  No    If yes, Readmission Assessment in CM Navigator will be completed. Advance Directives:      Code Status: Full Code   Patient's Primary Decision Maker is:      Primary Decision Maker: Alley Whitaker - Brother/Sister - [phone]    Secondary Decision Maker: Diandra Harris - Brother/Sister - [phone]    Discharge Planning:    Patient lives with: Alone Type of Home: Apartment  Primary Care Giver: Self  Patient Support Systems include: Family Members     Current services prior to admission: Other (Comment) (dialysis)                      Type of Home Care services:  None    ADLS  Prior functional level: Independent in ADLs/IADLs  Current functional level: Independent in ADLs/IADLs      Family can provide assistance at DC: Yes  Would you like Case Management to discuss the discharge plan with any other family members/significant others, and if so, who?  No  Plans to Return to Present Housing: Yes    Potential Assistance needed at discharge: N/A           Patient expects to discharge to: Apartment      Financial    Payor: Char Monterroso / Plan: Alexis Finders / Product Type: *No Product type* /       Potential assistance Purchasing Medications: No  Meds-to-Beds request:        [address] - [place]), [place] - [address] [employer] [phone] Terry Eaton [phone]  [address] [facility]  Charisse Quinteros) [address]  Phone: [phone] Fax: [phone]      Notes:    Factors facilitating achievement of predicted outcomes: Family support, Motivated, and Cooperative    Barriers to discharge: Medical complications    Additional Case Management Notes: Met with patient for transition of care planning. Pt lives alone, he is independent at baseline, drives. Attends HD at [facility] on MWF, drives himself there. Uses oxygen thru [employer]. Pt denies any discharge needs.     The Plan for Transition of Care is related to the following treatment goals of Acute recurrent pancreatitis [K85.90]        Cori Marion RN  Case Management Department  Ph: [phone]

## 2023-03-06 NOTE — FLOWSHEET NOTE
03/06/23 1805   Vital Signs   /63   Temp 98 °F (36.7 °C)   Heart Rate 63   Resp 18   Weight 198 lb 6.6 oz (90 kg)   Weight Method Bed scale   Percent Weight Change -3.23   Pain Assessment   Pain Assessment None - Denies Pain   Pain Level 0   Post-Hemodialysis Assessment   Machine Disinfection Process Exterior Machine Disinfection   Rinseback Volume (ml) 300 ml   Blood Volume Processed (Liters) 64.1 l/min   Dialyzer Clearance Lightly streaked   Duration of Treatment (minutes) 180 minutes   Heparin Amount Administered During Treatment (mL) 0 mL   Hemodialysis Intake (ml) 300 ml   Hemodialysis Output (ml) 2300 ml   NET Removed (ml) 2000   Tolerated Treatment Good   Patient Response to Treatment Tolerated well; 2 L net Fluid removed; BVP 64.1 L; post VSS; report called to Lyric Lopez RN   Bilateral Breath Sounds Diminished   Edema None   Physician Notified No   Time Off 3956   Patient Disposition Return to room

## 2023-03-07 VITALS
WEIGHT: 198.41 LBS | TEMPERATURE: 98.3 F | SYSTOLIC BLOOD PRESSURE: 108 MMHG | HEART RATE: 73 BPM | BODY MASS INDEX: 31.08 KG/M2 | OXYGEN SATURATION: 97 % | RESPIRATION RATE: 20 BRPM | DIASTOLIC BLOOD PRESSURE: 66 MMHG

## 2023-03-07 LAB
ALBUMIN SERPL-MCNC: 3 G/DL (ref 3.5–5.2)
ALP BLD-CCNC: 58 U/L (ref 40–129)
ALT SERPL-CCNC: 10 U/L (ref 0–40)
ANION GAP SERPL CALCULATED.3IONS-SCNC: 12 MMOL/L (ref 7–16)
AST SERPL-CCNC: 12 U/L (ref 0–39)
BASOPHILS ABSOLUTE: 0.02 E9/L (ref 0–0.2)
BASOPHILS RELATIVE PERCENT: 0.4 % (ref 0–2)
BILIRUB SERPL-MCNC: 0.3 MG/DL (ref 0–1.2)
BUN BLDV-MCNC: 31 MG/DL (ref 6–23)
CALCIUM SERPL-MCNC: 8.4 MG/DL (ref 8.6–10.2)
CHLORIDE BLD-SCNC: 92 MMOL/L (ref 98–107)
CO2: 26 MMOL/L (ref 22–29)
CREAT SERPL-MCNC: 8.4 MG/DL (ref 0.7–1.2)
EOSINOPHILS ABSOLUTE: 0.25 E9/L (ref 0.05–0.5)
EOSINOPHILS RELATIVE PERCENT: 5 % (ref 0–6)
FERRITIN: 515 NG/ML
FOLATE: 9.3 NG/ML (ref 4.8–24.2)
GFR SERPL CREATININE-BSD FRML MDRD: 6 ML/MIN/1.73
GLUCOSE BLD-MCNC: 192 MG/DL (ref 74–99)
HCT VFR BLD CALC: 37.4 % (ref 37–54)
HEMOGLOBIN: 12.2 G/DL (ref 12.5–16.5)
IMMATURE GRANULOCYTES #: 0.01 E9/L
IMMATURE GRANULOCYTES %: 0.2 % (ref 0–5)
IRON SATURATION: 26 % (ref 20–55)
IRON: 44 MCG/DL (ref 59–158)
LIPASE: 198 U/L (ref 13–60)
LYMPHOCYTES ABSOLUTE: 1.33 E9/L (ref 1.5–4)
LYMPHOCYTES RELATIVE PERCENT: 26.7 % (ref 20–42)
MCH RBC QN AUTO: 29.8 PG (ref 26–35)
MCHC RBC AUTO-ENTMCNC: 32.6 % (ref 32–34.5)
MCV RBC AUTO: 91.2 FL (ref 80–99.9)
METER GLUCOSE: 126 MG/DL (ref 74–99)
METER GLUCOSE: 168 MG/DL (ref 74–99)
METER GLUCOSE: 239 MG/DL (ref 74–99)
MONOCYTES ABSOLUTE: 0.67 E9/L (ref 0.1–0.95)
MONOCYTES RELATIVE PERCENT: 13.5 % (ref 2–12)
NEUTROPHILS ABSOLUTE: 2.7 E9/L (ref 1.8–7.3)
NEUTROPHILS RELATIVE PERCENT: 54.2 % (ref 43–80)
PDW BLD-RTO: 15.8 FL (ref 11.5–15)
PLATELET # BLD: 148 E9/L (ref 130–450)
PMV BLD AUTO: 10.3 FL (ref 7–12)
POTASSIUM SERPL-SCNC: 4.6 MMOL/L (ref 3.5–5)
RBC # BLD: 4.1 E12/L (ref 3.8–5.8)
SODIUM BLD-SCNC: 130 MMOL/L (ref 132–146)
TOTAL IRON BINDING CAPACITY: 171 MCG/DL (ref 250–450)
TOTAL PROTEIN: 6.9 G/DL (ref 6.4–8.3)
VITAMIN B-12: 466 PG/ML (ref 211–946)
WBC # BLD: 5 E9/L (ref 4.5–11.5)

## 2023-03-07 PROCEDURE — 82728 ASSAY OF FERRITIN: CPT

## 2023-03-07 PROCEDURE — 83540 ASSAY OF IRON: CPT

## 2023-03-07 PROCEDURE — 82962 GLUCOSE BLOOD TEST: CPT

## 2023-03-07 PROCEDURE — 83550 IRON BINDING TEST: CPT

## 2023-03-07 PROCEDURE — 6360000002 HC RX W HCPCS: Performed by: INTERNAL MEDICINE

## 2023-03-07 PROCEDURE — 85025 COMPLETE CBC W/AUTO DIFF WBC: CPT

## 2023-03-07 PROCEDURE — 6370000000 HC RX 637 (ALT 250 FOR IP): Performed by: INTERNAL MEDICINE

## 2023-03-07 PROCEDURE — 80053 COMPREHEN METABOLIC PANEL: CPT

## 2023-03-07 PROCEDURE — 82607 VITAMIN B-12: CPT

## 2023-03-07 PROCEDURE — 83690 ASSAY OF LIPASE: CPT

## 2023-03-07 PROCEDURE — 36415 COLL VENOUS BLD VENIPUNCTURE: CPT

## 2023-03-07 PROCEDURE — 82746 ASSAY OF FOLIC ACID SERUM: CPT

## 2023-03-07 RX ADMIN — INSULIN LISPRO 2 UNITS: 100 INJECTION, SOLUTION INTRAVENOUS; SUBCUTANEOUS at 12:37

## 2023-03-07 RX ADMIN — SEVELAMER CARBONATE 1600 MG: 800 TABLET, FILM COATED ORAL at 08:13

## 2023-03-07 RX ADMIN — SODIUM ZIRCONIUM CYCLOSILICATE 5 G: 5 POWDER, FOR SUSPENSION ORAL at 08:13

## 2023-03-07 RX ADMIN — HEPARIN SODIUM 5000 UNITS: 5000 INJECTION INTRAVENOUS; SUBCUTANEOUS at 05:31

## 2023-03-07 ASSESSMENT — PAIN SCALES - GENERAL: PAINLEVEL_OUTOF10: 0

## 2023-03-07 NOTE — PROGRESS NOTES
PROGRESS NOTE  By Carl Guerrero M.D. [facility]  Dr. Brad Servin M.D.,  Dr. Eveline Finney M.D.,   Dr. Juan Garcia D.O.,  Dr. Isaiah Cano M.D.,  Dr. Ev Kemp D.O.,          Ingris Lesser  74 y.o.  male    SUBJECTIVE:  Denies abdominal pain. Tolerates diet    OBJECTIVE:    /66   Pulse 73   Temp 98.3 °F (36.8 °C) (Oral)   Resp 20   Wt 198 lb 6.6 oz (90 kg)   SpO2 97%   BMI 31.08 kg/m²     General: NAD/-American male  HEENT: Anicteric sclera/moist oral mucosa  Neck: Supple/trach midline  Chest: Symmetric excursion/labored respirations  Abd.: Soft and obese.   Nontender  Extr.:  Decreased muscle tone and bulk, consistent with age and condition  Skin: Warm and dry      DATA:     Lab Results   Component Value Date/Time    WBC 5.0 03/07/2023 03:56 AM    RBC 4.10 03/07/2023 03:56 AM    HGB 12.2 03/07/2023 03:56 AM    HCT 37.4 03/07/2023 03:56 AM    MCV 91.2 03/07/2023 03:56 AM    MCH 29.8 03/07/2023 03:56 AM    MCHC 32.6 03/07/2023 03:56 AM    RDW 15.8 03/07/2023 03:56 AM     03/07/2023 03:56 AM    MPV 10.3 03/07/2023 03:56 AM     Lab Results   Component Value Date/Time     03/07/2023 03:56 AM    K 4.6 03/07/2023 03:56 AM    K 4.8 03/05/2023 08:11 AM    CL 92 03/07/2023 03:56 AM    CO2 26 03/07/2023 03:56 AM    BUN 31 03/07/2023 03:56 AM    CREATININE 8.4 03/07/2023 03:56 AM    CALCIUM 8.4 03/07/2023 03:56 AM    PROT 6.9 03/07/2023 03:56 AM    LABALBU 3.0 03/07/2023 03:56 AM    BILITOT 0.3 03/07/2023 03:56 AM    ALKPHOS 58 03/07/2023 03:56 AM    AST 12 03/07/2023 03:56 AM    ALT 10 03/07/2023 03:56 AM     Lab Results   Component Value Date/Time    LIPASE 198 03/07/2023 03:56 AM     Lab Results   Component Value Date/Time    AMYLASE 138 08/16/2022 02:08 PM         ASSESSMENT/PLAN:  Patient Active Problem List   Diagnosis    Hallux valgus, acquired    DMII (diabetes mellitus, type 2) ([facility] [address]    HTN (hypertension)    Lumbar radicular pain    Spinal stenosis    B12 deficiency    Idiopathic acute pancreatitis    Acute pancreatitis without necrosis or infection, unspecified    Pneumonia    Respiratory failure (HCC)    Acute respiratory failure with hypoxia (HCC)    Hyperkalemia    Paroxysmal atrial fibrillation (HCC)    ESRD on hemodialysis ([facility] [address]    Acute pancreatitis    Encounter regarding vascular access for dialysis for end-stage renal disease ([facility] [address]    Acute recurrent pancreatitis     1. Pancreatitis  -Acute/recurrent  -Nonobstructive liver profile  -Will require further evaluation with endoscopic ultrasound  -Patient tolerating diet -consider EUS as outpatient     2. Anemia  -Mild  -Obtain iron studies and FOBT     3. End-stage renal disease  -Per admitting/pertinent consultants    Okay to be discharged from GI POV. Follow-up in 2 weeks after discharge -patient to call for appointment and with questions/concerns at [phone]. Thank you for the opportunity to participate in the [facility] of [facility]. Lashon Beebe MD  3/7/2023  10:34 AM    NOTE:  This report was transcribed using voice recognition software. Every effort was made to ensure accuracy; however, inadvertent computerized transcription errors may be present.

## 2023-03-07 NOTE — CONSULTS
[address]                                  CONSULTATION    PATIENT NAME: Talisha Knapp                    :        1955  MED REC NO:   [id number]                            ROOM:       0321  ACCOUNT NO:   [de-identified]                           ADMIT DATE: 2023  PROVIDER:     Danae Daugherty MD    CONSULT DATE:  2023    ADMITTING PROVIDER:  Dr. Suresh Alegria. REASON FOR CONSULTATION:  End-stage renal disease. HISTORY OF PRESENT ILLNESS:  The patient is being seen in consultation  at the request of Dr. Suresh Alegria. The patient is a 72-year-old gentleman  with underlying history of end-stage renal disease due to diabetic  nephropathy and who is on maintenance hemodialysis. The patient  presented with abdominal pain which started a day before admission, and  it was non-radiating. The patient could not sleep due to the abdominal  pain. The patient does have a prior history of pancreatitis so he was  asked to come to the hospital.  The patient's serum lipase was 450 with  normal transaminases. When seen up on the floor, he is feeling much  better, much less abdominal pain. CT scan of the abdomen and pelvis  showed persistent pattern of mild edematous pancreatitis throughout,  which is similar to studies back in 2022 and 10/2022. The patient  has had three episodes of pancreatitis in the past.  The patient also  had an MRCP in the past which showed no obvious etiology of  pancreatitis. PAST MEDICAL HISTORY:  Significant for end-stage renal disease secondary  to microvascular disease with diabetic nephropathy, hypertension, type 2  diabetes mellitus, hyperlipidemia, history of sepsis secondary to  infected dialysis catheter, chronic back pain, secondary  hyperparathyroidism due to renal insufficiency/renal osteodystrophy,  anemia of chronic kidney disease.     PAST SURGICAL HISTORY:  Past surgical history is significant for a  recent surgery for AV fistula placement in the right arm on 02/23/2023. He has a history of placement and removal of tunneled hemodialysis  catheter. He has history of bilateral shoulder surgery. He has had  bunionectomy of the left foot. ALLERGIES:  The patient has no known drug allergies. MEDICATIONS:  At home include pantoprazole, Omega-3 fish oil, Veltassa,  amlodipine, insulin, sevelamer, supplemental oxygen, and calcitriol. The patient's current medications in the hospital:  The patient is on  empagliflozin 10 mg daily, Rocaltrol 1.5 mcg three times a week,  amlodipine 5 mg daily, heparin 5000 units subcutaneously every 8 hours,  Lantus insulin 10 units at bedtime, Humalog insulin on a sliding scale,  metoprolol succinate 50 mg daily, sevelamer 1600 mg t.i.d. with meals,  Lokelma 5 gm daily. The patient is on 0.9 normal saline at 50 mL an  hour. SOCIAL HISTORY:  The patient denies any tobacco or alcohol use. He is a  former smoker. He quit smoking in 06/2021. FAMILY HISTORY:  Family history is positive chronic kidney disease in  his sister and diabetes mellitus in both his parents. REVIEW OF SYSTEMS:  Review of systems negative for any fever or chills. No cough or wheezing. No chest pain or palpitations. He has had  abdominal pain, but no nausea, vomiting, or diarrhea. No dysuria. He  has limited urine output. Rest of the review of systems is negative. PHYSICAL EXAMINATION:  GENERAL:  The patient is awake and alert, in no acute distress. VITAL SIGNS:  Blood pressure is 118/72, pulse is 84, respiratory rate is  18, and temperature 97.5 degrees Fahrenheit. HEENT:  Head is normocephalic and atraumatic. Eyes:  Sclerae are  nonicteric. Pupils are equal and reactive to light and accommodation. Fundus examination deferred. Mouth and throat:  Dry oral mucosa without  any mucosal ulcerations or exudates. NECK:  Supple. No distention.   No carotid bruits. No palpable masses. CHEST:  Symmetrical.  LUNGS:  Vesicular breath sounds. Breath sounds are decreased at the  bases. No rales or rhonchi. HEART:  Regular rate and rhythm without any pericardial rub or gallop. ABDOMEN:  Abdomen is soft with mild epigastric tenderness. No rebound  tenderness. Normoactive bowel sounds. EXTREMITIES:  The patient has no pedal edema. AV fistula in the right  arm with a bruit and thrill. CHEST:  Tunneled hemodialysis catheter is present in the left  infraclavicular region with no drainage at this site. LABORATORY DATA:  Lab data from today; potassium is 4.8 mmol/L, lipase  is 193 units per liter. Hemoglobin 12.2 gm/dL. IMPRESSION:  1. Chronic kidney disease, stage G5/ESRD. We will follow the patient  for dialytic support. 2.  Hypertension with chronic kidney disease, stage G5. Hypertension  control is good with blood pressure at target less than 140/90 mmHg. 3.  Anemia of chronic kidney disease. Hemoglobin is good and at target. No indication for use of MANUEL. 4.  Renal osteodystrophy. We will follow calcium and phosphorus levels. 5.  Acute pancreatitis. We will defer further management to GI service. PLAN:  Plan is to follow the patient for dialytic support. We will  follow hemoglobin and hematocrit and calcium and phosphorus levels. Rest of plans per orders. Thank you for allowing me to participate in the care of this patient. We will follow the patient with you.         Nila Concepcion MD    D: 03/06/2023 15:09:45       T: 03/06/2023 18:35:18     [email or web address]  Job#: [id number]     Doc#: [id number]    CC:

## 2023-03-07 NOTE — PROGRESS NOTES
Nephrology Note  Christi Long MD          Patient seen and examined. No family member is present at bedside. Chart reviewed. Gastroenterology notes reviewed. Patient is feeling better. He is status his abdominal pain has resolved. Tolerating diet. Denies shortness of breath. Appetite good. No nausea or dysguesia. Awake and alert . In no acute distress. Vital SignsBP 108/66   Pulse 73   Temp 98.3 °F (36.8 °C) (Oral)   Resp 20   Wt 198 lb 6.6 oz (90 kg)   SpO2 97%   BMI 31.08 kg/m²   24HR INTAKE/OUTPUT:    Intake/Output Summary (Last 24 hours) at 3/7/2023 1111  Last data filed at 3/7/2023 0901  Gross per 24 hour   Intake 720 ml   Output 2300 ml   Net -1580 ml         Physical Exam    Neck: No JVD  Lungs: Breath sounds decreased at the bases. No rales or ronchi. Heart: Regular rate and rhythm. No S3 gallop. No  murmrur. Abdomen: Soft non distended, non tender and normal bowel sounds. Extremeties: No edema.            Current Facility-Administered Medications   Medication Dose Route Frequency Provider Last Rate Last Admin    calcitRIOL (ROCALTROL) capsule 1.5 mcg  1.5 mcg Oral Once per day on  Pramod Rolle, DO   1.5 mcg at 23 1903    empagliflozin (JARDIANCE) tablet 10 mg  10 mg Oral Daily Evgeny Oro, DO   10 mg at 23 190    sevelamer (RENVELA) tablet 1,600 mg  1,600 mg Oral TID  Evgeny Oro, DO   1,600 mg at 23 0813    metoprolol succinate (TOPROL XL) extended release tablet 50 mg  50 mg Oral Daily Evgeny Oro, DO   50 mg at 23 190    amLODIPine (NORVASC) tablet 5 mg  5 mg Oral Daily Evgeny Oro, DO   5 mg at 23    insulin glargine (LANTUS) injection vial 10 Units  10 Units SubCUTAneous Nightly Evgeny Oro, DO   10 Units at 23    acetaminophen (TYLENOL) tablet 500 mg  500 mg Oral Q6H PRN Evgeny Oro DO   500 mg at 23    heparin (porcine) injection 5,000 Units  5,000 Units SubCUTAneous 3 times per day Yanna Haw, DO   5,000 Units at 03/07/23 0531    prochlorperazine (COMPAZINE) injection 10 mg  10 mg IntraVENous Q6H PRN Yanna Kenneth, DO        polyethylene glycol (GLYCOLAX) packet 17 g  17 g Oral Daily PRN Yanna Haw, DO   17 g at 03/06/23 2147    glucose chewable tablet 16 g  4 tablet Oral PRN Yanna Haw, DO        dextrose bolus 10% 125 mL  125 mL IntraVENous PRN Yanna Haw, DO        Or    dextrose bolus 10% 250 mL  250 mL IntraVENous PRN Yanna Haw, DO        glucagon (rDNA) injection 1 mg  1 mg SubCUTAneous PRN Yannapolo Cooper, DO        dextrose 10 % infusion   IntraVENous Continuous PRN Yannapolo Cooper, DO        insulin lispro (HUMALOG) injection vial 0-8 Units  0-8 Units SubCUTAneous TID WC Yanna Kenneth, DO   4 Units at 03/06/23 1150    insulin lispro (HUMALOG) injection vial 0-4 Units  0-4 Units SubCUTAneous Nightly Yanna Kenneth, DO        albuterol (PROVENTIL) nebulizer solution 2.5 mg  2.5 mg Nebulization Q4H PRN Yannapolo Cooper, DO        And    ipratropium (ATROVENT) 0.02 % nebulizer solution 0.5 mg  0.5 mg Nebulization Q4H PRN Yanna Haw, DO        sodium zirconium cyclosilicate (LOKELMA) oral suspension 5 g  5 g Oral Daily Yannapolo Cooper, DO   5 g at 03/07/23 0813    HYDROmorphone HCl PF (DILAUDID) injection 0.5 mg  0.5 mg IntraVENous Q8H PRN Kyleigh , DO   0.5 mg at 03/06/23 1920       US ABDOMEN LIMITED   Final Result   Diffuse fatty infiltration of the liver. The pancreas is suboptimally visualized. CT ABDOMEN PELVIS W IV CONTRAST Additional Contrast? Oral   Final Result   1. There is a persistent pattern of mild edematous pancreatitis throughout   the pancreas as observed on previous studies of October, July in June 2022. intermittent/recurrent pancreatitis is considered. Cannot exclude the   possibility for ongoing active pancreatitis in mild degree on chronic basis.       2.  No indication for small-bowel obstruction. Labs:    CBC:   Recent Labs     03/05/23  0811 03/06/23  0423 03/07/23  0356   WBC 7.6 6.6 5.0   HGB 13.8 12.2* 12.2*    200 148        Lab Results   Component Value Date    IRON 19 (L) 02/09/2020    TIBC 189 (L) 02/09/2020    FERRITIN 188 11/13/2019       Lab Results   Component Value Date    PTH 98 (H) 06/21/2022     (H) 01/23/2020    PTH 97 (H) 10/02/2019    CALCIUM 8.4 (L) 03/07/2023    CALCIUM 8.9 03/06/2023    CALCIUM 10.0 03/05/2023    CAION 1.25 06/22/2022    CAION 1.26 06/21/2022    CAION 1.23 02/09/2020    PHOS 5.4 (H) 03/06/2023    PHOS 5.0 (H) 06/22/2022    PHOS 4.8 (H) 06/21/2022    MG 2.3 03/06/2023    MG 2.6 06/22/2022    MG 2.5 06/21/2022       BMP:   Recent Labs     03/05/23  0811 03/06/23  0423 03/07/23  0356    133 130*   K 4.8 4.8 4.6   CL 91* 90* 92*   CO2 27 26 26   BUN 53* 58* 31*   CREATININE 11.2* 12.0* 8.4*   GLUCOSE 102* 71* 192*             Assessment: / Plan:          1. Chronic kidney disease stage G5/ESRD. Continue dialytic support. 2   Hypertension with Chronic Kidney Disease Stage G5/ESRD. Hypertension  control is  fair with blood pressure at target of less than 130/80 mmHg. Will continue current medications. 3.   Anemia secondary to chronic kidney disease. Target hemoglobin is 10 g/dL. Continue MANUEL and adjust dosage. 4.  Renal Osteodystrophy. Follow calcium phosphorus and PTH levels. Calcium and phosphorus levels adequate. 5.  Acute on chronic pancreatitis. Will defer further management to GI service. Okay for discharge from a purely renal standpoint. Kings Da Silva MD  Nephrology  Electronically signed by Kings Da Silva MD on 3/7/2023 at 10:44 AM    Note: This report was completed utilizing computer voice recognition software. Every effort has been made to ensure accuracy, however; inadvertent computerized transcription errors may be present.

## 2023-03-07 NOTE — DISCHARGE INSTRUCTIONS
Your information:  Name: Sharona Vigil  : 1955    Your instructions:  Discharge home    Signs and symptoms to watch out for: The main symptom is pain in the upper belly. The pain can be severe. You also may have a fever, nausea, or vomiting. Some people get so sick that they have problems breathing. They also may have low blood pressure. What to do after you leave the hospital:    Recommended diet:  Low fat    Recommended activity: activity as tolerated        The following personal items were collected during your admission and were returned to you:    Belongings  Dental Appliances: None  Vision - Corrective Lenses: Eyeglasses  Hearing Aid: None  Clothing: Jacket/Coat, Footwear, Pants, Shirt  Jewelry: None  Body Piercings Removed: No  Electronic Devices: Cell Phone,   Other Valuables: Ella Anthony  Home Medications: Sent home  Valuables Given To: Patient  Patient approves for provider to speak to responsible person post operatively: Yes    Information obtained by:  By signing below, I understand that if any problems occur once I leave the hospital I am to contact Dr. Cm Jung. I understand and acknowledge receipt of the instructions indicated above.

## 2023-03-07 NOTE — ACP (ADVANCE CARE PLANNING)
Advance Care Planning   Healthcare Decision Maker:    Primary Decision Maker: Vanessa Nichole - Brother/Sister - [phone]    Secondary Decision Maker: Elke Desir - Brother/Sister - [phone]

## 2023-03-07 NOTE — PROGRESS NOTES
Department of Internal Medicine        CHIEF COMPLAINT: Upper mid abdominal pain    Reason for Admission: Recurrent pancreatitis    HISTORY OF PRESENT ILLNESS:      The patient is a 74 y.o. male who presents with central abdominal pain which started yesterday. The pain is nonradiating. Pain is severe. Patient had the pain throughout most yesterday and Tylenol seem to help and when he went to sleep the pain seemed subside but he woke up the pain was recurring. Patient has occasional radiation of pain to his back. Patient does have history of constipation and has not had a bowel movement for 4 days. The patient denies any fever/chills, unusual shortness of breath, chest pain, nausea/vomiting. Serum lipase is 450 with normal transaminases. Patient has chronic renal failure with a CO2 electrolytes of 27. WBC 7.6 hemoglobin 13.8. Temperature was 98.4 with heart rate 76 blood pressure 130/67 O2 sat 95% on room air at rest.  CT of the abdomen pelvis showed persistent pattern of mild edematous pancreatitis throughout which is similar to studies back in June and October. Patient has had a history of pancreatitis 3 other episodes in the past.  These are usually short cases that resolved quickly. Patient had an MRCP in the past which showed no obvious etiology of the pancreatitis. CT of the abdomen was in the past have been unremarkable. Patient denies any alcohol abuse and denies any illicit drug abuse. 3/6/2023  Patient seen examined on medical surgical floor. Patient denies any abdominal pain or back pain today. There is no nausea vomiting or unusual shortness of breath. Electrolytes essentially normal BUN/creatinine 58/12.0. Procalcitonin was 11.8 with blood sugars ranging 71-94. Transaminases normal with lipase improved to 193. WBC 6.6 with hemoglobin 12.2. Temperature 97.5 heart rate 84 blood pressure 118/72.   O2 sat 94% room air at rest.  Gallbladder ultrasound did not show any evidence of cholecystitis or cholelithiasis. With recurrent pancreatitis we will consult GI for further input. We will increase the diet. 3/7/2023  Patient seen and examined on medical surgical floor. Patient denies any abdominal or back pain. Patient is tolerating his diet without problems. He denies any nausea/vomiting or unusual shortness of breath. BUN/creatinine 31/8.4. Blood sugars range 168-269. Liver enzymes normal with a lipase this still 198. WBC is 5.0 hemoglobin 12.2. NKECHI was negative temperature is 98.4 with heart rate 81 blood pressure 112/61. O2 sat 95% on room air at rest.  GI note reviewed and patient to have probable outpatient versus inpatient endoscopic ultrasound of pancreas. Discharge home when okay with GI. Past Medical History:    Past Medical History:   Diagnosis Date    Back pain     Diabetes mellitus ([facility]     DMII (diabetes mellitus, type 2) ([facility] 7/28/2015    Hemodialysis patient ([facility]     History of cardiovascular stress test 2/16/2015    lexiscan    HTN (hypertension) 7/28/2015    Hyperlipidemia     Hypertension     Lumbar radicular pain 7/28/2015    Oxygen dependent     wears 2 liters at home    Type II or unspecified type diabetes mellitus without mention of complication, not stated as uncontrolled      Past Surgical History:    Past Surgical History:   Procedure Laterality Date    DIALYSIS FISTULA CREATION Right 2/23/2023    AV FISTULA CREATION RIGHT ARM performed by Maggie Patel MD at [facility] [address] Left 06/06/14    cain bunionectomy left foot with osteomed screw x1    SHOULDER SURGERY      Bilateral    VEIN SURGERY         Medications Prior to Admission:    @  Prior to Admission medications    Medication Sig Start Date End Date Taking?  Authorizing Provider   pantoprazole (PROTONIX) 40 MG tablet Take 1 tablet by mouth daily for 20 days 10/27/22 1/24/23  Chela Alejandre MD   Central-3 1000 MG CAPS Take by mouth 2 times daily    Historical Provider, MD willis sorbitex calcium (VELTASSA) 8.4 g PACK packet Take 1 packet by mouth Not on dialysis days 21   Historical Provider, MD   metoprolol succinate (TOPROL XL) 50 MG extended release tablet Take 1 tablet by mouth daily 21   Severo Martinez, DO   amLODIPine (NORVASC) 10 MG tablet Take 5 mg by mouth daily 21   Severo Martinez, DO   insulin glargine (LANTUS) 100 UNIT/ML injection vial Inject 34 Units into the skin nightly 21   Esteban, DO   empagliflozin (JARDIANCE) 10 MG tablet Take 10 mg by mouth daily    Historical Provider, MD   sevelamer (RENVELA) 800 MG tablet Take 2 tablets by mouth 3 times daily (with meals)    Historical Provider, MD   OXYGEN Inhale 2 L into the lungs as needed     Historical Provider, MD   iron sucrose (VENOFER) 20 MG/ML injection Infuse 50 mg intravenously once a week Friday    Historical Provider, MD   calcitRIOL (ROCALTROL) 0.25 MCG capsule Take 1.5 mcg by mouth three times a week Monday, Wednesday, Friday    Historical Provider, MD       Allergies:   Other    Social History:   Social History     Socioeconomic History    Marital status: Single     Spouse name: Not on file    Number of children: Not on file    Years of education: Not on file    Highest education level: Not on file   Occupational History    Not on file   Tobacco Use    Smoking status: Former     Packs/day: 1.00     Years: 30.00     Pack years: 30.00     Types: Cigarettes     Quit date: 2021     Years since quittin.7    Smokeless tobacco: Never   Vaping Use    Vaping Use: Never used   Substance and Sexual Activity    Alcohol use: No    Drug use: No    Sexual activity: Not on file   Other Topics Concern    Not on file   Social History Narrative    Not on file     Social Determinants of Health     Financial Resource Strain: Not on file   Food Insecurity: Not on file   Transportation Needs: Not on file   Physical Activity: Not on file   Stress: Not on file   Social Connections: Not on file   Intimate Partner Violence: Not on file   Housing Stability: Not on file       Family History:   Family History   Problem Relation Age of Onset    Kidney Disease Sister     Diabetes Mother     Diabetes Father        REVIEW OF SYSTEMS:    Gen: Patient denies any lightheadedness or dizziness. No LOC or syncope. No fevers or chills. HEENT: No earache, sore throat or nasal congestion. Resp: Denies cough, hemoptysis or sputum production. Cardiac: Denies chest pain, SOB, diaphoresis or palpitations. GI: + Mid abdominal pain. No nausea, vomiting, diarrhea or constipation. No melena or hematochezia. : No urinary complaints, dysuria, hematuria or frequency. MSK: No extremity weakness, paralysis or paresthesias. PHYSICAL EXAM:    Vitals:  /61   Pulse 81   Temp 98.4 °F (36.9 °C) (Oral)   Resp 17   Wt 198 lb 6.6 oz (90 kg)   SpO2 95%   BMI 31.08 kg/m²     General:  This is a 74 y.o. yo male who is alert and oriented in mild distress secondary to above  HEENT:  Head is normocephalic and atraumatic, PERRLA, EOMI, mucus membranes moist with no pharyngeal erythema or exudate. Neck:  Supple with no carotid bruits, JVD or thyromegaly. No cervical adenopathy  CV:  Regular rate and rhythm, no murmurs  Lungs:  Clear to auscultation bilaterally with no wheezes, rales or rhonchi  Abdomen:  + Upper mid abdominal tenderness, nondistended, bowel sounds present  Extremities: + AV fistula right upper arm.   No edema, peripheral pulses intact bilaterally  Neuro:  Cranial nerves II-XII grossly intact; motor and sensory function intact with no focal deficits  Skin:  No rashes, lesions or wounds    DATA:  CBC with Differential:    Lab Results   Component Value Date/Time    WBC 5.0 03/07/2023 03:56 AM    RBC 4.10 03/07/2023 03:56 AM    HGB 12.2 03/07/2023 03:56 AM    HCT 37.4 03/07/2023 03:56 AM     03/07/2023 03:56 AM    MCV 91.2 03/07/2023 03:56 AM    MCH 29.8 03/07/2023 03:56 AM    MCHC 32.6 03/07/2023 03:56 AM    RDW 15.8 03/07/2023 03:56 AM    LYMPHOPCT 26.7 03/07/2023 03:56 AM    MONOPCT 13.5 03/07/2023 03:56 AM    BASOPCT 0.4 03/07/2023 03:56 AM    MONOSABS 0.67 03/07/2023 03:56 AM    LYMPHSABS 1.33 03/07/2023 03:56 AM    EOSABS 0.25 03/07/2023 03:56 AM    BASOSABS 0.02 03/07/2023 03:56 AM     CMP:    Lab Results   Component Value Date/Time     03/07/2023 03:56 AM    K 4.6 03/07/2023 03:56 AM    K 4.8 03/05/2023 08:11 AM    CL 92 03/07/2023 03:56 AM    CO2 26 03/07/2023 03:56 AM    BUN 31 03/07/2023 03:56 AM    CREATININE 8.4 03/07/2023 03:56 AM    GFRAA 7 08/16/2022 02:08 PM    LABGLOM 6 03/07/2023 03:56 AM    GLUCOSE 192 03/07/2023 03:56 AM    PROT 6.9 03/07/2023 03:56 AM    LABALBU 3.0 03/07/2023 03:56 AM    CALCIUM 8.4 03/07/2023 03:56 AM    BILITOT 0.3 03/07/2023 03:56 AM    ALKPHOS 58 03/07/2023 03:56 AM    AST 12 03/07/2023 03:56 AM    ALT 10 03/07/2023 03:56 AM     Magnesium:    Lab Results   Component Value Date/Time    MG 2.3 03/06/2023 04:23 AM     Phosphorus:    Lab Results   Component Value Date/Time    PHOS 5.4 03/06/2023 04:23 AM     PT/INR:    Lab Results   Component Value Date/Time    PROTIME 10.8 07/19/2021 11:39 AM    INR 0.9 07/19/2021 11:39 AM     Troponin:    Lab Results   Component Value Date/Time    TROPONINI 0.10 03/21/2021 05:30 AM     U/A:    Lab Results   Component Value Date/Time    COLORU Yellow 10/27/2022 08:59 PM    PROTEINU 100 10/27/2022 08:59 PM    PHUR 7.0 10/27/2022 08:59 PM    LABCAST RARE 09/21/2018 10:30 AM    WBCUA 0-1 10/27/2022 08:59 PM    RBCUA 0-1 10/27/2022 08:59 PM    BACTERIA RARE 10/27/2022 08:59 PM    CLARITYU Clear 10/27/2022 08:59 PM    SPECGRAV 1.010 10/27/2022 08:59 PM    LEUKOCYTESUR Negative 10/27/2022 08:59 PM    UROBILINOGEN 0.2 10/27/2022 08:59 PM    BILIRUBINUR Negative 10/27/2022 08:59 PM    BLOODU MODERATE 10/27/2022 08:59 PM    GLUCOSEU 250 10/27/2022 08:59 PM    AMORPHOUS FEW 10/02/2019 12:25 PM     ABG:    Lab Results Component Value Date/Time    PH 7.503 04/04/2022 02:45 PM    PCO2 41.0 04/04/2022 02:45 PM    PO2 73.5 04/04/2022 02:45 PM    HCO3 31.5 04/04/2022 02:45 PM    BE 7.7 04/04/2022 02:45 PM    O2SAT 94.7 04/04/2022 02:45 PM     HgBA1c:    Lab Results   Component Value Date/Time    LABA1C 8.7 03/05/2023 02:33 PM     FLP:    Lab Results   Component Value Date/Time    TRIG 99 03/06/2023 04:23 AM    HDL 38 03/06/2023 04:23 AM    LDLCALC 80 03/06/2023 04:23 AM    LABVLDL 20 03/06/2023 04:23 AM     TSH:    Lab Results   Component Value Date/Time    TSH 0.716 03/06/2023 04:23 AM     IRON:    Lab Results   Component Value Date/Time    IRON 19 02/09/2020 05:25 AM     LIPASE:    Lab Results   Component Value Date/Time    LIPASE 198 03/07/2023 03:56 AM       ASSESSMENT AND PLAN:      Patient Active Problem List    Diagnosis Date Noted    Acute recurrent pancreatitis 03/05/2023    Encounter regarding vascular access for dialysis for end[facility]stage renal disease [facility] 01/24/2023    Acute pancreatitis 06/20/2022    Hallux valgus, acquired 06/06/2014    Paroxysmal atrial fibrillation ([facility]     ESRD on hemodialysis ([facility]     Hyperkalemia 07/19/2021    Acute respiratory failure with hypoxia ([facility] 06/20/2021    Respiratory failure ([facility] 02/02/2020    Pneumonia 01/28/2020    Acute pancreatitis without necrosis or infection, unspecified 09/23/2018    Idiopathic acute pancreatitis 09/21/2018    B12 deficiency 03/17/2016    DMII (diabetes mellitus, type 2) ([facility] 07/28/2015    HTN (hypertension) 07/28/2015    Lumbar radicular pain 07/28/2015    Spinal stenosis 07/28/2015     Impression:  1. Acute recurrent interstitial pancreatitis  2. Chronic renal failure with hemodialysis  3. Insulin-dependent diabetes mellitus type 2  4. History hypertension  5. History hyperlipidemia  6.   History of prior tobacco abuse with possible underlying COPD with the patient wearing O2 nasal cannula as needed    Plan:  Admit to medical floor  Home medications reviewed  Monitor heart rate, blood pressure, O2 saturations  Heparin 5000 units subcu every 8  Glucoscans 4 times daily with sliding scale insulin  Diet-n.p.o. except for meds and sips  Decrease Lantus 10 units subcu daily at at bedtime  Activity up ad layla. Compazine 10 mg IV push every 6 hours as needed  IV fluids normal saline 65 cc an hour  TSH, lipid panel, ESR in a.m. NKECHI  Gallbladder ultrasound     Increase diet to low-fat  Consult GI  Stop IV fluids    Discharge home when okay with GI.    CMP, CBC, lipase in a.m.       Ellan Kanner, DO, D.O.  3/7/2023  6:18 AM

## 2023-03-14 ENCOUNTER — OFFICE VISIT (OUTPATIENT)
Dept: VASCULAR SURGERY | Age: 68
End: 2023-03-14

## 2023-03-14 DIAGNOSIS — Z99.2 ENCOUNTER REGARDING VASCULAR ACCESS FOR DIALYSIS FOR END-STAGE RENAL DISEASE (HCC): Primary | ICD-10-CM

## 2023-03-14 DIAGNOSIS — N18.6 ENCOUNTER REGARDING VASCULAR ACCESS FOR DIALYSIS FOR END-STAGE RENAL DISEASE (HCC): Primary | ICD-10-CM

## 2023-03-14 PROCEDURE — 99024 POSTOP FOLLOW-UP VISIT: CPT | Performed by: NURSE PRACTITIONER

## 2023-03-14 NOTE — PROGRESS NOTES
3/14/2023    Elia Friendship  1955    Chief Complaint   Patient presents with    Post-Op Check     S/P creation right AVF. Patient returns for post operative evaluation status post creation of a right radiocephalic AVF. The patient denies any unexpected problems since the procedure. He does have some numbness at the base of his right thumb. He denies right hand pain. Procedure Laterality Date    DIALYSIS FISTULA CREATION Right 2/23/2023    AV FISTULA CREATION RIGHT ARM performed by Rafal Tolbert MD at [facility] Left 06/06/14    cain bunionectomy left foot with osteomed screw x1    SHOULDER SURGERY      Bilateral    VEIN SURGERY         Physical Exam:  The incision(s) are healing without evidence of infection. Heart rhythm is regular. AVF + thrill. Measures approx 0.5 cm throughout forearm. Right      Left   Brachial     Radial 2    Femoral     Popliteal     Dorsalis Pedis     Posterior Tibial     (3=normal, 2=diminished, 1=barely palpable, 4=widened)    Assessment:  Post-operative AV access. Problem List Items Addressed This Visit       Encounter regarding vascular access for dialysis for end-stage renal disease ([facility] [address] - Primary     I reviewed with the patient that his fistula is patent and developing nicely. The next step is to allow time for the access to mature. I will plan to see him back in 6 weeks. Hopefully at that time his fistula will be ready for use. The right thumb numbness should also continue to improve over the next several weeks. I reviewed with the patient that normal activities can be resumed as tolerated. Pt seen and plan reviewed with Dr. Blue Mir. VENANCIO Ferrer - CNP    Plan: Return in 6 weeks for follow-up office visit.

## 2023-03-21 ENCOUNTER — OFFICE VISIT (OUTPATIENT)
Dept: CARDIOLOGY CLINIC | Age: 68
End: 2023-03-21
Payer: MEDICARE

## 2023-03-21 VITALS
SYSTOLIC BLOOD PRESSURE: 108 MMHG | HEIGHT: 67 IN | BODY MASS INDEX: 31.39 KG/M2 | WEIGHT: 200 LBS | HEART RATE: 73 BPM | RESPIRATION RATE: 6 BRPM | DIASTOLIC BLOOD PRESSURE: 54 MMHG

## 2023-03-21 DIAGNOSIS — I10 PRIMARY HYPERTENSION: ICD-10-CM

## 2023-03-21 DIAGNOSIS — Z72.0 TOBACCO USE: ICD-10-CM

## 2023-03-21 DIAGNOSIS — N18.6 ESRD ON HEMODIALYSIS (HCC): ICD-10-CM

## 2023-03-21 DIAGNOSIS — Z99.2 ESRD ON HEMODIALYSIS (HCC): ICD-10-CM

## 2023-03-21 DIAGNOSIS — I48.0 PAROXYSMAL ATRIAL FIBRILLATION (HCC): Primary | ICD-10-CM

## 2023-03-21 DIAGNOSIS — Z99.81 ON HOME OXYGEN THERAPY: ICD-10-CM

## 2023-03-21 PROCEDURE — 99214 OFFICE O/P EST MOD 30 MIN: CPT | Performed by: INTERNAL MEDICINE

## 2023-03-21 PROCEDURE — 93000 ELECTROCARDIOGRAM COMPLETE: CPT | Performed by: INTERNAL MEDICINE

## 2023-03-21 PROCEDURE — 1123F ACP DISCUSS/DSCN MKR DOCD: CPT | Performed by: INTERNAL MEDICINE

## 2023-03-21 PROCEDURE — 3078F DIAST BP <80 MM HG: CPT | Performed by: INTERNAL MEDICINE

## 2023-03-21 PROCEDURE — 3074F SYST BP LT 130 MM HG: CPT | Performed by: INTERNAL MEDICINE

## 2023-03-21 RX ORDER — EMPAGLIFLOZIN 25 MG/1
TABLET, FILM COATED ORAL
COMMUNITY
Start: 2023-03-09

## 2023-03-21 RX ORDER — ICOSAPENT ETHYL 1000 MG/1
CAPSULE ORAL
COMMUNITY
Start: 2023-02-16

## 2023-03-21 RX ORDER — APIXABAN 5 MG/1
5 TABLET, FILM COATED ORAL 2 TIMES DAILY
COMMUNITY
Start: 2023-02-16

## 2023-03-21 RX ORDER — METOPROLOL SUCCINATE 25 MG/1
25 TABLET, EXTENDED RELEASE ORAL DAILY
COMMUNITY
Start: 2023-02-23 | End: 2023-03-21 | Stop reason: DRUGHIGH

## 2023-03-21 RX ORDER — TIOTROPIUM BROMIDE INHALATION SPRAY 3.12 UG/1
SPRAY, METERED RESPIRATORY (INHALATION)
COMMUNITY
Start: 2023-02-17

## 2023-03-21 RX ORDER — METOPROLOL SUCCINATE 25 MG/1
25 TABLET, EXTENDED RELEASE ORAL EVERY OTHER DAY
Qty: 15 TABLET | Refills: 9 | Status: SHIPPED
Start: 2023-03-21

## 2023-03-21 RX ORDER — INSULIN DEGLUDEC INJECTION 100 U/ML
34 INJECTION, SOLUTION SUBCUTANEOUS NIGHTLY
COMMUNITY
Start: 2023-02-23

## 2023-03-21 RX ORDER — FLUTICASONE PROPIONATE AND SALMETEROL 250; 50 UG/1; UG/1
POWDER RESPIRATORY (INHALATION)
COMMUNITY
Start: 2023-02-23

## 2023-03-21 NOTE — PROGRESS NOTES
OFFICE VISIT     PRIMARY CARE PHYSICIAN:      Fayne Habermann, DO       ALLERGIES / SENSITIVITIES:        Allergies   Allergen Reactions    Other Other (See Comments)     environmental          REVIEWED MEDICATIONS:        Current Outpatient Medications:     ELIQUIS 5 MG TABS tablet, 5 mg 2 times daily, Disp: , Rfl:     fluticasone-salmeterol (ADVAIR) 250-50 MCG/ACT AEPB diskus inhaler, , Disp: , Rfl:     VASCEPA 1 g CAPS capsule, , Disp: , Rfl:     TRESIBA FLEXTOUCH 100 UNIT/ML SOPN, 34 Units at bedtime, Disp: , Rfl:     SPIRIVA RESPIMAT 2.5 MCG/ACT AERS inhaler, , Disp: , Rfl:     JARDIANCE 25 MG tablet, , Disp: , Rfl:     metoprolol succinate (TOPROL XL) 25 MG extended release tablet, Take 1 tablet by mouth every other day, Disp: 15 tablet, Rfl: 9    Omega-3 1000 MG CAPS, Take by mouth 2 times daily, Disp: , Rfl:     OXYGEN, Inhale 2 L into the lungs as needed , Disp: , Rfl:     pantoprazole (PROTONIX) 40 MG tablet, Take 1 tablet by mouth daily for 20 days, Disp: 20 tablet, Rfl: 0    patiromer sorbitex calcium (VELTASSA) 8.4 g PACK packet, Take 1 packet by mouth Not on dialysis days (Patient not taking: Reported on 3/21/2023), Disp: , Rfl:     insulin glargine (LANTUS) 100 UNIT/ML injection vial, Inject 34 Units into the skin nightly (Patient not taking: Reported on 3/21/2023), Disp: 1 vial, Rfl: 3    sevelamer (RENVELA) 800 MG tablet, Take 2 tablets by mouth 3 times daily (with meals) (Patient not taking: Reported on 3/21/2023), Disp: , Rfl:     iron sucrose (VENOFER) 20 MG/ML injection, Infuse 50 mg intravenously once a week Friday (Patient not taking: Reported on 3/21/2023), Disp: , Rfl:     calcitRIOL (ROCALTROL) 0.25 MCG capsule, Take 1.5 mcg by mouth three times a week Monday, Wednesday, Friday (Patient not taking: Reported on 3/21/2023), Disp: , Rfl:       S: REASON FOR VISIT:       Chief Complaint   Patient presents with    Atrial Fibrillation          History of Present Illness:       Office Visit for

## 2023-03-21 NOTE — PATIENT INSTRUCTIONS
Check BP if gets dizzy, if BP top number <100, do not take Metoprolol  Call  for chest pain or heart racing

## 2023-03-27 ENCOUNTER — INITIAL CONSULT (OUTPATIENT)
Dept: GASTROENTEROLOGY | Age: 68
End: 2023-03-27
Payer: MEDICARE

## 2023-03-27 VITALS
WEIGHT: 202 LBS | RESPIRATION RATE: 16 BRPM | HEART RATE: 87 BPM | OXYGEN SATURATION: 95 % | HEIGHT: 67 IN | BODY MASS INDEX: 31.71 KG/M2 | TEMPERATURE: 97 F | DIASTOLIC BLOOD PRESSURE: 57 MMHG | SYSTOLIC BLOOD PRESSURE: 88 MMHG

## 2023-03-27 DIAGNOSIS — K85.90 ACUTE PANCREATITIS WITHOUT INFECTION OR NECROSIS, UNSPECIFIED PANCREATITIS TYPE: Primary | ICD-10-CM

## 2023-03-27 PROCEDURE — 99202 OFFICE O/P NEW SF 15 MIN: CPT | Performed by: NURSE PRACTITIONER

## 2023-03-27 PROCEDURE — 3078F DIAST BP <80 MM HG: CPT | Performed by: NURSE PRACTITIONER

## 2023-03-27 PROCEDURE — 1123F ACP DISCUSS/DSCN MKR DOCD: CPT | Performed by: NURSE PRACTITIONER

## 2023-03-27 PROCEDURE — 3074F SYST BP LT 130 MM HG: CPT | Performed by: NURSE PRACTITIONER

## 2023-03-27 RX ORDER — ALBUTEROL SULFATE 90 UG/1
AEROSOL, METERED RESPIRATORY (INHALATION)
COMMUNITY
Start: 2023-03-21

## 2023-03-27 NOTE — PROGRESS NOTES
Abdominal:      General: Bowel sounds are normal.      Palpations: Abdomen is soft. Skin:     Comments: Dialysis cath in left upper chest    Fistula right forearm   Neurological:      Mental Status: He is alert. Past Medical History:   Diagnosis Date    Back pain     Diabetes mellitus (HCC)     DMII (diabetes mellitus, type 2) ([facility] 7/28/2015    Hemodialysis patient [facility])     History of cardiovascular stress test 2/16/2015    lexiscan    HTN (hypertension) 7/28/2015    Hyperlipidemia     Hypertension     Lumbar radicular pain 7/28/2015    Oxygen dependent     wears 2 liters at home    Type II or unspecified type diabetes mellitus without mention of complication, not stated as uncontrolled       Past Surgical History:   Procedure Laterality Date    DIALYSIS FISTULA CREATION Right 2/23/2023    AV FISTULA CREATION RIGHT ARM performed by Wendy Coulter MD at [address] Left 06/06/14    cain bunionectomy left foot with osteomed screw x1    SHOULDER SURGERY      Bilateral    VEIN SURGERY        Family History   Problem Relation Age of Onset    Kidney Disease Sister     Diabetes Mother     Diabetes Father         Lab Results   Component Value Date    WBC 5.0 03/07/2023    HGB 12.2 (L) 03/07/2023    HCT 37.4 03/07/2023    MCV 91.2 03/07/2023     03/07/2023      Lab Results   Component Value Date     (L) 03/07/2023    K 4.6 03/07/2023    CL 92 (L) 03/07/2023    CO2 26 03/07/2023    BUN 31 (H) 03/07/2023    CREATININE 8.4 (HH) 03/07/2023    GLUCOSE 192 (H) 03/07/2023    CALCIUM 8.4 (L) 03/07/2023    PROT 6.9 03/07/2023    LABALBU 3.0 (L) 03/07/2023    BILITOT 0.3 03/07/2023    ALKPHOS 58 03/07/2023    AST 12 03/07/2023    ALT 10 03/07/2023    LABGLOM 6 03/07/2023    GFRAA 7 08/16/2022                       ASSESSMENT/PLAN:    1. Acute pancreatitis without infection or necrosis, unspecified pancreatitis type    -will proceed with EUS for further evaluation of the pancreas.

## 2023-03-28 ENCOUNTER — TELEPHONE (OUTPATIENT)
Dept: GASTROENTEROLOGY | Age: 68
End: 2023-03-28

## 2023-03-28 NOTE — TELEPHONE ENCOUNTER
Patient called and would like to give you his Caseworkers name and number     Bindu Diaz   [phone]

## 2023-04-25 ENCOUNTER — OFFICE VISIT (OUTPATIENT)
Dept: VASCULAR SURGERY | Age: 68
End: 2023-04-25
Payer: MEDICARE

## 2023-04-25 ENCOUNTER — TELEPHONE (OUTPATIENT)
Dept: VASCULAR SURGERY | Age: 68
End: 2023-04-25

## 2023-04-25 ENCOUNTER — OFFICE VISIT (OUTPATIENT)
Dept: ENT CLINIC | Age: 68
End: 2023-04-25
Payer: MEDICARE

## 2023-04-25 ENCOUNTER — PROCEDURE VISIT (OUTPATIENT)
Dept: AUDIOLOGY | Age: 68
End: 2023-04-25
Payer: MEDICARE

## 2023-04-25 VITALS
BODY MASS INDEX: 30.45 KG/M2 | HEIGHT: 67 IN | WEIGHT: 194 LBS | HEART RATE: 94 BPM | DIASTOLIC BLOOD PRESSURE: 74 MMHG | SYSTOLIC BLOOD PRESSURE: 117 MMHG

## 2023-04-25 DIAGNOSIS — H92.01 EAR PAIN, RIGHT: Primary | ICD-10-CM

## 2023-04-25 DIAGNOSIS — J30.9 ALLERGIC RHINITIS, UNSPECIFIED SEASONALITY, UNSPECIFIED TRIGGER: ICD-10-CM

## 2023-04-25 DIAGNOSIS — J34.2 NASAL SEPTAL DEVIATION: ICD-10-CM

## 2023-04-25 DIAGNOSIS — N18.6 ENCOUNTER REGARDING VASCULAR ACCESS FOR DIALYSIS FOR END-STAGE RENAL DISEASE (HCC): Primary | ICD-10-CM

## 2023-04-25 DIAGNOSIS — H92.03 ACUTE PAIN OF BOTH EARS: Primary | ICD-10-CM

## 2023-04-25 DIAGNOSIS — H61.23 BILATERAL IMPACTED CERUMEN: ICD-10-CM

## 2023-04-25 DIAGNOSIS — H90.3 SENSORINEURAL HEARING LOSS, ASYMMETRICAL: ICD-10-CM

## 2023-04-25 DIAGNOSIS — Z99.89 OSA ON CPAP: ICD-10-CM

## 2023-04-25 DIAGNOSIS — G47.33 OSA ON CPAP: ICD-10-CM

## 2023-04-25 DIAGNOSIS — Z99.2 ENCOUNTER REGARDING VASCULAR ACCESS FOR DIALYSIS FOR END-STAGE RENAL DISEASE (HCC): Primary | ICD-10-CM

## 2023-04-25 PROCEDURE — 1123F ACP DISCUSS/DSCN MKR DOCD: CPT | Performed by: OTOLARYNGOLOGY

## 2023-04-25 PROCEDURE — 92557 COMPREHENSIVE HEARING TEST: CPT | Performed by: AUDIOLOGIST

## 2023-04-25 PROCEDURE — 1123F ACP DISCUSS/DSCN MKR DOCD: CPT

## 2023-04-25 PROCEDURE — 69210 REMOVE IMPACTED EAR WAX UNI: CPT | Performed by: OTOLARYNGOLOGY

## 2023-04-25 PROCEDURE — 4004F PT TOBACCO SCREEN RCVD TLK: CPT | Performed by: OTOLARYNGOLOGY

## 2023-04-25 PROCEDURE — 3074F SYST BP LT 130 MM HG: CPT | Performed by: OTOLARYNGOLOGY

## 2023-04-25 PROCEDURE — 3078F DIAST BP <80 MM HG: CPT | Performed by: OTOLARYNGOLOGY

## 2023-04-25 PROCEDURE — G8427 DOCREV CUR MEDS BY ELIG CLIN: HCPCS | Performed by: OTOLARYNGOLOGY

## 2023-04-25 PROCEDURE — 99204 OFFICE O/P NEW MOD 45 MIN: CPT | Performed by: OTOLARYNGOLOGY

## 2023-04-25 PROCEDURE — 92567 TYMPANOMETRY: CPT | Performed by: AUDIOLOGIST

## 2023-04-25 PROCEDURE — 99213 OFFICE O/P EST LOW 20 MIN: CPT

## 2023-04-25 PROCEDURE — 3017F COLORECTAL CA SCREEN DOC REV: CPT | Performed by: OTOLARYNGOLOGY

## 2023-04-25 PROCEDURE — G8417 CALC BMI ABV UP PARAM F/U: HCPCS | Performed by: OTOLARYNGOLOGY

## 2023-04-25 RX ORDER — FLUTICASONE PROPIONATE 50 MCG
2 SPRAY, SUSPENSION (ML) NASAL DAILY
Qty: 16 G | Refills: 3 | Status: ON HOLD | OUTPATIENT
Start: 2023-04-25

## 2023-04-25 ASSESSMENT — ENCOUNTER SYMPTOMS
RHINORRHEA: 0
VOICE CHANGE: 0
COLOR CHANGE: 0
STRIDOR: 0
SINUS PAIN: 0
TROUBLE SWALLOWING: 0
GASTROINTESTINAL NEGATIVE: 1
COUGH: 0
CHOKING: 0
SORE THROAT: 0
SINUS PRESSURE: 0
WHEEZING: 0

## 2023-04-25 ASSESSMENT — VISUAL ACUITY: OU: 1

## 2023-04-25 NOTE — PROGRESS NOTES
This patient was referred for audiometric and tympanometric testing by Dr. India Caballero due to ear pain. Patient denied any difficulty hearing, dizziness, tinnitus, or drainage. Audiometry using pure tone air and bone conduction testing revealed a hearing sensitivity within normal limits through 6000 Hz, sloping to a mild loss, in the right ear. The left ear revealed hearing sensitivity within normal limits through 2000 Hz, sloping to moderate sensorineural hearing loss through 4000 Hz, rising to mild through 8000 Hz. Reliability was good. Speech reception thresholds were in good agreement with the pure tone averages, bilaterally. Speech discrimination scores were excellent (100%), bilaterally. Asymmetries noted 7059-5920 Hz, left ear worse. Tympanometry revealed normal middle ear peak pressure and compliance, bilaterally. The results were reviewed with the patient. Recommendations for follow up will be made pending physician consult. ABHIJIT Zaragoza.   Audiology Intern (4th Year)

## 2023-04-25 NOTE — PROGRESS NOTES
Vascular Surgery Outpatient Progress Note      Chief Complaint   Patient presents with    Post-Op Check     S/P (R) AVF       HISTORY OF PRESENT ILLNESS:                The patient is a 74 y.o. male who is s/p right radiocephalic AVF. He is currently dialyzing through a left sided tunneled HD catheter. He denies symptoms of steal syndrome. Past Medical History:        Diagnosis Date    Back pain     Diabetes mellitus ([facility]     DMII (diabetes mellitus, type 2) ([facility] 7/28/2015    Hemodialysis patient ([facility]     History of cardiovascular stress test 2/16/2015    lexiscan    HTN (hypertension) 7/28/2015    Hyperlipidemia     Hypertension     Lumbar radicular pain 7/28/2015    Oxygen dependent     wears 2 liters at home    Type II or unspecified type diabetes mellitus without mention of complication, not stated as uncontrolled      Past Surgical History:        Procedure Laterality Date    DIALYSIS FISTULA CREATION Right 2/23/2023    AV FISTULA CREATION RIGHT ARM performed by Milton Michelle MD at [facility] Left 06/06/14    cain bunionectomy left foot with osteomed screw x1    SHOULDER SURGERY      Bilateral    VEIN SURGERY       Current Medications:   Prior to Admission medications    Medication Sig Start Date End Date Taking?  Authorizing Provider   fluticasone (FLONASE) 50 MCG/ACT nasal spray 2 sprays by Nasal route daily 2 sprays in each nostril daily 4/25/23  Yes Mae Montoya,    albuterol sulfate HFA (PROVENTIL;VENTOLIN;PROAIR) 108 (90 Base) MCG/ACT inhaler  3/21/23  Yes Historical Provider, MD   ELIQUHARMAN 5 MG TABS tablet 1 tablet 2 times daily 2/16/23  Yes Historical Provider, MD   fluticasone-salmeterol (ADVAIR) 250-50 MCG/ACT AEPB diskus inhaler  2/23/23  Yes Historical Provider, MD   VASCEPA 1 g CAPS capsule  2/16/23  Yes Historical Provider, MD COTTER FLEXTOUCH 100 UNIT/ML SOPN 34 Units at bedtime 2/23/23  Yes Historical Provider, MD Jose Ayon 2.5 MCG/ACT AERS

## 2023-04-26 NOTE — PROGRESS NOTES
I have discussed the case, including pertinent history and exam findings with the audiology extern. I have seen and examined the patient and the key elements of the encounter have been performed by me. I agree with the assessment, plan and orders as documented by the audiology extern.    Betsy aMcdonald CCC/A  Audiologist  [id number]  NPI#:  [id number]

## 2023-05-16 ENCOUNTER — HOSPITAL ENCOUNTER (EMERGENCY)
Age: 68
Discharge: HOME | End: 2023-05-16
Attending: EMERGENCY MEDICINE
Payer: MEDICARE

## 2023-05-16 ENCOUNTER — APPOINTMENT (OUTPATIENT)
Dept: CT IMAGING | Age: 68
End: 2023-05-16
Payer: MEDICARE

## 2023-05-16 VITALS
RESPIRATION RATE: 16 BRPM | TEMPERATURE: 98 F | HEART RATE: 78 BPM | WEIGHT: 194 LBS | BODY MASS INDEX: 30.45 KG/M2 | DIASTOLIC BLOOD PRESSURE: 68 MMHG | SYSTOLIC BLOOD PRESSURE: 148 MMHG | OXYGEN SATURATION: 99 % | HEIGHT: 67 IN

## 2023-05-16 DIAGNOSIS — R10.31 RIGHT INGUINAL PAIN: ICD-10-CM

## 2023-05-16 DIAGNOSIS — K40.90 UNILATERAL INGUINAL HERNIA WITHOUT OBSTRUCTION OR GANGRENE, RECURRENCE NOT SPECIFIED: Primary | ICD-10-CM

## 2023-05-16 LAB
ALBUMIN SERPL-MCNC: 3.6 G/DL (ref 3.5–5.2)
ALP SERPL-CCNC: 55 U/L (ref 40–129)
ALT SERPL-CCNC: 6 U/L (ref 0–40)
ANION GAP SERPL CALCULATED.3IONS-SCNC: 17 MMOL/L (ref 7–16)
AST SERPL-CCNC: 12 U/L (ref 0–39)
BASOPHILS # BLD: 0.01 E9/L (ref 0–0.2)
BASOPHILS NFR BLD: 0.1 % (ref 0–2)
BILIRUB SERPL-MCNC: 0.4 MG/DL (ref 0–1.2)
BUN SERPL-MCNC: 30 MG/DL (ref 6–23)
CALCIUM SERPL-MCNC: 9.8 MG/DL (ref 8.6–10.2)
CHLORIDE SERPL-SCNC: 95 MMOL/L (ref 98–107)
CO2 SERPL-SCNC: 28 MMOL/L (ref 22–29)
CREAT SERPL-MCNC: 9.8 MG/DL (ref 0.7–1.2)
EOSINOPHIL # BLD: 0.15 E9/L (ref 0.05–0.5)
EOSINOPHIL NFR BLD: 2 % (ref 0–6)
ERYTHROCYTE [DISTWIDTH] IN BLOOD BY AUTOMATED COUNT: 16 FL (ref 11.5–15)
GLUCOSE SERPL-MCNC: 116 MG/DL (ref 74–99)
HCT VFR BLD AUTO: 40.3 % (ref 37–54)
HGB BLD-MCNC: 12.7 G/DL (ref 12.5–16.5)
IMM GRANULOCYTES # BLD: 0.01 E9/L
IMM GRANULOCYTES NFR BLD: 0.1 % (ref 0–5)
LACTATE BLDV-SCNC: 1.5 MMOL/L (ref 0.5–2.2)
LIPASE: 17 U/L (ref 13–60)
LYMPHOCYTES # BLD: 1.39 E9/L (ref 1.5–4)
LYMPHOCYTES NFR BLD: 18.9 % (ref 20–42)
MCH RBC QN AUTO: 30 PG (ref 26–35)
MCHC RBC AUTO-ENTMCNC: 31.5 % (ref 32–34.5)
MCV RBC AUTO: 95 FL (ref 80–99.9)
MONOCYTES # BLD: 0.88 E9/L (ref 0.1–0.95)
MONOCYTES NFR BLD: 12 % (ref 2–12)
NEUTROPHILS # BLD: 4.91 E9/L (ref 1.8–7.3)
NEUTS SEG NFR BLD: 66.9 % (ref 43–80)
PLATELET # BLD AUTO: 176 E9/L (ref 130–450)
PMV BLD AUTO: 10.2 FL (ref 7–12)
POTASSIUM SERPL-SCNC: 4.7 MMOL/L (ref 3.5–5)
PROT SERPL-MCNC: 7.9 G/DL (ref 6.4–8.3)
RBC # BLD AUTO: 4.24 E12/L (ref 3.8–5.8)
SODIUM SERPL-SCNC: 140 MMOL/L (ref 132–146)
WBC # BLD: 7.4 E9/L (ref 4.5–11.5)

## 2023-05-16 PROCEDURE — 74177 CT ABD & PELVIS W/CONTRAST: CPT

## 2023-05-16 PROCEDURE — 2580000003 HC RX 258: Performed by: EMERGENCY MEDICINE

## 2023-05-16 PROCEDURE — 96361 HYDRATE IV INFUSION ADD-ON: CPT

## 2023-05-16 PROCEDURE — 6360000002 HC RX W HCPCS: Performed by: EMERGENCY MEDICINE

## 2023-05-16 PROCEDURE — 99285 EMERGENCY DEPT VISIT HI MDM: CPT

## 2023-05-16 PROCEDURE — 83690 ASSAY OF LIPASE: CPT

## 2023-05-16 PROCEDURE — 96374 THER/PROPH/DIAG INJ IV PUSH: CPT

## 2023-05-16 PROCEDURE — 80053 COMPREHEN METABOLIC PANEL: CPT

## 2023-05-16 PROCEDURE — 85025 COMPLETE CBC W/AUTO DIFF WBC: CPT

## 2023-05-16 PROCEDURE — 83605 ASSAY OF LACTIC ACID: CPT

## 2023-05-16 PROCEDURE — 6360000004 HC RX CONTRAST MEDICATION: Performed by: RADIOLOGY

## 2023-05-16 RX ORDER — FENTANYL CITRATE 0.05 MG/ML
75 INJECTION, SOLUTION INTRAMUSCULAR; INTRAVENOUS ONCE
Status: COMPLETED | OUTPATIENT
Start: 2023-05-16 | End: 2023-05-16

## 2023-05-16 RX ORDER — 0.9 % SODIUM CHLORIDE 0.9 %
500 INTRAVENOUS SOLUTION INTRAVENOUS ONCE
Status: COMPLETED | OUTPATIENT
Start: 2023-05-16 | End: 2023-05-16

## 2023-05-16 RX ORDER — OXYCODONE HYDROCHLORIDE AND ACETAMINOPHEN 5; 325 MG/1; MG/1
1 TABLET ORAL EVERY 6 HOURS PRN
Qty: 12 TABLET | Refills: 0 | Status: SHIPPED | OUTPATIENT
Start: 2023-05-16 | End: 2023-05-19

## 2023-05-16 RX ADMIN — FENTANYL CITRATE 75 MCG: 0.05 INJECTION, SOLUTION INTRAMUSCULAR; INTRAVENOUS at 12:25

## 2023-05-16 RX ADMIN — SODIUM CHLORIDE 500 ML: 9 INJECTION, SOLUTION INTRAVENOUS at 12:25

## 2023-05-16 RX ADMIN — IOPAMIDOL 75 ML: 755 INJECTION, SOLUTION INTRAVENOUS at 15:01

## 2023-05-16 ASSESSMENT — PAIN SCALES - GENERAL
PAINLEVEL_OUTOF10: 10
PAINLEVEL_OUTOF10: 10

## 2023-05-16 ASSESSMENT — PAIN - FUNCTIONAL ASSESSMENT: PAIN_FUNCTIONAL_ASSESSMENT: 0-10

## 2023-05-16 ASSESSMENT — PAIN DESCRIPTION - ORIENTATION: ORIENTATION: RIGHT

## 2023-05-16 ASSESSMENT — PAIN DESCRIPTION - LOCATION: LOCATION: GROIN

## 2023-05-16 NOTE — ED NOTES
Name: Shant Brambila  : 1955  MRN: [id number]    Date: 2023    Benefits of immediately proceeding with Radiology exam outweigh the risks and therefore the following is being waived:      [] Pregnancy test    [] Protocol for Iodine allergy    [] MRI questionnaire    [x] BUN/Creatinine        DO Alethea Vyas DO  23 [id number]

## 2023-05-16 NOTE — ED PROVIDER NOTES
muscle strain or abscess, perforation and a few of the many. Patient states he no longer does make urine evaluation patient was nontoxic however did appear slightly tachycardic as he was in some pain patient was treated with analgesics had significant improvement on recheck he is resting comfortably with improvement his heart rate on reevaluation reevaluation no significant masses or hernias and was nontender. He was found to have an inguinal hernia without any type of obstruction. I reviewed the patient's OARRS report he was given a short course of analgesics I suspect that at times his hernia is causing his pain given that he was in no distress and there was no strangulation was given surgery to follow-up with outpatient emergently with return precautions  Appropriate for outpatient management      FINAL IMPRESSION      1. Unilateral inguinal hernia without obstruction or gangrene, recurrence not specified    2. Right inguinal pain          DISPOSITION/PLAN     DISPOSITION Decision To Discharge 05/16/2023 04:55:42 PM    PATIENT REFERRED TO:  Dot Bates., DO  [address] [place] [address]  [phone]    Schedule an appointment as soon as possible for a visit       Ulla Mortimer, MD  [facility]. [address] [place] [address]  [phone]    Schedule an appointment as soon as possible for a visit       [address] [facility] Emergency Department  [address]  Go to   If symptoms worsen      DISCHARGE MEDICATIONS:  Discharge Medication List as of 5/16/2023  5:01 PM        START taking these medications    Details   oxyCODONE-acetaminophen (PERCOCET) 5-325 MG per tablet Take 1 tablet by mouth every 6 hours as needed for Pain for up to 3 days. Intended supply: 3 days.  Take lowest dose possible to manage pain Max Daily Amount: 4 tablets, Disp-12 tablet, R-0Print             DISCONTINUED MEDICATIONS:  Discharge Medication List as of

## 2023-05-16 NOTE — ED NOTES
Pt states he does not produce urine due to dialysis. Dr. Freda Angelucci informed.      Sidney Leroy, GARRET  05/16/23 0955

## 2023-05-17 ENCOUNTER — TELEPHONE (OUTPATIENT)
Dept: CARDIAC CATH/INVASIVE PROCEDURES | Age: 68
End: 2023-05-17

## 2023-05-18 ENCOUNTER — HOSPITAL ENCOUNTER (OUTPATIENT)
Dept: CARDIAC CATH/INVASIVE PROCEDURES | Age: 68
Discharge: HOME | End: 2023-05-18
Payer: MEDICARE

## 2023-05-18 VITALS
RESPIRATION RATE: 24 BRPM | DIASTOLIC BLOOD PRESSURE: 70 MMHG | HEIGHT: 67 IN | SYSTOLIC BLOOD PRESSURE: 149 MMHG | TEMPERATURE: 98 F | WEIGHT: 194 LBS | BODY MASS INDEX: 30.45 KG/M2 | HEART RATE: 104 BPM | OXYGEN SATURATION: 98 %

## 2023-05-18 PROCEDURE — 2709999900 HC NON-CHARGEABLE SUPPLY

## 2023-05-18 PROCEDURE — C1894 INTRO/SHEATH, NON-LASER: HCPCS

## 2023-05-18 PROCEDURE — C1769 GUIDE WIRE: HCPCS

## 2023-05-18 PROCEDURE — 2500000003 HC RX 250 WO HCPCS

## 2023-05-18 PROCEDURE — 36901 INTRO CATH DIALYSIS CIRCUIT: CPT

## 2023-05-18 RX ORDER — SODIUM CHLORIDE 9 MG/ML
INJECTION, SOLUTION INTRAVENOUS PRN
Status: DISCONTINUED | OUTPATIENT
Start: 2023-05-18 | End: 2023-05-19 | Stop reason: HOSPADM

## 2023-05-18 RX ORDER — SODIUM CHLORIDE 0.9 % (FLUSH) 0.9 %
5-40 SYRINGE (ML) INJECTION EVERY 12 HOURS SCHEDULED
Status: DISCONTINUED | OUTPATIENT
Start: 2023-05-18 | End: 2023-05-19 | Stop reason: HOSPADM

## 2023-05-18 RX ORDER — SODIUM CHLORIDE 0.9 % (FLUSH) 0.9 %
5-40 SYRINGE (ML) INJECTION PRN
Status: DISCONTINUED | OUTPATIENT
Start: 2023-05-18 | End: 2023-05-19 | Stop reason: HOSPADM

## 2023-05-18 NOTE — H&P
Vascular Surgery History & Physical Exam      Chief Complaint:  ESRD, evaluate AVF    HISTORY OF PRESENT ILLNESS:                The patient is a 74 y.o. male who presents to the hospital for elective fistulagram with possible intervention. The patient has a history of chronic renal failure and a right AVF which is not functioning well. IMPRESSION:   ESRD, evaluate AVF    PLAN:  Fistulagram, possible intervention. I reviewed the procedure with the patient and family as available. I discussed the procedure, risks, benefits, complications, and alternatives of the procedure. They understand and consent.   All questions were answered    ROS : All others Negative if blank [], Positive if [x]  General   [] Fevers   [] Chills   [] Weight Loss   Skin   [] Tissue Loss   Eyes   [x] Wears Glasses/Contacts   [] Vision Changes   Respiratory    [] Shortness of breath   Cardiovascular   [] Chest Pain   [] Shortness of breath with exertion   Gastrointestinal   [] Abdominal Pain     Past Medical History:   Diagnosis Date    Back pain     Diabetes mellitus ([facility]     DMII (diabetes mellitus, type 2) ([facility] 7/28/2015    Hemodialysis patient ([facility]     History of cardiovascular stress test 2/16/2015    lexiscan    HTN (hypertension) 7/28/2015    Hyperlipidemia     Hypertension     Lumbar radicular pain 7/28/2015    Oxygen dependent     wears 2 liters at home    Type II or unspecified type diabetes mellitus without mention of complication, not stated as uncontrolled      Past Surgical History:   Procedure Laterality Date    DIALYSIS FISTULA CREATION Right 2/23/2023    AV FISTULA CREATION RIGHT ARM performed by Elmer Thomas MD at [address] Left 06/06/14    cain bunionectomy left foot with osteomed screw x1    SHOULDER SURGERY      Bilateral    VEIN SURGERY       Current Medications:     Current Outpatient Medications:     oxyCODONE-acetaminophen (PERCOCET) 5-325 MG per tablet, Take 1 tablet by mouth

## 2023-05-19 ENCOUNTER — TELEPHONE (OUTPATIENT)
Dept: GASTROENTEROLOGY | Age: 68
End: 2023-05-19

## 2023-05-19 ENCOUNTER — PREP FOR PROCEDURE (OUTPATIENT)
Dept: VASCULAR SURGERY | Age: 68
End: 2023-05-19

## 2023-05-19 ENCOUNTER — TELEPHONE (OUTPATIENT)
Dept: VASCULAR SURGERY | Age: 68
End: 2023-05-19

## 2023-05-19 ENCOUNTER — PREP FOR PROCEDURE (OUTPATIENT)
Dept: GASTROENTEROLOGY | Age: 68
End: 2023-05-19

## 2023-05-19 NOTE — OP NOTE
Cardiovascular Lab Procedure Report     Shana Gandhi  1955    Date : 5/18/2023  Surgeon: Susana Wright M.D. Pre-procedure Diagnosis: ESRD, Poorly functioning fistula  Post-procedure Diagnosis: Same  Procedure:     US guided access of the Right cephalic vein   Right upper extremity fistulagram  Anesthesia: Local   Assistants: Cath Lab Staff  Estimated Blood Loss: Minimal  Complications: none  Findings:   Right   Proximal Cephalic Vein Patent   Distal Cephailc Vein Patent   Upper arm cephalic  occluded   Basilic outflow  occluded   Arteriovenous anastamosis Patent   Brachial vein Patent   Upper arm Basilic Vein Patent   Axillary Vein Patent   Subclavian Vein Patent   Brachiocephalic Vein Patent   Superior Vena Cava Patent     Procedure Details :  Timeout preformed identifying pt and procedure. Right arm prepped and draped in sterile fashion. The Right cephalic vein-fistula was identified under US and noted to be patent. It was accessed under US guidance after infiltrating with local. Printer used to print a still image. Micropuncture placed, exchanged out for 4 fr sheath. Fistulogram preformed. The outflow of the cephalic vein was occluded at the antecubital fossa - both cephalic and basilic. The basilic was patent more medially and up into the upper arm. The primary outflow of the cephalic was via smaller branches into the brachial vein and likely basilic vein. Attempts to place wire across the basilic vein occlusion were unsuccessful. Sheath and wire were removed and pressure was held.       Plan  Will need surgical intervention  - revision of cephalic vein avf to likely brachial vein outflow at the level of the antecubital fossa  Discussed with patient and Dr Ashley Maldonado MD

## 2023-05-19 NOTE — TELEPHONE ENCOUNTER
Prior Authorization Form:      DEMOGRAPHICS:                     Patient Name:  Atilio Gracia  Patient :  1955            Insurance:  Payor: Sharon Lopez / Plan: Ernie Gone / Product Type: *No Product type* /   Insurance ID Number:    Payer/Plan Subscr  Sex Relation Sub. Ins. ID Effective Group Num   1. Leidy Chun [address] [facility] 1955 Male Self [id number]                                   [address]   2.  Liamin Georgiana 1955 Male Self [id number]* 22                                    [address]         DIAGNOSIS & PROCEDURE:                       Procedure/Operation: EUS           CPT Code: 57803    Diagnosis:  Acute Pancreatitis    ICD10 Code: K85.90    Location:  [address] [place] [address] Michael    Surgeon:  Dr. Frank Cisneros     SCHEDULING INFORMATION:                          Date: 2023    Time: 1130              Anesthesia:  MAC/TIVA                                                       Status:  Outpatient          Electronically signed by Arturo Dotson MA on 2023 at 9:30 AM

## 2023-05-19 NOTE — TELEPHONE ENCOUNTER
Spoke with the pt to schedule revision of his AVF. He asked me to leave a message on his voicemail but when I called back, the message simply stated he wasn't available, no voicemail. I called the pt back and he said he couldn't take the information at this time.

## 2023-05-21 ENCOUNTER — HOSPITAL ENCOUNTER (INPATIENT)
Age: 68
LOS: 6 days | Discharge: HOME | DRG: 981 | End: 2023-05-27
Attending: EMERGENCY MEDICINE | Admitting: INTERNAL MEDICINE
Payer: MEDICARE

## 2023-05-21 ENCOUNTER — APPOINTMENT (OUTPATIENT)
Dept: GENERAL RADIOLOGY | Age: 68
DRG: 981 | End: 2023-05-21
Payer: MEDICARE

## 2023-05-21 DIAGNOSIS — J44.1 COPD EXACERBATION (HCC): Primary | ICD-10-CM

## 2023-05-21 DIAGNOSIS — K40.90 NON-RECURRENT UNILATERAL INGUINAL HERNIA WITHOUT OBSTRUCTION OR GANGRENE: ICD-10-CM

## 2023-05-21 DIAGNOSIS — Z99.2 ESRD ON DIALYSIS (HCC): ICD-10-CM

## 2023-05-21 DIAGNOSIS — J90 PLEURAL EFFUSION: ICD-10-CM

## 2023-05-21 DIAGNOSIS — N18.6 ESRD ON DIALYSIS (HCC): ICD-10-CM

## 2023-05-21 DIAGNOSIS — J96.01 ACUTE RESPIRATORY FAILURE WITH HYPOXIA (HCC): ICD-10-CM

## 2023-05-21 DIAGNOSIS — E87.79 OTHER HYPERVOLEMIA: ICD-10-CM

## 2023-05-21 PROBLEM — J96.21 ACUTE ON CHRONIC RESPIRATORY FAILURE WITH HYPOXIA (HCC): Status: ACTIVE | Noted: 2023-05-21

## 2023-05-21 LAB
ALBUMIN SERPL-MCNC: 3.4 G/DL (ref 3.5–5.2)
ALP SERPL-CCNC: 53 U/L (ref 40–129)
ALT SERPL-CCNC: 6 U/L (ref 0–40)
ANION GAP SERPL CALCULATED.3IONS-SCNC: 19 MMOL/L (ref 7–16)
AST SERPL-CCNC: 9 U/L (ref 0–39)
B.E.: 4.1 MMOL/L (ref -3–3)
BASOPHILS # BLD: 0.02 E9/L (ref 0–0.2)
BASOPHILS NFR BLD: 0.2 % (ref 0–2)
BILIRUB SERPL-MCNC: 0.7 MG/DL (ref 0–1.2)
BNP BLD-MCNC: ABNORMAL PG/ML (ref 0–125)
BUN SERPL-MCNC: 33 MG/DL (ref 6–23)
CALCIUM SERPL-MCNC: 9.5 MG/DL (ref 8.6–10.2)
CHLORIDE SERPL-SCNC: 87 MMOL/L (ref 98–107)
CO2 SERPL-SCNC: 28 MMOL/L (ref 22–29)
CREAT SERPL-MCNC: 10.9 MG/DL (ref 0.7–1.2)
DELIVERY SYSTEMS: ABNORMAL
DEVICE: ABNORMAL
EKG ATRIAL RATE: 95 BPM
EKG P AXIS: 43 DEGREES
EKG P-R INTERVAL: 168 MS
EKG Q-T INTERVAL: 382 MS
EKG QRS DURATION: 84 MS
EKG QTC CALCULATION (BAZETT): 480 MS
EKG R AXIS: 37 DEGREES
EKG T AXIS: 57 DEGREES
EKG VENTRICULAR RATE: 95 BPM
EOSINOPHIL # BLD: 0.13 E9/L (ref 0.05–0.5)
EOSINOPHIL NFR BLD: 1.4 % (ref 0–6)
ERYTHROCYTE [DISTWIDTH] IN BLOOD BY AUTOMATED COUNT: 16.3 FL (ref 11.5–15)
GLUCOSE SERPL-MCNC: 166 MG/DL (ref 74–99)
HBA1C MFR BLD: 7.6 % (ref 4–5.6)
HCO3: 27.3 MMOL/L (ref 22–26)
HCT VFR BLD AUTO: 39 % (ref 37–54)
HGB BLD-MCNC: 12.1 G/DL (ref 12.5–16.5)
IMM GRANULOCYTES # BLD: 0.02 E9/L
IMM GRANULOCYTES NFR BLD: 0.2 % (ref 0–5)
LACTATE BLDV-SCNC: 1.7 MMOL/L (ref 0.5–2.2)
LIPASE: 9 U/L (ref 13–60)
LYMPHOCYTES # BLD: 0.92 E9/L (ref 1.5–4)
LYMPHOCYTES NFR BLD: 10.3 % (ref 20–42)
MAGNESIUM SERPL-MCNC: 2.3 MG/DL (ref 1.6–2.6)
MCH RBC QN AUTO: 29.6 PG (ref 26–35)
MCHC RBC AUTO-ENTMCNC: 31 % (ref 32–34.5)
MCV RBC AUTO: 95.4 FL (ref 80–99.9)
METER GLUCOSE: 216 MG/DL (ref 74–99)
METER GLUCOSE: 269 MG/DL (ref 74–99)
METER GLUCOSE: 303 MG/DL (ref 74–99)
MONOCYTES # BLD: 1.21 E9/L (ref 0.1–0.95)
MONOCYTES NFR BLD: 13.5 % (ref 2–12)
NEUTROPHILS # BLD: 6.67 E9/L (ref 1.8–7.3)
NEUTS SEG NFR BLD: 74.4 % (ref 43–80)
O2 SATURATION: 87.9 % (ref 92–98.5)
OPERATOR ID: 3
PCO2 37: 35.7 MMHG (ref 35–45)
PH 37: 7.49 (ref 7.35–7.45)
PHOSPHATE SERPL-MCNC: 3.6 MG/DL (ref 2.5–4.5)
PLATELET # BLD AUTO: 250 E9/L (ref 130–450)
PMV BLD AUTO: 10.1 FL (ref 7–12)
PO2 37: 49.6 MMHG (ref 60–80)
POC SOURCE: ABNORMAL
POTASSIUM SERPL-SCNC: 4.3 MMOL/L (ref 3.5–5)
PROCALCITONIN: 0.61 NG/ML (ref 0–0.08)
PROT SERPL-MCNC: 8.1 G/DL (ref 6.4–8.3)
RBC # BLD AUTO: 4.09 E12/L (ref 3.8–5.8)
SARS-COV-2 RDRP RESP QL NAA+PROBE: NOT DETECTED
SODIUM SERPL-SCNC: 134 MMOL/L (ref 132–146)
TROPONIN, HIGH SENSITIVITY: 318 NG/L (ref 0–11)
TROPONIN, HIGH SENSITIVITY: 320 NG/L (ref 0–11)
WBC # BLD: 9 E9/L (ref 4.5–11.5)

## 2023-05-21 PROCEDURE — 83735 ASSAY OF MAGNESIUM: CPT

## 2023-05-21 PROCEDURE — 1200000000 HC SEMI PRIVATE

## 2023-05-21 PROCEDURE — 2700000000 HC OXYGEN THERAPY PER DAY

## 2023-05-21 PROCEDURE — 83880 ASSAY OF NATRIURETIC PEPTIDE: CPT

## 2023-05-21 PROCEDURE — 6370000000 HC RX 637 (ALT 250 FOR IP): Performed by: INTERNAL MEDICINE

## 2023-05-21 PROCEDURE — 93005 ELECTROCARDIOGRAM TRACING: CPT

## 2023-05-21 PROCEDURE — 84484 ASSAY OF TROPONIN QUANT: CPT

## 2023-05-21 PROCEDURE — 94640 AIRWAY INHALATION TREATMENT: CPT

## 2023-05-21 PROCEDURE — 96374 THER/PROPH/DIAG INJ IV PUSH: CPT

## 2023-05-21 PROCEDURE — 36415 COLL VENOUS BLD VENIPUNCTURE: CPT

## 2023-05-21 PROCEDURE — 83036 HEMOGLOBIN GLYCOSYLATED A1C: CPT

## 2023-05-21 PROCEDURE — 80053 COMPREHEN METABOLIC PANEL: CPT

## 2023-05-21 PROCEDURE — 99222 1ST HOSP IP/OBS MODERATE 55: CPT | Performed by: INTERNAL MEDICINE

## 2023-05-21 PROCEDURE — 71045 X-RAY EXAM CHEST 1 VIEW: CPT

## 2023-05-21 PROCEDURE — 99285 EMERGENCY DEPT VISIT HI MDM: CPT

## 2023-05-21 PROCEDURE — 6370000000 HC RX 637 (ALT 250 FOR IP)

## 2023-05-21 PROCEDURE — 85025 COMPLETE CBC W/AUTO DIFF WBC: CPT

## 2023-05-21 PROCEDURE — 84145 PROCALCITONIN (PCT): CPT

## 2023-05-21 PROCEDURE — 99221 1ST HOSP IP/OBS SF/LOW 40: CPT | Performed by: SURGERY

## 2023-05-21 PROCEDURE — 83690 ASSAY OF LIPASE: CPT

## 2023-05-21 PROCEDURE — 87635 SARS-COV-2 COVID-19 AMP PRB: CPT

## 2023-05-21 PROCEDURE — 82962 GLUCOSE BLOOD TEST: CPT

## 2023-05-21 PROCEDURE — 2500000003 HC RX 250 WO HCPCS: Performed by: INTERNAL MEDICINE

## 2023-05-21 PROCEDURE — 83605 ASSAY OF LACTIC ACID: CPT

## 2023-05-21 PROCEDURE — 84100 ASSAY OF PHOSPHORUS: CPT

## 2023-05-21 PROCEDURE — 82803 BLOOD GASES ANY COMBINATION: CPT

## 2023-05-21 PROCEDURE — 6360000002 HC RX W HCPCS

## 2023-05-21 PROCEDURE — 93010 ELECTROCARDIOGRAM REPORT: CPT | Performed by: INTERNAL MEDICINE

## 2023-05-21 PROCEDURE — 2580000003 HC RX 258: Performed by: INTERNAL MEDICINE

## 2023-05-21 PROCEDURE — 6360000002 HC RX W HCPCS: Performed by: INTERNAL MEDICINE

## 2023-05-21 RX ORDER — GUAIFENESIN/DEXTROMETHORPHAN 100-10MG/5
10 SYRUP ORAL EVERY 4 HOURS PRN
Status: DISCONTINUED | OUTPATIENT
Start: 2023-05-21 | End: 2023-05-27 | Stop reason: HOSPADM

## 2023-05-21 RX ORDER — BUDESONIDE 0.5 MG/2ML
0.5 INHALANT ORAL 2 TIMES DAILY
Status: DISCONTINUED | OUTPATIENT
Start: 2023-05-21 | End: 2023-05-27 | Stop reason: HOSPADM

## 2023-05-21 RX ORDER — METOPROLOL SUCCINATE 25 MG/1
25 TABLET, EXTENDED RELEASE ORAL EVERY OTHER DAY
Status: DISCONTINUED | OUTPATIENT
Start: 2023-05-21 | End: 2023-05-26

## 2023-05-21 RX ORDER — SEVELAMER CARBONATE 800 MG/1
1600 TABLET, FILM COATED ORAL
Status: DISCONTINUED | OUTPATIENT
Start: 2023-05-21 | End: 2023-05-27 | Stop reason: HOSPADM

## 2023-05-21 RX ORDER — ALBUTEROL SULFATE 2.5 MG/3ML
2.5 SOLUTION RESPIRATORY (INHALATION) EVERY 4 HOURS PRN
Status: DISCONTINUED | OUTPATIENT
Start: 2023-05-21 | End: 2023-05-27 | Stop reason: HOSPADM

## 2023-05-21 RX ORDER — METHYLPREDNISOLONE SODIUM SUCCINATE 125 MG/2ML
125 INJECTION, POWDER, LYOPHILIZED, FOR SOLUTION INTRAMUSCULAR; INTRAVENOUS ONCE
Status: COMPLETED | OUTPATIENT
Start: 2023-05-21 | End: 2023-05-21

## 2023-05-21 RX ORDER — BUDESONIDE AND FORMOTEROL FUMARATE DIHYDRATE 160; 4.5 UG/1; UG/1
2 AEROSOL RESPIRATORY (INHALATION) 2 TIMES DAILY
Status: DISCONTINUED | OUTPATIENT
Start: 2023-05-21 | End: 2023-05-21 | Stop reason: CLARIF

## 2023-05-21 RX ORDER — ARFORMOTEROL TARTRATE 15 UG/2ML
15 SOLUTION RESPIRATORY (INHALATION) 2 TIMES DAILY
Status: DISCONTINUED | OUTPATIENT
Start: 2023-05-21 | End: 2023-05-27 | Stop reason: HOSPADM

## 2023-05-21 RX ORDER — INSULIN GLARGINE 100 [IU]/ML
34 INJECTION, SOLUTION SUBCUTANEOUS NIGHTLY
Status: DISCONTINUED | OUTPATIENT
Start: 2023-05-21 | End: 2023-05-27 | Stop reason: HOSPADM

## 2023-05-21 RX ORDER — OMEGA-3-ACID ETHYL ESTERS 1 G/1
2 CAPSULE, LIQUID FILLED ORAL
Status: DISCONTINUED | OUTPATIENT
Start: 2023-05-21 | End: 2023-05-27 | Stop reason: HOSPADM

## 2023-05-21 RX ORDER — FLUTICASONE PROPIONATE 50 MCG
2 SPRAY, SUSPENSION (ML) NASAL DAILY
Status: DISCONTINUED | OUTPATIENT
Start: 2023-05-21 | End: 2023-05-27 | Stop reason: HOSPADM

## 2023-05-21 RX ORDER — INSULIN LISPRO 100 [IU]/ML
0-4 INJECTION, SOLUTION INTRAVENOUS; SUBCUTANEOUS NIGHTLY
Status: DISCONTINUED | OUTPATIENT
Start: 2023-05-21 | End: 2023-05-27 | Stop reason: HOSPADM

## 2023-05-21 RX ORDER — PROCHLORPERAZINE EDISYLATE 5 MG/ML
10 INJECTION INTRAMUSCULAR; INTRAVENOUS EVERY 6 HOURS PRN
Status: DISCONTINUED | OUTPATIENT
Start: 2023-05-21 | End: 2023-05-27 | Stop reason: HOSPADM

## 2023-05-21 RX ORDER — OMEGA-3 FATTY ACIDS CAP DELAYED RELEASE 1000 MG 1000 MG
2000 CAPSULE DELAYED RELEASE ORAL
Status: DISCONTINUED | OUTPATIENT
Start: 2023-05-21 | End: 2023-05-21 | Stop reason: CLARIF

## 2023-05-21 RX ORDER — POLYETHYLENE GLYCOL 3350 17 G/17G
17 POWDER, FOR SOLUTION ORAL DAILY PRN
Status: DISCONTINUED | OUTPATIENT
Start: 2023-05-21 | End: 2023-05-27 | Stop reason: HOSPADM

## 2023-05-21 RX ORDER — DEXTROSE MONOHYDRATE 100 MG/ML
INJECTION, SOLUTION INTRAVENOUS CONTINUOUS PRN
Status: DISCONTINUED | OUTPATIENT
Start: 2023-05-21 | End: 2023-05-27 | Stop reason: HOSPADM

## 2023-05-21 RX ORDER — ACETAMINOPHEN 500 MG
500 TABLET ORAL EVERY 6 HOURS PRN
Status: DISCONTINUED | OUTPATIENT
Start: 2023-05-21 | End: 2023-05-27 | Stop reason: HOSPADM

## 2023-05-21 RX ORDER — ALBUTEROL SULFATE 90 UG/1
2 AEROSOL, METERED RESPIRATORY (INHALATION) EVERY 4 HOURS PRN
Status: DISCONTINUED | OUTPATIENT
Start: 2023-05-21 | End: 2023-05-21 | Stop reason: CLARIF

## 2023-05-21 RX ORDER — INSULIN LISPRO 100 [IU]/ML
0-8 INJECTION, SOLUTION INTRAVENOUS; SUBCUTANEOUS
Status: DISCONTINUED | OUTPATIENT
Start: 2023-05-21 | End: 2023-05-27 | Stop reason: HOSPADM

## 2023-05-21 RX ORDER — IPRATROPIUM BROMIDE AND ALBUTEROL SULFATE 2.5; .5 MG/3ML; MG/3ML
3 SOLUTION RESPIRATORY (INHALATION) ONCE
Status: COMPLETED | OUTPATIENT
Start: 2023-05-21 | End: 2023-05-21

## 2023-05-21 RX ORDER — 0.9 % SODIUM CHLORIDE 0.9 %
1000 INTRAVENOUS SOLUTION INTRAVENOUS ONCE
Status: DISCONTINUED | OUTPATIENT
Start: 2023-05-21 | End: 2023-05-27 | Stop reason: HOSPADM

## 2023-05-21 RX ORDER — OMEGA-3-ACID ETHYL ESTERS 1 G/1
2 CAPSULE, LIQUID FILLED ORAL DAILY
Status: DISCONTINUED | OUTPATIENT
Start: 2023-05-21 | End: 2023-05-21 | Stop reason: CLARIF

## 2023-05-21 RX ORDER — IPRATROPIUM BROMIDE AND ALBUTEROL SULFATE 2.5; .5 MG/3ML; MG/3ML
1 SOLUTION RESPIRATORY (INHALATION) EVERY 4 HOURS PRN
Status: DISCONTINUED | OUTPATIENT
Start: 2023-05-21 | End: 2023-05-21 | Stop reason: SDUPTHER

## 2023-05-21 RX ADMIN — FLUTICASONE PROPIONATE 2 SPRAY: 50 SPRAY, METERED NASAL at 13:26

## 2023-05-21 RX ADMIN — METHYLPREDNISOLONE SODIUM SUCCINATE 125 MG: 125 INJECTION, POWDER, FOR SOLUTION INTRAMUSCULAR; INTRAVENOUS at 08:03

## 2023-05-21 RX ADMIN — INSULIN LISPRO 4 UNITS: 100 INJECTION, SOLUTION INTRAVENOUS; SUBCUTANEOUS at 16:54

## 2023-05-21 RX ADMIN — IPRATROPIUM BROMIDE AND ALBUTEROL SULFATE 3 ML: .5; 3 SOLUTION RESPIRATORY (INHALATION) at 07:52

## 2023-05-21 RX ADMIN — INSULIN LISPRO 4 UNITS: 100 INJECTION, SOLUTION INTRAVENOUS; SUBCUTANEOUS at 20:33

## 2023-05-21 RX ADMIN — APIXABAN 5 MG: 5 TABLET, FILM COATED ORAL at 20:32

## 2023-05-21 RX ADMIN — DOXYCYCLINE 100 MG: 100 INJECTION, POWDER, LYOPHILIZED, FOR SOLUTION INTRAVENOUS at 13:13

## 2023-05-21 RX ADMIN — IPRATROPIUM BROMIDE 0.5 MG: 0.5 SOLUTION RESPIRATORY (INHALATION) at 14:06

## 2023-05-21 RX ADMIN — OMEGA-3-ACID ETHYL ESTERS 2 G: 1 CAPSULE, LIQUID FILLED ORAL at 16:54

## 2023-05-21 RX ADMIN — BUDESONIDE 500 MCG: 0.5 SUSPENSION RESPIRATORY (INHALATION) at 17:37

## 2023-05-21 RX ADMIN — SEVELAMER CARBONATE 1600 MG: 800 TABLET, FILM COATED ORAL at 16:54

## 2023-05-21 RX ADMIN — IPRATROPIUM BROMIDE 0.5 MG: 0.5 SOLUTION RESPIRATORY (INHALATION) at 17:36

## 2023-05-21 RX ADMIN — CEFTRIAXONE 1000 MG: 1 INJECTION, POWDER, FOR SOLUTION INTRAMUSCULAR; INTRAVENOUS at 12:49

## 2023-05-21 RX ADMIN — INSULIN GLARGINE 34 UNITS: 100 INJECTION, SOLUTION SUBCUTANEOUS at 20:32

## 2023-05-21 RX ADMIN — ARFORMOTEROL TARTRATE 15 MCG: 15 SOLUTION RESPIRATORY (INHALATION) at 17:37

## 2023-05-21 RX ADMIN — INSULIN LISPRO 2 UNITS: 100 INJECTION, SOLUTION INTRAVENOUS; SUBCUTANEOUS at 12:50

## 2023-05-21 RX ADMIN — SEVELAMER CARBONATE 1600 MG: 800 TABLET, FILM COATED ORAL at 12:49

## 2023-05-21 ASSESSMENT — PAIN SCALES - GENERAL: PAINLEVEL_OUTOF10: 0

## 2023-05-22 LAB
ALBUMIN SERPL-MCNC: 3.2 G/DL (ref 3.5–5.2)
ALP SERPL-CCNC: 53 U/L (ref 40–129)
ALT SERPL-CCNC: 5 U/L (ref 0–40)
ANION GAP SERPL CALCULATED.3IONS-SCNC: 14 MMOL/L (ref 7–16)
ANISOCYTOSIS: ABNORMAL
AST SERPL-CCNC: 10 U/L (ref 0–39)
BASOPHILS # BLD: 0 E9/L (ref 0–0.2)
BASOPHILS NFR BLD: 0 % (ref 0–2)
BILIRUB SERPL-MCNC: 0.3 MG/DL (ref 0–1.2)
BUN SERPL-MCNC: 49 MG/DL (ref 6–23)
CALCIUM SERPL-MCNC: 9.3 MG/DL (ref 8.6–10.2)
CHLORIDE SERPL-SCNC: 90 MMOL/L (ref 98–107)
CO2 SERPL-SCNC: 28 MMOL/L (ref 22–29)
CREAT SERPL-MCNC: 12.5 MG/DL (ref 0.7–1.2)
EOSINOPHIL # BLD: 0 E9/L (ref 0.05–0.5)
EOSINOPHIL NFR BLD: 0 % (ref 0–6)
ERYTHROCYTE [DISTWIDTH] IN BLOOD BY AUTOMATED COUNT: 16.1 FL (ref 11.5–15)
GLUCOSE SERPL-MCNC: 295 MG/DL (ref 74–99)
HCT VFR BLD AUTO: 37.2 % (ref 37–54)
HGB BLD-MCNC: 11.8 G/DL (ref 12.5–16.5)
LV EF: 63 %
LVEF MODALITY: NORMAL
LYMPHOCYTES # BLD: 0.43 E9/L (ref 1.5–4)
LYMPHOCYTES NFR BLD: 4.4 % (ref 20–42)
MAGNESIUM SERPL-MCNC: 2.8 MG/DL (ref 1.6–2.6)
MCH RBC QN AUTO: 29.8 PG (ref 26–35)
MCHC RBC AUTO-ENTMCNC: 31.7 % (ref 32–34.5)
MCV RBC AUTO: 93.9 FL (ref 80–99.9)
METER GLUCOSE: 164 MG/DL (ref 74–99)
METER GLUCOSE: 234 MG/DL (ref 74–99)
METER GLUCOSE: 255 MG/DL (ref 74–99)
METER GLUCOSE: 284 MG/DL (ref 74–99)
MONOCYTES # BLD: 0.11 E9/L (ref 0.1–0.95)
MONOCYTES NFR BLD: 0.9 % (ref 2–12)
NEUTROPHILS # BLD: 10.17 E9/L (ref 1.8–7.3)
NEUTS SEG NFR BLD: 94.7 % (ref 43–80)
PHOSPHATE SERPL-MCNC: 2.5 MG/DL (ref 2.5–4.5)
PLATELET # BLD AUTO: 277 E9/L (ref 130–450)
PMV BLD AUTO: 10.3 FL (ref 7–12)
POLYCHROMASIA: ABNORMAL
POTASSIUM SERPL-SCNC: 5.6 MMOL/L (ref 3.5–5)
PROT SERPL-MCNC: 7.8 G/DL (ref 6.4–8.3)
RBC # BLD AUTO: 3.96 E12/L (ref 3.8–5.8)
SODIUM SERPL-SCNC: 132 MMOL/L (ref 132–146)
WBC # BLD: 10.7 E9/L (ref 4.5–11.5)

## 2023-05-22 PROCEDURE — 90935 HEMODIALYSIS ONE EVALUATION: CPT

## 2023-05-22 PROCEDURE — 6360000002 HC RX W HCPCS: Performed by: INTERNAL MEDICINE

## 2023-05-22 PROCEDURE — 94640 AIRWAY INHALATION TREATMENT: CPT

## 2023-05-22 PROCEDURE — 84100 ASSAY OF PHOSPHORUS: CPT

## 2023-05-22 PROCEDURE — 2700000000 HC OXYGEN THERAPY PER DAY

## 2023-05-22 PROCEDURE — 6370000000 HC RX 637 (ALT 250 FOR IP): Performed by: INTERNAL MEDICINE

## 2023-05-22 PROCEDURE — 6360000004 HC RX CONTRAST MEDICATION: Performed by: INTERNAL MEDICINE

## 2023-05-22 PROCEDURE — C8929 TTE W OR WO FOL WCON,DOPPLER: HCPCS

## 2023-05-22 PROCEDURE — 85025 COMPLETE CBC W/AUTO DIFF WBC: CPT

## 2023-05-22 PROCEDURE — 80053 COMPREHEN METABOLIC PANEL: CPT

## 2023-05-22 PROCEDURE — 82962 GLUCOSE BLOOD TEST: CPT

## 2023-05-22 PROCEDURE — 5A1D70Z PERFORMANCE OF URINARY FILTRATION, INTERMITTENT, LESS THAN 6 HOURS PER DAY: ICD-10-PCS | Performed by: INTERNAL MEDICINE

## 2023-05-22 PROCEDURE — 2500000003 HC RX 250 WO HCPCS: Performed by: INTERNAL MEDICINE

## 2023-05-22 PROCEDURE — 2580000003 HC RX 258: Performed by: INTERNAL MEDICINE

## 2023-05-22 PROCEDURE — 36415 COLL VENOUS BLD VENIPUNCTURE: CPT

## 2023-05-22 PROCEDURE — 1200000000 HC SEMI PRIVATE

## 2023-05-22 PROCEDURE — 99233 SBSQ HOSP IP/OBS HIGH 50: CPT | Performed by: INTERNAL MEDICINE

## 2023-05-22 PROCEDURE — 83735 ASSAY OF MAGNESIUM: CPT

## 2023-05-22 RX ADMIN — FLUTICASONE PROPIONATE 2 SPRAY: 50 SPRAY, METERED NASAL at 07:57

## 2023-05-22 RX ADMIN — PERFLUTREN 1.5 ML: 6.52 INJECTION, SUSPENSION INTRAVENOUS at 15:16

## 2023-05-22 RX ADMIN — BUDESONIDE 500 MCG: 0.5 SUSPENSION RESPIRATORY (INHALATION) at 06:22

## 2023-05-22 RX ADMIN — INSULIN LISPRO 4 UNITS: 100 INJECTION, SOLUTION INTRAVENOUS; SUBCUTANEOUS at 07:58

## 2023-05-22 RX ADMIN — SEVELAMER CARBONATE 1600 MG: 800 TABLET, FILM COATED ORAL at 15:54

## 2023-05-22 RX ADMIN — IPRATROPIUM BROMIDE 0.5 MG: 0.5 SOLUTION RESPIRATORY (INHALATION) at 06:22

## 2023-05-22 RX ADMIN — DOXYCYCLINE 100 MG: 100 INJECTION, POWDER, LYOPHILIZED, FOR SOLUTION INTRAVENOUS at 01:10

## 2023-05-22 RX ADMIN — SEVELAMER CARBONATE 1600 MG: 800 TABLET, FILM COATED ORAL at 13:28

## 2023-05-22 RX ADMIN — INSULIN LISPRO 2 UNITS: 100 INJECTION, SOLUTION INTRAVENOUS; SUBCUTANEOUS at 15:55

## 2023-05-22 RX ADMIN — IPRATROPIUM BROMIDE 0.5 MG: 0.5 SOLUTION RESPIRATORY (INHALATION) at 14:28

## 2023-05-22 RX ADMIN — IPRATROPIUM BROMIDE 0.5 MG: 0.5 SOLUTION RESPIRATORY (INHALATION) at 18:23

## 2023-05-22 RX ADMIN — ALTEPLASE 1 MG: 2.2 INJECTION, POWDER, LYOPHILIZED, FOR SOLUTION INTRAVENOUS at 12:42

## 2023-05-22 RX ADMIN — ARFORMOTEROL TARTRATE 15 MCG: 15 SOLUTION RESPIRATORY (INHALATION) at 18:20

## 2023-05-22 RX ADMIN — INSULIN LISPRO 4 UNITS: 100 INJECTION, SOLUTION INTRAVENOUS; SUBCUTANEOUS at 13:28

## 2023-05-22 RX ADMIN — APIXABAN 5 MG: 5 TABLET, FILM COATED ORAL at 13:28

## 2023-05-22 RX ADMIN — ARFORMOTEROL TARTRATE 15 MCG: 15 SOLUTION RESPIRATORY (INHALATION) at 06:22

## 2023-05-22 RX ADMIN — OMEGA-3-ACID ETHYL ESTERS 2 G: 1 CAPSULE, LIQUID FILLED ORAL at 15:54

## 2023-05-22 RX ADMIN — INSULIN GLARGINE 34 UNITS: 100 INJECTION, SOLUTION SUBCUTANEOUS at 20:32

## 2023-05-22 RX ADMIN — ALTEPLASE 1 MG: 2.2 INJECTION, POWDER, LYOPHILIZED, FOR SOLUTION INTRAVENOUS at 12:43

## 2023-05-22 RX ADMIN — CEFTRIAXONE 1000 MG: 1 INJECTION, POWDER, FOR SOLUTION INTRAMUSCULAR; INTRAVENOUS at 13:29

## 2023-05-22 RX ADMIN — BUDESONIDE 500 MCG: 0.5 SUSPENSION RESPIRATORY (INHALATION) at 18:20

## 2023-05-22 RX ADMIN — APIXABAN 5 MG: 5 TABLET, FILM COATED ORAL at 20:27

## 2023-05-22 RX ADMIN — DOXYCYCLINE 100 MG: 100 INJECTION, POWDER, LYOPHILIZED, FOR SOLUTION INTRAVENOUS at 13:29

## 2023-05-22 NOTE — CONSULTS
Jessee Uribe MD  Nephrology    Hemodialysis/Progress note. Patient seen and examined on hemodialyisis. Dialysis prescription reviewed. Patient is well-known to me from outpatient and inpatient follow-up. So formal consultation is deferred. 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 PAST SURGICAL HISTORY:  Past surgical history is significant for a  t surgery for AV fistula placement in the right arm on 02/23/2023. He has a history of placement and removal of tunneled hemodialysis catheter. He has history of bilateral shoulder surgery. He has had bunionectomy of the left foot. Patient is a 30-year-old gentleman with underlying history of chronic kidney disease stage G5/ESRD on who is on maintenance hemodialysis. Patient is admitted with chief complaints of abdominal pain. He has been seen by general surgery. Found to have right inguinal hernia but has not been deemed to be an operative candidate at this time. He has had some shortness of breath. His proBNP was 56,000 upon presentation. Cardiology has been consulted cardiology has deemed the patient to be euvolemic. Still complaining of some shortness of breath. Dialysis prescription reviewed. Prep with poor flows. Chart reviewed. Diet is good. Still has intermittent abdominal pain. No nausea or dysguesia. Awake and alert . In no acute distress.     Vital SignsBP 135/73   Pulse 75   Temp 97.4 °F (36.3 °C)   Resp 18   Ht 5' 7\" (1.702 m)   Wt 189 lb 13.1 oz (86.1 kg)   SpO2 96%   BMI 29.73 kg/m²   24HR INTAKE/OUTPUT:    Intake/Output Summary (Last 24 hours) at 5/22/2023 1224  Last data filed at
mentioned in the above HPI. Specific negatives are listed below but may not include all those reviewed. General ROS: negative obtundation, AMS  ENT ROS: negative rhinorrhea, epistaxis  Allergy and Immunology ROS: negative itchy/watery eyes or nasal congestion  Hematological and Lymphatic ROS: negative spontaneous bleeding or bruising  Endocrine ROS: negative  lethargy, mood swings, palpitations or polydipsia/polyuria  Respiratory ROS: negative sputum changes, stridor, tachypnea or wheezing  Cardiovascular ROS: negative for - loss of consciousness, murmur or orthopnea  Gastrointestinal ROS: negative for - hematochezia or hematemesis  Genito-Urinary ROS: negative for -  genital discharge or hematuria  Musculoskeletal ROS: negative for - focal weakness, gangrene  Psych/Neuro ROS: negative for - visual or auditory hallucinations, suicidal ideation      Physical exam:   BP (!) 143/70   Pulse 84   Temp 97.7 °F (36.5 °C)   Resp 22   Ht 5' 7\" (1.702 m)   Wt 189 lb (85.7 kg)   SpO2 91%   BMI 29.60 kg/m²   General appearance:  NAD, appears stated age  Head: NCAT, PERRLA, EOMI, red conjunctiva  Neck: supple, no masses, trachea midline  Lungs: Equal chest rise bilateral, no retractions, no wheezing  Heart: Reg rate  Abdomen: soft, notender. Skin; warm and dry, no cyanosis  Gu: R groin lump incarcerated, tender. No abdominal  guarding or rebound  Extremities: atraumatic, no focal motor deficits, no open wounds  Psych: No tremor, visual hallucinations    Pathology: n/a    Radiology: I reviewed relevant abdominal imaging from this admission and that available in the EMR including CT abd/pel from 5/16/23.  My assessment is R inguinal hernia    Assessment:  Pako Ritter is a 74 y.o. male with acute hypoxic respiratory failure, Intermittently incarcerated right inguinal hernia with pain, no bowel incarceration of obstruction, ESRD on dialysis    Patient Active Problem List   Diagnosis    Hallux valgus, acquired    DMII

## 2023-05-22 NOTE — FLOWSHEET NOTE
05/22/23 1244   Vital Signs   /72   Temp 97.6 °F (36.4 °C)   Pulse 67   Respirations 18   Weight - Scale 186 lb 15.2 oz (84.8 kg)   Percent Weight Change -1.51   Post-Hemodialysis Assessment   Post-Treatment Procedures Blood returned;Catheter capped, clamped and heparinized x 2 ports   Machine Disinfection Process Acid/Vinegar Clean;Heat Disinfect   Rinseback Volume (ml) 300 ml   Blood Volume Processed (Liters) 60.1 l/min   Dialyzer Clearance Lightly streaked   Duration of Treatment (minutes) 240 minutes   Heparin Amount Administered During Treatment (mL) 0 mL   Hemodialysis Intake (ml) 300 ml   Hemodialysis Output (ml) 1800 ml   NET Removed (ml) 1500   Patient Response to Treatment Tolerated Hd well.   Cath Stone instilled post treatment for withdrawl 30 minutes post.   Bilateral Breath Sounds Diminished   Edema Generalized   Time Off 1230   Patient Disposition Return to room

## 2023-05-22 NOTE — PLAN OF CARE
Problem: ABCDS Injury Assessment  Goal: Absence of physical injury  5/22/2023 1052 by Little Morelos RN  Outcome: Progressing  5/21/2023 2116 by Tacho Mcmahan RN  Outcome: Progressing     Problem: Pain  Goal: Verbalizes/displays adequate comfort level or baseline comfort level  5/22/2023 1052 by Little Morelos RN  Outcome: Progressing  5/21/2023 2116 by Tacoh Mcmahan RN  Outcome: Progressing     Problem: Safety - Adult  Goal: Free from fall injury  Outcome: Progressing

## 2023-05-23 LAB
ALBUMIN SERPL-MCNC: 3 G/DL (ref 3.5–5.2)
ALP SERPL-CCNC: 53 U/L (ref 40–129)
ALT SERPL-CCNC: 7 U/L (ref 0–40)
ANION GAP SERPL CALCULATED.3IONS-SCNC: 12 MMOL/L (ref 7–16)
AST SERPL-CCNC: 10 U/L (ref 0–39)
BASOPHILS # BLD: 0.01 E9/L (ref 0–0.2)
BASOPHILS NFR BLD: 0.1 % (ref 0–2)
BILIRUB SERPL-MCNC: 0.2 MG/DL (ref 0–1.2)
BNP BLD-MCNC: ABNORMAL PG/ML (ref 0–125)
BUN SERPL-MCNC: 36 MG/DL (ref 6–23)
CALCIUM SERPL-MCNC: 9.1 MG/DL (ref 8.6–10.2)
CHLORIDE SERPL-SCNC: 94 MMOL/L (ref 98–107)
CO2 SERPL-SCNC: 29 MMOL/L (ref 22–29)
CREAT SERPL-MCNC: 8.1 MG/DL (ref 0.7–1.2)
EOSINOPHIL # BLD: 0.05 E9/L (ref 0.05–0.5)
EOSINOPHIL NFR BLD: 0.5 % (ref 0–6)
ERYTHROCYTE [DISTWIDTH] IN BLOOD BY AUTOMATED COUNT: 16.6 FL (ref 11.5–15)
GLUCOSE SERPL-MCNC: 172 MG/DL (ref 74–99)
HCT VFR BLD AUTO: 38 % (ref 37–54)
HGB BLD-MCNC: 11.5 G/DL (ref 12.5–16.5)
IMM GRANULOCYTES # BLD: 0.04 E9/L
IMM GRANULOCYTES NFR BLD: 0.4 % (ref 0–5)
LYMPHOCYTES # BLD: 1.61 E9/L (ref 1.5–4)
LYMPHOCYTES NFR BLD: 15 % (ref 20–42)
MCH RBC QN AUTO: 29.3 PG (ref 26–35)
MCHC RBC AUTO-ENTMCNC: 30.3 % (ref 32–34.5)
MCV RBC AUTO: 96.7 FL (ref 80–99.9)
METER GLUCOSE: 155 MG/DL (ref 74–99)
METER GLUCOSE: 201 MG/DL (ref 74–99)
METER GLUCOSE: 217 MG/DL (ref 74–99)
METER GLUCOSE: 234 MG/DL (ref 74–99)
MONOCYTES # BLD: 1.25 E9/L (ref 0.1–0.95)
MONOCYTES NFR BLD: 11.7 % (ref 2–12)
NEUTROPHILS # BLD: 7.75 E9/L (ref 1.8–7.3)
NEUTS SEG NFR BLD: 72.3 % (ref 43–80)
PLATELET # BLD AUTO: 257 E9/L (ref 130–450)
PMV BLD AUTO: 10 FL (ref 7–12)
POTASSIUM SERPL-SCNC: 4.2 MMOL/L (ref 3.5–5)
PROT SERPL-MCNC: 6.9 G/DL (ref 6.4–8.3)
RBC # BLD AUTO: 3.93 E12/L (ref 3.8–5.8)
SODIUM SERPL-SCNC: 135 MMOL/L (ref 132–146)
WBC # BLD: 10.7 E9/L (ref 4.5–11.5)

## 2023-05-23 PROCEDURE — 2700000000 HC OXYGEN THERAPY PER DAY

## 2023-05-23 PROCEDURE — 82962 GLUCOSE BLOOD TEST: CPT

## 2023-05-23 PROCEDURE — 2500000003 HC RX 250 WO HCPCS: Performed by: INTERNAL MEDICINE

## 2023-05-23 PROCEDURE — 90935 HEMODIALYSIS ONE EVALUATION: CPT

## 2023-05-23 PROCEDURE — 6360000002 HC RX W HCPCS: Performed by: INTERNAL MEDICINE

## 2023-05-23 PROCEDURE — 6370000000 HC RX 637 (ALT 250 FOR IP): Performed by: INTERNAL MEDICINE

## 2023-05-23 PROCEDURE — 85025 COMPLETE CBC W/AUTO DIFF WBC: CPT

## 2023-05-23 PROCEDURE — 1200000000 HC SEMI PRIVATE

## 2023-05-23 PROCEDURE — 36415 COLL VENOUS BLD VENIPUNCTURE: CPT

## 2023-05-23 PROCEDURE — 83880 ASSAY OF NATRIURETIC PEPTIDE: CPT

## 2023-05-23 PROCEDURE — 2580000003 HC RX 258: Performed by: INTERNAL MEDICINE

## 2023-05-23 PROCEDURE — 80053 COMPREHEN METABOLIC PANEL: CPT

## 2023-05-23 PROCEDURE — 94640 AIRWAY INHALATION TREATMENT: CPT

## 2023-05-23 PROCEDURE — 99233 SBSQ HOSP IP/OBS HIGH 50: CPT | Performed by: INTERNAL MEDICINE

## 2023-05-23 RX ORDER — DOXYCYCLINE HYCLATE 100 MG/1
100 CAPSULE ORAL EVERY 12 HOURS SCHEDULED
Status: DISCONTINUED | OUTPATIENT
Start: 2023-05-23 | End: 2023-05-27 | Stop reason: HOSPADM

## 2023-05-23 RX ORDER — CEFDINIR 300 MG/1
300 CAPSULE ORAL DAILY
Status: DISCONTINUED | OUTPATIENT
Start: 2023-05-24 | End: 2023-05-27 | Stop reason: HOSPADM

## 2023-05-23 RX ORDER — DOXYCYCLINE HYCLATE 100 MG/1
100 CAPSULE ORAL EVERY 12 HOURS SCHEDULED
Qty: 10 CAPSULE | Refills: 0 | Status: SHIPPED | OUTPATIENT
Start: 2023-05-23 | End: 2023-05-28

## 2023-05-23 RX ORDER — REGADENOSON 0.08 MG/ML
0.4 INJECTION, SOLUTION INTRAVENOUS
Status: COMPLETED | OUTPATIENT
Start: 2023-05-23 | End: 2023-05-24

## 2023-05-23 RX ORDER — CEFDINIR 300 MG/1
300 CAPSULE ORAL DAILY
Status: DISCONTINUED | OUTPATIENT
Start: 2023-05-28 | End: 2023-05-23

## 2023-05-23 RX ORDER — CEFDINIR 300 MG/1
300 CAPSULE ORAL DAILY
Qty: 5 CAPSULE | Refills: 0 | Status: SHIPPED | OUTPATIENT
Start: 2023-05-24 | End: 2023-05-29

## 2023-05-23 RX ADMIN — IPRATROPIUM BROMIDE 0.5 MG: 0.5 SOLUTION RESPIRATORY (INHALATION) at 18:25

## 2023-05-23 RX ADMIN — DOXYCYCLINE HYCLATE 100 MG: 100 CAPSULE ORAL at 20:29

## 2023-05-23 RX ADMIN — SEVELAMER CARBONATE 1600 MG: 800 TABLET, FILM COATED ORAL at 17:04

## 2023-05-23 RX ADMIN — DOXYCYCLINE 100 MG: 100 INJECTION, POWDER, LYOPHILIZED, FOR SOLUTION INTRAVENOUS at 00:41

## 2023-05-23 RX ADMIN — ARFORMOTEROL TARTRATE 15 MCG: 15 SOLUTION RESPIRATORY (INHALATION) at 05:25

## 2023-05-23 RX ADMIN — SEVELAMER CARBONATE 1600 MG: 800 TABLET, FILM COATED ORAL at 12:55

## 2023-05-23 RX ADMIN — APIXABAN 5 MG: 5 TABLET, FILM COATED ORAL at 20:29

## 2023-05-23 RX ADMIN — DOXYCYCLINE HYCLATE 100 MG: 100 CAPSULE ORAL at 12:56

## 2023-05-23 RX ADMIN — IPRATROPIUM BROMIDE 0.5 MG: 0.5 SOLUTION RESPIRATORY (INHALATION) at 05:25

## 2023-05-23 RX ADMIN — INSULIN LISPRO 2 UNITS: 100 INJECTION, SOLUTION INTRAVENOUS; SUBCUTANEOUS at 12:02

## 2023-05-23 RX ADMIN — METOPROLOL SUCCINATE 25 MG: 25 TABLET, EXTENDED RELEASE ORAL at 12:56

## 2023-05-23 RX ADMIN — INSULIN GLARGINE 34 UNITS: 100 INJECTION, SOLUTION SUBCUTANEOUS at 20:30

## 2023-05-23 RX ADMIN — BUDESONIDE 500 MCG: 0.5 SUSPENSION RESPIRATORY (INHALATION) at 18:25

## 2023-05-23 RX ADMIN — OMEGA-3-ACID ETHYL ESTERS 2 G: 1 CAPSULE, LIQUID FILLED ORAL at 17:04

## 2023-05-23 RX ADMIN — IPRATROPIUM BROMIDE 0.5 MG: 0.5 SOLUTION RESPIRATORY (INHALATION) at 14:37

## 2023-05-23 RX ADMIN — CEFTRIAXONE 1000 MG: 1 INJECTION, POWDER, FOR SOLUTION INTRAMUSCULAR; INTRAVENOUS at 12:56

## 2023-05-23 RX ADMIN — APIXABAN 5 MG: 5 TABLET, FILM COATED ORAL at 12:56

## 2023-05-23 RX ADMIN — POLYETHYLENE GLYCOL 3350 17 G: 17 POWDER, FOR SOLUTION ORAL at 22:32

## 2023-05-23 RX ADMIN — ACETAMINOPHEN 500 MG: 500 TABLET ORAL at 20:29

## 2023-05-23 RX ADMIN — ARFORMOTEROL TARTRATE 15 MCG: 15 SOLUTION RESPIRATORY (INHALATION) at 18:25

## 2023-05-23 RX ADMIN — BUDESONIDE 500 MCG: 0.5 SUSPENSION RESPIRATORY (INHALATION) at 05:25

## 2023-05-23 ASSESSMENT — PAIN DESCRIPTION - LOCATION: LOCATION: HEAD

## 2023-05-23 ASSESSMENT — PAIN SCALES - GENERAL: PAINLEVEL_OUTOF10: 6

## 2023-05-23 NOTE — PLAN OF CARE
Problem: ABCDS Injury Assessment  Goal: Absence of physical injury  5/23/2023 1043 by Delia Vasquez RN  Outcome: Progressing     Problem: Pain  Goal: Verbalizes/displays adequate comfort level or baseline comfort level  5/23/2023 1043 by Delia Vasquez RN  Outcome: Progressing     Problem: Safety - Adult  Goal: Free from fall injury  5/23/2023 1043 by Delia Vasquez RN  Outcome: Progressing     Problem: Nutrition Deficit:  Goal: Optimize nutritional status  5/23/2023 1043 by Delia Vasquez RN  Outcome: Progressing     Problem: Chronic Conditions and Co-morbidities  Goal: Patient's chronic conditions and co-morbidity symptoms are monitored and maintained or improved  5/23/2023 1043 by Delia Vasquez RN  Outcome: Progressing

## 2023-05-23 NOTE — FLOWSHEET NOTE
05/23/23 1145   Vital Signs   /68   Temp 97.8 °F (36.6 °C)   Pulse 87   Respirations 16   Weight - Scale 185 lb 13.6 oz (84.3 kg)   Weight Method Bed scale   Percent Weight Change -0.82   Post-Hemodialysis Assessment   Post-Treatment Procedures Blood returned;Catheter capped, clamped and heparinized x 2 ports   Machine Disinfection Process Acid/Vinegar Clean;Heat Disinfect; Bicarb Jug Disinfection; Exterior Machine Disinfection;Machine Absence of Bleach Machine;Verified Absence of Bleach in HCO3 Jug   Rinseback Volume (ml) 300 ml   Blood Volume Processed (Liters) 64.2 l/min   Dialyzer Clearance Lightly streaked   Duration of Treatment (minutes) 180 minutes   Hemodialysis Intake (ml) 300 ml   Hemodialysis Output (ml) 1800 ml   NET Removed (ml) 1500   Tolerated Treatment Good   Patient Response to Treatment HD completed as ordered. Catheter ran well, BVP 64.2, total UF 1500ml. Catheter flushed and capped per protocol. Return to room in stable condition.    Bilateral Breath Sounds Diminished   Edema Generalized   Edema Generalized Non-pitting   Time Off 1130   Patient Disposition Return to room

## 2023-05-23 NOTE — DISCHARGE INSTRUCTIONS
take your medications as prescribed. IF YOU EXPERIENCE ANY OF THE FOLLOWING SYMPTOMS, CHEST PAIN, SHORTNESS OF BREATH, COUGHING UP BLOOD OR BLOODY SPUTUM, STOMACH PAIN OR CRAMPING, DARK, TARRY STOOLS, LOSS OF APPETITE, GENERAL NOT FEELING WELL, SIGNS AND SYMPTOMS OF INFECTION LIKE FEVER AND OR CHILLS, PLEASE CALL DR NOWAK OR RETURN TO THE EMERGENCY ROOM. What to do after you leave the hospital:    Recommended diet: regular diet and diabetic diet    Recommended activity: activity as tolerated        The following personal items were collected during your admission and were returned to you:    Belongings  Dental Appliances: None  Vision - Corrective Lenses: Eyeglasses  Hearing Aid: None  Clothing: Footwear, Pants, Shirt  Jewelry: None  Electronic Devices: Cell Phone,   Weapons (Notify Protective Services/Security): None  Other Valuables: Wallet  Home Medications: None  Valuables Given To: Patient  Provide Name(s) of Who Valuable(s) Were Given To: patient    Information obtained by:  By signing below, I understand that if any problems occur once I leave the hospital I am to contact PCP. I understand and acknowledge receipt of the instructions indicated above.

## 2023-05-23 NOTE — PLAN OF CARE
Problem: ABCDS Injury Assessment  Goal: Absence of physical injury  5/22/2023 2043 by Sailaja Garces RN  Outcome: Progressing     Problem: Pain  Goal: Verbalizes/displays adequate comfort level or baseline comfort level  5/22/2023 2043 by Sailaja Garces RN  Outcome: Progressing     Problem: Safety - Adult  Goal: Free from fall injury  5/22/2023 2043 by Sailaja Garces RN  Outcome: Progressing     Problem: Nutrition Deficit:  Goal: Optimize nutritional status  Outcome: Progressing     Problem: Chronic Conditions and Co-morbidities  Goal: Patient's chronic conditions and co-morbidity symptoms are monitored and maintained or improved  Outcome: Progressing

## 2023-05-24 ENCOUNTER — APPOINTMENT (OUTPATIENT)
Dept: NON INVASIVE DIAGNOSTICS | Age: 68
DRG: 981 | End: 2023-05-24
Payer: MEDICARE

## 2023-05-24 ENCOUNTER — APPOINTMENT (OUTPATIENT)
Dept: NUCLEAR MEDICINE | Age: 68
DRG: 981 | End: 2023-05-24
Payer: MEDICARE

## 2023-05-24 LAB
ALBUMIN SERPL-MCNC: 3 G/DL (ref 3.5–5.2)
ALP SERPL-CCNC: 53 U/L (ref 40–129)
ALT SERPL-CCNC: 9 U/L (ref 0–40)
ANION GAP SERPL CALCULATED.3IONS-SCNC: 11 MMOL/L (ref 7–16)
AST SERPL-CCNC: 10 U/L (ref 0–39)
BASOPHILS # BLD: 0.02 E9/L (ref 0–0.2)
BASOPHILS NFR BLD: 0.3 % (ref 0–2)
BILIRUB SERPL-MCNC: 0.2 MG/DL (ref 0–1.2)
BUN SERPL-MCNC: 35 MG/DL (ref 6–23)
CALCIUM SERPL-MCNC: 9.3 MG/DL (ref 8.6–10.2)
CHLORIDE SERPL-SCNC: 97 MMOL/L (ref 98–107)
CO2 SERPL-SCNC: 28 MMOL/L (ref 22–29)
CREAT SERPL-MCNC: 7.2 MG/DL (ref 0.7–1.2)
EOSINOPHIL # BLD: 0.29 E9/L (ref 0.05–0.5)
EOSINOPHIL NFR BLD: 4.3 % (ref 0–6)
ERYTHROCYTE [DISTWIDTH] IN BLOOD BY AUTOMATED COUNT: 16.7 FL (ref 11.5–15)
GLUCOSE SERPL-MCNC: 160 MG/DL (ref 74–99)
HCT VFR BLD AUTO: 40.4 % (ref 37–54)
HGB BLD-MCNC: 11.9 G/DL (ref 12.5–16.5)
IMM GRANULOCYTES # BLD: 0.04 E9/L
IMM GRANULOCYTES NFR BLD: 0.6 % (ref 0–5)
LV EF: 75 %
LVEF MODALITY: NORMAL
LYMPHOCYTES # BLD: 1.36 E9/L (ref 1.5–4)
LYMPHOCYTES NFR BLD: 20.2 % (ref 20–42)
MCH RBC QN AUTO: 28.8 PG (ref 26–35)
MCHC RBC AUTO-ENTMCNC: 29.5 % (ref 32–34.5)
MCV RBC AUTO: 97.8 FL (ref 80–99.9)
METER GLUCOSE: 182 MG/DL (ref 74–99)
METER GLUCOSE: 216 MG/DL (ref 74–99)
MONOCYTES # BLD: 1.06 E9/L (ref 0.1–0.95)
MONOCYTES NFR BLD: 15.8 % (ref 2–12)
NEUTROPHILS # BLD: 3.95 E9/L (ref 1.8–7.3)
NEUTS SEG NFR BLD: 58.8 % (ref 43–80)
PLATELET # BLD AUTO: 273 E9/L (ref 130–450)
PMV BLD AUTO: 10 FL (ref 7–12)
POTASSIUM SERPL-SCNC: 5.2 MMOL/L (ref 3.5–5)
PROT SERPL-MCNC: 6.9 G/DL (ref 6.4–8.3)
RBC # BLD AUTO: 4.13 E12/L (ref 3.8–5.8)
SODIUM SERPL-SCNC: 136 MMOL/L (ref 132–146)
WBC # BLD: 6.7 E9/L (ref 4.5–11.5)

## 2023-05-24 PROCEDURE — 93016 CV STRESS TEST SUPVJ ONLY: CPT | Performed by: INTERNAL MEDICINE

## 2023-05-24 PROCEDURE — 85025 COMPLETE CBC W/AUTO DIFF WBC: CPT

## 2023-05-24 PROCEDURE — 3430000000 HC RX DIAGNOSTIC RADIOPHARMACEUTICAL: Performed by: RADIOLOGY

## 2023-05-24 PROCEDURE — 78452 HT MUSCLE IMAGE SPECT MULT: CPT

## 2023-05-24 PROCEDURE — 80053 COMPREHEN METABOLIC PANEL: CPT

## 2023-05-24 PROCEDURE — 99233 SBSQ HOSP IP/OBS HIGH 50: CPT | Performed by: INTERNAL MEDICINE

## 2023-05-24 PROCEDURE — 90935 HEMODIALYSIS ONE EVALUATION: CPT

## 2023-05-24 PROCEDURE — 94640 AIRWAY INHALATION TREATMENT: CPT

## 2023-05-24 PROCEDURE — 6370000000 HC RX 637 (ALT 250 FOR IP): Performed by: INTERNAL MEDICINE

## 2023-05-24 PROCEDURE — 36415 COLL VENOUS BLD VENIPUNCTURE: CPT

## 2023-05-24 PROCEDURE — 78452 HT MUSCLE IMAGE SPECT MULT: CPT | Performed by: INTERNAL MEDICINE

## 2023-05-24 PROCEDURE — 93018 CV STRESS TEST I&R ONLY: CPT | Performed by: INTERNAL MEDICINE

## 2023-05-24 PROCEDURE — 2700000000 HC OXYGEN THERAPY PER DAY

## 2023-05-24 PROCEDURE — 6360000002 HC RX W HCPCS: Performed by: INTERNAL MEDICINE

## 2023-05-24 PROCEDURE — 93017 CV STRESS TEST TRACING ONLY: CPT

## 2023-05-24 PROCEDURE — A9500 TC99M SESTAMIBI: HCPCS | Performed by: RADIOLOGY

## 2023-05-24 PROCEDURE — 1200000000 HC SEMI PRIVATE

## 2023-05-24 PROCEDURE — 82962 GLUCOSE BLOOD TEST: CPT

## 2023-05-24 RX ORDER — TETRAKIS(2-METHOXYISOBUTYLISOCYANIDE)COPPER(I) TETRAFLUOROBORATE 1 MG/ML
30 INJECTION, POWDER, LYOPHILIZED, FOR SOLUTION INTRAVENOUS
Status: COMPLETED | OUTPATIENT
Start: 2023-05-24 | End: 2023-05-24

## 2023-05-24 RX ORDER — TETRAKIS(2-METHOXYISOBUTYLISOCYANIDE)COPPER(I) TETRAFLUOROBORATE 1 MG/ML
10 INJECTION, POWDER, LYOPHILIZED, FOR SOLUTION INTRAVENOUS
Status: COMPLETED | OUTPATIENT
Start: 2023-05-24 | End: 2023-05-24

## 2023-05-24 RX ADMIN — ARFORMOTEROL TARTRATE 15 MCG: 15 SOLUTION RESPIRATORY (INHALATION) at 18:00

## 2023-05-24 RX ADMIN — SEVELAMER CARBONATE 1600 MG: 800 TABLET, FILM COATED ORAL at 17:12

## 2023-05-24 RX ADMIN — IPRATROPIUM BROMIDE 0.5 MG: 0.5 SOLUTION RESPIRATORY (INHALATION) at 18:00

## 2023-05-24 RX ADMIN — Medication 30 MILLICURIE: at 12:29

## 2023-05-24 RX ADMIN — REGADENOSON 0.4 MG: 0.08 INJECTION, SOLUTION INTRAVENOUS at 12:38

## 2023-05-24 RX ADMIN — BUDESONIDE 500 MCG: 0.5 SUSPENSION RESPIRATORY (INHALATION) at 06:31

## 2023-05-24 RX ADMIN — Medication 10 MILLICURIE: at 08:34

## 2023-05-24 RX ADMIN — BUDESONIDE 500 MCG: 0.5 SUSPENSION RESPIRATORY (INHALATION) at 18:00

## 2023-05-24 RX ADMIN — IPRATROPIUM BROMIDE 0.5 MG: 0.5 SOLUTION RESPIRATORY (INHALATION) at 06:31

## 2023-05-24 RX ADMIN — ARFORMOTEROL TARTRATE 15 MCG: 15 SOLUTION RESPIRATORY (INHALATION) at 06:31

## 2023-05-24 RX ADMIN — INSULIN LISPRO 2 UNITS: 100 INJECTION, SOLUTION INTRAVENOUS; SUBCUTANEOUS at 17:13

## 2023-05-24 ASSESSMENT — PAIN SCALES - GENERAL
PAINLEVEL_OUTOF10: 0
PAINLEVEL_OUTOF10: 0

## 2023-05-24 NOTE — PROCEDURES
[facility] Nuclear Stress Test:    Cardiologist: Dr. Michael Kirkpatrick MD    Date: 5/24/2023    Indications for study: Chest pain    No chest pain  No new arrhythmias  No EKG changes suggestive of stress induced ischemia  Nuclear images pending    Michael Kirkpatrick MD  [facility]) Cardiology

## 2023-05-25 LAB
ALBUMIN SERPL-MCNC: 3.2 G/DL (ref 3.5–5.2)
ALP SERPL-CCNC: 52 U/L (ref 40–129)
ALT SERPL-CCNC: 8 U/L (ref 0–40)
ANION GAP SERPL CALCULATED.3IONS-SCNC: 13 MMOL/L (ref 7–16)
AST SERPL-CCNC: 9 U/L (ref 0–39)
BASOPHILS # BLD: 0.02 E9/L (ref 0–0.2)
BASOPHILS NFR BLD: 0.3 % (ref 0–2)
BILIRUB SERPL-MCNC: 0.2 MG/DL (ref 0–1.2)
BUN SERPL-MCNC: 24 MG/DL (ref 6–23)
CALCIUM SERPL-MCNC: 9.3 MG/DL (ref 8.6–10.2)
CHLORIDE SERPL-SCNC: 99 MMOL/L (ref 98–107)
CO2 SERPL-SCNC: 22 MMOL/L (ref 22–29)
CREAT SERPL-MCNC: 5.2 MG/DL (ref 0.7–1.2)
EOSINOPHIL # BLD: 0.37 E9/L (ref 0.05–0.5)
EOSINOPHIL NFR BLD: 5.3 % (ref 0–6)
ERYTHROCYTE [DISTWIDTH] IN BLOOD BY AUTOMATED COUNT: 16.9 FL (ref 11.5–15)
GLUCOSE SERPL-MCNC: 122 MG/DL (ref 74–99)
HCT VFR BLD AUTO: 40.9 % (ref 37–54)
HGB BLD-MCNC: 12 G/DL (ref 12.5–16.5)
IMM GRANULOCYTES # BLD: 0.02 E9/L
IMM GRANULOCYTES NFR BLD: 0.3 % (ref 0–5)
LEGIONELLA AG UR QL: NORMAL
LYMPHOCYTES # BLD: 1.18 E9/L (ref 1.5–4)
LYMPHOCYTES NFR BLD: 17 % (ref 20–42)
MCH RBC QN AUTO: 29.1 PG (ref 26–35)
MCHC RBC AUTO-ENTMCNC: 29.3 % (ref 32–34.5)
MCV RBC AUTO: 99 FL (ref 80–99.9)
METER GLUCOSE: 104 MG/DL (ref 74–99)
METER GLUCOSE: 170 MG/DL (ref 74–99)
METER GLUCOSE: 175 MG/DL (ref 74–99)
METER GLUCOSE: 274 MG/DL (ref 74–99)
MONOCYTES # BLD: 0.91 E9/L (ref 0.1–0.95)
MONOCYTES NFR BLD: 13.1 % (ref 2–12)
NEUTROPHILS # BLD: 4.43 E9/L (ref 1.8–7.3)
NEUTS SEG NFR BLD: 64 % (ref 43–80)
PLATELET # BLD AUTO: 225 E9/L (ref 130–450)
PMV BLD AUTO: 10 FL (ref 7–12)
POTASSIUM SERPL-SCNC: 4.8 MMOL/L (ref 3.5–5)
PROT SERPL-MCNC: 7.5 G/DL (ref 6.4–8.3)
RBC # BLD AUTO: 4.13 E12/L (ref 3.8–5.8)
S PNEUM AG SPEC QL: NORMAL
SODIUM SERPL-SCNC: 134 MMOL/L (ref 132–146)
WBC # BLD: 6.9 E9/L (ref 4.5–11.5)

## 2023-05-25 PROCEDURE — 6360000002 HC RX W HCPCS: Performed by: INTERNAL MEDICINE

## 2023-05-25 PROCEDURE — 1200000000 HC SEMI PRIVATE

## 2023-05-25 PROCEDURE — 94640 AIRWAY INHALATION TREATMENT: CPT

## 2023-05-25 PROCEDURE — 2700000000 HC OXYGEN THERAPY PER DAY

## 2023-05-25 PROCEDURE — 6370000000 HC RX 637 (ALT 250 FOR IP): Performed by: INTERNAL MEDICINE

## 2023-05-25 PROCEDURE — 99233 SBSQ HOSP IP/OBS HIGH 50: CPT | Performed by: INTERNAL MEDICINE

## 2023-05-25 PROCEDURE — 85025 COMPLETE CBC W/AUTO DIFF WBC: CPT

## 2023-05-25 PROCEDURE — 87449 NOS EACH ORGANISM AG IA: CPT

## 2023-05-25 PROCEDURE — 36415 COLL VENOUS BLD VENIPUNCTURE: CPT

## 2023-05-25 PROCEDURE — 80053 COMPREHEN METABOLIC PANEL: CPT

## 2023-05-25 PROCEDURE — 82962 GLUCOSE BLOOD TEST: CPT

## 2023-05-25 RX ADMIN — SEVELAMER CARBONATE 1600 MG: 800 TABLET, FILM COATED ORAL at 08:15

## 2023-05-25 RX ADMIN — IPRATROPIUM BROMIDE 0.5 MG: 0.5 SOLUTION RESPIRATORY (INHALATION) at 06:41

## 2023-05-25 RX ADMIN — FLUTICASONE PROPIONATE 2 SPRAY: 50 SPRAY, METERED NASAL at 08:17

## 2023-05-25 RX ADMIN — SEVELAMER CARBONATE 1600 MG: 800 TABLET, FILM COATED ORAL at 17:31

## 2023-05-25 RX ADMIN — BUDESONIDE 500 MCG: 0.5 SUSPENSION RESPIRATORY (INHALATION) at 18:38

## 2023-05-25 RX ADMIN — DOXYCYCLINE HYCLATE 100 MG: 100 CAPSULE ORAL at 20:24

## 2023-05-25 RX ADMIN — DOXYCYCLINE HYCLATE 100 MG: 100 CAPSULE ORAL at 08:15

## 2023-05-25 RX ADMIN — INSULIN GLARGINE 34 UNITS: 100 INJECTION, SOLUTION SUBCUTANEOUS at 20:27

## 2023-05-25 RX ADMIN — BUDESONIDE 500 MCG: 0.5 SUSPENSION RESPIRATORY (INHALATION) at 06:41

## 2023-05-25 RX ADMIN — IPRATROPIUM BROMIDE 0.5 MG: 0.5 SOLUTION RESPIRATORY (INHALATION) at 09:36

## 2023-05-25 RX ADMIN — IPRATROPIUM BROMIDE 0.5 MG: 0.5 SOLUTION RESPIRATORY (INHALATION) at 15:20

## 2023-05-25 RX ADMIN — ARFORMOTEROL TARTRATE 15 MCG: 15 SOLUTION RESPIRATORY (INHALATION) at 06:41

## 2023-05-25 RX ADMIN — SEVELAMER CARBONATE 1600 MG: 800 TABLET, FILM COATED ORAL at 12:06

## 2023-05-25 RX ADMIN — OMEGA-3-ACID ETHYL ESTERS 2 G: 1 CAPSULE, LIQUID FILLED ORAL at 17:31

## 2023-05-25 RX ADMIN — IPRATROPIUM BROMIDE 0.5 MG: 0.5 SOLUTION RESPIRATORY (INHALATION) at 18:38

## 2023-05-25 RX ADMIN — METOPROLOL SUCCINATE 25 MG: 25 TABLET, EXTENDED RELEASE ORAL at 08:14

## 2023-05-25 RX ADMIN — ARFORMOTEROL TARTRATE 15 MCG: 15 SOLUTION RESPIRATORY (INHALATION) at 18:38

## 2023-05-25 RX ADMIN — CEFDINIR 300 MG: 300 CAPSULE ORAL at 08:15

## 2023-05-25 RX ADMIN — INSULIN LISPRO 4 UNITS: 100 INJECTION, SOLUTION INTRAVENOUS; SUBCUTANEOUS at 12:07

## 2023-05-25 NOTE — FLOWSHEET NOTE
05/24/23 2226   Vital Signs   BP (!) 140/80   Temp 98.2 °F (36.8 °C)   Pulse 75   Respirations 14   Weight - Scale 204 lb 12.9 oz (92.9 kg)   Weight Method Bed scale   Percent Weight Change -2   Pain Assessment   Pain Assessment None - Denies Pain   Pain Level 0   Post-Hemodialysis Assessment   Post-Treatment Procedures Blood returned;Catheter capped, clamped and heparinized x 2 ports   Machine Disinfection Process Acid/Vinegar Clean;Heat Disinfect; Exterior Machine Disinfection   Rinseback Volume (ml) 300 ml   Blood Volume Processed (Liters) 75.8 l/min   Dialyzer Clearance Moderately streaked   Duration of Treatment (minutes) 240 minutes   Heparin Amount Administered During Treatment (mL) 0 mL   Hemodialysis Intake (ml) 300 ml   Hemodialysis Output (ml) 2156 ml   NET Removed (ml) 1856   Tolerated Treatment Good   Patient Response to Treatment HD tx was ended 24 mins early d/t patient demanding that he wanted off treatment to go back to his room and go to bed, unable to find an AMA to be able to get patients signature, blood returned to patient per [facility] protocol, catheter clamps and caps secure x2, patient stable, return to room.    Bilateral Breath Sounds Diminished   Edema Generalized   Edema Generalized +1   Physician Notified No   Time Off 2200   Patient Disposition Return to room

## 2023-05-26 ENCOUNTER — ANESTHESIA (OUTPATIENT)
Dept: OPERATING ROOM | Age: 68
End: 2023-05-26
Payer: MEDICARE

## 2023-05-26 ENCOUNTER — ANESTHESIA EVENT (OUTPATIENT)
Dept: OPERATING ROOM | Age: 68
End: 2023-05-26
Payer: MEDICARE

## 2023-05-26 LAB
ALBUMIN SERPL-MCNC: 3.2 G/DL (ref 3.5–5.2)
ALP SERPL-CCNC: 51 U/L (ref 40–129)
ALT SERPL-CCNC: 8 U/L (ref 0–40)
ANION GAP SERPL CALCULATED.3IONS-SCNC: 13 MMOL/L (ref 7–16)
AST SERPL-CCNC: 9 U/L (ref 0–39)
BASOPHILS # BLD: 0.02 E9/L (ref 0–0.2)
BASOPHILS NFR BLD: 0.3 % (ref 0–2)
BILIRUB SERPL-MCNC: 0.2 MG/DL (ref 0–1.2)
BUN SERPL-MCNC: 44 MG/DL (ref 6–23)
CALCIUM SERPL-MCNC: 9.5 MG/DL (ref 8.6–10.2)
CHLORIDE SERPL-SCNC: 96 MMOL/L (ref 98–107)
CO2 SERPL-SCNC: 30 MMOL/L (ref 22–29)
CREAT SERPL-MCNC: 7.6 MG/DL (ref 0.7–1.2)
EOSINOPHIL # BLD: 0.38 E9/L (ref 0.05–0.5)
EOSINOPHIL NFR BLD: 5.3 % (ref 0–6)
ERYTHROCYTE [DISTWIDTH] IN BLOOD BY AUTOMATED COUNT: 17.1 FL (ref 11.5–15)
GLUCOSE SERPL-MCNC: 123 MG/DL (ref 74–99)
HCT VFR BLD AUTO: 40.4 % (ref 37–54)
HGB BLD-MCNC: 11.7 G/DL (ref 12.5–16.5)
IMM GRANULOCYTES # BLD: 0.02 E9/L
IMM GRANULOCYTES NFR BLD: 0.3 % (ref 0–5)
LYMPHOCYTES # BLD: 1.26 E9/L (ref 1.5–4)
LYMPHOCYTES NFR BLD: 17.4 % (ref 20–42)
MAGNESIUM SERPL-MCNC: 2.1 MG/DL (ref 1.6–2.6)
MCH RBC QN AUTO: 29.2 PG (ref 26–35)
MCHC RBC AUTO-ENTMCNC: 29 % (ref 32–34.5)
MCV RBC AUTO: 100.7 FL (ref 80–99.9)
METER GLUCOSE: 119 MG/DL (ref 74–99)
METER GLUCOSE: 148 MG/DL (ref 74–99)
METER GLUCOSE: 237 MG/DL (ref 74–99)
MONOCYTES # BLD: 0.85 E9/L (ref 0.1–0.95)
MONOCYTES NFR BLD: 11.8 % (ref 2–12)
NEUTROPHILS # BLD: 4.7 E9/L (ref 1.8–7.3)
NEUTS SEG NFR BLD: 64.9 % (ref 43–80)
PHOSPHATE SERPL-MCNC: 4.6 MG/DL (ref 2.5–4.5)
PLATELET # BLD AUTO: 217 E9/L (ref 130–450)
PMV BLD AUTO: 9.5 FL (ref 7–12)
POTASSIUM SERPL-SCNC: 5 MMOL/L (ref 3.5–5)
PROT SERPL-MCNC: 7 G/DL (ref 6.4–8.3)
RBC # BLD AUTO: 4.01 E12/L (ref 3.8–5.8)
SODIUM SERPL-SCNC: 139 MMOL/L (ref 132–146)
WBC # BLD: 7.2 E9/L (ref 4.5–11.5)

## 2023-05-26 PROCEDURE — 6360000002 HC RX W HCPCS: Performed by: INTERNAL MEDICINE

## 2023-05-26 PROCEDURE — 3600000004 HC SURGERY LEVEL 4 BASE: Performed by: SURGERY

## 2023-05-26 PROCEDURE — 6370000000 HC RX 637 (ALT 250 FOR IP): Performed by: INTERNAL MEDICINE

## 2023-05-26 PROCEDURE — 0YU54JZ SUPPLEMENT RIGHT INGUINAL REGION WITH SYNTHETIC SUBSTITUTE, PERCUTANEOUS ENDOSCOPIC APPROACH: ICD-10-PCS | Performed by: SURGERY

## 2023-05-26 PROCEDURE — 7100000001 HC PACU RECOVERY - ADDTL 15 MIN: Performed by: SURGERY

## 2023-05-26 PROCEDURE — 49650 LAP ING HERNIA REPAIR INIT: CPT | Performed by: SURGERY

## 2023-05-26 PROCEDURE — 3700000000 HC ANESTHESIA ATTENDED CARE: Performed by: SURGERY

## 2023-05-26 PROCEDURE — 6360000002 HC RX W HCPCS: Performed by: NURSE ANESTHETIST, CERTIFIED REGISTERED

## 2023-05-26 PROCEDURE — 2709999900 HC NON-CHARGEABLE SUPPLY: Performed by: SURGERY

## 2023-05-26 PROCEDURE — 3700000001 HC ADD 15 MINUTES (ANESTHESIA): Performed by: SURGERY

## 2023-05-26 PROCEDURE — 99233 SBSQ HOSP IP/OBS HIGH 50: CPT | Performed by: INTERNAL MEDICINE

## 2023-05-26 PROCEDURE — 2580000003 HC RX 258: Performed by: SURGERY

## 2023-05-26 PROCEDURE — 2720000010 HC SURG SUPPLY STERILE: Performed by: SURGERY

## 2023-05-26 PROCEDURE — 2500000003 HC RX 250 WO HCPCS: Performed by: SURGERY

## 2023-05-26 PROCEDURE — 2580000003 HC RX 258: Performed by: NURSE ANESTHETIST, CERTIFIED REGISTERED

## 2023-05-26 PROCEDURE — 6370000000 HC RX 637 (ALT 250 FOR IP): Performed by: STUDENT IN AN ORGANIZED HEALTH CARE EDUCATION/TRAINING PROGRAM

## 2023-05-26 PROCEDURE — 2700000000 HC OXYGEN THERAPY PER DAY

## 2023-05-26 PROCEDURE — 85025 COMPLETE CBC W/AUTO DIFF WBC: CPT

## 2023-05-26 PROCEDURE — 83735 ASSAY OF MAGNESIUM: CPT

## 2023-05-26 PROCEDURE — 6360000002 HC RX W HCPCS: Performed by: SURGERY

## 2023-05-26 PROCEDURE — 36415 COLL VENOUS BLD VENIPUNCTURE: CPT

## 2023-05-26 PROCEDURE — C1781 MESH (IMPLANTABLE): HCPCS | Performed by: SURGERY

## 2023-05-26 PROCEDURE — 7100000000 HC PACU RECOVERY - FIRST 15 MIN: Performed by: SURGERY

## 2023-05-26 PROCEDURE — 3600000014 HC SURGERY LEVEL 4 ADDTL 15MIN: Performed by: SURGERY

## 2023-05-26 PROCEDURE — 80053 COMPREHEN METABOLIC PANEL: CPT

## 2023-05-26 PROCEDURE — 84100 ASSAY OF PHOSPHORUS: CPT

## 2023-05-26 PROCEDURE — 90935 HEMODIALYSIS ONE EVALUATION: CPT

## 2023-05-26 PROCEDURE — 1200000000 HC SEMI PRIVATE

## 2023-05-26 PROCEDURE — 2500000003 HC RX 250 WO HCPCS: Performed by: NURSE ANESTHETIST, CERTIFIED REGISTERED

## 2023-05-26 PROCEDURE — 94640 AIRWAY INHALATION TREATMENT: CPT

## 2023-05-26 PROCEDURE — 82962 GLUCOSE BLOOD TEST: CPT

## 2023-05-26 DEVICE — 3DMAX LIGHT MESH, 10.3 CM X 15.7 CM (4.1" X 6.2"), RIGHT, LARGE
Type: IMPLANTABLE DEVICE | Site: ABDOMEN | Status: FUNCTIONAL
Brand: 3DMAX

## 2023-05-26 RX ORDER — MIDAZOLAM HYDROCHLORIDE 1 MG/ML
2 INJECTION INTRAMUSCULAR; INTRAVENOUS
Status: DISCONTINUED | OUTPATIENT
Start: 2023-05-26 | End: 2023-05-26 | Stop reason: HOSPADM

## 2023-05-26 RX ORDER — DIPHENHYDRAMINE HYDROCHLORIDE 50 MG/ML
12.5 INJECTION INTRAMUSCULAR; INTRAVENOUS
Status: DISCONTINUED | OUTPATIENT
Start: 2023-05-26 | End: 2023-05-26 | Stop reason: HOSPADM

## 2023-05-26 RX ORDER — ONDANSETRON 2 MG/ML
4 INJECTION INTRAMUSCULAR; INTRAVENOUS
Status: DISCONTINUED | OUTPATIENT
Start: 2023-05-26 | End: 2023-05-26 | Stop reason: HOSPADM

## 2023-05-26 RX ORDER — LABETALOL HYDROCHLORIDE 5 MG/ML
10 INJECTION, SOLUTION INTRAVENOUS
Status: DISCONTINUED | OUTPATIENT
Start: 2023-05-26 | End: 2023-05-26 | Stop reason: HOSPADM

## 2023-05-26 RX ORDER — HYDRALAZINE HYDROCHLORIDE 20 MG/ML
10 INJECTION INTRAMUSCULAR; INTRAVENOUS
Status: DISCONTINUED | OUTPATIENT
Start: 2023-05-26 | End: 2023-05-26 | Stop reason: HOSPADM

## 2023-05-26 RX ORDER — ACETAMINOPHEN 500 MG
1000 TABLET ORAL EVERY 8 HOURS SCHEDULED
Status: DISCONTINUED | OUTPATIENT
Start: 2023-05-26 | End: 2023-05-27 | Stop reason: HOSPADM

## 2023-05-26 RX ORDER — IPRATROPIUM BROMIDE AND ALBUTEROL SULFATE 2.5; .5 MG/3ML; MG/3ML
1 SOLUTION RESPIRATORY (INHALATION)
Status: DISCONTINUED | OUTPATIENT
Start: 2023-05-26 | End: 2023-05-26 | Stop reason: HOSPADM

## 2023-05-26 RX ORDER — SIMETHICONE 80 MG
80 TABLET,CHEWABLE ORAL EVERY 4 HOURS PRN
Status: DISCONTINUED | OUTPATIENT
Start: 2023-05-26 | End: 2023-05-27 | Stop reason: HOSPADM

## 2023-05-26 RX ORDER — DEXAMETHASONE SODIUM PHOSPHATE 10 MG/ML
INJECTION, SOLUTION INTRAMUSCULAR; INTRAVENOUS PRN
Status: DISCONTINUED | OUTPATIENT
Start: 2023-05-26 | End: 2023-05-26 | Stop reason: SDUPTHER

## 2023-05-26 RX ORDER — PROPOFOL 10 MG/ML
INJECTION, EMULSION INTRAVENOUS PRN
Status: DISCONTINUED | OUTPATIENT
Start: 2023-05-26 | End: 2023-05-26 | Stop reason: SDUPTHER

## 2023-05-26 RX ORDER — OXYCODONE HYDROCHLORIDE 5 MG/1
5 TABLET ORAL EVERY 6 HOURS PRN
Qty: 10 TABLET | Refills: 0 | Status: SHIPPED | OUTPATIENT
Start: 2023-05-26 | End: 2023-05-29

## 2023-05-26 RX ORDER — MIDAZOLAM HYDROCHLORIDE 1 MG/ML
INJECTION INTRAMUSCULAR; INTRAVENOUS PRN
Status: DISCONTINUED | OUTPATIENT
Start: 2023-05-26 | End: 2023-05-26 | Stop reason: SDUPTHER

## 2023-05-26 RX ORDER — SODIUM CHLORIDE 9 MG/ML
INJECTION, SOLUTION INTRAVENOUS CONTINUOUS PRN
Status: DISCONTINUED | OUTPATIENT
Start: 2023-05-26 | End: 2023-05-26 | Stop reason: SDUPTHER

## 2023-05-26 RX ORDER — SENNA AND DOCUSATE SODIUM 50; 8.6 MG/1; MG/1
2 TABLET, FILM COATED ORAL 2 TIMES DAILY
Qty: 28 TABLET | Refills: 0 | Status: SHIPPED | OUTPATIENT
Start: 2023-05-26

## 2023-05-26 RX ORDER — GLYCOPYRROLATE 1 MG/5 ML
SYRINGE (ML) INTRAVENOUS PRN
Status: DISCONTINUED | OUTPATIENT
Start: 2023-05-26 | End: 2023-05-26 | Stop reason: SDUPTHER

## 2023-05-26 RX ORDER — FENTANYL CITRATE 50 UG/ML
INJECTION, SOLUTION INTRAMUSCULAR; INTRAVENOUS PRN
Status: DISCONTINUED | OUTPATIENT
Start: 2023-05-26 | End: 2023-05-26 | Stop reason: SDUPTHER

## 2023-05-26 RX ORDER — FENTANYL CITRATE 0.05 MG/ML
25 INJECTION, SOLUTION INTRAMUSCULAR; INTRAVENOUS EVERY 5 MIN PRN
Status: DISCONTINUED | OUTPATIENT
Start: 2023-05-26 | End: 2023-05-26 | Stop reason: HOSPADM

## 2023-05-26 RX ORDER — PROCHLORPERAZINE EDISYLATE 5 MG/ML
5 INJECTION INTRAMUSCULAR; INTRAVENOUS
Status: DISCONTINUED | OUTPATIENT
Start: 2023-05-26 | End: 2023-05-26 | Stop reason: HOSPADM

## 2023-05-26 RX ORDER — BUPIVACAINE HYDROCHLORIDE AND EPINEPHRINE 2.5; 5 MG/ML; UG/ML
INJECTION, SOLUTION EPIDURAL; INFILTRATION; INTRACAUDAL; PERINEURAL PRN
Status: DISCONTINUED | OUTPATIENT
Start: 2023-05-26 | End: 2023-05-26 | Stop reason: ALTCHOICE

## 2023-05-26 RX ORDER — NALOXONE HYDROCHLORIDE 0.4 MG/ML
INJECTION, SOLUTION INTRAMUSCULAR; INTRAVENOUS; SUBCUTANEOUS PRN
Status: DISCONTINUED | OUTPATIENT
Start: 2023-05-26 | End: 2023-05-26 | Stop reason: SDUPTHER

## 2023-05-26 RX ORDER — ROCURONIUM BROMIDE 10 MG/ML
INJECTION, SOLUTION INTRAVENOUS PRN
Status: DISCONTINUED | OUTPATIENT
Start: 2023-05-26 | End: 2023-05-26 | Stop reason: SDUPTHER

## 2023-05-26 RX ORDER — ONDANSETRON 2 MG/ML
INJECTION INTRAMUSCULAR; INTRAVENOUS PRN
Status: DISCONTINUED | OUTPATIENT
Start: 2023-05-26 | End: 2023-05-26 | Stop reason: SDUPTHER

## 2023-05-26 RX ORDER — NEOSTIGMINE METHYLSULFATE 1 MG/ML
INJECTION, SOLUTION INTRAVENOUS PRN
Status: DISCONTINUED | OUTPATIENT
Start: 2023-05-26 | End: 2023-05-26 | Stop reason: SDUPTHER

## 2023-05-26 RX ORDER — METOPROLOL SUCCINATE 25 MG/1
25 TABLET, EXTENDED RELEASE ORAL DAILY
Status: DISCONTINUED | OUTPATIENT
Start: 2023-05-26 | End: 2023-05-27 | Stop reason: HOSPADM

## 2023-05-26 RX ORDER — LIDOCAINE HYDROCHLORIDE 20 MG/ML
INJECTION, SOLUTION INTRAVENOUS PRN
Status: DISCONTINUED | OUTPATIENT
Start: 2023-05-26 | End: 2023-05-26 | Stop reason: SDUPTHER

## 2023-05-26 RX ORDER — OXYCODONE HYDROCHLORIDE 5 MG/1
5 TABLET ORAL EVERY 4 HOURS PRN
Status: DISCONTINUED | OUTPATIENT
Start: 2023-05-26 | End: 2023-05-27 | Stop reason: HOSPADM

## 2023-05-26 RX ORDER — HYDROMORPHONE HYDROCHLORIDE 1 MG/ML
0.5 INJECTION, SOLUTION INTRAMUSCULAR; INTRAVENOUS; SUBCUTANEOUS EVERY 5 MIN PRN
Status: DISCONTINUED | OUTPATIENT
Start: 2023-05-26 | End: 2023-05-26 | Stop reason: HOSPADM

## 2023-05-26 RX ADMIN — ONDANSETRON 4 MG: 2 INJECTION INTRAMUSCULAR; INTRAVENOUS at 12:00

## 2023-05-26 RX ADMIN — Medication 3 MG: at 12:08

## 2023-05-26 RX ADMIN — PROPOFOL 30 MG: 10 INJECTION, EMULSION INTRAVENOUS at 11:20

## 2023-05-26 RX ADMIN — MIDAZOLAM 2 MG: 1 INJECTION INTRAMUSCULAR; INTRAVENOUS at 11:01

## 2023-05-26 RX ADMIN — ROCURONIUM BROMIDE 30 MG: 100 INJECTION INTRAVENOUS at 11:11

## 2023-05-26 RX ADMIN — DEXAMETHASONE SODIUM PHOSPHATE 10 MG: 10 INJECTION, SOLUTION INTRAMUSCULAR; INTRAVENOUS at 11:19

## 2023-05-26 RX ADMIN — ACETAMINOPHEN 1000 MG: 500 TABLET ORAL at 21:31

## 2023-05-26 RX ADMIN — ROCURONIUM BROMIDE 10 MG: 100 INJECTION INTRAVENOUS at 11:39

## 2023-05-26 RX ADMIN — FENTANYL CITRATE 150 MCG: 50 INJECTION, SOLUTION INTRAMUSCULAR; INTRAVENOUS at 11:11

## 2023-05-26 RX ADMIN — IPRATROPIUM BROMIDE 0.5 MG: 0.5 SOLUTION RESPIRATORY (INHALATION) at 18:46

## 2023-05-26 RX ADMIN — NALOXONE HYDROCHLORIDE 0.2 MG: 0.4 INJECTION, SOLUTION INTRAMUSCULAR; INTRAVENOUS; SUBCUTANEOUS at 12:18

## 2023-05-26 RX ADMIN — BUDESONIDE 500 MCG: 0.5 SUSPENSION RESPIRATORY (INHALATION) at 18:45

## 2023-05-26 RX ADMIN — METOPROLOL SUCCINATE 25 MG: 25 TABLET, EXTENDED RELEASE ORAL at 10:05

## 2023-05-26 RX ADMIN — SIMETHICONE 80 MG: 80 TABLET, CHEWABLE ORAL at 03:00

## 2023-05-26 RX ADMIN — FENTANYL CITRATE 50 MCG: 50 INJECTION, SOLUTION INTRAMUSCULAR; INTRAVENOUS at 11:28

## 2023-05-26 RX ADMIN — FENTANYL CITRATE 50 MCG: 50 INJECTION, SOLUTION INTRAMUSCULAR; INTRAVENOUS at 11:39

## 2023-05-26 RX ADMIN — ACETAMINOPHEN 1000 MG: 500 TABLET ORAL at 17:18

## 2023-05-26 RX ADMIN — CEFDINIR 300 MG: 300 CAPSULE ORAL at 18:03

## 2023-05-26 RX ADMIN — CEFAZOLIN 2000 MG: 2 INJECTION, POWDER, FOR SOLUTION INTRAMUSCULAR; INTRAVENOUS at 18:02

## 2023-05-26 RX ADMIN — DOXYCYCLINE HYCLATE 100 MG: 100 CAPSULE ORAL at 21:32

## 2023-05-26 RX ADMIN — PROPOFOL 40 MG: 10 INJECTION, EMULSION INTRAVENOUS at 11:30

## 2023-05-26 RX ADMIN — BUDESONIDE 500 MCG: 0.5 SUSPENSION RESPIRATORY (INHALATION) at 06:51

## 2023-05-26 RX ADMIN — LIDOCAINE HYDROCHLORIDE 100 MG: 20 INJECTION, SOLUTION INTRAVENOUS at 11:11

## 2023-05-26 RX ADMIN — ARFORMOTEROL TARTRATE 15 MCG: 15 SOLUTION RESPIRATORY (INHALATION) at 18:46

## 2023-05-26 RX ADMIN — ARFORMOTEROL TARTRATE 15 MCG: 15 SOLUTION RESPIRATORY (INHALATION) at 06:51

## 2023-05-26 RX ADMIN — PROPOFOL 130 MG: 10 INJECTION, EMULSION INTRAVENOUS at 11:11

## 2023-05-26 RX ADMIN — IPRATROPIUM BROMIDE 0.5 MG: 0.5 SOLUTION RESPIRATORY (INHALATION) at 09:43

## 2023-05-26 RX ADMIN — Medication 0.6 MG: at 12:08

## 2023-05-26 RX ADMIN — SODIUM CHLORIDE: 900 INJECTION, SOLUTION INTRAVENOUS at 11:01

## 2023-05-26 RX ADMIN — INSULIN GLARGINE 34 UNITS: 100 INJECTION, SOLUTION SUBCUTANEOUS at 21:32

## 2023-05-26 RX ADMIN — OMEGA-3-ACID ETHYL ESTERS 2 G: 1 CAPSULE, LIQUID FILLED ORAL at 18:03

## 2023-05-26 RX ADMIN — IPRATROPIUM BROMIDE 0.5 MG: 0.5 SOLUTION RESPIRATORY (INHALATION) at 06:51

## 2023-05-26 RX ADMIN — OXYCODONE 5 MG: 5 TABLET ORAL at 17:17

## 2023-05-26 RX ADMIN — SEVELAMER CARBONATE 1600 MG: 800 TABLET, FILM COATED ORAL at 18:03

## 2023-05-26 ASSESSMENT — PAIN DESCRIPTION - LOCATION: LOCATION: GROIN

## 2023-05-26 ASSESSMENT — ENCOUNTER SYMPTOMS: SHORTNESS OF BREATH: 1

## 2023-05-26 ASSESSMENT — LIFESTYLE VARIABLES: SMOKING_STATUS: 1

## 2023-05-26 ASSESSMENT — PAIN DESCRIPTION - ORIENTATION: ORIENTATION: RIGHT

## 2023-05-26 ASSESSMENT — PAIN SCALES - GENERAL
PAINLEVEL_OUTOF10: 9
PAINLEVEL_OUTOF10: 10

## 2023-05-26 ASSESSMENT — PAIN DESCRIPTION - DESCRIPTORS: DESCRIPTORS: ACHING

## 2023-05-26 NOTE — CARE COORDINATION
Ss note: 5/22/20233:26 PM Received call from Clair Duque at [facility] whom relays pt has had an insurance change and the last o2 order the DME had for pt was for 2-3 liters continuous, this was over 2 years ago. Baudilio at [facility] relays the DME co will need NEW OXYGEN ORDERS, NEW TESTING AND DX PRIOR TO DISCHARGE. [facility] and [facility]. Pt resides alone and relays he has a new phone number which sw provided to [facility]. Pt attends [facility] at 5 am and drives self.  of [facility]. Plan is home at discharge.  FLY Galo
Ss note: 5/23/20232:36 PM Pulse ox testing obtained along with oxygen order. Referral completed to Clair Dquue at [facility] and orders faxed. Will await delivery of portable tank to bedside for anticipated discharge home today. (New orders were needed due to insurance change). Pt attends [facility] at 5 am and drives self. Plan is home alone.  FLY De La Garza
Ss note: 5/24/20231:15 PM Per IDR pt for stress test today. Follow up call placed to Baudilio at [facility] and a portable tank was delivered to pts room yesterday and [facility], pt has concentrator at home PTA. Pt attends [address] [place] [address] at 5 am and drives self. Plan is home alone.  FLY Boogie
Ss note: 5/26/2023 2:06 PM Per IDR, pt for hernia repair surgery today. Presents from home alone in apt . PTA pt independent and drives self to \[facility]\"". MWF at 5 am. Pt has home 02 via [employer] and updated oxygen orders were obtained as pt had insurance change, a portable tank was also provided to room. Pt can follow up with PCP and [employer] for evaluation of smaller 02 tanks post discharge. Plan is home alone.  FLY Monson
with any other family members/significant others, and if so, who? No  Plans to Return to Present Housing: Yes  Other Identified Issues/Barriers to RETURNING to current housing: YES  Potential Assistance needed at discharge: N/A            Potential DME: has home 02 via [employer] and portable tanks. Patient expects to discharge to: [address] [place] [address] for transportation at discharge: will have transport. Financial    Payor: Delvis Carbajal / Plan: [employer] DUAL ADVANTAGE / Product Type: *No Product type* /         Potential assistance Purchasing Medications:    Meds-to-Beds request: Yes      [address] - Carla Shell POST [facility]), [place] - [address] [facility] [address] [facility] P [phone] Estefaníajaniaemil [place] [phone]  [address] [facility] [address] [facility]  Phone: [phone] Fax: [phone] [facility], [address] [place] [address] [address] [facility] [address]  [address]  [address] [place] [address]  [id number] [place] [address]  Phone: [phone] Fax: [phone]      Notes:    Ss note: 5/22/2023 .2:52 PM Met with pt, resides home alone apt with few steps to enter. PTA pt independent and drives self to [facility] [address] MWF at 5 am for HD treatment. 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 Pt has home 02 via [employer] and portable tanks, pt concerned he does not have enough portable tanks to go to [facility], sw has left message for Baudilio at [facility]. Hx of [facility], No hx of SNF. Follows with Dr. Mecca Camarena. [facility] in [place] to obtain medications. Plan is home at discharge. JEANA will follow.  FLY Forrester    The Plan for Transition of Care is related to the following treatment goals of Pleural effusion [J90]  COPD exacerbation (HCC) [J44.1]  ESRD on dialysis ([address] [N18.6, Z99.2]  Acute respiratory failure with hypoxia ([address] [J96.01]  Acute on chronic respiratory failure with hypoxia ([address] [J96.21]  Other hypervolemia [E87.79]        The Patient and/or Patient

## 2023-05-26 NOTE — OP NOTE
direct visualization, triangulating to the right groin. Electrocautery with scissors was used to create a peritoneal flap and the preperitoneal space was developed. Careful dissection with blunt graspers was used to completely develop the preperitoneal space to the pubic tubercle medially and the psoas tendon laterally. The cord structures were identified. Careful blunt dissection was used to free the cord lateral to the internal inguinal ring. The hernia sac, along with a cord lipoma, was carefully  from the cord structures and reduced. A Bard 3D Max Light mesh was introduced and positioned over the right myopectineal orifice. It was positioned to lay flat, covering the direct, indirect, and femoral spaces. Tacks were used to secure the mesh to the pubic tubercle, and laterally to the abdominal wall. The peritoneal flap was tacked to the anterior abdominal wall and the defect created via our dissection was approximated using a running 2-0 v-lock absorbable suture. The ports were removed and insufflation was allowed to evacuate from the abdomen. The skin incisions were approximated with dermal 4-0 Monocryl sutures. Skin glue was applied to the incisions. Needle, sponge, and instrument counts were reported as correct x2. Dr. Anival Matthews was present and scrubbed throughout the case. The patient tolerated the procedure well without complications. He was transferred to the recovery area in good condition. Electronically signed by Mali Rosenthal MD on 5/26/2023 at 12:21 PM   I was present and scrubbed for procedure.      Froylan Jarvis MD

## 2023-05-26 NOTE — ANESTHESIA POSTPROCEDURE EVALUATION
Department of Anesthesiology  Postprocedure Note    Patient: Jennifer Patterson  MRN: [id number]  YOB: 1955  Date of evaluation: 5/26/2023      Procedure Summary     Date: 05/26/23 Room / Location: [address] [place] [address]    Anesthesia Start: 1105 Anesthesia Stop: [phone]    Procedure: LAPAROSCOPIC RIGHT  INGUINAL HERNIA  REPAI (Right: Abdomen) Diagnosis:       Right inguinal hernia      (Right inguinal hernia [K40.90])    Surgeons: Samina Acevedo MD Responsible Provider: Leti Knowles MD    Anesthesia Type: general ASA Status: 4          Anesthesia Type: No value filed.     Nadeem Phase I: Nadeem Score: 8    Nadeem Phase II:        Anesthesia Post Evaluation    Patient location during evaluation: PACU  Patient participation: complete - patient participated  Level of consciousness: awake  Airway patency: patent  Nausea & Vomiting: no nausea and no vomiting  Complications: no  Cardiovascular status: hemodynamically stable  Respiratory status: acceptable  Hydration status: euvolemic

## 2023-05-26 NOTE — FLOWSHEET NOTE
05/26/23 1746   Vital Signs   /61   Temp 96.9 °F (36.1 °C)   Pulse 94   Respirations 18   Post-Hemodialysis Assessment   Post-Treatment Procedures Blood returned;Catheter capped, clamped and heparinized x 2 ports   Machine Disinfection Process Acid/Vinegar Clean;Heat Disinfect   Rinseback Volume (ml) 300 ml   Blood Volume Processed (Liters) 62 l/min   Dialyzer Clearance Lightly streaked   Duration of Treatment (minutes) 180 minutes   Heparin Amount Administered During Treatment (mL) 0 mL   Hemodialysis Intake (ml) 300 ml   Hemodialysis Output (ml) 2300 ml   NET Removed (ml) 2000   Patient Response to Treatment Tolerated treatment well. Returned to room in stable condition.    Bilateral Breath Sounds Diminished   Edema Generalized   Patient Disposition Return to room

## 2023-05-26 NOTE — ANESTHESIA PRE PROCEDURE
Department of Anesthesiology  Preprocedure Note       Name:  Ashly Novak   Age:  74 y.o.  :  1955                                          MRN:  [id number]         Date:  2023      Surgeon: Danilo Lucas):  Stacy Bee MD    Procedure: Procedure(s):  LAPAROSCOPIC RIGHT  INGUINAL HERNIA  REPAIR,POSSIBLE BILATERAL    Medications prior to admission:   Prior to Admission medications    Medication Sig Start Date End Date Taking?  Authorizing Provider   doxycycline hyclate (VIBRAMYCIN) 100 MG capsule Take 1 capsule by mouth every 12 hours for 10 doses 23  Yane Jimenez DO   cefdinir (OMNICEF) 300 MG capsule Take 1 capsule by mouth daily for 5 doses 23  Yane Jimenez DO   fluticasone (FLONASE) 50 MCG/ACT nasal spray 2 sprays by Nasal route daily 2 sprays in each nostril daily 23   Tyler Scott DO   albuterol sulfate HFA (PROVENTIL;VENTOLIN;PROAIR) 108 (90 Base) MCG/ACT inhaler  3/21/23   Historical Provider, MD   ELIQUHARMAN 5 MG TABS tablet 1 tablet 2 times daily 23   Historical Provider, MD   fluticasone-salmeterol (ADVAIR) 250-50 MCG/ACT AEPB diskus inhaler  23   Historical Provider, MD   VASCEPA 1 g CAPS capsule  23   Historical Provider, MD COTTER FLEXTOUCH 100 UNIT/ML SOPN 34 Units at bedtime 23   Historical Provider, MD   SPIRIVA RESPIMAT 2.5 MCG/ACT AERS inhaler  23   Historical Provider, MD   JARDIANCE 25 MG tablet  3/9/23   Historical Provider, MD   metoprolol succinate (TOPROL XL) 25 MG extended release tablet Take 1 tablet by mouth every other day 3/21/23   Clinton Patton MD   pantoprazole (PROTONIX) 40 MG tablet Take 1 tablet by mouth daily for 20 days 10/27/22 4/25/23  Walt Moss MD   [place]-3 1000 MG CAPS Take by mouth 2 times daily    Historical Provider, MD   insulin glargine (LANTUS) 100 UNIT/ML injection vial Inject 34 Units into the skin nightly 21   Yane Jimenez DO   sevelamer (RENVELA) 800 MG tablet

## 2023-05-27 VITALS
WEIGHT: 204.81 LBS | HEIGHT: 67 IN | SYSTOLIC BLOOD PRESSURE: 124 MMHG | DIASTOLIC BLOOD PRESSURE: 59 MMHG | RESPIRATION RATE: 16 BRPM | BODY MASS INDEX: 32.15 KG/M2 | HEART RATE: 72 BPM | TEMPERATURE: 97.5 F | OXYGEN SATURATION: 100 %

## 2023-05-27 LAB
ALBUMIN SERPL-MCNC: 2.9 G/DL (ref 3.5–5.2)
ALP SERPL-CCNC: 46 U/L (ref 40–129)
ALT SERPL-CCNC: 5 U/L (ref 0–40)
ANION GAP SERPL CALCULATED.3IONS-SCNC: 11 MMOL/L (ref 7–16)
ANION GAP SERPL CALCULATED.3IONS-SCNC: 13 MMOL/L (ref 7–16)
AST SERPL-CCNC: 7 U/L (ref 0–39)
BASOPHILS # BLD: 0.01 E9/L (ref 0–0.2)
BASOPHILS NFR BLD: 0.1 % (ref 0–2)
BILIRUB SERPL-MCNC: 0.2 MG/DL (ref 0–1.2)
BUN SERPL-MCNC: 35 MG/DL (ref 6–23)
BUN SERPL-MCNC: 38 MG/DL (ref 6–23)
CALCIUM SERPL-MCNC: 9 MG/DL (ref 8.6–10.2)
CALCIUM SERPL-MCNC: 9.7 MG/DL (ref 8.6–10.2)
CHLORIDE SERPL-SCNC: 94 MMOL/L (ref 98–107)
CHLORIDE SERPL-SCNC: 96 MMOL/L (ref 98–107)
CO2 SERPL-SCNC: 27 MMOL/L (ref 22–29)
CO2 SERPL-SCNC: 28 MMOL/L (ref 22–29)
CREAT SERPL-MCNC: 6.2 MG/DL (ref 0.7–1.2)
CREAT SERPL-MCNC: 6.4 MG/DL (ref 0.7–1.2)
EOSINOPHIL # BLD: 0.01 E9/L (ref 0.05–0.5)
EOSINOPHIL NFR BLD: 0.1 % (ref 0–6)
ERYTHROCYTE [DISTWIDTH] IN BLOOD BY AUTOMATED COUNT: 16.6 FL (ref 11.5–15)
GLUCOSE SERPL-MCNC: 192 MG/DL (ref 74–99)
GLUCOSE SERPL-MCNC: 194 MG/DL (ref 74–99)
HCT VFR BLD AUTO: 37.6 % (ref 37–54)
HGB BLD-MCNC: 11.5 G/DL (ref 12.5–16.5)
IMM GRANULOCYTES # BLD: 0.03 E9/L
IMM GRANULOCYTES NFR BLD: 0.3 % (ref 0–5)
LYMPHOCYTES # BLD: 0.92 E9/L (ref 1.5–4)
LYMPHOCYTES NFR BLD: 10.4 % (ref 20–42)
MAGNESIUM SERPL-MCNC: 2.2 MG/DL (ref 1.6–2.6)
MCH RBC QN AUTO: 29.1 PG (ref 26–35)
MCHC RBC AUTO-ENTMCNC: 30.6 % (ref 32–34.5)
MCV RBC AUTO: 95.2 FL (ref 80–99.9)
METER GLUCOSE: 183 MG/DL (ref 74–99)
METER GLUCOSE: 203 MG/DL (ref 74–99)
MONOCYTES # BLD: 1 E9/L (ref 0.1–0.95)
MONOCYTES NFR BLD: 11.3 % (ref 2–12)
NEUTROPHILS # BLD: 6.86 E9/L (ref 1.8–7.3)
NEUTS SEG NFR BLD: 77.8 % (ref 43–80)
PHOSPHATE SERPL-MCNC: 4.1 MG/DL (ref 2.5–4.5)
PLATELET # BLD AUTO: 237 E9/L (ref 130–450)
PMV BLD AUTO: 9.9 FL (ref 7–12)
POTASSIUM SERPL-SCNC: 5.2 MMOL/L (ref 3.5–5)
POTASSIUM SERPL-SCNC: 5.8 MMOL/L (ref 3.5–5)
PROT SERPL-MCNC: 6.6 G/DL (ref 6.4–8.3)
RBC # BLD AUTO: 3.95 E12/L (ref 3.8–5.8)
SODIUM SERPL-SCNC: 134 MMOL/L (ref 132–146)
SODIUM SERPL-SCNC: 135 MMOL/L (ref 132–146)
WBC # BLD: 8.8 E9/L (ref 4.5–11.5)

## 2023-05-27 PROCEDURE — 94640 AIRWAY INHALATION TREATMENT: CPT

## 2023-05-27 PROCEDURE — 6370000000 HC RX 637 (ALT 250 FOR IP): Performed by: INTERNAL MEDICINE

## 2023-05-27 PROCEDURE — 82962 GLUCOSE BLOOD TEST: CPT

## 2023-05-27 PROCEDURE — 80053 COMPREHEN METABOLIC PANEL: CPT

## 2023-05-27 PROCEDURE — 6370000000 HC RX 637 (ALT 250 FOR IP): Performed by: STUDENT IN AN ORGANIZED HEALTH CARE EDUCATION/TRAINING PROGRAM

## 2023-05-27 PROCEDURE — 84100 ASSAY OF PHOSPHORUS: CPT

## 2023-05-27 PROCEDURE — 6360000002 HC RX W HCPCS: Performed by: INTERNAL MEDICINE

## 2023-05-27 PROCEDURE — 80048 BASIC METABOLIC PNL TOTAL CA: CPT

## 2023-05-27 PROCEDURE — 83735 ASSAY OF MAGNESIUM: CPT

## 2023-05-27 PROCEDURE — 85025 COMPLETE CBC W/AUTO DIFF WBC: CPT

## 2023-05-27 PROCEDURE — 36415 COLL VENOUS BLD VENIPUNCTURE: CPT

## 2023-05-27 PROCEDURE — 2700000000 HC OXYGEN THERAPY PER DAY

## 2023-05-27 RX ADMIN — SEVELAMER CARBONATE 1600 MG: 800 TABLET, FILM COATED ORAL at 08:39

## 2023-05-27 RX ADMIN — ACETAMINOPHEN 1000 MG: 500 TABLET ORAL at 05:32

## 2023-05-27 RX ADMIN — IPRATROPIUM BROMIDE 0.5 MG: 0.5 SOLUTION RESPIRATORY (INHALATION) at 06:36

## 2023-05-27 RX ADMIN — FLUTICASONE PROPIONATE 2 SPRAY: 50 SPRAY, METERED NASAL at 08:39

## 2023-05-27 RX ADMIN — CEFDINIR 300 MG: 300 CAPSULE ORAL at 08:40

## 2023-05-27 RX ADMIN — ARFORMOTEROL TARTRATE 15 MCG: 15 SOLUTION RESPIRATORY (INHALATION) at 06:36

## 2023-05-27 RX ADMIN — BUDESONIDE 500 MCG: 0.5 SUSPENSION RESPIRATORY (INHALATION) at 06:36

## 2023-05-27 RX ADMIN — INSULIN LISPRO 2 UNITS: 100 INJECTION, SOLUTION INTRAVENOUS; SUBCUTANEOUS at 08:40

## 2023-05-27 RX ADMIN — DOXYCYCLINE HYCLATE 100 MG: 100 CAPSULE ORAL at 08:40

## 2023-05-27 RX ADMIN — ACETAMINOPHEN 500 MG: 500 TABLET ORAL at 08:42

## 2023-05-27 RX ADMIN — METOPROLOL SUCCINATE 25 MG: 25 TABLET, EXTENDED RELEASE ORAL at 08:40

## 2023-05-27 ASSESSMENT — PAIN SCALES - GENERAL: PAINLEVEL_OUTOF10: 7

## 2023-05-27 ASSESSMENT — PAIN DESCRIPTION - DESCRIPTORS: DESCRIPTORS: THROBBING

## 2023-05-27 ASSESSMENT — PAIN DESCRIPTION - ORIENTATION: ORIENTATION: RIGHT

## 2023-05-27 ASSESSMENT — PAIN DESCRIPTION - LOCATION: LOCATION: GROIN

## 2023-05-27 NOTE — PLAN OF CARE
Problem: ABCDS Injury Assessment  Goal: Absence of physical injury  Outcome: Progressing     Problem: Pain  Goal: Verbalizes/displays adequate comfort level or baseline comfort level  Outcome: Progressing     Problem: Safety - Adult  Goal: Free from fall injury  Outcome: Progressing     Problem: Nutrition Deficit:  Goal: Optimize nutritional status  Outcome: Progressing     Problem: Chronic Conditions and Co-morbidities  Goal: Patient's chronic conditions and co-morbidity symptoms are monitored and maintained or improved  Outcome: Progressing     Problem: Discharge Planning  Goal: Discharge to home or other facility with appropriate resources  Outcome: Progressing

## 2023-05-27 NOTE — PROGRESS NOTES
responded to a spiritual care consult. Patient stated he was overwhelmed and tired.  and patient listened to \"Up [email or web address] this mountain\" by Dannial Klinefelter. Patient was very grateful for the song and would like to listen to it again.  offered a listening presence, emptathy, music, prayer and a blessing. Spiritual care is ongoing and as needed.
BMP sent to Dr. Justine San 5.2- Plan is for the patient to get dialysis before he is d/c. The RN, Eri Crystal, informed this nurse that dialysis cant get the patient until this evening. The patiet does not want to wait and his ride is here and he wants to leave. The RN Eri Crystal did inform the patient that his K is high and needs HD. Dr. Naila Art and Dr. Tere Anna aware.
CLINICAL PHARMACY NOTE: MEDS TO BEDS    Total # of Prescriptions Filled: 1   The following medications were delivered to the patient:  Oxycodone 5 mg    Additional Documentation:    Discharge medication left in patient's room with other personal items.
CLINICAL PHARMACY NOTE: MEDS TO BEDS    Total # of Prescriptions Filled: 2   The following medications were delivered to the patient:  Doxycycline Hyclate 100 mg capsules  Cefdinir 300 mg    Additional Documentation:
Comprehensive Nutrition Assessment    Type and Reason for Visit:  Initial, Positive Nutrition Screen    Nutrition Recommendations/Plan:   Continue current diet   Renal ONS initiated; recommend to modify to BID as pt with good appetite and oral intakes; will monitor. Malnutrition Assessment:  Malnutrition Status: At risk for malnutrition (Comment) (05/22/23 5485)    Context:  Chronic Illness     Findings of the 6 clinical characteristics of malnutrition:  Energy Intake:  No significant decrease in energy intake (Notes good oral intake PTA.)  Weight Loss:  Unable to assess (LARISSA d/t ESRD on HD, + fluid shifts. Pt denies any recent unintentional losses)     Body Fat Loss:  No significant body fat loss     Muscle Mass Loss:  Mild muscle mass loss Temples (temporalis), Clavicles (pectoralis & deltoids), Thigh (quadraceps)  Fluid Accumulation:  Unable to assess     Strength:  Not Performed    Nutrition Assessment:    Pt admitted with acute on chronic respiratory failure. Pt w/ right inguinal hernia. PMHx ESRD on HD; COPD, Type 2 DM, HTN, HLD. ONS initiated; will modify ONS to BID as pt with good appetite and continue to monitor. Nutrition Related Findings:    A/Ox 4; -I/Os; +3L NC O2; abd round, soft, active BS, + constipation; generalized non pitting edema; elevated renals labs, hyperkalemia. A1C = 7.6. Wound Type: None (noted old healed sternum scar)       Current Nutrition Intake & Therapies:    Average Meal Intake: % (x 2 meals)  Average Supplements Intake: Unable to assess (ordered)  ADULT DIET; Regular; 5 carb choices (75 gm/meal)  ADULT ORAL NUTRITION SUPPLEMENT; Breakfast, Lunch, Dinner; Renal Oral Supplement    Anthropometric Measures:  Height: 5' 7\" (170.2 cm)  Ideal Body Weight (IBW): 148 lbs (67 kg)       Current Body Weight: 186 lb 15.2 oz (84.8 kg) (5/22 stated), 126.3 % IBW. Weight Source: Stated  Current BMI (kg/m2): 29.3  Usual Body Weight:  (LARISSA d/t +ESRD on HD, Fluid shifts.  Note
Date:5/23/23     This patient's order for doxycycline IV has been changed to PO as approved by the Pharmacy & Therapeutics and Medical Executive Committees. If the patient should become strict NPO while on this therapy, contact the prescriber for further orders.
Department of Internal Medicine        CHIEF COMPLAINT: Shortness of breath    Reason for Admission: Acute on chronic hypoxic respiratory failure    HISTORY OF PRESENT ILLNESS:      The patient is a 74 y.o. male who presents with increased shortness of breath that started about 48 hours prior to admission. Patient also has some occasional lower abdominal pain which he states is from someone telling him he has a hernia. Patient denies any fever/chills, nausea/vomiting, chest pain, dizziness. Occasional nonproductive cough. Patient has a history of COPD and also has a history of chronic renal failure with hemodialysis with the patient though not missing any of his appointments and has been taking his medicines and watches his fluid and salt intake. Routine labs showed a proBNP of 56,000 with elevated troponin 318. CO2 electrolytes was 28. Liver enzymes normal and a WBC 9.0 and hemoglobin 12.1. Temperature 98.1 with heart rate in 90 and blood pressure 136/67. O2 sat 98% on 3 L. Chest x-ray showed COPD with superimposed interstitial lung disease. There was a right upper lobe airspace disease and trace left pleural effusion. 5/22/2023  Patient seen and examined on telemetry floor. Patient feels really good. Patient bradycardia is improved. He denies any chest or abdominal pain serum potassium 5.6 today with blood sugars ranging 2 . Transaminases normal with a WBC CEA 10.1 hemoglobin 0.8. Temperature 97.4 with heart rate 16 blood pressure 127/64. O2 sat 96% on 3 L nasal cannula. 5/23/2023  Patient seen and examined on telemetry floor. Patient feels a bit better. Patient denies any chest pain, abdominal pain, nausea vomiting. Patient breathing is slowly improving. BUN/creatinine 36/8.1. Serum potassium 4.2. Blood sugars range 164-217. Transaminases normal W BC 10.1 hemoglobin 11.5. Temperature is 97.8 with heart rate 87 blood pressure 133/68. O2 sat 95% on 3 nasal cannula.
Dr. Mateo Lopez made aware this pt had 11beats of vtach
GENERAL SURGERY  DAILY PROGRESS NOTE  2023  CC: SOB    Subjective:  No acute events. Ready for operating room      Objective:  Last 24Hrs  Temp  Av.8 °F (37.7 °C)  Min: 99.8 °F (37.7 °C)  Max: 99.8 °F (37.7 °C)  Resp  Av  Min: 18  Max: 18  Pulse  Av.5  Min: 77  Max: 78  Systolic (41FUS), IPD:021 , Min:126 , YLT:741     Diastolic (73SMZ), OYD:84, Min:64, Max:66    SpO2  Av.8 %  Min: 95 %  Max: 99 %    I/O last 3 completed shifts: In: 1100 [P.O.:800]  Out: 2156       General: Lying in bed, NAD  Cardiovascular: Warm throughout  Respiratory: Equal chest rise bilaterally, on supplemental oxygen, no audible wheezing  Abdomen: soft, NTTP throughout, ND; right groin with bulge with associated tenderness to palpation, no obvious erythema  Skin: no obvious rashes or lesions appreciated  Extremities: atraumatic, no focal motor deficits, no open wounds      CBC  Recent Labs     23  0507 23  0749 23  0452   WBC 6.7 6.9 7.2   RBC 4.13 4.13 4.01   HGB 11.9* 12.0* 11.7*   HCT 40.4 40.9 40.4   MCV 97.8 99.0 100.7*   MCH 28.8 29.1 29.2   MCHC 29.5* 29.3* 29.0*   RDW 16.7* 16.9* 17.1*    225 217   MPV 10.0 10.0 9.5         CMP  Recent Labs     23  0507 23  0749 23  0452    134 139   K 5.2* 4.8 5.0   CL 97* 99 96*   CO2 28 22 30*   BUN 35* 24* 44*   CREATININE 7.2* 5.2* 7.6*   GLUCOSE 160* 122* 123*   CALCIUM 9.3 9.3 9.5   PROT 6.9 7.5 7.0   LABALBU 3.0* 3.2* 3.2*   BILITOT 0.2 0.2 0.2   ALKPHOS 53 52 51   AST 10 9 9   ALT 9 8 8           Assessment/Plan:  74 y.o. male with right inguinal hernia    Low risk stress test from the , echo shows normal ejection fraction. Patient currently on 3 L-has pulmonary hypertension secondary to intrinsic lung disease. Deemed appropriate risk by the anesthesia team yesterday.   We will plan on operative fixation of his inguinal hernia today     Continue to hold Anna Bowden MD  General Surgery
GENERAL SURGERY  DAILY PROGRESS NOTE  2023  CC: SOB    Subjective:  Reports that his shortness of breath is better than prior. Still having some pain in his right groin. Better than before. Having bowel function. No abdominal pain. Objective:  Last 24Hrs  Temp  Av °F (36.7 °C)  Min: 97.6 °F (36.4 °C)  Max: 98.4 °F (36.9 °C)  Resp  Av.8  Min: 20  Max: 22  Pulse  Av.7  Min: 84  Max: 580  Systolic (58JPS), TIK:919 , Min:95 , BNS:625     Diastolic (99RPA), FFC:09, Min:65, Max:84    SpO2  Av %  Min: 60 %  Max: 98 %    No intake/output data recorded. General: Lying in bed, NAD  Cardiovascular: Warm throughout  Respiratory: Equal chest rise bilaterally, on supplemental oxygen, no audible wheezing  Abdomen: soft, NTTP throughout, ND; right groin with bulge with associated tenderness to palpation, positive impulse, no obvious erythema  Skin: no obvious rashes or lesions appreciated  Extremities: atraumatic, no focal motor deficits, no open wounds      CBC  Recent Labs     23  0747 23  0441   WBC 9.0 10.7   RBC 4.09 3.96   HGB 12.1* 11.8*   HCT 39.0 37.2   MCV 95.4 93.9   MCH 29.6 29.8   MCHC 31.0* 31.7*   RDW 16.3* 16.1*    277   MPV 10.1 10.3       CMP  Recent Labs     23  0747 23  0441    132   K 4.3 5.6*   CL 87* 90*   CO2 28 28   BUN 33* 49*   CREATININE 10.9* 12.5*   GLUCOSE 166* 295*   CALCIUM 9.5 9.3   PROT 8.1 7.8   LABALBU 3.4* 3.2*   BILITOT 0.7 0.3   ALKPHOS 53 53   AST 9 10   ALT 6 5         Assessment/Plan:  74 y.o. male with right inguinal hernia. Not the best operative candidate at this time. Presented initially with shortness of breath. Shortness of breath is better. We would like to have patient optimized for surgery so that he has a best chance to decrease his risk of recurrence. We have no plans for operative intervention while he is in the hospital at this time.   We will request that the patient is evaluated by in order
I informed pt that his potassium is still high and his doctor wants him to have dialysis this evening. Pt declines and wants to be discharged presently.
INPATIENT CARDIOLOGY Follow UP    Name: Aury Pompa    Age: 74 y.o. Date of Admission: 5/21/2023  7:37 AM    Date of Service: 5/26/2023      Referring Physician: Tamara Conn DO      Subjective:  Echocardiogram as shown below showing normal systolic function but severe pulmonary hypertension. Stress test was fine. Underwent laparoscopic assisted hernia repair without any reported cardiovascular complications  Cardiology will sign off. Please call with questions. Transthoracic echocardiogram May 22, 2023  Conclusions      Summary   Technically difficult examination. Micro-bubble contrast injected to enhance left ventricular visualization. Ejection fraction is visually estimated at 60 to 65%. Mild left ventricular concentric hypertrophy noted. The left atrium is mildly dilated. Normal right ventricular size and function. Mild mitral regurgitation is present. Mild aortic regurgitation is noted. The aortic valve appears mildly sclerotic. No hemodynamically significant aortic stenosis is present. Mild to moderate tricuspid regurgitation. RVSP is 70 mmHg. Pulmonary hypertension is severe .       Past Medical History:  Past Medical History:   Diagnosis Date    Back pain     Diabetes mellitus ([facility] [address]     DMII (diabetes mellitus, type 2) ([facility] [address] 7/28/2015    Hemodialysis patient ([facility] [address]     History of cardiovascular stress test 2/16/2015    lexiscan    HTN (hypertension) 7/28/2015    Hyperlipidemia     Hypertension     Lumbar radicular pain 7/28/2015    Oxygen dependent     wears 2 liters at home    Type II or unspecified type diabetes mellitus without mention of complication, not stated as uncontrolled        Past Surgical History:  Past Surgical History:   Procedure Laterality Date    CARDIOVASCULAR STRESS TEST N/A 05/24/2023    Lexiscan stress test    DIALYSIS FISTULA CREATION Right 02/23/2023    AV FISTULA CREATION RIGHT ARM performed by Leah Winn MD at [facility]
INPATIENT CARDIOLOGY Follow UP    Name: Ayaz Galeano    Age: 74 y.o. Date of Admission: 5/21/2023  7:37 AM    Date of Service: 5/25/2023      Referring Physician: Yareli Ruiz DO      Subjective:  Echocardiogram as shown below showing normal systolic function but severe pulmonary hypertension. Stress test was fine. Report undergoing surgery tomorrow    Transthoracic echocardiogram May 22, 2023  Conclusions      Summary   Technically difficult examination. Micro-bubble contrast injected to enhance left ventricular visualization. Ejection fraction is visually estimated at 60 to 65%. Mild left ventricular concentric hypertrophy noted. The left atrium is mildly dilated. Normal right ventricular size and function. Mild mitral regurgitation is present. Mild aortic regurgitation is noted. The aortic valve appears mildly sclerotic. No hemodynamically significant aortic stenosis is present. Mild to moderate tricuspid regurgitation. RVSP is 70 mmHg. Pulmonary hypertension is severe .       Past Medical History:  Past Medical History:   Diagnosis Date    Back pain     Diabetes mellitus ([facility] [address]     DMII (diabetes mellitus, type 2) ([facility] [address] 7/28/2015    Hemodialysis patient ([facility] [address]     History of cardiovascular stress test 2/16/2015    lexiscan    HTN (hypertension) 7/28/2015    Hyperlipidemia     Hypertension     Lumbar radicular pain 7/28/2015    Oxygen dependent     wears 2 liters at home    Type II or unspecified type diabetes mellitus without mention of complication, not stated as uncontrolled        Past Surgical History:  Past Surgical History:   Procedure Laterality Date    CARDIOVASCULAR STRESS TEST N/A 05/24/2023    Lexiscan stress test    DIALYSIS FISTULA CREATION Right 02/23/2023    AV FISTULA CREATION RIGHT ARM performed by Anuradha Randle MD at [facility] Left 06/06/2014    cain bunionectomy left foot with osteomed screw x1    SHOULDER SURGERY
INPATIENT CARDIOLOGY Follow UP    Name: Jade Aguillon    Age: 74 y.o. Date of Admission: 5/21/2023  7:37 AM    Date of Service: 5/22/2023      Referring Physician: Mendel Lindsey DO      Subjective: Will obtain echocardiogram to guide therapy for his chronic elevated cardiac enzyme.               Past Medical History:  Past Medical History:   Diagnosis Date    Back pain     Diabetes mellitus ([facility] [address]     DMII (diabetes mellitus, type 2) ([facility] [address] 7/28/2015    Hemodialysis patient ([facility] [address]     History of cardiovascular stress test 2/16/2015    lexiscan    HTN (hypertension) 7/28/2015    Hyperlipidemia     Hypertension     Lumbar radicular pain 7/28/2015    Oxygen dependent     wears 2 liters at home    Type II or unspecified type diabetes mellitus without mention of complication, not stated as uncontrolled        Past Surgical History:  Past Surgical History:   Procedure Laterality Date    DIALYSIS FISTULA CREATION Right 2/23/2023    AV FISTULA CREATION RIGHT ARM performed by Roberto Ballard MD at [facility] Left 06/06/14    cain bunionectomy left foot with osteomed screw x1    SHOULDER SURGERY      Bilateral    VEIN SURGERY         Family History:  Family History   Problem Relation Age of Onset    Kidney Disease Sister     Diabetes Mother     Diabetes Father        Social History:  Social History     Socioeconomic History    Marital status: Single     Spouse name: Not on file    Number of children: Not on file    Years of education: Not on file    Highest education level: Not on file   Occupational History    Not on file   Tobacco Use    Smoking status: Every Day     Packs/day: 1.00     Years: 30.00     Pack years: 30.00     Types: Cigarettes    Smokeless tobacco: Never   Vaping Use    Vaping Use: Never used   Substance and Sexual Activity    Alcohol use: No    Drug use: No    Sexual activity: Not on file   Other Topics Concern    Not on file   Social History Narrative    Not on file
INPATIENT CARDIOLOGY Follow UP    Name: Jasper Ayala    Age: 74 y.o. Date of Admission: 5/21/2023  7:37 AM    Date of Service: 5/23/2023      Referring Physician: Lisa Carlin DO      Subjective:  Echocardiogram as shown below showing normal systolic function but severe pulmonary hypertension. Transthoracic echocardiogram May 22, 2023  Conclusions      Summary   Technically difficult examination. Micro-bubble contrast injected to enhance left ventricular visualization. Ejection fraction is visually estimated at 60 to 65%. Mild left ventricular concentric hypertrophy noted. The left atrium is mildly dilated. Normal right ventricular size and function. Mild mitral regurgitation is present. Mild aortic regurgitation is noted. The aortic valve appears mildly sclerotic. No hemodynamically significant aortic stenosis is present. Mild to moderate tricuspid regurgitation. RVSP is 70 mmHg. Pulmonary hypertension is severe .       Past Medical History:  Past Medical History:   Diagnosis Date    Back pain     Diabetes mellitus ([facility] [address]     DMII (diabetes mellitus, type 2) ([facility] [address] 7/28/2015    Hemodialysis patient ([facility] [address]     History of cardiovascular stress test 2/16/2015    lexiscan    HTN (hypertension) 7/28/2015    Hyperlipidemia     Hypertension     Lumbar radicular pain 7/28/2015    Oxygen dependent     wears 2 liters at home    Type II or unspecified type diabetes mellitus without mention of complication, not stated as uncontrolled        Past Surgical History:  Past Surgical History:   Procedure Laterality Date    DIALYSIS FISTULA CREATION Right 2/23/2023    AV FISTULA CREATION RIGHT ARM performed by Ernie Francois MD at [facility] Left 06/06/14    cain bunionectomy left foot with osteomed screw x1    SHOULDER SURGERY      Bilateral    VEIN SURGERY         Family History:  Family History   Problem Relation Age of Onset    Kidney Disease Sister
Nephrology Note  Helaine Kussmaul MD          Patient seen and examined. No family member is present at bedside. Chart reviewed. Patient had another hemodialysis treatment today with a net fluid removal of 1500 cc. Catheter had good flows today. Patient is feeling better. Is less short of breath. Appetite good. No nausea or dysguesia. Awake and alert . In no acute distress. Vital SignsBP 125/63   Pulse 88   Temp 97.8 °F (36.6 °C)   Resp 16   Ht 5' 7\" (1.702 m)   Wt 185 lb 13.6 oz (84.3 kg)   SpO2 95%   BMI 29.11 kg/m²   24HR INTAKE/OUTPUT:    Intake/Output Summary (Last 24 hours) at 5/23/2023 1303  Last data filed at 5/23/2023 1145  Gross per 24 hour   Intake 880 ml   Output 1800 ml   Net -920 ml         Physical Exam    Neck: No JVD  Chest: A tunneled hemodialysis catheter is present in the left infraclavicular region with no drainage at the exit site. Lungs: Breath sounds decreased at the bases. No rales or ronchi. Heart: Regular rate and rhythm. No S3 gallop. No murmrur. Abdomen: Soft non distended, non tender and normal bowel sounds. Extremeties: Trace bipedal edema. AV fistula in the right arm with a bruit and thrill.     Current Facility-Administered Medications   Medication Dose Route Frequency Provider Last Rate Last Admin    doxycycline hyclate (VIBRAMYCIN) capsule 100 mg  100 mg Oral 2 times per day Evaline Joseph, DO   100 mg at 05/23/23 1256    cefdinir (OMNICEF) capsule 300 mg  300 mg Oral Daily Pramod Rolle, DO        0.9 % sodium chloride bolus  1,000 mL IntraVENous Once Pink Daughters, DO   Held at 05/21/23 0804    sevelamer (RENVELA) tablet 1,600 mg  1,600 mg Oral TID  Evaline Joseph, DO   1,600 mg at 05/23/23 1255    [START ON 5/26/2023] iron sucrose (VENOFER) injection 50 mg  50 mg IntraVENous Weekly Evaline Joseph, DO        insulin glargine (LANTUS) injection vial 34 Units  34 Units SubCUTAneous Nightly Evaline Joseph, DO   34 Units at 05/22/23 2032    apixaban
Nephrology Note  Liv Fink MD          Patient seen and examined. No family member is present at bedside. Chart reviewed. Patient had an uneventful hemodialysis treatment yesterday with a net fluid removal of 2 L. He is denying any shortness of breath. Main complaint remains some pain at the surgical site. Appetite is fair. . No nausea or dysguesia. Awake and alert . In no acute distress. Vital SignsBP (!) 124/59   Pulse 72   Temp 97.5 °F (36.4 °C) (Oral)   Resp 16   Ht 5' 7\" (1.702 m)   Wt 204 lb 12.9 oz (92.9 kg)   SpO2 100%   BMI 32.08 kg/m²   24HR INTAKE/OUTPUT:    Intake/Output Summary (Last 24 hours) at 5/27/2023 0737  Last data filed at 5/26/2023 1746  Gross per 24 hour   Intake 825 ml   Output 2310 ml   Net -1485 ml         PHYSICAL EXAM        Neck: No JVD  Chest: A tunneled hemodialysis catheter is present in the left infraclavicular region with no drainage at the exit site. Lungs: Breath sounds decreased at the bases. No rales or ronchi. Heart: Regular rate and rhythm. No S3 gallop. No murmrur. Abdomen: Soft non distended, tender rosalia-incisional area, and normal bowel sounds. Extremeties: Trace bipedal edema. AV fistula in the right arm with a bruit and thrill.     Current Facility-Administered Medications   Medication Dose Route Frequency Provider Last Rate Last Admin    simethicone (MYLICON) chewable tablet 80 mg  80 mg Oral Q4H PRN Carmelo Soto DO   80 mg at 05/26/23 0300    metoprolol succinate (TOPROL XL) extended release tablet 25 mg  25 mg Oral Daily Jeffry Valero MD   25 mg at 05/26/23 1005    acetaminophen (TYLENOL) tablet 1,000 mg  1,000 mg Oral 3 times per day Bijan Stevenson MD   1,000 mg at 05/27/23 0532    oxyCODONE (ROXICODONE) immediate release tablet 5 mg  5 mg Oral Q4H PRN Bijan Stevenson MD   5 mg at 05/26/23 1717    doxycycline hyclate (VIBRAMYCIN) capsule 100 mg  100 mg Oral 2 times per day Isabel Luis Felipe, DO   100 mg at 05/26/23 8082
Paged Dr. Farhana Brannon, cardiology ordered stress test in AM keep pt NPO after midnight but patient had discharge order already in. Awaiting call back from Dr. Farhana Brannon.
Patient continuing to refuse care at this time. Will not allow nursing to apply oxygen, assess patient or complete vital signs and or administer scheduled medications. Patient noncompliant with fall safety protocol. Attempted to educate patient on falls safety, bed alarm refusal form however patient continuing to be verbally aggressive to staff and demanding to be left alone. Nursing staff decreased stimuli in attempt to de-escalate patient behavior.
Patient could not be seen in person as the patient is off the floor for a cardiac stress test.  Patient's chart was reviewed. Patient's condition discussed with patient's nurse and Dr. Evie Muñoz. Patient scheduled for dialysis after his stress test today. If cardiac stress test unremarkable and no further intervention is planned. Patient can be discharged after dialysis from a renal standpoint.
Patient dialysis treatment completed at [phone] 5/24/2023 see dialysis note, primary nurse and this RN reported to dialysis to transport patient back at [phone]. Patient agitated demanding to be returned to room stating he's been \"waiting for us for hours\". Dialysis nurse left bedside to get oxygen tank for return to unit. Patient removed oxygen jumping from bed pushing primary nurse and began walking to elevators despite nursing staff attempting to return patient to bed. Upon arrival to room patient again verbally aggressive and physically threatening to staff telling us to \"go get my mother fucking bed\". Nursing requested patient to allow us to apply nasal canula as patient requiring 3-4L NC. Patient told us to \"get the fuck away from him. \" Would only agree to sit in chair while nursing provided patient with bed. Patient refusing nasal cannula, refusing blood glucose check and evening medications, refusing to allow nursing to complete vital signs and shift assessment. Patient continuing to be physically and verbally aggressive to staff. Refusing further care despite attempts at educating patient on oxygen requirements, fall safety, vital sign re-assessment  and medication compliance.  Uvaldo Gómez APRN-CNP notified of patient behavior and continued refusal.
Pharmacy Discharge Reconciliation & Education    Counseled patient on the importance of adherence with new medications. New medication(s), Doxycycline and Cefdinir. Discussed the purpose of each medication, as well as the dose, frequency, and common side effects/mitigation strategies.     Mert Fairchild, [facility], [facility]3/46/9061 2:58 PM
Pulse ox was__90__% on room air at rest.  Ambulated patient on room air. Oxygen saturation was __86___% on room air while ambulating. Oxygen applied. Recovery pulse ox was ___96__% on __3___liters of oxygen while ambulating.
Will discuss with anesthesia risks/benefits of OR this admission -- high risk pulmonary candidate. Hold Eliquis for now    Electronically signed by Hayley Baxter MD on 5/25/2023 at 8:29 AM    OR tomorrow for Lap R inguinal hernia repair, possible bilateral    -The procedure, risks, benefits and alternatives were discussed with patient. he   agrees to proceed.     Ernestine Steward MD
Rolle, DO   100 mg at 05/25/23 2024    cefdinir (OMNICEF) capsule 300 mg  300 mg Oral Daily Rafat Chambers DO   300 mg at 05/25/23 0815    0.9 % sodium chloride bolus  1,000 mL IntraVENous Once Leafy Boozer, DO   Held at 05/21/23 3556    sevelamer (RENVELA) tablet 1,600 mg  1,600 mg Oral TID  Rafat Chambers, DO   1,600 mg at 05/25/23 1731    iron sucrose (VENOFER) injection 50 mg  50 mg IntraVENous Weekly Rafat Chambers DO        insulin glargine (LANTUS) injection vial 34 Units  34 Units SubCUTAneous Nightly Rafat Chambers DO   34 Units at 05/25/23 2027    [Held by provider] apixaban (ELIQUIS) tablet 5 mg  5 mg Oral BID Rafat Chambers DO   5 mg at 05/23/23 2029    fluticasone (FLONASE) 50 MCG/ACT nasal spray 2 spray  2 spray Nasal Daily Rafat Chambers DO   2 spray at 05/25/23 0817    acetaminophen (TYLENOL) tablet 500 mg  500 mg Oral Q6H PRN Rafat Chambers, DO   500 mg at 05/23/23 2029    guaiFENesin-dextromethorphan (ROBITUSSIN DM) 100-10 MG/5ML syrup 10 mL  10 mL Oral Q4H PRN Rafat Chambers, DO        prochlorperazine (COMPAZINE) injection 10 mg  10 mg IntraVENous Q6H PRN Rafat Chambers, DO        polyethylene glycol (GLYCOLAX) packet 17 g  17 g Oral Daily PRN Rafat Chambers, DO   17 g at 05/23/23 2232    glucose chewable tablet 16 g  4 tablet Oral PRN Rafat Chambers, DO        dextrose bolus 10% 125 mL  125 mL IntraVENous PRN Rafat Chambers, DO        Or    dextrose bolus 10% 250 mL  250 mL IntraVENous PRN Rafat Chambers, DO        glucagon (rDNA) injection 1 mg  1 mg SubCUTAneous PRN Rafat Chambers, DO        dextrose 10 % infusion   IntraVENous Continuous PRN Rafat Chambers,         insulin lispro (HUMALOG) injection vial 0-8 Units  0-8 Units SubCUTAneous TID  Rafat Chambers, DO   4 Units at 05/25/23 1207    insulin lispro (HUMALOG) injection vial 0-4 Units  0-4 Units SubCUTAneous Nightly Rafat Chambers DO   4 Units at 05/21/23 2033    ipratropium (ATROVENT) 0.02
Attempt UF as hemodynamics allow    VENANCIO Sterling CNP      Patient seen and examined. No family member is present at the bedside. Chart reviewed. Patient became agitated and abusive last night. He states that he was upset at the long delay in his transportation. He is in better spirits this morning. I had a face to face encounter with the patient. Agree with exam.    Agree with  formulation, assessment and plan as outlined above and directed by me. Addition and corrections were done as deemed appropriate. My exam and plan include:     Continue current treatment as outlined above.                  Raul Mars MD  Nephrology        Electronically signed by Raul Mars MD on 5/25/2023 at 3:50 PM
CKTOTAL 41 07/20/2021    TROPONINI 0.10 (H) 03/21/2021    TROPONINI 0.09 (H) 02/02/2020    TROPONINI <0.01 09/21/2018     No results for input(s): CKTOTAL, CKMB, CKMBINDEX, TROPHS in the last 72 hours. Lab Results   Component Value Date    INR 0.9 07/19/2021    INR 1.0 02/07/2020    PROTIME 10.8 07/19/2021    PROTIME 11.8 02/07/2020     Lab Results   Component Value Date    TSH 0.716 03/06/2023    TSH 1.180 06/21/2022    TSH 1.210 06/21/2021     Lab Results   Component Value Date    LABA1C 7.6 (H) 05/21/2023    LABA1C 8.7 (H) 03/05/2023    LABA1C 7.9 (H) 06/20/2022     No results found for: EAG  Lab Results   Component Value Date    CHOL 138 03/06/2023    CHOL 136 06/21/2022    CHOL 141 07/23/2021     Lab Results   Component Value Date    TRIG 99 03/06/2023    TRIG 109 06/21/2022    TRIG 79 07/23/2021     Lab Results   Component Value Date    HDL 38 03/06/2023    HDL 43 06/21/2022    HDL 60 07/23/2021     Lab Results   Component Value Date    LDLCALC 80 03/06/2023    LDLCALC 71 06/21/2022    LDLCALC 65 07/23/2021     Lab Results   Component Value Date    LABVLDL 20 03/06/2023    LABVLDL 22 06/21/2022    LABVLDL 16 07/23/2021     No results found for: [facility]  Recent Labs     05/23/23  1319   PROBNP 41,094*       Cardiac Tests:    Transthoracic echocardiogram May 22 2023     Summary   Technically difficult examination. Micro-bubble contrast injected to enhance left ventricular visualization. Ejection fraction is visually estimated at 60 to 65%. Mild left ventricular concentric hypertrophy noted. The left atrium is mildly dilated. Normal right ventricular size and function. Mild mitral regurgitation is present. Mild aortic regurgitation is noted. The aortic valve appears mildly sclerotic. No hemodynamically significant aortic stenosis is present. Mild to moderate tricuspid regurgitation. RVSP is 70 mmHg. Pulmonary hypertension is severe .     Myocardial perfusion stress test May 24,
for dialysis for end-stage renal disease ([facility] 01/24/2023    Acute pancreatitis 06/20/2022    Hallux valgus, acquired 06/06/2014    Acute on chronic respiratory failure with hypoxia ([facility] 05/21/2023    COPD exacerbation ([facility] 05/21/2023    Paroxysmal atrial fibrillation ([facility]     ESRD on hemodialysis ([facility]     Hyperkalemia 07/19/2021    Acute respiratory failure with hypoxia ([facility] 06/20/2021    Respiratory failure ([facility] 02/02/2020    Pneumonia 01/28/2020    Acute pancreatitis without necrosis or infection, unspecified 09/23/2018    Idiopathic acute pancreatitis 09/21/2018    B12 deficiency 03/17/2016    DMII (diabetes mellitus, type 2) ([facility] 07/28/2015    HTN (hypertension) 07/28/2015    Lumbar radicular pain 07/28/2015    Spinal stenosis 07/28/2015     Impression:  1. Acute on chronic hypoxic respiratory failure  2. COPD with exacerbation  3. Right upper lobe airspace disease-possible pneumonia  4. Chronic renal failure with hemodialysis  5. Insulin-dependent diabetes mellitus type 2  6. History hypertension  7. History hyperlipidemia  8. Elevated troponin 318-history of chronic elevated troponin during last hospitalization but normally in the low 100s-low 200s. 9.  Possible right inguinal hernia      Plan:  Admit to monitored bed  Home medications reviewed  Monitor heart rate, blood pressure, O2 saturations  O2 nasal cannula-titrate mid 90s  DuoNeb aerosols every 4 hours as needed  Continue Spiriva and Symbicort inhalers  Sputum for Gram stain culture sensitivity  Procalcitonin now  Strep and Legionella urine antigen  Rocephin 1 g IV piggyback daily  Vibramycin 100 mg IV piggyback twice daily  Robitussin-DM 10 cc every 4 hours as needed for cough  Consult general surgery  Consult nephrology  Consult cardiology with elevated troponin  Glucoscans 4 times daily with sliding scale insulin        BMP, CBC in a.m.     Anival Doyle DO, D.O.  5/22/2023  11:05 AM
07/28/2015    Lumbar radicular pain 07/28/2015    Spinal stenosis 07/28/2015     Impression:  1. Acute on chronic hypoxic respiratory failure  2. COPD with exacerbation  3. Right upper lobe airspace disease-possible pneumonia  4. Chronic renal failure with hemodialysis  5. Insulin-dependent diabetes mellitus type 2  6. History hypertension  7. History hyperlipidemia  8. Elevated troponin 318-history of chronic elevated troponin during last hospitalization but normally in the low 100s-low 200s. 9.  Possible right inguinal hernia      Plan:  Admit to monitored bed  Home medications reviewed  Monitor heart rate, blood pressure, O2 saturations  O2 nasal cannula-titrate mid 90s  DuoNeb aerosols every 4 hours as needed  Continue Spiriva and Symbicort inhalers  Sputum for Gram stain culture sensitivity  Procalcitonin now  Strep and Legionella urine antigen  Rocephin 1 g IV piggyback daily  Vibramycin 100 mg IV piggyback twice daily  Robitussin-DM 10 cc every 4 hours as needed for cough  Consult general surgery  Consult nephrology  Consult cardiology with elevated troponin  Glucoscans 4 times daily with sliding scale insulin    IV Lexiscan stress test 4/24    Discharge home when okay with cardiology      BMP, CBC in a.m.     Tamaravanita Conn DO, D.OHenri  5/24/2023  10:30 AM

## 2023-05-27 NOTE — DISCHARGE SUMMARY
denies any chest pain, abdominal pain, nausea/vomiting or unusual shortness of breath. Potassium 5.8 today with normal electrolytes and WBC 8.8 hemoglobin 8.5. Temperature 97.5 with heart rate 72 and blood pressure 124/59. O2 sat 100% on 2 L nasal cannula. Patient will be discharged home today. Patient stable for discharge      Past Medical History:    Past Medical History:   Diagnosis Date    Back pain     Diabetes mellitus ([facility]     DMII (diabetes mellitus, type 2) ([facility] 7/28/2015    Hemodialysis patient ([facility]     History of cardiovascular stress test 2/16/2015    lexiscan    HTN (hypertension) 7/28/2015    Hyperlipidemia     Hypertension     Lumbar radicular pain 7/28/2015    Oxygen dependent     wears 2 liters at home    Type II or unspecified type diabetes mellitus without mention of complication, not stated as uncontrolled      Past Surgical History:    Past Surgical History:   Procedure Laterality Date    CARDIOVASCULAR STRESS TEST N/A 05/24/2023    Lexiscan stress test    DIALYSIS FISTULA CREATION Right 02/23/2023    AV FISTULA CREATION RIGHT ARM performed by Vince Peraza MD at [facility] Left 06/06/2014    cain bunionectomy left foot with osteomed screw x1    SHOULDER SURGERY      Bilateral    VEIN SURGERY         Medications Prior to Admission:    @  Prior to Admission medications    Medication Sig Start Date End Date Taking? Authorizing Provider   oxyCODONE (ROXICODONE) 5 MG immediate release tablet Take 1 tablet by mouth every 6 hours as needed for Pain for up to 3 days. Intended supply: 5 days.  Take lowest dose possible to manage pain Max Daily Amount: 20 mg 5/26/23 5/29/23 Yes Edmund Yung MD   sennosides-docusate sodium (SENOKOT-S) 8.6-50 MG tablet Take 2 tablets by mouth 2 times daily 5/26/23  Yes Edmund Yung MD   doxycycline hyclate (VIBRAMYCIN) 100 MG capsule Take 1 capsule by mouth every 12 hours for 10 doses 5/23/23 5/28/23 Yes Denisse LORENZ

## 2023-06-06 ENCOUNTER — OFFICE VISIT (OUTPATIENT)
Dept: SURGERY | Age: 68
End: 2023-06-06

## 2023-06-06 VITALS
TEMPERATURE: 97.9 F | BODY MASS INDEX: 32.02 KG/M2 | HEIGHT: 67 IN | DIASTOLIC BLOOD PRESSURE: 68 MMHG | HEART RATE: 60 BPM | SYSTOLIC BLOOD PRESSURE: 119 MMHG | WEIGHT: 204 LBS

## 2023-06-06 DIAGNOSIS — Z98.890 S/P INGUINAL HERNIORRHAPHY: Primary | ICD-10-CM

## 2023-06-06 DIAGNOSIS — Z87.19 S/P INGUINAL HERNIORRHAPHY: Primary | ICD-10-CM

## 2023-06-06 PROCEDURE — 99024 POSTOP FOLLOW-UP VISIT: CPT | Performed by: SURGERY

## 2023-06-06 NOTE — PROGRESS NOTES
hernia repair. Doing well.     Resume activity gradually   No heavy lifting more than 20lbs for 6 weeks total  Follow up as needed    Physician Signature: Electronically signed by Dr. Herb Dobson  6/6/2023

## 2023-06-28 ENCOUNTER — TELEPHONE (OUTPATIENT)
Dept: VASCULAR SURGERY | Age: 68
End: 2023-06-28

## 2023-06-28 ENCOUNTER — PREP FOR PROCEDURE (OUTPATIENT)
Dept: VASCULAR SURGERY | Age: 68
End: 2023-06-28

## 2023-07-23 ENCOUNTER — APPOINTMENT (OUTPATIENT)
Dept: ULTRASOUND IMAGING | Age: 68
End: 2023-07-23
Payer: MEDICARE

## 2023-07-23 ENCOUNTER — HOSPITAL ENCOUNTER (EMERGENCY)
Age: 68
Discharge: HOME | End: 2023-07-23
Attending: EMERGENCY MEDICINE
Payer: MEDICARE

## 2023-07-23 ENCOUNTER — APPOINTMENT (OUTPATIENT)
Dept: CT IMAGING | Age: 68
End: 2023-07-23
Payer: MEDICARE

## 2023-07-23 VITALS
OXYGEN SATURATION: 93 % | HEART RATE: 87 BPM | TEMPERATURE: 97.6 F | SYSTOLIC BLOOD PRESSURE: 170 MMHG | RESPIRATION RATE: 18 BRPM | WEIGHT: 184 LBS | DIASTOLIC BLOOD PRESSURE: 76 MMHG | BODY MASS INDEX: 28.82 KG/M2

## 2023-07-23 DIAGNOSIS — R10.31 RIGHT INGUINAL PAIN: Primary | ICD-10-CM

## 2023-07-23 LAB
ALBUMIN SERPL-MCNC: 3.6 G/DL (ref 3.5–5.2)
ALP SERPL-CCNC: 56 U/L (ref 40–129)
ALT SERPL-CCNC: 5 U/L (ref 0–40)
ANION GAP SERPL CALCULATED.3IONS-SCNC: 15 MMOL/L (ref 7–16)
AST SERPL-CCNC: 10 U/L (ref 0–39)
BASOPHILS # BLD: 0.01 K/UL (ref 0–0.2)
BASOPHILS NFR BLD: 0 % (ref 0–2)
BILIRUB SERPL-MCNC: 0.3 MG/DL (ref 0–1.2)
BUN SERPL-MCNC: 38 MG/DL (ref 6–23)
CALCIUM SERPL-MCNC: 10.3 MG/DL (ref 8.6–10.2)
CHLORIDE SERPL-SCNC: 91 MMOL/L (ref 98–107)
CO2 SERPL-SCNC: 31 MMOL/L (ref 22–29)
CREAT SERPL-MCNC: 9.5 MG/DL (ref 0.7–1.2)
EOSINOPHIL # BLD: 0.17 K/UL (ref 0.05–0.5)
EOSINOPHILS RELATIVE PERCENT: 3 % (ref 0–6)
ERYTHROCYTE [DISTWIDTH] IN BLOOD BY AUTOMATED COUNT: 17.6 % (ref 11.5–15)
GFR SERPL CREATININE-BSD FRML MDRD: 6 ML/MIN/1.73M2
GLUCOSE SERPL-MCNC: 124 MG/DL (ref 74–99)
HCT VFR BLD AUTO: 41.6 % (ref 37–54)
HGB BLD-MCNC: 12.7 G/DL (ref 12.5–16.5)
IMM GRANULOCYTES # BLD AUTO: <0.03 K/UL (ref 0–0.58)
IMM GRANULOCYTES NFR BLD: 0 % (ref 0–5)
LACTATE BLDV-SCNC: 1.4 MMOL/L (ref 0.5–2.2)
LIPASE SERPL-CCNC: 33 U/L (ref 13–60)
LYMPHOCYTES NFR BLD: 1.62 K/UL (ref 1.5–4)
LYMPHOCYTES RELATIVE PERCENT: 32 % (ref 20–42)
MCH RBC QN AUTO: 28.5 PG (ref 26–35)
MCHC RBC AUTO-ENTMCNC: 30.5 G/DL (ref 32–34.5)
MCV RBC AUTO: 93.3 FL (ref 80–99.9)
MONOCYTES NFR BLD: 0.7 K/UL (ref 0.1–0.95)
MONOCYTES NFR BLD: 14 % (ref 2–12)
NEUTROPHILS NFR BLD: 51 % (ref 43–80)
NEUTS SEG NFR BLD: 2.59 K/UL (ref 1.8–7.3)
PLATELET # BLD AUTO: 206 K/UL (ref 130–450)
PMV BLD AUTO: 10.5 FL (ref 7–12)
POTASSIUM SERPL-SCNC: 5.2 MMOL/L (ref 3.5–5)
PROT SERPL-MCNC: 8 G/DL (ref 6.4–8.3)
RBC # BLD AUTO: 4.46 M/UL (ref 3.8–5.8)
SODIUM SERPL-SCNC: 137 MMOL/L (ref 132–146)
WBC OTHER # BLD: 5.1 K/UL (ref 4.5–11.5)

## 2023-07-23 PROCEDURE — 96374 THER/PROPH/DIAG INJ IV PUSH: CPT

## 2023-07-23 PROCEDURE — 83690 ASSAY OF LIPASE: CPT

## 2023-07-23 PROCEDURE — 6360000002 HC RX W HCPCS: Performed by: STUDENT IN AN ORGANIZED HEALTH CARE EDUCATION/TRAINING PROGRAM

## 2023-07-23 PROCEDURE — 83605 ASSAY OF LACTIC ACID: CPT

## 2023-07-23 PROCEDURE — 99284 EMERGENCY DEPT VISIT MOD MDM: CPT

## 2023-07-23 PROCEDURE — 76870 US EXAM SCROTUM: CPT

## 2023-07-23 PROCEDURE — 6370000000 HC RX 637 (ALT 250 FOR IP): Performed by: STUDENT IN AN ORGANIZED HEALTH CARE EDUCATION/TRAINING PROGRAM

## 2023-07-23 PROCEDURE — 74176 CT ABD & PELVIS W/O CONTRAST: CPT

## 2023-07-23 PROCEDURE — 85027 COMPLETE CBC AUTOMATED: CPT

## 2023-07-23 PROCEDURE — 93975 VASCULAR STUDY: CPT

## 2023-07-23 PROCEDURE — 80053 COMPREHEN METABOLIC PANEL: CPT

## 2023-07-23 RX ORDER — MORPHINE SULFATE 4 MG/ML
4 INJECTION, SOLUTION INTRAMUSCULAR; INTRAVENOUS ONCE
Status: COMPLETED | OUTPATIENT
Start: 2023-07-23 | End: 2023-07-23

## 2023-07-23 RX ORDER — OXYCODONE HYDROCHLORIDE AND ACETAMINOPHEN 5; 325 MG/1; MG/1
1 TABLET ORAL ONCE
Status: COMPLETED | OUTPATIENT
Start: 2023-07-23 | End: 2023-07-23

## 2023-07-23 RX ORDER — OXYCODONE HYDROCHLORIDE AND ACETAMINOPHEN 5; 325 MG/1; MG/1
1 TABLET ORAL EVERY 6 HOURS PRN
Qty: 12 TABLET | Refills: 0 | Status: SHIPPED | OUTPATIENT
Start: 2023-07-23 | End: 2023-07-26

## 2023-07-23 RX ADMIN — MORPHINE SULFATE 4 MG: 4 INJECTION, SOLUTION INTRAMUSCULAR; INTRAVENOUS at 12:59

## 2023-07-23 RX ADMIN — OXYCODONE AND ACETAMINOPHEN 1 TABLET: 5; 325 TABLET ORAL at 15:23

## 2023-07-23 ASSESSMENT — LIFESTYLE VARIABLES: HOW OFTEN DO YOU HAVE A DRINK CONTAINING ALCOHOL: NEVER

## 2023-07-23 ASSESSMENT — PAIN DESCRIPTION - LOCATION
LOCATION: GROIN
LOCATION: GROIN

## 2023-07-23 ASSESSMENT — PAIN SCALES - GENERAL
PAINLEVEL_OUTOF10: 7
PAINLEVEL_OUTOF10: 10

## 2023-07-23 ASSESSMENT — PAIN DESCRIPTION - ORIENTATION
ORIENTATION: RIGHT
ORIENTATION: RIGHT

## 2023-07-23 NOTE — DISCHARGE INSTRUCTIONS
Thank you for the opportunity to serve in your medical care today. Please be sure to take your prescribed medication as directed. Follow up with your doctor is critical for optimal healing. Should you have any new or worsening symptoms, please return to the Emergency Department for further work-up and evaluation. CT ABDOMEN PELVIS WO CONTRAST Additional Contrast? None   Preliminary Result   Mild fat induration with minimal free fluid extending along the inferior   aspect of the right pericolic gutter, toward the right inguinal canal.  This   is of uncertain etiology or clinical significance. Of note, the appendix is   normal and there are no colonic diverticula adjacent to the fat induration. A small right hydrocele is partially imaged. Enlarged and heterogeneous prostate gland. Recommend correlation with serum   PSA if this has not been performed recently. Additional, incidental findings as above. US SCROTUM AND TESTICLES   Final Result   Small hydrocele on the right with by bilateral epididymal cysts. The   remainder of the testicular ultrasound is unremarkable with normal testicular   Doppler flow. US DUP ABD PEL RETRO SCROT COMPLETE   Final Result   Small hydrocele on the right with by bilateral epididymal cysts. The   remainder of the testicular ultrasound is unremarkable with normal testicular   Doppler flow.

## 2023-07-23 NOTE — ED PROVIDER NOTES
[address]        Pt Name: James Moss  MRN: [id number]  [address] [facility] 1955  Date of evaluation: 7/23/2023  Provider: Debbie Shaw DO  PCP: Alonzo Higgins DO  Note Started: 2:01 PM EDT 7/23/23    CHIEF COMPLAINT       Chief Complaint   Patient presents with    Groin Pain     Right groin pain since Wednesday, had right inguinal sx back in may       HISTORY OF PRESENT ILLNESS: 1 or more Elements   History From: Patient    Limitations to history : None    James Moss is a 74 y.o. male with past medical history of diabetes, hypertension, acute pancreatitis, A-fib, end-stage renal disease on hemodialysis and COPD who presents to the emergency department due to worsening right groin pain. Patient states that his symptoms have been ongoing since Wednesday. He endorses gradual onset with persistence. Patient states that his pain is localized to his right groin region and is constant, mostly sharp and radiates through to his back occasionally. Nothing particular makes the pain better or worse. Pain is moderate in severity. Patient does note that he had a inguinal hernia repair in May of this year but cannot remember the name of the surgeon. He denies any specific scrotal pain or testicular pain. Patient has no other symptoms otherwise including fever, chills, nausea, vomiting, headache, lightheadedness, syncope, cough, congestion, sore throat, numbness, tingling, urinary symptoms, constipation or diarrhea. On initial assessment, patient is nontoxic-appearing but in moderate acute distress secondary to her symptoms. He does not seem to be able to get comfortable in the chair. He denies any recent sick contacts or abnormal food ingestions.     Nursing Notes were all reviewed and agreed with or any disagreements were addressed in the HPI.    ROS:   Pertinent positives and negatives are stated within HPI, all other

## 2023-07-24 ENCOUNTER — INITIAL CONSULT (OUTPATIENT)
Dept: SURGERY | Age: 68
End: 2023-07-24

## 2023-07-24 VITALS — DIASTOLIC BLOOD PRESSURE: 72 MMHG | HEART RATE: 93 BPM | SYSTOLIC BLOOD PRESSURE: 120 MMHG | OXYGEN SATURATION: 91 %

## 2023-07-24 DIAGNOSIS — Z98.890 S/P INGUINAL HERNIORRHAPHY: Primary | ICD-10-CM

## 2023-07-24 DIAGNOSIS — R10.31 RIGHT GROIN PAIN: ICD-10-CM

## 2023-07-24 DIAGNOSIS — Z87.19 S/P INGUINAL HERNIORRHAPHY: Primary | ICD-10-CM

## 2023-07-24 PROCEDURE — 99024 POSTOP FOLLOW-UP VISIT: CPT | Performed by: SURGERY

## 2023-07-24 RX ORDER — CIPROFLOXACIN 500 MG/1
250 TABLET, FILM COATED ORAL 2 TIMES DAILY
Qty: 7 TABLET | Refills: 0 | Status: SHIPPED | OUTPATIENT
Start: 2023-07-24 | End: 2023-07-31

## 2023-07-24 NOTE — PROGRESS NOTES
General Surgery Office Note  Rolando Hardwick MD, MS    Patient's Name/Date of Birth: Brown Rodriguez / 1955    Date: July 24, 2023     Chief compaint: Postop visit from laparoscopic robot assisted right inguinal hernia repair with mesh    Surgeon: Wanda Pretty MD    Patient Active Problem List   Diagnosis    Hallux valgus, acquired    DMII (diabetes mellitus, type 2) ([address])    HTN (hypertension)    Lumbar radicular pain    Spinal stenosis    B12 deficiency    Idiopathic acute pancreatitis    Acute pancreatitis without necrosis or infection, unspecified    Pneumonia    Respiratory failure ([address])    Acute respiratory failure with hypoxia ([address])    Hyperkalemia    Paroxysmal atrial fibrillation ([address])    ESRD on hemodialysis ([address])    Acute pancreatitis    Encounter regarding vascular access for dialysis for end-stage renal disease ([address])    Acute recurrent pancreatitis    Acute on chronic respiratory failure with hypoxia ([address])    COPD exacerbation ([address])    S/P inguinal herniorrhaphy       Subjective: Hernia repair 2 months ago, pain started wed last week, no insighting event, seen in ER, no signs of recurrence, no hematoma    Objective:  /72 (Site: Left Upper Arm, Position: Sitting, Cuff Size: Medium Adult)   Pulse 93   SpO2 91%   Labs:  Recent Labs     07/23/23  1248   WBC 5.1   HGB 12.7   HCT 41.6     Lab Results   Component Value Date    CREATININE 9.5 (HH) 07/23/2023    BUN 38 (H) 07/23/2023     07/23/2023    K 5.2 (H) 07/23/2023    CL 91 (L) 07/23/2023    CO2 31 (H) 07/23/2023     Recent Labs     07/23/23  1248   LIPASE 33         Review of Systems -  General ROS: negative for - chills, fatigue or malaise  ENT ROS: negative for - hearing change, nasal congestion or nasal discharge  Allergy and Immunology ROS: negative for - hives, itchy/watery eyes or nasal congestion  Hematological and Lymphatic ROS: negative for - blood clots, blood transfusions, bruising or

## 2023-07-31 ENCOUNTER — PREP FOR PROCEDURE (OUTPATIENT)
Dept: GASTROENTEROLOGY | Age: 68
End: 2023-07-31

## 2023-07-31 ENCOUNTER — TELEPHONE (OUTPATIENT)
Dept: GASTROENTEROLOGY | Age: 68
End: 2023-07-31

## 2023-07-31 NOTE — TELEPHONE ENCOUNTER
Prior Authorization Form:      DEMOGRAPHICS:                     Patient Name:  Emil Boo  Patient :  1955            Insurance:  Payor: Anahi Lang / Plan: Vivienne Number / Product Type: *No Product type* /   Insurance ID Number:    Payer/Plan Subscr  Sex Relation Sub. Ins. ID Effective Group Num   1. Breanna Douglass [facility] 1955 Male Self [id number] 22 [id number]                                   [address]   2.  Cecy Logan 1955 Male Self [id number]* 22                                    [address]         DIAGNOSIS & PROCEDURE:                       Procedure/Operation: EUS           CPT Code: 14626    Diagnosis:  ACUTE PANCREATITIS     ICD10 Code: K85.90    Location:  [address] [place] [address]    Surgeon:  Emil Boo INFORMATION:                          Date: 8-    Time: TBD              Anesthesia:  MAC/TIVA                                                       Status:  Outpatient        Special Comments:  NA       Electronically signed by Kristel Ellington LPN on  at 2:35 AM

## 2023-08-01 ENCOUNTER — OFFICE VISIT (OUTPATIENT)
Dept: SURGERY | Age: 68
End: 2023-08-01

## 2023-08-01 VITALS
HEIGHT: 67 IN | TEMPERATURE: 97.2 F | SYSTOLIC BLOOD PRESSURE: 144 MMHG | WEIGHT: 184 LBS | BODY MASS INDEX: 28.88 KG/M2 | HEART RATE: 100 BPM | DIASTOLIC BLOOD PRESSURE: 78 MMHG

## 2023-08-01 DIAGNOSIS — R10.31 RIGHT GROIN PAIN: Primary | ICD-10-CM

## 2023-08-01 PROCEDURE — 99024 POSTOP FOLLOW-UP VISIT: CPT | Performed by: SURGERY

## 2023-08-01 RX ORDER — IBUPROFEN 800 MG/1
800 TABLET ORAL 2 TIMES DAILY PRN
Qty: 60 TABLET | Refills: 0 | Status: SHIPPED | OUTPATIENT
Start: 2023-08-01

## 2023-08-01 RX ORDER — FERRIC CITRATE 210 MG/1
TABLET, COATED ORAL
COMMUNITY
Start: 2023-07-24

## 2023-08-01 RX ORDER — OMEGA-3-ACID ETHYL ESTERS 1 G/1
CAPSULE, LIQUID FILLED ORAL
COMMUNITY
Start: 2023-06-02

## 2023-08-01 RX ORDER — PATIROMER 8.4 G/1
POWDER, FOR SUSPENSION ORAL
COMMUNITY
Start: 2023-07-26

## 2023-08-01 NOTE — PROGRESS NOTES
Kenalog/Bupivacaine injection administered by Dr. Braulio Pierson during office visit. See Dr. Adriane Ordonez note for detailed information.      Kenalog 5mg/5ml (5mL administered)   GTIN: [id number]  S/N: [id number]  Lot: [id number]  Exp: 09/2024    0.25% Bupivacaine 25mg/10mL (5mL administered)   NDC: 2121-9546-96  Lot: [id number]  Exp: 10/2024  Electronically signed by Shirin Leach RN on 8/1/2023 at 2:36 PM

## 2023-08-04 NOTE — PROGRESS NOTES
I returned patient's message. I left him instructions that the last dose of his eliquis is to be 8/14. We will be in touch again closer to surgery date.

## 2023-08-04 NOTE — PROGRESS NOTES
[address] [place] [address] PRE-ADMISSION TESTING   ENDOSCOPY/ COLONSCOPY INSTRUCTIONS  PAT- Phone Number: [phone]    ENDOSCOPY/ COLONSCOPY INSTRUCTIONS:     [x] Nothing by mouth after midnight. Including no gum, candy, mints, or water. [x] You may brush your teeth, gargle, but do NOT swallow water. [x] Do not wear lotions, powders, deodorant. [x] Arrange transportation with a responsible adult  to and from the hospital. If you do not have a responsible adult  to transport you, you will need to make arrangements with a medical transportation company. Arrange for someone to be with you for the remainder of the day and for 24 hours after your procedure due to having had anesthesia. -Who will be your  for transportation?__friend_____   -Who will be staying with you for 24 hrs after your procedure?__________________    PARKING INSTRUCTIONS:     [x] ARRIVAL DATE & TIME:  8/15  @  1030  [x] Times are subject to change. We will contact you the business day before surgery if that were to occur. [x] Enter into the [employer]. Two people may accompany you. Masks are not required. [x] Parking Lot \"I\" is where you will park. It is located on the corner of [address] [place] [address] and [address] [facility]. The entrance is on [address] [facility]. Only one vehicle - per patient, is permitted in parking lot. Upon entering the parking lot, a voucher ticket will print. MEDICATION INSTRUCTIONS:    [x] Bring a complete list of your medications, please write the last time you took the medicine, give this list to the nurse in Pre-Op. [x] Take only the following medications the morning of surgery with 1-2 ounces of water: metoprolol   [x] Stop all herbal supplements and vitamins 5 days before surgery. [x] Stop all NSAIDS 7 days before surgery. [x] DO NOT take any diabetic medicine the morning of surgery. Follow instructions for insulin the day before surgery.   [x] If you are diabetic

## 2023-08-08 RX ORDER — SODIUM CHLORIDE 9 MG/ML
INJECTION, SOLUTION INTRAVENOUS PRN
Status: CANCELLED | OUTPATIENT
Start: 2023-08-08

## 2023-08-08 RX ORDER — SODIUM CHLORIDE 0.9 % (FLUSH) 0.9 %
5-40 SYRINGE (ML) INJECTION EVERY 12 HOURS SCHEDULED
Status: CANCELLED | OUTPATIENT
Start: 2023-08-08

## 2023-08-08 RX ORDER — SODIUM CHLORIDE 0.9 % (FLUSH) 0.9 %
5-40 SYRINGE (ML) INJECTION PRN
Status: CANCELLED | OUTPATIENT
Start: 2023-08-08

## 2023-08-09 ENCOUNTER — TELEPHONE (OUTPATIENT)
Dept: CARDIOLOGY CLINIC | Age: 68
End: 2023-08-09

## 2023-08-09 NOTE — PROGRESS NOTES
[address]   PRE-ADMISSION TESTING GENERAL INSTRUCTIONS  [facility] Phone Number: [phone]      GENERAL INSTRUCTIONS:    [x] Antibacterial Soap Shower Night before and/or AM of surgery. [x] Do not wear makeup, lotions, powders, deodorant. [x] Nothing by mouth after midnight; including gum, candy, mints, or water. [x] You may brush your teeth, gargle, but do NOT swallow water. [x] No tobacco products, illegal drugs, or alcohol within 24 hours of your surgery. [x] Jewelry or valuables should not be brought to the hospital. All body and/or tongue piercing's must be removed prior to arriving to hospital. No contact lens or hair pins. [x] Arrange transportation with a responsible adult  to and from the hospital. Arrange for someone to be with you for the remainder of the day and for 24 hours after your procedure due to having had anesthesia. -Who will be your  for transportation? Friend/Transportation Service         -Who will be staying with you for 24 hrs after your procedure? Friend  [x] Bring insurance card and photo ID. [x] Bring copy of living will or healthcare power of  papers to be placed in your electronic record. PARKING INSTRUCTIONS:     [x] ARRIVAL DATE & TIME: 8/17/23      1245  [x] Times are subject to change. We will contact you the business day before surgery if that were to occur. [x] Enter into the [employer]. Two people may accompany you. Masks are not required. [x] Parking Lot \"I\" is where you will park. It is located on the corner of [address] [place] [address] and [address] [facility]. The entrance is on [address] [facility]. Only one vehicle - per patient, is permitted in parking lot. Upon entering the parking lot, a voucher ticket will print. MEDICATION INSTRUCTIONS:    [x] Bring a complete list of your medications, please write the last time you took the medicine, give this list to the nurse in Pre-Op.   [x] Take only the following medications the morning of surgery with 1-2 ounces of water: metoprolol  [x] Stop all herbal supplements and vitamins 5 days before surgery. Stop NSAIDS 7 days before surgery. [x] DO NOT take any diabetic medicine the morning of surgery. Follow instructions for insulin the day before surgery. [x] If you are diabetic and your blood sugar is low or you feel symptomatic, you may drink 1-2 ounces of apple juice or take a glucose tablet.            -The morning of your procedure, you may call the pre-op area if you have concerns about your blood sugar [phone]. [x] Use your inhalers the morning of surgery. Bring your emergency inhaler with you day of surgery: albuterol, advair, spiriva  [x] Follow physician instructions regarding any blood thinners you may be taking. WHAT TO EXPECT:    [x] The day of surgery you will be greeted and checked in by the [employer].  In addition, you will be registered in the [facility] by a Patient Access Representative. Please bring your photo ID and insurance card. A nurse will greet you in accordance to the time you are needed in the pre-op area to prepare you for surgery. Please do not be discouraged if you are not greeted in the order you arrive as there are many variables that are involved in patient preparation. Your patience is greatly appreciated as you wait for your nurse. Please bring in items such as: books, magazines, newspapers, electronics, or any other items  to occupy your time in the waiting area. [x]  Delays may occur with surgery and staff will make a sincere effort to keep you informed of delays. If any delays occur with your procedure, we apologize ahead of time for your inconvenience as we recognize the value of your time.

## 2023-08-09 NOTE — TELEPHONE ENCOUNTER
Patient scheduled for 2 back to back procedures both requiring Eliquis be held which will total a week of no Eliquis. He has an EGD 08/15 and they want him off Eliquis 48 hours prior. He then is scheduled for removal of tunneled hemodialysis catheter 08/17 and they are also req Eliquis be held 48 hours prior. Pre-admission testing caught this and is asking if this is OK with you. I do not see where we gave an OK to begin with, however, you did see patient in office 03/21/23.  Please advise

## 2023-08-14 NOTE — PROGRESS NOTES
Attempted to reach patient in regards to a time change for his procedure tomorrow (0830). Voicemail was left. I was able to reach his EC- sister and notified her of the new arrival time. She stated she would notify her brother.   Kem Barrett RN

## 2023-08-15 ENCOUNTER — ANESTHESIA EVENT (OUTPATIENT)
Dept: ENDOSCOPY | Age: 68
End: 2023-08-15
Payer: MEDICARE

## 2023-08-15 ENCOUNTER — HOSPITAL ENCOUNTER (OUTPATIENT)
Age: 68
Setting detail: OUTPATIENT SURGERY
Discharge: HOME | End: 2023-08-15
Attending: STUDENT IN AN ORGANIZED HEALTH CARE EDUCATION/TRAINING PROGRAM | Admitting: STUDENT IN AN ORGANIZED HEALTH CARE EDUCATION/TRAINING PROGRAM
Payer: MEDICARE

## 2023-08-15 ENCOUNTER — ANESTHESIA (OUTPATIENT)
Dept: ENDOSCOPY | Age: 68
End: 2023-08-15
Payer: MEDICARE

## 2023-08-15 VITALS
DIASTOLIC BLOOD PRESSURE: 74 MMHG | HEIGHT: 67 IN | BODY MASS INDEX: 28.91 KG/M2 | SYSTOLIC BLOOD PRESSURE: 148 MMHG | WEIGHT: 184.2 LBS | HEART RATE: 74 BPM | TEMPERATURE: 97.6 F | RESPIRATION RATE: 18 BRPM | OXYGEN SATURATION: 92 %

## 2023-08-15 DIAGNOSIS — Z01.812 PRE-OPERATIVE LABORATORY EXAMINATION: Primary | ICD-10-CM

## 2023-08-15 DIAGNOSIS — K85.90 ACUTE PANCREATITIS: ICD-10-CM

## 2023-08-15 LAB
GLUCOSE BLD-MCNC: 100 MG/DL (ref 74–99)
POTASSIUM SERPL-SCNC: 4.9 MMOL/L (ref 3.5–5)

## 2023-08-15 PROCEDURE — 3609018500 HC EGD US SCOPE W/ADJACENT STRUCTURES: Performed by: STUDENT IN AN ORGANIZED HEALTH CARE EDUCATION/TRAINING PROGRAM

## 2023-08-15 PROCEDURE — 3700000000 HC ANESTHESIA ATTENDED CARE: Performed by: STUDENT IN AN ORGANIZED HEALTH CARE EDUCATION/TRAINING PROGRAM

## 2023-08-15 PROCEDURE — 43239 EGD BIOPSY SINGLE/MULTIPLE: CPT | Performed by: STUDENT IN AN ORGANIZED HEALTH CARE EDUCATION/TRAINING PROGRAM

## 2023-08-15 PROCEDURE — 3609012400 HC EGD TRANSORAL BIOPSY SINGLE/MULTIPLE: Performed by: STUDENT IN AN ORGANIZED HEALTH CARE EDUCATION/TRAINING PROGRAM

## 2023-08-15 PROCEDURE — 6360000002 HC RX W HCPCS: Performed by: NURSE ANESTHETIST, CERTIFIED REGISTERED

## 2023-08-15 PROCEDURE — 88305 TISSUE EXAM BY PATHOLOGIST: CPT

## 2023-08-15 PROCEDURE — C1753 CATH, INTRAVAS ULTRASOUND: HCPCS | Performed by: STUDENT IN AN ORGANIZED HEALTH CARE EDUCATION/TRAINING PROGRAM

## 2023-08-15 PROCEDURE — 3700000001 HC ADD 15 MINUTES (ANESTHESIA): Performed by: STUDENT IN AN ORGANIZED HEALTH CARE EDUCATION/TRAINING PROGRAM

## 2023-08-15 PROCEDURE — 2500000003 HC RX 250 WO HCPCS: Performed by: NURSE ANESTHETIST, CERTIFIED REGISTERED

## 2023-08-15 PROCEDURE — 2580000003 HC RX 258: Performed by: NURSE ANESTHETIST, CERTIFIED REGISTERED

## 2023-08-15 PROCEDURE — 7100000010 HC PHASE II RECOVERY - FIRST 15 MIN: Performed by: STUDENT IN AN ORGANIZED HEALTH CARE EDUCATION/TRAINING PROGRAM

## 2023-08-15 PROCEDURE — 82962 GLUCOSE BLOOD TEST: CPT

## 2023-08-15 PROCEDURE — 43237 ENDOSCOPIC US EXAM ESOPH: CPT | Performed by: STUDENT IN AN ORGANIZED HEALTH CARE EDUCATION/TRAINING PROGRAM

## 2023-08-15 PROCEDURE — 2709999900 HC NON-CHARGEABLE SUPPLY: Performed by: STUDENT IN AN ORGANIZED HEALTH CARE EDUCATION/TRAINING PROGRAM

## 2023-08-15 PROCEDURE — 84132 ASSAY OF SERUM POTASSIUM: CPT

## 2023-08-15 PROCEDURE — 7100000011 HC PHASE II RECOVERY - ADDTL 15 MIN: Performed by: STUDENT IN AN ORGANIZED HEALTH CARE EDUCATION/TRAINING PROGRAM

## 2023-08-15 RX ORDER — GLYCOPYRROLATE 1 MG/5 ML
SYRINGE (ML) INTRAVENOUS PRN
Status: DISCONTINUED | OUTPATIENT
Start: 2023-08-15 | End: 2023-08-15 | Stop reason: SDUPTHER

## 2023-08-15 RX ORDER — SODIUM CHLORIDE 9 MG/ML
INJECTION, SOLUTION INTRAVENOUS PRN
Status: CANCELLED | OUTPATIENT
Start: 2023-08-15

## 2023-08-15 RX ORDER — ONDANSETRON 2 MG/ML
4 INJECTION INTRAMUSCULAR; INTRAVENOUS EVERY 6 HOURS PRN
Status: CANCELLED | OUTPATIENT
Start: 2023-08-15

## 2023-08-15 RX ORDER — SODIUM CHLORIDE 9 MG/ML
INJECTION, SOLUTION INTRAVENOUS CONTINUOUS PRN
Status: DISCONTINUED | OUTPATIENT
Start: 2023-08-15 | End: 2023-08-15 | Stop reason: SDUPTHER

## 2023-08-15 RX ORDER — SODIUM CHLORIDE 0.9 % (FLUSH) 0.9 %
5-40 SYRINGE (ML) INJECTION EVERY 12 HOURS SCHEDULED
Status: CANCELLED | OUTPATIENT
Start: 2023-08-15

## 2023-08-15 RX ORDER — ONDANSETRON 4 MG/1
4 TABLET, ORALLY DISINTEGRATING ORAL EVERY 8 HOURS PRN
Status: CANCELLED | OUTPATIENT
Start: 2023-08-15

## 2023-08-15 RX ORDER — PANTOPRAZOLE SODIUM 40 MG/1
40 TABLET, DELAYED RELEASE ORAL
Qty: 60 TABLET | Refills: 11 | Status: SHIPPED | OUTPATIENT
Start: 2023-08-15

## 2023-08-15 RX ORDER — PROPOFOL 10 MG/ML
INJECTION, EMULSION INTRAVENOUS PRN
Status: DISCONTINUED | OUTPATIENT
Start: 2023-08-15 | End: 2023-08-15 | Stop reason: SDUPTHER

## 2023-08-15 RX ORDER — SODIUM CHLORIDE 0.9 % (FLUSH) 0.9 %
5-40 SYRINGE (ML) INJECTION PRN
Status: CANCELLED | OUTPATIENT
Start: 2023-08-15

## 2023-08-15 RX ADMIN — Medication 0.2 MG: at 11:41

## 2023-08-15 RX ADMIN — SODIUM CHLORIDE: 9 INJECTION, SOLUTION INTRAVENOUS at 11:38

## 2023-08-15 RX ADMIN — PROPOFOL 100 MG: 10 INJECTION, EMULSION INTRAVENOUS at 12:01

## 2023-08-15 RX ADMIN — PROPOFOL 100 MG: 10 INJECTION, EMULSION INTRAVENOUS at 12:05

## 2023-08-15 RX ADMIN — PROPOFOL 150 MG: 10 INJECTION, EMULSION INTRAVENOUS at 11:55

## 2023-08-15 ASSESSMENT — PAIN - FUNCTIONAL ASSESSMENT: PAIN_FUNCTIONAL_ASSESSMENT: 0-10

## 2023-08-15 ASSESSMENT — ENCOUNTER SYMPTOMS: SHORTNESS OF BREATH: 0

## 2023-08-15 ASSESSMENT — LIFESTYLE VARIABLES: SMOKING_STATUS: 1

## 2023-08-15 NOTE — OP NOTE
Operative Note      Patient: Zackery Rodrigez  YOB: 1955  MRN: [id number]    Date of Procedure: 8/15/2023    Pre-Op Diagnosis Codes:     * Acute pancreatitis [K85.90]    Post-Op Diagnosis: Duodenitis, Duodenal Ulcer, Chronic Pancreatitis        Procedure(s):  EGD ESOPHAGOGASTRODUODENOSCOPY ULTRASOUND  EGD BIOPSY    Surgeon(s):  Vinicius Winston MD    Assistant:   * No surgical staff found *    Anesthesia: Monitor Anesthesia Care    Estimated Blood Loss (mL): less than 50     Complications: None    Specimens:   ID Type Source Tests Collected by Time Destination   A : gastric antrum bx Antrum Antrum SURGICAL PATHOLOGY Vinicius Winston MD 8/15/2023 1213        Implants:  * No implants in log *      Drains: * No LDAs found *    Indications:  67y/M who presents for endoscopic evaluation of recurrent acute pancreatitis. Findings:     EGD:  LA Grade A esophagitis. Mild gastritis. H pylori biopsies obtained. Duodenitis and focal clean-based duodenal ulceration. EUS:  Diffuse lobular and fatty changes were appreciated throughout the examined pancreas. No cysts or masses appreciated. Normal pancreatic duct. Normal bile duct. Gallbladder with mildly thickened walls and thin layering of debris. Fatty infiltration of liver. Detailed Description of Procedure:   Pre-Anesthesia Assessment:  Prior to the procedure, a history and physical was performed and patient medications and allergies were reviewed. The risks, benefits, complications, treatment options and expected outcomes were discussed with the patient. The possibilities of reaction to medication, pulmonary aspiration, perforation of the gastrointestinal tract, bleeding requiring transfusion or operation, respiratory failure requiring placement on a ventilator, failure to diagnose a condition or identify polyps/lesions, and acute pancreatitis with potential need for prolonged hospitalization were discussed with the patient.  All questions were pancreatic duct was well visualized from the ampulla to the tail. The pancreatic duct was thin in caliber and regular in contour. No strictures or intraductal debris was appreciated. The common bile duct was normal in contour and measured 4.2mm in diameter. No intraductal stones or debris were appreciated. The gallbladder was appreciated. Mildly thickened walls with thin layering of debris was appreciated. No large stones or significant debris/sludge was appreciated. The ampulla was appreciated and was normal in appearance. Mild diffuse heterogeneity suggestive of fatty infiltration of the liver was appreciated. No focal pathology were identified. Recommendations:  -The patient will be observed post procedure until all discharge criteria are met. -Patient has a contact number available for emergencies. The signs and symptoms of potential delayed complications were discussed with the patient.    -Return to normal activities tomorrow. -Written discharge instructions were provided to the patient.    -Resume anticoagulation tomorrow.  -Await pathology results.  -Clinic appointment to be scheduled.          Electronically signed by Sandy Pathak MD on 8/15/2023 at 12:37 PM

## 2023-08-15 NOTE — DISCHARGE INSTRUCTIONS
Recommendations:  -The patient will be observed post procedure until all discharge criteria are met. -Patient has a contact number available for emergencies. The signs and symptoms of potential delayed complications were discussed with the patient.    -Return to normal activities tomorrow. -Written discharge instructions were provided to the patient.    -Resume anticoagulation tomorrow.  -Await pathology results.  -Clinic appointment to be scheduled.

## 2023-08-15 NOTE — ANESTHESIA POSTPROCEDURE EVALUATION
Department of Anesthesiology  Postprocedure Note    Patient: Crystal Garcia  MRN: [id number]  YOB: 1955  Date of evaluation: 8/15/2023      Procedure Summary     Date: 08/15/23 Room / Location: [facility] 03 / [facility]    Anesthesia Start: 1413 Anesthesia Stop: 1665    Procedures:       EGD ESOPHAGOGASTRODUODENOSCOPY ULTRASOUND      EGD BIOPSY Diagnosis:       Acute pancreatitis      (Acute pancreatitis [K85.90])    Surgeons: Elton Love MD Responsible Provider: Job Sal MD    Anesthesia Type: MAC ASA Status: 4          Anesthesia Type: MAC    Nadeem Phase I: Nadeem Score: 10    Nadeem Phase II:        Anesthesia Post Evaluation    Patient location during evaluation: PACU  Patient participation: complete - patient participated  Level of consciousness: sleepy but conscious  Pain score: 0  Airway patency: patent  Nausea & Vomiting: no nausea and no vomiting  Complications: no  Cardiovascular status: hemodynamically stable  Respiratory status: room air  Hydration status: stable  Pain management: adequate

## 2023-08-17 ENCOUNTER — HOSPITAL ENCOUNTER (OUTPATIENT)
Age: 68
Setting detail: OUTPATIENT SURGERY
Discharge: HOME | End: 2023-08-17
Attending: SURGERY | Admitting: SURGERY
Payer: MEDICARE

## 2023-08-17 VITALS
BODY MASS INDEX: 28.88 KG/M2 | OXYGEN SATURATION: 94 % | RESPIRATION RATE: 18 BRPM | DIASTOLIC BLOOD PRESSURE: 82 MMHG | WEIGHT: 184 LBS | HEIGHT: 67 IN | HEART RATE: 85 BPM | TEMPERATURE: 98 F | SYSTOLIC BLOOD PRESSURE: 167 MMHG

## 2023-08-17 DIAGNOSIS — Z01.812 PRE-OPERATIVE LABORATORY EXAMINATION: Primary | ICD-10-CM

## 2023-08-17 PROCEDURE — 7100000011 HC PHASE II RECOVERY - ADDTL 15 MIN: Performed by: SURGERY

## 2023-08-17 PROCEDURE — 7100000010 HC PHASE II RECOVERY - FIRST 15 MIN: Performed by: SURGERY

## 2023-08-17 PROCEDURE — 3600000012 HC SURGERY LEVEL 2 ADDTL 15MIN: Performed by: SURGERY

## 2023-08-17 PROCEDURE — 36589 REMOVAL TUNNELED CV CATH: CPT | Performed by: SURGERY

## 2023-08-17 PROCEDURE — 3600000002 HC SURGERY LEVEL 2 BASE: Performed by: SURGERY

## 2023-08-17 PROCEDURE — 2709999900 HC NON-CHARGEABLE SUPPLY: Performed by: SURGERY

## 2023-08-17 RX ORDER — LIDOCAINE HYDROCHLORIDE 10 MG/ML
INJECTION, SOLUTION INFILTRATION; PERINEURAL
Status: DISCONTINUED
Start: 2023-08-17 | End: 2023-08-17 | Stop reason: HOSPADM

## 2023-08-17 ASSESSMENT — PAIN - FUNCTIONAL ASSESSMENT: PAIN_FUNCTIONAL_ASSESSMENT: 0-10

## 2023-08-17 NOTE — PROGRESS NOTES
Dr Castillo Morales made aware that pt is here hours before his scheduled surgical time. Also aware that pt drove self. Will do procedure in line room with local anesthesia only before first case.  Pt agreeable

## 2023-08-17 NOTE — PROGRESS NOTES
Pt SpO2 briefly desaturated to 87% on RA. With instruction from RN for pursed lip breathing SpO2 quickly increased to 96%. Per pt he wears 2 L of O2 PRN at home and monitors it SpO2 with his pulse oximeter. RN placed pt on 2 L for procedure and instructed pt to monitor SpO2 various times throughout the day. Pt does not have IV site due to pt not receiving sedation for procedure.   HD catheter sites both capped and clamped upon assessment

## 2023-08-17 NOTE — PROGRESS NOTES
RN discussed D/C instructions with pt. All questions answered by RN. Per Dr. Missy Zavala pt can remove dressing tonight and shower. Per Kimmy pt can don new dressing if site is draining.

## 2023-08-17 NOTE — H&P
Vascular Surgery History & Physical Exam      Chief Complaint: CRF    HISTORY OF PRESENT ILLNESS:                The patient is a 74 y.o. male who presents to the hospital for elective catheter removal.  He denies any problems since the last office visit. IMPRESSION:   Active Hospital Problems    Diagnosis     Encounter regarding vascular access for dialysis for end-stage renal disease ([address]) [N18.6, Z99.2]      Priority: Medium       PLAN:  Removal tunneled dialysis catheter. Patient refuses MAC. Will remove with local at bedside. I reviewed the procedure with the patient. I discussed the risks, benefits, and alternatives of the procedure. The patient understands and consents. All questions were answered.         Past Medical History:   Diagnosis Date    Back pain     Diabetes mellitus (HCC)     DMII (diabetes mellitus, type 2) ([address]) 7/28/2015    Hemodialysis patient ([address])     History of cardiovascular stress test 2/16/2015    lexiscan    HTN (hypertension) 7/28/2015    Hyperlipidemia     Hypertension     Lumbar radicular pain 7/28/2015    Oxygen dependent     wears 2 liters at home    Type II or unspecified type diabetes mellitus without mention of complication, not stated as uncontrolled         Past Surgical History:   Procedure Laterality Date    CARDIOVASCULAR STRESS TEST N/A 05/24/2023    Lexiscan stress test    DIALYSIS FISTULA CREATION Right 02/23/2023    AV FISTULA CREATION RIGHT ARM performed by Eriberto Marinelli MD at [address] [facility] Right 5/26/2023    LAPAROSCOPIC RIGHT  INGUINAL HERNIA  REPAI performed by Azalea Nelson MD at [address] [place] [address] Left 06/06/2014    cain bunionectomy left foot with osteomed screw x1    SHOULDER SURGERY      Bilateral    UPPER GASTROINTESTINAL ENDOSCOPY N/A 8/15/2023    EGD ESOPHAGOGASTRODUODENOSCOPY ULTRASOUND performed by Jennifer Gomes MD at [facility] 8/15/2023    EGD BIOPSY performed by 26.8 02/07/2020    K 4.9 08/15/2023    BUN 38 (H) 07/23/2023    CREATININE 9.5 (HH) 07/23/2023       RADIOLOGY:

## 2023-08-17 NOTE — OP NOTE
Operative Note      Patient: Kay Nichole  YOB: 1955  MRN: [id number]    Date of Procedure: 8/17/2023    Pre-Op Diagnosis Codes:     * End stage renal disease ([address]) [N18.6]    Post-Op Diagnosis: Same       Procedure(s):  removal of tunneled hemodialysis catheter    Surgeon(s):  Mary Ellen Hand MD    Assistant:   * No surgical staff found *    Anesthesia: Local    Estimated Blood Loss (mL): Minimal    Complications: None    Specimens:   * No specimens in log *    Implants:  * No implants in log *      Drains: * No LDAs found *    Findings:     Detailed Description of Procedure: The patient was identified and the procedure was confirmed. The left neck, chest, and catheter were prepped and draped in the usual sterile fashion. Next, 1% lidocaine was used for local anesthesia. Through the catheter exit site the cuff was freed from the surrounding tissues. Traction was applied to the catheter and it was removed intact. Pressure was held for hemostasis and a sterile pressure dressing was applied to the exit site. The patient tolerated the procedure.     Electronically signed by Mary Ellen Hand MD on 8/17/2023 at 7:11 AM

## 2023-08-17 NOTE — PROGRESS NOTES
Per Dr Palomo Carbone pt only needs to stay in line room for one set of vitals 15 minutes from procedure. No additional tasks or procedures need to be completed with pt prior to D/C and pt can resume elijanis tonight per Dr. Palomo Carbone.

## 2023-08-18 LAB — SURGICAL PATHOLOGY REPORT: NORMAL

## 2023-10-10 ENCOUNTER — TELEPHONE (OUTPATIENT)
Dept: FAMILY MEDICINE CLINIC | Age: 68
End: 2023-10-10

## 2023-10-10 NOTE — TELEPHONE ENCOUNTER
Called pt and advised him of Dr Kamar Morataya next opening for a new patient and pt said he needs seen this year. Advised him there was nothing available and pt hung up.

## 2023-10-10 NOTE — TELEPHONE ENCOUNTER
----- Message from Primojeffy Nettles sent at 10/10/2023 10:57 AM EDT -----  Subject: Appointment Request    Reason for Call: New Patient/New to Provider Appointment needed: New   Patient Request Appointment    QUESTIONS    Reason for appointment request? Requested Provider unavailable - Chidi Mosqueda, DO     Additional Information for Provider? Pt is a NP when scheduling with   requested PCP the alert for times selected came up so we wasn't able to   schedule with Chidi Mosqueda, DO. Pt would like to be accepted by this   PCP before the end of the month with available appts if not will schedule   with another PCP in the [facility] who is available. Pt has DM, Goes to Dialysis   3 times and needs to be seen and have a PCP, Former PCP left.  Please   contact to schedule next early available appt with requested PCP or anothe   in [facility].   ---------------------------------------------------------------------------  --------------  Rolanda Gilford PEPW  [phone]; OK to leave message on voicemail,Do not leave any message,   patient will call back for answer  ---------------------------------------------------------------------------  --------------  SCRIPT ANSWERS

## 2023-10-18 ENCOUNTER — TELEPHONE (OUTPATIENT)
Dept: VASCULAR SURGERY | Age: 68
End: 2023-10-18

## 2023-10-18 NOTE — TELEPHONE ENCOUNTER
Jessica Villela from [place] [facility] called stating they have been having difficulty cannulating pt's (R) AVF. Scheduled fistulogram with Dr. Roxy Barcenas 11/15/23 at 8:30 a.m. Jessica Villela will notify the pt and instruct him to report to [facility] (1-) registration at 7:30 a.m, be NPO after midnight except heart and/or BP meds in the a.m with sips of water and to hold Eliquis x 2 days.

## 2023-10-25 NOTE — PLAN OF CARE
Problem: Discharge Planning  Goal: Discharge to home or other facility with appropriate resources  Outcome: Progressing Vascular Surgery - after your carotid endarterectomy:   Incision Care:   - It is okay to leave your neck incision open to air. It does not need to be covered with a dressing once you leave the hospital.   - You are okay to shower with the incision uncovered beginning on 10/26/2023 (approximately 48 hours after surgery). Do not scrub the incision site directly. Allow water to run over and pat gently dry with a towel.   - Do not apply lotions, creams, or ointments to the incision.     Activity   - No strenuous activity or heavy lifting (> 10 pounds) for two weeks following surgery.   - Recommend no driving for one week following surgery.     Diet:  - Follow low fat and low cholesterol or ADA diet as prior to admission.    Follow-up:  - Follow up at the Vascular Center as instructed.  - Call the Vascular Center with any questions or concerns: [phone]    Call your doctor if you have:  - Significant bleeding from the procedure site.   - Increasing pain and firmness near the incision site.  - A temperature greater than 101 degrees.  - Redness or drainage around incision site.  - Uncontrolled headache following surgery.    Call 911 with any of the following stroke like symptoms:  - BALANCE: Sudden loss of balance or coordination  - EYES: Sudden change in vision; loss of vision; blurry vision; or double vision  - FACIAL DROOP: Any facial droop on one side of the face  - ARMS: Arm or leg weakness, numbness, or tingling  - SPEECH: Slurred speech, trouble speaking, trouble understanding speech  - TERRIBLE HEADACHE: Sudden onset of a terrible headache

## 2023-11-10 ENCOUNTER — TELEPHONE (OUTPATIENT)
Dept: FAMILY MEDICINE CLINIC | Age: 68
End: 2023-11-10

## 2023-11-10 NOTE — TELEPHONE ENCOUNTER
Pt called office asking why I told him he was at the wrong office. I then asked what callers name was, Pt informed me Emil Boo, I have an appointment with  today and you just told me I was at the wrong office\". I then Informed pt that I did not speak to him but could provide him with the correct address to ensure he was at the correct location. Pt then became very agitated and Stated ''no listen lady I just saw you and you told me I was in the wrong office for ''. At this point I stated to patient that I was unsure who he talked to but it was not anyone in this office and that I could provide him with the address to the office, Pt was informed of appropriate address for office Pt continued to become very agitated and argued that he had in fact spoken to me and that he was coming back and Why would I tell him he was in the wrong office for his appt. The call was ended by patient. Manager was informed of situation.

## 2023-11-15 ENCOUNTER — HOSPITAL ENCOUNTER (OUTPATIENT)
Age: 68
Setting detail: OUTPATIENT SURGERY
Discharge: HOME | End: 2023-11-15
Attending: SURGERY | Admitting: SURGERY
Payer: MEDICARE

## 2023-11-15 VITALS
TEMPERATURE: 97.6 F | SYSTOLIC BLOOD PRESSURE: 122 MMHG | WEIGHT: 184 LBS | OXYGEN SATURATION: 95 % | RESPIRATION RATE: 18 BRPM | HEIGHT: 67 IN | DIASTOLIC BLOOD PRESSURE: 75 MMHG | BODY MASS INDEX: 28.88 KG/M2 | HEART RATE: 69 BPM

## 2023-11-15 DIAGNOSIS — N18.6 END STAGE RENAL DISEASE (HCC): ICD-10-CM

## 2023-11-15 LAB — ECHO BSA: 1.99 M2

## 2023-11-15 PROCEDURE — 7100000010 HC PHASE II RECOVERY - FIRST 15 MIN: Performed by: SURGERY

## 2023-11-15 PROCEDURE — 2709999900 HC NON-CHARGEABLE SUPPLY: Performed by: SURGERY

## 2023-11-15 PROCEDURE — 7100000011 HC PHASE II RECOVERY - ADDTL 15 MIN: Performed by: SURGERY

## 2023-11-15 PROCEDURE — 36901 INTRO CATH DIALYSIS CIRCUIT: CPT | Performed by: SURGERY

## 2023-11-15 PROCEDURE — 2500000003 HC RX 250 WO HCPCS: Performed by: SURGERY

## 2023-11-15 PROCEDURE — C1769 GUIDE WIRE: HCPCS | Performed by: SURGERY

## 2023-11-15 RX ORDER — SODIUM CHLORIDE 0.9 % (FLUSH) 0.9 %
5-40 SYRINGE (ML) INJECTION PRN
Status: DISCONTINUED | OUTPATIENT
Start: 2023-11-15 | End: 2023-11-21 | Stop reason: HOSPADM

## 2023-11-15 RX ORDER — LIDOCAINE HCL/PF 100 MG/5ML
SYRINGE (ML) INJECTION PRN
Status: DISCONTINUED | OUTPATIENT
Start: 2023-11-15 | End: 2023-11-15 | Stop reason: HOSPADM

## 2023-11-15 RX ORDER — SODIUM CHLORIDE 0.9 % (FLUSH) 0.9 %
5-40 SYRINGE (ML) INJECTION EVERY 12 HOURS SCHEDULED
Status: DISCONTINUED | OUTPATIENT
Start: 2023-11-15 | End: 2023-11-21 | Stop reason: HOSPADM

## 2023-11-15 RX ORDER — SODIUM CHLORIDE 9 MG/ML
INJECTION, SOLUTION INTRAVENOUS PRN
Status: DISCONTINUED | OUTPATIENT
Start: 2023-11-15 | End: 2023-11-21 | Stop reason: HOSPADM

## 2023-11-15 NOTE — H&P
Vascular Surgery History & Physical Exam      Chief Complaint: Chronic renal failure, evaluate AVF. HISTORY OF PRESENT ILLNESS:                The patient is a 74 y.o. male who presents to the hospital for elective fistulogram with possible intervention. The patient has a history of chronic renal failure and a right AVF which dialysis has been having a difficult time cannulating. IMPRESSION:   Active Hospital Problems    Diagnosis     Encounter regarding vascular access for dialysis for end-stage renal disease ([address]) [N18.6, Z99.2]      Priority: Medium       PLAN:  Fistulogram, possible intervention. I reviewed the procedure with the patient. I discussed the risks, benefits, and alternatives of the procedure. The patient understands and consents. All questions were answered.     Patient Active Problem List   Diagnosis Code    Hallux valgus, acquired M20.10    DMII (diabetes mellitus, type 2) ([address]) E11.9    HTN (hypertension) I10    Lumbar radicular pain M54.16    Spinal stenosis M48.00    B12 deficiency E53.8    Idiopathic acute pancreatitis K85.00    Acute pancreatitis without necrosis or infection, unspecified K85.90    Pneumonia J18.9    Respiratory failure (HCC) J96.90    Acute respiratory failure with hypoxia (HCC) J96.01    Hyperkalemia E87.5    Paroxysmal atrial fibrillation (HCC) I48.0    ESRD on hemodialysis (HCC) N18.6, Z99.2    Acute pancreatitis K85.90    Encounter regarding vascular access for dialysis for end-stage renal disease ([address]) N18.6, Z99.2    Acute recurrent pancreatitis K85.90    Acute on chronic respiratory failure with hypoxia ([address]) J96.21    COPD exacerbation ([address]) J44.1    S/P inguinal herniorrhaphy Z98.890, Z87.19    Right groin pain R10.31       Past Medical History:   Diagnosis Date    Back pain     Diabetes mellitus ([address])     DMII (diabetes mellitus, type 2) ([address]) 7/28/2015    Hemodialysis patient [facility])     History of cardiovascular stress test 2/16/2015    lexiscan    HTN

## 2023-11-17 ENCOUNTER — TELEPHONE (OUTPATIENT)
Dept: VASCULAR SURGERY | Age: 68
End: 2023-11-17

## 2023-11-17 NOTE — TELEPHONE ENCOUNTER
Patient called requesting to know when he will be scheduled for another procedure for his fistula.     Raymond hensley    [facility] [employer]

## 2023-12-04 ENCOUNTER — PREP FOR PROCEDURE (OUTPATIENT)
Dept: VASCULAR SURGERY | Age: 68
End: 2023-12-04

## 2023-12-04 ENCOUNTER — TELEPHONE (OUTPATIENT)
Dept: VASCULAR SURGERY | Age: 68
End: 2023-12-04

## 2023-12-04 DIAGNOSIS — N18.6 END STAGE RENAL DISEASE (HCC): ICD-10-CM

## 2023-12-04 NOTE — TELEPHONE ENCOUNTER
Pt returned my call, scheduled revision of (R) AVF with Dr. Marlys Joaquin 1/11/24. He will have his  call our office for confirmation.

## 2023-12-04 NOTE — TELEPHONE ENCOUNTER
Message left on pt's voicemail for a return call to schedule revision of his AVF with Dr. Jeri Connor.

## 2023-12-13 ENCOUNTER — TELEPHONE (OUTPATIENT)
Dept: VASCULAR SURGERY | Age: 68
End: 2023-12-13

## 2023-12-20 NOTE — PROGRESS NOTES
I spoke to [facility] EKG depatrtment, explained patient is only needing and EKG, Not a stress test as the patient thought.  Surgery s 1/4.  She will contact patient.

## 2023-12-22 NOTE — PROGRESS NOTES
[facility]   PRE-ADMISSION TESTING GENERAL INSTRUCTIONS  PAT Phone Number: [phone]      GENERAL INSTRUCTIONS:    [x] Antibacterial Soap Shower Night before and/or AM of surgery.  [] CHG Wipes instruction sheet and wipes given.  [] Hibiclens shower the night before AND the morning of surgery.   -Do not use Hibiclens on your face or head.   [x] Do not wear makeup, lotions, powders, deodorant.   [x] Nothing by mouth after midnight; including gum, candy, mints, or water.  [x] You may brush your teeth, gargle, but do NOT swallow water.   [x] No tobacco products, illegal drugs, or alcohol within 24 hours of your surgery.  [x] Jewelry or valuables should not be brought to the hospital. All body and/or tongue piercing's must be removed prior to arriving to hospital. No contact lens or hair pins.   [x] Arrange transportation with a responsible adult  to and from the hospital. Arrange for someone to be with you for the remainder of the day and for 24 hours after your procedure due to having had anesthesia.          -Who will be your  for transportation?________        -Who will be staying with you for 24 hrs after your procedure? ________   [x] Bring insurance card and photo ID.  [] Bring copy of living will or healthcare power of  papers to be placed in your electronic record.  [] Urine Pregnancy test will be preformed the day of surgery. A specimen sample may be brought from home.  [] Transfusion (Green) Bracelet: Please bring with you to hospital, day of surgery.     PARKING INSTRUCTIONS:     [x] ARRIVAL DATE & TIME: January 4 2024 0930  [] Times are subject to change. We will contact you the business day before surgery if that were to occur.  [x] Enter into the [facility] Entrance. Two people may accompany you. Masks are not required.  [] Parking Lot \"I\" is where you will park. It is located on the corner of [facility] and [address] [facility]. The entrance is on

## 2023-12-27 ENCOUNTER — HOSPITAL ENCOUNTER (OUTPATIENT)
Age: 68
Discharge: HOME | End: 2023-12-27
Payer: MEDICAID

## 2023-12-27 DIAGNOSIS — Z01.818 PRE-OP TESTING: ICD-10-CM

## 2023-12-27 PROCEDURE — 93005 ELECTROCARDIOGRAM TRACING: CPT | Performed by: NURSE PRACTITIONER

## 2023-12-28 LAB
EKG ATRIAL RATE: 89 BPM
EKG P AXIS: 26 DEGREES
EKG P-R INTERVAL: 152 MS
EKG Q-T INTERVAL: 350 MS
EKG QRS DURATION: 88 MS
EKG QTC CALCULATION (BAZETT): 425 MS
EKG R AXIS: 51 DEGREES
EKG T AXIS: 70 DEGREES
EKG VENTRICULAR RATE: 89 BPM

## 2024-01-04 ENCOUNTER — ANESTHESIA EVENT (OUTPATIENT)
Dept: OPERATING ROOM | Age: 69
End: 2024-01-04
Payer: MEDICAID

## 2024-01-04 ENCOUNTER — HOSPITAL ENCOUNTER (OUTPATIENT)
Age: 69
Setting detail: OUTPATIENT SURGERY
Discharge: HOME | End: 2024-01-04
Attending: SURGERY | Admitting: SURGERY
Payer: MEDICAID

## 2024-01-04 ENCOUNTER — ANESTHESIA (OUTPATIENT)
Dept: OPERATING ROOM | Age: 69
End: 2024-01-04
Payer: MEDICAID

## 2024-01-04 VITALS
DIASTOLIC BLOOD PRESSURE: 64 MMHG | WEIGHT: 190 LBS | SYSTOLIC BLOOD PRESSURE: 113 MMHG | RESPIRATION RATE: 18 BRPM | TEMPERATURE: 97.1 F | OXYGEN SATURATION: 92 % | HEART RATE: 81 BPM | BODY MASS INDEX: 29.82 KG/M2 | HEIGHT: 67 IN

## 2024-01-04 DIAGNOSIS — N18.6 END STAGE RENAL DISEASE (HCC): Primary | ICD-10-CM

## 2024-01-04 DIAGNOSIS — I48.0 PAROXYSMAL ATRIAL FIBRILLATION (HCC): ICD-10-CM

## 2024-01-04 DIAGNOSIS — Z01.818 PRE-OP TESTING: ICD-10-CM

## 2024-01-04 LAB
ANION GAP SERPL CALCULATED.3IONS-SCNC: 18 MMOL/L (ref 7–16)
BUN SERPL-MCNC: 38 MG/DL (ref 6–23)
CALCIUM SERPL-MCNC: 9.3 MG/DL (ref 8.6–10.2)
CHLORIDE SERPL-SCNC: 90 MMOL/L (ref 98–107)
CO2 SERPL-SCNC: 28 MMOL/L (ref 22–29)
CREAT SERPL-MCNC: 9.7 MG/DL (ref 0.7–1.2)
ERYTHROCYTE [DISTWIDTH] IN BLOOD BY AUTOMATED COUNT: 15 % (ref 11.5–15)
GFR SERPL CREATININE-BSD FRML MDRD: 5 ML/MIN/1.73M2
GLUCOSE BLD-MCNC: 207 MG/DL (ref 74–99)
GLUCOSE SERPL-MCNC: 207 MG/DL (ref 74–99)
HCT VFR BLD AUTO: 47.5 % (ref 37–54)
HGB BLD-MCNC: 15.2 G/DL (ref 12.5–16.5)
MCH RBC QN AUTO: 29.6 PG (ref 26–35)
MCHC RBC AUTO-ENTMCNC: 32 G/DL (ref 32–34.5)
MCV RBC AUTO: 92.6 FL (ref 80–99.9)
PLATELET # BLD AUTO: 199 K/UL (ref 130–450)
PMV BLD AUTO: 11 FL (ref 7–12)
POTASSIUM SERPL-SCNC: 5.1 MMOL/L (ref 3.5–5)
RBC # BLD AUTO: 5.13 M/UL (ref 3.8–5.8)
SODIUM SERPL-SCNC: 136 MMOL/L (ref 132–146)
WBC OTHER # BLD: 5.6 K/UL (ref 4.5–11.5)

## 2024-01-04 PROCEDURE — 6360000002 HC RX W HCPCS: Performed by: NURSE PRACTITIONER

## 2024-01-04 PROCEDURE — 80048 BASIC METABOLIC PNL TOTAL CA: CPT

## 2024-01-04 PROCEDURE — 6360000002 HC RX W HCPCS

## 2024-01-04 PROCEDURE — 3600000002 HC SURGERY LEVEL 2 BASE: Performed by: SURGERY

## 2024-01-04 PROCEDURE — 6360000002 HC RX W HCPCS: Performed by: SURGERY

## 2024-01-04 PROCEDURE — 7100000010 HC PHASE II RECOVERY - FIRST 15 MIN: Performed by: SURGERY

## 2024-01-04 PROCEDURE — 64417 NJX AA&/STRD AX NERVE IMG: CPT | Performed by: STUDENT IN AN ORGANIZED HEALTH CARE EDUCATION/TRAINING PROGRAM

## 2024-01-04 PROCEDURE — 6360000002 HC RX W HCPCS: Performed by: NURSE ANESTHETIST, CERTIFIED REGISTERED

## 2024-01-04 PROCEDURE — 85027 COMPLETE CBC AUTOMATED: CPT

## 2024-01-04 PROCEDURE — 2580000003 HC RX 258: Performed by: NURSE PRACTITIONER

## 2024-01-04 PROCEDURE — 2500000003 HC RX 250 WO HCPCS: Performed by: SURGERY

## 2024-01-04 PROCEDURE — 7100000011 HC PHASE II RECOVERY - ADDTL 15 MIN: Performed by: SURGERY

## 2024-01-04 PROCEDURE — 36832 AV FISTULA REVISION OPEN: CPT | Performed by: SURGERY

## 2024-01-04 PROCEDURE — 82962 GLUCOSE BLOOD TEST: CPT

## 2024-01-04 PROCEDURE — 3600000012 HC SURGERY LEVEL 2 ADDTL 15MIN: Performed by: SURGERY

## 2024-01-04 PROCEDURE — 3700000000 HC ANESTHESIA ATTENDED CARE: Performed by: SURGERY

## 2024-01-04 PROCEDURE — 2580000003 HC RX 258: Performed by: NURSE ANESTHETIST, CERTIFIED REGISTERED

## 2024-01-04 PROCEDURE — A4217 STERILE WATER/SALINE, 500 ML: HCPCS | Performed by: SURGERY

## 2024-01-04 PROCEDURE — 6360000002 HC RX W HCPCS: Performed by: STUDENT IN AN ORGANIZED HEALTH CARE EDUCATION/TRAINING PROGRAM

## 2024-01-04 PROCEDURE — 3700000001 HC ADD 15 MINUTES (ANESTHESIA): Performed by: SURGERY

## 2024-01-04 PROCEDURE — 2709999900 HC NON-CHARGEABLE SUPPLY: Performed by: SURGERY

## 2024-01-04 PROCEDURE — 2580000003 HC RX 258: Performed by: SURGERY

## 2024-01-04 RX ORDER — SODIUM CHLORIDE 9 MG/ML
INJECTION, SOLUTION INTRAVENOUS PRN
Status: DISCONTINUED | OUTPATIENT
Start: 2024-01-04 | End: 2024-01-04 | Stop reason: HOSPADM

## 2024-01-04 RX ORDER — PHENYLEPHRINE HCL IN 0.9% NACL 1 MG/10 ML
SYRINGE (ML) INTRAVENOUS PRN
Status: DISCONTINUED | OUTPATIENT
Start: 2024-01-04 | End: 2024-01-04 | Stop reason: SDUPTHER

## 2024-01-04 RX ORDER — HEPARIN SODIUM 1000 [USP'U]/ML
INJECTION, SOLUTION INTRAVENOUS; SUBCUTANEOUS PRN
Status: DISCONTINUED | OUTPATIENT
Start: 2024-01-04 | End: 2024-01-04 | Stop reason: SDUPTHER

## 2024-01-04 RX ORDER — SODIUM CHLORIDE 9 MG/ML
INJECTION, SOLUTION INTRAVENOUS CONTINUOUS PRN
Status: DISCONTINUED | OUTPATIENT
Start: 2024-01-04 | End: 2024-01-04 | Stop reason: SDUPTHER

## 2024-01-04 RX ORDER — ROPIVACAINE HYDROCHLORIDE 5 MG/ML
30 INJECTION, SOLUTION EPIDURAL; INFILTRATION; PERINEURAL ONCE
Status: COMPLETED | OUTPATIENT
Start: 2024-01-04 | End: 2024-01-04

## 2024-01-04 RX ORDER — SODIUM CHLORIDE 9 MG/ML
INJECTION, SOLUTION INTRAVENOUS CONTINUOUS
Status: DISCONTINUED | OUTPATIENT
Start: 2024-01-04 | End: 2024-01-04 | Stop reason: HOSPADM

## 2024-01-04 RX ORDER — ROPIVACAINE HYDROCHLORIDE 5 MG/ML
INJECTION, SOLUTION EPIDURAL; INFILTRATION; PERINEURAL
Status: COMPLETED | OUTPATIENT
Start: 2024-01-04 | End: 2024-01-04

## 2024-01-04 RX ORDER — SODIUM CHLORIDE 0.9 % (FLUSH) 0.9 %
5-40 SYRINGE (ML) INJECTION EVERY 12 HOURS SCHEDULED
Status: DISCONTINUED | OUTPATIENT
Start: 2024-01-04 | End: 2024-01-04 | Stop reason: HOSPADM

## 2024-01-04 RX ORDER — GLYCOPYRROLATE 0.2 MG/ML
INJECTION INTRAMUSCULAR; INTRAVENOUS PRN
Status: DISCONTINUED | OUTPATIENT
Start: 2024-01-04 | End: 2024-01-04 | Stop reason: SDUPTHER

## 2024-01-04 RX ORDER — ROPIVACAINE HYDROCHLORIDE 5 MG/ML
INJECTION, SOLUTION EPIDURAL; INFILTRATION; PERINEURAL
Status: COMPLETED
Start: 2024-01-04 | End: 2024-01-04

## 2024-01-04 RX ORDER — PROPOFOL 10 MG/ML
INJECTION, EMULSION INTRAVENOUS CONTINUOUS PRN
Status: DISCONTINUED | OUTPATIENT
Start: 2024-01-04 | End: 2024-01-04 | Stop reason: SDUPTHER

## 2024-01-04 RX ORDER — OXYCODONE HYDROCHLORIDE AND ACETAMINOPHEN 5; 325 MG/1; MG/1
1 TABLET ORAL EVERY 6 HOURS PRN
Qty: 8 TABLET | Refills: 0 | Status: SHIPPED | OUTPATIENT
Start: 2024-01-04 | End: 2024-01-06

## 2024-01-04 RX ORDER — SODIUM CHLORIDE 0.9 % (FLUSH) 0.9 %
5-40 SYRINGE (ML) INJECTION PRN
Status: DISCONTINUED | OUTPATIENT
Start: 2024-01-04 | End: 2024-01-04 | Stop reason: HOSPADM

## 2024-01-04 RX ORDER — MIDAZOLAM HYDROCHLORIDE 2 MG/2ML
2 INJECTION, SOLUTION INTRAMUSCULAR; INTRAVENOUS ONCE
Status: COMPLETED | OUTPATIENT
Start: 2024-01-04 | End: 2024-01-04

## 2024-01-04 RX ORDER — MIDAZOLAM HYDROCHLORIDE 1 MG/ML
INJECTION INTRAMUSCULAR; INTRAVENOUS
Status: COMPLETED
Start: 2024-01-04 | End: 2024-01-04

## 2024-01-04 RX ADMIN — HEPARIN SODIUM 5000 UNITS: 1000 INJECTION INTRAVENOUS; SUBCUTANEOUS at 14:04

## 2024-01-04 RX ADMIN — Medication 100 MCG: at 14:12

## 2024-01-04 RX ADMIN — GLYCOPYRROLATE 0.2 MG: 0.2 INJECTION INTRAMUSCULAR; INTRAVENOUS at 13:47

## 2024-01-04 RX ADMIN — PROPOFOL 100 MCG/KG/MIN: 10 INJECTION, EMULSION INTRAVENOUS at 13:24

## 2024-01-04 RX ADMIN — Medication 50 MCG: at 13:53

## 2024-01-04 RX ADMIN — ROPIVACAINE HYDROCHLORIDE 35 ML: 5 INJECTION EPIDURAL; INFILTRATION; PERINEURAL at 11:38

## 2024-01-04 RX ADMIN — Medication 100 MCG: at 14:02

## 2024-01-04 RX ADMIN — ROPIVACAINE HYDROCHLORIDE 35 ML: 5 INJECTION EPIDURAL; INFILTRATION; PERINEURAL at 11:45

## 2024-01-04 RX ADMIN — MIDAZOLAM 2 MG: 1 INJECTION INTRAMUSCULAR; INTRAVENOUS at 11:38

## 2024-01-04 RX ADMIN — CEFAZOLIN 2000 MG: 2 INJECTION, POWDER, FOR SOLUTION INTRAMUSCULAR; INTRAVENOUS at 13:25

## 2024-01-04 RX ADMIN — SODIUM CHLORIDE: 9 INJECTION, SOLUTION INTRAVENOUS at 13:14

## 2024-01-04 RX ADMIN — MIDAZOLAM HYDROCHLORIDE 2 MG: 2 INJECTION, SOLUTION INTRAMUSCULAR; INTRAVENOUS at 11:38

## 2024-01-04 RX ADMIN — ROPIVACAINE HYDROCHLORIDE 35 ML: 5 INJECTION, SOLUTION EPIDURAL; INFILTRATION; PERINEURAL at 11:38

## 2024-01-04 RX ADMIN — SODIUM CHLORIDE, PRESERVATIVE FREE 10 ML: 5 INJECTION INTRAVENOUS at 09:56

## 2024-01-04 ASSESSMENT — LIFESTYLE VARIABLES: SMOKING_STATUS: 1

## 2024-01-04 ASSESSMENT — ENCOUNTER SYMPTOMS: SHORTNESS OF BREATH: 1

## 2024-01-04 ASSESSMENT — PAIN - FUNCTIONAL ASSESSMENT
PAIN_FUNCTIONAL_ASSESSMENT: NONE - DENIES PAIN
PAIN_FUNCTIONAL_ASSESSMENT: 0-10

## 2024-01-04 NOTE — ANESTHESIA PRE PROCEDURE
Department of Anesthesiology  Preprocedure Note       Name:  Carmelo Desir   Age:  68 y.o.  :  1955                                          MRN:  [id number]         Date:  2024      Surgeon: Surgeon(s):  Thad Oneal MD    Procedure: Procedure(s):  REVISION AV FISTULA RIGHT ARM    Medications prior to admission:   Prior to Admission medications    Medication Sig Start Date End Date Taking? Authorizing Provider   pantoprazole (PROTONIX) 40 MG tablet Take 1 tablet by mouth 2 times daily (before meals) 8/15/23   Logan Murillo MD   AURYXIA 1  MG(Fe) TABS tablet TAKE 3 TABLETS BY MOUTH THREE TIMES A DAY WITH MEALS. SWALLOW WHOLE, DO NOT CHEW OR CRUSH MEDICATION 23   Jose Hardy MD   VELTASSA 8.4 g PACK packet  23   Jose Hardy MD   sodium zirconium cyclosilicate (LOKELMA) 5 g PACK oral suspension Take 1 packet by mouth 23   Jose Hardy MD   ibuprofen (ADVIL;MOTRIN) 800 MG tablet Take 1 tablet by mouth 2 times daily as needed for Pain 23   Vera Moser MD   sennosides-docusate sodium (SENOKOT-S) 8.6-50 MG tablet Take 2 tablets by mouth 2 times daily 23   Homero Tsang MD   fluticasone (FLONASE) 50 MCG/ACT nasal spray 2 sprays by Nasal route daily 2 sprays in each nostril daily 23   Milton Verdin DO   albuterol sulfate HFA (PROVENTIL;VENTOLIN;PROAIR) 108 (90 Base) MCG/ACT inhaler  3/21/23   Jose Hardy MD   ELIQUIS 5 MG TABS tablet 1 tablet 2 times daily 23   Jose Hardy MD   insulin glargine (LANTUS) 100 UNIT/ML injection vial Inject 34 Units into the skin nightly 21   Pramod Rolle DO   sevelamer (RENVELA) 800 MG tablet Take 2 tablets by mouth 3 times daily (with meals)    Jose Hardy MD   OXYGEN Inhale 2 L into the lungs as needed     Jose Hardy MD   iron sucrose (VENOFER) 20 MG/ML injection Infuse 2.5 mLs intravenously once a week Friday    Provider, Jose,

## 2024-01-04 NOTE — DISCHARGE INSTRUCTIONS
[facility] AMBULATORY PROCEDURE DISCHARGE INSTRUCTIONS  You may be drowsy or lightheaded after receiving sedation or anesthesia.    A responsible person should be with you for the next 24 hours.    Please follow the instructions checked below:    DIET INSTRUCTIONS:  [x]Start with light diet and progress to your normal diet as you feel like eating. If you experience nausea or repeated episodes of vomiting which persist beyond 12-24 hours, notify your doctor.  []Other     ACTIVITY INSTRUCTIONS:  [x]Rest today. Increase activity as tolerated    [x]Elevate operative limb   [x]No heavy lifting or strenuous activity     [x]No driving for 2 days  []Other     WOUND/DRESSING INSTRUCTIONS:  Always ensure you and your care giver clean hands before and after caring for the wound.  [x]May shower      [x]May bathe      [x]Derma bond dressing-Do not apply lotion, gel, or liquid to wound while the derma bond is in place.   []Other         MEDICATION INSTRUCTIONS:    [x]Prescriptions sent with you.  Use as directed.  When taking pain medications, you may experience dizziness or drowsiness.  Do not drink alcohol or drive when taking these medications.  [x]You may take a non-prescription “headache remedy”, preferably one that does not contain aspirin.    Other Instructions:      FOLLOW-UP CARE:  [x]Call the office at [phone] for follow-up appointment in 2 weeks    Call physician if they or any other problems occur:  Fever over 101°    Redness, swelling, hardness or warmth at the operative site  Unrelieved nausea    Foul smelling or cloudy drainage at the operative site   Unrelieved pain    Blood soaked dressing. (Some oozing may be normal)

## 2024-01-04 NOTE — ANESTHESIA PROCEDURE NOTES
Peripheral Block    Patient location during procedure: PACU  Reason for block: post-op pain management and at surgeon's request  Start time: 1/4/2024 11:45 AM  End time: 1/4/2024 11:47 AM  Staffing  Performed by: Alicia Lombardi MD  Authorized by: Alicia Lombardi MD    Preanesthetic Checklist  Completed: patient identified, IV checked, site marked, risks and benefits discussed, surgical/procedural consents, equipment checked, pre-op evaluation, timeout performed, anesthesia consent given, oxygen available and monitors applied/VS acknowledged  Peripheral Block   Patient position: supine  Prep: ChloraPrep  Provider prep: mask and sterile gloves  Patient monitoring: cardiac monitor, continuous pulse ox, frequent blood pressure checks and IV access  Block type: Axillary  R axillary  Laterality: right  Injection technique: single-shot  Guidance: ultrasound guided  Local infiltration: lidocaine  Infiltration strength: 1 %  Local infiltration: lidocaine    Needle   Needle type: combined needle/nerve stimulator   Needle gauge: 20 G  Needle localization: ultrasound guidance  Needle length: 10 cm  Assessment   Injection assessment: negative aspiration for heme, no paresthesia on injection and local visualized surrounding nerve on ultrasound  Slow fractionated injection: yes  Hemodynamics: stable  Real-time US image taken/store: yes    Additional Notes        Medications Administered  ropivacaine (NAROPIN) injection 0.5% - Perineural   35 mL - 1/4/2024 11:45:00 AM

## 2024-01-04 NOTE — H&P
Vascular Surgery History & Physical Exam      Chief Complaint: CRF    HISTORY OF PRESENT ILLNESS:                The patient is a 68 y.o. male who presents to the hospital for elective creation of a arteriovenous fistula.  The patient denies any problems since the last office visit.    IMPRESSION:   Active Hospital Problems    Diagnosis     End stage renal disease ([facility]) [N18.6]        PLAN:  Revision of a right arm arteriovenous fistula.    I reviewed the procedure with the patient.  I discussed the risks, benefits, and alternatives of the procedure.  The patient understands and consents.  All questions were answered.    Patient Active Problem List   Diagnosis Code    Hallux valgus, acquired M20.10    DMII (diabetes mellitus, type 2) ([facility]) E11.9    HTN (hypertension) I10    Lumbar radicular pain M54.16    Spinal stenosis M48.00    B12 deficiency E53.8    Idiopathic acute pancreatitis K85.00    Acute pancreatitis without necrosis or infection, unspecified K85.90    Pneumonia J18.9    Respiratory failure ([facility]) J96.90    Acute respiratory failure with hypoxia ([facility]) J96.01    Hyperkalemia E87.5    Paroxysmal atrial fibrillation ([facility]) I48.0    ESRD on hemodialysis ([facility]) N18.6, Z99.2    Acute pancreatitis K85.90    Encounter regarding vascular access for dialysis for end-stage renal disease ([facility]) N18.6, Z99.2    Acute recurrent pancreatitis K85.90    Acute on chronic respiratory failure with hypoxia ([facility]) J96.21    COPD exacerbation ([facility]) J44.1    S/P inguinal herniorrhaphy Z98.890, Z87.19    Right groin pain R10.31    End stage renal disease ([facility]) N18.6       Past Medical History:   Diagnosis Date    Back pain     Diabetes mellitus ([facility])     DMII (diabetes mellitus, type 2) ([facility]) 7/28/2015    Hemodialysis patient ([facility])     History of cardiovascular stress test 2/16/2015    lexiscan    HTN (hypertension) 7/28/2015    Hyperlipidemia     Hypertension     Lumbar radicular pain 7/28/2015    Oxygen dependent     wears 2 liters

## 2024-01-04 NOTE — OP NOTE
Operative Note      Patient: Carmelo Desir  YOB: 1955  MRN: [id number]    Date of Procedure: 1/4/2024    Pre-Op Diagnosis Codes:     * End stage renal disease (HCC) [N18.6]    Post-Op Diagnosis: Same       Procedure(s):  REVISION AV FISTULA RIGHT ARM    Surgeon(s):  Thad Oneal MD    Assistant:   Resident: Trav Cherry DO    Anesthesia: Regional    Estimated Blood Loss (mL): Minimal    Complications: None    Specimens:   * No specimens in log *    Implants:  * No implants in log *      Drains: * No LDAs found *    Findings:         Detailed Description of Procedure:   The patient was identified and the procedure was confirmed. The right arm was prepped and draped in the usual sterile fashion. Lidocaine 1% mixed with 0.25% Marcaine was used for local anesthesia. A skin incision was made in the medial aspect of the distal upper arm and carried down through the subcutaneous tissue. The basilic vein was identified and freed from the surrounding tissues. Branches were divided between silk ties. The vein was ligated distally with a silk suture.    A second skin incision was made over the cephalic vein in the forearm that was noted to be patent.  The vein was freed from the surrounding tissues.  The inflow and outflow branches were controlled with Vesseloops.  The basilic vein was pulled through a subcutaneous tunnel to the incision over the cephalic vein.  The patient was given heparin.  The vein was clamped and a longitudinal venotomy was made.  The vein was spatulated and anastomosed to the side of the cephalic vein with a running 6-0 Prolene suture.  After completing the anastomosis the clamps were released and flow was appreciated through the transposed basilic vein.    Hemostasis was obtained, and the incisions were irrigated with saline solution. The incisions were approximated with multiple layers of Vicryl suture, and Dermabond was applied to the skin incisions in the operating room.  Needle, sponge and instrument counts were reported as correct x2. The patient tolerated the procedure and was transferred to the recovery area in satisfactory condition.      Electronically signed by Thad Oneal MD on 1/4/2024 at 3:51 PM

## 2024-01-05 NOTE — ANESTHESIA POSTPROCEDURE EVALUATION
Department of Anesthesiology  Postprocedure Note    Patient: Carmelo Desir  MRN: [id number]  YOB: 1955  Date of evaluation: 1/4/2024    Procedure Summary     Date: 01/04/24 Room / Location: [address]    Anesthesia Start: 1314 Anesthesia Stop: 1457    Procedure: REVISION AV FISTULA RIGHT ARM (Right: Arm Lower) Diagnosis:       End stage renal disease (HCC)      (End stage renal disease (HCC) [N18.6])    Surgeons: Thad Oneal MD Responsible Provider: Alicia Lombardi MD    Anesthesia Type: MAC, regional ASA Status: 4          Anesthesia Type: No value filed.    Nadeem Phase I: Nadeem Score: 10    Nadeem Phase II: Nadeem Score: 10    Anesthesia Post Evaluation    Patient location during evaluation: PACU  Patient participation: complete - patient participated  Level of consciousness: awake  Airway patency: patent  Nausea & Vomiting: no nausea and no vomiting  Cardiovascular status: hemodynamically stable  Respiratory status: acceptable  Hydration status: euvolemic  Pain management: adequate        No notable events documented.

## 2024-01-08 ENCOUNTER — TELEPHONE (OUTPATIENT)
Dept: VASCULAR SURGERY | Age: 69
End: 2024-01-08

## 2024-01-30 ENCOUNTER — OFFICE VISIT (OUTPATIENT)
Dept: VASCULAR SURGERY | Age: 69
End: 2024-01-30

## 2024-01-30 VITALS — WEIGHT: 184 LBS | BODY MASS INDEX: 28.82 KG/M2

## 2024-01-30 DIAGNOSIS — N18.6 ENCOUNTER REGARDING VASCULAR ACCESS FOR DIALYSIS FOR END-STAGE RENAL DISEASE (HCC): Primary | ICD-10-CM

## 2024-01-30 DIAGNOSIS — Z99.2 ENCOUNTER REGARDING VASCULAR ACCESS FOR DIALYSIS FOR END-STAGE RENAL DISEASE (HCC): Primary | ICD-10-CM

## 2024-01-30 PROCEDURE — 99024 POSTOP FOLLOW-UP VISIT: CPT | Performed by: NURSE PRACTITIONER

## 2024-01-30 NOTE — PROGRESS NOTES
1/30/2024    Carmelo Desir  1955    Chief Complaint   Patient presents with    Post-Op Check     Rt.arm fistula       Patient returns for post operative evaluation status post revision of a right AVF.  The patient denies any unexpected problems since the procedure. He has been using his fistula for dialysis without any issues. He denies any right hand pain.         Procedure Laterality Date    CARDIOVASCULAR STRESS TEST N/A 05/24/2023    Lexiscan stress test    CATHETER REMOVAL Left 8/17/2023    removal of tunneled hemodialysis catheter performed by Thad Oneal MD at [facility] [place]    DIALYSIS FISTULA CREATION Right 02/23/2023    AV FISTULA CREATION RIGHT ARM performed by Thad Oneal MD at [facility] [place]    DIALYSIS FISTULA CREATION Right 1/4/2024    REVISION AV FISTULA RIGHT ARM performed by Thad Oneal MD at [facility] [place]    HERNIA REPAIR Right 5/26/2023    LAPAROSCOPIC RIGHT  INGUINAL HERNIA  REPAI performed by Vera Moser MD at [facility] OR    INVASIVE VASCULAR N/A 11/15/2023    Fistulogram right performed by Thad Oneal MD at [facility] CARDIAC CATH LAB    OTHER SURGICAL HISTORY Left 06/06/2014    cain bunionectomy left foot with osteomed screw x1    SHOULDER SURGERY      Bilateral    UPPER GASTROINTESTINAL ENDOSCOPY N/A 8/15/2023    EGD ESOPHAGOGASTRODUODENOSCOPY ULTRASOUND performed by Logan Murillo MD at [facility] ENDOSCOPY    UPPER GASTROINTESTINAL ENDOSCOPY N/A 8/15/2023    EGD BIOPSY performed by Logan Murillo MD at [facility] ENDOSCOPY    VEIN SURGERY         Physical Exam:  The incision(s) are healing without evidence of infection.  Heart rhythm is regular. AVF + thrill, bruit. No longer pulsatile.           Right      Left   Brachial     Radial 2    Femoral     Popliteal     Dorsalis Pedis     Posterior Tibial     (3=normal, 2=diminished, 1=barely palpable, 4=widened)    Assessment:  Post-operative AV access.    Problem List Items Addressed This Visit       Encounter regarding vascular access for

## 2024-02-06 ENCOUNTER — OFFICE VISIT (OUTPATIENT)
Dept: FAMILY MEDICINE CLINIC | Age: 69
End: 2024-02-06
Payer: MEDICARE

## 2024-02-06 VITALS
TEMPERATURE: 97 F | WEIGHT: 191 LBS | DIASTOLIC BLOOD PRESSURE: 70 MMHG | RESPIRATION RATE: 16 BRPM | BODY MASS INDEX: 29.98 KG/M2 | SYSTOLIC BLOOD PRESSURE: 138 MMHG | HEIGHT: 67 IN | OXYGEN SATURATION: 98 % | HEART RATE: 70 BPM

## 2024-02-06 DIAGNOSIS — I10 PRIMARY HYPERTENSION: Chronic | ICD-10-CM

## 2024-02-06 DIAGNOSIS — E11.22 TYPE 2 DIABETES MELLITUS WITH CHRONIC KIDNEY DISEASE, WITH LONG-TERM CURRENT USE OF INSULIN, UNSPECIFIED CKD STAGE (HCC): Primary | Chronic | ICD-10-CM

## 2024-02-06 DIAGNOSIS — I48.0 PAROXYSMAL ATRIAL FIBRILLATION (HCC): ICD-10-CM

## 2024-02-06 DIAGNOSIS — N18.6 ESRD ON HEMODIALYSIS (HCC): ICD-10-CM

## 2024-02-06 DIAGNOSIS — Z79.4 TYPE 2 DIABETES MELLITUS WITH CHRONIC KIDNEY DISEASE, WITH LONG-TERM CURRENT USE OF INSULIN, UNSPECIFIED CKD STAGE (HCC): Primary | Chronic | ICD-10-CM

## 2024-02-06 DIAGNOSIS — Z99.2 ESRD ON HEMODIALYSIS (HCC): ICD-10-CM

## 2024-02-06 LAB — HBA1C MFR BLD: 9.3 %

## 2024-02-06 PROCEDURE — 1123F ACP DISCUSS/DSCN MKR DOCD: CPT | Performed by: FAMILY MEDICINE

## 2024-02-06 PROCEDURE — 3017F COLORECTAL CA SCREEN DOC REV: CPT | Performed by: FAMILY MEDICINE

## 2024-02-06 PROCEDURE — 2022F DILAT RTA XM EVC RTNOPTHY: CPT | Performed by: FAMILY MEDICINE

## 2024-02-06 PROCEDURE — 99204 OFFICE O/P NEW MOD 45 MIN: CPT | Performed by: FAMILY MEDICINE

## 2024-02-06 PROCEDURE — G8482 FLU IMMUNIZE ORDER/ADMIN: HCPCS | Performed by: FAMILY MEDICINE

## 2024-02-06 PROCEDURE — 3075F SYST BP GE 130 - 139MM HG: CPT | Performed by: FAMILY MEDICINE

## 2024-02-06 PROCEDURE — G8417 CALC BMI ABV UP PARAM F/U: HCPCS | Performed by: FAMILY MEDICINE

## 2024-02-06 PROCEDURE — 4004F PT TOBACCO SCREEN RCVD TLK: CPT | Performed by: FAMILY MEDICINE

## 2024-02-06 PROCEDURE — 83036 HEMOGLOBIN GLYCOSYLATED A1C: CPT | Performed by: FAMILY MEDICINE

## 2024-02-06 PROCEDURE — G8427 DOCREV CUR MEDS BY ELIG CLIN: HCPCS | Performed by: FAMILY MEDICINE

## 2024-02-06 PROCEDURE — 3046F HEMOGLOBIN A1C LEVEL >9.0%: CPT | Performed by: FAMILY MEDICINE

## 2024-02-06 PROCEDURE — 3078F DIAST BP <80 MM HG: CPT | Performed by: FAMILY MEDICINE

## 2024-02-06 SDOH — ECONOMIC STABILITY: FOOD INSECURITY: WITHIN THE PAST 12 MONTHS, THE FOOD YOU BOUGHT JUST DIDN'T LAST AND YOU DIDN'T HAVE MONEY TO GET MORE.: NEVER TRUE

## 2024-02-06 SDOH — ECONOMIC STABILITY: FOOD INSECURITY: WITHIN THE PAST 12 MONTHS, YOU WORRIED THAT YOUR FOOD WOULD RUN OUT BEFORE YOU GOT MONEY TO BUY MORE.: NEVER TRUE

## 2024-02-06 SDOH — ECONOMIC STABILITY: INCOME INSECURITY: HOW HARD IS IT FOR YOU TO PAY FOR THE VERY BASICS LIKE FOOD, HOUSING, MEDICAL CARE, AND HEATING?: NOT VERY HARD

## 2024-02-06 ASSESSMENT — PATIENT HEALTH QUESTIONNAIRE - PHQ9
SUM OF ALL RESPONSES TO PHQ9 QUESTIONS 1 & 2: 0
SUM OF ALL RESPONSES TO PHQ QUESTIONS 1-9: 0
2. FEELING DOWN, DEPRESSED OR HOPELESS: 0
SUM OF ALL RESPONSES TO PHQ QUESTIONS 1-9: 0
1. LITTLE INTEREST OR PLEASURE IN DOING THINGS: 0

## 2024-02-06 NOTE — PROGRESS NOTES
2024    Carmelo Desir    Chief Complaint   Patient presents with    New Patient     Patient is here to establish care as a new patient  and his previous pcp was Dr Garcia    Health Maintenance     [facility], podiatrist- doesn't know his name    Medication Refill     Patient needs refills but doesn't know what ones and will call with that information later       HPI  History was obtained from patient.  Carmelo is a 68 y.o. male who presents today with the followin. Type 2 diabetes mellitus with chronic kidney disease, with long-term current use of insulin, unspecified CKD stage ([facility])    2. Primary hypertension    3. ESRD on hemodialysis ([facility])    4. Paroxysmal atrial fibrillation ([facility])     Diagnosed    [facility]  Patient presents with patient has a history of diabetes diagnosed 14 years ago.  He is currently on Lantus insulin 34 units nightly and Jardiance.  He does follow with nephrology patient is currently on hemodialysis.  Patient has paroxysmal atrial fibrillation.    REVIEW OF SYMPTOMS    Review of Systems   Constitutional:  Negative for chills, fatigue and fever.   HENT:  Negative for congestion, mouth sores, postnasal drip, rhinorrhea and sinus pain.    Eyes:  Negative for photophobia, discharge and redness.   Respiratory:  Negative for cough and shortness of breath.    Cardiovascular:  Negative for chest pain.   Gastrointestinal: Negative.    Genitourinary:  Negative for difficulty urinating, dysuria, hematuria and urgency.   Neurological:  Negative for headaches.   Psychiatric/Behavioral:  Negative for sleep disturbance.        PAST MEDICAL HISTORY  Past Medical History:   Diagnosis Date    Back pain     Diabetes mellitus (HCC)     DMII (diabetes mellitus, type 2) ([facility]) 2015    Hemodialysis patient ([facility])     History of cardiovascular stress test 2015    lexiscan    HTN (hypertension) 2015    Hyperlipidemia     Hypertension     Lumbar radicular pain  [No Acute Distress] : no acute distress [Well Nourished] : well nourished [Well Developed] : well developed [Well-Appearing] : well-appearing [No Accessory Muscle Use] : no accessory muscle use [Regular Rhythm] : with a regular rhythm [Normal S1, S2] : normal S1 and S2 [Normal Affect] : the affect was normal [Alert and Oriented x3] : oriented to person, place, and time [Normal Insight/Judgement] : insight and judgment were intact

## 2024-02-27 ENCOUNTER — COMMUNITY OUTREACH (OUTPATIENT)
Dept: FAMILY MEDICINE CLINIC | Age: 69
End: 2024-02-27

## 2024-02-27 DIAGNOSIS — I10 PRIMARY HYPERTENSION: Primary | ICD-10-CM

## 2024-02-27 RX ORDER — METOPROLOL SUCCINATE 25 MG/1
25 TABLET, EXTENDED RELEASE ORAL DAILY
COMMUNITY
End: 2024-02-27 | Stop reason: SDUPTHER

## 2024-02-27 RX ORDER — METOPROLOL SUCCINATE 25 MG/1
25 TABLET, EXTENDED RELEASE ORAL DAILY
Qty: 90 TABLET | Refills: 0 | Status: SHIPPED | OUTPATIENT
Start: 2024-02-27

## 2024-03-26 ENCOUNTER — OFFICE VISIT (OUTPATIENT)
Dept: VASCULAR SURGERY | Age: 69
End: 2024-03-26

## 2024-03-26 ENCOUNTER — TELEPHONE (OUTPATIENT)
Dept: FAMILY MEDICINE CLINIC | Age: 69
End: 2024-03-26

## 2024-03-26 VITALS — WEIGHT: 186 LBS | BODY MASS INDEX: 29.13 KG/M2

## 2024-03-26 DIAGNOSIS — Z99.2 ENCOUNTER REGARDING VASCULAR ACCESS FOR DIALYSIS FOR END-STAGE RENAL DISEASE (HCC): Primary | ICD-10-CM

## 2024-03-26 DIAGNOSIS — N18.6 ENCOUNTER REGARDING VASCULAR ACCESS FOR DIALYSIS FOR END-STAGE RENAL DISEASE (HCC): Primary | ICD-10-CM

## 2024-03-26 PROCEDURE — 99024 POSTOP FOLLOW-UP VISIT: CPT | Performed by: SURGERY

## 2024-03-26 NOTE — TELEPHONE ENCOUNTER
Patient called 2 times today regarding the letter that was sent to remind patients about getting colonoscopy. He was told that I was in room and was to leave a message and I will call back soon as I was done with patients for the morning. He called back with the ECC for a 3rd time and was beligerant and wouldn't listen as I tried to explain the purpose of the letter. He told me he had it done then hung up.

## 2024-03-26 NOTE — PROGRESS NOTES
Vascular Surgery Progress Note    Chief Complaint   Patient presents with    Check-Up     Rt. Arm pt. Not coperating with questions I'm asking him       Patient returns for post operative evaluation status post revision of his AV fistula.  He was referred to the dialysis access center and underwent attempted intervention on an occluded cephalic vein.  He states he has been having pain since the procedure because they \"hit a nerve\".        Procedure Laterality Date    CARDIOVASCULAR STRESS TEST N/A 05/24/2023    Lexiscan stress test    CATHETER REMOVAL Left 8/17/2023    removal of tunneled hemodialysis catheter performed [place]al [place] [facility] [place]    DIALYSIS FISTULA CREATION Right 02/23/2023    AV FISTULA CREATION RIGHT ARM performed by Thad Oneal MD at [facility] [place]    DIALYSIS FISTULA CREATION Right 1/4/2024    REVISION AV FISTULA RIGHT ARM performed by Thad Oneal MD at [facility] [place]    HERNIA REPAIR Right 5/26/2023    LAPAROSCOPIC RIGHT  INGUINAL HERNIA  REPAI performed by Vera Moser MD at [facility] OR    INVASIVE VASCULAR N/A 11/15/2023    Fistulogram right performed by Thad Oneal MD at [facility] [place]    OTHER SURGICAL HISTORY Left 06/06/2014    cain bunionectomy left foot with osteomed screw x1    SHOULDER SURGERY      Bilateral    UPPER GASTROINTESTINAL ENDOSCOPY N/A 8/15/2023    EGD ESOPHAGOGASTRODUODENOSCOPY ULTRASOUND performed by Logan Murillo MD at [facility] [place]    UPPER GASTROINTESTINAL ENDOSCOPY N/A 8/15/2023    EGD BIOPSY performed by Logan Murillo MD at [facility] [place]    VEIN SURGERY         Physical Exam: The fistula is patent with a good thrill and bruit.  Mildly pulsatile.          Right      Left   Brachial     Radial     Femoral     Popliteal     Dorsalis Pedis     Posterior Tibial     (3=normal, 2=diminished, 1=barely palpable, 4=widened)    Problem List Items Addressed This Visit       Encounter regarding vascular access for dialysis for end-stage renal disease

## 2024-04-12 ENCOUNTER — HOSPITAL ENCOUNTER (EMERGENCY)
Age: 69
Discharge: HOME | End: 2024-04-12
Attending: EMERGENCY MEDICINE
Payer: COMMERCIAL

## 2024-04-12 ENCOUNTER — TELEPHONE (OUTPATIENT)
Dept: VASCULAR SURGERY | Age: 69
End: 2024-04-12

## 2024-04-12 ENCOUNTER — CLINICAL DOCUMENTATION (OUTPATIENT)
Dept: VASCULAR SURGERY | Age: 69
End: 2024-04-12

## 2024-04-12 VITALS
HEART RATE: 99 BPM | TEMPERATURE: 98.2 F | SYSTOLIC BLOOD PRESSURE: 111 MMHG | OXYGEN SATURATION: 95 % | BODY MASS INDEX: 29.13 KG/M2 | WEIGHT: 186 LBS | DIASTOLIC BLOOD PRESSURE: 66 MMHG | RESPIRATION RATE: 20 BRPM

## 2024-04-12 DIAGNOSIS — T82.838A HEMORRHAGE OF ARTERIOVENOUS FISTULA, INITIAL ENCOUNTER (HCC): Primary | ICD-10-CM

## 2024-04-12 LAB — POTASSIUM SERPL-SCNC: 3.7 MMOL/L (ref 3.5–5)

## 2024-04-12 PROCEDURE — 84132 ASSAY OF SERUM POTASSIUM: CPT

## 2024-04-12 PROCEDURE — 99283 EMERGENCY DEPT VISIT LOW MDM: CPT

## 2024-04-12 ASSESSMENT — ENCOUNTER SYMPTOMS
CHEST TIGHTNESS: 0
SHORTNESS OF BREATH: 0

## 2024-04-12 NOTE — ED NOTES
Right arm with coban C/D/I. No evidence of bleeding at this time. Radial pulse palpable. Cont to monitor for bleeding.

## 2024-04-12 NOTE — PROGRESS NOTES
Vascular:    Discussed with Dr. Luis Manuel Ashton, patient was sent from dialysis because of bleeding from needle sticks, the bandage was removed, there was no bleeding    The fistula has good thrill and soft    Patient is currently on Eliquis    Discussed with Dr. Ashton, as there are no vascular issues that need attention and intervention at the present time, it was decided to follow the patient conservatively with instruction for the patient to call Dr. Oneal's office on Monday if any questions or concerns and for follow-up care

## 2024-04-12 NOTE — ED PROVIDER NOTES
67 yo male with concern about bleeding from his dialysis site.  He was on dialysis today, he completed about 2 hours when they noticed the bleeding was backing up in the tubing and it was causing some difficulty with the machine.  Patient reports that they called Dr. Oneal and he advised the patient go to the emergency department.  Upon arrival patient has a tight bandage wrapped on the right forearm.  There is no bleeding coming through.  Bandage taken down, there is no active bleeding at this time.  He has a distal radial pulse, no lightheadedness, no dizziness, no chest pain, shortness of breath.      Family History   Problem Relation Age of Onset    Kidney Disease Sister     Diabetes Mother     Diabetes Father      Past Surgical History:   Procedure Laterality Date    CARDIOVASCULAR STRESS TEST N/A 05/24/2023    Lexiscan stress test    CATHETER REMOVAL Left 8/17/2023    removal of tunneled hemodialysis catheter performed by Thad Oneal MD at [facility] [place]    DIALYSIS FISTULA CREATION Right 02/23/2023    AV FISTULA CREATION RIGHT ARM performed by Thad Oneal MD at [facility] [place]    DIALYSIS FISTULA CREATION Right 1/4/2024    REVISION AV FISTULA RIGHT ARM performed by Thad Oneal MD at [facility] [place]    HERNIA REPAIR Right 5/26/2023    LAPAROSCOPIC RIGHT  INGUINAL HERNIA  REPAI performed by Vera Moser MD at [facility] OR    INVASIVE VASCULAR N/A 11/15/2023    Fistulogram right performed by Thad Oneal MD at [facility] [place]ARDIAC CATH LAB    OTHER SURGICAL HISTORY Left 06/06/2014    cain bunionectomy left foot with osteomed screw x1    SHOULDER SURGERY      Bilateral    UPPER GASTROINTESTINAL ENDOSCOPY N/A 8/15/2023    EGD ESOPHAGOGASTRODUODENOSCOPY ULTRASOUND performed by Logan Murillo MD at [facility]    UPPER GASTROINTESTINAL ENDOSCOPY N/A 8/15/2023    EGD BIOPSY performed by Logan Murillo MD at [facility]    VEIN SURGERY         Review of Systems   Constitutional:  Negative for chills and fever.

## 2024-04-12 NOTE — TELEPHONE ENCOUNTER
Patient's arterial side of AVF has prolonged bleeding, schedule a fistulogram?  Please advise, thank you.

## 2024-04-16 ENCOUNTER — TELEPHONE (OUTPATIENT)
Dept: VASCULAR SURGERY | Age: 69
End: 2024-04-16

## 2024-04-21 ENCOUNTER — HOSPITAL ENCOUNTER (INPATIENT)
Age: 69
LOS: 1 days | Discharge: HOME | DRG: 438 | End: 2024-04-23
Attending: EMERGENCY MEDICINE | Admitting: INTERNAL MEDICINE
Payer: COMMERCIAL

## 2024-04-21 DIAGNOSIS — K85.00 IDIOPATHIC ACUTE PANCREATITIS, UNSPECIFIED COMPLICATION STATUS: Primary | ICD-10-CM

## 2024-04-21 DIAGNOSIS — N18.6 ESRD (END STAGE RENAL DISEASE) (HCC): ICD-10-CM

## 2024-04-21 LAB
ANION GAP SERPL CALCULATED.3IONS-SCNC: 17 MMOL/L (ref 7–16)
BASOPHILS # BLD: 0.02 K/UL (ref 0–0.2)
BASOPHILS NFR BLD: 0 % (ref 0–2)
BUN SERPL-MCNC: 46 MG/DL (ref 6–23)
CALCIUM SERPL-MCNC: 9.2 MG/DL (ref 8.6–10.2)
CHLORIDE SERPL-SCNC: 93 MMOL/L (ref 98–107)
CO2 SERPL-SCNC: 27 MMOL/L (ref 22–29)
CREAT SERPL-MCNC: 11.4 MG/DL (ref 0.7–1.2)
EOSINOPHIL # BLD: 0.19 K/UL (ref 0.05–0.5)
EOSINOPHILS RELATIVE PERCENT: 3 % (ref 0–6)
ERYTHROCYTE [DISTWIDTH] IN BLOOD BY AUTOMATED COUNT: 15.3 % (ref 11.5–15)
GFR SERPL CREATININE-BSD FRML MDRD: 4 ML/MIN/1.73M2
GLUCOSE SERPL-MCNC: 148 MG/DL (ref 74–99)
HCT VFR BLD AUTO: 44.5 % (ref 37–54)
HGB BLD-MCNC: 14.4 G/DL (ref 12.5–16.5)
IMM GRANULOCYTES # BLD AUTO: <0.03 K/UL (ref 0–0.58)
IMM GRANULOCYTES NFR BLD: 0 % (ref 0–5)
LACTATE BLDV-SCNC: 1.3 MMOL/L (ref 0.5–2.2)
LIPASE SERPL-CCNC: 159 U/L (ref 13–60)
LYMPHOCYTES NFR BLD: 1.52 K/UL (ref 1.5–4)
LYMPHOCYTES RELATIVE PERCENT: 22 % (ref 20–42)
MCH RBC QN AUTO: 29.4 PG (ref 26–35)
MCHC RBC AUTO-ENTMCNC: 32.4 G/DL (ref 32–34.5)
MCV RBC AUTO: 90.8 FL (ref 80–99.9)
MONOCYTES NFR BLD: 0.89 K/UL (ref 0.1–0.95)
MONOCYTES NFR BLD: 13 % (ref 2–12)
NEUTROPHILS NFR BLD: 62 % (ref 43–80)
NEUTS SEG NFR BLD: 4.34 K/UL (ref 1.8–7.3)
PLATELET # BLD AUTO: 238 K/UL (ref 130–450)
PMV BLD AUTO: 10.1 FL (ref 7–12)
POTASSIUM SERPL-SCNC: 4.3 MMOL/L (ref 3.5–5)
RBC # BLD AUTO: 4.9 M/UL (ref 3.8–5.8)
SODIUM SERPL-SCNC: 137 MMOL/L (ref 132–146)
WBC OTHER # BLD: 7 K/UL (ref 4.5–11.5)

## 2024-04-21 PROCEDURE — 83690 ASSAY OF LIPASE: CPT

## 2024-04-21 PROCEDURE — 99285 EMERGENCY DEPT VISIT HI MDM: CPT

## 2024-04-21 PROCEDURE — 83605 ASSAY OF LACTIC ACID: CPT

## 2024-04-21 PROCEDURE — 2580000003 HC RX 258

## 2024-04-21 PROCEDURE — 36415 COLL VENOUS BLD VENIPUNCTURE: CPT

## 2024-04-21 PROCEDURE — 85025 COMPLETE CBC W/AUTO DIFF WBC: CPT

## 2024-04-21 PROCEDURE — 82248 BILIRUBIN DIRECT: CPT

## 2024-04-21 PROCEDURE — 80053 COMPREHEN METABOLIC PANEL: CPT

## 2024-04-21 PROCEDURE — 6370000000 HC RX 637 (ALT 250 FOR IP)

## 2024-04-21 RX ORDER — SODIUM CHLORIDE 0.9 % (FLUSH) 0.9 %
SYRINGE (ML) INJECTION
Status: DISPENSED
Start: 2024-04-21 | End: 2024-04-22

## 2024-04-21 RX ORDER — 0.9 % SODIUM CHLORIDE 0.9 %
1000 INTRAVENOUS SOLUTION INTRAVENOUS ONCE
Status: COMPLETED | OUTPATIENT
Start: 2024-04-21 | End: 2024-04-22

## 2024-04-21 RX ORDER — SUCRALFATE 1 G/1
1 TABLET ORAL ONCE
Status: COMPLETED | OUTPATIENT
Start: 2024-04-21 | End: 2024-04-21

## 2024-04-21 RX ADMIN — SODIUM CHLORIDE 1000 ML: 9 INJECTION, SOLUTION INTRAVENOUS at 22:59

## 2024-04-21 RX ADMIN — ALUMINUM HYDROXIDE, MAGNESIUM HYDROXIDE, AND SIMETHICONE: 1200; 120; 1200 SUSPENSION ORAL at 23:01

## 2024-04-21 RX ADMIN — SUCRALFATE 1 G: 1 TABLET ORAL at 23:01

## 2024-04-21 ASSESSMENT — PAIN DESCRIPTION - LOCATION: LOCATION: ABDOMEN

## 2024-04-21 ASSESSMENT — PAIN SCALES - GENERAL: PAINLEVEL_OUTOF10: 9

## 2024-04-21 ASSESSMENT — LIFESTYLE VARIABLES: HOW OFTEN DO YOU HAVE A DRINK CONTAINING ALCOHOL: NEVER

## 2024-04-21 ASSESSMENT — PAIN - FUNCTIONAL ASSESSMENT: PAIN_FUNCTIONAL_ASSESSMENT: 0-10

## 2024-04-22 ENCOUNTER — APPOINTMENT (OUTPATIENT)
Dept: CT IMAGING | Age: 69
DRG: 438 | End: 2024-04-22
Payer: COMMERCIAL

## 2024-04-22 PROBLEM — K85.00 IDIOPATHIC ACUTE PANCREATITIS, UNSPECIFIED COMPLICATION STATUS: Status: ACTIVE | Noted: 2024-04-22

## 2024-04-22 LAB
ALBUMIN SERPL-MCNC: 3.7 G/DL (ref 3.5–5.2)
ALP SERPL-CCNC: 68 U/L (ref 40–129)
ALT SERPL-CCNC: 6 U/L (ref 0–40)
AST SERPL-CCNC: 10 U/L (ref 0–39)
BILIRUB DIRECT SERPL-MCNC: <0.2 MG/DL (ref 0–0.3)
BILIRUB INDIRECT SERPL-MCNC: NORMAL MG/DL (ref 0–1)
BILIRUB SERPL-MCNC: 0.4 MG/DL (ref 0–1.2)
ETHANOLAMINE SERPL-MCNC: <10 MG/DL
GLUCOSE BLD-MCNC: 244 MG/DL (ref 74–99)
GLUCOSE BLD-MCNC: 313 MG/DL (ref 74–99)
GLUCOSE BLD-MCNC: 87 MG/DL (ref 74–99)
PROT SERPL-MCNC: 8 G/DL (ref 6.4–8.3)
TRIGL SERPL-MCNC: 84 MG/DL

## 2024-04-22 PROCEDURE — 6360000002 HC RX W HCPCS

## 2024-04-22 PROCEDURE — 99232 SBSQ HOSP IP/OBS MODERATE 35: CPT | Performed by: INTERNAL MEDICINE

## 2024-04-22 PROCEDURE — 6370000000 HC RX 637 (ALT 250 FOR IP): Performed by: INTERNAL MEDICINE

## 2024-04-22 PROCEDURE — 6370000000 HC RX 637 (ALT 250 FOR IP): Performed by: NURSE PRACTITIONER

## 2024-04-22 PROCEDURE — 2580000003 HC RX 258: Performed by: INTERNAL MEDICINE

## 2024-04-22 PROCEDURE — G0480 DRUG TEST DEF 1-7 CLASSES: HCPCS

## 2024-04-22 PROCEDURE — 82962 GLUCOSE BLOOD TEST: CPT

## 2024-04-22 PROCEDURE — 6360000002 HC RX W HCPCS: Performed by: INTERNAL MEDICINE

## 2024-04-22 PROCEDURE — 1200000000 HC SEMI PRIVATE

## 2024-04-22 PROCEDURE — 99222 1ST HOSP IP/OBS MODERATE 55: CPT | Performed by: INTERNAL MEDICINE

## 2024-04-22 PROCEDURE — 74176 CT ABD & PELVIS W/O CONTRAST: CPT

## 2024-04-22 PROCEDURE — 2580000003 HC RX 258

## 2024-04-22 PROCEDURE — 5A1D70Z PERFORMANCE OF URINARY FILTRATION, INTERMITTENT, LESS THAN 6 HOURS PER DAY: ICD-10-PCS | Performed by: STUDENT IN AN ORGANIZED HEALTH CARE EDUCATION/TRAINING PROGRAM

## 2024-04-22 PROCEDURE — 84478 ASSAY OF TRIGLYCERIDES: CPT

## 2024-04-22 PROCEDURE — 90935 HEMODIALYSIS ONE EVALUATION: CPT

## 2024-04-22 RX ORDER — SENNA AND DOCUSATE SODIUM 50; 8.6 MG/1; MG/1
2 TABLET, FILM COATED ORAL 2 TIMES DAILY
Status: DISCONTINUED | OUTPATIENT
Start: 2024-04-22 | End: 2024-04-23 | Stop reason: HOSPADM

## 2024-04-22 RX ORDER — POLYETHYLENE GLYCOL 3350 17 G/17G
17 POWDER, FOR SOLUTION ORAL DAILY
Status: DISCONTINUED | OUTPATIENT
Start: 2024-04-22 | End: 2024-04-23 | Stop reason: HOSPADM

## 2024-04-22 RX ORDER — DEXTROSE MONOHYDRATE 100 MG/ML
INJECTION, SOLUTION INTRAVENOUS CONTINUOUS PRN
Status: DISCONTINUED | OUTPATIENT
Start: 2024-04-22 | End: 2024-04-23 | Stop reason: HOSPADM

## 2024-04-22 RX ORDER — METOPROLOL SUCCINATE 25 MG/1
25 TABLET, EXTENDED RELEASE ORAL DAILY
Status: DISCONTINUED | OUTPATIENT
Start: 2024-04-22 | End: 2024-04-23 | Stop reason: HOSPADM

## 2024-04-22 RX ORDER — HEPARIN SODIUM 5000 [USP'U]/ML
5000 INJECTION, SOLUTION INTRAVENOUS; SUBCUTANEOUS EVERY 8 HOURS SCHEDULED
Status: DISCONTINUED | OUTPATIENT
Start: 2024-04-22 | End: 2024-04-22

## 2024-04-22 RX ORDER — SEVELAMER CARBONATE 800 MG/1
1600 TABLET, FILM COATED ORAL
Status: DISCONTINUED | OUTPATIENT
Start: 2024-04-22 | End: 2024-04-23 | Stop reason: HOSPADM

## 2024-04-22 RX ORDER — POLYETHYLENE GLYCOL 3350 17 G/17G
17 POWDER, FOR SOLUTION ORAL DAILY PRN
Status: DISCONTINUED | OUTPATIENT
Start: 2024-04-22 | End: 2024-04-23 | Stop reason: HOSPADM

## 2024-04-22 RX ORDER — PANTOPRAZOLE SODIUM 40 MG/1
40 TABLET, DELAYED RELEASE ORAL
Status: DISCONTINUED | OUTPATIENT
Start: 2024-04-22 | End: 2024-04-23 | Stop reason: HOSPADM

## 2024-04-22 RX ORDER — INSULIN GLARGINE 100 [IU]/ML
27 INJECTION, SOLUTION SUBCUTANEOUS NIGHTLY
Status: DISCONTINUED | OUTPATIENT
Start: 2024-04-22 | End: 2024-04-23 | Stop reason: HOSPADM

## 2024-04-22 RX ORDER — SODIUM CHLORIDE, SODIUM LACTATE, POTASSIUM CHLORIDE, CALCIUM CHLORIDE 600; 310; 30; 20 MG/100ML; MG/100ML; MG/100ML; MG/100ML
INJECTION, SOLUTION INTRAVENOUS
Status: COMPLETED
Start: 2024-04-22 | End: 2024-04-22

## 2024-04-22 RX ORDER — SODIUM CHLORIDE, SODIUM LACTATE, POTASSIUM CHLORIDE, AND CALCIUM CHLORIDE .6; .31; .03; .02 G/100ML; G/100ML; G/100ML; G/100ML
1000 INJECTION, SOLUTION INTRAVENOUS ONCE
Status: COMPLETED | OUTPATIENT
Start: 2024-04-22 | End: 2024-04-22

## 2024-04-22 RX ORDER — SEVELAMER CARBONATE 800 MG/1
800 TABLET, FILM COATED ORAL
Status: DISCONTINUED | OUTPATIENT
Start: 2024-04-22 | End: 2024-04-22 | Stop reason: SDUPTHER

## 2024-04-22 RX ORDER — ACETAMINOPHEN 325 MG/1
650 TABLET ORAL EVERY 6 HOURS PRN
Status: DISCONTINUED | OUTPATIENT
Start: 2024-04-22 | End: 2024-04-23 | Stop reason: HOSPADM

## 2024-04-22 RX ORDER — ALBUTEROL SULFATE 2.5 MG/3ML
2.5 SOLUTION RESPIRATORY (INHALATION) EVERY 6 HOURS PRN
Status: DISCONTINUED | OUTPATIENT
Start: 2024-04-22 | End: 2024-04-23 | Stop reason: HOSPADM

## 2024-04-22 RX ORDER — SODIUM CHLORIDE 0.9 % (FLUSH) 0.9 %
10 SYRINGE (ML) INJECTION PRN
Status: DISCONTINUED | OUTPATIENT
Start: 2024-04-22 | End: 2024-04-23 | Stop reason: HOSPADM

## 2024-04-22 RX ORDER — SODIUM CHLORIDE 0.9 % (FLUSH) 0.9 %
10 SYRINGE (ML) INJECTION EVERY 12 HOURS SCHEDULED
Status: DISCONTINUED | OUTPATIENT
Start: 2024-04-22 | End: 2024-04-23 | Stop reason: HOSPADM

## 2024-04-22 RX ORDER — PROMETHAZINE HYDROCHLORIDE 25 MG/1
12.5 TABLET ORAL EVERY 6 HOURS PRN
Status: DISCONTINUED | OUTPATIENT
Start: 2024-04-22 | End: 2024-04-23 | Stop reason: HOSPADM

## 2024-04-22 RX ORDER — ONDANSETRON 2 MG/ML
4 INJECTION INTRAMUSCULAR; INTRAVENOUS EVERY 6 HOURS PRN
Status: DISCONTINUED | OUTPATIENT
Start: 2024-04-22 | End: 2024-04-23 | Stop reason: HOSPADM

## 2024-04-22 RX ORDER — INSULIN LISPRO 100 [IU]/ML
0-4 INJECTION, SOLUTION INTRAVENOUS; SUBCUTANEOUS
Status: DISCONTINUED | OUTPATIENT
Start: 2024-04-22 | End: 2024-04-23 | Stop reason: HOSPADM

## 2024-04-22 RX ORDER — ALBUTEROL SULFATE 90 UG/1
1 AEROSOL, METERED RESPIRATORY (INHALATION) EVERY 6 HOURS PRN
Status: DISCONTINUED | OUTPATIENT
Start: 2024-04-22 | End: 2024-04-22 | Stop reason: CLARIF

## 2024-04-22 RX ORDER — INSULIN GLARGINE 100 [IU]/ML
34 INJECTION, SOLUTION SUBCUTANEOUS NIGHTLY
Status: DISCONTINUED | OUTPATIENT
Start: 2024-04-22 | End: 2024-04-22

## 2024-04-22 RX ORDER — GLUCAGON 1 MG/ML
1 KIT INJECTION PRN
Status: DISCONTINUED | OUTPATIENT
Start: 2024-04-22 | End: 2024-04-23 | Stop reason: HOSPADM

## 2024-04-22 RX ORDER — FLUTICASONE PROPIONATE 50 MCG
2 SPRAY, SUSPENSION (ML) NASAL DAILY
Status: DISCONTINUED | OUTPATIENT
Start: 2024-04-22 | End: 2024-04-23 | Stop reason: HOSPADM

## 2024-04-22 RX ORDER — INSULIN LISPRO 100 [IU]/ML
0-4 INJECTION, SOLUTION INTRAVENOUS; SUBCUTANEOUS NIGHTLY
Status: DISCONTINUED | OUTPATIENT
Start: 2024-04-22 | End: 2024-04-23 | Stop reason: HOSPADM

## 2024-04-22 RX ORDER — MORPHINE SULFATE 4 MG/ML
4 INJECTION, SOLUTION INTRAMUSCULAR; INTRAVENOUS ONCE
Status: COMPLETED | OUTPATIENT
Start: 2024-04-22 | End: 2024-04-22

## 2024-04-22 RX ORDER — ACETAMINOPHEN 650 MG/1
650 SUPPOSITORY RECTAL EVERY 6 HOURS PRN
Status: DISCONTINUED | OUTPATIENT
Start: 2024-04-22 | End: 2024-04-23 | Stop reason: HOSPADM

## 2024-04-22 RX ORDER — SODIUM CHLORIDE 9 MG/ML
INJECTION, SOLUTION INTRAVENOUS PRN
Status: DISCONTINUED | OUTPATIENT
Start: 2024-04-22 | End: 2024-04-23 | Stop reason: HOSPADM

## 2024-04-22 RX ADMIN — APIXABAN 5 MG: 5 TABLET, FILM COATED ORAL at 20:50

## 2024-04-22 RX ADMIN — FLUTICASONE PROPIONATE 2 SPRAY: 50 SPRAY, METERED NASAL at 10:59

## 2024-04-22 RX ADMIN — SODIUM CHLORIDE, PRESERVATIVE FREE 10 ML: 5 INJECTION INTRAVENOUS at 20:57

## 2024-04-22 RX ADMIN — APIXABAN 5 MG: 5 TABLET, FILM COATED ORAL at 10:51

## 2024-04-22 RX ADMIN — SODIUM CHLORIDE, SODIUM LACTATE, POTASSIUM CHLORIDE, AND CALCIUM CHLORIDE 1000 ML: .6; .31; .03; .02 INJECTION, SOLUTION INTRAVENOUS at 00:22

## 2024-04-22 RX ADMIN — SODIUM CHLORIDE, PRESERVATIVE FREE 10 ML: 5 INJECTION INTRAVENOUS at 10:53

## 2024-04-22 RX ADMIN — METOPROLOL SUCCINATE 25 MG: 25 TABLET, FILM COATED, EXTENDED RELEASE ORAL at 10:51

## 2024-04-22 RX ADMIN — SODIUM CHLORIDE, POTASSIUM CHLORIDE, SODIUM LACTATE AND CALCIUM CHLORIDE 1000 ML: 600; 310; 30; 20 INJECTION, SOLUTION INTRAVENOUS at 00:22

## 2024-04-22 RX ADMIN — MORPHINE SULFATE 4 MG: 4 INJECTION, SOLUTION INTRAMUSCULAR; INTRAVENOUS at 00:49

## 2024-04-22 RX ADMIN — DOCUSATE SODIUM 50MG AND SENNOSIDES 8.6MG 2 TABLET: 8.6; 5 TABLET, FILM COATED ORAL at 10:50

## 2024-04-22 RX ADMIN — HYDROMORPHONE HYDROCHLORIDE 0.5 MG: 1 INJECTION, SOLUTION INTRAMUSCULAR; INTRAVENOUS; SUBCUTANEOUS at 10:51

## 2024-04-22 RX ADMIN — PANTOPRAZOLE SODIUM 40 MG: 40 TABLET, DELAYED RELEASE ORAL at 10:50

## 2024-04-22 RX ADMIN — INSULIN LISPRO 1 UNITS: 100 INJECTION, SOLUTION INTRAVENOUS; SUBCUTANEOUS at 11:05

## 2024-04-22 RX ADMIN — INSULIN GLARGINE 27 UNITS: 100 INJECTION, SOLUTION SUBCUTANEOUS at 20:59

## 2024-04-22 RX ADMIN — PANTOPRAZOLE SODIUM 40 MG: 40 TABLET, DELAYED RELEASE ORAL at 18:34

## 2024-04-22 RX ADMIN — HYDROMORPHONE HYDROCHLORIDE 0.5 MG: 1 INJECTION, SOLUTION INTRAMUSCULAR; INTRAVENOUS; SUBCUTANEOUS at 03:57

## 2024-04-22 RX ADMIN — HYDROMORPHONE HYDROCHLORIDE 0.5 MG: 1 INJECTION, SOLUTION INTRAMUSCULAR; INTRAVENOUS; SUBCUTANEOUS at 20:50

## 2024-04-22 RX ADMIN — INSULIN LISPRO 4 UNITS: 100 INJECTION, SOLUTION INTRAVENOUS; SUBCUTANEOUS at 21:01

## 2024-04-22 RX ADMIN — POLYETHYLENE GLYCOL 3350 17 G: 17 POWDER, FOR SOLUTION ORAL at 18:34

## 2024-04-22 ASSESSMENT — PAIN SCALES - GENERAL
PAINLEVEL_OUTOF10: 10
PAINLEVEL_OUTOF10: 7
PAINLEVEL_OUTOF10: 0
PAINLEVEL_OUTOF10: 8
PAINLEVEL_OUTOF10: 0
PAINLEVEL_OUTOF10: 0
PAINLEVEL_OUTOF10: 7

## 2024-04-22 ASSESSMENT — PAIN DESCRIPTION - LOCATION
LOCATION: ABDOMEN

## 2024-04-22 ASSESSMENT — LIFESTYLE VARIABLES
HOW MANY STANDARD DRINKS CONTAINING ALCOHOL DO YOU HAVE ON A TYPICAL DAY: PATIENT DOES NOT DRINK
HOW OFTEN DO YOU HAVE A DRINK CONTAINING ALCOHOL: NEVER

## 2024-04-22 ASSESSMENT — PAIN DESCRIPTION - ORIENTATION
ORIENTATION: LEFT
ORIENTATION: LEFT

## 2024-04-22 ASSESSMENT — PAIN DESCRIPTION - DESCRIPTORS
DESCRIPTORS: THROBBING;DISCOMFORT;SORE
DESCRIPTORS: DISCOMFORT;SORE;THROBBING

## 2024-04-22 NOTE — FLOWSHEET NOTE
04/22/24 1717   Vital Signs   BP (!) 148/68   Pulse 64   Respirations 18   Post-Hemodialysis Assessment   Post-Treatment Procedures Blood returned;Access bleeding time < 10 minutes   Machine Disinfection Process Acid/Vinegar Clean;Heat Disinfect;Exterior Machine Disinfection   Rinseback Volume (ml) 300 ml   Blood Volume Processed (Liters) 50 L   Dialyzer Clearance Lightly streaked   Duration of Treatment (minutes) 184 minutes   Hemodialysis Intake (ml) 300 ml   Hemodialysis Output (ml) 2553 ml   NET Removed (ml) 2253   Tolerated Treatment Good   Patient Response to Treatment Patient became very abusive, yelling at dialysis staff demanding to be taken off of treatment. Dr Hernandez notified via phone call. Stated to take patient off. Total UF 2253, BVP 50. Needles removed with minimal bleed time.   Physician Notified Yes   Time Off 1704   Patient Disposition Return to room   Observations & Evaluations   Level of Consciousness 0

## 2024-04-22 NOTE — H&P
[facility]   HISTORY AND PHYSICAL EXAM      AUTHOR: Aguilar Birmingham MD PATIENT NAME: Carmelo Desir   PCP: Mian Tilley DO  MRN: [id number], : 1955       CHIEF COMPLAINT / REASON FOR ADMISSION: Abdominal pain  HPI:   This is a 68 y.o. male  has a past medical history of Back pain, Diabetes mellitus (HCC), DMII (diabetes mellitus, type 2) (HCC), Hemodialysis patient (HCC), History of cardiovascular stress test, HTN (hypertension), Hyperlipidemia, Hypertension, Lumbar radicular pain, Oxygen dependent, and Type II or unspecified type diabetes mellitus without mention of complication, not stated as uncontrolled. presented with Abdominal pain  for last few days prior to arrival to the hospital. Abdominal pain is moderate to severe, dull, nonradiating. Initially abdominal pain was mild, intermittent but gradually getting more persistent. Started gradually.  The patient was seen and examined at bedside, appears alert and awake with no acute distress and is able to answer simple  questions. On direct questioning, patient denied any  resting ongoing chest pain, resting SOB, hemoptysis, productive cough, fever, ongoing palpitation, hematemesis, rectal bleeding, chris, hematuria, any other  and GI complaints, and any new focal neuro deficits .    ROS:  Pertinent positives and negatives are noted in the HPI, all other systems are reviewed and negative    PMH:  Past Medical History:   Diagnosis Date    Back pain     Diabetes mellitus (HCC)     DMII (diabetes mellitus, type 2) ([facility]) 2015    Hemodialysis patient (HCC)     History of cardiovascular stress test 2015    lexiscan    HTN (hypertension) 2015    Hyperlipidemia     Hypertension     Lumbar radicular pain 2015    Oxygen dependent     wears 2 liters at home    Type II or unspecified type diabetes mellitus without mention of complication, not stated as uncontrolled        Surgical History:  Past Surgical

## 2024-04-22 NOTE — DIALYSIS
Patient became verbally abusive during treatment, threatening physical harm to dialysis staff and demanded to be taken off treatment. Dr Hernandez notified via phone call, states to terminate HD treatment. Treatment ended 26 minutes early.

## 2024-04-22 NOTE — CONSULTS
[facility]        Nephrology Consult Note    Patient's Name: Carmelo Desir     Reason for Consult: End-Stage Renal Disease    Chief Complaint: abdominal pain  History Obtained from: patient, electronic medical record, past medical records     History of Present Illness:     Carmelo Desir is a 68 year old male, with past medical history of type 2 DM, ESRD on HD, HTN, and HLD, who presented to the ED on 4/21 with complaints of abdominal pain. Vital signs on 4/21 include temperature 98.7, RR 20, pulse 85, /82, 100% SPO2 on room air. Lab data on 4/21 includes sodium 137, potassium 4.3, CO2 27, BUN 46, creatinine 11.4, lactic acid 1.3, calcium 9.2, albumin 3.7, Wbc 7, Hgb 14.4. Imaging on 4/21 include a CT abdomen/pelvis, which showed mild fat stranding around the tail of the pancreas, bilateral ground-glass opacities with septal thickening in the lower lungs, findings may represent pulmonary edema or less likely atypical infection, diverticulosis, coronary artery calcifications, fat stranding around the kidneys likely representing medical renal disease.   Nephrology was consulted to see the patient for ESRD on HD. The patient is known to our service and dialyzes outpatient at [facility] 1st shift via a right forearm AV graft. His last outpatient dialysis treatment was 4/19.  At present, patient was seen and examined. He reports coming to the hospital for abdominal pain that started yesterday. He reports that the abdominal pain is slightly improved today. He denies any pain. He denies shortness of breath. He denies n/v. He denies fevers/chills. He denies headaches or dizziness.     PMH:    Past Medical History:   Diagnosis Date    Back pain     Diabetes mellitus (HCC)     DMII (diabetes mellitus, type 2) ([facility]) 7/28/2015    Hemodialysis patient ([facility])     History of cardiovascular stress test 2/16/2015    lexiscan    HTN (hypertension) 7/28/2015    Hyperlipidemia     Hypertension     Lumbar radicular

## 2024-04-22 NOTE — ED PROVIDER NOTES
Phosphatase 68 40 - 129 U/L    ALT 6 0 - 40 U/L    AST 10 0 - 39 U/L    Total Bilirubin 0.4 0.0 - 1.2 mg/dL    Bilirubin, Direct PENDING mg/dL    Bilirubin, Indirect PENDING mg/dL    Total Protein 8.0 6.4 - 8.3 g/dL       Radiology reviewed and interpreted by me:  CT ABDOMEN PELVIS WO CONTRAST Additional Contrast? None   Final Result   1. Mild fat stranding around the tail of the pancreas. Correlate with amylase   and lipase to exclude acute pancreatitis.   2. Bilateral ground-glass opacities with septal thickening in the lower   lungs. Findings may represent pulmonary edema or less likely atypical   infection.   3. Diverticulosis.   4. Coronary artery calcifications.   5. Fat stranding around the kidneys likely representing medical renal disease   recommend correlation with urinalysis to exclude infection.             MDM and ED course  History is obtained from patient for patient's acute onset of moderate abdominal pain    Differential diagnoses: CBC is ordered to evaluate for any signs of infection or inflammation by obtaining a WBC count, or any signs of acute anemia by interpreting hemoglobin. BMP for any electrolyte imbalances, kidney function, or any elevations in anion gap. Lipase to evaluate for pancreatitis. Lactic acid as a marker of hypoperfusion or ischemia. Hepatic function panel to assess for hepatitis, liver failure or other biliary disease. CT abdomen for, but without limitation to, ureterolithiasis, nephrolithiasis, constipation, hollow organ perforation, small bowel obstruction, bowel ischemia, pneumoperitoneum, diverticulitis, cholecystitis, appendicitis, intra-abdominal abscess, or malignancy.     This is a 68-year-old male that presents to the ER with complaints of abdominal pain.  Ongoing for the last 3 days.  Found to have pancreatitis, lipase minimally elevated 159 however in setting of epigastric/left upper quadrant pain, and fat stranding around pancreas on CT, concern that lipase will

## 2024-04-23 VITALS
RESPIRATION RATE: 18 BRPM | HEART RATE: 82 BPM | OXYGEN SATURATION: 98 % | SYSTOLIC BLOOD PRESSURE: 139 MMHG | BODY MASS INDEX: 31.36 KG/M2 | DIASTOLIC BLOOD PRESSURE: 68 MMHG | TEMPERATURE: 98 F | WEIGHT: 200.2 LBS

## 2024-04-23 LAB
ANION GAP SERPL CALCULATED.3IONS-SCNC: 16 MMOL/L (ref 7–16)
BASOPHILS # BLD: 0.02 K/UL (ref 0–0.2)
BASOPHILS NFR BLD: 0 % (ref 0–2)
BUN SERPL-MCNC: 30 MG/DL (ref 6–23)
CALCIUM SERPL-MCNC: 8.9 MG/DL (ref 8.6–10.2)
CHLORIDE SERPL-SCNC: 93 MMOL/L (ref 98–107)
CO2 SERPL-SCNC: 26 MMOL/L (ref 22–29)
CREAT SERPL-MCNC: 9.6 MG/DL (ref 0.7–1.2)
EOSINOPHIL # BLD: 0.13 K/UL (ref 0.05–0.5)
EOSINOPHILS RELATIVE PERCENT: 2 % (ref 0–6)
ERYTHROCYTE [DISTWIDTH] IN BLOOD BY AUTOMATED COUNT: 15.3 % (ref 11.5–15)
GFR SERPL CREATININE-BSD FRML MDRD: 6 ML/MIN/1.73M2
GLUCOSE BLD-MCNC: 102 MG/DL (ref 74–99)
GLUCOSE BLD-MCNC: 111 MG/DL (ref 74–99)
GLUCOSE BLD-MCNC: 61 MG/DL (ref 74–99)
GLUCOSE BLD-MCNC: 83 MG/DL (ref 74–99)
GLUCOSE SERPL-MCNC: 63 MG/DL (ref 74–99)
HCT VFR BLD AUTO: 45.2 % (ref 37–54)
HGB BLD-MCNC: 14.1 G/DL (ref 12.5–16.5)
IMM GRANULOCYTES # BLD AUTO: <0.03 K/UL (ref 0–0.58)
IMM GRANULOCYTES NFR BLD: 0 % (ref 0–5)
LYMPHOCYTES NFR BLD: 1.25 K/UL (ref 1.5–4)
LYMPHOCYTES RELATIVE PERCENT: 20 % (ref 20–42)
MAGNESIUM SERPL-MCNC: 2.5 MG/DL (ref 1.6–2.6)
MCH RBC QN AUTO: 28.9 PG (ref 26–35)
MCHC RBC AUTO-ENTMCNC: 31.2 G/DL (ref 32–34.5)
MCV RBC AUTO: 92.6 FL (ref 80–99.9)
MONOCYTES NFR BLD: 0.93 K/UL (ref 0.1–0.95)
MONOCYTES NFR BLD: 15 % (ref 2–12)
NEUTROPHILS NFR BLD: 63 % (ref 43–80)
NEUTS SEG NFR BLD: 3.92 K/UL (ref 1.8–7.3)
PHOSPHATE SERPL-MCNC: 4.4 MG/DL (ref 2.5–4.5)
PLATELET # BLD AUTO: 170 K/UL (ref 130–450)
PMV BLD AUTO: 11 FL (ref 7–12)
POTASSIUM SERPL-SCNC: 4.5 MMOL/L (ref 3.5–5)
RBC # BLD AUTO: 4.88 M/UL (ref 3.8–5.8)
SODIUM SERPL-SCNC: 135 MMOL/L (ref 132–146)
WBC OTHER # BLD: 6.3 K/UL (ref 4.5–11.5)

## 2024-04-23 PROCEDURE — 84100 ASSAY OF PHOSPHORUS: CPT

## 2024-04-23 PROCEDURE — 36415 COLL VENOUS BLD VENIPUNCTURE: CPT

## 2024-04-23 PROCEDURE — 6370000000 HC RX 637 (ALT 250 FOR IP): Performed by: INTERNAL MEDICINE

## 2024-04-23 PROCEDURE — 82962 GLUCOSE BLOOD TEST: CPT

## 2024-04-23 PROCEDURE — 83735 ASSAY OF MAGNESIUM: CPT

## 2024-04-23 PROCEDURE — 80048 BASIC METABOLIC PNL TOTAL CA: CPT

## 2024-04-23 PROCEDURE — 99239 HOSP IP/OBS DSCHRG MGMT >30: CPT | Performed by: INTERNAL MEDICINE

## 2024-04-23 PROCEDURE — 2580000003 HC RX 258: Performed by: INTERNAL MEDICINE

## 2024-04-23 PROCEDURE — APPSS45 APP SPLIT SHARED TIME 31-45 MINUTES: Performed by: NURSE PRACTITIONER

## 2024-04-23 PROCEDURE — 85025 COMPLETE CBC W/AUTO DIFF WBC: CPT

## 2024-04-23 PROCEDURE — 2700000000 HC OXYGEN THERAPY PER DAY

## 2024-04-23 RX ORDER — POLYETHYLENE GLYCOL 3350 17 G/17G
17 POWDER, FOR SOLUTION ORAL DAILY
Qty: 10 PACKET | Refills: 0 | Status: SHIPPED | OUTPATIENT
Start: 2024-04-24 | End: 2024-05-04

## 2024-04-23 RX ADMIN — FLUTICASONE PROPIONATE 2 SPRAY: 50 SPRAY, METERED NASAL at 08:06

## 2024-04-23 RX ADMIN — DOCUSATE SODIUM 50MG AND SENNOSIDES 8.6MG 2 TABLET: 8.6; 5 TABLET, FILM COATED ORAL at 08:04

## 2024-04-23 RX ADMIN — DEXTROSE MONOHYDRATE 125 ML: 100 INJECTION, SOLUTION INTRAVENOUS at 03:03

## 2024-04-23 RX ADMIN — METOPROLOL SUCCINATE 25 MG: 25 TABLET, FILM COATED, EXTENDED RELEASE ORAL at 08:04

## 2024-04-23 RX ADMIN — SEVELAMER CARBONATE 1600 MG: 800 TABLET, FILM COATED ORAL at 08:03

## 2024-04-23 RX ADMIN — SEVELAMER CARBONATE 1600 MG: 800 TABLET, FILM COATED ORAL at 11:44

## 2024-04-23 RX ADMIN — PANTOPRAZOLE SODIUM 40 MG: 40 TABLET, DELAYED RELEASE ORAL at 06:38

## 2024-04-23 RX ADMIN — APIXABAN 5 MG: 5 TABLET, FILM COATED ORAL at 08:04

## 2024-04-23 NOTE — DISCHARGE SUMMARY
[facility]   Discharge Summary    Discharge date and time:  4/23/2024  11:51 AM    Follow-up with:     Mian Tilley DO  [address] [facility] [address]  [phone]    Follow up      Activity level: As tolerated    Diet: ADULT DIET; Regular    Labs:Per PCP    Condition at discharge: Stable    Dispo: Discharge home    Patient ID:  Carmelo Desir 1955  [id number]    Consults:  IP CONSULT TO NEPHROLOGY    Procedures: Inpatient hemodialysis    Hospital Course: Carmelo Desir is a 68 y.o. male with a history of ESRD on HD, DM 2, HLD, HTN, history of pancreatitis who presented to ER on 4/21 for left-sided abdominal pain started 3 days prior Chinese food. Patient also reported no bowel movement for the previous 4 days.  Serum lipase was 159.  CT abd/pelvis noted mild fat stranding around tail of pancreas. Patient received IV fluids & as needed Dilaudid for pain. He was also started on a bowel regimen.  Initially, patient did not tolerate solid foods & he was changed to a clear liquid diet.  This morning reports that he is feeling much better large bowel movement, and he tolerated solid foods for breakfast without difficulty. Patient reports that pain has improved significantly.   He went for inpatient hemodialysis yesterday and should resume his regular outpatient HD schedule upon discharge.  Okay to discharged with a 10-day prescription for GlycoLax to supplement his outpatient bowel regimen.  Any other medication changes as listed below.  At this time patient is medically stable and ready for discharge.    Discharge Exam:  Vitals:    04/22/24 1745 04/22/24 1840 04/23/24 0630 04/23/24 0700   BP: (!) 140/77 (!) 148/72  139/68   Pulse: 62 70  82   Resp: 20 20  18   Temp: 98.2 °F (36.8 °C) 97.6 °F (36.4 °C)  98 °F (36.7 °C)   TempSrc: Oral Infrared  Temporal   SpO2: 99% 100%  98%   Weight:   90.8 kg (200 lb 3.2 oz)        PHYSICAL EXAM  Constitutional: No fever, chills,

## 2024-04-23 NOTE — PLAN OF CARE
Problem: Pain  Goal: Verbalizes/displays adequate comfort level or baseline comfort level  4/23/2024 1140 by La Cantrell RN  Outcome: Progressing  4/23/2024 0309 by Amy Hein RN  Outcome: Progressing     Problem: Safety - Adult  Goal: Free from fall injury  4/23/2024 1140 by La Cantrell RN  Outcome: Progressing  4/23/2024 0309 by Amy Hein RN  Outcome: Progressing     Problem: ABCDS Injury Assessment  Goal: Absence of physical injury  4/23/2024 1140 by La Cantrell RN  Outcome: Progressing  4/23/2024 0309 by Amy Hein RN  Outcome: Progressing     Problem: Chronic Conditions and Co-morbidities  Goal: Patient's chronic conditions and co-morbidity symptoms are monitored and maintained or improved  Outcome: Progressing

## 2024-04-23 NOTE — DISCHARGE INSTRUCTIONS
Your information:  Name: Carmelo Desir  : 1955    Your instructions:    You are being discharged home. Please follow up with your PCP and take your medications as prescribed.     IF YOU EXPERIENCE ANY OF THE FOLLOWING SYMPTOMS, CHEST PAIN, SHORTNESS OF BREATH, COUGHING UP BLOOD OR BLOODY SPUTUM, STOMACH PAIN OR CRAMPING, DARK, TARRY STOOLS, LOSS OF APPETITE, GENERAL NOT FEELING WELL, SIGNS AND SYMPTOMS OF INFECTION LIKE FEVER AND OR CHILLS, PLEASE CALL DR GOINS OR RETURN TO THE EMERGENCY ROOM.     What to do after you leave the hospital:    Recommended diet: {diet:13698}    Recommended activity: {discharge activity:10845}        The following personal items were collected during your admission and were returned to you:    Belongings  Dental Appliances: Lowers (at home)  Vision - Corrective Lenses: Eyeglasses  Hearing Aid: None  Clothing: Footwear, Jacket/Coat, Hat, Socks, Undergarments, Shirt, Pants  Jewelry: Watch  Body Piercings Removed: No  Electronic Devices: Cell Phone  Weapons (Notify Protective Services/Security): None  Other Valuables: Money  Home Medications: None  Valuables Given To:  (n/a)  Patient approves for provider to speak to responsible person post operatively: Yes    Information obtained by:  By signing below, I understand that if any problems occur once I leave the hospital I am to contact ***.  I understand and acknowledge receipt of the instructions indicated above.

## 2024-04-23 NOTE — PROGRESS NOTES
4 Eyes Skin Assessment     NAME:  Carmelo Desir  YOB: 1955  MEDICAL RECORD NUMBER:  [id number]    The patient is being assessed for  Admission    I agree that at least one RN has performed a thorough Head to Toe Skin Assessment on the patient. ALL assessment sites listed below have been assessed.      Areas assessed by both nurses:    Head, Face, Ears, Shoulders, Back, Chest, Arms, Elbows, Hands, Sacrum. Buttock, Coccyx, Ischium, Legs. Feet and Heels, and Under Medical Devices         Does the Patient have a Wound? No noted wound(s)       Bear Prevention initiated by RN: No  Wound Care Orders initiated by RN: No    Pressure Injury (Stage 3,4, Unstageable, DTI, NWPT, and Complex wounds) if present, place Wound referral order by RN under : No    New Ostomies, if present place, Ostomy referral order under : No     Nurse 1 eSignature: Electronically signed by Litzy Peters RN on 4/22/24 at 7:01 PM EDT    **SHARE this note so that the co-signing nurse can place an eSignature**    Nurse 2 eSignature: Electronically signed by Neema Otero RN on 4/22/24 at 9:44 PM EDT    
[facility]  Nephrology Progress Note    Patient's Name: Carmelo Desir     History OF Present Illness From 4/22 Consult Note:  \"Carmelo Desir is a 68 year old male, with past medical history of type 2 DM, ESRD on HD, HTN, and HLD, who presented to the ED on 4/21 with complaints of abdominal pain. Vital signs on 4/21 include temperature 98.7, RR 20, pulse 85, /82, 100% SPO2 on room air. Lab data on 4/21 includes sodium 137, potassium 4.3, CO2 27, BUN 46, creatinine 11.4, lactic acid 1.3, calcium 9.2, albumin 3.7, Wbc 7, Hgb 14.4. Imaging on 4/21 include a CT abdomen/pelvis, which showed mild fat stranding around the tail of the pancreas, bilateral ground-glass opacities with septal thickening in the lower lungs, findings may represent pulmonary edema or less likely atypical infection, diverticulosis, coronary artery calcifications, fat stranding around the kidneys likely representing medical renal disease.   Nephrology was consulted to see the patient for ESRD on HD. The patient is known to our service and dialyzes outpatient at [facility] 1st shift via a right forearm AV graft. His last outpatient dialysis treatment was 4/19.  At present, patient was seen and examined. He reports coming to the hospital for abdominal pain that started yesterday. He reports that the abdominal pain is slightly improved today. He denies any pain. He denies shortness of breath. He denies n/v. He denies fevers/chills. He denies headaches or dizziness.\"    Subjective:      4/23/24: Patient was seen and examined. He reports feeling alright today. He reports improved abdominal pain today. He denies chest pain. He denies shortness of breath. He denies n/v. He reports that he had a good breakfast this morning.     Problem List:    Patient Active Problem List   Diagnosis    Hallux valgus, acquired    DMII (diabetes mellitus, type 2) ([facility])    HTN (hypertension)    Lumbar radicular pain    Spinal stenosis    B12 deficiency    
transfusions    Objective:    BP (!) 150/72   Pulse 65   Temp 97.6 °F (36.4 °C) (Oral)   Resp 20   Wt 81.6 kg (180 lb)   SpO2 96%   BMI 28.19 kg/m²     PHYSICAL EXAM  Constitutional: No fever, chills, diaphoresis  Skin: No visible skin rash or erythema, no visible skin breakdown  HEENT: Unremarkable; mucous membranes are moist  Neck: No JVD, lymphadenopathy, thyromegaly  Cardiovascular: Normal rate, normal rhythm to auscultation. S1, S2 normal.  No S3 or rubs appreciated. No carotid bruits appreciated.  Respiratory: Clear to auscultation bilaterally. Currently on room air.  Gastrointestinal: round, firm, non-tender. Active bowel sounds  Genitourinary: No CVA tenderness  Extremities: No clubbing or cyanosis. No edema  Neurological: Awake, alert, oriented x 3.  No evidence of focal motor or sensory deficits  Psychological: Appropriate affect.     Recent Labs     04/21/24  2300      K 4.3   CL 93*   CO2 27   BUN 46*   CREATININE 11.4*   GLUCOSE 148*   CALCIUM 9.2     No results for input(s): \"MG\" in the last 72 hours.  No results for input(s): \"PHOS\" in the last 72 hours.  Recent Labs     04/21/24  2300   WBC 7.0   RBC 4.90   HGB 14.4   HCT 44.5   MCV 90.8   MCH 29.4   MCHC 32.4   RDW 15.3*      MPV 10.1       Assessment & Plan:    Idiopathic acute pancreatitis with left-sided abd pain  -serum lipase 159. CTAP notes mild fat stranding around tail of pancreas.   -ethanol <10, trigs 84  -received 2L IFV bolus in ER  -dilaudid 0.5mg IV q3h prn    ESRD on HD follows with Dr. Womack, [facility] dialysis  -nephrology consulted for HD management  -home meds include:   *auryxia 1g, 3 tabs TID   *sevelamer 16oomg TID   *Lokelma 5g T-R-S-S   *Veltassa -unspecified dosing & frequency    DMII with long-term use of insulin 2/2024 A1c 9.3  -home meds include:    *Lantus 34u qhs  -continue home Lantus at 27u qhs  -AC, HS BS checks with low-dose SSI coverage  -hypoglycemia protocol  -carb controlled diet    Paroxysmal

## 2024-04-26 ENCOUNTER — TELEPHONE (OUTPATIENT)
Dept: ONCOLOGY | Age: 69
End: 2024-04-26

## 2024-04-30 ENCOUNTER — OFFICE VISIT (OUTPATIENT)
Dept: VASCULAR SURGERY | Age: 69
End: 2024-04-30
Payer: COMMERCIAL

## 2024-04-30 ENCOUNTER — PREP FOR PROCEDURE (OUTPATIENT)
Dept: VASCULAR SURGERY | Age: 69
End: 2024-04-30

## 2024-04-30 VITALS — BODY MASS INDEX: 29.13 KG/M2 | WEIGHT: 186 LBS

## 2024-04-30 DIAGNOSIS — N18.6 END STAGE RENAL DISEASE (HCC): ICD-10-CM

## 2024-04-30 DIAGNOSIS — N18.6 ENCOUNTER REGARDING VASCULAR ACCESS FOR DIALYSIS FOR END-STAGE RENAL DISEASE (HCC): Primary | ICD-10-CM

## 2024-04-30 DIAGNOSIS — Z99.2 ENCOUNTER REGARDING VASCULAR ACCESS FOR DIALYSIS FOR END-STAGE RENAL DISEASE (HCC): Primary | ICD-10-CM

## 2024-04-30 PROCEDURE — 4004F PT TOBACCO SCREEN RCVD TLK: CPT | Performed by: SURGERY

## 2024-04-30 PROCEDURE — 3017F COLORECTAL CA SCREEN DOC REV: CPT | Performed by: SURGERY

## 2024-04-30 PROCEDURE — 99214 OFFICE O/P EST MOD 30 MIN: CPT | Performed by: SURGERY

## 2024-04-30 PROCEDURE — G8417 CALC BMI ABV UP PARAM F/U: HCPCS | Performed by: SURGERY

## 2024-04-30 PROCEDURE — 1111F DSCHRG MED/CURRENT MED MERGE: CPT | Performed by: SURGERY

## 2024-04-30 PROCEDURE — 1123F ACP DISCUSS/DSCN MKR DOCD: CPT | Performed by: SURGERY

## 2024-04-30 PROCEDURE — G8427 DOCREV CUR MEDS BY ELIG CLIN: HCPCS | Performed by: SURGERY

## 2024-04-30 NOTE — PROGRESS NOTES
[facility]   PRE-ADMISSION TESTING GENERAL INSTRUCTIONS  PAT Phone Number: [phone]      GENERAL INSTRUCTIONS:    [x] Antibacterial Soap Shower Night before and/or AM of surgery.  [] CHG Wipes instruction sheet and wipes given.  [x] Do not wear makeup, lotions, powders, deodorant the morning of surgery.  [x] Nothing to eat or drink after midnight. This includes no gum, candy, mints or water.  [x] You may brush your teeth, gargle, but do not swallow water.   [x] No tobacco products, illegal drugs, or alcohol within 24 hours of your surgery.  [x] Jewelry or valuables should not be brought to the hospital. All body and/or tongue piercing's must be removed prior to arriving to hospital. No contact lens or hair pins.   [x] Arrange transportation with a responsible adult  to and from the hospital. Arrange for someone to be with you for the remainder of the day and for 24 hours after your procedure due to having had anesthesia.          -Who will be your  for transportation? Transportation company        -Explained to patient he must have someone accompany him  [x] Bring insurance card and photo ID.  [] Bring copy of living will or healthcare power of  papers to be placed in your electronic record.  [] Urine Pregnancy test will be preformed the day of surgery. A specimen sample may be brought from home.  [] Transfusion (Green) Bracelet: Please bring with you to hospital, day of surgery.     PARKING INSTRUCTIONS:     [x] ARRIVAL DATE & TIME: 5/2/24 at 0845  [x] Times are subject to change. We will contact you the business day before surgery if that were to occur.  [x] Enter into the [facility] Entrance. Two people may accompany you. Masks are not required.  [x] Parking Lot \"I\" is where you will park. It is located on the corner of [address] and [address]. The entrance is on [address].   Only one vehicle - per patient, is permitted in parking lot.

## 2024-04-30 NOTE — PROGRESS NOTES
During PAT call patient said he was planning to use a transportation company to bring him home. I explained to him that it is our policy that he must have someone he knows accompany him if he plans to use this transportation after surgery, or he must use medical transportation. He said he will have his  set that up for him.  Dr Oneal's office notified.

## 2024-04-30 NOTE — PROGRESS NOTES
Vascular Surgery Outpatient Progress Note      Chief Complaint   Patient presents with    Follow-up     Rt arm fistula       HISTORY OF PRESENT ILLNESS:                The patient is a 68 y.o. male who returns for follow-up evaluation of hemodialysis access.  He states that his left AV fistula continues to run poorly and he is having prolonged bleeding episodes.  He was evaluated at the access center and they could not improve the outflow.    Past Medical History:        Diagnosis Date    Back pain     Diabetes mellitus (HCC)     DMII (diabetes mellitus, type 2) (HCC) 7/28/2015    Hemodialysis patient (HCC)     History of cardiovascular stress test 2/16/2015    lexiscan    HTN (hypertension) 7/28/2015    Hyperlipidemia     Hypertension     Lumbar radicular pain 7/28/2015    Oxygen dependent     wears 2 liters at home    Type II or unspecified type diabetes mellitus without mention of complication, not stated as uncontrolled      Past Surgical History:        Procedure Laterality Date    CARDIOVASCULAR STRESS TEST N/A 05/24/2023    Lexiscan stress test    CATHETER REMOVAL Left 8/17/2023    removal of tunneled hemodialysis catheter performed [place] Thad [facility]al [place] [facility] [place]    DIALYSIS FISTULA CREATION Right 02/23/2023    AV FISTULA CREATION RIGHT ARM performed by Thad Oneal MD at [facility]    DIALYSIS FISTULA CREATION Right 1/4/2024    REVISION AV FISTULA RIGHT ARM performed by Thad Oneal MD at [facility]    HERNIA REPAIR Right 5/26/2023    LAPAROSCOPIC RIGHT  INGUINAL HERNIA  REPAI performed by Vera Moser MD at [facility] OR    INVASIVE VASCULAR N/A 11/15/2023    Fistulogram right performed by Thad Oneal MD [place] [facility]    OTHER SURGICAL HISTORY Left 06/06/2014    cain bunionectomy left foot with osteomed screw x1    SHOULDER SURGERY      Bilateral    UPPER GASTROINTESTINAL ENDOSCOPY N/A 8/15/2023    EGD ESOPHAGOGASTRODUODENOSCOPY ULTRASOUND performed by Logan Murillo MD [place] [facility]  complains of pain/discomfort

## 2024-05-01 ENCOUNTER — ANESTHESIA EVENT (OUTPATIENT)
Dept: OPERATING ROOM | Age: 69
End: 2024-05-01
Payer: COMMERCIAL

## 2024-05-02 ENCOUNTER — APPOINTMENT (OUTPATIENT)
Dept: GENERAL RADIOLOGY | Age: 69
End: 2024-05-02
Attending: SURGERY
Payer: COMMERCIAL

## 2024-05-02 ENCOUNTER — HOSPITAL ENCOUNTER (OUTPATIENT)
Age: 69
Setting detail: OBSERVATION
Discharge: HOME | End: 2024-05-04
Attending: SURGERY | Admitting: SURGERY
Payer: COMMERCIAL

## 2024-05-02 ENCOUNTER — ANESTHESIA (OUTPATIENT)
Dept: OPERATING ROOM | Age: 69
End: 2024-05-02
Payer: COMMERCIAL

## 2024-05-02 DIAGNOSIS — Z01.812 PRE-OPERATIVE LABORATORY EXAMINATION: ICD-10-CM

## 2024-05-02 DIAGNOSIS — N18.6 ENCOUNTER REGARDING VASCULAR ACCESS FOR DIALYSIS FOR END-STAGE RENAL DISEASE (HCC): Primary | ICD-10-CM

## 2024-05-02 DIAGNOSIS — Z99.2 ENCOUNTER REGARDING VASCULAR ACCESS FOR DIALYSIS FOR END-STAGE RENAL DISEASE (HCC): Primary | ICD-10-CM

## 2024-05-02 PROBLEM — Z95.828 S/P ARTERIOVENOUS (AV) GRAFT PLACEMENT: Status: ACTIVE | Noted: 2024-05-02

## 2024-05-02 LAB
ANION GAP SERPL CALCULATED.3IONS-SCNC: 14 MMOL/L (ref 7–16)
BUN SERPL-MCNC: 33 MG/DL (ref 6–23)
CALCIUM SERPL-MCNC: 9.1 MG/DL (ref 8.6–10.2)
CHLORIDE SERPL-SCNC: 95 MMOL/L (ref 98–107)
CO2 SERPL-SCNC: 28 MMOL/L (ref 22–29)
CREAT SERPL-MCNC: 8.7 MG/DL (ref 0.7–1.2)
ERYTHROCYTE [DISTWIDTH] IN BLOOD BY AUTOMATED COUNT: 16 % (ref 11.5–15)
GFR, ESTIMATED: 6 ML/MIN/1.73M2
GLUCOSE BLD-MCNC: 246 MG/DL (ref 74–99)
GLUCOSE BLD-MCNC: 308 MG/DL (ref 74–99)
GLUCOSE SERPL-MCNC: 251 MG/DL (ref 74–99)
HCT VFR BLD AUTO: 41.5 % (ref 37–54)
HGB BLD-MCNC: 14.3 G/DL (ref 12.5–16.5)
MCH RBC QN AUTO: 32.1 PG (ref 26–35)
MCHC RBC AUTO-ENTMCNC: 34.5 G/DL (ref 32–34.5)
MCV RBC AUTO: 93.3 FL (ref 80–99.9)
PLATELET # BLD AUTO: 247 K/UL (ref 130–450)
PMV BLD AUTO: 10.8 FL (ref 7–12)
POTASSIUM SERPL-SCNC: 5 MMOL/L (ref 3.5–5)
RBC # BLD AUTO: 4.45 M/UL (ref 3.8–5.8)
SODIUM SERPL-SCNC: 137 MMOL/L (ref 132–146)
WBC OTHER # BLD: 6.4 K/UL (ref 4.5–11.5)

## 2024-05-02 PROCEDURE — 6370000000 HC RX 637 (ALT 250 FOR IP): Performed by: STUDENT IN AN ORGANIZED HEALTH CARE EDUCATION/TRAINING PROGRAM

## 2024-05-02 PROCEDURE — 71045 X-RAY EXAM CHEST 1 VIEW: CPT

## 2024-05-02 PROCEDURE — 7100000010 HC PHASE II RECOVERY - FIRST 15 MIN: Performed by: SURGERY

## 2024-05-02 PROCEDURE — 2580000003 HC RX 258: Performed by: NURSE ANESTHETIST, CERTIFIED REGISTERED

## 2024-05-02 PROCEDURE — A4217 STERILE WATER/SALINE, 500 ML: HCPCS | Performed by: SURGERY

## 2024-05-02 PROCEDURE — 2580000003 HC RX 258

## 2024-05-02 PROCEDURE — C1894 INTRO/SHEATH, NON-LASER: HCPCS | Performed by: SURGERY

## 2024-05-02 PROCEDURE — C1750 CATH, HEMODIALYSIS,LONG-TERM: HCPCS | Performed by: SURGERY

## 2024-05-02 PROCEDURE — 64415 NJX AA&/STRD BRCH PLXS IMG: CPT | Performed by: ANESTHESIOLOGY

## 2024-05-02 PROCEDURE — 3700000001 HC ADD 15 MINUTES (ANESTHESIA): Performed by: SURGERY

## 2024-05-02 PROCEDURE — 6360000002 HC RX W HCPCS

## 2024-05-02 PROCEDURE — C1768 GRAFT, VASCULAR: HCPCS | Performed by: SURGERY

## 2024-05-02 PROCEDURE — 6360000002 HC RX W HCPCS: Performed by: ANESTHESIOLOGY

## 2024-05-02 PROCEDURE — 6370000000 HC RX 637 (ALT 250 FOR IP): Performed by: SURGERY

## 2024-05-02 PROCEDURE — 3600000002 HC SURGERY LEVEL 2 BASE: Performed by: SURGERY

## 2024-05-02 PROCEDURE — 82962 GLUCOSE BLOOD TEST: CPT

## 2024-05-02 PROCEDURE — 2500000003 HC RX 250 WO HCPCS: Performed by: SURGERY

## 2024-05-02 PROCEDURE — 80048 BASIC METABOLIC PNL TOTAL CA: CPT

## 2024-05-02 PROCEDURE — 85027 COMPLETE CBC AUTOMATED: CPT

## 2024-05-02 PROCEDURE — G0378 HOSPITAL OBSERVATION PER HR: HCPCS

## 2024-05-02 PROCEDURE — 2580000003 HC RX 258: Performed by: SURGERY

## 2024-05-02 PROCEDURE — C1769 GUIDE WIRE: HCPCS | Performed by: SURGERY

## 2024-05-02 PROCEDURE — 6360000002 HC RX W HCPCS: Performed by: NURSE ANESTHETIST, CERTIFIED REGISTERED

## 2024-05-02 PROCEDURE — 7100000011 HC PHASE II RECOVERY - ADDTL 15 MIN: Performed by: SURGERY

## 2024-05-02 PROCEDURE — 3600000012 HC SURGERY LEVEL 2 ADDTL 15MIN: Performed by: SURGERY

## 2024-05-02 PROCEDURE — 2709999900 HC NON-CHARGEABLE SUPPLY: Performed by: SURGERY

## 2024-05-02 PROCEDURE — 6360000002 HC RX W HCPCS: Performed by: SURGERY

## 2024-05-02 PROCEDURE — 2580000003 HC RX 258: Performed by: STUDENT IN AN ORGANIZED HEALTH CARE EDUCATION/TRAINING PROGRAM

## 2024-05-02 PROCEDURE — 3700000000 HC ANESTHESIA ATTENDED CARE: Performed by: SURGERY

## 2024-05-02 DEVICE — 6MM X 40 CM
Type: IMPLANTABLE DEVICE | Site: ARM | Status: FUNCTIONAL
Brand: ARTEGRAFT VASCULAR GRAFT

## 2024-05-02 DEVICE — 16F X 28CM PRE-CURVED16F X 28CM SPLIT CATH®III CATHETER SET (CUFF 23CM FROM TIP)SET (CUF
Type: IMPLANTABLE DEVICE | Site: CHEST | Status: FUNCTIONAL
Brand: SPLIT CATH®III

## 2024-05-02 RX ORDER — MIDAZOLAM HYDROCHLORIDE 2 MG/2ML
2 INJECTION, SOLUTION INTRAMUSCULAR; INTRAVENOUS ONCE
Status: CANCELLED | OUTPATIENT
Start: 2024-05-02 | End: 2024-05-02

## 2024-05-02 RX ORDER — SODIUM CHLORIDE 9 MG/ML
INJECTION, SOLUTION INTRAVENOUS PRN
Status: DISCONTINUED | OUTPATIENT
Start: 2024-05-02 | End: 2024-05-04 | Stop reason: HOSPADM

## 2024-05-02 RX ORDER — ROPIVACAINE HYDROCHLORIDE 5 MG/ML
30 INJECTION, SOLUTION EPIDURAL; INFILTRATION; PERINEURAL ONCE
Status: CANCELLED | OUTPATIENT
Start: 2024-05-02 | End: 2024-05-02

## 2024-05-02 RX ORDER — ONDANSETRON 2 MG/ML
4 INJECTION INTRAMUSCULAR; INTRAVENOUS EVERY 8 HOURS PRN
Status: DISCONTINUED | OUTPATIENT
Start: 2024-05-02 | End: 2024-05-02 | Stop reason: SDUPTHER

## 2024-05-02 RX ORDER — ONDANSETRON 4 MG/1
4 TABLET, ORALLY DISINTEGRATING ORAL EVERY 8 HOURS PRN
Status: DISCONTINUED | OUTPATIENT
Start: 2024-05-02 | End: 2024-05-04 | Stop reason: HOSPADM

## 2024-05-02 RX ORDER — FLUTICASONE PROPIONATE 50 MCG
2 SPRAY, SUSPENSION (ML) NASAL DAILY
Status: DISCONTINUED | OUTPATIENT
Start: 2024-05-03 | End: 2024-05-04 | Stop reason: HOSPADM

## 2024-05-02 RX ORDER — INSULIN GLARGINE 100 [IU]/ML
34 INJECTION, SOLUTION SUBCUTANEOUS NIGHTLY
Status: DISCONTINUED | OUTPATIENT
Start: 2024-05-02 | End: 2024-05-04 | Stop reason: HOSPADM

## 2024-05-02 RX ORDER — SODIUM CHLORIDE 9 MG/ML
INJECTION, SOLUTION INTRAVENOUS PRN
Status: DISCONTINUED | OUTPATIENT
Start: 2024-05-02 | End: 2024-05-02 | Stop reason: HOSPADM

## 2024-05-02 RX ORDER — SODIUM CHLORIDE 0.9 % (FLUSH) 0.9 %
5-40 SYRINGE (ML) INJECTION PRN
Status: DISCONTINUED | OUTPATIENT
Start: 2024-05-02 | End: 2024-05-02 | Stop reason: HOSPADM

## 2024-05-02 RX ORDER — SODIUM CHLORIDE 9 MG/ML
INJECTION, SOLUTION INTRAVENOUS CONTINUOUS PRN
Status: DISCONTINUED | OUTPATIENT
Start: 2024-05-02 | End: 2024-05-02 | Stop reason: SDUPTHER

## 2024-05-02 RX ORDER — FENTANYL CITRATE 50 UG/ML
INJECTION, SOLUTION INTRAMUSCULAR; INTRAVENOUS
Status: COMPLETED
Start: 2024-05-02 | End: 2024-05-02

## 2024-05-02 RX ORDER — ONDANSETRON 2 MG/ML
4 INJECTION INTRAMUSCULAR; INTRAVENOUS EVERY 6 HOURS PRN
Status: DISCONTINUED | OUTPATIENT
Start: 2024-05-02 | End: 2024-05-04 | Stop reason: HOSPADM

## 2024-05-02 RX ORDER — PANTOPRAZOLE SODIUM 40 MG/1
40 TABLET, DELAYED RELEASE ORAL
Status: DISCONTINUED | OUTPATIENT
Start: 2024-05-03 | End: 2024-05-04 | Stop reason: HOSPADM

## 2024-05-02 RX ORDER — PROPOFOL 10 MG/ML
INJECTION, EMULSION INTRAVENOUS CONTINUOUS PRN
Status: DISCONTINUED | OUTPATIENT
Start: 2024-05-02 | End: 2024-05-02 | Stop reason: SDUPTHER

## 2024-05-02 RX ORDER — PHENYLEPHRINE HCL IN 0.9% NACL 1 MG/10 ML
SYRINGE (ML) INTRAVENOUS PRN
Status: DISCONTINUED | OUTPATIENT
Start: 2024-05-02 | End: 2024-05-02 | Stop reason: SDUPTHER

## 2024-05-02 RX ORDER — ROPIVACAINE HYDROCHLORIDE 5 MG/ML
INJECTION, SOLUTION EPIDURAL; INFILTRATION; PERINEURAL
Status: DISPENSED
Start: 2024-05-02 | End: 2024-05-02

## 2024-05-02 RX ORDER — METOPROLOL SUCCINATE 50 MG/1
25 TABLET, EXTENDED RELEASE ORAL DAILY
Status: DISCONTINUED | OUTPATIENT
Start: 2024-05-03 | End: 2024-05-04 | Stop reason: HOSPADM

## 2024-05-02 RX ORDER — GLUCAGON 1 MG/ML
1 KIT INJECTION PRN
Status: DISCONTINUED | OUTPATIENT
Start: 2024-05-02 | End: 2024-05-04 | Stop reason: HOSPADM

## 2024-05-02 RX ORDER — ROPIVACAINE HYDROCHLORIDE 5 MG/ML
INJECTION, SOLUTION EPIDURAL; INFILTRATION; PERINEURAL
Status: COMPLETED | OUTPATIENT
Start: 2024-05-02 | End: 2024-05-02

## 2024-05-02 RX ORDER — MIDAZOLAM HYDROCHLORIDE 1 MG/ML
INJECTION INTRAMUSCULAR; INTRAVENOUS
Status: COMPLETED
Start: 2024-05-02 | End: 2024-05-02

## 2024-05-02 RX ORDER — OXYCODONE HYDROCHLORIDE AND ACETAMINOPHEN 5; 325 MG/1; MG/1
1 TABLET ORAL EVERY 4 HOURS PRN
Status: DISCONTINUED | OUTPATIENT
Start: 2024-05-02 | End: 2024-05-04 | Stop reason: HOSPADM

## 2024-05-02 RX ORDER — ALBUTEROL SULFATE 90 UG/1
2 AEROSOL, METERED RESPIRATORY (INHALATION) EVERY 4 HOURS PRN
Status: DISCONTINUED | OUTPATIENT
Start: 2024-05-02 | End: 2024-05-04 | Stop reason: HOSPADM

## 2024-05-02 RX ORDER — SODIUM CHLORIDE 0.9 % (FLUSH) 0.9 %
5-40 SYRINGE (ML) INJECTION EVERY 12 HOURS SCHEDULED
Status: DISCONTINUED | OUTPATIENT
Start: 2024-05-02 | End: 2024-05-04 | Stop reason: HOSPADM

## 2024-05-02 RX ORDER — HEPARIN SODIUM 1000 [USP'U]/ML
INJECTION, SOLUTION INTRAVENOUS; SUBCUTANEOUS PRN
Status: DISCONTINUED | OUTPATIENT
Start: 2024-05-02 | End: 2024-05-02 | Stop reason: SDUPTHER

## 2024-05-02 RX ORDER — INSULIN GLARGINE 100 [IU]/ML
34 INJECTION, SOLUTION SUBCUTANEOUS NIGHTLY
Status: DISCONTINUED | OUTPATIENT
Start: 2024-05-03 | End: 2024-05-02

## 2024-05-02 RX ORDER — ACETAMINOPHEN 325 MG/1
650 TABLET ORAL ONCE AS NEEDED
Status: DISCONTINUED | OUTPATIENT
Start: 2024-05-02 | End: 2024-05-02 | Stop reason: SDUPTHER

## 2024-05-02 RX ORDER — HEPARIN SODIUM 5000 [USP'U]/ML
5000 INJECTION, SOLUTION INTRAVENOUS; SUBCUTANEOUS EVERY 8 HOURS SCHEDULED
Status: DISCONTINUED | OUTPATIENT
Start: 2024-05-02 | End: 2024-05-02

## 2024-05-02 RX ORDER — DEXTROSE MONOHYDRATE 100 MG/ML
INJECTION, SOLUTION INTRAVENOUS CONTINUOUS PRN
Status: DISCONTINUED | OUTPATIENT
Start: 2024-05-02 | End: 2024-05-04 | Stop reason: HOSPADM

## 2024-05-02 RX ORDER — SODIUM CHLORIDE 0.9 % (FLUSH) 0.9 %
5-40 SYRINGE (ML) INJECTION PRN
Status: DISCONTINUED | OUTPATIENT
Start: 2024-05-02 | End: 2024-05-04 | Stop reason: HOSPADM

## 2024-05-02 RX ORDER — OXYCODONE HYDROCHLORIDE 5 MG/1
5 TABLET ORAL EVERY 6 HOURS PRN
Qty: 28 TABLET | Refills: 0 | Status: SHIPPED | OUTPATIENT
Start: 2024-05-02 | End: 2024-05-09

## 2024-05-02 RX ORDER — SODIUM CHLORIDE 9 MG/ML
INJECTION, SOLUTION INTRAVENOUS CONTINUOUS
Status: DISCONTINUED | OUTPATIENT
Start: 2024-05-02 | End: 2024-05-02 | Stop reason: HOSPADM

## 2024-05-02 RX ORDER — SEVELAMER CARBONATE 800 MG/1
1600 TABLET, FILM COATED ORAL
Status: DISCONTINUED | OUTPATIENT
Start: 2024-05-02 | End: 2024-05-04 | Stop reason: HOSPADM

## 2024-05-02 RX ORDER — OXYCODONE HYDROCHLORIDE AND ACETAMINOPHEN 5; 325 MG/1; MG/1
2 TABLET ORAL EVERY 4 HOURS PRN
Status: DISCONTINUED | OUTPATIENT
Start: 2024-05-02 | End: 2024-05-04 | Stop reason: HOSPADM

## 2024-05-02 RX ORDER — SODIUM CHLORIDE 0.9 % (FLUSH) 0.9 %
5-40 SYRINGE (ML) INJECTION EVERY 12 HOURS SCHEDULED
Status: DISCONTINUED | OUTPATIENT
Start: 2024-05-02 | End: 2024-05-02 | Stop reason: HOSPADM

## 2024-05-02 RX ORDER — HEPARIN 100 UNIT/ML
SYRINGE INTRAVENOUS PRN
Status: DISCONTINUED | OUTPATIENT
Start: 2024-05-02 | End: 2024-05-02 | Stop reason: ALTCHOICE

## 2024-05-02 RX ORDER — FENTANYL CITRATE 50 UG/ML
100 INJECTION, SOLUTION INTRAMUSCULAR; INTRAVENOUS ONCE
Status: CANCELLED | OUTPATIENT
Start: 2024-05-02 | End: 2024-05-02

## 2024-05-02 RX ORDER — ACETAMINOPHEN 325 MG/1
650 TABLET ORAL EVERY 4 HOURS PRN
Status: DISCONTINUED | OUTPATIENT
Start: 2024-05-02 | End: 2024-05-04 | Stop reason: HOSPADM

## 2024-05-02 RX ADMIN — PROPOFOL 100 MCG/KG/MIN: 10 INJECTION, EMULSION INTRAVENOUS at 11:24

## 2024-05-02 RX ADMIN — Medication 50 MCG: at 12:21

## 2024-05-02 RX ADMIN — Medication 50 MCG: at 12:11

## 2024-05-02 RX ADMIN — FENTANYL CITRATE 100 MCG: 50 INJECTION INTRAMUSCULAR; INTRAVENOUS at 10:58

## 2024-05-02 RX ADMIN — SODIUM CHLORIDE: 9 INJECTION, SOLUTION INTRAVENOUS at 08:25

## 2024-05-02 RX ADMIN — HEPARIN SODIUM 6000 UNITS: 1000 INJECTION INTRAVENOUS; SUBCUTANEOUS at 12:04

## 2024-05-02 RX ADMIN — OXYCODONE HYDROCHLORIDE AND ACETAMINOPHEN 1 TABLET: 5; 325 TABLET ORAL at 22:52

## 2024-05-02 RX ADMIN — ROPIVACAINE HYDROCHLORIDE 30 ML: 5 INJECTION EPIDURAL; INFILTRATION; PERINEURAL at 11:30

## 2024-05-02 RX ADMIN — SODIUM CHLORIDE, PRESERVATIVE FREE 10 ML: 5 INJECTION INTRAVENOUS at 21:44

## 2024-05-02 RX ADMIN — Medication 50 MCG: at 12:01

## 2024-05-02 RX ADMIN — OXYCODONE HYDROCHLORIDE AND ACETAMINOPHEN 1 TABLET: 5; 325 TABLET ORAL at 18:34

## 2024-05-02 RX ADMIN — SODIUM CHLORIDE: 9 INJECTION, SOLUTION INTRAVENOUS at 11:10

## 2024-05-02 RX ADMIN — MIDAZOLAM 2 MG: 1 INJECTION INTRAMUSCULAR; INTRAVENOUS at 10:58

## 2024-05-02 RX ADMIN — Medication 50 MCG: at 11:55

## 2024-05-02 RX ADMIN — INSULIN GLARGINE 34 UNITS: 100 INJECTION, SOLUTION SUBCUTANEOUS at 21:43

## 2024-05-02 RX ADMIN — CEFAZOLIN 2000 MG: 2 INJECTION, POWDER, FOR SOLUTION INTRAMUSCULAR; INTRAVENOUS at 11:24

## 2024-05-02 RX ADMIN — Medication 50 MCG: at 12:41

## 2024-05-02 ASSESSMENT — PAIN DESCRIPTION - ORIENTATION
ORIENTATION: RIGHT
ORIENTATION: RIGHT

## 2024-05-02 ASSESSMENT — PAIN - FUNCTIONAL ASSESSMENT: PAIN_FUNCTIONAL_ASSESSMENT: 0-10

## 2024-05-02 ASSESSMENT — PAIN DESCRIPTION - DESCRIPTORS
DESCRIPTORS: ACHING;DISCOMFORT;SORE
DESCRIPTORS: ACHING;SORE;TENDER

## 2024-05-02 ASSESSMENT — ENCOUNTER SYMPTOMS: SHORTNESS OF BREATH: 1

## 2024-05-02 ASSESSMENT — PAIN DESCRIPTION - LOCATION
LOCATION: ARM
LOCATION: NECK

## 2024-05-02 ASSESSMENT — PAIN SCALES - GENERAL
PAINLEVEL_OUTOF10: 7
PAINLEVEL_OUTOF10: 8

## 2024-05-02 ASSESSMENT — LIFESTYLE VARIABLES: SMOKING_STATUS: 1

## 2024-05-02 NOTE — ANESTHESIA PRE PROCEDURE
7/28/2015    Hyperlipidemia     Hypertension     Lumbar radicular pain 7/28/2015    Oxygen dependent     wears 2 liters at home    Type II or unspecified type diabetes mellitus without mention of complication, not stated as uncontrolled        Past Surgical History:        Procedure Laterality Date    CARDIOVASCULAR STRESS TEST N/A 05/24/2023    Lexiscan stress test    CATHETER REMOVAL Left 8/17/2023    removal of tunneled hemodialysis catheter [place] [facility] [place]    DIALYSIS FISTULA CREATION Right 02/23/2023    AV FISTULA CREATION RIGHT ARM performed [place] [facility] [place]    DIALYSIS FISTULA CREATION Right 1/4/2024    REVISION AV FISTULA RIGHT ARM performed [place] [facility] [place]    HERNIA REPAIR Right 5/26/2023    LAPAROSCOPIC RIGHT  INGUINAL HERNIA  REPAI performed by Vera Moser MD at [facility] OR    INVASIVE VASCULAR N/A 11/15/2023    Fistulogram right performed [place] [facility] [place] [place] [facility]    OTHER SURGICAL HISTORY Left 06/06/2014    cain bunionectomy left foot with osteomed screw x1    SHOULDER SURGERY      Bilateral    UPPER GASTROINTESTINAL ENDOSCOPY N/A 8/15/2023    EGD ESOPHAGOGASTRODUODENOSCOPY ULTRASOUND performed [place]illo [place] [facility] [place]    UPPER GASTROINTESTINAL ENDOSCOPY N/A 8/15/2023    EGD BIOPSY performed by [place] [facility] [place]    VEIN SURGERY         Social History:    Social History     Tobacco Use    Smoking status: Every Day     Current packs/day: 1.00     Average packs/day: 1 pack/day for 30.0 years (30.0 ttl pk-yrs)     Types: Cigarettes    Smokeless tobacco: Never   Substance Use Topics    Alcohol use: No                                Ready to quit: Not Answered  Counseling given: Not Answered      Vital Signs (Current):   Vitals:    04/30/24 1435 05/02/24 0802   BP:  124/68   Pulse:  90   Resp:  20   Temp:  36.7 °C (98 °F)   TempSrc:  Temporal   SpO2:  95%   Weight: 84.4 kg (186 lb) 84.4

## 2024-05-02 NOTE — PROGRESS NOTES
Pt asked for drink and tylenol for complaint of headache.  apple juice was given, order for tylenol was placed, pt was speaking disrespectful and refused for this nurse to obtain his tylenol and to not return to his room.

## 2024-05-02 NOTE — H&P
Vascular Surgery History & Physical Exam      Chief Complaint: CRF    HISTORY OF PRESENT ILLNESS:                The patient is a 68 y.o. male who presents to the hospital for elective creation of a arteriovenous fistula with insertion of a tunneled hd catheter.  The patient denies any problems since the last office visit.    IMPRESSION:   Active Hospital Problems    Diagnosis     End stage renal disease (HCC) [N18.6]        PLAN:  Creation of a right arteriovenous fistula, possible graft    Possible right arm fistula ligation, insertion of tunneled hd catheter.    I reviewed the procedure with the patient.  I discussed the risks, benefits, and alternatives of the procedure.  The patient understands and consents.  All questions were answered.    Patient Active Problem List   Diagnosis Code    Hallux valgus, acquired M20.10    DMII (diabetes mellitus, type 2) ([facility]) E11.9    HTN (hypertension) I10    Lumbar radicular pain M54.16    Spinal stenosis M48.00    B12 deficiency E53.8    Idiopathic acute pancreatitis K85.00    Acute pancreatitis without necrosis or infection, unspecified K85.90    Pneumonia J18.9    Respiratory failure ([facility]) J96.90    Acute respiratory failure with hypoxia ([facility]) J96.01    Hyperkalemia E87.5    Paroxysmal atrial fibrillation ([facility]) I48.0    ESRD on hemodialysis ([facility]) N18.6, Z99.2    Acute pancreatitis K85.90    Encounter regarding vascular access for dialysis for end-stage renal disease ([facility]) N18.6, Z99.2    Acute recurrent pancreatitis K85.90    Acute on chronic respiratory failure with hypoxia ([facility]) J96.21    COPD exacerbation ([facility]) J44.1    S/P inguinal herniorrhaphy Z98.890, Z87.19    Right groin pain R10.31    Idiopathic acute pancreatitis, unspecified complication status K85.00    End stage renal disease (HCC) N18.6       Past Medical History:   Diagnosis Date    Back pain     Diabetes mellitus ([facility])     DMII (diabetes mellitus, type 2) ([facility]) 7/28/2015    Hemodialysis patient ([facility])

## 2024-05-02 NOTE — ANESTHESIA PROCEDURE NOTES
Peripheral Block    Patient location during procedure: pre-op  Reason for block: post-op pain management and at surgeon's request  Start time: 5/2/2024 11:30 AM  Staffing  Performed: anesthesiologist   Anesthesiologist: Mian Narayanan MD  Performed by: Mian Narayanan MD  Authorized by: Mian Narayanan MD    Preanesthetic Checklist  Completed: patient identified, IV checked, site marked, risks and benefits discussed, surgical/procedural consents, equipment checked, pre-op evaluation, timeout performed, anesthesia consent given, oxygen available and monitors applied/VS acknowledged  Peripheral Block   Patient position: sitting  Prep: ChloraPrep  Provider prep: mask and sterile gloves  Patient monitoring: cardiac monitor, continuous pulse ox, frequent blood pressure checks and IV access  Block type: Brachial plexus  Interscalene and Supraclavicular  Laterality: right  Injection technique: single-shot  Guidance: ultrasound guided  Local infiltration: lidocaine  Infiltration strength: 1 %  Local infiltration: lidocaine  Dose: 3 mL    Needle   Needle gauge: 21 G  Needle localization: ultrasound guidance  Needle length: 10 cm  Assessment   Injection assessment: negative aspiration for heme, no paresthesia on injection and local visualized surrounding nerve on ultrasound  Paresthesia pain: none  Slow fractionated injection: yes  Hemodynamics: stable  Outcomes: uncomplicated and patient tolerated procedure well    Additional Notes  U/S [id number].  (1) Under ultrasound guidance, a  gauge needle was inserted and placed in close proximity to the nerve.  (2) Ultrasound was also used to visualize the spread of the anesthetic in close proximity to the nerve being blocked. (3) The nerve appeared anatomically normal, and (4 there were no apparent abnormal pathological findings on the image that were readily visible and related to the nerve being blocked. (5) A permanent ultrasound image was saved in the patient's

## 2024-05-02 NOTE — PROGRESS NOTES
Patient extremely upset stating  and cursing \"this place needs to get their shit together.\" States he was told yesterday he was going to be admitted so he didn't arrange a ride.Patient was getting very loud and using foul language towards 2 nurses.Patient was explained to that Dr Oneal was informed about the situation and would take care of the situation when he got out of surgery.

## 2024-05-02 NOTE — PROGRESS NOTES
4 Eyes Skin Assessment     NAME:  Carmelo Desir  YOB: 1955  MEDICAL RECORD NUMBER:  [id number]    The patient is being assessed for  Admission    I agree that at least one RN has performed a thorough Head to Toe Skin Assessment on the patient. ALL assessment sites listed below have been assessed.      Areas assessed by both nurses:    Head, Face, Ears, Shoulders, Back, Chest, Arms, Elbows, Hands, Sacrum. Buttock, Coccyx, Ischium, Legs. Feet and Heels, and Under Medical Devices         Does the Patient have a Wound? No noted wound(s)       Bear Prevention initiated by RN: No  Wound Care Orders initiated by RN: No    Pressure Injury (Stage 3,4, Unstageable, DTI, NWPT, and Complex wounds) if present, place Wound referral order by RN under : No    New Ostomies, if present place, Ostomy referral order under : No     Nurse 1 eSignature: Electronically signed by Elke Lara RN on 5/2/24 at 7:49 PM EDT    **SHARE this note so that the co-signing nurse can place an eSignature**    Nurse 2 eSignature: {Esignature:[id number]}+

## 2024-05-02 NOTE — ANESTHESIA POSTPROCEDURE EVALUATION
Department of Anesthesiology  Postprocedure Note    Patient: Carmelo Desir  MRN: [id number]  YOB: 1955  Date of evaluation: 5/2/2024    Procedure Summary       Date: 05/02/24 Room / Location: [address]    Anesthesia Start: 1110 Anesthesia Stop:     Procedure: Right upper extremity arteriovenous graft, right arteriovenous fistula ligation, insertion of tunneled central venous catheter (Right: Arm Upper) Diagnosis:       End stage renal disease (HCC)      (End stage renal disease (HCC) [N18.6])    Surgeons: Thad Oneal MD Responsible Provider:     Anesthesia Type: MAC, regional ASA Status: 4            Anesthesia Type: No value filed.    Nadeem Phase I: Nadeem Score: 10    Nadeem Phase II:      Anesthesia Post Evaluation    Patient location during evaluation: PACU  Patient participation: complete - patient participated  Level of consciousness: awake  Pain score: 3  Airway patency: patent  Nausea & Vomiting: no nausea and no vomiting  Cardiovascular status: blood pressure returned to baseline  Respiratory status: acceptable  Hydration status: euvolemic    No notable events documented.

## 2024-05-02 NOTE — OP NOTE
Operative Note      Patient: Carmelo Desir  YOB: 1955  MRN: [id number]    Date of Procedure: 5/2/2024    Pre-Op Diagnosis Codes:     * End stage renal disease (HCC) [N18.6]    Post-Op Diagnosis: Same       Procedure(s):  Right upper extremity arteriovenous graft, right arteriovenous fistula ligation, insertion of tunneled central venous catheter    Surgeon(s):  Thad Oneal MD    Assistant:   Surgical Assistant: Cindi Boggs, APRN - CNP  Resident: Ariana Mora MD    Anesthesia: Monitor Anesthesia Care    Estimated Blood Loss (mL): Minimal    Complications: None    Specimens:   * No specimens in log *    Implants:  Implant Name Type Inv. Item Serial No.  Lot No. LRB No. Used Action   GRAFT VASC L40CM DIA6MM BOV CAR ART CLLGN FOR FUNC HAD ACCS -  Vascular grafts GRAFT VASC L40CM DIA6MM BOV CAR ART CLLGN FOR FUNC HAD ACCS [employer]WD [id number] Right 1 Implanted   CATHETER HD PRECRV 16 FRX28 CM LT BASIC SET SPLIT CATH III - [id number] Hemodialysis catheters CATHETER HD PRECRV 16 FRX28 CM LT BASIC SET SPLIT CATH III  [employer] [id number] Right 1 Implanted         Drains: * No LDAs found *    Findings:  Infection Present At Time Of Surgery (PATOS) (choose all levels that have infection present):  No infection present  Other Findings:     Detailed Description of Procedure:  The patient was identified and the procedure was confirmed. The right neck and arm was prepped and draped in the usual sterile fashion. Lidocaine 1% mixed with 0.25% Marcaine was used for local anesthesia.  A skin incision was made over the anastomosis of the radiocephalic fistula and carried down through the subcutaneous tissue.  The cephalic vein was identified and freed from the surrounding tissues.  It was ligated with a 2-0 silk suture.    A skin incision was made in the distal upper arm and carried down through the subcutaneous tissue. The brachial artery was identified

## 2024-05-03 LAB
GLUCOSE BLD-MCNC: 159 MG/DL (ref 74–99)
GLUCOSE BLD-MCNC: 246 MG/DL (ref 74–99)

## 2024-05-03 PROCEDURE — 90935 HEMODIALYSIS ONE EVALUATION: CPT

## 2024-05-03 PROCEDURE — G0378 HOSPITAL OBSERVATION PER HR: HCPCS

## 2024-05-03 PROCEDURE — 6370000000 HC RX 637 (ALT 250 FOR IP): Performed by: STUDENT IN AN ORGANIZED HEALTH CARE EDUCATION/TRAINING PROGRAM

## 2024-05-03 PROCEDURE — 2580000003 HC RX 258: Performed by: STUDENT IN AN ORGANIZED HEALTH CARE EDUCATION/TRAINING PROGRAM

## 2024-05-03 PROCEDURE — 82962 GLUCOSE BLOOD TEST: CPT

## 2024-05-03 PROCEDURE — 6370000000 HC RX 637 (ALT 250 FOR IP): Performed by: SURGERY

## 2024-05-03 RX ADMIN — INSULIN GLARGINE 34 UNITS: 100 INJECTION, SOLUTION SUBCUTANEOUS at 22:04

## 2024-05-03 RX ADMIN — OXYCODONE HYDROCHLORIDE AND ACETAMINOPHEN 1 TABLET: 5; 325 TABLET ORAL at 22:03

## 2024-05-03 RX ADMIN — SEVELAMER CARBONATE 1600 MG: 800 TABLET, FILM COATED ORAL at 08:58

## 2024-05-03 RX ADMIN — METOPROLOL SUCCINATE 25 MG: 50 TABLET, FILM COATED, EXTENDED RELEASE ORAL at 08:58

## 2024-05-03 RX ADMIN — OXYCODONE HYDROCHLORIDE AND ACETAMINOPHEN 1 TABLET: 5; 325 TABLET ORAL at 17:56

## 2024-05-03 RX ADMIN — SODIUM CHLORIDE, PRESERVATIVE FREE 10 ML: 5 INJECTION INTRAVENOUS at 22:05

## 2024-05-03 RX ADMIN — APIXABAN 5 MG: 5 TABLET, FILM COATED ORAL at 22:04

## 2024-05-03 RX ADMIN — SODIUM CHLORIDE, PRESERVATIVE FREE 10 ML: 5 INJECTION INTRAVENOUS at 08:59

## 2024-05-03 RX ADMIN — PANTOPRAZOLE SODIUM 40 MG: 40 TABLET, DELAYED RELEASE ORAL at 05:20

## 2024-05-03 RX ADMIN — PANTOPRAZOLE SODIUM 40 MG: 40 TABLET, DELAYED RELEASE ORAL at 17:56

## 2024-05-03 RX ADMIN — FLUTICASONE PROPIONATE 2 SPRAY: 50 SPRAY, METERED NASAL at 08:57

## 2024-05-03 RX ADMIN — OXYCODONE HYDROCHLORIDE AND ACETAMINOPHEN 1 TABLET: 5; 325 TABLET ORAL at 05:19

## 2024-05-03 RX ADMIN — APIXABAN 5 MG: 5 TABLET, FILM COATED ORAL at 08:58

## 2024-05-03 RX ADMIN — OXYCODONE HYDROCHLORIDE AND ACETAMINOPHEN 1 TABLET: 5; 325 TABLET ORAL at 12:41

## 2024-05-03 RX ADMIN — SEVELAMER CARBONATE 1600 MG: 800 TABLET, FILM COATED ORAL at 11:34

## 2024-05-03 RX ADMIN — ACETAMINOPHEN 650 MG: 325 TABLET ORAL at 17:11

## 2024-05-03 RX ADMIN — SEVELAMER CARBONATE 1600 MG: 800 TABLET, FILM COATED ORAL at 17:56

## 2024-05-03 ASSESSMENT — PAIN DESCRIPTION - ORIENTATION
ORIENTATION: RIGHT

## 2024-05-03 ASSESSMENT — PAIN SCALES - GENERAL
PAINLEVEL_OUTOF10: 9
PAINLEVEL_OUTOF10: 5
PAINLEVEL_OUTOF10: 8
PAINLEVEL_OUTOF10: 5
PAINLEVEL_OUTOF10: 10
PAINLEVEL_OUTOF10: 7
PAINLEVEL_OUTOF10: 7

## 2024-05-03 ASSESSMENT — PAIN DESCRIPTION - DESCRIPTORS
DESCRIPTORS: ACHING;SORE;POUNDING
DESCRIPTORS: ACHING;DISCOMFORT;PRESSURE;SORE
DESCRIPTORS: ACHING
DESCRIPTORS: ACHING;DISCOMFORT

## 2024-05-03 ASSESSMENT — PAIN DESCRIPTION - LOCATION
LOCATION: ARM
LOCATION: ARM
LOCATION: HEAD
LOCATION: ARM
LOCATION: ARM;CHEST
LOCATION: ARM

## 2024-05-03 ASSESSMENT — PAIN - FUNCTIONAL ASSESSMENT
PAIN_FUNCTIONAL_ASSESSMENT: ACTIVITIES ARE NOT PREVENTED
PAIN_FUNCTIONAL_ASSESSMENT: ACTIVITIES ARE NOT PREVENTED

## 2024-05-03 NOTE — PROGRESS NOTES
CLINICAL PHARMACY NOTE: MEDS TO BEDS    Total # of Prescriptions Filled: 1   The following medications were delivered to the patient:  Oxycodone 5 mg    Additional Documentation:   Delivered to Elke COMBS at station

## 2024-05-03 NOTE — PROGRESS NOTES
Vascular Surgery Progress Note    Pt is being seen in f/u today regarding AVF ligation, AVG, TDC placement    Subjective  Pt s/e. Patient doing well this morning, having some soreness of the right arm. Waiting for transport for dialysis.     Current Medications:    sodium chloride      dextrose        acetaminophen, oxyCODONE-acetaminophen **OR** oxyCODONE-acetaminophen, sodium chloride flush, sodium chloride, ondansetron **OR** ondansetron, albuterol sulfate HFA, glucose, dextrose bolus **OR** dextrose bolus, glucagon (rDNA), dextrose    sodium chloride flush  5-40 mL IntraVENous 2 times per day    apixaban  5 mg Oral BID    fluticasone  2 spray Nasal Daily    metoprolol succinate  25 mg Oral Daily    pantoprazole  40 mg Oral BID AC    sevelamer  1,600 mg Oral TID WC    insulin glargine  34 Units SubCUTAneous Nightly        PHYSICAL EXAM:    BP (!) 140/61   Pulse 85   Temp 97.6 °F (36.4 °C) (Temporal)   Resp 18   Ht 1.702 m (5' 7\")   Wt 84.4 kg (186 lb)   SpO2 96%   BMI 29.13 kg/m²     Intake/Output Summary (Last 24 hours) at 5/3/2024 0952  Last data filed at 5/2/2024 1312  Gross per 24 hour   Intake 200 ml   Output --   Net 200 ml          Gen: awake, alert and oriented x3, no apparent distress  CVS: RRR  Resp: No increased work of breathing  Abd: Soft, non-tender, non-distended  Ext: RUE with palpable thrill - wrist incision c/d/I, antecubital incision c/d/I, inferior axillary incision c/d/I, minimal swelling, appropriately tender to palpation around incisions    LABS:    Lab Results   Component Value Date    WBC 6.4 05/02/2024    HGB 14.3 05/02/2024    HCT 41.5 05/02/2024     05/02/2024    PROTIME 10.8 07/19/2021    INR 0.9 07/19/2021    APTT 26.8 02/07/2020    K 5.0 05/02/2024    BUN 33 (H) 05/02/2024    CREATININE 8.7 (HH) 05/02/2024       A/P  68 y.o. male with ESRD on HD s/p RUE AVF ligation, RUE graft insertion, and TDC insertion 5/2.     - Nephro consulted, appreciate recommendations as

## 2024-05-03 NOTE — PROGRESS NOTES
patient is now complaining of severe throbbing pain to incision right arm and refusing to leave. States he would like to stay through night as he is home alone. Area approximated. No drainage. No warmth. Dr JOHNSON updated.

## 2024-05-03 NOTE — CARE COORDINATION
05/03/24 Transition of care: Patient is observation scheduled surgery for placement of AV fistula/Tunneled catheter for dialysis. He is alert and oriented. He attends [facility] MWF at 5am for hemo. He drives himself. He has oxygen thru [employer] at home. He he is independent and takes care of his ADLs without help. He follows with Dr Tilley and fills his meds at [facility] in [place]. Will follow for dialysis today at 12:00pm and discharge after dinner. Electronically signed by Nano Warner RN CM on 5/3/2024 at 11:23 AM

## 2024-05-03 NOTE — FLOWSHEET NOTE
05/03/24 1653   Vital Signs   /69   Temp 96.9 °F (36.1 °C)   Pulse 84   Respirations 16   Weight - Scale 89.3 kg (196 lb 13.9 oz)   Weight Method Bed scale   Percent Weight Change 5.84   Post-Hemodialysis Assessment   Post-Treatment Procedures Blood returned;Catheter capped, clamped and heparinized x 2 ports   Machine Disinfection Process Acid/Vinegar Clean;Heat Disinfect;Exterior Machine Disinfection   Rinseback Volume (ml) 300 ml   Blood Volume Processed (Liters) 76.9 L   Dialyzer Clearance Lightly streaked   Duration of Treatment (minutes) 240 minutes   Heparin Amount Administered During Treatment (mL) 0 mL   Hemodialysis Intake (ml) 300 ml   Hemodialysis Output (ml) 2300 ml   NET Removed (ml) 2000   Tolerated Treatment Good   Patient Response to Treatment tolerated well, blood retured, cath care per policy/procedure, lines flushed, heparin instilled, ports capped

## 2024-05-03 NOTE — PROGRESS NOTES
Vascular Surgery Progress Note    Pt is being seen in f/u today regarding AVF ligation, AVG, TDC placement    Subjective  Pt s/e. Patient doing well this morning, having some soreness. No nausea or vomiting.     Current Medications:    sodium chloride      dextrose        acetaminophen, oxyCODONE-acetaminophen **OR** oxyCODONE-acetaminophen, sodium chloride flush, sodium chloride, ondansetron **OR** ondansetron, albuterol sulfate HFA, glucose, dextrose bolus **OR** dextrose bolus, glucagon (rDNA), dextrose    sodium chloride flush  5-40 mL IntraVENous 2 times per day    apixaban  5 mg Oral BID    fluticasone  2 spray Nasal Daily    metoprolol succinate  25 mg Oral Daily    pantoprazole  40 mg Oral BID AC    sevelamer  1,600 mg Oral TID WC    insulin glargine  34 Units SubCUTAneous Nightly        PHYSICAL EXAM:    /70   Pulse 86   Temp 97.3 °F (36.3 °C) (Infrared)   Resp 18   Ht 1.702 m (5' 7\")   Wt 84.4 kg (186 lb)   SpO2 99%   BMI 29.13 kg/m²     Intake/Output Summary (Last 24 hours) at 5/3/2024 0546  Last data filed at 5/2/2024 1312  Gross per 24 hour   Intake 200 ml   Output --   Net 200 ml          Gen: awake, alert and oriented x3, no apparent distress  CVS: RRR  Resp: No increased work of breathing  Abd: Soft, non-tender, non-distended  Ext: RUE with palpable thrill - wrist incision c/d/I, antecubital incision c/d/I, inferior axillary incision c/d/I, minimal swelling, appropriately tender to palpation around incisions    LABS:    Lab Results   Component Value Date    WBC 6.4 05/02/2024    HGB 14.3 05/02/2024    HCT 41.5 05/02/2024     05/02/2024    PROTIME 10.8 07/19/2021    INR 0.9 07/19/2021    APTT 26.8 02/07/2020    K 5.0 05/02/2024    BUN 33 (H) 05/02/2024    CREATININE 8.7 (HH) 05/02/2024       A/P  68 y.o. male with ESRD on HD s/p RUE AVF ligation, RUE graft insertion, and TDC insertion 5/2.     - Nephro consulted, appreciate recommendations as patient on HD MWF  - prn pain

## 2024-05-03 NOTE — PROGRESS NOTES
Was paged regarding pain at RUE AVG site    S:    Patient seen and examined.  Patient states that he still has some pain around his AVG site and would like to stay the night and go home tomorrow.  Denies any new onset numbness, tingling, lack of sensation.     O:    Ext: RUE with palpable thrill - wrist incision c/d/I, antecubital incision c/d/I, inferior axillary incision c/d/I, minimal swelling, appropriately tender to palpation around incisions, tunneled dialysis catheter site dry, clean, intact dressing in place.  Sensation intact.     A/P  68 y.o. male with ESRD on HD s/p RUE AVF ligation, RUE graft insertion, and TDC insertion 5/2.     -Okay to keep overnight for pain control   -Okay to discharge 5/4 from a vascular surgery point of view  -Plan discussed with attending    Nikos Paul, DO  General Surgery PGY-1

## 2024-05-03 NOTE — CONSULTS
[facility]  Nephrology Consult Note    Patient's Name: Carmelo Desir    Reason for Consult:  MWF HD, recently ligated AVF now with TDC    History Obtained From:  patient, past medical records, and EMR    History of Present Illness:    Carmelo Desir is a 68 y.o. male with a past medical history of diabetes mellitus type 2, hypertension, hyperlipidemia, and back pain.  He presented to the hospital on 5/2 and underwent a right upper extremity arteriovenous graft, right arteriovenous fistula ligation, insertion of tunneled central venous catheter with vascular surgery.  Vital signs on 5/2 includes temperature 98, respirations 20, pulse 90, /68, he was 95% SpO2.  Lab data on 5/2 includes BUN 33, creatinine 8.7, and glucose 251.  Nephrology has been consulted to see the patient for MWF HD, recently ligated AVF now with TDC.  Patient is known to our service and dialyzes as an outpatient at [facility].  At present, patient was seen and examined.  He notes that his right arm is sore.  He denies any chest pain or shortness of breath.  He denies any abdominal pain or nausea.  He denies any headache or dizziness.    PMH:    Past Medical History:   Diagnosis Date    Back pain     Diabetes mellitus (HCC)     DMII (diabetes mellitus, type 2) ([facility]) 7/28/2015    Hemodialysis patient ([facility])     History of cardiovascular stress test 2/16/2015    lexiscan    HTN (hypertension) 7/28/2015    Hyperlipidemia     Hypertension     Lumbar radicular pain 7/28/2015    Oxygen dependent     wears 2 liters at home    Type II or unspecified type diabetes mellitus without mention of complication, not stated as uncontrolled        Past Surgical History:   Procedure Laterality Date    CARDIOVASCULAR STRESS TEST N/A 05/24/2023    Lexiscan stress test    CATHETER REMOVAL Left 8/17/2023    removal of tunneled hemodialysis catheter performed by Thad Oneal MD at [facility] OR    DIALYSIS FISTULA CREATION Right 02/23/2023    AV FISTULA  10/27/2022 08:59 PM       Lab Results   Component Value Date/Time    PROTEIN 8.0 04/21/2024 11:00 PM       No components found for: \"URIC\"    No results found for: \"LIPIDPAN\"    Assessment and Plans:    [facility]RD on HD  [facility]  Monitor labs  Continue HD MWF while inpatient-HD today    2.  S/p RUE AV graft, right AV fistula ligation, insertion of TDC with vascular surgery 5/2    3.  Hypertension with CKD 5/ESRD  BP goal<130/80  BP near goal  Monitor BPs    4.  Secondary hyperparathyroidism of renal origin   and phosphorus 4.4 on 4/17 in the outpatient setting  Check phosphorus in a.m.  On Renvela  Monitor labs    Juliana Gan, APRN - CNP

## 2024-05-04 VITALS
TEMPERATURE: 97.4 F | BODY MASS INDEX: 30.9 KG/M2 | OXYGEN SATURATION: 94 % | HEART RATE: 91 BPM | RESPIRATION RATE: 18 BRPM | SYSTOLIC BLOOD PRESSURE: 104 MMHG | WEIGHT: 196.87 LBS | HEIGHT: 67 IN | DIASTOLIC BLOOD PRESSURE: 60 MMHG

## 2024-05-04 LAB
ALBUMIN SERPL-MCNC: 3.4 G/DL (ref 3.5–5.2)
ALP SERPL-CCNC: 72 U/L (ref 40–129)
ALT SERPL-CCNC: <5 U/L (ref 0–40)
ANION GAP SERPL CALCULATED.3IONS-SCNC: 16 MMOL/L (ref 7–16)
AST SERPL-CCNC: 9 U/L (ref 0–39)
BILIRUB SERPL-MCNC: 0.3 MG/DL (ref 0–1.2)
BUN SERPL-MCNC: 26 MG/DL (ref 6–23)
CALCIUM SERPL-MCNC: 9.2 MG/DL (ref 8.6–10.2)
CHLORIDE SERPL-SCNC: 93 MMOL/L (ref 98–107)
CO2 SERPL-SCNC: 25 MMOL/L (ref 22–29)
CREAT SERPL-MCNC: 7.2 MG/DL (ref 0.7–1.2)
ERYTHROCYTE [DISTWIDTH] IN BLOOD BY AUTOMATED COUNT: 16.1 % (ref 11.5–15)
GFR, ESTIMATED: 8 ML/MIN/1.73M2
GLUCOSE BLD-MCNC: 146 MG/DL (ref 74–99)
GLUCOSE SERPL-MCNC: 127 MG/DL (ref 74–99)
HCT VFR BLD AUTO: 42.8 % (ref 37–54)
HGB BLD-MCNC: 13.2 G/DL (ref 12.5–16.5)
MAGNESIUM SERPL-MCNC: 2.2 MG/DL (ref 1.6–2.6)
MCH RBC QN AUTO: 29.1 PG (ref 26–35)
MCHC RBC AUTO-ENTMCNC: 30.8 G/DL (ref 32–34.5)
MCV RBC AUTO: 94.5 FL (ref 80–99.9)
PHOSPHATE SERPL-MCNC: 4.2 MG/DL (ref 2.5–4.5)
PLATELET, FLUORESCENCE: 123 K/UL (ref 130–450)
PMV BLD AUTO: 10.2 FL (ref 7–12)
POTASSIUM SERPL-SCNC: 4.7 MMOL/L (ref 3.5–5)
PROT SERPL-MCNC: 7.5 G/DL (ref 6.4–8.3)
RBC # BLD AUTO: 4.53 M/UL (ref 3.8–5.8)
SODIUM SERPL-SCNC: 134 MMOL/L (ref 132–146)
WBC OTHER # BLD: 6.6 K/UL (ref 4.5–11.5)

## 2024-05-04 PROCEDURE — 6370000000 HC RX 637 (ALT 250 FOR IP): Performed by: SURGERY

## 2024-05-04 PROCEDURE — 83735 ASSAY OF MAGNESIUM: CPT

## 2024-05-04 PROCEDURE — G0378 HOSPITAL OBSERVATION PER HR: HCPCS

## 2024-05-04 PROCEDURE — 6370000000 HC RX 637 (ALT 250 FOR IP): Performed by: STUDENT IN AN ORGANIZED HEALTH CARE EDUCATION/TRAINING PROGRAM

## 2024-05-04 PROCEDURE — 80053 COMPREHEN METABOLIC PANEL: CPT

## 2024-05-04 PROCEDURE — 84100 ASSAY OF PHOSPHORUS: CPT

## 2024-05-04 PROCEDURE — 82962 GLUCOSE BLOOD TEST: CPT

## 2024-05-04 PROCEDURE — 85027 COMPLETE CBC AUTOMATED: CPT

## 2024-05-04 PROCEDURE — 36415 COLL VENOUS BLD VENIPUNCTURE: CPT

## 2024-05-04 RX ADMIN — PANTOPRAZOLE SODIUM 40 MG: 40 TABLET, DELAYED RELEASE ORAL at 06:16

## 2024-05-04 RX ADMIN — OXYCODONE HYDROCHLORIDE AND ACETAMINOPHEN 1 TABLET: 5; 325 TABLET ORAL at 06:16

## 2024-05-04 ASSESSMENT — PAIN DESCRIPTION - ORIENTATION: ORIENTATION: RIGHT

## 2024-05-04 ASSESSMENT — PAIN SCALES - GENERAL: PAINLEVEL_OUTOF10: 8

## 2024-05-04 ASSESSMENT — PAIN DESCRIPTION - DESCRIPTORS: DESCRIPTORS: PRESSURE;DISCOMFORT

## 2024-05-04 ASSESSMENT — PAIN DESCRIPTION - LOCATION: LOCATION: ARM

## 2024-05-04 NOTE — DISCHARGE SUMMARY
Physician Discharge Summary     Patient ID:  Carmelo Desir  [id number]  68 y.o.  1955    Admit date: 5/2/2024    Discharge date and time: No discharge date for patient encounter.     Admitting Physician: Thad Oneal MD     Admission Diagnoses: End stage renal disease (HCC) [N18.6]  S/P arteriovenous (AV) graft placement [Z95.828]    Discharge Diagnoses: Principal Problem:    Encounter regarding vascular access for dialysis for end-stage renal disease (HCC)  Active Problems:    End stage renal disease (HCC)    S/P arteriovenous (AV) graft placement  Resolved Problems:    * No resolved hospital problems. *      Admission Condition: good    Discharged Condition: stable    Indication for Admission: S/p right upper extremity AV fistula ligation with AV graft placement and tunneled hemodialysis catheter placement    Hospital Course/Procedures/Operation/treatments:   Patient is a 70-year-old male who initially presented for right upper extremity AV fistula ligation and right upper extremity AV graft placement with tunneled dialysis catheter placement on 4/2.  Patient was admitted postoperatively for monitoring.  Nephrology was consulted for hemodialysis management.  Patient was dialyzed via his right IJ tunneled hemodialysis catheter without issue after being admitted.  Patient was kept overnight on 5/3 due to concern of pain control.  Patient's pain well-controlled a.m. 5/4.  Patient tolerating regular diet.  Patient ambulating independently.  Patient deemed stable for discharge.      Consults:   IP CONSULT TO NEPHROLOGY  IP CONSULT TO NEPHROLOGY    Significant Diagnostic Studies:   XR CHEST PORTABLE    Result Date: 5/3/2024  EXAMINATION: ONE XRAY VIEW OF THE CHEST 5/2/2024 2:28 pm COMPARISON: None. HISTORY: ORDERING SYSTEM PROVIDED HISTORY: post op catheter insertion TECHNOLOGIST PROVIDED HISTORY: Reason for exam:->post op catheter insertion FINDINGS: -Right central venous catheter is noted with the distal  deal with know that you have a vascular access.   Follow-up care is a key part of your treatment and safety. Be sure to make and go to all appointments, and call your doctor if you are having problems. It's also a good idea to know your test results and keep a list of the medicines you take.  When should you call for help?   Call 911 anytime you think you may need emergency care. For example, call if:    You passed out (lost consciousness).     You have chest pain, are short of breath, or cough up blood.   Call your doctor now or seek immediate medical care if:    Your hand or arm is cold or dark-colored.     You have no pulse in your access.     You have nausea or you vomit for more than four hours.     You have pain that does not get better after you take pain medicine.     You have loose stitches, or your incision comes open.     You are bleeding from the incision.     You have signs of infection, such as:  Increased pain, swelling, warmth, or redness.  Red streaks leading from the area.  Pus draining from the area.  A fever.     You have signs of a blood clot in your leg (called a deep vein thrombosis), such as:  Pain in your calf, back of the knee, thigh, or groin.  Redness or swelling in your leg.   Watch closely for changes in your health, and be sure to contact your doctor if you have any problems.  Where can you learn more?   Short-term Catheter for Hemodialysis: Care Instructions  Overview     To start hemodialysis (also called dialysis) right away, your doctor will insert a soft plastic tube into a vein. This tube will carry your blood to the dialysis machine. The tube is called a central vascular access device (CVAD), or a central line. It will be your vascular access until your permanent access is ready to use.  If you have a kidney injury that can be healed, you may need dialysis only for a short time. But some people will need to have long-term dialysis. This includes people with chronic kidney

## 2024-05-04 NOTE — PROGRESS NOTES
Patient to discharge this am. Wishes to take medications at home. Sent with Rx from pharmacy per order.

## 2024-05-04 NOTE — CARE COORDINATION
5/4/24, Discharge Acknowledged.  SW called [facility] in [place] and could not leave message that patient would need to be back on regular dialysis schedule starting Monday.  Phone rang and rang .  Spoke with patient in room and informed him that message could not be left for [facility].  Patient says that facility is closed on Saturdays.  Patient aware that he will be going to dialysis on Monday in am.  SW to follow.      Alicia Villavicencio, BUBBA  [facility] Case Management  [phone]

## 2024-05-06 ENCOUNTER — TELEPHONE (OUTPATIENT)
Dept: FAMILY MEDICINE CLINIC | Age: 69
End: 2024-05-06

## 2024-05-07 ENCOUNTER — TELEPHONE (OUTPATIENT)
Dept: FAMILY MEDICINE CLINIC | Age: 69
End: 2024-05-07

## 2024-05-07 NOTE — TELEPHONE ENCOUNTER
.Care Transitions Initial Follow Up Call    Outreach made within 2 business days of discharge: Yes    Patient: Carmelo Desir Patient : 1955   MRN: [id number]  Reason for Admission: There are no discharge diagnoses documented for the most recent discharge.  Discharge Date: 24       Spoke with: patient    Discharge department/facility: [facility] Interactive Patient Contact:  Was patient able to fill all prescriptions: Yes  Was patient instructed to bring all medications to the follow-up visit: Yes  Is patient taking all medications as directed in the discharge summary? Yes  Does patient understand their discharge instructions: Yes  Does patient have questions or concerns that need addressed prior to 7-14 day follow up office visit:no      Scheduled appointment with PCP within 7-14 days    Follow Up  Future Appointments   Date Time Provider Department Center   2024  7:00 AM Mian Tilley DO [facility]   2024 10:00 AM Silvano Banerjee MD Blood Cancer [facility]   2024 10:30 AM [facility] LABS ROOM MEDICAL ONCOLOGY [facility] MED ONC [place]   2024  1:15 PM Thad Oneal MD [facility]/MED [facility]       Serenity Hernandez MA

## 2024-05-09 ENCOUNTER — OFFICE VISIT (OUTPATIENT)
Dept: FAMILY MEDICINE CLINIC | Age: 69
End: 2024-05-09
Payer: COMMERCIAL

## 2024-05-09 VITALS
SYSTOLIC BLOOD PRESSURE: 124 MMHG | BODY MASS INDEX: 29.51 KG/M2 | DIASTOLIC BLOOD PRESSURE: 82 MMHG | HEART RATE: 88 BPM | TEMPERATURE: 97.7 F | RESPIRATION RATE: 17 BRPM | OXYGEN SATURATION: 100 % | HEIGHT: 67 IN | WEIGHT: 188 LBS

## 2024-05-09 DIAGNOSIS — E11.22 TYPE 2 DIABETES MELLITUS WITH CHRONIC KIDNEY DISEASE, WITH LONG-TERM CURRENT USE OF INSULIN, UNSPECIFIED CKD STAGE (HCC): Primary | ICD-10-CM

## 2024-05-09 DIAGNOSIS — I10 PRIMARY HYPERTENSION: ICD-10-CM

## 2024-05-09 DIAGNOSIS — Z79.4 TYPE 2 DIABETES MELLITUS WITH CHRONIC KIDNEY DISEASE, WITH LONG-TERM CURRENT USE OF INSULIN, UNSPECIFIED CKD STAGE (HCC): Primary | ICD-10-CM

## 2024-05-09 DIAGNOSIS — I48.0 PAROXYSMAL ATRIAL FIBRILLATION (HCC): ICD-10-CM

## 2024-05-09 LAB — HBA1C MFR BLD: 8.7 %

## 2024-05-09 PROCEDURE — 2022F DILAT RTA XM EVC RTNOPTHY: CPT | Performed by: FAMILY MEDICINE

## 2024-05-09 PROCEDURE — 4004F PT TOBACCO SCREEN RCVD TLK: CPT | Performed by: FAMILY MEDICINE

## 2024-05-09 PROCEDURE — 99214 OFFICE O/P EST MOD 30 MIN: CPT | Performed by: FAMILY MEDICINE

## 2024-05-09 PROCEDURE — 3074F SYST BP LT 130 MM HG: CPT | Performed by: FAMILY MEDICINE

## 2024-05-09 PROCEDURE — 1123F ACP DISCUSS/DSCN MKR DOCD: CPT | Performed by: FAMILY MEDICINE

## 2024-05-09 PROCEDURE — G8427 DOCREV CUR MEDS BY ELIG CLIN: HCPCS | Performed by: FAMILY MEDICINE

## 2024-05-09 PROCEDURE — 83036 HEMOGLOBIN GLYCOSYLATED A1C: CPT | Performed by: FAMILY MEDICINE

## 2024-05-09 PROCEDURE — G2211 COMPLEX E/M VISIT ADD ON: HCPCS | Performed by: FAMILY MEDICINE

## 2024-05-09 PROCEDURE — 3052F HG A1C>EQUAL 8.0%<EQUAL 9.0%: CPT | Performed by: FAMILY MEDICINE

## 2024-05-09 PROCEDURE — 3017F COLORECTAL CA SCREEN DOC REV: CPT | Performed by: FAMILY MEDICINE

## 2024-05-09 PROCEDURE — G8417 CALC BMI ABV UP PARAM F/U: HCPCS | Performed by: FAMILY MEDICINE

## 2024-05-09 PROCEDURE — 1111F DSCHRG MED/CURRENT MED MERGE: CPT | Performed by: FAMILY MEDICINE

## 2024-05-09 PROCEDURE — 3079F DIAST BP 80-89 MM HG: CPT | Performed by: FAMILY MEDICINE

## 2024-05-09 RX ORDER — METOPROLOL SUCCINATE 25 MG/1
25 TABLET, EXTENDED RELEASE ORAL DAILY
Qty: 90 TABLET | Refills: 1 | Status: SHIPPED | OUTPATIENT
Start: 2024-05-09

## 2024-05-09 RX ORDER — APIXABAN 5 MG/1
5 TABLET, FILM COATED ORAL 2 TIMES DAILY
Qty: 60 TABLET | Refills: 5 | Status: SHIPPED | OUTPATIENT
Start: 2024-05-09

## 2024-05-09 NOTE — PROGRESS NOTES
2024    Carmelo Desir    Chief Complaint   Patient presents with    Follow-Up from Hospital              HPI  History was obtained from patient.  Carmelo is a 68 y.o. male who presents today with the followin. Type 2 diabetes mellitus with chronic kidney disease, with long-term current use of insulin, unspecified CKD stage ([facility])    2. Primary hypertension    3. Paroxysmal atrial fibrillation ([facility])       Patient presents for follow-up of diabetes hypertension and paroxysmal atrial fibrillation.  Patient is taking all of his medication as prescribed.  Patient has no acute complaints today.  REVIEW OF SYMPTOMS    Review of Systems   Constitutional:  Negative for chills, fatigue and fever.   HENT:  Negative for congestion, mouth sores, postnasal drip, rhinorrhea and sinus pain.    Eyes:  Negative for photophobia, discharge and redness.   Respiratory:  Negative for cough and shortness of breath.    Cardiovascular:  Negative for chest pain.   Gastrointestinal: Negative.    Genitourinary:  Negative for difficulty urinating, dysuria, hematuria and urgency.   Neurological:  Negative for headaches.   Psychiatric/Behavioral:  Negative for sleep disturbance.        PAST MEDICAL HISTORY  Past Medical History:   Diagnosis Date    Back pain     Diabetes mellitus ([facility])     DMII (diabetes mellitus, type 2) ([facility]) 2015    Hemodialysis patient ([facility])     History of cardiovascular stress test 2015    lexiscan    HTN (hypertension) 2015    Hyperlipidemia     Hypertension     Lumbar radicular pain 2015    Oxygen dependent     wears 2 liters at home    Type II or unspecified type diabetes mellitus without mention of complication, not stated as uncontrolled        FAMILY HISTORY  Family History   Problem Relation Age of Onset    Kidney Disease Sister     Diabetes Mother     Diabetes Father        SOCIAL HISTORY  Social History     Socioeconomic History    Marital status: Single     Spouse name: None    Number of 
Vera Moser MD at [facility] OR    INVASIVE VASCULAR N/A 11/15/2023    Fistulogram right performed by Thad Oneal MD at [facility] CARDIAC CATH LAB    OTHER SURGICAL HISTORY Left 06/06/2014    cain bunionectomy left foot with osteomed screw x1    SHOULDER SURGERY      Bilateral    UPPER GASTROINTESTINAL ENDOSCOPY N/A 8/15/2023    EGD ESOPHAGOGASTRODUODENOSCOPY ULTRASOUND performed by Logan Murillo MD at [facility] ENDOSCOPY    UPPER GASTROINTESTINAL ENDOSCOPY N/A 8/15/2023    EGD BIOPSY performed by Logan Murillo MD at [facility] ENDOSCOPY    VASCULAR SURGERY Right 5/2/2024    Right upper extremity arteriovenous graft, right arteriovenous fistula ligation, insertion of tunneled central venous catheter performed by Thad Oneal MD at [facility] OR    VEIN SURGERY                   CURRENT MEDICATIONS  Current Outpatient Medications   Medication Sig Dispense Refill    metoprolol succinate (TOPROL XL) 25 MG extended release tablet Take 1 tablet by mouth daily 90 tablet 0    pantoprazole (PROTONIX) 40 MG tablet Take 1 tablet by mouth 2 times daily (before meals) 60 tablet 11    sodium zirconium cyclosilicate (LOKELMA) 5 g PACK oral suspension Take 1 packet by mouth ONLY TAKES ON NONDIALYSIS DAYS TUES, THURS SAT AND SUNDAYS      fluticasone (FLONASE) 50 MCG/ACT nasal spray 2 sprays by Nasal route daily 2 sprays in each nostril daily 16 g 3    albuterol sulfate HFA (PROVENTIL;VENTOLIN;PROAIR) 108 (90 Base) MCG/ACT inhaler       ELIQUIS 5 MG TABS tablet 1 tablet 2 times daily      insulin glargine (LANTUS) 100 UNIT/ML injection vial Inject 34 Units into the skin nightly 1 vial 3    sevelamer (RENVELA) 800 MG tablet Take 2 tablets by mouth 3 times daily (with meals)      OXYGEN Inhale 2 L into the lungs as needed       oxyCODONE (ROXICODONE) 5 MG immediate release tablet Take 1 tablet by mouth every 6 hours as needed for Pain for up to 7 days. Intended supply: 7 days. Take lowest dose possible to manage pain Max Daily Amount: 20 mg

## 2024-05-16 ENCOUNTER — OFFICE VISIT (OUTPATIENT)
Dept: ONCOLOGY | Age: 69
End: 2024-05-16
Payer: COMMERCIAL

## 2024-05-16 ENCOUNTER — HOSPITAL ENCOUNTER (OUTPATIENT)
Dept: INFUSION THERAPY | Age: 69
Discharge: HOME | End: 2024-05-16
Payer: COMMERCIAL

## 2024-05-16 VITALS
HEIGHT: 67 IN | BODY MASS INDEX: 29.49 KG/M2 | SYSTOLIC BLOOD PRESSURE: 108 MMHG | DIASTOLIC BLOOD PRESSURE: 97 MMHG | WEIGHT: 187.9 LBS | TEMPERATURE: 98 F | OXYGEN SATURATION: 93 % | HEART RATE: 95 BPM

## 2024-05-16 DIAGNOSIS — D75.1 POLYCYTHEMIA: Primary | ICD-10-CM

## 2024-05-16 DIAGNOSIS — D75.1 POLYCYTHEMIA: ICD-10-CM

## 2024-05-16 LAB
BASOPHILS # BLD: 0.03 K/UL (ref 0–0.2)
BASOPHILS NFR BLD: 0 % (ref 0–2)
EOSINOPHIL # BLD: 0.12 K/UL (ref 0.05–0.5)
EOSINOPHILS RELATIVE PERCENT: 2 % (ref 0–6)
ERYTHROCYTE [DISTWIDTH] IN BLOOD BY AUTOMATED COUNT: 17.4 % (ref 11.5–15)
HCT VFR BLD AUTO: 44.6 % (ref 37–54)
HGB BLD-MCNC: 14.3 G/DL (ref 12.5–16.5)
IMM GRANULOCYTES # BLD AUTO: <0.03 K/UL (ref 0–0.58)
IMM GRANULOCYTES NFR BLD: 0 % (ref 0–5)
LYMPHOCYTES NFR BLD: 1.32 K/UL (ref 1.5–4)
LYMPHOCYTES RELATIVE PERCENT: 17 % (ref 20–42)
MCH RBC QN AUTO: 30.3 PG (ref 26–35)
MCHC RBC AUTO-ENTMCNC: 32.1 G/DL (ref 32–34.5)
MCV RBC AUTO: 94.5 FL (ref 80–99.9)
MONOCYTES NFR BLD: 0.79 K/UL (ref 0.1–0.95)
MONOCYTES NFR BLD: 10 % (ref 2–12)
NEUTROPHILS NFR BLD: 71 % (ref 43–80)
NEUTS SEG NFR BLD: 5.71 K/UL (ref 1.8–7.3)
PLATELET # BLD AUTO: 237 K/UL (ref 130–450)
PMV BLD AUTO: 9.4 FL (ref 7–12)
RBC # BLD AUTO: 4.72 M/UL (ref 3.8–5.8)
WBC OTHER # BLD: 8 K/UL (ref 4.5–11.5)

## 2024-05-16 PROCEDURE — 82668 ASSAY OF ERYTHROPOIETIN: CPT

## 2024-05-16 PROCEDURE — 36415 COLL VENOUS BLD VENIPUNCTURE: CPT

## 2024-05-16 PROCEDURE — 99214 OFFICE O/P EST MOD 30 MIN: CPT

## 2024-05-16 PROCEDURE — 85025 COMPLETE CBC W/AUTO DIFF WBC: CPT

## 2024-05-16 NOTE — PROGRESS NOTES
Carmelo Desir  1955 68 y.o.      Referring Physician:     PCP: Mian Tilley, DO    Vitals:    24 1010   BP: (!) 108/97   Pulse: 95   Temp: 98 °F (36.7 °C)   SpO2: 93%        Wt Readings from Last 3 Encounters:   24 85.2 kg (187 lb 14.4 oz)   24 85.3 kg (188 lb)   24 89.3 kg (196 lb 13.9 oz)        Body mass index is 29.43 kg/m².          Chief Complaint:   Chief Complaint   Patient presents with    New Patient     POLYCYTHEMIA OV          Cancer Staging   No matching staging information was found for the patient.    Prior Radiation Therapy? NO    Concurrent Chemo/radiation? NO    Prior Chemotherapy? NO    Prior Hormonal Therapy? NO    Head and Neck Cancer? No, patient does NOT have HN cancer.      LMP: na    Age at first Menses: na    : na    Para: na          Current Outpatient Medications:     metoprolol succinate (TOPROL XL) 25 MG extended release tablet, Take 1 tablet by mouth daily, Disp: 90 tablet, Rfl: 1    ELIQUIS 5 MG TABS tablet, Take 1 tablet by mouth 2 times daily, Disp: 60 tablet, Rfl: 5    pantoprazole (PROTONIX) 40 MG tablet, Take 1 tablet by mouth 2 times daily (before meals), Disp: 60 tablet, Rfl: 11    sodium zirconium cyclosilicate (LOKELMA) 5 g PACK oral suspension, Take 1 packet by mouth ONLY TAKES ON NONDIALYSIS DAYS TUES, THURS SAT AND SUNDAYS, Disp: , Rfl:     fluticasone (FLONASE) 50 MCG/ACT nasal spray, 2 sprays by Nasal route daily 2 sprays in each nostril daily, Disp: 16 g, Rfl: 3    albuterol sulfate HFA (PROVENTIL;VENTOLIN;PROAIR) 108 (90 Base) MCG/ACT inhaler, , Disp: , Rfl:     insulin glargine (LANTUS) 100 UNIT/ML injection vial, Inject 34 Units into the skin nightly, Disp: 1 vial, Rfl: 3    sevelamer (RENVELA) 800 MG tablet, Take 2 tablets by mouth 3 times daily (with meals), Disp: , Rfl:     OXYGEN, Inhale 2 L into the lungs as needed , Disp: , Rfl:     iron sucrose (VENOFER) 20 MG/ML injection, Infuse 2.5 mLs intravenously once a week 
phlebotomies for hemoglobin more than 18.  Repeat CBC erythropoietin requested.  Return to clinic in 3 months.  Thank you for the consult.    Return in about 3 months (around 8/16/2024).     Silvano Banerjee MD   Electronically signed 5/16/2024 at 10:37 AM

## 2024-05-17 LAB — EPO SERPL-ACNC: 27 MU/ML (ref 4–27)

## 2024-05-27 ASSESSMENT — ENCOUNTER SYMPTOMS
COUGH: 0
EYE REDNESS: 0
GASTROINTESTINAL NEGATIVE: 1
PHOTOPHOBIA: 0
SINUS PAIN: 0
SHORTNESS OF BREATH: 0
EYE DISCHARGE: 0
RHINORRHEA: 0

## 2024-05-28 ENCOUNTER — OFFICE VISIT (OUTPATIENT)
Dept: VASCULAR SURGERY | Age: 69
End: 2024-05-28

## 2024-05-28 ENCOUNTER — TELEPHONE (OUTPATIENT)
Dept: VASCULAR SURGERY | Age: 69
End: 2024-05-28

## 2024-05-28 VITALS — BODY MASS INDEX: 29.13 KG/M2 | WEIGHT: 186 LBS

## 2024-05-28 DIAGNOSIS — N18.6 ENCOUNTER REGARDING VASCULAR ACCESS FOR DIALYSIS FOR END-STAGE RENAL DISEASE (HCC): Primary | ICD-10-CM

## 2024-05-28 DIAGNOSIS — Z99.2 ENCOUNTER REGARDING VASCULAR ACCESS FOR DIALYSIS FOR END-STAGE RENAL DISEASE (HCC): Primary | ICD-10-CM

## 2024-05-28 NOTE — PROGRESS NOTES
5/28/2024    Carmelo Desir  1955    Chief Complaint   Patient presents with    Post-Op Check     Rt arm fistula       Patient returns for post operative evaluation status post ligation of a right arm fistula, insertion of right upper arm AVG, and insertion of tunneled dialysis catheter. The patient denies any unexpected problems since the procedure. He does have some numbness of his elbow and forearm. He denies any right hand pain.        Procedure Laterality Date    CARDIOVASCULAR STRESS TEST N/A 05/24/2023    Lexiscan stress test    CATHETER REMOVAL Left 8/17/2023    removal of tunneled hemodialysis catheter performed by Thad Oneal MD at [facility] OR    DIALYSIS FISTULA CREATION Right 02/23/2023    AV FISTULA CREATION RIGHT ARM performed by Thad Oneal MD at [facility] OR    DIALYSIS FISTULA CREATION Right 1/4/2024    REVISION AV FISTULA RIGHT ARM performed by Thad Oneal MD at [facility] OR    HERNIA REPAIR Right 5/26/2023    LAPAROSCOPIC RIGHT  INGUINAL HERNIA  REPAI performed by Vera Moser MD at [facility] OR    INVASIVE VASCULAR N/A 11/15/2023    Fistulogram right performed by Thad Oneal MD at [facility] CARDIAC CATH LAB    OTHER SURGICAL HISTORY Left 06/06/2014    cain bunionectomy left foot with osteomed screw x1    SHOULDER SURGERY      Bilateral    UPPER GASTROINTESTINAL ENDOSCOPY N/A 8/15/2023    EGD ESOPHAGOGASTRODUODENOSCOPY ULTRASOUND performed by Logan Murillo MD at [facility] ENDOSCOPY    UPPER GASTROINTESTINAL ENDOSCOPY N/A 8/15/2023    EGD BIOPSY performed by Logan Murillo MD at [facility] ENDOSCOPY    VASCULAR SURGERY Right 5/2/2024    Right upper extremity arteriovenous graft, right arteriovenous fistula ligation, insertion of tunneled central venous catheter performed by Thad Oneal MD at [facility] OR    VEIN SURGERY         Physical Exam:  The incision(s) are healing without evidence of infection.  Heart rhythm is regular. AVG + thrill, bruit.           Right      Left   Brachial

## 2024-07-19 ENCOUNTER — APPOINTMENT (OUTPATIENT)
Dept: ULTRASOUND IMAGING | Age: 69
End: 2024-07-19
Payer: OTHER GOVERNMENT

## 2024-07-19 ENCOUNTER — HOSPITAL ENCOUNTER (EMERGENCY)
Age: 69
Discharge: HOME | End: 2024-07-19
Attending: EMERGENCY MEDICINE
Payer: OTHER GOVERNMENT

## 2024-07-19 VITALS
SYSTOLIC BLOOD PRESSURE: 127 MMHG | TEMPERATURE: 98.3 F | DIASTOLIC BLOOD PRESSURE: 61 MMHG | WEIGHT: 184 LBS | HEART RATE: 96 BPM | HEIGHT: 67 IN | OXYGEN SATURATION: 93 % | RESPIRATION RATE: 16 BRPM | BODY MASS INDEX: 28.88 KG/M2

## 2024-07-19 DIAGNOSIS — N17.9 ACUTE RENAL FAILURE, UNSPECIFIED ACUTE RENAL FAILURE TYPE (HCC): ICD-10-CM

## 2024-07-19 DIAGNOSIS — I82.611 ACUTE CEPHALIC VEIN THROMBOSIS, RIGHT: ICD-10-CM

## 2024-07-19 DIAGNOSIS — R52 PAIN: Primary | ICD-10-CM

## 2024-07-19 DIAGNOSIS — Z51.89 VISIT FOR WOUND CHECK: ICD-10-CM

## 2024-07-19 LAB
ALBUMIN SERPL-MCNC: 3.4 G/DL (ref 3.5–5.2)
ALP SERPL-CCNC: 67 U/L (ref 40–129)
ALT SERPL-CCNC: 6 U/L (ref 0–40)
ANION GAP SERPL CALCULATED.3IONS-SCNC: 14 MMOL/L (ref 7–16)
AST SERPL-CCNC: 8 U/L (ref 0–39)
BASOPHILS # BLD: 0.03 K/UL (ref 0–0.2)
BASOPHILS NFR BLD: 0 % (ref 0–2)
BILIRUB SERPL-MCNC: 0.4 MG/DL (ref 0–1.2)
BUN SERPL-MCNC: 47 MG/DL (ref 6–23)
CALCIUM SERPL-MCNC: 9.9 MG/DL (ref 8.6–10.2)
CHLORIDE SERPL-SCNC: 98 MMOL/L (ref 98–107)
CO2 SERPL-SCNC: 28 MMOL/L (ref 22–29)
CREAT SERPL-MCNC: 10 MG/DL (ref 0.7–1.2)
EKG ATRIAL RATE: 75 BPM
EKG P AXIS: 33 DEGREES
EKG P-R INTERVAL: 166 MS
EKG Q-T INTERVAL: 368 MS
EKG QRS DURATION: 84 MS
EKG QTC CALCULATION (BAZETT): 410 MS
EKG R AXIS: 42 DEGREES
EKG T AXIS: 52 DEGREES
EKG VENTRICULAR RATE: 75 BPM
EOSINOPHIL # BLD: 0.21 K/UL (ref 0.05–0.5)
EOSINOPHILS RELATIVE PERCENT: 3 % (ref 0–6)
ERYTHROCYTE [DISTWIDTH] IN BLOOD BY AUTOMATED COUNT: 18.7 % (ref 11.5–15)
GFR, ESTIMATED: 5 ML/MIN/1.73M2
GLUCOSE SERPL-MCNC: 152 MG/DL (ref 74–99)
HCT VFR BLD AUTO: 42.5 % (ref 37–54)
HGB BLD-MCNC: 13.3 G/DL (ref 12.5–16.5)
IMM GRANULOCYTES # BLD AUTO: <0.03 K/UL (ref 0–0.58)
IMM GRANULOCYTES NFR BLD: 0 % (ref 0–5)
LYMPHOCYTES NFR BLD: 1.75 K/UL (ref 1.5–4)
LYMPHOCYTES RELATIVE PERCENT: 23 % (ref 20–42)
MCH RBC QN AUTO: 29.3 PG (ref 26–35)
MCHC RBC AUTO-ENTMCNC: 31.3 G/DL (ref 32–34.5)
MCV RBC AUTO: 93.6 FL (ref 80–99.9)
MONOCYTES NFR BLD: 0.77 K/UL (ref 0.1–0.95)
MONOCYTES NFR BLD: 10 % (ref 2–12)
NEUTROPHILS NFR BLD: 64 % (ref 43–80)
NEUTS SEG NFR BLD: 4.84 K/UL (ref 1.8–7.3)
PLATELET # BLD AUTO: 232 K/UL (ref 130–450)
PMV BLD AUTO: 9.8 FL (ref 7–12)
POTASSIUM SERPL-SCNC: 5.5 MMOL/L (ref 3.5–5)
PROT SERPL-MCNC: 8 G/DL (ref 6.4–8.3)
RBC # BLD AUTO: 4.54 M/UL (ref 3.8–5.8)
SODIUM SERPL-SCNC: 140 MMOL/L (ref 132–146)
WBC OTHER # BLD: 7.6 K/UL (ref 4.5–11.5)

## 2024-07-19 PROCEDURE — 93005 ELECTROCARDIOGRAM TRACING: CPT | Performed by: PHYSICIAN ASSISTANT

## 2024-07-19 PROCEDURE — 93971 EXTREMITY STUDY: CPT

## 2024-07-19 PROCEDURE — 93010 ELECTROCARDIOGRAM REPORT: CPT | Performed by: INTERNAL MEDICINE

## 2024-07-19 PROCEDURE — 85025 COMPLETE CBC W/AUTO DIFF WBC: CPT

## 2024-07-19 PROCEDURE — 80053 COMPREHEN METABOLIC PANEL: CPT

## 2024-07-19 PROCEDURE — 99284 EMERGENCY DEPT VISIT MOD MDM: CPT

## 2024-07-19 ASSESSMENT — LIFESTYLE VARIABLES
HOW OFTEN DO YOU HAVE A DRINK CONTAINING ALCOHOL: NEVER
HOW MANY STANDARD DRINKS CONTAINING ALCOHOL DO YOU HAVE ON A TYPICAL DAY: PATIENT DOES NOT DRINK

## 2024-07-19 ASSESSMENT — PAIN DESCRIPTION - ORIENTATION: ORIENTATION: RIGHT;UPPER

## 2024-07-19 ASSESSMENT — PAIN DESCRIPTION - LOCATION: LOCATION: ARM

## 2024-07-19 ASSESSMENT — PAIN - FUNCTIONAL ASSESSMENT: PAIN_FUNCTIONAL_ASSESSMENT: 0-10

## 2024-07-19 ASSESSMENT — PAIN SCALES - GENERAL: PAINLEVEL_OUTOF10: 9

## 2024-07-19 NOTE — ED PROVIDER NOTES
1550 Patient called to inform of finding of supercial cepalic vein Shared JANAY-ED Attending Visit.  CC: No      HPI:  7/19/24, Time: 10:09 AM EDT         Carmelo Desir is a 68 y.o. male presenting to the ED for right arm pain swelling , beginning 5 days ago.  The complaint has been persistent, moderate in severity, and worsened by movement of right arm .    Patient comes in with complaint of pain eccymosis swelling of right upper arm fistula afteer access on Monday . Patient had dialysis yesterday with no issues . Today he refused to allow tech to access graft and did not receive dialysis. Dr Staples       Review of Systems:   A complete review of systems was performed and pertinent positives and negatives are stated within HPI, all other systems reviewed and are negative.          --------------------------------------------- PAST HISTORY ---------------------------------------------  Past Medical History:  has a past medical history of Back pain, Chronic kidney disease, Diabetes mellitus (HCC), DMII (diabetes mellitus, type 2) (HCC), Hemodialysis patient (HCC), History of cardiovascular stress test, HTN (hypertension), Hyperlipidemia, Hypertension, Lumbar radicular pain, Oxygen dependent, and Type II or unspecified type diabetes mellitus without mention of complication, not stated as uncontrolled.    Past Surgical History:  has a past surgical history that includes shoulder surgery; Vein Surgery; other surgical history (Left, 06/06/2014); Dialysis fistula creation (Right, 02/23/2023); cardiovascular stress test (N/A, 05/24/2023); hernia repair (Right, 5/26/2023); Upper gastrointestinal endoscopy (N/A, 8/15/2023); Upper gastrointestinal endoscopy (N/A, 8/15/2023); Catheter Removal (Left, 8/17/2023); invasive vascular (N/A, 11/15/2023); Dialysis fistula creation (Right, 1/4/2024); and vascular surgery (Right, 5/2/2024).    Social History:  reports that he has been smoking cigarettes. He has a 30.0 pack-year

## 2024-07-19 NOTE — ED NOTES
Large area of bruising noted to  the right upper inner arm near axilla. There is swelling noted also. Ice applied

## 2024-07-19 NOTE — DISCHARGE INSTR - COC
Continuity of Care Form    Patient Name: Carmelo Desir   :  1955  MRN:  [id number]    Admit date:  2024  Discharge date:  ***    Code Status Order: Prior   Advance Directives:     Admitting Physician:  No admitting provider for patient encounter.  PCP: Mian Tilley DO    Discharging Nurse: ***  Discharging Hospital Unit/Room#:   Discharging Unit Phone Number: ***    Emergency Contact:   Extended Emergency Contact Information  Primary Emergency Contact: Apurva Hurt  Home Phone: [phone]  Mobile Phone: [phone]  Relation: Brother/Sister   needed? No  Secondary Emergency Contact: NicholeVanessa   [facility]  Home Phone: [phone]  Mobile Phone: [phone]  Relation: Brother/Sister   needed? No    Past Surgical History:  Past Surgical History:   Procedure Laterality Date    CARDIOVASCULAR STRESS TEST N/A 2023    Lexiscan stress test    CATHETER REMOVAL Left 2023    removal of tunneled hemodialysis catheter performed by Thad Oneal MD at [facility] OR    DIALYSIS FISTULA CREATION Right 2023    AV FISTULA CREATION RIGHT ARM performed by Thad Oneal MD at [facility] OR    DIALYSIS FISTULA CREATION Right 2024    REVISION AV FISTULA RIGHT ARM performed by Thad Oneal MD at [facility] OR    HERNIA REPAIR Right 2023    LAPAROSCOPIC RIGHT  INGUINAL HERNIA  REPAI performed by Vera Moser MD at [facility] OR    INVASIVE VASCULAR N/A 11/15/2023    Fistulogram right performed by Thad Oneal MD at [facility] CARDIAC CATH LAB    OTHER SURGICAL HISTORY Left 2014    cain bunionectomy left foot with osteomed screw x1    SHOULDER SURGERY      Bilateral    UPPER GASTROINTESTINAL ENDOSCOPY N/A 8/15/2023    EGD ESOPHAGOGASTRODUODENOSCOPY ULTRASOUND performed by Logan Murillo MD at [facility] ENDOSCOPY    UPPER GASTROINTESTINAL ENDOSCOPY N/A 8/15/2023    EGD BIOPSY performed by Logan Murillo MD at [facility] ENDOSCOPY    VASCULAR SURGERY Right  5/2/2024    Right upper extremity arteriovenous graft, right arteriovenous fistula ligation, insertion of tunneled central venous catheter performed by Thad Oneal MD at [facility] OR    VEIN SURGERY         Immunization History:   Immunization History   Administered Date(s) Administered    COVID-19, MODERNA BLUE border, Primary or Immunocompromised, (age 12y+), IM, 100 mcg/0.5mL 03/10/2021, 04/07/2021, 10/20/2021    COVID-19, PFIZER Bivalent, DO NOT Dilute, (age 12y+), IM, 30 mcg/0.3 mL 02/17/2023    COVID-19, PFIZER, (2023-24 formula), (age 12y+), IM, 30mcg/0.3mL 11/10/2023    COVID-19, [place] Vaccine, Vaccine Unspecified 03/10/2021, 04/07/2021, 10/20/2021    Hep B, ENGERIX-B, (age 20y+), IM, 1mL 05/15/2020, 06/10/2020, 07/15/2020, 11/18/2020    Hepatitis B vaccine 05/15/2020, 06/10/2020, 07/15/2020, 11/18/2020    Influenza Virus Vaccine 11/09/2015, 09/25/2020, 09/25/2020, 02/24/2021, 09/29/2021, 09/23/2022    Influenza, FLUBLOK, (age 18 y+), PF, 0.5mL 10/13/2023    Pneumococcal, PCV-13, PREVNAR 13, (age 6w+), IM, 0.5mL 05/01/2020    Pneumococcal, PCV20, PREVNAR 20, (age 6w+), IM, 0.5mL 01/19/2024    Pneumococcal, PPSV23, PNEUMOVAX 23, (age 2y+), SC/IM, 0.5mL 01/01/2002, 04/24/2020       Active Problems:  Patient Active Problem List   Diagnosis Code    Hallux valgus, acquired M20.10    DMII (diabetes mellitus, type 2) (HCC) E11.9    HTN (hypertension) I10    Lumbar radicular pain M54.16    Spinal stenosis M48.00    B12 deficiency E53.8    Idiopathic acute pancreatitis K85.00    Acute pancreatitis without necrosis or infection, unspecified K85.90    Pneumonia J18.9    Respiratory failure (HCC) J96.90    Acute respiratory failure with hypoxia (HCC) J96.01    Hyperkalemia E87.5    Paroxysmal atrial fibrillation (HCC) I48.0    ESRD on hemodialysis (HCC) N18.6, Z99.2    Acute pancreatitis K85.90    Encounter regarding vascular access for dialysis for end-stage renal disease (HCC) N18.6, Z99.2    Acute recurrent

## 2024-07-23 ENCOUNTER — OFFICE VISIT (OUTPATIENT)
Dept: VASCULAR SURGERY | Age: 69
End: 2024-07-23

## 2024-07-23 VITALS — WEIGHT: 184 LBS | BODY MASS INDEX: 28.82 KG/M2

## 2024-07-23 DIAGNOSIS — Z99.2 ENCOUNTER REGARDING VASCULAR ACCESS FOR DIALYSIS FOR END-STAGE RENAL DISEASE (HCC): Primary | ICD-10-CM

## 2024-07-23 DIAGNOSIS — N18.6 ENCOUNTER REGARDING VASCULAR ACCESS FOR DIALYSIS FOR END-STAGE RENAL DISEASE (HCC): Primary | ICD-10-CM

## 2024-07-23 PROCEDURE — 99024 POSTOP FOLLOW-UP VISIT: CPT | Performed by: SURGERY

## 2024-07-23 NOTE — PROGRESS NOTES
Popliteal     Dorsalis Pedis     Posterior Tibial     (3=normal, 2=diminished, 1=barely palpable, 4=widened)    Assessment:  Post-operative AV access.    Problem List Items Addressed This Visit       Encounter regarding vascular access for dialysis for end-stage renal disease (HCC) - Primary     I reviewed with the patient that the graft is soft and nonpulsatile.  I do feel that there is adequate length of the graft to access it.  I recommend avoiding accessing the graft in the proximal upper arm where it begins to dive medially.  I asked him to call if it continues to be an issue.

## 2024-09-09 RX ORDER — PANTOPRAZOLE SODIUM 40 MG/1
TABLET, DELAYED RELEASE ORAL
Qty: 60 TABLET | Refills: 0 | OUTPATIENT
Start: 2024-09-09

## 2024-09-11 RX ORDER — PANTOPRAZOLE SODIUM 40 MG/1
40 TABLET, DELAYED RELEASE ORAL
Qty: 60 TABLET | Refills: 11 | OUTPATIENT
Start: 2024-09-11

## 2024-09-12 RX ORDER — PANTOPRAZOLE SODIUM 40 MG/1
TABLET, DELAYED RELEASE ORAL
Qty: 60 TABLET | Refills: 0 | OUTPATIENT
Start: 2024-09-12

## 2024-09-12 RX ORDER — PANTOPRAZOLE SODIUM 40 MG/1
40 TABLET, DELAYED RELEASE ORAL
Qty: 60 TABLET | Refills: 11 | Status: SHIPPED | OUTPATIENT
Start: 2024-09-12

## 2024-09-19 ENCOUNTER — OFFICE VISIT (OUTPATIENT)
Dept: FAMILY MEDICINE CLINIC | Age: 69
End: 2024-09-19

## 2024-09-19 VITALS
TEMPERATURE: 97 F | OXYGEN SATURATION: 94 % | HEART RATE: 74 BPM | DIASTOLIC BLOOD PRESSURE: 62 MMHG | WEIGHT: 185 LBS | RESPIRATION RATE: 16 BRPM | BODY MASS INDEX: 29.03 KG/M2 | HEIGHT: 67 IN | SYSTOLIC BLOOD PRESSURE: 93 MMHG

## 2024-09-19 DIAGNOSIS — I48.0 PAROXYSMAL ATRIAL FIBRILLATION (HCC): ICD-10-CM

## 2024-09-19 DIAGNOSIS — I10 PRIMARY HYPERTENSION: ICD-10-CM

## 2024-09-19 DIAGNOSIS — E11.22 TYPE 2 DIABETES MELLITUS WITH CHRONIC KIDNEY DISEASE, WITH LONG-TERM CURRENT USE OF INSULIN, UNSPECIFIED CKD STAGE (HCC): ICD-10-CM

## 2024-09-19 DIAGNOSIS — N18.6 ESRD ON HEMODIALYSIS (HCC): ICD-10-CM

## 2024-09-19 DIAGNOSIS — I95.9 HYPOTENSION, UNSPECIFIED HYPOTENSION TYPE: Primary | ICD-10-CM

## 2024-09-19 DIAGNOSIS — Z79.4 TYPE 2 DIABETES MELLITUS WITH CHRONIC KIDNEY DISEASE, WITH LONG-TERM CURRENT USE OF INSULIN, UNSPECIFIED CKD STAGE (HCC): ICD-10-CM

## 2024-09-19 DIAGNOSIS — Z99.2 ESRD ON HEMODIALYSIS (HCC): ICD-10-CM

## 2024-09-19 RX ORDER — PATIROMER 8.4 G/1
POWDER, FOR SUSPENSION ORAL
COMMUNITY

## 2024-09-19 RX ORDER — FERRIC CITRATE 210 MG/1
TABLET, COATED ORAL
COMMUNITY
Start: 2024-08-23

## 2024-09-19 RX ORDER — INSULIN ASPART 100 [IU]/ML
8 INJECTION, SOLUTION INTRAVENOUS; SUBCUTANEOUS
COMMUNITY
Start: 2024-08-19

## 2024-10-17 ENCOUNTER — OFFICE VISIT (OUTPATIENT)
Dept: FAMILY MEDICINE CLINIC | Age: 69
End: 2024-10-17

## 2024-10-17 ENCOUNTER — DOCUMENTATION (OUTPATIENT)
Dept: TRANSPLANT | Facility: HOSPITAL | Age: 69
End: 2024-10-17
Payer: COMMERCIAL

## 2024-10-17 ENCOUNTER — TELEPHONE (OUTPATIENT)
Dept: TRANSPLANT | Facility: HOSPITAL | Age: 69
End: 2024-10-17
Payer: COMMERCIAL

## 2024-10-17 VITALS
DIASTOLIC BLOOD PRESSURE: 70 MMHG | BODY MASS INDEX: 29.82 KG/M2 | SYSTOLIC BLOOD PRESSURE: 126 MMHG | RESPIRATION RATE: 16 BRPM | HEIGHT: 67 IN | HEART RATE: 101 BPM | WEIGHT: 190 LBS | OXYGEN SATURATION: 98 %

## 2024-10-17 VITALS — WEIGHT: 190 LBS | BODY MASS INDEX: 29.82 KG/M2 | HEIGHT: 67 IN

## 2024-10-17 DIAGNOSIS — I48.0 PAROXYSMAL ATRIAL FIBRILLATION (HCC): ICD-10-CM

## 2024-10-17 DIAGNOSIS — N18.6 ESRD ON HEMODIALYSIS (HCC): ICD-10-CM

## 2024-10-17 DIAGNOSIS — Z79.4 TYPE 2 DIABETES MELLITUS WITH CHRONIC KIDNEY DISEASE, WITH LONG-TERM CURRENT USE OF INSULIN, UNSPECIFIED CKD STAGE (HCC): ICD-10-CM

## 2024-10-17 DIAGNOSIS — Z00.00 WELCOME TO MEDICARE PREVENTIVE VISIT: Primary | ICD-10-CM

## 2024-10-17 DIAGNOSIS — N18.6 ESRD (END STAGE RENAL DISEASE) (MULTI): Primary | ICD-10-CM

## 2024-10-17 DIAGNOSIS — I10 PRIMARY HYPERTENSION: ICD-10-CM

## 2024-10-17 DIAGNOSIS — K86.1 OTHER CHRONIC PANCREATITIS (HCC): ICD-10-CM

## 2024-10-17 DIAGNOSIS — Z99.2 ESRD ON HEMODIALYSIS (HCC): ICD-10-CM

## 2024-10-17 DIAGNOSIS — I50.32 CHRONIC DIASTOLIC (CONGESTIVE) HEART FAILURE (HCC): ICD-10-CM

## 2024-10-17 DIAGNOSIS — E11.22 TYPE 2 DIABETES MELLITUS WITH CHRONIC KIDNEY DISEASE, WITH LONG-TERM CURRENT USE OF INSULIN, UNSPECIFIED CKD STAGE (HCC): ICD-10-CM

## 2024-10-17 DIAGNOSIS — K21.9 GASTROESOPHAGEAL REFLUX DISEASE, UNSPECIFIED WHETHER ESOPHAGITIS PRESENT: ICD-10-CM

## 2024-10-17 DIAGNOSIS — Z87.891 PERSONAL HISTORY OF TOBACCO USE: ICD-10-CM

## 2024-10-17 LAB — HBA1C MFR BLD: 7.7 %

## 2024-10-17 RX ORDER — PANTOPRAZOLE SODIUM 40 MG/1
40 TABLET, DELAYED RELEASE ORAL DAILY
Qty: 60 TABLET | Refills: 11 | Status: SHIPPED | OUTPATIENT
Start: 2024-10-17

## 2024-10-17 ASSESSMENT — PATIENT HEALTH QUESTIONNAIRE - PHQ9
SUM OF ALL RESPONSES TO PHQ QUESTIONS 1-9: 0
SUM OF ALL RESPONSES TO PHQ QUESTIONS 1-9: 0
1. LITTLE INTEREST OR PLEASURE IN DOING THINGS: NOT AT ALL
2. FEELING DOWN, DEPRESSED OR HOPELESS: NOT AT ALL
SUM OF ALL RESPONSES TO PHQ9 QUESTIONS 1 & 2: 0
SUM OF ALL RESPONSES TO PHQ QUESTIONS 1-9: 0
SUM OF ALL RESPONSES TO PHQ QUESTIONS 1-9: 0

## 2024-10-17 NOTE — PROGRESS NOTES
Patient called to inquire about his transplant status.  Patient was informed that his eval was closed back in Nov 2022.  Patient was to quit smoking.  Patient stated that he never received the letter letting him know that the eval was closed. He is no longer smoking, but is still on oxygen as needed.  Patient would like to start the eval process again.  Will notify our intake team to re-start an evaluation.

## 2024-10-17 NOTE — PROGRESS NOTES
Do you have difficulty reading or writing in English?   no   What is the primary cause of your kidney disease?  Diabetes  Are you currently on dialysis?   yes  How many years have you been on dialysis? 4 Yrs  If yes, what days do you have your dialysis treatments? M,W,F  Have you received a transplant before?   no  If yes, what organ, and when and where was your transplant?     Have you been diagnosed with diabetes?    yes  Have you tested positive for hepatitis or HIV?   no  Have you ever been diagnosed with cancer?   no  If yes, what type of cancer, and when and where were you treated?     Do you have a history of a heart attack or stroke?   no  Are you currently or have you previously been seen by a mental health professional?   no  If yes, what is the name of your mental health provider?   N/A  Are you a current or former tobacco user?   yes,  Quit 10/07/24  Do you have history of alcohol abuse or dependence?   no  Do you have a history of illegal drug abuse or dependence?   no  Has anyone told you they're willing to donate their kidney to you?   no  What is your blood type? Unknown  Comments:  Intake complete, Scheduled Eval Appt.   Pt. States he uses Oxygen as needed.    Neph- D/U  PCP-Dr. Arenas  Records Requested.

## 2024-10-17 NOTE — PROGRESS NOTES
10/29/2024    Carmelo Desir    Chief Complaint   Patient presents with    Medicare AWV       HPI  History was obtained from patient.  Carmelo is a 68 y.o. male who presents today with the followin. Welcome to Medicare preventive visit    2. Type 2 diabetes mellitus with chronic kidney disease, with long-term current use of insulin, unspecified CKD stage ([facility])    3. Chronic diastolic (congestive) heart failure ([facility])    4. Other chronic pancreatitis ([facility])    5. Personal history of tobacco use    6. ESRD on hemodialysis ([facility])    7. Paroxysmal atrial fibrillation ([facility])    8. Primary hypertension    9. Gastroesophageal reflux disease, unspecified whether esophagitis present    Patient presents for Medicare annual wellness visit.  Patient also is being seen for multiple chronic medical problems listed above.  Patient is taking all of his medication as prescribed.  Patient is attending dialysis 3 days a week.     REVIEW OF SYMPTOMS    Review of Systems   Constitutional:  Negative for chills, fatigue and fever.   HENT:  Negative for congestion, mouth sores, postnasal drip, rhinorrhea and sinus pain.    Eyes:  Negative for photophobia, discharge and redness.   Respiratory:  Positive for shortness of breath (Chronic and stable). Negative for cough.    Cardiovascular:  Negative for chest pain.   Gastrointestinal: Negative.    Genitourinary:  Negative for difficulty urinating, dysuria, hematuria and urgency.   Neurological:  Negative for headaches.   Psychiatric/Behavioral:  Negative for sleep disturbance.        PAST MEDICAL HISTORY  Past Medical History:   Diagnosis Date    Back pain     Chronic kidney disease     Diabetes mellitus (HCC)     DMII (diabetes mellitus, type 2) ([facility]) 2015    Hemodialysis patient ([facility])     History of cardiovascular stress test 2015    lexiscan    HTN (hypertension) 2015    Hyperlipidemia     Hypertension     Lumbar radicular pain 2015    Oxygen dependent     wears 2

## 2024-10-17 NOTE — PATIENT INSTRUCTIONS
Learning About Being Active as an Older Adult  Why is being active important as you get older?     Being active is one of the best things you can do for your health. And it's never too late to start. Being active--or getting active, if you aren't already--has definite benefits. It can:  Give you more energy,  Keep your mind sharp.  Improve balance to reduce your risk of falls.  Help you manage chronic illness with fewer medicines.  No matter how old you are, how fit you are, or what health problems you have, there is a form of activity that will work for you. And the more physical activity you can do, the better your overall health will be.  What kinds of activity can help you stay healthy?  Being more active will make your daily activities easier. Physical activity includes planned exercise and things you do in daily life. There are four types of activity:  Aerobic.  Doing aerobic activity makes your heart and lungs strong.  Includes walking, dancing, and gardening.  Aim for at least 2½ hours spread throughout the week.  It improves your energy and can help you sleep better.  Muscle-strengthening.  This type of activity can help maintain muscle and strengthen bones.  Includes climbing stairs, using resistance bands, and lifting or carrying heavy loads.  Aim for at least twice a week.  It can help protect the knees and other joints.  Stretching.  Stretching gives you better range of motion in joints and muscles.  Includes upper arm stretches, calf stretches, and gentle yoga.  Aim for at least twice a week, preferably after your muscles are warmed up from other activities.  It can help you function better in daily life.  Balancing.  This helps you stay coordinated and have good posture.  Includes heel-to-toe walking, bambi chi, and certain types of yoga.  Aim for at least 3 days a week.  It can reduce your risk of falling.  Even if you have a hard time meeting the recommendations, it's better to be more active

## 2024-10-29 ASSESSMENT — ENCOUNTER SYMPTOMS
EYE DISCHARGE: 0
SINUS PAIN: 0
GASTROINTESTINAL NEGATIVE: 1
EYE REDNESS: 0
RHINORRHEA: 0
PHOTOPHOBIA: 0
COUGH: 0
SHORTNESS OF BREATH: 1

## 2024-11-07 ENCOUNTER — TELEPHONE (OUTPATIENT)
Dept: FAMILY MEDICINE CLINIC | Age: 69
End: 2024-11-07

## 2024-11-07 NOTE — TELEPHONE ENCOUNTER
Patient came to the window and started cussing at staff because he said the dentist office sent paperwork to be done. We tried to explain to him that we have no paperwork for him. He continued to cuss  at staff and raise his voice. I tried to explain to him that he needed to calm down or leave until he can act appropriate. He told me \"fuck you fat hoe\" then left still cussing at me as he walked down the hallway

## 2025-01-03 ENCOUNTER — DOCUMENTATION (OUTPATIENT)
Dept: TRANSPLANT | Facility: HOSPITAL | Age: 70
End: 2025-01-03
Payer: MEDICARE

## 2025-01-03 NOTE — PROGRESS NOTES
[employer] Medicare active 01/01/2025. Faxed txp form to [facility] transplant unit.

## 2025-01-04 ENCOUNTER — DOCUMENTATION (OUTPATIENT)
Dept: TRANSPLANT | Facility: HOSPITAL | Age: 70
End: 2025-01-04
Payer: MEDICARE

## 2025-01-04 NOTE — PROGRESS NOTES
Kidney Patient Summary    Date of appointment: 2025    Name: Jose E Mast    : 1955    MRN: [id number]    Diagnosis: DM,  former smoker quit 10/7/24, oxygen as needed (Eval.at   closed bc smoking)  ABO: A     Phase: Referral  Status: Active    Center Waitlist Date:       Last Seen:   Referring Nephrologist:       HD Unit: [facility]   Dialysis Start:   Access:       Days:  MWF    PCP:Dr Arenas      Endocrinologist:     BMI Readings from Last 1 Encounters:   10/17/24 29.76 kg/m²     Blood Transfusion:    Medical History/Hospitalizations:   Past Medical History:   Diagnosis Date    Chronic kidney disease, unspecified 2022    CKD, patient interested in transplantation    Hypoxemia 2022    Hypoxia    Personal history of other diseases of the circulatory system 2022    History of essential hypertension    Personal history of other diseases of the digestive system 2022    History of acute pancreatitis    Personal history of other diseases of the nervous system and sense organs 2022    History of obstructive sleep apnea    Type 2 diabetes mellitus without complications (Multi) 2022    Diabetes mellitus type 2, insulin dependent       Surgical History:   Past Surgical History:   Procedure Laterality Date    OTHER SURGICAL HISTORY  2022    Dialysis tunneled catheter placement     Donors: No    Staff:  Nephrologist:   Surgeon:     :      Testing Date:     Serologies:    CMV:     No results found for requested labs within last 365 days.  EBV Panel:  KRYSTYNA-MCKENNA VCA IGG:   No results found for requested labs within last 365 days.   KRYSTYNA-MCKENNA VCA IGM: No results found for requested labs within last 365 days.   EBV EARLY ANTIGEN ANTIBODY, IGG: No results found for requested labs within last 365 days.   EPSTIEN-BARR NUCLEAR ANTIGEN AB: No results found for requested labs within last 365 days.   Tox:     AMPHETAMINE SCREEN BLOOD: No results found for requested labs within last 365 days.   METHAMPHETAMINE, BLOOD, SCREEN: No results found for requested labs within last 365 days.   BENZODIAZEPINES SCREEN BLOOD: No results found for requested labs within last 365 days.   BUPRENORPHINE SCREEN BLOOD: No results found for requested labs within last 365 days.   CANNABINOIDS SCREEN BLOOD: No results found for requested labs within last 365 days.   COCAINE SCREEN BLOOD: No results found for requested labs within last 365 days.   METHADONE SCREEN BLOOD: No results found for requested labs within last 365 days.   OXYCODONE SCREEN BLOOD: No results found for requested labs within last 365 days.   PHENCYCLIDINE SCREEN BLOOD: No results found for requested labs within last 365 days.   OPIATE SCREEN BLOOD: No results found for requested labs within last 365 days.   DRUG SCREEN COMMENT BLOOD: No results found for requested labs within last 365 days.   BARBITURATE SCREEN BLOOD: No results found for requested labs within last 365 days.   Cotinine: No results found for requested labs within last 365 days.  HBV ag:   No results found for requested labs within last 365 days.  HBV ab:   No results found for requested labs within last 365 days.  HBV c:     No results found for requested labs within last 365 days.  HCV:        No results found for requested labs within last 365 days.  HIV:    No results found for requested labs within last 365 days.  RPR:    No results found for requested labs within last 365 days.  TSpot:   No results found for requested labs within last 365 days.   VZV:   VARICELLA ZOSTER IGG: No results found for requested labs within last 365 days.   VARICELLA ZOSTER IGG INDEX: No results found for requested labs within last 365 days.   A1C:       HEMOGLOBIN A1C/HEMOGLOBIN TOTAL IN BLOOD: No results found for requested labs within last 365 days.   ESTIMATED AVERAGE GLUCOSE FOR HBA1C: No results found for requested  labs within last 365 days.   CPep:  No results found for requested labs within last 365 days.  PSA:    No results found for requested labs within last 365 days.  Other: none    Test/Consult Impression Next Scheduled Date   CXR No results found.     EKG No results found for this or any previous visit from the past 1095 days.      Echo No results found.     CT Cardiac Score No results found.     NM Stress Test No results found.     Cardiac MRI No results found.     Left Heart Catheterization No results found.     Cardiology last visit impression  none    Pulmonary Function Test No results found for this or any previous visit.    CT Abd/Pelvis No results found.     Colonoscopy  No orders found for this or any previous visit.    Pap No results found for requested labs within last 1825 days.  No results found for requested labs within last 1825 days.    Mammogram No results found.     Other:

## 2025-01-06 ENCOUNTER — HOSPITAL ENCOUNTER (INPATIENT)
Age: 70
LOS: 2 days | Discharge: HOME | DRG: 640 | End: 2025-01-08
Attending: EMERGENCY MEDICINE | Admitting: FAMILY MEDICINE
Payer: OTHER GOVERNMENT

## 2025-01-06 ENCOUNTER — APPOINTMENT (OUTPATIENT)
Dept: GENERAL RADIOLOGY | Age: 70
DRG: 640 | End: 2025-01-06
Payer: OTHER GOVERNMENT

## 2025-01-06 DIAGNOSIS — E87.5 HYPERKALEMIA: Primary | ICD-10-CM

## 2025-01-06 DIAGNOSIS — Z99.2 ESRD ON DIALYSIS (HCC): ICD-10-CM

## 2025-01-06 DIAGNOSIS — N18.6 ESRD ON DIALYSIS (HCC): ICD-10-CM

## 2025-01-06 LAB
ANION GAP SERPL CALCULATED.3IONS-SCNC: 25 MMOL/L (ref 7–16)
BASOPHILS # BLD: 0.04 K/UL (ref 0–0.2)
BASOPHILS NFR BLD: 0 % (ref 0–2)
BNP SERPL-MCNC: ABNORMAL PG/ML (ref 0–450)
BUN SERPL-MCNC: 112 MG/DL (ref 6–23)
CALCIUM SERPL-MCNC: 10 MG/DL (ref 8.6–10.2)
CHLORIDE SERPL-SCNC: 92 MMOL/L (ref 98–107)
CO2 SERPL-SCNC: 19 MMOL/L (ref 22–29)
CREAT SERPL-MCNC: 20.7 MG/DL (ref 0.7–1.2)
EOSINOPHIL # BLD: 0.08 K/UL (ref 0.05–0.5)
EOSINOPHILS RELATIVE PERCENT: 1 % (ref 0–6)
ERYTHROCYTE [DISTWIDTH] IN BLOOD BY AUTOMATED COUNT: 17.6 % (ref 11.5–15)
FLUAV RNA RESP QL NAA+PROBE: NOT DETECTED
FLUBV RNA RESP QL NAA+PROBE: NOT DETECTED
GFR, ESTIMATED: 2 ML/MIN/1.73M2
GLUCOSE BLD-MCNC: 150 MG/DL (ref 74–99)
GLUCOSE BLD-MCNC: 267 MG/DL (ref 74–99)
GLUCOSE SERPL-MCNC: 149 MG/DL (ref 74–99)
HCT VFR BLD AUTO: 44.9 % (ref 37–54)
HGB BLD-MCNC: 14.5 G/DL (ref 12.5–16.5)
IMM GRANULOCYTES # BLD AUTO: 0.04 K/UL (ref 0–0.58)
IMM GRANULOCYTES NFR BLD: 0 % (ref 0–5)
LYMPHOCYTES NFR BLD: 1 K/UL (ref 1.5–4)
LYMPHOCYTES RELATIVE PERCENT: 11 % (ref 20–42)
MCH RBC QN AUTO: 29.8 PG (ref 26–35)
MCHC RBC AUTO-ENTMCNC: 32.3 G/DL (ref 32–34.5)
MCV RBC AUTO: 92.4 FL (ref 80–99.9)
MONOCYTES NFR BLD: 1.28 K/UL (ref 0.1–0.95)
MONOCYTES NFR BLD: 14 % (ref 2–12)
NEUTROPHILS NFR BLD: 73 % (ref 43–80)
NEUTS SEG NFR BLD: 6.49 K/UL (ref 1.8–7.3)
PLATELET # BLD AUTO: 234 K/UL (ref 130–450)
PMV BLD AUTO: 10.6 FL (ref 7–12)
POTASSIUM SERPL-SCNC: 6.1 MMOL/L (ref 3.5–5)
RBC # BLD AUTO: 4.86 M/UL (ref 3.8–5.8)
SARS-COV-2 RNA RESP QL NAA+PROBE: NOT DETECTED
SODIUM SERPL-SCNC: 136 MMOL/L (ref 132–146)
SOURCE: NORMAL
SPECIMEN DESCRIPTION: NORMAL
TROPONIN I SERPL HS-MCNC: 228 NG/L (ref 0–11)
WBC OTHER # BLD: 8.9 K/UL (ref 4.5–11.5)

## 2025-01-06 PROCEDURE — 2580000003 HC RX 258

## 2025-01-06 PROCEDURE — 6370000000 HC RX 637 (ALT 250 FOR IP): Performed by: FAMILY MEDICINE

## 2025-01-06 PROCEDURE — 99223 1ST HOSP IP/OBS HIGH 75: CPT | Performed by: FAMILY MEDICINE

## 2025-01-06 PROCEDURE — 6360000002 HC RX W HCPCS

## 2025-01-06 PROCEDURE — 82962 GLUCOSE BLOOD TEST: CPT

## 2025-01-06 PROCEDURE — 90935 HEMODIALYSIS ONE EVALUATION: CPT

## 2025-01-06 PROCEDURE — 2500000003 HC RX 250 WO HCPCS

## 2025-01-06 PROCEDURE — 93005 ELECTROCARDIOGRAM TRACING: CPT

## 2025-01-06 PROCEDURE — 6370000000 HC RX 637 (ALT 250 FOR IP)

## 2025-01-06 PROCEDURE — 84484 ASSAY OF TROPONIN QUANT: CPT

## 2025-01-06 PROCEDURE — 83880 ASSAY OF NATRIURETIC PEPTIDE: CPT

## 2025-01-06 PROCEDURE — 87636 SARSCOV2 & INF A&B AMP PRB: CPT

## 2025-01-06 PROCEDURE — 5A1D70Z PERFORMANCE OF URINARY FILTRATION, INTERMITTENT, LESS THAN 6 HOURS PER DAY: ICD-10-PCS | Performed by: FAMILY MEDICINE

## 2025-01-06 PROCEDURE — 96375 TX/PRO/DX INJ NEW DRUG ADDON: CPT

## 2025-01-06 PROCEDURE — 99285 EMERGENCY DEPT VISIT HI MDM: CPT

## 2025-01-06 PROCEDURE — 71045 X-RAY EXAM CHEST 1 VIEW: CPT

## 2025-01-06 PROCEDURE — 1200000000 HC SEMI PRIVATE

## 2025-01-06 PROCEDURE — 96365 THER/PROPH/DIAG IV INF INIT: CPT

## 2025-01-06 PROCEDURE — 85025 COMPLETE CBC W/AUTO DIFF WBC: CPT

## 2025-01-06 PROCEDURE — 2500000003 HC RX 250 WO HCPCS: Performed by: FAMILY MEDICINE

## 2025-01-06 PROCEDURE — 80048 BASIC METABOLIC PNL TOTAL CA: CPT

## 2025-01-06 RX ORDER — POLYETHYLENE GLYCOL 3350 17 G/17G
17 POWDER, FOR SOLUTION ORAL DAILY PRN
Status: DISCONTINUED | OUTPATIENT
Start: 2025-01-06 | End: 2025-01-08 | Stop reason: HOSPADM

## 2025-01-06 RX ORDER — ONDANSETRON 2 MG/ML
4 INJECTION INTRAMUSCULAR; INTRAVENOUS EVERY 6 HOURS PRN
Status: DISCONTINUED | OUTPATIENT
Start: 2025-01-06 | End: 2025-01-08 | Stop reason: HOSPADM

## 2025-01-06 RX ORDER — DEXTROSE MONOHYDRATE 100 MG/ML
INJECTION, SOLUTION INTRAVENOUS CONTINUOUS PRN
Status: DISCONTINUED | OUTPATIENT
Start: 2025-01-06 | End: 2025-01-08 | Stop reason: HOSPADM

## 2025-01-06 RX ORDER — DEXTROSE MONOHYDRATE 100 MG/ML
INJECTION, SOLUTION INTRAVENOUS CONTINUOUS PRN
Status: DISCONTINUED | OUTPATIENT
Start: 2025-01-06 | End: 2025-01-06 | Stop reason: SDUPTHER

## 2025-01-06 RX ORDER — SODIUM CHLORIDE 0.9 % (FLUSH) 0.9 %
5-40 SYRINGE (ML) INJECTION EVERY 12 HOURS SCHEDULED
Status: DISCONTINUED | OUTPATIENT
Start: 2025-01-06 | End: 2025-01-08 | Stop reason: HOSPADM

## 2025-01-06 RX ORDER — SEVELAMER CARBONATE 800 MG/1
800 TABLET, FILM COATED ORAL
Status: DISCONTINUED | OUTPATIENT
Start: 2025-01-06 | End: 2025-01-08 | Stop reason: HOSPADM

## 2025-01-06 RX ORDER — INSULIN LISPRO 100 [IU]/ML
0-8 INJECTION, SOLUTION INTRAVENOUS; SUBCUTANEOUS
Status: DISCONTINUED | OUTPATIENT
Start: 2025-01-06 | End: 2025-01-08 | Stop reason: HOSPADM

## 2025-01-06 RX ORDER — PANTOPRAZOLE SODIUM 40 MG/1
40 TABLET, DELAYED RELEASE ORAL DAILY
Status: DISCONTINUED | OUTPATIENT
Start: 2025-01-06 | End: 2025-01-08 | Stop reason: HOSPADM

## 2025-01-06 RX ORDER — ACETAMINOPHEN 325 MG/1
650 TABLET ORAL EVERY 6 HOURS PRN
Status: DISCONTINUED | OUTPATIENT
Start: 2025-01-06 | End: 2025-01-08 | Stop reason: HOSPADM

## 2025-01-06 RX ORDER — HEPARIN SODIUM 5000 [USP'U]/ML
5000 INJECTION, SOLUTION INTRAVENOUS; SUBCUTANEOUS EVERY 8 HOURS SCHEDULED
Status: DISCONTINUED | OUTPATIENT
Start: 2025-01-06 | End: 2025-01-06

## 2025-01-06 RX ORDER — SODIUM CHLORIDE 0.9 % (FLUSH) 0.9 %
5-40 SYRINGE (ML) INJECTION PRN
Status: DISCONTINUED | OUTPATIENT
Start: 2025-01-06 | End: 2025-01-08 | Stop reason: HOSPADM

## 2025-01-06 RX ORDER — ALBUTEROL SULFATE 0.83 MG/ML
10 SOLUTION RESPIRATORY (INHALATION) ONCE
Status: DISCONTINUED | OUTPATIENT
Start: 2025-01-06 | End: 2025-01-07

## 2025-01-06 RX ORDER — FLUTICASONE PROPIONATE 50 MCG
2 SPRAY, SUSPENSION (ML) NASAL DAILY PRN
Status: DISCONTINUED | OUTPATIENT
Start: 2025-01-06 | End: 2025-01-08 | Stop reason: HOSPADM

## 2025-01-06 RX ORDER — ALBUTEROL SULFATE 0.83 MG/ML
2.5 SOLUTION RESPIRATORY (INHALATION) EVERY 6 HOURS PRN
Status: DISCONTINUED | OUTPATIENT
Start: 2025-01-06 | End: 2025-01-08 | Stop reason: HOSPADM

## 2025-01-06 RX ORDER — INSULIN GLARGINE 100 [IU]/ML
28 INJECTION, SOLUTION SUBCUTANEOUS NIGHTLY
Status: DISCONTINUED | OUTPATIENT
Start: 2025-01-06 | End: 2025-01-08 | Stop reason: HOSPADM

## 2025-01-06 RX ORDER — METOPROLOL SUCCINATE 25 MG/1
25 TABLET, EXTENDED RELEASE ORAL DAILY
Status: DISCONTINUED | OUTPATIENT
Start: 2025-01-06 | End: 2025-01-08 | Stop reason: HOSPADM

## 2025-01-06 RX ORDER — SODIUM CHLORIDE 9 MG/ML
INJECTION, SOLUTION INTRAVENOUS PRN
Status: DISCONTINUED | OUTPATIENT
Start: 2025-01-06 | End: 2025-01-08 | Stop reason: HOSPADM

## 2025-01-06 RX ORDER — CALCIUM GLUCONATE 20 MG/ML
1000 INJECTION, SOLUTION INTRAVENOUS ONCE
Status: COMPLETED | OUTPATIENT
Start: 2025-01-06 | End: 2025-01-06

## 2025-01-06 RX ORDER — MAGNESIUM SULFATE IN WATER 40 MG/ML
2000 INJECTION, SOLUTION INTRAVENOUS PRN
Status: DISCONTINUED | OUTPATIENT
Start: 2025-01-06 | End: 2025-01-08 | Stop reason: HOSPADM

## 2025-01-06 RX ORDER — ACETAMINOPHEN 650 MG/1
650 SUPPOSITORY RECTAL EVERY 6 HOURS PRN
Status: DISCONTINUED | OUTPATIENT
Start: 2025-01-06 | End: 2025-01-08 | Stop reason: HOSPADM

## 2025-01-06 RX ORDER — GLUCAGON 1 MG/ML
1 KIT INJECTION PRN
Status: DISCONTINUED | OUTPATIENT
Start: 2025-01-06 | End: 2025-01-06 | Stop reason: SDUPTHER

## 2025-01-06 RX ORDER — GLUCAGON 1 MG/ML
1 KIT INJECTION PRN
Status: DISCONTINUED | OUTPATIENT
Start: 2025-01-06 | End: 2025-01-08 | Stop reason: HOSPADM

## 2025-01-06 RX ORDER — ONDANSETRON 4 MG/1
4 TABLET, ORALLY DISINTEGRATING ORAL EVERY 8 HOURS PRN
Status: DISCONTINUED | OUTPATIENT
Start: 2025-01-06 | End: 2025-01-08 | Stop reason: HOSPADM

## 2025-01-06 RX ADMIN — SODIUM ZIRCONIUM CYCLOSILICATE 10 G: 10 POWDER, FOR SUSPENSION ORAL at 09:19

## 2025-01-06 RX ADMIN — Medication 10 ML: at 21:42

## 2025-01-06 RX ADMIN — DEXTROSE MONOHYDRATE 250 ML: 100 INJECTION, SOLUTION INTRAVENOUS at 09:31

## 2025-01-06 RX ADMIN — SEVELAMER CARBONATE 800 MG: 800 TABLET, FILM COATED ORAL at 16:05

## 2025-01-06 RX ADMIN — INSULIN GLARGINE 28 UNITS: 100 INJECTION, SOLUTION SUBCUTANEOUS at 21:37

## 2025-01-06 RX ADMIN — APIXABAN 5 MG: 5 TABLET, FILM COATED ORAL at 21:34

## 2025-01-06 RX ADMIN — PANTOPRAZOLE SODIUM 40 MG: 40 TABLET, DELAYED RELEASE ORAL at 16:06

## 2025-01-06 RX ADMIN — CALCIUM GLUCONATE 1000 MG: 20 INJECTION, SOLUTION INTRAVENOUS at 09:18

## 2025-01-06 RX ADMIN — INSULIN HUMAN 10 UNITS: 100 INJECTION, SOLUTION PARENTERAL at 09:26

## 2025-01-06 RX ADMIN — ACETAMINOPHEN 650 MG: 325 TABLET ORAL at 21:34

## 2025-01-06 RX ADMIN — METOPROLOL SUCCINATE 25 MG: 25 TABLET, FILM COATED, EXTENDED RELEASE ORAL at 16:06

## 2025-01-06 RX ADMIN — SODIUM BICARBONATE 50 MEQ: 84 INJECTION INTRAVENOUS at 09:12

## 2025-01-06 RX ADMIN — INSULIN LISPRO 4 UNITS: 100 INJECTION, SOLUTION INTRAVENOUS; SUBCUTANEOUS at 21:37

## 2025-01-06 RX ADMIN — APIXABAN 5 MG: 5 TABLET, FILM COATED ORAL at 16:06

## 2025-01-06 ASSESSMENT — ENCOUNTER SYMPTOMS
SHORTNESS OF BREATH: 1
ALLERGIC/IMMUNOLOGIC NEGATIVE: 1
GASTROINTESTINAL NEGATIVE: 1
EYES NEGATIVE: 1
COUGH: 1

## 2025-01-06 ASSESSMENT — PAIN SCALES - GENERAL: PAINLEVEL_OUTOF10: 8

## 2025-01-06 ASSESSMENT — PAIN - FUNCTIONAL ASSESSMENT: PAIN_FUNCTIONAL_ASSESSMENT: NONE - DENIES PAIN

## 2025-01-06 ASSESSMENT — PAIN DESCRIPTION - DESCRIPTORS: DESCRIPTORS: ACHING;CRUSHING;DULL

## 2025-01-06 ASSESSMENT — PAIN DESCRIPTION - LOCATION: LOCATION: TEETH

## 2025-01-06 NOTE — H&P
Ventricular Rate : 116  Atrial Rate : 116  P-R Interval : 120  QRS Duration : 96  Q-T Interval : 316  QTC Calculation(Bazett) : 439  P Axis : 31  R Axis : -46  T Axis : 60  Diagnosis : Normal sinus rhythm  Left axis deviation  Right bundle branch block  When compared with ECG of 29-Apr-2019, No significant change was found  Confirmed by fellow YAN BOLANOS (PEDS FELLOW)JANNETTE ([id number]) on 9/17/2019 3:44:19 PM  Confirmed by YAN VELEZ TANIA ([id number]) on 9/19/2019 7:25:54 PM  
  Musculoskeletal: Negative.    Skin: Negative.    Allergic/Immunologic: Negative.    Neurological: Negative.    Hematological: Negative.    Psychiatric/Behavioral: Negative.       Physical Exam  Constitutional:       Appearance: Normal appearance.   HENT:      Head: Normocephalic and atraumatic.      Nose: Nose normal. No congestion.      Mouth/Throat:      Mouth: Mucous membranes are moist.      Pharynx: Oropharynx is clear. No oropharyngeal exudate.   Eyes:      Pupils: Pupils are equal, round, and reactive to light.   Cardiovascular:      Rate and Rhythm: Normal rate and regular rhythm.   Pulmonary:      Effort: Pulmonary effort is normal.      Breath sounds: Normal breath sounds. No wheezing.      Comments: Diminished laterally, on oxygen via nasal cannula  Abdominal:      General: Bowel sounds are normal.      Palpations: Abdomen is soft.   Musculoskeletal:         General: Normal range of motion.      Cervical back: Normal range of motion and neck supple.      Right lower leg: No edema.      Left lower leg: No edema.   Skin:     General: Skin is warm and dry.   Neurological:      General: No focal deficit present.      Mental Status: He is alert. Mental status is at baseline.      Cranial Nerves: No cranial nerve deficit.   Psychiatric:         Mood and Affect: Mood normal.         Behavior: Behavior normal.         Thought Content: Thought content normal.         Judgment: Judgment normal.         Principal Problem:    ESRD on dialysis (HCC)  Active Problems:    Acute respiratory failure with hypoxia    Hyperkalemia    Paroxysmal atrial fibrillation (HCC)    Chronic diastolic (congestive) heart failure (HCC)  Resolved Problems:    * No resolved hospital problems. *    Plan:  ESRD on dialysis with volume overload due to missing two sessions d/t transport issues  --nephro consulted for urgent dialysis  --renal diet  --monitor intake and output  --daily weights    Hyperkalemia  --albuterol, insulin, sodium

## 2025-01-06 NOTE — ED PROVIDER NOTES
cyanosis, 1+ peripheral pitting edema bilaterally  Integument: skin warm and dry. No rashes.   Neurologic: GCS 15  Psychiatric: Normal Affect            DIAGNOSTIC RESULTS   LABS:    Labs Reviewed   CBC WITH AUTO DIFFERENTIAL - Abnormal; Notable for the following components:       Result Value    RDW 17.6 (*)     Lymphocytes % 11 (*)     Monocytes % 14 (*)     Lymphocytes Absolute 1.00 (*)     Monocytes Absolute 1.28 (*)     All other components within normal limits   BASIC METABOLIC PANEL W/ REFLEX TO MG FOR LOW K - Abnormal; Notable for the following components:    Potassium 6.1 (*)     Chloride 92 (*)     CO2 19 (*)     Anion Gap 25 (*)     Glucose 149 (*)      (*)     Creatinine 20.7 (*)     Est, Glom Filt Rate 2 (*)     All other components within normal limits   TROPONIN - Abnormal; Notable for the following components:    Troponin, High Sensitivity 228 (*)     All other components within normal limits   BRAIN NATRIURETIC PEPTIDE - Abnormal; Notable for the following components:    NT Pro-BNP >70,000 (*)     All other components within normal limits   COVID-19 & INFLUENZA COMBO   POTASSIUM   TROPONIN   POCT GLUCOSE   POCT GLUCOSE   POCT GLUCOSE   POCT GLUCOSE   POCT GLUCOSE   POCT GLUCOSE   POCT GLUCOSE       As interpreted by me, the above displayed labs are abnormal. All other labs obtained during this visit were within normal range or not returned as of this dictation.      EKG Interpretation  Interpreted by emergency department physician, Gem Rivera MD    See ED course      RADIOLOGY:   Non-plain film images such as CT, Ultrasound and MRI are read by the radiologist. Plain radiographic images are visualized and preliminarily interpreted by the ED Provider with the below findings:    CXR shows vascular congestion    Interpretation per the Radiologist below, if available at the time of this note:    XR CHEST PORTABLE   Final Result   1. Cardiomegaly with ongoing interstitial edema.   2. Interval

## 2025-01-06 NOTE — FLOWSHEET NOTE
01/06/25 1345   Vital Signs   /72   Temp 98.6 °F (37 °C)   Pulse 75   Respirations 18   Post-Hemodialysis Assessment   Post-Treatment Procedures Blood returned;Access bleeding time < 10 minutes   Machine Disinfection Process Acid/Vinegar Clean;Heat Disinfect   Rinseback Volume (ml) 300 ml   Blood Volume Processed (Liters) 62.1 L   Dialyzer Clearance Lightly streaked   Duration of Treatment (minutes) 210 minutes   Heparin Amount Administered During Treatment (mL) 0 mL   Hemodialysis Intake (ml) 300 ml   Hemodialysis Output (ml) 2300 ml   NET Removed (ml) 2000   Patient Response to Treatment tolerated tx well   Bilateral Breath Sounds Diminished   Edema Generalized   Time Off 1325   Patient Disposition Return to room   Observations & Evaluations   Level of Consciousness 0   Oriented X 3   Respiratory Quality/Effort Unlabored

## 2025-01-07 LAB
ALBUMIN SERPL-MCNC: 2.9 G/DL (ref 3.5–5.2)
ALP SERPL-CCNC: 50 U/L (ref 40–129)
ALT SERPL-CCNC: 7 U/L (ref 0–40)
ANION GAP SERPL CALCULATED.3IONS-SCNC: 19 MMOL/L (ref 7–16)
AST SERPL-CCNC: 9 U/L (ref 0–39)
BASOPHILS # BLD: 0 K/UL (ref 0–0.2)
BASOPHILS NFR BLD: 0 % (ref 0–2)
BILIRUB SERPL-MCNC: 0.6 MG/DL (ref 0–1.2)
BUN SERPL-MCNC: 65 MG/DL (ref 6–23)
CALCIUM SERPL-MCNC: 9.3 MG/DL (ref 8.6–10.2)
CHLORIDE SERPL-SCNC: 97 MMOL/L (ref 98–107)
CO2 SERPL-SCNC: 23 MMOL/L (ref 22–29)
CREAT SERPL-MCNC: 14.2 MG/DL (ref 0.7–1.2)
EOSINOPHIL # BLD: 0.25 K/UL (ref 0.05–0.5)
EOSINOPHILS RELATIVE PERCENT: 4 % (ref 0–6)
ERYTHROCYTE [DISTWIDTH] IN BLOOD BY AUTOMATED COUNT: 17.6 % (ref 11.5–15)
GFR, ESTIMATED: 3 ML/MIN/1.73M2
GLUCOSE BLD-MCNC: 113 MG/DL (ref 74–99)
GLUCOSE BLD-MCNC: 115 MG/DL (ref 74–99)
GLUCOSE BLD-MCNC: 188 MG/DL (ref 74–99)
GLUCOSE BLD-MCNC: 190 MG/DL (ref 74–99)
GLUCOSE SERPL-MCNC: 89 MG/DL (ref 74–99)
HCT VFR BLD AUTO: 43.6 % (ref 37–54)
HGB BLD-MCNC: 13.8 G/DL (ref 12.5–16.5)
LYMPHOCYTES NFR BLD: 0.83 K/UL (ref 1.5–4)
LYMPHOCYTES RELATIVE PERCENT: 11 % (ref 20–42)
MAGNESIUM SERPL-MCNC: 2.2 MG/DL (ref 1.6–2.6)
MCH RBC QN AUTO: 29.6 PG (ref 26–35)
MCHC RBC AUTO-ENTMCNC: 31.7 G/DL (ref 32–34.5)
MCV RBC AUTO: 93.6 FL (ref 80–99.9)
METAMYELOCYTES ABSOLUTE COUNT: 0.06 K/UL (ref 0–0.12)
METAMYELOCYTES: 1 % (ref 0–1)
MONOCYTES NFR BLD: 1.46 K/UL (ref 0.1–0.95)
MONOCYTES NFR BLD: 20 % (ref 2–12)
NEUTROPHILS NFR BLD: 64 % (ref 43–80)
NEUTS SEG NFR BLD: 4.7 K/UL (ref 1.8–7.3)
PHOSPHATE SERPL-MCNC: 6.3 MG/DL (ref 2.5–4.5)
PLATELET # BLD AUTO: 214 K/UL (ref 130–450)
PMV BLD AUTO: 11.2 FL (ref 7–12)
POTASSIUM SERPL-SCNC: 5.5 MMOL/L (ref 3.5–5)
PROT SERPL-MCNC: 7.4 G/DL (ref 6.4–8.3)
RBC # BLD AUTO: 4.66 M/UL (ref 3.8–5.8)
RBC # BLD: ABNORMAL 10*6/UL
SODIUM SERPL-SCNC: 139 MMOL/L (ref 132–146)
TROPONIN I SERPL HS-MCNC: 200 NG/L (ref 0–11)
WBC OTHER # BLD: 7.3 K/UL (ref 4.5–11.5)

## 2025-01-07 PROCEDURE — 85025 COMPLETE CBC W/AUTO DIFF WBC: CPT

## 2025-01-07 PROCEDURE — 1200000000 HC SEMI PRIVATE

## 2025-01-07 PROCEDURE — 6370000000 HC RX 637 (ALT 250 FOR IP): Performed by: FAMILY MEDICINE

## 2025-01-07 PROCEDURE — 2500000003 HC RX 250 WO HCPCS: Performed by: FAMILY MEDICINE

## 2025-01-07 PROCEDURE — 80053 COMPREHEN METABOLIC PANEL: CPT

## 2025-01-07 PROCEDURE — 82962 GLUCOSE BLOOD TEST: CPT

## 2025-01-07 PROCEDURE — 83735 ASSAY OF MAGNESIUM: CPT

## 2025-01-07 PROCEDURE — 99232 SBSQ HOSP IP/OBS MODERATE 35: CPT | Performed by: FAMILY MEDICINE

## 2025-01-07 PROCEDURE — 36415 COLL VENOUS BLD VENIPUNCTURE: CPT

## 2025-01-07 PROCEDURE — 84100 ASSAY OF PHOSPHORUS: CPT

## 2025-01-07 PROCEDURE — 84484 ASSAY OF TROPONIN QUANT: CPT

## 2025-01-07 PROCEDURE — 90935 HEMODIALYSIS ONE EVALUATION: CPT

## 2025-01-07 RX ADMIN — METOPROLOL SUCCINATE 25 MG: 25 TABLET, FILM COATED, EXTENDED RELEASE ORAL at 08:42

## 2025-01-07 RX ADMIN — POLYETHYLENE GLYCOL 3350 17 G: 17 POWDER, FOR SOLUTION ORAL at 08:47

## 2025-01-07 RX ADMIN — SEVELAMER CARBONATE 800 MG: 800 TABLET, FILM COATED ORAL at 11:39

## 2025-01-07 RX ADMIN — SEVELAMER CARBONATE 800 MG: 800 TABLET, FILM COATED ORAL at 08:41

## 2025-01-07 RX ADMIN — INSULIN GLARGINE 28 UNITS: 100 INJECTION, SOLUTION SUBCUTANEOUS at 21:02

## 2025-01-07 RX ADMIN — PANTOPRAZOLE SODIUM 40 MG: 40 TABLET, DELAYED RELEASE ORAL at 08:42

## 2025-01-07 RX ADMIN — FLUTICASONE PROPIONATE 2 SPRAY: 50 SPRAY, METERED NASAL at 17:10

## 2025-01-07 RX ADMIN — Medication 6 MG: at 21:06

## 2025-01-07 RX ADMIN — INSULIN LISPRO 2 UNITS: 100 INJECTION, SOLUTION INTRAVENOUS; SUBCUTANEOUS at 11:36

## 2025-01-07 RX ADMIN — Medication 10 ML: at 21:01

## 2025-01-07 RX ADMIN — APIXABAN 5 MG: 5 TABLET, FILM COATED ORAL at 21:01

## 2025-01-07 RX ADMIN — Medication 10 ML: at 08:42

## 2025-01-07 RX ADMIN — SEVELAMER CARBONATE 800 MG: 800 TABLET, FILM COATED ORAL at 17:10

## 2025-01-07 RX ADMIN — APIXABAN 5 MG: 5 TABLET, FILM COATED ORAL at 08:42

## 2025-01-07 RX ADMIN — ACETAMINOPHEN 650 MG: 325 TABLET ORAL at 08:41

## 2025-01-07 ASSESSMENT — PAIN SCALES - GENERAL: PAINLEVEL_OUTOF10: 0

## 2025-01-07 NOTE — ACP (ADVANCE CARE PLANNING)
Advance Care Planning         The patient has appointed the following active healthcare agents:    Primary Decision Maker (Active): Vanessa Nichole - Brother/Sister - [phone]    Secondary Decision Maker: Apurva Hurt - Brother/Sister - [phone]

## 2025-01-07 NOTE — FLOWSHEET NOTE
01/07/25 1618   Treatment   Time On 1315   Time Off 1615   Treatment Goal 1000   Observations & Evaluations   Level of Consciousness 0   Oriented X 3   Heart Rhythm Regular   Respiratory Quality/Effort Unlabored   Bilateral Breath Sounds Diminished   Skin Color Other (comment)  (normal for ethnicity)   Skin Condition/Temp Dry;Warm   Abdomen Inspection Soft;Rounded   Bowel Sounds (All Quadrants) Active   Edema Generalized   Edema Generalized +1   Vital Signs   BP (!) 109/54   Temp 98 °F (36.7 °C)   Pulse 61   Respirations 18   Weight - Scale   (unable to assess. scale broken)   Pain Assessment   Pain Assessment None - Denies Pain   Pain Level 0   Post-Hemodialysis Assessment   Post-Treatment Procedures Blood returned;Access bleeding time < 10 minutes   Machine Disinfection Process Acid/Vinegar Clean;Heat Disinfect;Exterior Machine Disinfection   Rinseback Volume (ml) 300 ml   Blood Volume Processed (Liters) 53.7 L   Dialyzer Clearance Lightly streaked   Duration of Treatment (minutes) 180 minutes   Hemodialysis Intake (ml) 300 ml   Hemodialysis Output (ml) 1400 ml   NET Removed (ml) 1100   Tolerated Treatment Good   Patient Disposition Return to room     HD treatment complete. Patient ran 3 hours on 2K 2.5 Ca bath. UF 1100 mL NET. Stasis achieved, pt voices no complaints.

## 2025-01-07 NOTE — CARE COORDINATION
Ss note: 1/7/20251:41 PM Attempted to meet with pt, currently oor for dialysis. Per chart review pt resides home alone, attends [facility] MWF at 5 a, charting reflect pt alert/oriented times 4, has home02 via [employer], is on 2 liters here. SW to follow up with pt for possible transportation resources (can provide [facility] information, [employer] resources and pt will need to follow up with his HD clinic for assistance with transportation.) FLY Victor

## 2025-01-08 ENCOUNTER — APPOINTMENT (OUTPATIENT)
Dept: GENERAL RADIOLOGY | Age: 70
DRG: 640 | End: 2025-01-08
Payer: OTHER GOVERNMENT

## 2025-01-08 VITALS
WEIGHT: 191.8 LBS | HEART RATE: 74 BPM | BODY MASS INDEX: 30.1 KG/M2 | DIASTOLIC BLOOD PRESSURE: 65 MMHG | OXYGEN SATURATION: 95 % | RESPIRATION RATE: 18 BRPM | TEMPERATURE: 97.3 F | SYSTOLIC BLOOD PRESSURE: 112 MMHG | HEIGHT: 67 IN

## 2025-01-08 LAB
ALBUMIN SERPL-MCNC: 2.9 G/DL (ref 3.5–5.2)
ALP SERPL-CCNC: 49 U/L (ref 40–129)
ALT SERPL-CCNC: <5 U/L (ref 0–40)
ANION GAP SERPL CALCULATED.3IONS-SCNC: 13 MMOL/L (ref 7–16)
AST SERPL-CCNC: 6 U/L (ref 0–39)
BASOPHILS # BLD: 0.02 K/UL (ref 0–0.2)
BASOPHILS NFR BLD: 0 % (ref 0–2)
BILIRUB SERPL-MCNC: 0.4 MG/DL (ref 0–1.2)
BUN SERPL-MCNC: 46 MG/DL (ref 6–23)
CALCIUM SERPL-MCNC: 9.3 MG/DL (ref 8.6–10.2)
CHLORIDE SERPL-SCNC: 97 MMOL/L (ref 98–107)
CO2 SERPL-SCNC: 25 MMOL/L (ref 22–29)
CREAT SERPL-MCNC: 11.2 MG/DL (ref 0.7–1.2)
EKG ATRIAL RATE: 99 BPM
EKG P AXIS: 34 DEGREES
EKG P-R INTERVAL: 156 MS
EKG Q-T INTERVAL: 344 MS
EKG QRS DURATION: 76 MS
EKG QTC CALCULATION (BAZETT): 441 MS
EKG R AXIS: 37 DEGREES
EKG T AXIS: 44 DEGREES
EKG VENTRICULAR RATE: 99 BPM
EOSINOPHIL # BLD: 0.31 K/UL (ref 0.05–0.5)
EOSINOPHILS RELATIVE PERCENT: 5 % (ref 0–6)
ERYTHROCYTE [DISTWIDTH] IN BLOOD BY AUTOMATED COUNT: 17.5 % (ref 11.5–15)
GFR, ESTIMATED: 5 ML/MIN/1.73M2
GLUCOSE BLD-MCNC: 103 MG/DL (ref 74–99)
GLUCOSE BLD-MCNC: 176 MG/DL (ref 74–99)
GLUCOSE SERPL-MCNC: 144 MG/DL (ref 74–99)
HCT VFR BLD AUTO: 40.7 % (ref 37–54)
HGB BLD-MCNC: 13 G/DL (ref 12.5–16.5)
IMM GRANULOCYTES # BLD AUTO: <0.03 K/UL (ref 0–0.58)
IMM GRANULOCYTES NFR BLD: 0 % (ref 0–5)
LYMPHOCYTES NFR BLD: 1.04 K/UL (ref 1.5–4)
LYMPHOCYTES RELATIVE PERCENT: 16 % (ref 20–42)
MAGNESIUM SERPL-MCNC: 2.1 MG/DL (ref 1.6–2.6)
MCH RBC QN AUTO: 29.8 PG (ref 26–35)
MCHC RBC AUTO-ENTMCNC: 31.9 G/DL (ref 32–34.5)
MCV RBC AUTO: 93.3 FL (ref 80–99.9)
MONOCYTES NFR BLD: 1.15 K/UL (ref 0.1–0.95)
MONOCYTES NFR BLD: 18 % (ref 2–12)
NEUTROPHILS NFR BLD: 61 % (ref 43–80)
NEUTS SEG NFR BLD: 3.94 K/UL (ref 1.8–7.3)
PHOSPHATE SERPL-MCNC: 6.3 MG/DL (ref 2.5–4.5)
PLATELET # BLD AUTO: 197 K/UL (ref 130–450)
PMV BLD AUTO: 10.6 FL (ref 7–12)
POTASSIUM SERPL-SCNC: 5.3 MMOL/L (ref 3.5–5)
PROT SERPL-MCNC: 7.3 G/DL (ref 6.4–8.3)
RBC # BLD AUTO: 4.36 M/UL (ref 3.8–5.8)
SODIUM SERPL-SCNC: 135 MMOL/L (ref 132–146)
WBC OTHER # BLD: 6.5 K/UL (ref 4.5–11.5)

## 2025-01-08 PROCEDURE — 36415 COLL VENOUS BLD VENIPUNCTURE: CPT

## 2025-01-08 PROCEDURE — 85025 COMPLETE CBC W/AUTO DIFF WBC: CPT

## 2025-01-08 PROCEDURE — 80053 COMPREHEN METABOLIC PANEL: CPT

## 2025-01-08 PROCEDURE — 90935 HEMODIALYSIS ONE EVALUATION: CPT | Performed by: INTERNAL MEDICINE

## 2025-01-08 PROCEDURE — 2700000000 HC OXYGEN THERAPY PER DAY

## 2025-01-08 PROCEDURE — 82962 GLUCOSE BLOOD TEST: CPT

## 2025-01-08 PROCEDURE — 2500000003 HC RX 250 WO HCPCS: Performed by: FAMILY MEDICINE

## 2025-01-08 PROCEDURE — 83735 ASSAY OF MAGNESIUM: CPT

## 2025-01-08 PROCEDURE — 99239 HOSP IP/OBS DSCHRG MGMT >30: CPT | Performed by: FAMILY MEDICINE

## 2025-01-08 PROCEDURE — 71046 X-RAY EXAM CHEST 2 VIEWS: CPT

## 2025-01-08 PROCEDURE — 93010 ELECTROCARDIOGRAM REPORT: CPT | Performed by: INTERNAL MEDICINE

## 2025-01-08 PROCEDURE — 6370000000 HC RX 637 (ALT 250 FOR IP): Performed by: FAMILY MEDICINE

## 2025-01-08 PROCEDURE — 84100 ASSAY OF PHOSPHORUS: CPT

## 2025-01-08 RX ADMIN — METOPROLOL SUCCINATE 25 MG: 25 TABLET, FILM COATED, EXTENDED RELEASE ORAL at 08:12

## 2025-01-08 RX ADMIN — Medication 10 ML: at 08:12

## 2025-01-08 RX ADMIN — ACETAMINOPHEN 650 MG: 325 TABLET ORAL at 02:12

## 2025-01-08 RX ADMIN — SEVELAMER CARBONATE 800 MG: 800 TABLET, FILM COATED ORAL at 14:28

## 2025-01-08 RX ADMIN — APIXABAN 5 MG: 5 TABLET, FILM COATED ORAL at 08:12

## 2025-01-08 RX ADMIN — PANTOPRAZOLE SODIUM 40 MG: 40 TABLET, DELAYED RELEASE ORAL at 08:12

## 2025-01-08 RX ADMIN — FLUTICASONE PROPIONATE 2 SPRAY: 50 SPRAY, METERED NASAL at 08:12

## 2025-01-08 RX ADMIN — SEVELAMER CARBONATE 800 MG: 800 TABLET, FILM COATED ORAL at 08:12

## 2025-01-08 ASSESSMENT — PAIN DESCRIPTION - LOCATION: LOCATION: HEAD

## 2025-01-08 ASSESSMENT — PAIN SCALES - GENERAL: PAINLEVEL_OUTOF10: 2

## 2025-01-08 NOTE — DISCHARGE INSTRUCTIONS
Your information:  Name: Carmelo Desir  : 1955    Your instructions:    YOU ARE BEING DISCHARGED HOME. PLEASE MAKE AND KEEP YOUR FOLLOW UP APPOINTMENTS.IF YOU EXPERIENCE ANY OF THE FOLLOWING SYMPTOMS SHORTNESS OF BREATH, CHEST PAIN, SUDDEN CONFUSION, SWELLING IN LEGS, ANKLES, FEET,PUFFY EYELIDS OR FACE, SIGNS OF INFECTION, INCLUDING FEVER, CHILLS, OR PAIN WHEN PASSING URINE, SUDDEN UPSET STOMACH OR THROWING UP, PLEASE CALL THE DOCTOR OR RETURN TO THE EMERGENCY ROOM.    What to do after you leave the hospital:    Recommended diet: renal diet    Recommended activity: activity as tolerated        The following personal items were collected during your admission and were returned to you:    Belongings  Dental Appliances: Lowers, At bedside  Vision - Corrective Lenses: At bedside, Eyeglasses  Hearing Aid: None  Clothing: Undergarments, Footwear, At bedside, Shirt, Jacket/Coat, Other (Comment), Socks (scarf)  Jewelry: Watch, At bedside  Electronic Devices: Cell Phone, At bedside,   Weapons (Notify Protective Services/Security): None  Other Valuables: At bedside, Wallet  Home Medications: None  Valuables Given To: Patient  Provide Name(s) of Who Valuable(s) Were Given To: Carmelo    Information obtained by:  By signing below, I understand that if any problems occur once I leave the hospital I am to contact the doctor.  I understand and acknowledge receipt of the instructions indicated above.

## 2025-01-08 NOTE — CONSULTS
Olu Garner MD  Nephrology    Hemodialysis/Progress note.      Hemodialysis/Progress note.     Patient seen and examined on hemodialyisis.  Dialysis prescription reviewed.  Patient is being dialyzed again today because of persistent hyperkalemia.   She is denying any shortness of breath today.   notes reviewed.  Appetite has been good.  No nausea vomiting or dysguesia.  Awake and alert and in no acute distress.        Vital SignsBP (!) 100/54   Pulse 85   Temp 98.2 °F (36.8 °C) (Oral)   Resp 20   Ht 1.702 m (5' 7\")   Wt 86.2 kg (190 lb)   SpO2 92%   BMI 29.76 kg/m²   24HR INTAKE/OUTPUT:  No intake or output data in the 24 hours ending 01/07/25 1440       Physical  Exam      Neck: No JVD  Lungs: Breath sounds decreased at the bases. No rales or ronchi.  Heart: Regular rate and rhythm. No S3 gallop. No murmrur.  Abdomen: Soft non distended, non tender and normal bowel sounds.  Extremeties: Trace bipedal edema.  AV fistula in the right arm with a bruit and thrill.        Medications:  Current Facility-Administered Medications   Medication Dose Route Frequency Provider Last Rate Last Admin    sodium chloride flush 0.9 % injection 5-40 mL  5-40 mL IntraVENous 2 times per day Esperanza Arauz, DO   10 mL at 01/07/25 0842    sodium chloride flush 0.9 % injection 5-40 mL  5-40 mL IntraVENous PRN Esperanza Arauz, DO        0.9 % sodium chloride infusion   IntraVENous PRN Esperanza Arauz, DO        ondansetron (ZOFRAN-ODT) disintegrating tablet 4 mg  4 mg Oral Q8H PRN Esperanza Arauz, DO        Or    ondansetron (ZOFRAN) injection 4 mg  4 mg IntraVENous Q6H PRN Esperanza Arauz, DO        polyethylene glycol (GLYCOLAX) packet 17 g  17 g Oral Daily PRN Esperanza Arauz, DO   17 g at 01/07/25 0847    acetaminophen (TYLENOL) tablet 650 mg  650 mg Oral Q6H PRN Esperanza Arauz, DO   650 mg at 01/07/25 0841    Or    acetaminophen (TYLENOL) suppository 650 mg  650 mg Rectal Q6H PRN 
            Olu Garner MD  Nephrology    Hemodialysis/Progress note.      Hemodialysis/Progress note.     Patient seen and examined on hemodialyisis.  Dialysis prescription reviewed.  Patient is well-known to me from outpatient and inpatient follow-up.   So formal consultation is deferred.        Patient is a 69-year-old gentleman with underlying history of chronic kidney disease stage G5/ESRD on who is on maintenance hemodialysis.  Patient presents with chief complaints of shortness of breath.    Patient had missed his dialysis for the last week due to issues with transportation to and back from dialysis.  Upon presentation patient was found to have a serum potassium of 6.1 mmol/L with a bicarbonate level of 19 mmol/L , BUN of 112 mg/dL and a creatinine of 20.7 mg/dL.    Chest x-ray revealed cardiomegaly with interstitial edema.    His proBNP was  greater than 70,000 upon presentation.      His appetite has been fair.  No nausea vomiting or dysguesia.          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     PAST SURGICAL HISTORY:  Past surgical history is significant for a  t surgery for AV fistula placement in the right arm on 02/23/2023.  He has a history of placement and removal of tunneled hemodialysis catheter.  He has history of bilateral shoulder surgery.  He has had bunionectomy of the left foot.           Vital SignsBP 121/67   Pulse 94   Temp 97.7 °F (36.5 °C) (Oral)   Resp 21   Ht 1.702 m (5' 7\")   Wt 86.2 kg (190 lb)   SpO2 94%   BMI 29.76 kg/m²   24HR INTAKE/OUTPUT:  No intake or output data in the 24 hours ending 01/06/25 1133       Physical  Exam      Neck: No 
Esperanza Arauz, DO   650 mg at 01/08/25 0212    Or    acetaminophen (TYLENOL) suppository 650 mg  650 mg Rectal Q6H PRN Esperanza Arauz, DO        magnesium sulfate 2000 mg in 50 mL IVPB premix  2,000 mg IntraVENous PRN Esperanza Arauz, DO        melatonin tablet 6 mg  6 mg Oral Nightly PRN Esperanza Arauz, DO   6 mg at 01/07/25 2106    sodium zirconium cyclosilicate (LOKELMA) oral suspension 5 g  5 g Oral Every Other Day Esperanza Arauz, DO        albuterol (PROVENTIL) (2.5 MG/3ML) 0.083% nebulizer solution 2.5 mg  2.5 mg Nebulization Q6H PRN Esperanza Arauz DO        sevelamer (RENVELA) tablet 800 mg  800 mg Oral TID WC Esperanza Arauz, DO   800 mg at 01/08/25 0812    apixaban (ELIQUIS) tablet 5 mg  5 mg Oral BID Esperanza Arauz, DO   5 mg at 01/08/25 0812    fluticasone (FLONASE) 50 MCG/ACT nasal spray 2 spray  2 spray Nasal Daily PRN Esperanza Arauz DO   2 spray at 01/08/25 0812    insulin glargine (LANTUS) injection vial 28 Units  28 Units SubCUTAneous Nightly Esperanza Arauz, DO   28 Units at 01/07/25 2102    glucose chewable tablet 16 g  4 tablet Oral PRN Esperanza Arauz, DO        dextrose bolus 10% 125 mL  125 mL IntraVENous PRN Esperanza Arauz, DO        Or    dextrose bolus 10% 250 mL  250 mL IntraVENous PRN Esperanza Arauz, DO        glucagon injection 1 mg  1 mg SubCUTAneous PRN Esperanza Arauz, DO        dextrose 10 % infusion   IntraVENous Continuous PRN Esperanza Arauz, DO        insulin lispro (HUMALOG,ADMELOG) injection vial 0-8 Units  0-8 Units SubCUTAneous 4x Daily AC & HS Esperanza Arauz, DO   2 Units at 01/07/25 1136    metoprolol succinate (TOPROL XL) extended release tablet 25 mg  25 mg Oral Daily Esperanza Arauz, DO   25 mg at 01/08/25 0812    pantoprazole (PROTONIX) tablet 40 mg  40 mg Oral Daily Esperanza Arauz,    40 mg at 01/08/25 0812       Labs:    Lab Results   Component Value Date    VITD25 30 06/21/2022    VITD25 40

## 2025-01-08 NOTE — FLOWSHEET NOTE
01/08/25 1250   Vital Signs   BP (!) 115/57   Temp 97.3 °F (36.3 °C)   Pulse 71   Respirations 18   Weight - Scale 87 kg (191 lb 12.8 oz)   Weight Method Bed scale   Percent Weight Change -0.83   Post-Hemodialysis Assessment   Post-Treatment Procedures Blood returned;Access bleeding time < 10 minutes   Machine Disinfection Process Acid/Vinegar Clean;Heat Disinfect;Exterior Machine Disinfection   Rinseback Volume (ml) 300 ml   Blood Volume Processed (Liters) 70.9 L   Dialyzer Clearance Lightly streaked   Duration of Treatment (minutes) 180 minutes   Heparin Amount Administered During Treatment (mL) 0 mL   Hemodialysis Intake (ml) 500 ml   Hemodialysis Output (ml) 2500 ml   NET Removed (ml) 2000   Tolerated Treatment Good   Patient Response to Treatment Completed 3 hour Dialysis on 2K2.5Ca.  Stable throughout treatment w/o complications. Alert/responsive at tx's end. 2x2's/tape dry/intact upon release from Dialysis room.   Patient Disposition Return to room

## 2025-01-08 NOTE — PROGRESS NOTES
Physician Progress Note      PATIENT:               PATO PAREDES  CSN #:                  [id number]  :                       1955  ADMIT DATE:       2025 6:27 AM  DISCH DATE:  RESPONDING  PROVIDER #:        Esperanza Arauz DO          QUERY TEXT:    Patient admitted with volume overload. Noted documentation of acute   respiratory failure in H/P dated  but there is no documented work of   breathing or distress. In order to support the diagnosis of acute respiratory   failure, please include additional clinical indicators in your documentation.    Or please document if the diagnosis of acute respiratory failure has been   ruled out after further study.    The medical record reflects the following:  Risk Factors: ESRD having missed 2 dialysis sessions, home O2 at 2l/nc  Clinical Indicators: presented with shortness of breath; documentation   reflects \" not in respiratory distress\"; RR 18-25 with O2 sats 92-97% with (1)   noted sat of 86% which was on RA (pt normallyon 2l at home)  Treatment: O2 at 2l (as at home)-4l/nc    Thank you,  Kev RN, CCDS  [email or web address]  Options provided:  -- Acute Respiratory Failure as evidenced by, Please document evidence.  -- Acute Respiratory Failure ruled out after study  -- Acute Respiratory Failure ruled out after study and Chronic Respiratory   Failure confirmed  -- Other - I will add my own diagnosis  -- Disagree - Not applicable / Not valid  -- Disagree - Clinically unable to determine / Unknown  -- Refer to Clinical Documentation Reviewer    PROVIDER RESPONSE TEXT:    Acute Respiratory Failure ruled out after study and Chronic Respiratory   Failure confirmed.    Query created by: Kev Cuellar on 2025 9:35 AM      Electronically signed by:  Esperanza Arauz DO 2025 12:07 PM          
4 Eyes Skin Assessment     NAME:  Carmelo Desir  YOB: 1955  MEDICAL RECORD NUMBER:  [id number]    The patient is being assessed for  Admission    I agree that at least one RN has performed a thorough Head to Toe Skin Assessment on the patient. ALL assessment sites listed below have been assessed.      Areas assessed by both nurses:    Head, Face, Ears, Shoulders, Back, Chest, Arms, Elbows, Hands, Sacrum. Buttock, Coccyx, Ischium, Legs. Feet and Heels, and Under Medical Devices         Does the Patient have a Wound? No noted wound(s)       Bear Prevention initiated by RN: No  Wound Care Orders initiated by RN: No    Pressure Injury (Stage 3,4, Unstageable, DTI, NWPT, and Complex wounds) if present, place Wound referral order by RN under : No    New Ostomies, if present place, Ostomy referral order under : No     Nurse 1 eSignature: Electronically signed by JERRY Glover RN on 1/7/25 at 2:22 AM EST    **SHARE this note so that the co-signing nurse can place an eSignature**    Nurse 2 eSignature: Electronically signed by Luann Varela RN on 1/7/25 at 2:30 AM EST   
Pulse ox was 95% on room air at rest.  Ambulated patient on room air.  Oxygen saturation was 87% on room air while ambulating.  Oxygen applied.  Recovery pulse ox was 93% on 2 liters of oxygen while ambulating.   
No family present at bedside.  He does not produce urine.  He does have bowel function.  He is tolerating diet without issues.  No acute events overnight.  He has no complaints at this time.  All questions and concerns were addressed during rounds.    ROS: denies fever, chills, cp, sob, n/v, HA unless stated above.      albuterol  10 mg Nebulization Once    sodium chloride flush  5-40 mL IntraVENous 2 times per day    [Held by provider] sodium zirconium cyclosilicate  5 g Oral Every Other Day    sevelamer  800 mg Oral TID WC    apixaban  5 mg Oral BID    insulin glargine  28 Units SubCUTAneous Nightly    insulin lispro  0-8 Units SubCUTAneous 4x Daily AC & HS    metoprolol succinate  25 mg Oral Daily    pantoprazole  40 mg Oral Daily     sodium chloride flush, 5-40 mL, PRN  sodium chloride, , PRN  ondansetron, 4 mg, Q8H PRN   Or  ondansetron, 4 mg, Q6H PRN  polyethylene glycol, 17 g, Daily PRN  acetaminophen, 650 mg, Q6H PRN   Or  acetaminophen, 650 mg, Q6H PRN  magnesium sulfate, 2,000 mg, PRN  melatonin, 6 mg, Nightly PRN  albuterol, 2.5 mg, Q6H PRN  fluticasone, 2 spray, Daily PRN  glucose, 4 tablet, PRN  dextrose bolus, 125 mL, PRN   Or  dextrose bolus, 250 mL, PRN  glucagon (rDNA), 1 mg, PRN  dextrose, , Continuous PRN         Objective:    BP (!) 100/54   Pulse 85   Temp 98.2 °F (36.8 °C) (Oral)   Resp 20   Ht 1.702 m (5' 7\")   Wt 86.2 kg (190 lb)   SpO2 95%   BMI 29.76 kg/m²   Constitutional:       Appearance: Normal appearance.   HENT:      Head: Normocephalic and atraumatic.      Nose: Nose normal. No congestion.      Mouth/Throat:      Mouth: Mucous membranes are moist.      Pharynx: Oropharynx is clear. No oropharyngeal exudate.   Eyes:      Pupils: Pupils are equal, round, and reactive to light.   Cardiovascular:      Rate and Rhythm: Normal rate and regular rhythm.   Pulmonary:      Effort: Pulmonary effort is normal.      Breath sounds: Normal breath sounds. No wheezing.      Comments:

## 2025-01-08 NOTE — CARE COORDINATION
Ss note:1/8/20252:29 PM Discharge order noted. Pt qualifies for home 02, met with pt who relays he has a concentrator and portable tanks at home from [facility], however liaison Baudilio relays the equipment was removed from the home. New 02 order noted, [facility] [employer] [facility] and will bring a portable tank to bedside for transport home once insurance confirmed. [facility] and [facility] [employer] to transport pt to [facility] on [address] on Friday morning. [facility] the [facility] and [facility]ad with [facility] will provide home going transport today between 4:15 pm and 4:30 pm today, nursing to take pt down to main lobby. Pt resides home alone at [address] at [facility] in [place]. No family available to transport pt home. FLY Victor    ADDENDUM; 1/8/20252:51 PM [facility] TO ROOM. FLY Victor

## 2025-01-08 NOTE — CARE COORDINATION
Ss note:1/8/202510:31 AM Pt oor for HD this am. SW unable to meet with pt yesterday due to oor. PTA pt resides home alone, attends [facility] at 5 am. Per charting pt is currently on 2 liters of oxygen sw contacted [facility] whom relays pt is NOT active with them for home oxygen, sw will need to inquire with regarding home 02 company. IF pt does not have 02 at home will need qualifying pulse ox testing and home 02 orders. Charting reflects currently on 2 liters here. SW will provide possible transportation options via [facility], [employer] and pt will also need to follow up with his HD clinic for assistance with transportation. FLY Victor

## 2025-01-08 NOTE — DISCHARGE SUMMARY
Discharge Summary  Patient ID:  Carmelo Desir  [id number]  69 y.o. 1955 male  No primary care provider on file.        Admit date: 1/6/2025    Discharge date and time:  1/8/2025  2:42 PM      Activity level: Baseline as tolerated  Diet: Renal diet  Labs: Dialysis labs per nephrology  Disposition: Home  Condition on Discharge: Stable  DME: Home oxygen at 2 L    Admit Diagnoses:   Patient Active Problem List   Diagnosis    Hallux valgus, acquired    DMII (diabetes mellitus, type 2) (HCC)    HTN (hypertension)    Lumbar radicular pain    Spinal stenosis    B12 deficiency    Idiopathic acute pancreatitis    Acute pancreatitis without necrosis or infection, unspecified    Pneumonia    Respiratory failure    Acute respiratory failure with hypoxia    Hyperkalemia    Paroxysmal atrial fibrillation (HCC)    ESRD on hemodialysis (HCC)    Acute pancreatitis    Encounter regarding vascular access for dialysis for end-stage renal disease ([facility])    Acute recurrent pancreatitis    Acute on chronic respiratory failure with hypoxia    COPD exacerbation (HCC)    S/P inguinal herniorrhaphy    Right groin pain    Idiopathic acute pancreatitis, unspecified complication status    End stage renal disease (HCC)    S/P arteriovenous (AV) graft placement    Chronic diastolic (congestive) heart failure (HCC)    Other chronic pancreatitis (HCC)    ESRD on dialysis ([facility])       Discharge Diagnoses: Principal Problem:    ESRD on dialysis (HCC)  Active Problems:    Acute respiratory failure with hypoxia    Hyperkalemia    Paroxysmal atrial fibrillation (HCC)    Chronic diastolic (congestive) heart failure (HCC)  Resolved Problems:    * No resolved hospital problems. *      Consults:  IP CONSULT TO NEPHROLOGY    Procedures: None    Hospital Course: The patient is a pleasant 69-year-old male who presents from home after taking 2 dialysis sessions.  He states that he did call transport to come and get him however they did not show up.  He has

## 2025-01-09 ENCOUNTER — TELEPHONE (OUTPATIENT)
Dept: TRANSPLANT | Facility: HOSPITAL | Age: 70
End: 2025-01-09
Payer: MEDICARE

## 2025-01-09 ENCOUNTER — CARE COORDINATION (OUTPATIENT)
Dept: CARE COORDINATION | Age: 70
End: 2025-01-09

## 2025-01-09 DIAGNOSIS — N18.6 ESRD (END STAGE RENAL DISEASE) (MULTI): Primary | ICD-10-CM

## 2025-01-09 NOTE — CARE COORDINATION
Care Transitions Note    Initial Call - Call within 2 business days of discharge: Yes    Patient Current Location:  Home: [address] [place] [address] [address]  [facility] [address]    Care Transition Nurse contacted the patient by telephone to perform post hospital discharge assessment. Provided introduction to self, and explanation of the Care Transition Nurse role.     Patient: Carmelo Desir    Patient : 1955   MRN: [id number]    Reason for Admission: Hyperkalemia  Discharge Date: 25  RURS: Readmission Risk Score: 19      Last Discharge Facility       Date Complaint Diagnosis Description Type Department Provider    25 Shortness of Breath Hyperkalemia ... ED to Hosp-Admission (Discharged) (ADMITTED) [facility] 4 TELE Esperanza Arauz, DO; ,...            Was this an external facility discharge? No    Additional needs identified to be addressed with provider   No needs identified             Method of communication with provider: none.    Patients top risk factors for readmission: lack of knowledge about disease, level of motivation, medical condition-Hyperkalemia, ESRD, HD, DM, Htn, A-fib, CKD, COPD, Fe deficiency, and polypharmacy    Interventions to address risk factors:   Schedule and attend a HFU appt with pcp ([facility])~Pt intends on calling today to schedule his f/u appt  Secure reliable transportation for HD~Per pt, he has situated this now and is using [employer] for transport who will be picking him up tomorrow for his HD    Care Summary Note: CTN called and spoke with the pt for a non  PCP initial care transition call. Pt admitted on 25 for hyperkalemia after missing x2 HD sessions d/t lack of transportation. Per chart review, pt stated that transport never showed to take him. Pt was urgently taken to HD at the hospital. Pt was dc'd home with the need for O2. This was set up with [facility] prior to discharge.    Pt states that she is doing well and offered no new/worsening complaints. Pt

## 2025-01-27 ENCOUNTER — APPOINTMENT (OUTPATIENT)
Dept: GENERAL RADIOLOGY | Age: 70
End: 2025-01-27
Payer: MEDICARE

## 2025-01-27 ENCOUNTER — HOSPITAL ENCOUNTER (EMERGENCY)
Age: 70
Discharge: HOME | End: 2025-01-27
Attending: EMERGENCY MEDICINE
Payer: MEDICARE

## 2025-01-27 VITALS
RESPIRATION RATE: 18 BRPM | SYSTOLIC BLOOD PRESSURE: 108 MMHG | BODY MASS INDEX: 29.03 KG/M2 | HEIGHT: 67 IN | WEIGHT: 185 LBS | OXYGEN SATURATION: 95 % | DIASTOLIC BLOOD PRESSURE: 62 MMHG | HEART RATE: 89 BPM | TEMPERATURE: 98.2 F

## 2025-01-27 DIAGNOSIS — J44.1 COPD EXACERBATION (HCC): ICD-10-CM

## 2025-01-27 DIAGNOSIS — Z99.2 ESRD ON DIALYSIS (HCC): ICD-10-CM

## 2025-01-27 DIAGNOSIS — N18.6 ESRD ON DIALYSIS (HCC): ICD-10-CM

## 2025-01-27 DIAGNOSIS — J10.1 INFLUENZA A: Primary | ICD-10-CM

## 2025-01-27 DIAGNOSIS — E86.0 DEHYDRATION: ICD-10-CM

## 2025-01-27 LAB
ANION GAP SERPL CALCULATED.3IONS-SCNC: 15 MMOL/L (ref 7–16)
ANION GAP SERPL CALCULATED.3IONS-SCNC: 20 MMOL/L (ref 7–16)
B-OH-BUTYR SERPL-MCNC: 0.82 MMOL/L (ref 0.02–0.27)
BASOPHILS # BLD: 0.02 K/UL (ref 0–0.2)
BASOPHILS NFR BLD: 0 % (ref 0–2)
BUN SERPL-MCNC: 21 MG/DL (ref 6–23)
BUN SERPL-MCNC: 26 MG/DL (ref 6–23)
CALCIUM SERPL-MCNC: 8.9 MG/DL (ref 8.6–10.2)
CALCIUM SERPL-MCNC: 9.7 MG/DL (ref 8.6–10.2)
CHLORIDE SERPL-SCNC: 89 MMOL/L (ref 98–107)
CHLORIDE SERPL-SCNC: 90 MMOL/L (ref 98–107)
CO2 SERPL-SCNC: 21 MMOL/L (ref 22–29)
CO2 SERPL-SCNC: 28 MMOL/L (ref 22–29)
CREAT SERPL-MCNC: 7 MG/DL (ref 0.7–1.2)
CREAT SERPL-MCNC: 7.9 MG/DL (ref 0.7–1.2)
EKG ATRIAL RATE: 104 BPM
EKG P AXIS: 20 DEGREES
EKG P-R INTERVAL: 152 MS
EKG Q-T INTERVAL: 330 MS
EKG QRS DURATION: 70 MS
EKG QTC CALCULATION (BAZETT): 433 MS
EKG R AXIS: 29 DEGREES
EKG T AXIS: 42 DEGREES
EKG VENTRICULAR RATE: 104 BPM
EOSINOPHIL # BLD: 0.03 K/UL (ref 0.05–0.5)
EOSINOPHILS RELATIVE PERCENT: 0 % (ref 0–6)
ERYTHROCYTE [DISTWIDTH] IN BLOOD BY AUTOMATED COUNT: 21.6 % (ref 11.5–15)
FLUAV RNA RESP QL NAA+PROBE: DETECTED
FLUBV RNA RESP QL NAA+PROBE: NOT DETECTED
GFR, ESTIMATED: 7 ML/MIN/1.73M2
GFR, ESTIMATED: 8 ML/MIN/1.73M2
GLUCOSE SERPL-MCNC: 124 MG/DL (ref 74–99)
GLUCOSE SERPL-MCNC: 141 MG/DL (ref 74–99)
HCT VFR BLD AUTO: 46.4 % (ref 37–54)
HGB BLD-MCNC: 15 G/DL (ref 12.5–16.5)
IMM GRANULOCYTES # BLD AUTO: <0.03 K/UL (ref 0–0.58)
IMM GRANULOCYTES NFR BLD: 0 % (ref 0–5)
LYMPHOCYTES NFR BLD: 0.38 K/UL (ref 1.5–4)
LYMPHOCYTES RELATIVE PERCENT: 4 % (ref 20–42)
MCH RBC QN AUTO: 29.9 PG (ref 26–35)
MCHC RBC AUTO-ENTMCNC: 32.3 G/DL (ref 32–34.5)
MCV RBC AUTO: 92.6 FL (ref 80–99.9)
MONOCYTES NFR BLD: 1.13 K/UL (ref 0.1–0.95)
MONOCYTES NFR BLD: 13 % (ref 2–12)
NEUTROPHILS NFR BLD: 82 % (ref 43–80)
NEUTS SEG NFR BLD: 7.22 K/UL (ref 1.8–7.3)
PH VENOUS: 7.34 (ref 7.35–7.45)
PLATELET CONFIRMATION: NORMAL
PLATELET, FLUORESCENCE: 149 K/UL (ref 130–450)
PMV BLD AUTO: ABNORMAL FL (ref 7–12)
POTASSIUM SERPL-SCNC: 4.6 MMOL/L (ref 3.5–5)
POTASSIUM SERPL-SCNC: 5 MMOL/L (ref 3.5–5)
RBC # BLD AUTO: 5.01 M/UL (ref 3.8–5.8)
SARS-COV-2 RNA RESP QL NAA+PROBE: NOT DETECTED
SODIUM SERPL-SCNC: 131 MMOL/L (ref 132–146)
SODIUM SERPL-SCNC: 132 MMOL/L (ref 132–146)
SOURCE: ABNORMAL
SPECIMEN DESCRIPTION: ABNORMAL
TROPONIN I SERPL HS-MCNC: 153 NG/L (ref 0–11)
TROPONIN I SERPL HS-MCNC: 170 NG/L (ref 0–11)
TROPONIN I SERPL HS-MCNC: 173 NG/L (ref 0–11)
WBC OTHER # BLD: 8.8 K/UL (ref 4.5–11.5)

## 2025-01-27 PROCEDURE — 71046 X-RAY EXAM CHEST 2 VIEWS: CPT

## 2025-01-27 PROCEDURE — 80048 BASIC METABOLIC PNL TOTAL CA: CPT

## 2025-01-27 PROCEDURE — 82010 KETONE BODYS QUAN: CPT

## 2025-01-27 PROCEDURE — 87636 SARSCOV2 & INF A&B AMP PRB: CPT

## 2025-01-27 PROCEDURE — 93005 ELECTROCARDIOGRAM TRACING: CPT

## 2025-01-27 PROCEDURE — 94640 AIRWAY INHALATION TREATMENT: CPT

## 2025-01-27 PROCEDURE — 6370000000 HC RX 637 (ALT 250 FOR IP)

## 2025-01-27 PROCEDURE — 93010 ELECTROCARDIOGRAM REPORT: CPT | Performed by: INTERNAL MEDICINE

## 2025-01-27 PROCEDURE — 99285 EMERGENCY DEPT VISIT HI MDM: CPT

## 2025-01-27 PROCEDURE — 84484 ASSAY OF TROPONIN QUANT: CPT

## 2025-01-27 PROCEDURE — 82800 BLOOD PH: CPT

## 2025-01-27 PROCEDURE — 85025 COMPLETE CBC W/AUTO DIFF WBC: CPT

## 2025-01-27 RX ORDER — DOXYCYCLINE 100 MG/1
100 CAPSULE ORAL ONCE
Status: COMPLETED | OUTPATIENT
Start: 2025-01-27 | End: 2025-01-27

## 2025-01-27 RX ORDER — 0.9 % SODIUM CHLORIDE 0.9 %
500 INTRAVENOUS SOLUTION INTRAVENOUS ONCE
Status: DISCONTINUED | OUTPATIENT
Start: 2025-01-27 | End: 2025-01-27 | Stop reason: HOSPADM

## 2025-01-27 RX ORDER — SODIUM CHLORIDE 9 MG/ML
INJECTION, SOLUTION INTRAVENOUS
Status: DISCONTINUED
Start: 2025-01-27 | End: 2025-01-27 | Stop reason: HOSPADM

## 2025-01-27 RX ORDER — GUAIFENESIN/DEXTROMETHORPHAN 100-10MG/5
10 SYRUP ORAL ONCE
Status: COMPLETED | OUTPATIENT
Start: 2025-01-27 | End: 2025-01-27

## 2025-01-27 RX ORDER — IPRATROPIUM BROMIDE AND ALBUTEROL SULFATE 2.5; .5 MG/3ML; MG/3ML
3 SOLUTION RESPIRATORY (INHALATION) ONCE
Status: COMPLETED | OUTPATIENT
Start: 2025-01-27 | End: 2025-01-27

## 2025-01-27 RX ORDER — DOXYCYCLINE HYCLATE 100 MG
100 TABLET ORAL 2 TIMES DAILY
Qty: 14 TABLET | Refills: 0 | Status: ON HOLD | OUTPATIENT
Start: 2025-01-27 | End: 2025-02-03

## 2025-01-27 RX ORDER — PREDNISONE 50 MG/1
50 TABLET ORAL DAILY
Qty: 5 TABLET | Refills: 0 | Status: ON HOLD | OUTPATIENT
Start: 2025-01-27 | End: 2025-02-01

## 2025-01-27 RX ORDER — CEFDINIR 300 MG/1
300 CAPSULE ORAL ONCE
Status: COMPLETED | OUTPATIENT
Start: 2025-01-27 | End: 2025-01-27

## 2025-01-27 RX ORDER — CEFDINIR 300 MG/1
300 CAPSULE ORAL 2 TIMES DAILY
Qty: 14 CAPSULE | Refills: 0 | Status: ON HOLD | OUTPATIENT
Start: 2025-01-27 | End: 2025-02-03

## 2025-01-27 RX ADMIN — Medication 500 ML: at 16:03

## 2025-01-27 RX ADMIN — CEFDINIR 300 MG: 300 CAPSULE ORAL at 14:46

## 2025-01-27 RX ADMIN — IPRATROPIUM BROMIDE AND ALBUTEROL SULFATE 3 DOSE: .5; 2.5 SOLUTION RESPIRATORY (INHALATION) at 12:31

## 2025-01-27 RX ADMIN — DOXYCYCLINE HYCLATE 100 MG: 100 CAPSULE ORAL at 14:46

## 2025-01-27 RX ADMIN — SODIUM CHLORIDE 500 ML: 9 INJECTION, SOLUTION INTRAVENOUS at 16:03

## 2025-01-27 RX ADMIN — GUAIFENESIN AND DEXTROMETHORPHAN 10 ML: 100; 10 SYRUP ORAL at 12:23

## 2025-01-27 NOTE — ED PROVIDER NOTES
acknowledges understanding of all of these instructions and is to be discharged at this time. Patient is agreeable to plan and all questions have been answered at this time.    Discharge Medications:  New Prescriptions    CEFDINIR (OMNICEF) 300 MG CAPSULE    Take 1 capsule by mouth 2 times daily for 7 days    DOXYCYCLINE HYCLATE (VIBRA-TABS) 100 MG TABLET    Take 1 tablet by mouth 2 times daily for 7 days    PREDNISONE (DELTASONE) 50 MG TABLET    Take 1 tablet by mouth daily for 5 days       Counseling:   The emergency provider has spoken with the patient and discussed today’s results including the incidental findings if present, in addition to providing specific details for the plan of care and counseling regarding the diagnosis and prognosis.  Questions are answered at this time and they are agreeable with the plan.     --------------------------------- IMPRESSION AND DISPOSITION ---------------------------------    IMPRESSION  1. Influenza A    2. Dehydration    3. ESRD on dialysis (HCC)    4. COPD exacerbation ([facility])        DISPOSITION  Disposition: Discharge to home  Patient condition is stable    NOTE: This report was transcribed using voice recognition software. Every effort was made to ensure accuracy; however, inadvertent computerized transcription errors may be present      Dean Linder MD

## 2025-01-28 NOTE — ED NOTES
Pt being verbally aggressive towards staff and swearing stating \"get the hell away from me.\" Pt advised that he is discharged and he is refusing to leave. Pt continues to swear at staff and Aaliyah WRAY called to escort patient from department.

## 2025-01-29 ENCOUNTER — HOSPITAL ENCOUNTER (INPATIENT)
Age: 70
LOS: 5 days | Discharge: HOME | DRG: 871 | End: 2025-02-03
Attending: STUDENT IN AN ORGANIZED HEALTH CARE EDUCATION/TRAINING PROGRAM | Admitting: FAMILY MEDICINE
Payer: OTHER GOVERNMENT

## 2025-01-29 ENCOUNTER — APPOINTMENT (OUTPATIENT)
Dept: GENERAL RADIOLOGY | Age: 70
DRG: 871 | End: 2025-01-29
Payer: OTHER GOVERNMENT

## 2025-01-29 DIAGNOSIS — N18.6 ESRD (END STAGE RENAL DISEASE) ON DIALYSIS (HCC): ICD-10-CM

## 2025-01-29 DIAGNOSIS — E87.5 HYPERKALEMIA: ICD-10-CM

## 2025-01-29 DIAGNOSIS — J18.9 PNEUMONIA OF BOTH LOWER LOBES DUE TO INFECTIOUS ORGANISM: Primary | ICD-10-CM

## 2025-01-29 DIAGNOSIS — Z99.2 ESRD (END STAGE RENAL DISEASE) ON DIALYSIS (HCC): ICD-10-CM

## 2025-01-29 DIAGNOSIS — J10.1 INFLUENZA A: ICD-10-CM

## 2025-01-29 DIAGNOSIS — J96.21 ACUTE ON CHRONIC HYPOXIC RESPIRATORY FAILURE: ICD-10-CM

## 2025-01-29 LAB
ALBUMIN SERPL-MCNC: 3.7 G/DL (ref 3.5–5.2)
ALP SERPL-CCNC: 61 U/L (ref 40–129)
ALT SERPL-CCNC: 10 U/L (ref 0–40)
ANION GAP SERPL CALCULATED.3IONS-SCNC: 26 MMOL/L (ref 7–16)
AST SERPL-CCNC: 19 U/L (ref 0–39)
BASOPHILS # BLD: 0 K/UL (ref 0–0.2)
BASOPHILS NFR BLD: 0 % (ref 0–2)
BILIRUB DIRECT SERPL-MCNC: <0.2 MG/DL (ref 0–0.3)
BILIRUB INDIRECT SERPL-MCNC: ABNORMAL MG/DL (ref 0–1)
BILIRUB SERPL-MCNC: 0.4 MG/DL (ref 0–1.2)
BUN SERPL-MCNC: 69 MG/DL (ref 6–23)
CALCIUM SERPL-MCNC: 10 MG/DL (ref 8.6–10.2)
CHLORIDE SERPL-SCNC: 90 MMOL/L (ref 98–107)
CO2 SERPL-SCNC: 19 MMOL/L (ref 22–29)
CREAT SERPL-MCNC: 13.8 MG/DL (ref 0.7–1.2)
CRP SERPL HS-MCNC: 117 MG/L (ref 0–5)
EKG ATRIAL RATE: 107 BPM
EKG P AXIS: 37 DEGREES
EKG P-R INTERVAL: 152 MS
EKG Q-T INTERVAL: 336 MS
EKG QRS DURATION: 78 MS
EKG QTC CALCULATION (BAZETT): 448 MS
EKG R AXIS: 48 DEGREES
EKG VENTRICULAR RATE: 107 BPM
EOSINOPHIL # BLD: 0 K/UL (ref 0.05–0.5)
EOSINOPHILS RELATIVE PERCENT: 0 % (ref 0–6)
ERYTHROCYTE [DISTWIDTH] IN BLOOD BY AUTOMATED COUNT: 18 % (ref 11.5–15)
ERYTHROCYTE [SEDIMENTATION RATE] IN BLOOD BY WESTERGREN METHOD: 31 MM/HR (ref 0–15)
FLUAV RNA RESP QL NAA+PROBE: DETECTED
FLUBV RNA RESP QL NAA+PROBE: NOT DETECTED
GFR, ESTIMATED: 4 ML/MIN/1.73M2
GLUCOSE BLD-MCNC: 129 MG/DL (ref 74–99)
GLUCOSE BLD-MCNC: 151 MG/DL (ref 74–99)
GLUCOSE BLD-MCNC: 161 MG/DL (ref 74–99)
GLUCOSE BLD-MCNC: 166 MG/DL (ref 74–99)
GLUCOSE BLD-MCNC: 175 MG/DL (ref 74–99)
GLUCOSE SERPL-MCNC: 127 MG/DL (ref 74–99)
HCT VFR BLD AUTO: 48.7 % (ref 37–54)
HGB BLD-MCNC: 15.4 G/DL (ref 12.5–16.5)
INR PPP: 1.2
LACTATE BLDV-SCNC: 2.6 MMOL/L (ref 0.5–1.9)
LACTATE BLDV-SCNC: 2.6 MMOL/L (ref 0.5–1.9)
LYMPHOCYTES NFR BLD: 0.77 K/UL (ref 1.5–4)
LYMPHOCYTES RELATIVE PERCENT: 11 % (ref 20–42)
MCH RBC QN AUTO: 29.7 PG (ref 26–35)
MCHC RBC AUTO-ENTMCNC: 31.6 G/DL (ref 32–34.5)
MCV RBC AUTO: 94 FL (ref 80–99.9)
MONOCYTES NFR BLD: 1.48 K/UL (ref 0.1–0.95)
MONOCYTES NFR BLD: 22 % (ref 2–12)
NEUTROPHILS NFR BLD: 67 % (ref 43–80)
NEUTS SEG NFR BLD: 4.55 K/UL (ref 1.8–7.3)
PARTIAL THROMBOPLASTIN TIME: 34.6 SEC (ref 24.5–35.1)
PLATELET, FLUORESCENCE: 137 K/UL (ref 130–450)
PMV BLD AUTO: 11.7 FL (ref 7–12)
POTASSIUM SERPL-SCNC: 5.3 MMOL/L (ref 3.5–5)
POTASSIUM SERPL-SCNC: 6.7 MMOL/L (ref 3.5–5)
PROCALCITONIN SERPL-MCNC: 2.05 NG/ML (ref 0–0.08)
PROCALCITONIN SERPL-MCNC: 2.09 NG/ML (ref 0–0.08)
PROT SERPL-MCNC: 8.7 G/DL (ref 6.4–8.3)
PROTHROMBIN TIME: 12.7 SEC (ref 9.3–12.4)
RBC # BLD AUTO: 5.18 M/UL (ref 3.8–5.8)
RBC # BLD: ABNORMAL 10*6/UL
SARS-COV-2 RNA RESP QL NAA+PROBE: NOT DETECTED
SODIUM SERPL-SCNC: 135 MMOL/L (ref 132–146)
SOURCE: ABNORMAL
SPECIMEN DESCRIPTION: ABNORMAL
WBC OTHER # BLD: 6.8 K/UL (ref 4.5–11.5)

## 2025-01-29 PROCEDURE — 36415 COLL VENOUS BLD VENIPUNCTURE: CPT

## 2025-01-29 PROCEDURE — 2580000003 HC RX 258: Performed by: FAMILY MEDICINE

## 2025-01-29 PROCEDURE — 96374 THER/PROPH/DIAG INJ IV PUSH: CPT

## 2025-01-29 PROCEDURE — 71045 X-RAY EXAM CHEST 1 VIEW: CPT

## 2025-01-29 PROCEDURE — 87636 SARSCOV2 & INF A&B AMP PRB: CPT

## 2025-01-29 PROCEDURE — 90935 HEMODIALYSIS ONE EVALUATION: CPT

## 2025-01-29 PROCEDURE — 85730 THROMBOPLASTIN TIME PARTIAL: CPT

## 2025-01-29 PROCEDURE — 93005 ELECTROCARDIOGRAM TRACING: CPT | Performed by: STUDENT IN AN ORGANIZED HEALTH CARE EDUCATION/TRAINING PROGRAM

## 2025-01-29 PROCEDURE — 6360000002 HC RX W HCPCS: Performed by: FAMILY MEDICINE

## 2025-01-29 PROCEDURE — 99223 1ST HOSP IP/OBS HIGH 75: CPT | Performed by: FAMILY MEDICINE

## 2025-01-29 PROCEDURE — 6370000000 HC RX 637 (ALT 250 FOR IP): Performed by: FAMILY MEDICINE

## 2025-01-29 PROCEDURE — 2060000000 HC ICU INTERMEDIATE R&B

## 2025-01-29 PROCEDURE — 6370000000 HC RX 637 (ALT 250 FOR IP): Performed by: STUDENT IN AN ORGANIZED HEALTH CARE EDUCATION/TRAINING PROGRAM

## 2025-01-29 PROCEDURE — 6360000002 HC RX W HCPCS: Performed by: STUDENT IN AN ORGANIZED HEALTH CARE EDUCATION/TRAINING PROGRAM

## 2025-01-29 PROCEDURE — 86140 C-REACTIVE PROTEIN: CPT

## 2025-01-29 PROCEDURE — 84132 ASSAY OF SERUM POTASSIUM: CPT

## 2025-01-29 PROCEDURE — 96375 TX/PRO/DX INJ NEW DRUG ADDON: CPT

## 2025-01-29 PROCEDURE — 94640 AIRWAY INHALATION TREATMENT: CPT

## 2025-01-29 PROCEDURE — 86738 MYCOPLASMA ANTIBODY: CPT

## 2025-01-29 PROCEDURE — 85025 COMPLETE CBC W/AUTO DIFF WBC: CPT

## 2025-01-29 PROCEDURE — 82962 GLUCOSE BLOOD TEST: CPT

## 2025-01-29 PROCEDURE — 2580000003 HC RX 258

## 2025-01-29 PROCEDURE — 87040 BLOOD CULTURE FOR BACTERIA: CPT

## 2025-01-29 PROCEDURE — 2580000003 HC RX 258: Performed by: STUDENT IN AN ORGANIZED HEALTH CARE EDUCATION/TRAINING PROGRAM

## 2025-01-29 PROCEDURE — 85610 PROTHROMBIN TIME: CPT

## 2025-01-29 PROCEDURE — 83605 ASSAY OF LACTIC ACID: CPT

## 2025-01-29 PROCEDURE — 2500000003 HC RX 250 WO HCPCS: Performed by: FAMILY MEDICINE

## 2025-01-29 PROCEDURE — 80053 COMPREHEN METABOLIC PANEL: CPT

## 2025-01-29 PROCEDURE — 85652 RBC SED RATE AUTOMATED: CPT

## 2025-01-29 PROCEDURE — 84145 PROCALCITONIN (PCT): CPT

## 2025-01-29 PROCEDURE — 99285 EMERGENCY DEPT VISIT HI MDM: CPT

## 2025-01-29 PROCEDURE — 2700000000 HC OXYGEN THERAPY PER DAY

## 2025-01-29 PROCEDURE — 2500000003 HC RX 250 WO HCPCS: Performed by: STUDENT IN AN ORGANIZED HEALTH CARE EDUCATION/TRAINING PROGRAM

## 2025-01-29 PROCEDURE — 82248 BILIRUBIN DIRECT: CPT

## 2025-01-29 PROCEDURE — 93010 ELECTROCARDIOGRAM REPORT: CPT | Performed by: INTERNAL MEDICINE

## 2025-01-29 RX ORDER — DOXYCYCLINE 100 MG/1
100 CAPSULE ORAL ONCE
Status: COMPLETED | OUTPATIENT
Start: 2025-01-29 | End: 2025-01-29

## 2025-01-29 RX ORDER — GLUCAGON 1 MG/ML
1 KIT INJECTION PRN
Status: DISCONTINUED | OUTPATIENT
Start: 2025-01-29 | End: 2025-02-03 | Stop reason: HOSPADM

## 2025-01-29 RX ORDER — FLUTICASONE PROPIONATE 50 MCG
2 SPRAY, SUSPENSION (ML) NASAL DAILY PRN
Status: DISCONTINUED | OUTPATIENT
Start: 2025-01-29 | End: 2025-02-03 | Stop reason: HOSPADM

## 2025-01-29 RX ORDER — ONDANSETRON 2 MG/ML
4 INJECTION INTRAMUSCULAR; INTRAVENOUS EVERY 6 HOURS PRN
Status: DISCONTINUED | OUTPATIENT
Start: 2025-01-29 | End: 2025-02-03 | Stop reason: HOSPADM

## 2025-01-29 RX ORDER — INSULIN LISPRO 100 [IU]/ML
8 INJECTION, SOLUTION INTRAVENOUS; SUBCUTANEOUS
Status: DISCONTINUED | OUTPATIENT
Start: 2025-01-29 | End: 2025-02-03 | Stop reason: HOSPADM

## 2025-01-29 RX ORDER — CALCIUM GLUCONATE 20 MG/ML
1000 INJECTION, SOLUTION INTRAVENOUS ONCE
Status: COMPLETED | OUTPATIENT
Start: 2025-01-29 | End: 2025-01-29

## 2025-01-29 RX ORDER — POLYETHYLENE GLYCOL 3350 17 G/17G
17 POWDER, FOR SOLUTION ORAL DAILY PRN
Status: DISCONTINUED | OUTPATIENT
Start: 2025-01-29 | End: 2025-02-03 | Stop reason: HOSPADM

## 2025-01-29 RX ORDER — DEXTROSE MONOHYDRATE 100 MG/ML
INJECTION, SOLUTION INTRAVENOUS CONTINUOUS PRN
Status: DISCONTINUED | OUTPATIENT
Start: 2025-01-29 | End: 2025-02-03 | Stop reason: HOSPADM

## 2025-01-29 RX ORDER — METOPROLOL SUCCINATE 25 MG/1
25 TABLET, EXTENDED RELEASE ORAL DAILY
Status: DISCONTINUED | OUTPATIENT
Start: 2025-01-29 | End: 2025-02-03 | Stop reason: HOSPADM

## 2025-01-29 RX ORDER — SODIUM CHLORIDE 9 MG/ML
INJECTION, SOLUTION INTRAVENOUS
Status: DISPENSED
Start: 2025-01-29 | End: 2025-01-29

## 2025-01-29 RX ORDER — SODIUM CHLORIDE 9 MG/ML
INJECTION, SOLUTION INTRAVENOUS PRN
Status: DISCONTINUED | OUTPATIENT
Start: 2025-01-29 | End: 2025-02-03 | Stop reason: HOSPADM

## 2025-01-29 RX ORDER — ACETAMINOPHEN 325 MG/1
650 TABLET ORAL EVERY 6 HOURS PRN
Status: DISCONTINUED | OUTPATIENT
Start: 2025-01-29 | End: 2025-02-03 | Stop reason: HOSPADM

## 2025-01-29 RX ORDER — OSELTAMIVIR PHOSPHATE 30 MG/1
30 CAPSULE ORAL DAILY
Status: DISCONTINUED | OUTPATIENT
Start: 2025-01-29 | End: 2025-01-30

## 2025-01-29 RX ORDER — DEXAMETHASONE SODIUM PHOSPHATE 10 MG/ML
10 INJECTION INTRAMUSCULAR; INTRAVENOUS ONCE
Status: COMPLETED | OUTPATIENT
Start: 2025-01-29 | End: 2025-01-29

## 2025-01-29 RX ORDER — AMMONIUM LACTATE 12 G/100G
CREAM TOPICAL 2 TIMES DAILY
Status: DISCONTINUED | OUTPATIENT
Start: 2025-01-29 | End: 2025-02-03 | Stop reason: HOSPADM

## 2025-01-29 RX ORDER — 0.9 % SODIUM CHLORIDE 0.9 %
500 INTRAVENOUS SOLUTION INTRAVENOUS ONCE
Status: COMPLETED | OUTPATIENT
Start: 2025-01-29 | End: 2025-01-29

## 2025-01-29 RX ORDER — INSULIN GLARGINE 100 [IU]/ML
28 INJECTION, SOLUTION SUBCUTANEOUS NIGHTLY
Status: DISCONTINUED | OUTPATIENT
Start: 2025-01-29 | End: 2025-01-30

## 2025-01-29 RX ORDER — IPRATROPIUM BROMIDE AND ALBUTEROL SULFATE 2.5; .5 MG/3ML; MG/3ML
1 SOLUTION RESPIRATORY (INHALATION)
Status: DISCONTINUED | OUTPATIENT
Start: 2025-01-29 | End: 2025-01-30

## 2025-01-29 RX ORDER — ONDANSETRON 4 MG/1
4 TABLET, ORALLY DISINTEGRATING ORAL EVERY 8 HOURS PRN
Status: DISCONTINUED | OUTPATIENT
Start: 2025-01-29 | End: 2025-02-03 | Stop reason: HOSPADM

## 2025-01-29 RX ORDER — SODIUM CHLORIDE 0.9 % (FLUSH) 0.9 %
5-40 SYRINGE (ML) INJECTION EVERY 12 HOURS SCHEDULED
Status: DISCONTINUED | OUTPATIENT
Start: 2025-01-29 | End: 2025-02-03 | Stop reason: HOSPADM

## 2025-01-29 RX ORDER — ACETAMINOPHEN 650 MG/1
650 SUPPOSITORY RECTAL EVERY 6 HOURS PRN
Status: DISCONTINUED | OUTPATIENT
Start: 2025-01-29 | End: 2025-02-03 | Stop reason: HOSPADM

## 2025-01-29 RX ORDER — IPRATROPIUM BROMIDE AND ALBUTEROL SULFATE 2.5; .5 MG/3ML; MG/3ML
1 SOLUTION RESPIRATORY (INHALATION)
Status: COMPLETED | OUTPATIENT
Start: 2025-01-29 | End: 2025-01-29

## 2025-01-29 RX ORDER — SODIUM CHLORIDE 0.9 % (FLUSH) 0.9 %
5-40 SYRINGE (ML) INJECTION PRN
Status: DISCONTINUED | OUTPATIENT
Start: 2025-01-29 | End: 2025-02-03 | Stop reason: HOSPADM

## 2025-01-29 RX ORDER — SODIUM CHLORIDE 9 MG/ML
INJECTION, SOLUTION INTRAVENOUS
Status: COMPLETED
Start: 2025-01-29 | End: 2025-01-29

## 2025-01-29 RX ORDER — MAGNESIUM SULFATE IN WATER 40 MG/ML
2000 INJECTION, SOLUTION INTRAVENOUS PRN
Status: DISCONTINUED | OUTPATIENT
Start: 2025-01-29 | End: 2025-01-29

## 2025-01-29 RX ORDER — SEVELAMER CARBONATE 800 MG/1
1600 TABLET, FILM COATED ORAL
Status: DISCONTINUED | OUTPATIENT
Start: 2025-01-29 | End: 2025-02-03 | Stop reason: HOSPADM

## 2025-01-29 RX ADMIN — IPRATROPIUM BROMIDE AND ALBUTEROL SULFATE 1 DOSE: .5; 3 SOLUTION RESPIRATORY (INHALATION) at 07:13

## 2025-01-29 RX ADMIN — Medication 10 ML: at 21:05

## 2025-01-29 RX ADMIN — DOXYCYCLINE 100 MG: 100 INJECTION, POWDER, LYOPHILIZED, FOR SOLUTION INTRAVENOUS at 21:08

## 2025-01-29 RX ADMIN — INSULIN HUMAN 5 UNITS: 100 INJECTION, SOLUTION PARENTERAL at 08:05

## 2025-01-29 RX ADMIN — IPRATROPIUM BROMIDE AND ALBUTEROL SULFATE 1 DOSE: .5; 3 SOLUTION RESPIRATORY (INHALATION) at 18:34

## 2025-01-29 RX ADMIN — IPRATROPIUM BROMIDE AND ALBUTEROL SULFATE 1 DOSE: .5; 3 SOLUTION RESPIRATORY (INHALATION) at 07:14

## 2025-01-29 RX ADMIN — IPRATROPIUM BROMIDE AND ALBUTEROL SULFATE 1 DOSE: .5; 3 SOLUTION RESPIRATORY (INHALATION) at 07:15

## 2025-01-29 RX ADMIN — OSELTAMIVIR PHOSPHATE 30 MG: 30 CAPSULE ORAL at 18:31

## 2025-01-29 RX ADMIN — IPRATROPIUM BROMIDE AND ALBUTEROL SULFATE 1 DOSE: .5; 3 SOLUTION RESPIRATORY (INHALATION) at 12:11

## 2025-01-29 RX ADMIN — APIXABAN 5 MG: 5 TABLET, FILM COATED ORAL at 21:04

## 2025-01-29 RX ADMIN — WATER 2000 MG: 1 INJECTION INTRAMUSCULAR; INTRAVENOUS; SUBCUTANEOUS at 07:56

## 2025-01-29 RX ADMIN — SODIUM CHLORIDE, PRESERVATIVE FREE 40 MG: 5 INJECTION INTRAVENOUS at 21:04

## 2025-01-29 RX ADMIN — INSULIN GLARGINE 28 UNITS: 100 INJECTION, SOLUTION SUBCUTANEOUS at 21:05

## 2025-01-29 RX ADMIN — DEXAMETHASONE SODIUM PHOSPHATE 10 MG: 10 INJECTION INTRAMUSCULAR; INTRAVENOUS at 07:58

## 2025-01-29 RX ADMIN — SODIUM CHLORIDE, PRESERVATIVE FREE 40 MG: 5 INJECTION INTRAVENOUS at 11:05

## 2025-01-29 RX ADMIN — SODIUM CHLORIDE 500 ML: 9 INJECTION, SOLUTION INTRAVENOUS at 08:01

## 2025-01-29 RX ADMIN — Medication 10 ML: at 11:09

## 2025-01-29 RX ADMIN — APIXABAN 5 MG: 5 TABLET, FILM COATED ORAL at 11:05

## 2025-01-29 RX ADMIN — DEXTROSE MONOHYDRATE 250 ML: 100 INJECTION, SOLUTION INTRAVENOUS at 08:08

## 2025-01-29 RX ADMIN — CALCIUM GLUCONATE 1000 MG: 20 INJECTION, SOLUTION INTRAVENOUS at 08:31

## 2025-01-29 RX ADMIN — SODIUM ZIRCONIUM CYCLOSILICATE 10 G: 10 POWDER, FOR SUSPENSION ORAL at 08:12

## 2025-01-29 RX ADMIN — DOXYCYCLINE HYCLATE 100 MG: 100 CAPSULE ORAL at 07:56

## 2025-01-29 RX ADMIN — Medication 500 ML: at 08:01

## 2025-01-29 RX ADMIN — SEVELAMER CARBONATE 1600 MG: 800 TABLET, FILM COATED ORAL at 18:30

## 2025-01-29 ASSESSMENT — PAIN SCALES - GENERAL
PAINLEVEL_OUTOF10: 9
PAINLEVEL_OUTOF10: 0

## 2025-01-29 ASSESSMENT — PAIN - FUNCTIONAL ASSESSMENT: PAIN_FUNCTIONAL_ASSESSMENT: 0-10

## 2025-01-29 NOTE — H&P
(HCC)     History of cardiovascular stress test 02/16/2015    lexiscan    HTN (hypertension) 07/28/2015    Hyperlipidemia     Hypertension     Lumbar radicular pain 07/28/2015    Oxygen dependent     wears 2 liters at home    Type II or unspecified type diabetes mellitus without mention of complication, not stated as uncontrolled      Review of Systems   Constitutional:  Positive for activity change, appetite change, chills and fatigue.   HENT:  Positive for congestion.    Eyes: Negative.    Respiratory:  Positive for cough, chest tightness and shortness of breath.    Cardiovascular: Negative.    Gastrointestinal: Negative.    Endocrine: Negative.    Genitourinary: Negative.    Musculoskeletal: Negative.    Skin: Negative.    Allergic/Immunologic: Negative.    Neurological:  Positive for headaches.   Hematological: Negative.    Psychiatric/Behavioral: Negative.       Physical Exam  Constitutional:       Appearance: He is ill-appearing and diaphoretic.      Comments: Appears ill, nonrebreather, mild respiratory distress   HENT:      Head: Normocephalic and atraumatic.      Nose: Nose normal. No congestion.      Mouth/Throat:      Mouth: Mucous membranes are moist.      Comments: Narrowed oropharynx  Eyes:      Extraocular Movements: Extraocular movements intact.      Pupils: Pupils are equal, round, and reactive to light.   Neck:      Comments: Increased neck circumference, JVD  Cardiovascular:      Rate and Rhythm: Tachycardia present. Rhythm irregular.      Heart sounds: Murmur heard.   Pulmonary:      Breath sounds: Wheezing and rales present.      Comments: Increased effort, on nonrebreather, dyspneic with conversation, tachypneic  Abdominal:      General: There is no distension.      Palpations: Abdomen is soft.   Musculoskeletal:      Cervical back: Normal range of motion and neck supple.      Right lower leg: No edema.      Left lower leg: No edema.      Comments: Fistula right arm   Neurological:      Mental

## 2025-01-29 NOTE — ED PROVIDER NOTES
[facility] EMERGENCY DEPARTMENT  EMERGENCY DEPARTMENT ENCOUNTER        Pt Name: Carmelo Desir  MRN: [id number]  Birthdate 1955  Date of evaluation: 1/29/2025  Provider: Moise Barakat MD  PCP: Sri Talamantes APRN - CNP  Note Started: 6:01 AM EST 1/29/25    CHIEF COMPLAINT       Chief Complaint   Patient presents with    Shortness of Breath     Pt states that he was diagnosed with pneumonia Monday, pt states he was unable to get medication at pharmacy. States he started to have a harder time breathing last night       HISTORY OF PRESENT ILLNESS: 1 or more Elements        Limitations to history : None    Carmelo Desir is a 69 y.o. male who presents to the emergency room for shortness of breath, hot and cold spells, cough, diarrhea.  Patient states he was recently seen in the emergency department and diagnosed with pneumonia, but says he has not had access to reach the pharmacy to  the antibiotics he was prescribed.  Has had worsening shortness of breath since then.  Typically wears 2 L, currently requiring 5 L to oxygenate.  Denies any nausea or vomiting, denies any chest pain.  Does not make urine any longer.  Does Monday Wednesday Friday dialysis.    Nursing Notes were all reviewed and agreed with or any disagreements were addressed in the HPI.      REVIEW OF EXTERNAL NOTE :       As below    REVIEW OF SYSTEMS :      Positives and Pertinent negatives as per HPI.     SURGICAL HISTORY     Past Surgical History:   Procedure Laterality Date    CARDIOVASCULAR STRESS TEST N/A 05/24/2023    Lexiscan stress test    CATHETER REMOVAL Left 8/17/2023    removal of tunneled hemodialysis catheter [place] [facility]    DIALYSIS FISTULA CREATION Right 02/23/2023    AV FISTULA CREATION RIGHT ARM performed [place] [facility]    DIALYSIS FISTULA CREATION Right 1/4/2024    REVISION AV FISTULA RIGHT ARM performed by Thad Oneal MD at

## 2025-01-29 NOTE — FLOWSHEET NOTE
01/29/25 1658   Vital Signs   BP (!) 118/55   Pulse 90   Respirations 18   Pain Assessment   Pain Assessment None - Denies Pain   Post-Hemodialysis Assessment   Post-Treatment Procedures Blood returned;Access bleeding time < 10 minutes   Machine Disinfection Process Acid/Vinegar Clean;Heat Disinfect;Exterior Machine Disinfection   Rinseback Volume (ml) 300 ml   Blood Volume Processed (Liters) 60.3 L   Dialyzer Clearance Lightly streaked   Duration of Treatment (minutes) 210 minutes   Hemodialysis Intake (ml) 300 ml   Hemodialysis Output (ml) 2100 ml   NET Removed (ml) 1800   Tolerated Treatment Good   Patient Response to Treatment tolerated well   Time Off 1645   Patient Disposition Return to room

## 2025-01-29 NOTE — ED NOTES
Dr. De Leon notified of pt SPO2 of 82-85%. Instructed to call respiratory to place patient on bipap. Respiratory called.

## 2025-01-29 NOTE — ED NOTES
Spoke with Elmo at [facility], he is [facility] for [facility]. He can be reached at [phone] ext [phone]
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
## 2025-01-30 PROBLEM — E11.65 UNCONTROLLED TYPE 2 DIABETES MELLITUS WITH HYPERGLYCEMIA (HCC): Status: ACTIVE | Noted: 2025-01-30

## 2025-01-30 PROBLEM — J10.1 INFLUENZA A: Status: ACTIVE | Noted: 2025-01-30

## 2025-01-30 PROBLEM — Z99.2 ESRD (END STAGE RENAL DISEASE) ON DIALYSIS (HCC): Status: ACTIVE | Noted: 2025-01-30

## 2025-01-30 PROBLEM — N18.6 ESRD (END STAGE RENAL DISEASE) ON DIALYSIS (HCC): Status: ACTIVE | Noted: 2025-01-30

## 2025-01-30 PROBLEM — J15.9 BACTERIAL PNEUMONIA: Status: ACTIVE | Noted: 2025-01-30

## 2025-01-30 LAB
ALBUMIN SERPL-MCNC: 3.6 G/DL (ref 3.5–5.2)
ALP SERPL-CCNC: 60 U/L (ref 40–129)
ALT SERPL-CCNC: 11 U/L (ref 0–40)
ANION GAP SERPL CALCULATED.3IONS-SCNC: 22 MMOL/L (ref 7–16)
AST SERPL-CCNC: 16 U/L (ref 0–39)
BASOPHILS # BLD: 0 K/UL (ref 0–0.2)
BASOPHILS NFR BLD: 0 % (ref 0–2)
BILIRUB SERPL-MCNC: 0.2 MG/DL (ref 0–1.2)
BUN SERPL-MCNC: 50 MG/DL (ref 6–23)
CALCIUM SERPL-MCNC: 8.9 MG/DL (ref 8.6–10.2)
CHLORIDE SERPL-SCNC: 90 MMOL/L (ref 98–107)
CK SERPL-CCNC: 128 U/L (ref 20–200)
CO2 SERPL-SCNC: 25 MMOL/L (ref 22–29)
CREAT SERPL-MCNC: 9.6 MG/DL (ref 0.7–1.2)
EOSINOPHIL # BLD: 0 K/UL (ref 0.05–0.5)
EOSINOPHILS RELATIVE PERCENT: 0 % (ref 0–6)
ERYTHROCYTE [DISTWIDTH] IN BLOOD BY AUTOMATED COUNT: 17.6 % (ref 11.5–15)
GFR, ESTIMATED: 5 ML/MIN/1.73M2
GLUCOSE BLD-MCNC: 131 MG/DL (ref 74–99)
GLUCOSE BLD-MCNC: 178 MG/DL (ref 74–99)
GLUCOSE BLD-MCNC: 179 MG/DL (ref 74–99)
GLUCOSE BLD-MCNC: 52 MG/DL (ref 74–99)
GLUCOSE BLD-MCNC: 56 MG/DL (ref 74–99)
GLUCOSE BLD-MCNC: 76 MG/DL (ref 74–99)
GLUCOSE BLD-MCNC: 87 MG/DL (ref 74–99)
GLUCOSE SERPL-MCNC: 207 MG/DL (ref 74–99)
HCT VFR BLD AUTO: 48.1 % (ref 37–54)
HGB BLD-MCNC: 15.1 G/DL (ref 12.5–16.5)
LYMPHOCYTES NFR BLD: 0.41 K/UL (ref 1.5–4)
LYMPHOCYTES RELATIVE PERCENT: 9 % (ref 20–42)
MCH RBC QN AUTO: 29.3 PG (ref 26–35)
MCHC RBC AUTO-ENTMCNC: 31.4 G/DL (ref 32–34.5)
MCV RBC AUTO: 93.4 FL (ref 80–99.9)
MONOCYTES NFR BLD: 0.99 K/UL (ref 0.1–0.95)
MONOCYTES NFR BLD: 21 % (ref 2–12)
NEUTROPHILS NFR BLD: 70 % (ref 43–80)
NEUTS SEG NFR BLD: 3.3 K/UL (ref 1.8–7.3)
PLATELET # BLD AUTO: 150 K/UL (ref 130–450)
PMV BLD AUTO: 11.8 FL (ref 7–12)
POTASSIUM SERPL-SCNC: 4.2 MMOL/L (ref 3.5–5)
PROT SERPL-MCNC: 8.3 G/DL (ref 6.4–8.3)
RBC # BLD AUTO: 5.15 M/UL (ref 3.8–5.8)
RBC # BLD: NORMAL 10*6/UL
SODIUM SERPL-SCNC: 137 MMOL/L (ref 132–146)
WBC OTHER # BLD: 4.7 K/UL (ref 4.5–11.5)

## 2025-01-30 PROCEDURE — 2700000000 HC OXYGEN THERAPY PER DAY

## 2025-01-30 PROCEDURE — 6370000000 HC RX 637 (ALT 250 FOR IP): Performed by: FAMILY MEDICINE

## 2025-01-30 PROCEDURE — 2500000003 HC RX 250 WO HCPCS: Performed by: FAMILY MEDICINE

## 2025-01-30 PROCEDURE — 82550 ASSAY OF CK (CPK): CPT

## 2025-01-30 PROCEDURE — 99233 SBSQ HOSP IP/OBS HIGH 50: CPT | Performed by: INTERNAL MEDICINE

## 2025-01-30 PROCEDURE — 2060000000 HC ICU INTERMEDIATE R&B

## 2025-01-30 PROCEDURE — 36415 COLL VENOUS BLD VENIPUNCTURE: CPT

## 2025-01-30 PROCEDURE — 94640 AIRWAY INHALATION TREATMENT: CPT

## 2025-01-30 PROCEDURE — 6370000000 HC RX 637 (ALT 250 FOR IP): Performed by: INTERNAL MEDICINE

## 2025-01-30 PROCEDURE — 82962 GLUCOSE BLOOD TEST: CPT

## 2025-01-30 PROCEDURE — 2580000003 HC RX 258: Performed by: FAMILY MEDICINE

## 2025-01-30 PROCEDURE — 85025 COMPLETE CBC W/AUTO DIFF WBC: CPT

## 2025-01-30 PROCEDURE — 5A1D70Z PERFORMANCE OF URINARY FILTRATION, INTERMITTENT, LESS THAN 6 HOURS PER DAY: ICD-10-PCS | Performed by: INTERNAL MEDICINE

## 2025-01-30 PROCEDURE — 80053 COMPREHEN METABOLIC PANEL: CPT

## 2025-01-30 PROCEDURE — 6360000002 HC RX W HCPCS: Performed by: FAMILY MEDICINE

## 2025-01-30 PROCEDURE — 6370000000 HC RX 637 (ALT 250 FOR IP): Performed by: STUDENT IN AN ORGANIZED HEALTH CARE EDUCATION/TRAINING PROGRAM

## 2025-01-30 RX ORDER — CYCLOBENZAPRINE HCL 5 MG
10 TABLET ORAL 3 TIMES DAILY PRN
Status: DISCONTINUED | OUTPATIENT
Start: 2025-01-30 | End: 2025-02-03 | Stop reason: HOSPADM

## 2025-01-30 RX ORDER — IPRATROPIUM BROMIDE AND ALBUTEROL SULFATE 2.5; .5 MG/3ML; MG/3ML
1 SOLUTION RESPIRATORY (INHALATION) EVERY 4 HOURS PRN
Status: DISCONTINUED | OUTPATIENT
Start: 2025-01-30 | End: 2025-02-03 | Stop reason: HOSPADM

## 2025-01-30 RX ORDER — OSELTAMIVIR PHOSPHATE 30 MG/1
30 CAPSULE ORAL EVERY OTHER DAY
Status: DISCONTINUED | OUTPATIENT
Start: 2025-01-31 | End: 2025-01-30

## 2025-01-30 RX ORDER — GUAIFENESIN/DEXTROMETHORPHAN 100-10MG/5
5 SYRUP ORAL EVERY 4 HOURS PRN
Status: DISCONTINUED | OUTPATIENT
Start: 2025-01-30 | End: 2025-02-03 | Stop reason: HOSPADM

## 2025-01-30 RX ORDER — INSULIN GLARGINE 100 [IU]/ML
25 INJECTION, SOLUTION SUBCUTANEOUS NIGHTLY
Status: DISCONTINUED | OUTPATIENT
Start: 2025-01-30 | End: 2025-02-03 | Stop reason: HOSPADM

## 2025-01-30 RX ORDER — OSELTAMIVIR PHOSPHATE 30 MG/1
30 CAPSULE ORAL EVERY OTHER DAY
Status: COMPLETED | OUTPATIENT
Start: 2025-01-31 | End: 2025-02-02

## 2025-01-30 RX ADMIN — SODIUM CHLORIDE, PRESERVATIVE FREE 40 MG: 5 INJECTION INTRAVENOUS at 09:20

## 2025-01-30 RX ADMIN — SEVELAMER CARBONATE 1600 MG: 800 TABLET, FILM COATED ORAL at 11:49

## 2025-01-30 RX ADMIN — INSULIN LISPRO 8 UNITS: 100 INJECTION, SOLUTION INTRAVENOUS; SUBCUTANEOUS at 09:21

## 2025-01-30 RX ADMIN — IPRATROPIUM BROMIDE AND ALBUTEROL SULFATE 1 DOSE: .5; 3 SOLUTION RESPIRATORY (INHALATION) at 13:41

## 2025-01-30 RX ADMIN — Medication 10 ML: at 20:51

## 2025-01-30 RX ADMIN — SODIUM CHLORIDE, PRESERVATIVE FREE 40 MG: 5 INJECTION INTRAVENOUS at 20:37

## 2025-01-30 RX ADMIN — IPRATROPIUM BROMIDE AND ALBUTEROL SULFATE 1 DOSE: .5; 3 SOLUTION RESPIRATORY (INHALATION) at 06:57

## 2025-01-30 RX ADMIN — APIXABAN 5 MG: 5 TABLET, FILM COATED ORAL at 20:37

## 2025-01-30 RX ADMIN — Medication: at 09:19

## 2025-01-30 RX ADMIN — GUAIFENESIN AND DEXTROMETHORPHAN 5 ML: 100; 10 SYRUP ORAL at 20:37

## 2025-01-30 RX ADMIN — SEVELAMER CARBONATE 1600 MG: 800 TABLET, FILM COATED ORAL at 16:29

## 2025-01-30 RX ADMIN — GUAIFENESIN AND DEXTROMETHORPHAN 5 ML: 100; 10 SYRUP ORAL at 13:14

## 2025-01-30 RX ADMIN — SEVELAMER CARBONATE 1600 MG: 800 TABLET, FILM COATED ORAL at 09:20

## 2025-01-30 RX ADMIN — Medication 6 MG: at 20:37

## 2025-01-30 RX ADMIN — Medication 16 G: at 11:51

## 2025-01-30 RX ADMIN — INSULIN LISPRO 8 UNITS: 100 INJECTION, SOLUTION INTRAVENOUS; SUBCUTANEOUS at 16:32

## 2025-01-30 RX ADMIN — DOXYCYCLINE 100 MG: 100 INJECTION, POWDER, LYOPHILIZED, FOR SOLUTION INTRAVENOUS at 20:53

## 2025-01-30 RX ADMIN — Medication: at 00:08

## 2025-01-30 RX ADMIN — IPRATROPIUM BROMIDE AND ALBUTEROL SULFATE 1 DOSE: .5; 3 SOLUTION RESPIRATORY (INHALATION) at 10:03

## 2025-01-30 RX ADMIN — Medication: at 20:36

## 2025-01-30 RX ADMIN — WATER 1000 MG: 1 INJECTION INTRAMUSCULAR; INTRAVENOUS; SUBCUTANEOUS at 09:20

## 2025-01-30 RX ADMIN — INSULIN GLARGINE 25 UNITS: 100 INJECTION, SOLUTION SUBCUTANEOUS at 20:40

## 2025-01-30 RX ADMIN — SODIUM ZIRCONIUM CYCLOSILICATE 5 G: 5 POWDER, FOR SUSPENSION ORAL at 09:20

## 2025-01-30 RX ADMIN — APIXABAN 5 MG: 5 TABLET, FILM COATED ORAL at 09:20

## 2025-01-30 RX ADMIN — DOXYCYCLINE 100 MG: 100 INJECTION, POWDER, LYOPHILIZED, FOR SOLUTION INTRAVENOUS at 09:39

## 2025-01-30 RX ADMIN — CYCLOBENZAPRINE HYDROCHLORIDE 10 MG: 5 TABLET, FILM COATED ORAL at 13:13

## 2025-01-30 RX ADMIN — Medication 6 MG: at 00:08

## 2025-01-30 RX ADMIN — Medication 10 ML: at 09:22

## 2025-01-30 NOTE — PLAN OF CARE
Problem: Chronic Conditions and Co-morbidities  Goal: Patient's chronic conditions and co-morbidity symptoms are monitored and maintained or improved  1/29/2025 2223 by Gely Hogue RN  Outcome: Progressing  Flowsheets (Taken 1/29/2025 2223)  Care Plan - Patient's Chronic Conditions and Co-Morbidity Symptoms are Monitored and Maintained or Improved: Monitor and assess patient's chronic conditions and comorbid symptoms for stability, deterioration, or improvement     Problem: Discharge Planning  Goal: Discharge to home or other facility with appropriate resources  1/29/2025 2223 by Gely Hogue, RN  Outcome: Progressing  Flowsheets (Taken 1/29/2025 2223)  Discharge to home or other facility with appropriate resources:   Identify barriers to discharge with patient and caregiver   Identify discharge learning needs (meds, wound care, etc)

## 2025-01-30 NOTE — CARE COORDINATION
Case Management Assessment  Initial Evaluation    Date/Time of Evaluation: 1/30/2025 2:56 PM  Assessment Completed by: FLY Martínez    If patient is discharged prior to next notation, then this note serves as note for discharge by case management.    Patient Name: Carmelo Desir                   YOB: 1955  Diagnosis: Hyperkalemia [E87.5]  Influenza A [J10.1]  ESRD (end stage renal disease) on dialysis (HCC) [N18.6, Z99.2]  Acute on chronic respiratory failure with hypoxia [J96.21]  Pneumonia of both lower lobes due to infectious organism [J18.9]  Acute on chronic hypoxic respiratory failure [J96.21]                   Date / Time: 1/29/2025  6:00 AM    Patient Admission Status: Inpatient   Readmission Risk (Low < 19, Mod (19-27), High > 27): Readmission Risk Score: 20.5    Current PCP: Sri Talamantes APRN - CNP  PCP verified by CM? Yes    Chart Reviewed: Yes      History Provided by: Patient  Patient Orientation: Alert and Oriented, Person, Place, Situation    Patient Cognition: Alert    Hospitalization in the last 30 days (Readmission):  Yes    Case Management Assessment  Initial Evaluation    Date/Time of Evaluation: 1/30/2025 2:56 PM  Assessment Completed by: FLY Martínez    If patient is discharged prior to next notation, then this note serves as note for discharge by case management.    Patient Name: Carmelo Desir                   YOB: 1955  Diagnosis: Hyperkalemia [E87.5]  Influenza A [J10.1]  ESRD (end stage renal disease) on dialysis (HCC) [N18.6, Z99.2]  Acute on chronic respiratory failure with hypoxia [J96.21]  Pneumonia of both lower lobes due to infectious organism [J18.9]  Acute on chronic hypoxic respiratory failure [J96.21]                   Date / Time: 1/29/2025  6:00 AM    Patient Admission Status: Inpatient   Readmission Risk (Low < 19, Mod (19-27), High > 27): Readmission Risk Score: 20.5    Current PCP: Sri Talamantes APRN -

## 2025-01-30 NOTE — RT PROTOCOL NOTE
RT Nebulizer Bronchodilator Protocol Note    There is a bronchodilator order in the chart from a provider indicating to follow the RT Bronchodilator Protocol and there is an “Initiate RT Bronchodilator Protocol” order as well (see protocol at bottom of note).    CXR Findings:  XR CHEST PORTABLE    Result Date: 1/29/2025  Increased interstitial markings seen throughout the lung fields suggesting interstitial edema or pneumonia.  Clinical correlation needed.  Trace bilateral pleural effusions cannot be excluded.       The findings from the last RT Protocol Assessment were as follows:  Smoking: Chronic pulmonary disease  Respiratory Pattern: Regular pattern and RR 12-20 bpm  Breath Sounds: Clear breath sounds  Cough: Strong, spontaneous, non-productive  Indication for Bronchodilator Therapy: On home bronchodilators  Bronchodilator Assessment Score: 2    Aerosolized bronchodilator medication orders have been revised according to the RT Nebulizer Bronchodilator Protocol below.    Respiratory Therapist to perform RT Therapy Protocol Assessment initially then follow the protocol.  Repeat RT Therapy Protocol Assessment PRN for score 0-3 or on second treatment, BID, and PRN for scores above 3.    No Indications - adjust the frequency to every 6 hours PRN wheezing or bronchospasm, if no treatments needed after 48 hours then discontinue using Per Protocol order mode.     If indication present, adjust the RT bronchodilator orders based on the Bronchodilator Assessment Score as indicated below.  If a patient is on this medication at home then do not decrease Frequency below that used at home.    0-3 - enter or revise RT bronchodilator order(s) to equivalent RT Bronchodilator order with Frequency of every 4 hours PRN for wheezing or increased work of breathing using Per Protocol order mode.       4-6 - enter or revise RT Bronchodilator order(s) to two equivalent RT bronchodilator orders with one order with BID Frequency and one

## 2025-01-30 NOTE — PLAN OF CARE
Problem: Chronic Conditions and Co-morbidities  Goal: Patient's chronic conditions and co-morbidity symptoms are monitored and maintained or improved  1/30/2025 1018 by Matti Maharaj RN  Outcome: Progressing  Flowsheets (Taken 1/29/2025 2223 by Gely Hogue, RN)  Care Plan - Patient's Chronic Conditions and Co-Morbidity Symptoms are Monitored and Maintained or Improved: Monitor and assess patient's chronic conditions and comorbid symptoms for stability, deterioration, or improvement     Problem: Discharge Planning  Goal: Discharge to home or other facility with appropriate resources  1/30/2025 1018 by Matti Maharaj RN  Outcome: Progressing  Flowsheets (Taken 1/29/2025 2223 by Gely Hogue, RN)  Discharge to home or other facility with appropriate resources:   Identify barriers to discharge with patient and caregiver   Identify discharge learning needs (meds, wound care, etc)

## 2025-01-30 NOTE — ACP (ADVANCE CARE PLANNING)
Advance Care Planning   Healthcare Decision Maker:    Primary Decision Maker (Active): Vanessa Nichole - Brother/Sister - [phone]    Secondary Decision Maker: Apurva Hurt - Brother/Sister - [phone]    Click here to complete Healthcare Decision Makers including selection of the Healthcare Decision Maker Relationship (ie \"Primary\").  Today we documented Decision Maker(s) consistent with Legal Next of Kin hierarchy.

## 2025-01-30 NOTE — PLAN OF CARE
Problem: Chronic Conditions and Co-morbidities  Goal: Patient's chronic conditions and co-morbidity symptoms are monitored and maintained or improved  Outcome: Progressing  Flowsheets (Taken 1/29/2025 1933)  Care Plan - Patient's Chronic Conditions and Co-Morbidity Symptoms are Monitored and Maintained or Improved:   Monitor and assess patient's chronic conditions and comorbid symptoms for stability, deterioration, or improvement   Collaborate with multidisciplinary team to address chronic and comorbid conditions and prevent exacerbation or deterioration   Update acute care plan with appropriate goals if chronic or comorbid symptoms are exacerbated and prevent overall improvement and discharge     Problem: Discharge Planning  Goal: Discharge to home or other facility with appropriate resources  Outcome: Progressing  Flowsheets (Taken 1/29/2025 1933)  Discharge to home or other facility with appropriate resources:   Identify barriers to discharge with patient and caregiver   Arrange for needed discharge resources and transportation as appropriate   Identify discharge learning needs (meds, wound care, etc)   Arrange for interpreters to assist at discharge as needed   Refer to discharge planning if patient needs post-hospital services based on physician order or complex needs related to functional status, cognitive ability or social support system

## 2025-01-31 LAB
ALBUMIN SERPL-MCNC: 3.3 G/DL (ref 3.5–5.2)
ALP SERPL-CCNC: 53 U/L (ref 40–129)
ALT SERPL-CCNC: 10 U/L (ref 0–40)
ANION GAP SERPL CALCULATED.3IONS-SCNC: 20 MMOL/L (ref 7–16)
AST SERPL-CCNC: 12 U/L (ref 0–39)
BASOPHILS # BLD: 0.02 K/UL (ref 0–0.2)
BASOPHILS NFR BLD: 0 % (ref 0–2)
BILIRUB SERPL-MCNC: 0.2 MG/DL (ref 0–1.2)
BUN SERPL-MCNC: 61 MG/DL (ref 6–23)
CALCIUM SERPL-MCNC: 8.8 MG/DL (ref 8.6–10.2)
CHLORIDE SERPL-SCNC: 91 MMOL/L (ref 98–107)
CO2 SERPL-SCNC: 26 MMOL/L (ref 22–29)
CREAT SERPL-MCNC: 11.4 MG/DL (ref 0.7–1.2)
EOSINOPHIL # BLD: 0.06 K/UL (ref 0.05–0.5)
EOSINOPHILS RELATIVE PERCENT: 1 % (ref 0–6)
ERYTHROCYTE [DISTWIDTH] IN BLOOD BY AUTOMATED COUNT: 17.4 % (ref 11.5–15)
GFR, ESTIMATED: 4 ML/MIN/1.73M2
GLUCOSE BLD-MCNC: 139 MG/DL (ref 74–99)
GLUCOSE BLD-MCNC: 166 MG/DL (ref 74–99)
GLUCOSE BLD-MCNC: 57 MG/DL (ref 74–99)
GLUCOSE BLD-MCNC: 57 MG/DL (ref 74–99)
GLUCOSE BLD-MCNC: 58 MG/DL (ref 74–99)
GLUCOSE BLD-MCNC: 95 MG/DL (ref 74–99)
GLUCOSE SERPL-MCNC: 146 MG/DL (ref 74–99)
HCT VFR BLD AUTO: 45.7 % (ref 37–54)
HGB BLD-MCNC: 14.3 G/DL (ref 12.5–16.5)
IMM GRANULOCYTES # BLD AUTO: <0.03 K/UL (ref 0–0.58)
IMM GRANULOCYTES NFR BLD: 0 % (ref 0–5)
LYMPHOCYTES NFR BLD: 1.5 K/UL (ref 1.5–4)
LYMPHOCYTES RELATIVE PERCENT: 26 % (ref 20–42)
MCH RBC QN AUTO: 29.5 PG (ref 26–35)
MCHC RBC AUTO-ENTMCNC: 31.3 G/DL (ref 32–34.5)
MCV RBC AUTO: 94.4 FL (ref 80–99.9)
MONOCYTES NFR BLD: 1.1 K/UL (ref 0.1–0.95)
MONOCYTES NFR BLD: 19 % (ref 2–12)
MYCOPLASMA AB,IGM: NORMAL
NEUTROPHILS NFR BLD: 53 % (ref 43–80)
NEUTS SEG NFR BLD: 3.02 K/UL (ref 1.8–7.3)
PLATELET, FLUORESCENCE: 131 K/UL (ref 130–450)
PMV BLD AUTO: 11.3 FL (ref 7–12)
POTASSIUM SERPL-SCNC: 4.2 MMOL/L (ref 3.5–5)
PROT SERPL-MCNC: 7.7 G/DL (ref 6.4–8.3)
RBC # BLD AUTO: 4.84 M/UL (ref 3.8–5.8)
SODIUM SERPL-SCNC: 137 MMOL/L (ref 132–146)
WBC OTHER # BLD: 5.7 K/UL (ref 4.5–11.5)

## 2025-01-31 PROCEDURE — 99232 SBSQ HOSP IP/OBS MODERATE 35: CPT | Performed by: INTERNAL MEDICINE

## 2025-01-31 PROCEDURE — 2500000003 HC RX 250 WO HCPCS: Performed by: FAMILY MEDICINE

## 2025-01-31 PROCEDURE — 80053 COMPREHEN METABOLIC PANEL: CPT

## 2025-01-31 PROCEDURE — 6370000000 HC RX 637 (ALT 250 FOR IP): Performed by: STUDENT IN AN ORGANIZED HEALTH CARE EDUCATION/TRAINING PROGRAM

## 2025-01-31 PROCEDURE — 82962 GLUCOSE BLOOD TEST: CPT

## 2025-01-31 PROCEDURE — 2060000000 HC ICU INTERMEDIATE R&B

## 2025-01-31 PROCEDURE — 6370000000 HC RX 637 (ALT 250 FOR IP): Performed by: FAMILY MEDICINE

## 2025-01-31 PROCEDURE — 6360000002 HC RX W HCPCS: Performed by: FAMILY MEDICINE

## 2025-01-31 PROCEDURE — 94640 AIRWAY INHALATION TREATMENT: CPT

## 2025-01-31 PROCEDURE — 90935 HEMODIALYSIS ONE EVALUATION: CPT

## 2025-01-31 PROCEDURE — 2700000000 HC OXYGEN THERAPY PER DAY

## 2025-01-31 PROCEDURE — 6370000000 HC RX 637 (ALT 250 FOR IP): Performed by: INTERNAL MEDICINE

## 2025-01-31 PROCEDURE — 2580000003 HC RX 258: Performed by: FAMILY MEDICINE

## 2025-01-31 PROCEDURE — 85025 COMPLETE CBC W/AUTO DIFF WBC: CPT

## 2025-01-31 PROCEDURE — 36415 COLL VENOUS BLD VENIPUNCTURE: CPT

## 2025-01-31 RX ADMIN — Medication: at 20:21

## 2025-01-31 RX ADMIN — WATER 1000 MG: 1 INJECTION INTRAMUSCULAR; INTRAVENOUS; SUBCUTANEOUS at 09:03

## 2025-01-31 RX ADMIN — SEVELAMER CARBONATE 1600 MG: 800 TABLET, FILM COATED ORAL at 13:49

## 2025-01-31 RX ADMIN — IPRATROPIUM BROMIDE AND ALBUTEROL SULFATE 1 DOSE: .5; 3 SOLUTION RESPIRATORY (INHALATION) at 12:26

## 2025-01-31 RX ADMIN — OSELTAMIVIR PHOSPHATE 30 MG: 30 CAPSULE ORAL at 14:58

## 2025-01-31 RX ADMIN — SEVELAMER CARBONATE 1600 MG: 800 TABLET, FILM COATED ORAL at 18:38

## 2025-01-31 RX ADMIN — CYCLOBENZAPRINE HYDROCHLORIDE 10 MG: 5 TABLET, FILM COATED ORAL at 09:03

## 2025-01-31 RX ADMIN — DOXYCYCLINE 100 MG: 100 INJECTION, POWDER, LYOPHILIZED, FOR SOLUTION INTRAVENOUS at 13:52

## 2025-01-31 RX ADMIN — SODIUM CHLORIDE, PRESERVATIVE FREE 40 MG: 5 INJECTION INTRAVENOUS at 20:21

## 2025-01-31 RX ADMIN — Medication 10 ML: at 20:30

## 2025-01-31 RX ADMIN — INSULIN LISPRO 8 UNITS: 100 INJECTION, SOLUTION INTRAVENOUS; SUBCUTANEOUS at 13:49

## 2025-01-31 RX ADMIN — GUAIFENESIN AND DEXTROMETHORPHAN 5 ML: 100; 10 SYRUP ORAL at 09:03

## 2025-01-31 RX ADMIN — Medication 16 G: at 18:00

## 2025-01-31 RX ADMIN — Medication: at 09:03

## 2025-01-31 RX ADMIN — Medication 16 G: at 18:41

## 2025-01-31 RX ADMIN — ACETAMINOPHEN 650 MG: 325 TABLET ORAL at 20:20

## 2025-01-31 RX ADMIN — APIXABAN 5 MG: 5 TABLET, FILM COATED ORAL at 09:02

## 2025-01-31 RX ADMIN — GUAIFENESIN AND DEXTROMETHORPHAN 5 ML: 100; 10 SYRUP ORAL at 20:20

## 2025-01-31 RX ADMIN — SODIUM CHLORIDE, PRESERVATIVE FREE 40 MG: 5 INJECTION INTRAVENOUS at 09:03

## 2025-01-31 RX ADMIN — Medication 16 G: at 17:27

## 2025-01-31 RX ADMIN — Medication 6 MG: at 20:20

## 2025-01-31 RX ADMIN — Medication 10 ML: at 09:08

## 2025-01-31 RX ADMIN — SEVELAMER CARBONATE 1600 MG: 800 TABLET, FILM COATED ORAL at 09:02

## 2025-01-31 RX ADMIN — DOXYCYCLINE 100 MG: 100 INJECTION, POWDER, LYOPHILIZED, FOR SOLUTION INTRAVENOUS at 20:29

## 2025-01-31 RX ADMIN — APIXABAN 5 MG: 5 TABLET, FILM COATED ORAL at 20:20

## 2025-01-31 ASSESSMENT — PAIN DESCRIPTION - DESCRIPTORS
DESCRIPTORS: SORE;THROBBING
DESCRIPTORS: ACHING;DISCOMFORT

## 2025-01-31 ASSESSMENT — PAIN DESCRIPTION - LOCATION
LOCATION: ABDOMEN
LOCATION: HIP;ABDOMEN

## 2025-01-31 ASSESSMENT — PAIN SCALES - GENERAL
PAINLEVEL_OUTOF10: 5
PAINLEVEL_OUTOF10: 4

## 2025-01-31 ASSESSMENT — PAIN - FUNCTIONAL ASSESSMENT: PAIN_FUNCTIONAL_ASSESSMENT: ACTIVITIES ARE NOT PREVENTED

## 2025-01-31 ASSESSMENT — PAIN DESCRIPTION - ORIENTATION
ORIENTATION: RIGHT
ORIENTATION: RIGHT

## 2025-01-31 NOTE — PLAN OF CARE
Problem: Chronic Conditions and Co-morbidities  Goal: Patient's chronic conditions and co-morbidity symptoms are monitored and maintained or improved  1/31/2025 0957 by Esperanza Betts RN  Outcome: Progressing  1/30/2025 2250 by Gely Hogue RN  Outcome: Progressing  1/30/2025 2248 by Gely Hogue RN  Outcome: Progressing     Problem: Discharge Planning  Goal: Discharge to home or other facility with appropriate resources  1/31/2025 0957 by Esperanza Betts RN  Outcome: Progressing  1/30/2025 2250 by Gely Hogue RN  Outcome: Progressing  1/30/2025 2248 by Gely Hogue RN  Outcome: Progressing     Problem: Safety - Adult  Goal: Free from fall injury  1/31/2025 0957 by Esperanza Betts RN  Outcome: Progressing  1/30/2025 2250 by Gely Hogue RN  Outcome: Progressing  1/30/2025 2248 by Gely Hogue RN  Outcome: Progressing

## 2025-01-31 NOTE — CARE COORDINATION
SOCIAL WORK / DISCHARGE PLANNING:  Pt out of room. Dc plan remains to return home alone. Pt's portable O2 tank did not come with him when sent from HD clinic. [employer] rep brought portable tank to take home. Pt wants smaller tanks but currently, per [employer] said conserving device go to 6L, will need order to assess for conserving device and they will have resp therapist assess once home. Pt denies having anyone who can provide transport home. He has no other insurance listed. He would need assist with taxi voucher. [employer] is who provides transport to [facility] 5am. [facility].              Electronically signed by FLY Martínez on 1/31/2025 at 1:26 PM

## 2025-01-31 NOTE — PLAN OF CARE
Problem: Chronic Conditions and Co-morbidities  Goal: Patient's chronic conditions and co-morbidity symptoms are monitored and maintained or improved  1/30/2025 2248 by Gely Hogue, RN  Outcome: Progressing     Problem: Discharge Planning  Goal: Discharge to home or other facility with appropriate resources  1/30/2025 2248 by Gely Hogue, RN  Outcome: Progressing     Problem: Safety - Adult  Goal: Free from fall injury  Outcome: Progressing

## 2025-01-31 NOTE — FLOWSHEET NOTE
01/31/25 1255   Vital Signs   /63   Temp 98.3 °F (36.8 °C)   Pulse 73   Respirations 18   Weight - Scale 87 kg (191 lb 12.8 oz)   Percent Weight Change -0.1   Pain Assessment   Pain Assessment None - Denies Pain   Post-Hemodialysis Assessment   Post-Treatment Procedures Blood returned;Access bleeding time < 10 minutes   Machine Disinfection Process Acid/Vinegar Clean;Heat Disinfect;Exterior Machine Disinfection   Rinseback Volume (ml) 300 ml   Blood Volume Processed (Liters) 64.3 L   Duration of Treatment (minutes) 210 minutes   Hemodialysis Intake (ml) 300 ml   Hemodialysis Output (ml) 1300 ml   NET Removed (ml) 1000   Tolerated Treatment Good        Detail Level: Generalized Nicotinamide Supplementation Recommendations: Heliocare Advanced Daily Topical Retinoids Recommendations: Pea sized amount of retinoid in evenings (at least 10 mins after washing face) as tolerated Detail Level: Zone Sunscreen Recommendations: Eryfotona Actinica or Eryfotona Ageless can improve appearance of skin and slow/decrease growth or pre-cancerous lesions Laser Recommendations: Consult with  Bleaching Agents Recommendations: Hydroquinone should be used 3 months on 3 months off\\nIf used continually it can cause pigmentation in the skin that will never resolve Chemical Peel Recommendations: VI peel Sunscreen Recommendations: SPF 30+ or higher daily Skin Checks Recommendations: monthly Sunscreen Recommendations: Eryfotona Actinica Daily \\n(if prolonged sun exposure reapply with any SPF 30+ sunscreen)

## 2025-02-01 LAB
ALBUMIN SERPL-MCNC: 3.3 G/DL (ref 3.5–5.2)
ALP SERPL-CCNC: 51 U/L (ref 40–129)
ALT SERPL-CCNC: 7 U/L (ref 0–40)
ANION GAP SERPL CALCULATED.3IONS-SCNC: 13 MMOL/L (ref 7–16)
AST SERPL-CCNC: 10 U/L (ref 0–39)
BASOPHILS # BLD: 0.01 K/UL (ref 0–0.2)
BASOPHILS NFR BLD: 0 % (ref 0–2)
BILIRUB SERPL-MCNC: 0.2 MG/DL (ref 0–1.2)
BUN SERPL-MCNC: 36 MG/DL (ref 6–23)
CALCIUM SERPL-MCNC: 8.8 MG/DL (ref 8.6–10.2)
CHLORIDE SERPL-SCNC: 94 MMOL/L (ref 98–107)
CO2 SERPL-SCNC: 23 MMOL/L (ref 22–29)
CREAT SERPL-MCNC: 8.6 MG/DL (ref 0.7–1.2)
EOSINOPHIL # BLD: 0.09 K/UL (ref 0.05–0.5)
EOSINOPHILS RELATIVE PERCENT: 2 % (ref 0–6)
ERYTHROCYTE [DISTWIDTH] IN BLOOD BY AUTOMATED COUNT: 17.8 % (ref 11.5–15)
GFR, ESTIMATED: 6 ML/MIN/1.73M2
GLUCOSE BLD-MCNC: 117 MG/DL (ref 74–99)
GLUCOSE BLD-MCNC: 155 MG/DL (ref 74–99)
GLUCOSE BLD-MCNC: 57 MG/DL (ref 74–99)
GLUCOSE BLD-MCNC: 64 MG/DL (ref 74–99)
GLUCOSE BLD-MCNC: 75 MG/DL (ref 74–99)
GLUCOSE BLD-MCNC: 83 MG/DL (ref 74–99)
GLUCOSE SERPL-MCNC: 105 MG/DL (ref 74–99)
HCT VFR BLD AUTO: 46.2 % (ref 37–54)
HGB BLD-MCNC: 14.1 G/DL (ref 12.5–16.5)
IMM GRANULOCYTES # BLD AUTO: <0.03 K/UL (ref 0–0.58)
IMM GRANULOCYTES NFR BLD: 0 % (ref 0–5)
LYMPHOCYTES NFR BLD: 1.32 K/UL (ref 1.5–4)
LYMPHOCYTES RELATIVE PERCENT: 25 % (ref 20–42)
MCH RBC QN AUTO: 29.6 PG (ref 26–35)
MCHC RBC AUTO-ENTMCNC: 30.5 G/DL (ref 32–34.5)
MCV RBC AUTO: 96.9 FL (ref 80–99.9)
MONOCYTES NFR BLD: 0.98 K/UL (ref 0.1–0.95)
MONOCYTES NFR BLD: 19 % (ref 2–12)
NEUTROPHILS NFR BLD: 53 % (ref 43–80)
NEUTS SEG NFR BLD: 2.77 K/UL (ref 1.8–7.3)
PLATELET, FLUORESCENCE: 119 K/UL (ref 130–450)
PMV BLD AUTO: 11 FL (ref 7–12)
POTASSIUM SERPL-SCNC: 4.3 MMOL/L (ref 3.5–5)
PROT SERPL-MCNC: 7.5 G/DL (ref 6.4–8.3)
RBC # BLD AUTO: 4.77 M/UL (ref 3.8–5.8)
SODIUM SERPL-SCNC: 130 MMOL/L (ref 132–146)
WBC OTHER # BLD: 5.2 K/UL (ref 4.5–11.5)

## 2025-02-01 PROCEDURE — 6360000002 HC RX W HCPCS: Performed by: FAMILY MEDICINE

## 2025-02-01 PROCEDURE — 36415 COLL VENOUS BLD VENIPUNCTURE: CPT

## 2025-02-01 PROCEDURE — 82962 GLUCOSE BLOOD TEST: CPT

## 2025-02-01 PROCEDURE — 6370000000 HC RX 637 (ALT 250 FOR IP): Performed by: INTERNAL MEDICINE

## 2025-02-01 PROCEDURE — 80053 COMPREHEN METABOLIC PANEL: CPT

## 2025-02-01 PROCEDURE — 99232 SBSQ HOSP IP/OBS MODERATE 35: CPT | Performed by: INTERNAL MEDICINE

## 2025-02-01 PROCEDURE — 85025 COMPLETE CBC W/AUTO DIFF WBC: CPT

## 2025-02-01 PROCEDURE — 2580000003 HC RX 258: Performed by: FAMILY MEDICINE

## 2025-02-01 PROCEDURE — 6370000000 HC RX 637 (ALT 250 FOR IP): Performed by: FAMILY MEDICINE

## 2025-02-01 PROCEDURE — 6370000000 HC RX 637 (ALT 250 FOR IP): Performed by: STUDENT IN AN ORGANIZED HEALTH CARE EDUCATION/TRAINING PROGRAM

## 2025-02-01 PROCEDURE — 2060000000 HC ICU INTERMEDIATE R&B

## 2025-02-01 PROCEDURE — 2500000003 HC RX 250 WO HCPCS: Performed by: FAMILY MEDICINE

## 2025-02-01 PROCEDURE — 2700000000 HC OXYGEN THERAPY PER DAY

## 2025-02-01 RX ADMIN — SODIUM CHLORIDE, PRESERVATIVE FREE 40 MG: 5 INJECTION INTRAVENOUS at 23:13

## 2025-02-01 RX ADMIN — Medication 5 ML: at 10:25

## 2025-02-01 RX ADMIN — SEVELAMER CARBONATE 1600 MG: 800 TABLET, FILM COATED ORAL at 10:23

## 2025-02-01 RX ADMIN — INSULIN LISPRO 8 UNITS: 100 INJECTION, SOLUTION INTRAVENOUS; SUBCUTANEOUS at 10:20

## 2025-02-01 RX ADMIN — APIXABAN 5 MG: 5 TABLET, FILM COATED ORAL at 10:24

## 2025-02-01 RX ADMIN — METOPROLOL SUCCINATE 25 MG: 25 TABLET, FILM COATED, EXTENDED RELEASE ORAL at 10:23

## 2025-02-01 RX ADMIN — Medication 16 G: at 12:28

## 2025-02-01 RX ADMIN — Medication 6 MG: at 23:13

## 2025-02-01 RX ADMIN — Medication: at 23:10

## 2025-02-01 RX ADMIN — Medication 16 G: at 13:22

## 2025-02-01 RX ADMIN — SEVELAMER CARBONATE 1600 MG: 800 TABLET, FILM COATED ORAL at 12:25

## 2025-02-01 RX ADMIN — SODIUM ZIRCONIUM CYCLOSILICATE 5 G: 5 POWDER, FOR SUSPENSION ORAL at 10:22

## 2025-02-01 RX ADMIN — APIXABAN 5 MG: 5 TABLET, FILM COATED ORAL at 23:13

## 2025-02-01 RX ADMIN — Medication: at 10:35

## 2025-02-01 RX ADMIN — ACETAMINOPHEN 650 MG: 325 TABLET ORAL at 10:24

## 2025-02-01 RX ADMIN — WATER 1000 MG: 1 INJECTION INTRAMUSCULAR; INTRAVENOUS; SUBCUTANEOUS at 10:29

## 2025-02-01 RX ADMIN — DOXYCYCLINE 100 MG: 100 INJECTION, POWDER, LYOPHILIZED, FOR SOLUTION INTRAVENOUS at 10:34

## 2025-02-01 RX ADMIN — GUAIFENESIN AND DEXTROMETHORPHAN 5 ML: 100; 10 SYRUP ORAL at 23:13

## 2025-02-01 RX ADMIN — SEVELAMER CARBONATE 1600 MG: 800 TABLET, FILM COATED ORAL at 18:06

## 2025-02-01 RX ADMIN — SODIUM CHLORIDE, PRESERVATIVE FREE 40 MG: 5 INJECTION INTRAVENOUS at 10:26

## 2025-02-01 RX ADMIN — DOXYCYCLINE 100 MG: 100 INJECTION, POWDER, LYOPHILIZED, FOR SOLUTION INTRAVENOUS at 23:20

## 2025-02-01 RX ADMIN — GUAIFENESIN AND DEXTROMETHORPHAN 5 ML: 100; 10 SYRUP ORAL at 10:42

## 2025-02-01 RX ADMIN — Medication 10 ML: at 23:43

## 2025-02-01 RX ADMIN — INSULIN LISPRO 8 UNITS: 100 INJECTION, SOLUTION INTRAVENOUS; SUBCUTANEOUS at 17:03

## 2025-02-01 ASSESSMENT — PAIN SCALES - GENERAL
PAINLEVEL_OUTOF10: 4
PAINLEVEL_OUTOF10: 1

## 2025-02-01 NOTE — PLAN OF CARE
Problem: Chronic Conditions and Co-morbidities  Goal: Patient's chronic conditions and co-morbidity symptoms are monitored and maintained or improved  Outcome: Progressing  Flowsheets (Taken 2/1/2025 0800)  Care Plan - Patient's Chronic Conditions and Co-Morbidity Symptoms are Monitored and Maintained or Improved:   Monitor and assess patient's chronic conditions and comorbid symptoms for stability, deterioration, or improvement   Collaborate with multidisciplinary team to address chronic and comorbid conditions and prevent exacerbation or deterioration   Update acute care plan with appropriate goals if chronic or comorbid symptoms are exacerbated and prevent overall improvement and discharge     Problem: Discharge Planning  Goal: Discharge to home or other facility with appropriate resources  Outcome: Progressing  Flowsheets (Taken 2/1/2025 0800)  Discharge to home or other facility with appropriate resources:   Identify barriers to discharge with patient and caregiver   Arrange for needed discharge resources and transportation as appropriate   Identify discharge learning needs (meds, wound care, etc)     Problem: Safety - Adult  Goal: Free from fall injury  Outcome: Progressing

## 2025-02-01 NOTE — PLAN OF CARE
Problem: Chronic Conditions and Co-morbidities  Goal: Patient's chronic conditions and co-morbidity symptoms are monitored and maintained or improved  1/31/2025 2233 by Gely Hogue RN  Outcome: Progressing     Problem: Discharge Planning  Goal: Discharge to home or other facility with appropriate resources  1/31/2025 2233 by Gely Hogue, RN  Outcome: Progressing     Problem: Safety - Adult  Goal: Free from fall injury  1/31/2025 2233 by Gely Hogue, RN  Outcome: Progressing

## 2025-02-02 LAB
ALBUMIN SERPL-MCNC: 3.2 G/DL (ref 3.5–5.2)
ALP SERPL-CCNC: 51 U/L (ref 40–129)
ALT SERPL-CCNC: 6 U/L (ref 0–40)
ANION GAP SERPL CALCULATED.3IONS-SCNC: 16 MMOL/L (ref 7–16)
AST SERPL-CCNC: 9 U/L (ref 0–39)
ATYPICAL LYMPHOCYTE ABSOLUTE COUNT: 0.22 K/UL (ref 0–0.46)
ATYPICAL LYMPHOCYTES: 4 % (ref 0–4)
BASOPHILS # BLD: 0 K/UL (ref 0–0.2)
BASOPHILS NFR BLD: 0 % (ref 0–2)
BILIRUB SERPL-MCNC: 0.2 MG/DL (ref 0–1.2)
BUN SERPL-MCNC: 59 MG/DL (ref 6–23)
CALCIUM SERPL-MCNC: 9.2 MG/DL (ref 8.6–10.2)
CHLORIDE SERPL-SCNC: 95 MMOL/L (ref 98–107)
CO2 SERPL-SCNC: 22 MMOL/L (ref 22–29)
CREAT SERPL-MCNC: 11 MG/DL (ref 0.7–1.2)
EOSINOPHIL # BLD: 0.11 K/UL (ref 0.05–0.5)
EOSINOPHILS RELATIVE PERCENT: 2 % (ref 0–6)
ERYTHROCYTE [DISTWIDTH] IN BLOOD BY AUTOMATED COUNT: 17.2 % (ref 11.5–15)
GFR, ESTIMATED: 5 ML/MIN/1.73M2
GLUCOSE BLD-MCNC: 114 MG/DL (ref 74–99)
GLUCOSE BLD-MCNC: 157 MG/DL (ref 74–99)
GLUCOSE BLD-MCNC: 199 MG/DL (ref 74–99)
GLUCOSE BLD-MCNC: 58 MG/DL (ref 74–99)
GLUCOSE SERPL-MCNC: 110 MG/DL (ref 74–99)
HCT VFR BLD AUTO: 44.1 % (ref 37–54)
HGB BLD-MCNC: 13.4 G/DL (ref 12.5–16.5)
LYMPHOCYTES NFR BLD: 1.36 K/UL (ref 1.5–4)
LYMPHOCYTES RELATIVE PERCENT: 22 % (ref 20–42)
MCH RBC QN AUTO: 29.1 PG (ref 26–35)
MCHC RBC AUTO-ENTMCNC: 30.4 G/DL (ref 32–34.5)
MCV RBC AUTO: 95.9 FL (ref 80–99.9)
MONOCYTES NFR BLD: 1.03 K/UL (ref 0.1–0.95)
MONOCYTES NFR BLD: 17 % (ref 2–12)
NEUTROPHILS NFR BLD: 56 % (ref 43–80)
NEUTS SEG NFR BLD: 3.48 K/UL (ref 1.8–7.3)
PLATELET # BLD AUTO: 141 K/UL (ref 130–450)
PMV BLD AUTO: 10.7 FL (ref 7–12)
POTASSIUM SERPL-SCNC: 5.1 MMOL/L (ref 3.5–5)
PROT SERPL-MCNC: 7.4 G/DL (ref 6.4–8.3)
RBC # BLD AUTO: 4.6 M/UL (ref 3.8–5.8)
RBC # BLD: ABNORMAL 10*6/UL
RBC # BLD: ABNORMAL 10*6/UL
SODIUM SERPL-SCNC: 133 MMOL/L (ref 132–146)
WBC OTHER # BLD: 6.2 K/UL (ref 4.5–11.5)

## 2025-02-02 PROCEDURE — 6370000000 HC RX 637 (ALT 250 FOR IP): Performed by: FAMILY MEDICINE

## 2025-02-02 PROCEDURE — 82962 GLUCOSE BLOOD TEST: CPT

## 2025-02-02 PROCEDURE — 80053 COMPREHEN METABOLIC PANEL: CPT

## 2025-02-02 PROCEDURE — 2700000000 HC OXYGEN THERAPY PER DAY

## 2025-02-02 PROCEDURE — 6370000000 HC RX 637 (ALT 250 FOR IP): Performed by: INTERNAL MEDICINE

## 2025-02-02 PROCEDURE — 2500000003 HC RX 250 WO HCPCS: Performed by: FAMILY MEDICINE

## 2025-02-02 PROCEDURE — 36415 COLL VENOUS BLD VENIPUNCTURE: CPT

## 2025-02-02 PROCEDURE — 2060000000 HC ICU INTERMEDIATE R&B

## 2025-02-02 PROCEDURE — 94640 AIRWAY INHALATION TREATMENT: CPT

## 2025-02-02 PROCEDURE — 6370000000 HC RX 637 (ALT 250 FOR IP): Performed by: STUDENT IN AN ORGANIZED HEALTH CARE EDUCATION/TRAINING PROGRAM

## 2025-02-02 PROCEDURE — 6360000002 HC RX W HCPCS: Performed by: FAMILY MEDICINE

## 2025-02-02 PROCEDURE — 99232 SBSQ HOSP IP/OBS MODERATE 35: CPT | Performed by: INTERNAL MEDICINE

## 2025-02-02 PROCEDURE — 2580000003 HC RX 258: Performed by: FAMILY MEDICINE

## 2025-02-02 PROCEDURE — 85025 COMPLETE CBC W/AUTO DIFF WBC: CPT

## 2025-02-02 RX ADMIN — SODIUM CHLORIDE, PRESERVATIVE FREE 40 MG: 5 INJECTION INTRAVENOUS at 20:36

## 2025-02-02 RX ADMIN — Medication 10 ML: at 09:10

## 2025-02-02 RX ADMIN — WATER 1000 MG: 1 INJECTION INTRAMUSCULAR; INTRAVENOUS; SUBCUTANEOUS at 09:09

## 2025-02-02 RX ADMIN — APIXABAN 5 MG: 5 TABLET, FILM COATED ORAL at 09:09

## 2025-02-02 RX ADMIN — APIXABAN 5 MG: 5 TABLET, FILM COATED ORAL at 20:38

## 2025-02-02 RX ADMIN — FLUTICASONE PROPIONATE 2 SPRAY: 50 SPRAY, METERED NASAL at 09:13

## 2025-02-02 RX ADMIN — SEVELAMER CARBONATE 1600 MG: 800 TABLET, FILM COATED ORAL at 09:09

## 2025-02-02 RX ADMIN — CYCLOBENZAPRINE HYDROCHLORIDE 10 MG: 5 TABLET, FILM COATED ORAL at 04:40

## 2025-02-02 RX ADMIN — SEVELAMER CARBONATE 1600 MG: 800 TABLET, FILM COATED ORAL at 17:03

## 2025-02-02 RX ADMIN — CYCLOBENZAPRINE HYDROCHLORIDE 10 MG: 5 TABLET, FILM COATED ORAL at 20:38

## 2025-02-02 RX ADMIN — OSELTAMIVIR PHOSPHATE 30 MG: 30 CAPSULE ORAL at 17:03

## 2025-02-02 RX ADMIN — Medication 10 ML: at 20:39

## 2025-02-02 RX ADMIN — Medication: at 09:08

## 2025-02-02 RX ADMIN — IPRATROPIUM BROMIDE AND ALBUTEROL SULFATE 1 DOSE: .5; 3 SOLUTION RESPIRATORY (INHALATION) at 20:48

## 2025-02-02 RX ADMIN — SODIUM ZIRCONIUM CYCLOSILICATE 5 G: 5 POWDER, FOR SUSPENSION ORAL at 09:10

## 2025-02-02 RX ADMIN — Medication 6 MG: at 20:38

## 2025-02-02 RX ADMIN — GUAIFENESIN AND DEXTROMETHORPHAN 5 ML: 100; 10 SYRUP ORAL at 09:12

## 2025-02-02 RX ADMIN — Medication: at 20:39

## 2025-02-02 RX ADMIN — DOXYCYCLINE 100 MG: 100 INJECTION, POWDER, LYOPHILIZED, FOR SOLUTION INTRAVENOUS at 09:31

## 2025-02-02 RX ADMIN — GUAIFENESIN AND DEXTROMETHORPHAN 5 ML: 100; 10 SYRUP ORAL at 20:38

## 2025-02-02 RX ADMIN — Medication 16 G: at 11:34

## 2025-02-02 RX ADMIN — SODIUM CHLORIDE, PRESERVATIVE FREE 40 MG: 5 INJECTION INTRAVENOUS at 09:09

## 2025-02-02 RX ADMIN — SEVELAMER CARBONATE 1600 MG: 800 TABLET, FILM COATED ORAL at 13:21

## 2025-02-02 RX ADMIN — METOPROLOL SUCCINATE 25 MG: 25 TABLET, FILM COATED, EXTENDED RELEASE ORAL at 09:09

## 2025-02-02 RX ADMIN — DOXYCYCLINE 100 MG: 100 INJECTION, POWDER, LYOPHILIZED, FOR SOLUTION INTRAVENOUS at 20:34

## 2025-02-02 RX ADMIN — INSULIN LISPRO 8 UNITS: 100 INJECTION, SOLUTION INTRAVENOUS; SUBCUTANEOUS at 09:15

## 2025-02-02 RX ADMIN — GUAIFENESIN AND DEXTROMETHORPHAN 5 ML: 100; 10 SYRUP ORAL at 04:39

## 2025-02-02 ASSESSMENT — PAIN SCALES - GENERAL
PAINLEVEL_OUTOF10: 6
PAINLEVEL_OUTOF10: 6

## 2025-02-02 ASSESSMENT — PAIN DESCRIPTION - LOCATION
LOCATION: BACK
LOCATION: BACK

## 2025-02-02 ASSESSMENT — PAIN DESCRIPTION - DESCRIPTORS: DESCRIPTORS: ACHING

## 2025-02-02 ASSESSMENT — PAIN DESCRIPTION - ORIENTATION: ORIENTATION: RIGHT;LEFT

## 2025-02-02 NOTE — PLAN OF CARE
Problem: Chronic Conditions and Co-morbidities  Goal: Patient's chronic conditions and co-morbidity symptoms are monitored and maintained or improved  2/2/2025 0530 by Sofiya Khan RN  Outcome: Progressing  Flowsheets (Taken 2/1/2025 1935)  Care Plan - Patient's Chronic Conditions and Co-Morbidity Symptoms are Monitored and Maintained or Improved: Monitor and assess patient's chronic conditions and comorbid symptoms for stability, deterioration, or improvement  2/1/2025 1635 by Bang Moore, RN  Outcome: Progressing  Flowsheets (Taken 2/1/2025 0800)  Care Plan - Patient's Chronic Conditions and Co-Morbidity Symptoms are Monitored and Maintained or Improved:   Monitor and assess patient's chronic conditions and comorbid symptoms for stability, deterioration, or improvement   Collaborate with multidisciplinary team to address chronic and comorbid conditions and prevent exacerbation or deterioration   Update acute care plan with appropriate goals if chronic or comorbid symptoms are exacerbated and prevent overall improvement and discharge     Problem: Discharge Planning  Goal: Discharge to home or other facility with appropriate resources  2/2/2025 0530 by Sofiya Khan RN  Outcome: Progressing  Flowsheets (Taken 2/1/2025 1935)  Discharge to home or other facility with appropriate resources: Identify barriers to discharge with patient and caregiver  2/1/2025 1635 by Bang Moore, RN  Outcome: Progressing  Flowsheets (Taken 2/1/2025 0800)  Discharge to home or other facility with appropriate resources:   Identify barriers to discharge with patient and caregiver   Arrange for needed discharge resources and transportation as appropriate   Identify discharge learning needs (meds, wound care, etc)     Problem: Safety - Adult  Goal: Free from fall injury  2/2/2025 0530 by Sofiya Khan RN  Outcome: Progressing  2/1/2025 1635 by Bang Moore, RN  Outcome: Progressing

## 2025-02-03 VITALS
HEIGHT: 67 IN | BODY MASS INDEX: 29.62 KG/M2 | RESPIRATION RATE: 20 BRPM | WEIGHT: 188.71 LBS | TEMPERATURE: 97.6 F | SYSTOLIC BLOOD PRESSURE: 120 MMHG | DIASTOLIC BLOOD PRESSURE: 61 MMHG | HEART RATE: 77 BPM | OXYGEN SATURATION: 94 %

## 2025-02-03 LAB
ALBUMIN SERPL-MCNC: 2.9 G/DL (ref 3.5–5.2)
ALP SERPL-CCNC: 50 U/L (ref 40–129)
ALT SERPL-CCNC: 6 U/L (ref 0–40)
ANION GAP SERPL CALCULATED.3IONS-SCNC: 19 MMOL/L (ref 7–16)
AST SERPL-CCNC: 10 U/L (ref 0–39)
BASOPHILS # BLD: 0 K/UL (ref 0–0.2)
BASOPHILS NFR BLD: 0 % (ref 0–2)
BILIRUB SERPL-MCNC: 0.2 MG/DL (ref 0–1.2)
BUN SERPL-MCNC: 74 MG/DL (ref 6–23)
CALCIUM SERPL-MCNC: 8.9 MG/DL (ref 8.6–10.2)
CHLORIDE SERPL-SCNC: 94 MMOL/L (ref 98–107)
CO2 SERPL-SCNC: 22 MMOL/L (ref 22–29)
CREAT SERPL-MCNC: 12.7 MG/DL (ref 0.7–1.2)
EOSINOPHIL # BLD: 0.06 K/UL (ref 0.05–0.5)
EOSINOPHILS RELATIVE PERCENT: 1 % (ref 0–6)
ERYTHROCYTE [DISTWIDTH] IN BLOOD BY AUTOMATED COUNT: 17.2 % (ref 11.5–15)
GFR, ESTIMATED: 4 ML/MIN/1.73M2
GLUCOSE BLD-MCNC: 157 MG/DL (ref 74–99)
GLUCOSE BLD-MCNC: 179 MG/DL (ref 74–99)
GLUCOSE SERPL-MCNC: 123 MG/DL (ref 74–99)
HCT VFR BLD AUTO: 40.5 % (ref 37–54)
HGB BLD-MCNC: 12.8 G/DL (ref 12.5–16.5)
LYMPHOCYTES NFR BLD: 1.16 K/UL (ref 1.5–4)
LYMPHOCYTES RELATIVE PERCENT: 18 % (ref 20–42)
MCH RBC QN AUTO: 29.6 PG (ref 26–35)
MCHC RBC AUTO-ENTMCNC: 31.6 G/DL (ref 32–34.5)
MCV RBC AUTO: 93.8 FL (ref 80–99.9)
MICROORGANISM SPEC CULT: NORMAL
MICROORGANISM SPEC CULT: NORMAL
MONOCYTES NFR BLD: 1.1 K/UL (ref 0.1–0.95)
MONOCYTES NFR BLD: 17 % (ref 2–12)
NEUTROPHILS NFR BLD: 65 % (ref 43–80)
NEUTS SEG NFR BLD: 4.28 K/UL (ref 1.8–7.3)
PLATELET, FLUORESCENCE: 138 K/UL (ref 130–450)
PMV BLD AUTO: 11.9 FL (ref 7–12)
POTASSIUM SERPL-SCNC: 5.1 MMOL/L (ref 3.5–5)
PROT SERPL-MCNC: 7.2 G/DL (ref 6.4–8.3)
RBC # BLD AUTO: 4.32 M/UL (ref 3.8–5.8)
RBC # BLD: ABNORMAL 10*6/UL
SERVICE CMNT-IMP: NORMAL
SERVICE CMNT-IMP: NORMAL
SODIUM SERPL-SCNC: 135 MMOL/L (ref 132–146)
SPECIMEN DESCRIPTION: NORMAL
SPECIMEN DESCRIPTION: NORMAL
WBC OTHER # BLD: 6.6 K/UL (ref 4.5–11.5)

## 2025-02-03 PROCEDURE — 6360000002 HC RX W HCPCS: Performed by: FAMILY MEDICINE

## 2025-02-03 PROCEDURE — 82962 GLUCOSE BLOOD TEST: CPT

## 2025-02-03 PROCEDURE — 2700000000 HC OXYGEN THERAPY PER DAY

## 2025-02-03 PROCEDURE — 2580000003 HC RX 258: Performed by: FAMILY MEDICINE

## 2025-02-03 PROCEDURE — 6370000000 HC RX 637 (ALT 250 FOR IP): Performed by: FAMILY MEDICINE

## 2025-02-03 PROCEDURE — 90935 HEMODIALYSIS ONE EVALUATION: CPT

## 2025-02-03 PROCEDURE — 2500000003 HC RX 250 WO HCPCS: Performed by: FAMILY MEDICINE

## 2025-02-03 PROCEDURE — 36415 COLL VENOUS BLD VENIPUNCTURE: CPT

## 2025-02-03 PROCEDURE — 99239 HOSP IP/OBS DSCHRG MGMT >30: CPT | Performed by: INTERNAL MEDICINE

## 2025-02-03 PROCEDURE — 80053 COMPREHEN METABOLIC PANEL: CPT

## 2025-02-03 PROCEDURE — 85025 COMPLETE CBC W/AUTO DIFF WBC: CPT

## 2025-02-03 RX ORDER — PREDNISONE 10 MG/1
TABLET ORAL
Qty: 10 TABLET | Refills: 0 | Status: SHIPPED | OUTPATIENT
Start: 2025-02-03

## 2025-02-03 RX ADMIN — SODIUM CHLORIDE, PRESERVATIVE FREE 40 MG: 5 INJECTION INTRAVENOUS at 13:24

## 2025-02-03 RX ADMIN — WATER 1000 MG: 1 INJECTION INTRAMUSCULAR; INTRAVENOUS; SUBCUTANEOUS at 13:24

## 2025-02-03 RX ADMIN — SEVELAMER CARBONATE 1600 MG: 800 TABLET, FILM COATED ORAL at 13:25

## 2025-02-03 RX ADMIN — DOXYCYCLINE 100 MG: 100 INJECTION, POWDER, LYOPHILIZED, FOR SOLUTION INTRAVENOUS at 13:24

## 2025-02-03 RX ADMIN — APIXABAN 5 MG: 5 TABLET, FILM COATED ORAL at 13:25

## 2025-02-03 RX ADMIN — ACETAMINOPHEN 650 MG: 325 TABLET ORAL at 05:01

## 2025-02-03 RX ADMIN — METOPROLOL SUCCINATE 25 MG: 25 TABLET, FILM COATED, EXTENDED RELEASE ORAL at 13:25

## 2025-02-03 RX ADMIN — ACETAMINOPHEN 650 MG: 325 TABLET ORAL at 13:25

## 2025-02-03 ASSESSMENT — PAIN DESCRIPTION - ORIENTATION
ORIENTATION: LEFT
ORIENTATION: LEFT

## 2025-02-03 ASSESSMENT — PAIN DESCRIPTION - LOCATION
LOCATION: ARM
LOCATION: ARM

## 2025-02-03 ASSESSMENT — PAIN SCALES - GENERAL
PAINLEVEL_OUTOF10: 0
PAINLEVEL_OUTOF10: 5

## 2025-02-03 ASSESSMENT — PAIN DESCRIPTION - DESCRIPTORS: DESCRIPTORS: ACHING

## 2025-02-03 NOTE — CARE COORDINATION
SOCIAL WORK / DISCHARGE PLANNING:  Pt cleared for discharge today, to return home. Portable O2 tank from [facility] in room for him to return home. Taxi voucher provided to Eli[facility] as pt denies having any one who can provide transport. He uses [employer] for transport to  at [facility] MWF 5am. No dc needs identified or requested at this time.                   Electronically signed by FLY Martínez on 2/3/2025 at 2:34 PM

## 2025-02-03 NOTE — DISCHARGE SUMMARY
[facility] Hospitalist       Hospitalist Physician Discharge Summary       No follow-up provider specified.    Activity level: as elvia    Diet: ADULT DIET; Regular; Low Potassium (Less than 3000 mg/day)    Dispo:home    Condition at discharge: fair          Patient ID:  Carmelo Desir  [id number]  69 y.o.  1955    Admit date: 1/29/2025    Discharge date and time:  2/3/2025  1:50 PM    Admission Diagnoses: Principal Problem:    Acute on chronic respiratory failure with hypoxia  Active Problems:    Influenza A    Bacterial pneumonia    Uncontrolled type 2 diabetes mellitus with hyperglycemia (HCC)    ESRD (end stage renal disease) on dialysis (HCC)  Resolved Problems:    * No resolved hospital problems. *      Discharge Diagnoses: Principal Problem:    Acute on chronic respiratory failure with hypoxia  Active Problems:    Influenza A    Bacterial pneumonia    Uncontrolled type 2 diabetes mellitus with hyperglycemia (HCC)    ESRD (end stage renal disease) on dialysis (HCC)  Resolved Problems:    * No resolved hospital problems. *    Sepsis  Influenza A infection  Dehydration  Hypotension likely due to hypovolemia  Elevated lactic acid level  ESRD  Hyperkalemia  Poss superimposed bacterial pneumonia  Acute on chronic respiratory failure with increased o2 requirements from recent admission  Dm type 2 uncontrolled  hyponatremia        Consults:  IP CONSULT TO NEPHROLOGY  IP CONSULT TO SPIRITUAL SERVICES    Procedures: none    Hospital Course: Patient was admitted with Hyperkalemia [E87.5]  Influenza A [J10.1]  ESRD (end stage renal disease) on dialysis (HCC) [N18.6, Z99.2]  Acute on chronic respiratory failure with hypoxia [J96.21]  Pneumonia of both lower lobes due to infectious organism [J18.9]  Acute on chronic hypoxic respiratory failure [J96.21]. Patient is a pleasant 69-year-old male who presents from home for shortness of breath worsening from his baseline. He is chronically on 2 L of oxygen. He

## 2025-02-03 NOTE — FLOWSHEET NOTE
02/03/25 1150   Vital Signs   /65   Temp 98 °F (36.7 °C)   Pulse 80   Respirations 18   Weight - Scale 85.6 kg (188 lb 11.4 oz)   Weight Method Bed scale   Percent Weight Change -8.15   Post-Hemodialysis Assessment   Post-Treatment Procedures Blood returned;Access bleeding time < 10 minutes   Machine Disinfection Process Exterior Machine Disinfection   Rinseback Volume (ml) 300 ml   Blood Volume Processed (Liters) 64.2 L   Dialyzer Clearance Lightly streaked   Duration of Treatment (minutes) 210 minutes   Hemodialysis Intake (ml) 300 ml   Hemodialysis Output (ml) 1800 ml   NET Removed (ml) 1500   Tolerated Treatment Good   Patient Response to Treatment tolerated tx well. removed 1.5L

## 2025-02-03 NOTE — PROGRESS NOTES
Nephrology Note  Olu Garner MD          Patient seen and examined.  No family member is present at bedside.  Chart reviewed.  Patient is feeling somewhat less short of breath.  Cough persist.  Appetite good. No nausea or dysguesia.   Awake and alert .   In no acute distress.    Vital SignsBP (!) 89/62   Pulse (!) 104   Temp 97.6 °F (36.4 °C) (Oral)   Resp (!) 31   Ht 1.702 m (5' 7\")   Wt 87.1 kg (192 lb)   SpO2 95%   BMI 30.07 kg/m²   24HR INTAKE/OUTPUT:    Intake/Output Summary (Last 24 hours) at 1/30/2025 1301  Last data filed at 1/29/2025 1658  Gross per 24 hour   Intake 300 ml   Output 2100 ml   Net -1800 ml         PHYSICAL EXAM          Neck: No JVD  Lungs: Breath sounds decreased at the bases with bilateral coarse breath sounds and scattered rhonchi.  Heart: Regular rate and rhythm. No S3 gallop. No murmrur.  Abdomen: Soft non distended, non tender and normal bowel sounds.  Extremeties: Trace bipedal edema.  AV fistula in the right arm with a bruit and thrill.    Current Facility-Administered Medications   Medication Dose Route Frequency Provider Last Rate Last Admin    guaiFENesin-dextromethorphan (ROBITUSSIN DM) 100-10 MG/5ML syrup 5 mL  5 mL Oral Q4H PRN Hric, Abbe, DO        cyclobenzaprine (FLEXERIL) tablet 10 mg  10 mg Oral TID PRN Hric, Abbe, DO        glucose chewable tablet 16 g  4 tablet Oral PRN Moise Barakat MD   16 g at 01/30/25 1151    dextrose bolus 10% 125 mL  125 mL IntraVENous PRN Moise Barakat MD        Or    dextrose bolus 10% 250 mL  250 mL IntraVENous PRN Moise Barakat MD        glucagon injection 1 mg  1 mg SubCUTAneous PRN Moise Barakat MD        dextrose 10 % infusion   IntraVENous Continuous PRN Moise Barakat MD        ipratropium 0.5 mg-albuterol 2.5 mg (DUONEB) nebulizer solution 1 Dose  1 Dose Inhalation Q4H WA RT Esperanza Arauz DO   1 Dose at 01/30/25 1003    sevelamer (RENVELA) tablet 1,600 mg  1,600 mg Oral TID Esperanza Boudreaux 
            Olu Garner MD  Nephrology    Hemodialysis/Progress note.    Patient seen and examined on hemodialyisis.  Chart reviewed.  Feels better.  Less short of breath.  Cough is improving.  Appetite is good.  No nausea or dysguesia.  Awake and alert . In no acute distress.    Vital SignsBP 121/67   Pulse 75   Temp 98 °F (36.7 °C) (Oral)   Resp 18   Ht 1.702 m (5' 7\")   Wt 93.2 kg (205 lb 7.5 oz)   SpO2 93%   BMI 32.18 kg/m²   24HR INTAKE/OUTPUT:    Intake/Output Summary (Last 24 hours) at 2/3/2025 1208  Last data filed at 2/3/2025 1150  Gross per 24 hour   Intake 540 ml   Output 1800 ml   Net -1260 ml          Physical  Exam        Neck: No JVD  Lungs: Breath sounds decreased at the bases with bilateral coarse breath sounds and scattered rhonchi.  Heart: Regular rate and rhythm. No S3 gallop. No murmrur.  Abdomen: Soft non distended, non tender and normal bowel sounds.  Extremeties: Trace bipedal edema.  AV fistula in the right arm with a bruit and thrill        Medications:  Current Facility-Administered Medications   Medication Dose Route Frequency Provider Last Rate Last Admin    guaiFENesin-dextromethorphan (ROBITUSSIN DM) 100-10 MG/5ML syrup 5 mL  5 mL Oral Q4H PRN Hric, Abbe, DO   5 mL at 02/02/25 2038    cyclobenzaprine (FLEXERIL) tablet 10 mg  10 mg Oral TID PRN Hric, Abbe, DO   10 mg at 02/02/25 2038    insulin glargine (LANTUS) injection vial 25 Units  25 Units SubCUTAneous Nightly Hric, Abbe, DO   25 Units at 01/30/25 2040    ipratropium 0.5 mg-albuterol 2.5 mg (DUONEB) nebulizer solution 1 Dose  1 Dose Inhalation Q4H PRN Esperanza Arauz DO   1 Dose at 02/02/25 2048    glucose chewable tablet 16 g  4 tablet Oral PRN Moise Barakat MD   16 g at 02/02/25 1134    dextrose bolus 10% 125 mL  125 mL IntraVENous PRN Moise Barakat MD        Or    dextrose bolus 10% 250 mL  250 mL IntraVENous PRN Moise Barakat MD        glucagon injection 1 mg  1 mg SubCUTAneous Moise Mclaughlin MD   
            Olu Garner MD  Nephrology    Progress note.    Patient seen and examined .  Chart reviewed.  Patient is feeling better.  Shortness of breath and cough improved.  He states that he is having low blood sugars.  Appetite is fair no nausea vomiting or dysguesia.  Awake and alert . In no acute distress.    Vital SignsBP 95/61   Pulse 87   Temp 97.9 °F (36.6 °C) (Oral)   Resp 23   Ht 1.702 m (5' 7\")   Wt 89.3 kg (196 lb 13.9 oz)   SpO2 97%   BMI 30.83 kg/m²   24HR INTAKE/OUTPUT:    Intake/Output Summary (Last 24 hours) at 2/1/2025 1428  Last data filed at 2/1/2025 1310  Gross per 24 hour   Intake 780 ml   Output --   Net 780 ml          Physical  Exam    Neck: No JVD  Lungs: Breath sounds decreased at the bases with bilateral coarse breath sounds and scattered rhonchi.  Heart: Regular rate and rhythm. No S3 gallop. No murmrur.  Abdomen: Soft non distended, non tender and normal bowel sounds.  Extremeties: Trace bipedal edema.  AV fistula in the right arm with a bruit and thrill        Medications:  Current Facility-Administered Medications   Medication Dose Route Frequency Provider Last Rate Last Admin    guaiFENesin-dextromethorphan (ROBITUSSIN DM) 100-10 MG/5ML syrup 5 mL  5 mL Oral Q4H PRN Hric, Abbe, DO   5 mL at 02/01/25 1042    cyclobenzaprine (FLEXERIL) tablet 10 mg  10 mg Oral TID PRN Hric, Abbe, DO   10 mg at 01/31/25 0903    insulin glargine (LANTUS) injection vial 25 Units  25 Units SubCUTAneous Nightly Hric, Abbe, DO   25 Units at 01/30/25 2040    oseltamivir (TAMIFLU) capsule 30 mg  30 mg Oral Every Other Day Esperanza Arauz DO   30 mg at 01/31/25 1458    ipratropium 0.5 mg-albuterol 2.5 mg (DUONEB) nebulizer solution 1 Dose  1 Dose Inhalation Q4H PRN Esperanza Arauz DO   1 Dose at 01/31/25 1226    glucose chewable tablet 16 g  4 tablet Oral PRN Moise Barakat MD   16 g at 02/01/25 1322    dextrose bolus 10% 125 mL  125 mL IntraVENous PRN Moise Barakat MD        Or    
     [facility] Hospitalist   Progress Note    Admitting Date and Time: 1/29/2025  6:00 AM  Admit Dx: Hyperkalemia [E87.5]  Influenza A [J10.1]  ESRD (end stage renal disease) on dialysis (HCC) [N18.6, Z99.2]  Acute on chronic respiratory failure with hypoxia [J96.21]  Pneumonia of both lower lobes due to infectious organism [J18.9]  Acute on chronic hypoxic respiratory failure [J96.21]    Subjective:    Patient was admitted with Hyperkalemia [E87.5]  Influenza A [J10.1]  ESRD (end stage renal disease) on dialysis (HCC) [N18.6, Z99.2]  Acute on chronic respiratory failure with hypoxia [J96.21]  Pneumonia of both lower lobes due to infectious organism [J18.9]  Acute on chronic hypoxic respiratory failure [J96.21]. Patient denies fever, chills, cp, sob, n/v.     ipratropium 0.5 mg-albuterol 2.5 mg  1 Dose Inhalation Q4H WA RT    sevelamer  1,600 mg Oral TID WC    apixaban  5 mg Oral BID    insulin glargine  28 Units SubCUTAneous Nightly    metoprolol succinate  25 mg Oral Daily    insulin lispro  8 Units SubCUTAneous TID WC    pantoprazole (PROTONIX) 40 mg in sodium chloride (PF) 0.9 % 10 mL injection  40 mg IntraVENous Q12H    oseltamivir  30 mg Oral Daily    cefTRIAXone (ROCEPHIN) IV  1,000 mg IntraVENous Q24H    doxycycline (VIBRAMYCIN) IV  100 mg IntraVENous Q12H    sodium chloride flush  5-40 mL IntraVENous 2 times per day    melatonin  6 mg Oral Nightly    sodium zirconium cyclosilicate  5 g Oral Once per day on Sunday Tuesday Thursday Saturday    ammonium lactate   Topical BID     glucose, 4 tablet, PRN  dextrose bolus, 125 mL, PRN   Or  dextrose bolus, 250 mL, PRN  glucagon (rDNA), 1 mg, PRN  dextrose, , Continuous PRN  fluticasone, 2 spray, Daily PRN  sodium chloride flush, 5-40 mL, PRN  sodium chloride, , PRN  ondansetron, 4 mg, Q8H PRN   Or  ondansetron, 4 mg, Q6H PRN  polyethylene glycol, 17 g, Daily PRN  acetaminophen, 650 mg, Q6H PRN   Or  acetaminophen, 650 mg, Q6H PRN     
     [facility] Hospitalist   Progress Note    Admitting Date and Time: 1/29/2025  6:00 AM  Admit Dx: Hyperkalemia [E87.5]  Influenza A [J10.1]  ESRD (end stage renal disease) on dialysis (HCC) [N18.6, Z99.2]  Acute on chronic respiratory failure with hypoxia [J96.21]  Pneumonia of both lower lobes due to infectious organism [J18.9]  Acute on chronic hypoxic respiratory failure [J96.21]    Subjective:    Patient was admitted with Hyperkalemia [E87.5]  Influenza A [J10.1]  ESRD (end stage renal disease) on dialysis (HCC) [N18.6, Z99.2]  Acute on chronic respiratory failure with hypoxia [J96.21]  Pneumonia of both lower lobes due to infectious organism [J18.9]  Acute on chronic hypoxic respiratory failure [J96.21]. Patient denies fever, chills, cp, sob, n/v.     insulin glargine  25 Units SubCUTAneous Nightly    oseltamivir  30 mg Oral Every Other Day    sevelamer  1,600 mg Oral TID WC    apixaban  5 mg Oral BID    metoprolol succinate  25 mg Oral Daily    insulin lispro  8 Units SubCUTAneous TID WC    pantoprazole (PROTONIX) 40 mg in sodium chloride (PF) 0.9 % 10 mL injection  40 mg IntraVENous Q12H    cefTRIAXone (ROCEPHIN) IV  1,000 mg IntraVENous Q24H    doxycycline (VIBRAMYCIN) IV  100 mg IntraVENous Q12H    sodium chloride flush  5-40 mL IntraVENous 2 times per day    melatonin  6 mg Oral Nightly    sodium zirconium cyclosilicate  5 g Oral Once per day on Sunday Tuesday Thursday Saturday    ammonium lactate   Topical BID     guaiFENesin-dextromethorphan, 5 mL, Q4H PRN  cyclobenzaprine, 10 mg, TID PRN  ipratropium 0.5 mg-albuterol 2.5 mg, 1 Dose, Q4H PRN  glucose, 4 tablet, PRN  dextrose bolus, 125 mL, PRN   Or  dextrose bolus, 250 mL, PRN  glucagon (rDNA), 1 mg, PRN  dextrose, , Continuous PRN  fluticasone, 2 spray, Daily PRN  sodium chloride flush, 5-40 mL, PRN  sodium chloride, , PRN  ondansetron, 4 mg, Q8H PRN   Or  ondansetron, 4 mg, Q6H PRN  polyethylene glycol, 17 g, Daily PRN  acetaminophen, 
     [facility] Hospitalist   Progress Note    Admitting Date and Time: 1/29/2025  6:00 AM  Admit Dx: Hyperkalemia [E87.5]  Influenza A [J10.1]  ESRD (end stage renal disease) on dialysis (HCC) [N18.6, Z99.2]  Acute on chronic respiratory failure with hypoxia [J96.21]  Pneumonia of both lower lobes due to infectious organism [J18.9]  Acute on chronic hypoxic respiratory failure [J96.21]    Subjective:    Patient was admitted with Hyperkalemia [E87.5]  Influenza A [J10.1]  ESRD (end stage renal disease) on dialysis (HCC) [N18.6, Z99.2]  Acute on chronic respiratory failure with hypoxia [J96.21]  Pneumonia of both lower lobes due to infectious organism [J18.9]  Acute on chronic hypoxic respiratory failure [J96.21]. Patient fever, chills, cp, sob, n/v.     insulin glargine  25 Units SubCUTAneous Nightly    oseltamivir  30 mg Oral Every Other Day    sevelamer  1,600 mg Oral TID WC    apixaban  5 mg Oral BID    metoprolol succinate  25 mg Oral Daily    insulin lispro  8 Units SubCUTAneous TID WC    pantoprazole (PROTONIX) 40 mg in sodium chloride (PF) 0.9 % 10 mL injection  40 mg IntraVENous Q12H    cefTRIAXone (ROCEPHIN) IV  1,000 mg IntraVENous Q24H    doxycycline (VIBRAMYCIN) IV  100 mg IntraVENous Q12H    sodium chloride flush  5-40 mL IntraVENous 2 times per day    melatonin  6 mg Oral Nightly    sodium zirconium cyclosilicate  5 g Oral Once per day on Sunday Tuesday Thursday Saturday    ammonium lactate   Topical BID     guaiFENesin-dextromethorphan, 5 mL, Q4H PRN  cyclobenzaprine, 10 mg, TID PRN  ipratropium 0.5 mg-albuterol 2.5 mg, 1 Dose, Q4H PRN  glucose, 4 tablet, PRN  dextrose bolus, 125 mL, PRN   Or  dextrose bolus, 250 mL, PRN  glucagon (rDNA), 1 mg, PRN  dextrose, , Continuous PRN  fluticasone, 2 spray, Daily PRN  sodium chloride flush, 5-40 mL, PRN  sodium chloride, , PRN  ondansetron, 4 mg, Q8H PRN   Or  ondansetron, 4 mg, Q6H PRN  polyethylene glycol, 17 g, Daily PRN  acetaminophen, 650 mg, 
    This patient is on medication that requires renal, weight, and/or indication dose adjustment.      Date Body Weight IBW  Adjusted BW SCr  CrCl Dialysis status   1/30/2025 87.1 kg (192 lb) Ideal body weight: 66.1 kg (145 lb 11.6 oz)  Adjusted ideal body weight: 74.5 kg (164 lb 3.8 oz) Serum creatinine: 9.6 mg/dL (HH) 01/30/25 0602  Estimated creatinine clearance: 8 mL/min (A) HD       Pharmacy has dose-adjusted the following medication(s):    Date Previous Order Adjusted Order   1/30/2025 Oseltamivir 30 mg daily (only 2 doses remain) Oseltamivir 30 mg every other day AFTER HD (only 2 doses remain)     These changes were made per protocol according to the [facility]   Automatic Renal Dose Adjustment Policy.     *Please note this dose may need readjusted if patient's condition changes.    Please contact pharmacy with any questions regarding these changes.    Jami Singh, PharmD.  1/30/2025 1:59 PM    BUBBA: [phone]    
4 Eyes Skin Assessment     NAME:  Carmelo Desir  YOB: 1955  MEDICAL RECORD NUMBER:  [id number]    The patient is being assessed for  Admission    I agree that at least one RN has performed a thorough Head to Toe Skin Assessment on the patient. ALL assessment sites listed below have been assessed.      Areas assessed by both nurses:    Head, Face, Ears, Shoulders, Back, Chest, Arms, Elbows, Hands, Sacrum. Buttock, Coccyx, Ischium, Legs. Feet and Heels, and Under Medical Devices         Does the Patient have a Wound? No noted wound(s)       Bear Prevention initiated by RN: Yes  Wound Care Orders initiated by RN: No    Dry skin on legs and feet. Not open.     Pressure Injury (Stage 3,4, Unstageable, DTI, NWPT, and Complex wounds) if present, place Wound referral order by RN under : Yes    New Ostomies, if present place, Ostomy referral order under : Yes     Nurse 1 eSignature: Electronically signed by Keisha Abreu RN on 1/29/25 at 6:12 PM EST    **SHARE this note so that the co-signing nurse can place an eSignature**    Nurse 2 eSignature: {Esignature:[id number]}   
CLINICAL PHARMACY NOTE: MEDS TO BEDS    Total # of Prescriptions Filled: 1   The following medications were delivered to the patient:  Prednisone 10 mg    Additional Documentation:    
Patient verbalized all personal belongings from this admission and  are present , accounted for in bags. Patient has portable O2 at bedside,set to 2l nasal cannula , and the nasal cannula is in place. Awaiting transport.   
Pt glucose check was 52. Repeated  check to confirm. BGL was 56. PRN glucose tabs given. Repeat check in 15 mins was 76. Pt was asymptomatic during the entire episode. Pt now eating a healthy meal for lunch.  
Pt was educated on need for stool sample, hat placed in the bathroom. Pt is agreeable to let nursing know when he needs to have a BM.  
Spiritual Health History and Assessment/Progress Note  [facility]    Initial Encounter, Spiritual/Emotional Needs,  ,  ,      Name: Carmelo Desir MRN: [id number]    Age: 69 y.o.     Sex: male   Language: English   [employer]: Latter day   Acute on chronic respiratory failure with hypoxia     Date: 1/30/2025                           Spiritual Assessment began in [facility] 6S IM        Referral/Consult From: Nurse   Encounter Overview/Reason: Initial Encounter, Spiritual/Emotional Needs  Service Provided For: Patient    Ginette, Belief, Meaning:   Patient is connected with a ginette tradition or spiritual practice  Family/Friends No family/friends present      Importance and Influence:  Patient has spiritual/personal beliefs that influence decisions regarding their health  Family/Friends No family/friends present    Community:  Patient is connected with a spiritual community  Family/Friends No family/friends present    Assessment and Plan of Care:     Patient Interventions include: Engaged in theological reflection  Family/Friends Interventions include: No family/friends present    Patient Plan of Care: Spiritual Care available upon further referral  Family/Friends Plan of Care: No family/friends present    Electronically signed by Chaplain Randy on 1/30/2025 at 1:14 PM   
IntraVENous PRN Moise Barakat MD        glucagon injection 1 mg  1 mg SubCUTAneous PRN Moise Barakat MD        dextrose 10 % infusion   IntraVENous Continuous PRN Moise Barakat MD        sevelamer (RENVELA) tablet 1,600 mg  1,600 mg Oral TID  Esperanza Arauz DO   1,600 mg at 02/02/25 0909    apixaban (ELIQUIS) tablet 5 mg  5 mg Oral BID Esperanza Arauz, DO   5 mg at 02/02/25 0909    fluticasone (FLONASE) 50 MCG/ACT nasal spray 2 spray  2 spray Nasal Daily PRN Esperanza Arauz DO   2 spray at 02/02/25 0913    metoprolol succinate (TOPROL XL) extended release tablet 25 mg  25 mg Oral Daily Esperanza Arauz DO   25 mg at 02/02/25 0909    insulin lispro (HUMALOG,ADMELOG) injection vial 8 Units  8 Units SubCUTAneous TID  Esperanza Arauz DO   8 Units at 02/02/25 0915    pantoprazole (PROTONIX) 40 mg in sodium chloride (PF) 0.9 % 10 mL injection  40 mg IntraVENous Q12H Esperanza Arauz, DO   40 mg at 02/02/25 0909    cefTRIAXone (ROCEPHIN) 1,000 mg in sterile water 10 mL IV syringe  1,000 mg IntraVENous Q24H Esperanza Arauz, DO   1,000 mg at 02/02/25 0909    doxycycline (VIBRAMYCIN) 100 mg in sodium chloride 0.9 % 100 mL IVPB ([id number])  100 mg IntraVENous Q12H Esperanza Arauz DO   Stopped at 02/02/25 1031    sodium chloride flush 0.9 % injection 5-40 mL  5-40 mL IntraVENous 2 times per day Esperanza Arauz DO   10 mL at 02/02/25 0910    sodium chloride flush 0.9 % injection 5-40 mL  5-40 mL IntraVENous PRN Esperanza Arauz DO        0.9 % sodium chloride infusion   IntraVENous PRN Esperanza Arauz DO        ondansetron (ZOFRAN-ODT) disintegrating tablet 4 mg  4 mg Oral Q8H PRN Esperanza Arauz DO        Or    ondansetron (ZOFRAN) injection 4 mg  4 mg IntraVENous Q6H PRN Esperanza Arauz, DO        polyethylene glycol (GLYCOLAX) packet 17 g  17 g Oral Daily PRN Esperanza Arauz, DO        acetaminophen (TYLENOL) tablet 650 mg  650 mg Oral Q6H PRN Esperanza Arauz, DO   
05:13 AM    BUN 36 02/01/2025 05:13 AM    CREATININE 8.6 02/01/2025 05:13 AM    GFRAA 7 08/16/2022 02:08 PM    LABGLOM 6 02/01/2025 05:13 AM    LABGLOM 6 04/23/2024 08:30 AM    GLUCOSE 105 02/01/2025 05:13 AM    CALCIUM 8.8 02/01/2025 05:13 AM    BILITOT 0.2 02/01/2025 05:13 AM    ALKPHOS 51 02/01/2025 05:13 AM    AST 10 02/01/2025 05:13 AM    ALT 7 02/01/2025 05:13 AM        Radiology:   XR CHEST PORTABLE   Final Result   Increased interstitial markings seen throughout the lung fields suggesting   interstitial edema or pneumonia.  Clinical correlation needed.  Trace   bilateral pleural effusions cannot be excluded.             Assessment:    Principal Problem:    Acute on chronic respiratory failure with hypoxia  Active Problems:    Influenza A    Bacterial pneumonia    Uncontrolled type 2 diabetes mellitus with hyperglycemia (HCC)    ESRD (end stage renal disease) on dialysis ([facility])  Resolved Problems:    * No resolved hospital problems. *      Plan:  Sepsis(tachypnea,hr over 90,infection)POA supportive care and tx underlying infection  Influenza A infection tamiflu  Dehydration monitor off iv fluids  Hypotension likely due to hypovolemia monitor off iv fluids  Elevated lactic acid level monitor  Esrd continue HD renal following  Hyperkalemia monitor and tx  Possible superimposed bacterial pneumonia continue abx  Acute on chronic respiratory failure with increased o2 requirement from recent admission continue to wean o2 as able  Dm type 2 uncontrolled monitor bs and tx with insulin  Hyponatremia monitor    Pt did not get up much because of dialysis yesterday. Pt states he forgot about using IS yesterday.    Encourage OOB. Encourage IS. Wean o2 as able         Electronically signed by Abbe Holder DO on 2/1/2025 at 8:42 AM    
6 mg Oral Nightly Amanda Arauzeve SNIDER DO   6 mg at 01/30/25 2037    sodium zirconium cyclosilicate (LOKELMA) oral suspension 5 g  5 g Oral Once per day on Sunday Tuesday Thursday Saturday Regla Esperanza SNIDER DO   5 g at 01/30/25 0920    ammonium lactate (AMLACTIN) 12 % cream   Topical BID Amanda Arauzeve SNIDER DO   Given at 01/31/25 0903       Labs:    Lab Results   Component Value Date    VITD25 30 06/21/2022    VITD25 40 06/22/2021    VITD25 32 01/23/2020    VITD25 42 10/02/2019    VITD25 24 (L) 03/06/2019    VITD25 14 (L) 08/03/2018    VITD25 12 (L) 03/01/2018    VITD25 19 (L) 06/27/2017    VITD25 30 11/08/2016    VITD25 39 02/24/2016    VITD25 25 (L) 11/06/2015    VITD25 27 (L) 06/17/2015       Lab Results   Component Value Date    IPTH 98 (H) 06/21/2022    IPTH 154 (H) 01/23/2020    IPTH 97 (H) 10/02/2019    IPTH 99 (H) 03/06/2019    IPTH 70 (H) 08/03/2018    IPTH 72 (H) 03/01/2018    IPTH 51 06/27/2017    IPTH 50 11/08/2016    IPTH 32 02/24/2016    IPTH 48 11/06/2015    IPTH 31 06/17/2015       Lab Results   Component Value Date    CALCIUM 8.8 01/31/2025    CALCIUM 8.9 01/30/2025    CALCIUM 10.0 01/29/2025    CAION 1.25 06/22/2022    CAION 1.26 06/21/2022    CAION 1.23 02/09/2020    PHOS 6.3 (H) 01/08/2025    PHOS 6.3 (H) 01/07/2025    PHOS 4.2 05/04/2024    MG 2.1 01/08/2025    MG 2.2 01/07/2025    MG 2.2 05/04/2024       CBC:   Recent Labs     01/29/25  0615 01/30/25  0602 01/31/25 0345   WBC 6.8 4.7 5.7   HGB 15.4 15.1 14.3   PLT  --  150  --          Lab Results   Component Value Date    IRON 44 (L) 03/07/2023    TIBC 171 (L) 03/07/2023    FERRITIN 515 03/07/2023       BMP:   Recent Labs     01/29/25  0615 01/29/25  1105 01/30/25  0602 01/31/25 0345     --  137 137   K 6.7* 5.3* 4.2 4.2   CL 90*  --  90* 91*   CO2 19*  --  25 26   BUN 69*  --  50* 61*   CREATININE 13.8*  --  9.6* 11.4*   GLUCOSE 127*  --  207* 146*       Phos and Calcium:  Lab Results   Component Value Date    CALCIUM 8.8 01/31/2025

## 2025-02-03 NOTE — PLAN OF CARE
Problem: Chronic Conditions and Co-morbidities  Goal: Patient's chronic conditions and co-morbidity symptoms are monitored and maintained or improved  2/2/2025 2313 by Sofiya Khan RN  Outcome: Progressing  2/2/2025 2048 by Sofiya Khan RN  Outcome: Progressing     Problem: Discharge Planning  Goal: Discharge to home or other facility with appropriate resources  2/2/2025 2313 by Sofiya Khan RN  Outcome: Progressing  2/2/2025 2048 by Sofiya Khan RN  Outcome: Progressing     Problem: Safety - Adult  Goal: Free from fall injury  2/2/2025 2313 by Sofiya Khan RN  Outcome: Progressing  2/2/2025 2048 by Sofiya Khan RN  Outcome: Progressing

## 2025-02-04 ENCOUNTER — CARE COORDINATION (OUTPATIENT)
Dept: CARE COORDINATION | Age: 70
End: 2025-02-04

## 2025-02-04 NOTE — CARE COORDINATION
Care Transitions Note    Initial Call - Call within 2 business days of discharge: Yes    Patient Current Location:  Home: [address] [place] [address] [address]  [address]  [facility] [id number]    Care Transition Nurse contacted the patient by telephone to perform post hospital discharge assessment, verified name and  as identifiers. Provided introduction to self, and explanation of the Care Transition Nurse role.     Patient: Carmelo Desir    Patient : 1955   MRN: [id number]    Reason for Admission: Influenza, PNA  Discharge Date: 2/3/25  RURS: Readmission Risk Score: 22.1      Last Discharge Facility       Date Complaint Diagnosis Description Type Department Provider    25 Shortness of Breath Pneumonia of both lower lobes due to infectious organism ... ED to Hosp-Admission (Discharged) (ADMITTED) [facility] Abbe Holder DO; Moise Barakat.            Was this an external facility discharge? No    Additional needs identified to be addressed with provider   No needs identified             Method of communication with provider: none.    Patients top risk factors for readmission: Hyperkalemia, ESRD, HD, DM, Htn, A-fib, CKD, COPD, Fe deficiency, and polypharmacy     Interventions to address risk factors:   Education: complete full course of prednisone taper, currently on 40 MG. [facility] with [facility]), low K+ diet. Blood thinner precautions  Review of patient management of conditions/medications: Reviewed medications appt needs, transportation for HD, needs to schedule for tomorrow, goes MWF at 5 am to [facility]  DME: Has 02 2 L from [employer], has inhalers    Care Summary Note: Spoke to Carmelo for initial transitions call. Patient went to ED on . He was sent home with oral antibiotics and Tamiflu however he never picked it up. He does not have a ride. Patient returned on  from HD for increased SOB. Diagnosed with influenza A, PNA, hyperkalemia. He was started on Rocephin and Doxy and given 10 mg

## 2025-03-01 PROBLEM — J10.1 INFLUENZA A: Status: RESOLVED | Noted: 2025-01-30 | Resolved: 2025-03-01

## 2025-03-04 ENCOUNTER — TELEPHONE (OUTPATIENT)
Facility: HOSPITAL | Age: 70
End: 2025-03-04
Payer: MEDICARE

## 2025-04-08 ENCOUNTER — TRANSCRIBE ORDERS (OUTPATIENT)
Dept: ADMINISTRATIVE | Age: 70
End: 2025-04-08

## 2025-04-08 DIAGNOSIS — F17.211 NICOTINE DEPENDENCE, CIGARETTES, IN REMISSION: Primary | ICD-10-CM

## 2025-05-06 ENCOUNTER — APPOINTMENT (OUTPATIENT)
Facility: HOSPITAL | Age: 70
End: 2025-05-06
Payer: MEDICARE

## 2025-05-30 ENCOUNTER — HOSPITAL ENCOUNTER (EMERGENCY)
Age: 70
Discharge: HOME | End: 2025-05-30
Attending: STUDENT IN AN ORGANIZED HEALTH CARE EDUCATION/TRAINING PROGRAM
Payer: OTHER GOVERNMENT

## 2025-05-30 VITALS
SYSTOLIC BLOOD PRESSURE: 105 MMHG | TEMPERATURE: 97.9 F | DIASTOLIC BLOOD PRESSURE: 55 MMHG | HEART RATE: 74 BPM | OXYGEN SATURATION: 96 % | RESPIRATION RATE: 18 BRPM

## 2025-05-30 DIAGNOSIS — T82.838A HEMORRHAGE OF ARTERIOVENOUS FISTULA, INITIAL ENCOUNTER: Primary | ICD-10-CM

## 2025-05-30 LAB
BASOPHILS # BLD: 0.02 K/UL (ref 0–0.2)
BASOPHILS NFR BLD: 0 % (ref 0–2)
EOSINOPHIL # BLD: 0.21 K/UL (ref 0.05–0.5)
EOSINOPHILS RELATIVE PERCENT: 3 % (ref 0–6)
ERYTHROCYTE [DISTWIDTH] IN BLOOD BY AUTOMATED COUNT: 17.8 % (ref 11.5–15)
HCT VFR BLD AUTO: 49 % (ref 37–54)
HGB BLD-MCNC: 15.1 G/DL (ref 12.5–16.5)
IMM GRANULOCYTES # BLD AUTO: <0.03 K/UL (ref 0–0.58)
IMM GRANULOCYTES NFR BLD: 0 % (ref 0–5)
INR PPP: 1.3
LYMPHOCYTES NFR BLD: 1.35 K/UL (ref 1.5–4)
LYMPHOCYTES RELATIVE PERCENT: 21 % (ref 20–42)
MCH RBC QN AUTO: 30.1 PG (ref 26–35)
MCHC RBC AUTO-ENTMCNC: 30.8 G/DL (ref 32–34.5)
MCV RBC AUTO: 97.6 FL (ref 80–99.9)
MONOCYTES NFR BLD: 0.8 K/UL (ref 0.1–0.95)
MONOCYTES NFR BLD: 13 % (ref 2–12)
NEUTROPHILS NFR BLD: 62 % (ref 43–80)
NEUTS SEG NFR BLD: 3.91 K/UL (ref 1.8–7.3)
PARTIAL THROMBOPLASTIN TIME: 36.6 SEC (ref 24.5–35.1)
PLATELET # BLD AUTO: 189 K/UL (ref 130–450)
PMV BLD AUTO: 9.7 FL (ref 7–12)
PROTHROMBIN TIME: 13.8 SEC (ref 9.3–12.4)
RBC # BLD AUTO: 5.02 M/UL (ref 3.8–5.8)
WBC OTHER # BLD: 6.3 K/UL (ref 4.5–11.5)

## 2025-05-30 PROCEDURE — 6360000002 HC RX W HCPCS: Performed by: STUDENT IN AN ORGANIZED HEALTH CARE EDUCATION/TRAINING PROGRAM

## 2025-05-30 PROCEDURE — 99284 EMERGENCY DEPT VISIT MOD MDM: CPT

## 2025-05-30 PROCEDURE — 85025 COMPLETE CBC W/AUTO DIFF WBC: CPT

## 2025-05-30 PROCEDURE — 85610 PROTHROMBIN TIME: CPT

## 2025-05-30 PROCEDURE — 85730 THROMBOPLASTIN TIME PARTIAL: CPT

## 2025-05-30 RX ORDER — LIDOCAINE HYDROCHLORIDE AND EPINEPHRINE 10; 10 MG/ML; UG/ML
20 INJECTION, SOLUTION INFILTRATION; PERINEURAL ONCE
Status: COMPLETED | OUTPATIENT
Start: 2025-05-30 | End: 2025-05-30

## 2025-05-30 RX ADMIN — LIDOCAINE HYDROCHLORIDE AND EPINEPHRINE 20 ML: 10; 10 INJECTION, SOLUTION INFILTRATION; PERINEURAL at 16:46

## 2025-05-30 NOTE — DISCHARGE INSTRUCTIONS
Please return to the ER for any new or worsening symptoms including but not limited to  if your fistula bleeds again  If prescribed, please be sure to  your prescriptions from the pharmacy  Please follow-up with  VASCULAR SURGERY  as instructed  Per discussion with vascular surgery, okay to go to dialysis on Monday

## 2025-05-30 NOTE — ED PROVIDER NOTES
[facility] EMERGENCY DEPARTMENT  EMERGENCY DEPARTMENT ENCOUNTER        Pt Name: Carmelo Desir  MRN: [id number]  Birthdate 1955  Date of evaluation: 5/30/2025  Provider: Julia Jain DO  PCP: Sri Talamantes APRN - CNP  Note Started: 4:17 PM EDT 5/30/25    CHIEF COMPLAINT       Chief Complaint   Patient presents with    Vascular Access Problem     Pt sent from dialysis center d/t arterial site bleeding >1h. Taking eliquis       HISTORY OF PRESENT ILLNESS: 1 or more Elements     Limitations to history : None    Carmelo Desir is a 69 y.o. male with past medical history of ESRD on HD M/W/F who presents to the ED for evaluation of bleeding dialysis fistula, right upper extremity.  Patient states that he finished dialysis about 1 hour ago and when they deaccessed his fistula they could not get 2 different areas to stop bleeding.  They applied a pressure dressing and sent him to the ER.  Patient states it did not bleed a lot because they were holding pressure on it but every time they let it off it would bleed again so the pressure dressing was applied.  He states it does not appear to have blood through the pressure dressing.  He denies any pain.  He did complete his dialysis session.  No other complaints at this time.  Does have a history of paroxysmal atrial fibrillation and he is on Eliquis.        Nursing Notes were all reviewed and agreed with or any disagreements were addressed in the HPI.      REVIEW OF EXTERNAL NOTE :       Reviewed documentation from encounter with dialysis on 5/30/2025.    Chart Review/External Note Review    Last Echo reviewed by Me:  Lab Results   Component Value Date    LVEF 75 05/24/2023           Controlled Substance Monitoring:    Acute and Chronic Pain Monitoring:   RX Monitoring Attestation Periodic Controlled Substance Monitoring   1/11/2016  12:40 PM The Prescription Monitoring Report for this patient was reviewed today. Possible  medication side effects, risk of tolerance and/or dependence, and alternative treatments discussed;No signs of potential drug abuse or diversion identified.             REVIEW OF SYSTEMS :      Review of Systems   Constitutional:  Negative for chills and fever.   Respiratory:  Negative for cough and shortness of breath.    Cardiovascular:  Negative for chest pain and palpitations.   Gastrointestinal:  Negative for abdominal pain, diarrhea, nausea and vomiting.   Skin:  Positive for wound (RUE dialysis fistula, bleeding after access). Negative for color change.   Neurological:  Negative for dizziness, weakness, light-headedness and numbness.       Pertinent positives and negatives are stated within HPI or above, all other systems reviewed and are negative.    SURGICAL HISTORY     Past Surgical History:   Procedure Laterality Date    CARDIOVASCULAR STRESS TEST N/A 05/24/2023    Lexiscan stress test    CATHETER REMOVAL Left 8/17/2023    removal of tunneled hemodialysis catheter performed by Thad Oneal MD at [facility] [place]    DIALYSIS FISTULA CREATION Right 02/23/2023    AV FISTULA CREATION RIGHT ARM performed by Thad Oneal MD at [facility]    DIALYSIS FISTULA CREATION Right 1/4/2024    REVISION AV FISTULA RIGHT ARM performed by Thad Oneal MD at [facility]    HERNIA REPAIR Right 5/26/2023    LAPAROSCOPIC RIGHT  INGUINAL HERNIA  REPAI performed by Vera Moser MD at [facility] OR    INVASIVE VASCULAR N/A 11/15/2023    Fistulogram right performed by Thad Oneal MD at [facility] CARDIAC CATH LAB    OTHER SURGICAL HISTORY Left 06/06/2014    cain bunionectomy left foot with osteomed screw x1    SHOULDER SURGERY      Bilateral    UPPER GASTROINTESTINAL ENDOSCOPY N/A 8/15/2023    EGD ESOPHAGOGASTRODUODENOSCOPY ULTRASOUND performed by Logan Murillo MD at [facility] ENDOSCOPY    UPPER GASTROINTESTINAL ENDOSCOPY N/A 8/15/2023    EGD BIOPSY performed by Logan Murillo MD at [facility] ENDOSCOPY    VASCULAR SURGERY Right 5/2/2024

## 2025-05-30 NOTE — PROGRESS NOTES
Vascular:    Patient presented to the [facility] emergency room, with bleeding from the puncture site, sent by the dialysis department, bleeding persisted after removing the pressure bandage and holding pressure for a while    Patient is also on Eliquis    Discussed with Dr. Julia Jain, who already controlled the bleeding with the 4-0 nylon suture, figure-of-eight    I was informed, the graft is patent, with a good thrill and not pulsatile    Suggested, baseline CBC and PT PTT to make sure there are no major coagulation abnormal findings and thrombocytopenia etc.    Also informed her, that I will have [place] office contact the patient to set up an appointment to see him next week    Also to instruct the patient, if he has any recurrent bleeding issues, to come to the main campus of [facility], okay to proceed with hemodialysis on Monday at the usual schedule time    All questions answered    José Manuel Last MD

## 2025-05-31 ASSESSMENT — ENCOUNTER SYMPTOMS
COUGH: 0
NAUSEA: 0
ABDOMINAL PAIN: 0
SHORTNESS OF BREATH: 0
VOMITING: 0
COLOR CHANGE: 0
DIARRHEA: 0

## 2025-06-03 ENCOUNTER — OFFICE VISIT (OUTPATIENT)
Dept: VASCULAR SURGERY | Age: 70
End: 2025-06-03

## 2025-06-03 DIAGNOSIS — N18.6 ENCOUNTER REGARDING VASCULAR ACCESS FOR DIALYSIS FOR END-STAGE RENAL DISEASE (HCC): Primary | ICD-10-CM

## 2025-06-03 DIAGNOSIS — Z99.2 ENCOUNTER REGARDING VASCULAR ACCESS FOR DIALYSIS FOR END-STAGE RENAL DISEASE (HCC): Primary | ICD-10-CM

## 2025-06-03 PROCEDURE — 99024 POSTOP FOLLOW-UP VISIT: CPT | Performed by: SURGERY

## 2025-06-03 NOTE — PROGRESS NOTES
Patient returns for follow-up.  He had dialysis yesterday without problems.  He is scheduled at the access center.  I left the dressings intact.  Okay for follow-up with the access center for maintenance intervention as scheduled.  I asked him to call with other problems.  He understands and agrees to the plan.

## 2025-06-12 ENCOUNTER — HOSPITAL ENCOUNTER (OUTPATIENT)
Dept: CT IMAGING | Age: 70
Discharge: HOME | End: 2025-06-12
Payer: OTHER GOVERNMENT

## 2025-06-12 DIAGNOSIS — F17.211 NICOTINE DEPENDENCE, CIGARETTES, IN REMISSION: ICD-10-CM

## 2025-06-12 PROCEDURE — 71271 CT THORAX LUNG CANCER SCR C-: CPT

## 2025-06-19 ENCOUNTER — APPOINTMENT (OUTPATIENT)
Dept: ULTRASOUND IMAGING | Age: 70
DRG: 312 | End: 2025-06-19
Payer: OTHER GOVERNMENT

## 2025-06-19 ENCOUNTER — HOSPITAL ENCOUNTER (INPATIENT)
Age: 70
LOS: 4 days | Discharge: HOME HEALTH | DRG: 312 | End: 2025-06-23
Attending: STUDENT IN AN ORGANIZED HEALTH CARE EDUCATION/TRAINING PROGRAM | Admitting: INTERNAL MEDICINE
Payer: OTHER GOVERNMENT

## 2025-06-19 ENCOUNTER — APPOINTMENT (OUTPATIENT)
Dept: GENERAL RADIOLOGY | Age: 70
DRG: 312 | End: 2025-06-19
Payer: OTHER GOVERNMENT

## 2025-06-19 DIAGNOSIS — I95.0 IDIOPATHIC HYPOTENSION: ICD-10-CM

## 2025-06-19 DIAGNOSIS — M79.604 PAIN IN BOTH LOWER EXTREMITIES: ICD-10-CM

## 2025-06-19 DIAGNOSIS — J96.21 ACUTE ON CHRONIC HYPOXIC RESPIRATORY FAILURE (HCC): ICD-10-CM

## 2025-06-19 DIAGNOSIS — I95.9 HYPOTENSION, UNSPECIFIED HYPOTENSION TYPE: ICD-10-CM

## 2025-06-19 DIAGNOSIS — N18.6 ESRD (END STAGE RENAL DISEASE) ON DIALYSIS (HCC): ICD-10-CM

## 2025-06-19 DIAGNOSIS — Z99.2 ESRD (END STAGE RENAL DISEASE) ON DIALYSIS (HCC): ICD-10-CM

## 2025-06-19 DIAGNOSIS — N18.6 ESRD (END STAGE RENAL DISEASE) (HCC): Primary | ICD-10-CM

## 2025-06-19 DIAGNOSIS — I73.9 PVD (PERIPHERAL VASCULAR DISEASE) WITH CLAUDICATION: Chronic | ICD-10-CM

## 2025-06-19 DIAGNOSIS — M79.605 PAIN IN BOTH LOWER EXTREMITIES: ICD-10-CM

## 2025-06-19 LAB
ALBUMIN SERPL-MCNC: 3.1 G/DL (ref 3.5–5.2)
ALP SERPL-CCNC: 50 U/L (ref 40–129)
ALT SERPL-CCNC: 33 U/L (ref 0–50)
ANION GAP SERPL CALCULATED.3IONS-SCNC: 18 MMOL/L (ref 7–16)
AST SERPL-CCNC: 60 U/L (ref 0–50)
BASOPHILS # BLD: 0.03 K/UL (ref 0–0.2)
BASOPHILS NFR BLD: 1 % (ref 0–2)
BILIRUB SERPL-MCNC: 0.6 MG/DL (ref 0–1.2)
BUN SERPL-MCNC: 29 MG/DL (ref 8–23)
CALCIUM SERPL-MCNC: 8.8 MG/DL (ref 8.8–10.2)
CHLORIDE SERPL-SCNC: 99 MMOL/L (ref 98–107)
CO2 SERPL-SCNC: 21 MMOL/L (ref 22–29)
CREAT SERPL-MCNC: 8.1 MG/DL (ref 0.7–1.2)
EOSINOPHIL # BLD: 0.02 K/UL (ref 0.05–0.5)
EOSINOPHILS RELATIVE PERCENT: 0 % (ref 0–6)
ERYTHROCYTE [DISTWIDTH] IN BLOOD BY AUTOMATED COUNT: 18.2 % (ref 11.5–15)
GFR, ESTIMATED: 7 ML/MIN/1.73M2
GLUCOSE BLD-MCNC: 187 MG/DL (ref 74–99)
GLUCOSE SERPL-MCNC: 146 MG/DL (ref 74–99)
HCT VFR BLD AUTO: 44.7 % (ref 37–54)
HGB BLD-MCNC: 13.9 G/DL (ref 12.5–16.5)
IMM GRANULOCYTES # BLD AUTO: <0.03 K/UL (ref 0–0.58)
IMM GRANULOCYTES NFR BLD: 0 % (ref 0–5)
LYMPHOCYTES NFR BLD: 1.13 K/UL (ref 1.5–4)
LYMPHOCYTES RELATIVE PERCENT: 23 % (ref 20–42)
MCH RBC QN AUTO: 30.7 PG (ref 26–35)
MCHC RBC AUTO-ENTMCNC: 31.1 G/DL (ref 32–34.5)
MCV RBC AUTO: 98.7 FL (ref 80–99.9)
MONOCYTES NFR BLD: 1.09 K/UL (ref 0.1–0.95)
MONOCYTES NFR BLD: 22 % (ref 2–12)
NEUTROPHILS NFR BLD: 54 % (ref 43–80)
NEUTS SEG NFR BLD: 2.73 K/UL (ref 1.8–7.3)
PLATELET # BLD AUTO: 145 K/UL (ref 130–450)
PMV BLD AUTO: 11.4 FL (ref 7–12)
POTASSIUM SERPL-SCNC: 5 MMOL/L (ref 3.5–5.1)
PROT SERPL-MCNC: 6.8 G/DL (ref 6.4–8.3)
RBC # BLD AUTO: 4.53 M/UL (ref 3.8–5.8)
SODIUM SERPL-SCNC: 137 MMOL/L (ref 136–145)
TROPONIN I SERPL HS-MCNC: 161 NG/L (ref 0–22)
TROPONIN I SERPL HS-MCNC: 179 NG/L (ref 0–22)
TROPONIN I SERPL HS-MCNC: 180 NG/L (ref 0–22)
URATE SERPL-MCNC: 7.9 MG/DL (ref 3.4–7)
WBC OTHER # BLD: 5 K/UL (ref 4.5–11.5)

## 2025-06-19 PROCEDURE — 1200000000 HC SEMI PRIVATE

## 2025-06-19 PROCEDURE — 6370000000 HC RX 637 (ALT 250 FOR IP): Performed by: STUDENT IN AN ORGANIZED HEALTH CARE EDUCATION/TRAINING PROGRAM

## 2025-06-19 PROCEDURE — 93005 ELECTROCARDIOGRAM TRACING: CPT | Performed by: STUDENT IN AN ORGANIZED HEALTH CARE EDUCATION/TRAINING PROGRAM

## 2025-06-19 PROCEDURE — 93970 EXTREMITY STUDY: CPT

## 2025-06-19 PROCEDURE — 82962 GLUCOSE BLOOD TEST: CPT

## 2025-06-19 PROCEDURE — 85025 COMPLETE CBC W/AUTO DIFF WBC: CPT

## 2025-06-19 PROCEDURE — 84484 ASSAY OF TROPONIN QUANT: CPT

## 2025-06-19 PROCEDURE — 99285 EMERGENCY DEPT VISIT HI MDM: CPT

## 2025-06-19 PROCEDURE — 80053 COMPREHEN METABOLIC PANEL: CPT

## 2025-06-19 PROCEDURE — 84550 ASSAY OF BLOOD/URIC ACID: CPT

## 2025-06-19 PROCEDURE — 71045 X-RAY EXAM CHEST 1 VIEW: CPT

## 2025-06-19 RX ORDER — DEXTROSE MONOHYDRATE 100 MG/ML
INJECTION, SOLUTION INTRAVENOUS CONTINUOUS PRN
Status: DISCONTINUED | OUTPATIENT
Start: 2025-06-19 | End: 2025-06-23 | Stop reason: HOSPADM

## 2025-06-19 RX ORDER — INSULIN DEGLUDEC 100 U/ML
28 INJECTION, SOLUTION SUBCUTANEOUS NIGHTLY
COMMUNITY

## 2025-06-19 RX ORDER — METOPROLOL SUCCINATE 25 MG/1
25 TABLET, EXTENDED RELEASE ORAL DAILY
Status: DISCONTINUED | OUTPATIENT
Start: 2025-06-20 | End: 2025-06-23 | Stop reason: HOSPADM

## 2025-06-19 RX ORDER — HYDROCODONE BITARTRATE AND ACETAMINOPHEN 5; 325 MG/1; MG/1
1 TABLET ORAL ONCE
Status: COMPLETED | OUTPATIENT
Start: 2025-06-19 | End: 2025-06-19

## 2025-06-19 RX ORDER — MIDODRINE HYDROCHLORIDE 10 MG/1
10 TABLET ORAL ONCE
Status: COMPLETED | OUTPATIENT
Start: 2025-06-19 | End: 2025-06-19

## 2025-06-19 RX ORDER — INSULIN LISPRO 100 [IU]/ML
8 INJECTION, SOLUTION INTRAVENOUS; SUBCUTANEOUS
Status: DISCONTINUED | OUTPATIENT
Start: 2025-06-20 | End: 2025-06-23 | Stop reason: HOSPADM

## 2025-06-19 RX ORDER — MECOBALAMIN 5000 MCG
5 TABLET,DISINTEGRATING ORAL NIGHTLY PRN
Status: DISCONTINUED | OUTPATIENT
Start: 2025-06-19 | End: 2025-06-23 | Stop reason: HOSPADM

## 2025-06-19 RX ORDER — PANTOPRAZOLE SODIUM 40 MG/1
40 TABLET, DELAYED RELEASE ORAL
Status: DISCONTINUED | OUTPATIENT
Start: 2025-06-20 | End: 2025-06-23 | Stop reason: HOSPADM

## 2025-06-19 RX ORDER — MULTIVITAMIN WITH IRON
500 TABLET ORAL EVERY MORNING
COMMUNITY

## 2025-06-19 RX ORDER — SEVELAMER CARBONATE 800 MG/1
800 TABLET, FILM COATED ORAL
Status: DISCONTINUED | OUTPATIENT
Start: 2025-06-20 | End: 2025-06-23 | Stop reason: HOSPADM

## 2025-06-19 RX ORDER — PROCHLORPERAZINE EDISYLATE 5 MG/ML
10 INJECTION INTRAMUSCULAR; INTRAVENOUS EVERY 6 HOURS PRN
Status: DISCONTINUED | OUTPATIENT
Start: 2025-06-19 | End: 2025-06-23 | Stop reason: HOSPADM

## 2025-06-19 RX ORDER — GLUCAGON 1 MG/ML
1 KIT INJECTION PRN
Status: DISCONTINUED | OUTPATIENT
Start: 2025-06-19 | End: 2025-06-23 | Stop reason: HOSPADM

## 2025-06-19 RX ORDER — LANOLIN ALCOHOL/MO/W.PET/CERES
500 CREAM (GRAM) TOPICAL EVERY MORNING
Status: DISCONTINUED | OUTPATIENT
Start: 2025-06-20 | End: 2025-06-23 | Stop reason: HOSPADM

## 2025-06-19 RX ORDER — ALBUTEROL SULFATE 90 UG/1
2 INHALANT RESPIRATORY (INHALATION) EVERY 6 HOURS PRN
Status: DISCONTINUED | OUTPATIENT
Start: 2025-06-19 | End: 2025-06-20 | Stop reason: CLARIF

## 2025-06-19 RX ORDER — ACETAMINOPHEN 325 MG/1
650 TABLET ORAL EVERY 6 HOURS PRN
Status: DISCONTINUED | OUTPATIENT
Start: 2025-06-19 | End: 2025-06-23 | Stop reason: HOSPADM

## 2025-06-19 RX ADMIN — HYDROCODONE BITARTRATE AND ACETAMINOPHEN 1 TABLET: 5; 325 TABLET ORAL at 21:23

## 2025-06-19 RX ADMIN — HYDROCODONE BITARTRATE AND ACETAMINOPHEN 1 TABLET: 5; 325 TABLET ORAL at 15:25

## 2025-06-19 RX ADMIN — MIDODRINE HYDROCHLORIDE 10 MG: 10 TABLET ORAL at 14:54

## 2025-06-19 ASSESSMENT — PAIN SCALES - GENERAL
PAINLEVEL_OUTOF10: 8
PAINLEVEL_OUTOF10: 7

## 2025-06-19 NOTE — ED PROVIDER NOTES
Carmelo Desir is a 69 year old male who presented to ED with concern for two weeks of increasing pain in both leg and feet. Symptoms worse when ambulating patient also did have lightheadedness that started today.  Patient is scheduled for dialysis tomorrow.  Patient is on 2 L nasal cannula oxygen at baseline.  Patient states he does have a history of hypotension but is not typically as hypotensive as he has been recently. Leg and foot pain worsens with ambulating patient has had increasing difficulty ambulating due to pain. Denies chest pain or shortness of breath    The history is provided by the patient, medical records and the EMS personnel.        Review of Systems   Constitutional:  Positive for fatigue. Negative for chills, diaphoresis and fever.   Eyes:  Negative for photophobia and visual disturbance.   Respiratory:  Negative for cough, chest tightness and shortness of breath.    Cardiovascular:  Negative for chest pain, palpitations and leg swelling.   Gastrointestinal:  Negative for abdominal distention, abdominal pain, diarrhea, nausea and vomiting.   Genitourinary:  Negative for dysuria.   Musculoskeletal:  Negative for back pain, neck pain and neck stiffness.   Skin:  Negative for pallor and rash.   Neurological:  Positive for light-headedness. Negative for headaches.   Psychiatric/Behavioral:  Negative for confusion.         Physical Exam  Vitals and nursing note reviewed.   Constitutional:       General: He is not in acute distress.  HENT:      Head: Normocephalic and atraumatic.   Eyes:      General: No scleral icterus.     Conjunctiva/sclera: Conjunctivae normal.      Pupils: Pupils are equal, round, and reactive to light.   Cardiovascular:      Rate and Rhythm: Normal rate and regular rhythm.      Comments: DP PT pulses present on doppler   Weak DP but present  Pulmonary:      Effort: Pulmonary effort is normal.      Breath sounds: Normal breath sounds.   Abdominal:      General: Bowel sounds

## 2025-06-20 ENCOUNTER — APPOINTMENT (OUTPATIENT)
Dept: INTERVENTIONAL RADIOLOGY/VASCULAR | Age: 70
DRG: 312 | End: 2025-06-20
Payer: OTHER GOVERNMENT

## 2025-06-20 LAB
25(OH)D3 SERPL-MCNC: 19.4 NG/ML (ref 30–100)
ALBUMIN SERPL-MCNC: 3.2 G/DL (ref 3.5–5.2)
ALP SERPL-CCNC: 50 U/L (ref 40–129)
ALT SERPL-CCNC: 38 U/L (ref 0–50)
ANION GAP SERPL CALCULATED.3IONS-SCNC: 15 MMOL/L (ref 7–16)
AST SERPL-CCNC: 44 U/L (ref 0–50)
BASOPHILS # BLD: 0.03 K/UL (ref 0–0.2)
BASOPHILS NFR BLD: 1 % (ref 0–2)
BILIRUB SERPL-MCNC: 0.4 MG/DL (ref 0–1.2)
BUN SERPL-MCNC: 41 MG/DL (ref 8–23)
CALCIUM SERPL-MCNC: 9.6 MG/DL (ref 8.8–10.2)
CHLORIDE SERPL-SCNC: 94 MMOL/L (ref 98–107)
CHOLEST SERPL-MCNC: 128 MG/DL
CO2 SERPL-SCNC: 29 MMOL/L (ref 22–29)
CREAT SERPL-MCNC: 10.8 MG/DL (ref 0.7–1.2)
EKG ATRIAL RATE: 85 BPM
EKG P AXIS: 17 DEGREES
EKG P-R INTERVAL: 160 MS
EKG Q-T INTERVAL: 374 MS
EKG QRS DURATION: 94 MS
EKG QTC CALCULATION (BAZETT): 445 MS
EKG R AXIS: 72 DEGREES
EKG T AXIS: 105 DEGREES
EKG VENTRICULAR RATE: 85 BPM
EOSINOPHIL # BLD: 0.12 K/UL (ref 0.05–0.5)
EOSINOPHILS RELATIVE PERCENT: 3 % (ref 0–6)
ERYTHROCYTE [DISTWIDTH] IN BLOOD BY AUTOMATED COUNT: 18.2 % (ref 11.5–15)
FOLATE SERPL-MCNC: 15.4 NG/ML (ref 4.6–34.8)
GFR, ESTIMATED: 5 ML/MIN/1.73M2
GLUCOSE BLD-MCNC: 103 MG/DL (ref 74–99)
GLUCOSE BLD-MCNC: 141 MG/DL (ref 74–99)
GLUCOSE BLD-MCNC: 148 MG/DL (ref 74–99)
GLUCOSE BLD-MCNC: 155 MG/DL (ref 74–99)
GLUCOSE SERPL-MCNC: 101 MG/DL (ref 74–99)
HAV IGM SERPL QL IA: NONREACTIVE
HBA1C MFR BLD: 9.3 % (ref 4–5.6)
HBV CORE IGM SERPL QL IA: NONREACTIVE
HBV SURFACE AG SERPL QL IA: NONREACTIVE
HCT VFR BLD AUTO: 46.1 % (ref 37–54)
HCV AB SERPL QL IA: NONREACTIVE
HDLC SERPL-MCNC: 32 MG/DL
HGB BLD-MCNC: 14.2 G/DL (ref 12.5–16.5)
IMM GRANULOCYTES # BLD AUTO: <0.03 K/UL (ref 0–0.58)
IMM GRANULOCYTES NFR BLD: 0 % (ref 0–5)
IRON SATN MFR SERPL: 24 % (ref 20–55)
IRON SERPL-MCNC: 49 UG/DL (ref 61–157)
LDLC SERPL CALC-MCNC: 76 MG/DL
LYMPHOCYTES NFR BLD: 1.53 K/UL (ref 1.5–4)
LYMPHOCYTES RELATIVE PERCENT: 37 % (ref 20–42)
MAGNESIUM SERPL-MCNC: 2.3 MG/DL (ref 1.6–2.4)
MCH RBC QN AUTO: 30.8 PG (ref 26–35)
MCHC RBC AUTO-ENTMCNC: 30.8 G/DL (ref 32–34.5)
MCV RBC AUTO: 100 FL (ref 80–99.9)
MONOCYTES NFR BLD: 1 K/UL (ref 0.1–0.95)
MONOCYTES NFR BLD: 24 % (ref 2–12)
NEUTROPHILS NFR BLD: 36 % (ref 43–80)
NEUTS SEG NFR BLD: 1.5 K/UL (ref 1.8–7.3)
PHOSPHATE SERPL-MCNC: 5.9 MG/DL (ref 2.5–4.5)
PLATELET # BLD AUTO: 147 K/UL (ref 130–450)
PMV BLD AUTO: 11.3 FL (ref 7–12)
POTASSIUM SERPL-SCNC: 5.4 MMOL/L (ref 3.5–5.1)
PROT SERPL-MCNC: 6.8 G/DL (ref 6.4–8.3)
RBC # BLD AUTO: 4.61 M/UL (ref 3.8–5.8)
SODIUM SERPL-SCNC: 138 MMOL/L (ref 136–145)
T4 FREE SERPL-MCNC: 1.2 NG/DL (ref 0.9–1.7)
TIBC SERPL-MCNC: 203 UG/DL (ref 250–450)
TRIGL SERPL-MCNC: 102 MG/DL
TROPONIN I SERPL HS-MCNC: 179 NG/L (ref 0–22)
TSH SERPL DL<=0.05 MIU/L-ACNC: 2.49 UIU/ML (ref 0.27–4.2)
VIT B12 SERPL-MCNC: 1210 PG/ML (ref 232–1245)
VLDLC SERPL CALC-MCNC: 20 MG/DL
WBC OTHER # BLD: 4.2 K/UL (ref 4.5–11.5)

## 2025-06-20 PROCEDURE — 6370000000 HC RX 637 (ALT 250 FOR IP)

## 2025-06-20 PROCEDURE — 82746 ASSAY OF FOLIC ACID SERUM: CPT

## 2025-06-20 PROCEDURE — 90935 HEMODIALYSIS ONE EVALUATION: CPT

## 2025-06-20 PROCEDURE — 85025 COMPLETE CBC W/AUTO DIFF WBC: CPT

## 2025-06-20 PROCEDURE — 93923 UPR/LXTR ART STDY 3+ LVLS: CPT

## 2025-06-20 PROCEDURE — 84439 ASSAY OF FREE THYROXINE: CPT

## 2025-06-20 PROCEDURE — 97530 THERAPEUTIC ACTIVITIES: CPT | Performed by: PHYSICAL THERAPIST

## 2025-06-20 PROCEDURE — 83735 ASSAY OF MAGNESIUM: CPT

## 2025-06-20 PROCEDURE — 97165 OT EVAL LOW COMPLEX 30 MIN: CPT

## 2025-06-20 PROCEDURE — 36415 COLL VENOUS BLD VENIPUNCTURE: CPT

## 2025-06-20 PROCEDURE — 80061 LIPID PANEL: CPT

## 2025-06-20 PROCEDURE — 1200000000 HC SEMI PRIVATE

## 2025-06-20 PROCEDURE — 82607 VITAMIN B-12: CPT

## 2025-06-20 PROCEDURE — 83550 IRON BINDING TEST: CPT

## 2025-06-20 PROCEDURE — 84484 ASSAY OF TROPONIN QUANT: CPT

## 2025-06-20 PROCEDURE — 84443 ASSAY THYROID STIM HORMONE: CPT

## 2025-06-20 PROCEDURE — 97161 PT EVAL LOW COMPLEX 20 MIN: CPT | Performed by: PHYSICAL THERAPIST

## 2025-06-20 PROCEDURE — 93010 ELECTROCARDIOGRAM REPORT: CPT | Performed by: INTERNAL MEDICINE

## 2025-06-20 PROCEDURE — 84100 ASSAY OF PHOSPHORUS: CPT

## 2025-06-20 PROCEDURE — 5A1D70Z PERFORMANCE OF URINARY FILTRATION, INTERMITTENT, LESS THAN 6 HOURS PER DAY: ICD-10-PCS | Performed by: INTERNAL MEDICINE

## 2025-06-20 PROCEDURE — 83540 ASSAY OF IRON: CPT

## 2025-06-20 PROCEDURE — 80053 COMPREHEN METABOLIC PANEL: CPT

## 2025-06-20 PROCEDURE — 82306 VITAMIN D 25 HYDROXY: CPT

## 2025-06-20 PROCEDURE — 2700000000 HC OXYGEN THERAPY PER DAY

## 2025-06-20 PROCEDURE — 82962 GLUCOSE BLOOD TEST: CPT

## 2025-06-20 PROCEDURE — 80074 ACUTE HEPATITIS PANEL: CPT

## 2025-06-20 PROCEDURE — 83036 HEMOGLOBIN GLYCOSYLATED A1C: CPT

## 2025-06-20 RX ORDER — MIDODRINE HYDROCHLORIDE 5 MG/1
5 TABLET ORAL 2 TIMES DAILY WITH MEALS
Status: DISCONTINUED | OUTPATIENT
Start: 2025-06-20 | End: 2025-06-23 | Stop reason: HOSPADM

## 2025-06-20 RX ORDER — MIDODRINE HYDROCHLORIDE 5 MG/1
5 TABLET ORAL
Status: DISCONTINUED | OUTPATIENT
Start: 2025-06-20 | End: 2025-06-20

## 2025-06-20 RX ORDER — INSULIN GLARGINE 100 [IU]/ML
22 INJECTION, SOLUTION SUBCUTANEOUS NIGHTLY
Status: DISCONTINUED | OUTPATIENT
Start: 2025-06-20 | End: 2025-06-23 | Stop reason: HOSPADM

## 2025-06-20 RX ORDER — ALBUTEROL SULFATE 0.83 MG/ML
2.5 SOLUTION RESPIRATORY (INHALATION) EVERY 6 HOURS PRN
Status: DISCONTINUED | OUTPATIENT
Start: 2025-06-20 | End: 2025-06-23 | Stop reason: HOSPADM

## 2025-06-20 RX ADMIN — PANTOPRAZOLE SODIUM 40 MG: 40 TABLET, DELAYED RELEASE ORAL at 05:42

## 2025-06-20 RX ADMIN — ACETAMINOPHEN 650 MG: 325 TABLET ORAL at 18:38

## 2025-06-20 RX ADMIN — CYANOCOBALAMIN TAB 1000 MCG 500 MCG: 1000 TAB at 13:35

## 2025-06-20 RX ADMIN — SEVELAMER CARBONATE 800 MG: 800 TABLET, FILM COATED ORAL at 18:33

## 2025-06-20 RX ADMIN — INSULIN GLARGINE 22 UNITS: 100 INJECTION, SOLUTION SUBCUTANEOUS at 21:16

## 2025-06-20 RX ADMIN — INSULIN GLARGINE 22 UNITS: 100 INJECTION, SOLUTION SUBCUTANEOUS at 00:57

## 2025-06-20 RX ADMIN — SEVELAMER CARBONATE 800 MG: 800 TABLET, FILM COATED ORAL at 13:35

## 2025-06-20 RX ADMIN — APIXABAN 5 MG: 5 TABLET, FILM COATED ORAL at 21:18

## 2025-06-20 RX ADMIN — MIDODRINE HYDROCHLORIDE 5 MG: 5 TABLET ORAL at 18:32

## 2025-06-20 ASSESSMENT — PAIN DESCRIPTION - ORIENTATION: ORIENTATION: RIGHT;LEFT

## 2025-06-20 ASSESSMENT — PAIN SCALES - GENERAL
PAINLEVEL_OUTOF10: 0
PAINLEVEL_OUTOF10: 9

## 2025-06-20 ASSESSMENT — PAIN DESCRIPTION - LOCATION: LOCATION: FOOT

## 2025-06-20 ASSESSMENT — PAIN DESCRIPTION - DESCRIPTORS: DESCRIPTORS: ACHING;SHARP;SORE

## 2025-06-20 ASSESSMENT — PAIN - FUNCTIONAL ASSESSMENT: PAIN_FUNCTIONAL_ASSESSMENT: PREVENTS OR INTERFERES SOME ACTIVE ACTIVITIES AND ADLS

## 2025-06-20 NOTE — CARE COORDINATION
6-20-Cm note: met with pt for care coordination, pt lives alone in an apt , he has home oxygen 2l nc from [employer], no other DME, [facility] [employer] transports him. Pt agreeable to home health care at [place], denies need for rehab , pt has a hx of [facility] and would want to have them again, orders obtained, referral sent via [facility], will await response. Electronically signed by Jennifer Gaitan RN on 6/20/2025 at 5:18 PM

## 2025-06-20 NOTE — H&P
Department of Internal Medicine  History and Physical    [facility]   Admitting Physician: Dr. Rolle  Consultants: Dr. Womack, PT/OT/SW      CHIEF COMPLAINT:  lightheadedness    HISTORY OF PRESENT ILLNESS:    Patient presents to ER due to lightheadedness starting today. He does wear 2 L nasal cannula at home and is on dialysis Monday Wednesday Friday via fistula in the right arm.  On arrival to the emergency room his blood pressure was 81/50.  He did get midodrine and states he feels better and blood pressures improved.  He also endorses numbness/tingling in his feet when he walks that sometimes radiates into the calves and coldness to the lower extremities making it difficult to walk at times.  He states he is on Eliquis due to a \"wacky heart\".  He denies new allergies and is unsure of his medications and asked me to check the chart.  There are no family members present, all questions answered at this time. Patient does not use nicotine, alcohol, marijuana, or illicit drugs. Lives at home alone, independent with ADLs, no home health services.  He agreeable to admission for consultation with his nephrologist and therapy teams.    PAST MEDICAL Hx:  Past Medical History:   Diagnosis Date    Back pain     Chronic kidney disease     Diabetes mellitus (HCC)     DMII (diabetes mellitus, type 2) (HCC) 07/28/2015    Hemodialysis patient     History of cardiovascular stress test 02/16/2015    lexiscan    HTN (hypertension) 07/28/2015    Hyperlipidemia     Hypertension     Lumbar radicular pain 07/28/2015    Oxygen dependent     wears 2 liters at home    Type II or unspecified type diabetes mellitus without mention of complication, not stated as uncontrolled        PAST SURGICAL Hx:   Past Surgical History:   Procedure Laterality Date    CARDIOVASCULAR STRESS TEST N/A 05/24/2023    Lexiscan stress test    CATHETER REMOVAL Left 8/17/2023    removal of tunneled hemodialysis catheter performed by Thad Oneal MD

## 2025-06-20 NOTE — PLAN OF CARE
Problem: Chronic Conditions and Co-morbidities  Goal: Patient's chronic conditions and co-morbidity symptoms are monitored and maintained or improved  6/20/2025 0025 by Gisele Jain RN  Outcome: Progressing  6/20/2025 0015 by Gisele Jain RN  Outcome: Progressing     Problem: Safety - Adult  Goal: Free from fall injury  6/20/2025 0025 by Gisele Jain RN  Outcome: Progressing  6/20/2025 0015 by Gisele Jain RN  Outcome: Progressing

## 2025-06-20 NOTE — ACP (ADVANCE CARE PLANNING)
Advance Care Planning   Healthcare Decision Maker:    Primary Decision Maker (Active): Vanessa Nichole - Brother/Sister - [phone]    Secondary Decision Maker: Apurva Hurt - Brother/Sister - [phone]    PT DOES NOT HAVE AN ACTIVE DECISION MAKER     Today we documented Decision Maker(s) consistent with Legal Next of Kin hierarchy.          
Yes

## 2025-06-20 NOTE — FLOWSHEET NOTE
06/20/25 1234   Vital Signs   BP (!) 98/50   Temp 97.6 °F (36.4 °C)   Pulse 67   Respirations 18   Post-Hemodialysis Assessment   Post-Treatment Procedures Blood returned;Access bleeding time < 10 minutes   Machine Disinfection Process Acid/Vinegar Clean;Heat Disinfect   Rinseback Volume (ml) 300 ml   Blood Volume Processed (Liters) 73.4 L   Dialyzer Clearance Lightly streaked   Duration of Treatment (minutes) 210 minutes   Heparin Amount Administered During Treatment (mL) 0 mL   Hemodialysis Intake (ml) 700 ml   Hemodialysis Output (ml) 2700 ml   NET Removed (ml) 2000   Tolerated Treatment Good   Patient Response to Treatment tolerated treatment well   Bilateral Breath Sounds Diminished   Edema Generalized   Time Off 1155   Patient Disposition Return to room   Observations & Evaluations   Level of Consciousness 0   Oriented X 3   Heart Rhythm Regular   Respiratory Quality/Effort Unlabored        ,

## 2025-06-20 NOTE — CONSULTS
Nephrology Consult Note            Patient's Name: Carmelo Desir  10:24 AM  6/20/2025    Nephrologist: Dr. Javier Womack    Reason for Consult:        Requesting Physician:  Pramod Rolle DO    Chief Complaint:   Pain both lower extremities    History Obtained From:  Patient and old chart    History of Present Ilness:        Patient is being seen in consultation at the request of Pramod Head DO for   Patient Carmelo Desir is a 69 y.o. male     Mr. Desir is a 69-year-old gentleman with underlying history of chronic kidney disease stage G5/ESRD who is on maintenance hemodialysis.  Patient is known to me from previous outpatient and inpatient follow-up.  Patient presented to the emergency room with chief complaints of pain in both lower extremities more prominently on the right.  There was no loss of function.  Patient did also have some shortness of breath.  Chest x-ray upon admission revealed pulmonary vascular congestion.  Patient is now seen on dialysis.  He is denying any shortness of breath.  On supplemental oxygen by nasal cannula at 2 L.  Pulse ox saturation is 94%.  He has been hypotensive and is usually on midodrine.  Presently denies any dizziness or lightheadedness..           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  Rest of the past medical history as below.           Past Medical History:   Diagnosis Date    Back pain     Chronic kidney disease     Diabetes mellitus (HCC)     DMII (diabetes mellitus, type 2) (HCC) 07/28/2015    Hemodialysis patient     History of cardiovascular stress test 02/16/2015    lexiscan    HTN (hypertension)

## 2025-06-21 LAB
ALBUMIN SERPL-MCNC: 3.4 G/DL (ref 3.5–5.2)
ALP SERPL-CCNC: 54 U/L (ref 40–129)
ALT SERPL-CCNC: 63 U/L (ref 0–50)
ANION GAP SERPL CALCULATED.3IONS-SCNC: 14 MMOL/L (ref 7–16)
AST SERPL-CCNC: 64 U/L (ref 0–50)
BASOPHILS # BLD: 0.01 K/UL (ref 0–0.2)
BASOPHILS NFR BLD: 0 % (ref 0–2)
BILIRUB SERPL-MCNC: 0.5 MG/DL (ref 0–1.2)
BUN SERPL-MCNC: 27 MG/DL (ref 8–23)
CALCIUM SERPL-MCNC: 9.9 MG/DL (ref 8.8–10.2)
CHLORIDE SERPL-SCNC: 95 MMOL/L (ref 98–107)
CO2 SERPL-SCNC: 28 MMOL/L (ref 22–29)
CREAT SERPL-MCNC: 8 MG/DL (ref 0.7–1.2)
EOSINOPHIL # BLD: 0.25 K/UL (ref 0.05–0.5)
EOSINOPHILS RELATIVE PERCENT: 6 % (ref 0–6)
ERYTHROCYTE [DISTWIDTH] IN BLOOD BY AUTOMATED COUNT: 17.7 % (ref 11.5–15)
GFR, ESTIMATED: 7 ML/MIN/1.73M2
GLUCOSE BLD-MCNC: 133 MG/DL (ref 74–99)
GLUCOSE BLD-MCNC: 176 MG/DL (ref 74–99)
GLUCOSE BLD-MCNC: 220 MG/DL (ref 74–99)
GLUCOSE BLD-MCNC: 71 MG/DL (ref 74–99)
GLUCOSE BLD-MCNC: 94 MG/DL (ref 74–99)
GLUCOSE SERPL-MCNC: 137 MG/DL (ref 74–99)
HCT VFR BLD AUTO: 48.4 % (ref 37–54)
HGB BLD-MCNC: 14.7 G/DL (ref 12.5–16.5)
IMM GRANULOCYTES # BLD AUTO: <0.03 K/UL (ref 0–0.58)
IMM GRANULOCYTES NFR BLD: 0 % (ref 0–5)
LYMPHOCYTES NFR BLD: 0.99 K/UL (ref 1.5–4)
LYMPHOCYTES RELATIVE PERCENT: 25 % (ref 20–42)
MAGNESIUM SERPL-MCNC: 2.1 MG/DL (ref 1.6–2.4)
MCH RBC QN AUTO: 30.4 PG (ref 26–35)
MCHC RBC AUTO-ENTMCNC: 30.4 G/DL (ref 32–34.5)
MCV RBC AUTO: 100 FL (ref 80–99.9)
MONOCYTES NFR BLD: 0.9 K/UL (ref 0.1–0.95)
MONOCYTES NFR BLD: 23 % (ref 2–12)
NEUTROPHILS NFR BLD: 46 % (ref 43–80)
NEUTS SEG NFR BLD: 1.83 K/UL (ref 1.8–7.3)
PHOSPHATE SERPL-MCNC: 4.8 MG/DL (ref 2.5–4.5)
PLATELET # BLD AUTO: 145 K/UL (ref 130–450)
PMV BLD AUTO: 10.8 FL (ref 7–12)
POTASSIUM SERPL-SCNC: 4.9 MMOL/L (ref 3.5–5.1)
PROT SERPL-MCNC: 7.4 G/DL (ref 6.4–8.3)
RBC # BLD AUTO: 4.84 M/UL (ref 3.8–5.8)
SODIUM SERPL-SCNC: 137 MMOL/L (ref 136–145)
WBC OTHER # BLD: 4 K/UL (ref 4.5–11.5)

## 2025-06-21 PROCEDURE — 6370000000 HC RX 637 (ALT 250 FOR IP)

## 2025-06-21 PROCEDURE — 2700000000 HC OXYGEN THERAPY PER DAY

## 2025-06-21 PROCEDURE — 1200000000 HC SEMI PRIVATE

## 2025-06-21 PROCEDURE — 6370000000 HC RX 637 (ALT 250 FOR IP): Performed by: INTERNAL MEDICINE

## 2025-06-21 PROCEDURE — 80053 COMPREHEN METABOLIC PANEL: CPT

## 2025-06-21 PROCEDURE — 83735 ASSAY OF MAGNESIUM: CPT

## 2025-06-21 PROCEDURE — 85025 COMPLETE CBC W/AUTO DIFF WBC: CPT

## 2025-06-21 PROCEDURE — 36415 COLL VENOUS BLD VENIPUNCTURE: CPT

## 2025-06-21 PROCEDURE — 82962 GLUCOSE BLOOD TEST: CPT

## 2025-06-21 PROCEDURE — 84100 ASSAY OF PHOSPHORUS: CPT

## 2025-06-21 RX ORDER — CALCIUM CARBONATE 500 MG/1
500 TABLET, CHEWABLE ORAL 3 TIMES DAILY PRN
Status: DISCONTINUED | OUTPATIENT
Start: 2025-06-21 | End: 2025-06-23 | Stop reason: HOSPADM

## 2025-06-21 RX ORDER — OXYCODONE AND ACETAMINOPHEN 5; 325 MG/1; MG/1
1 TABLET ORAL EVERY 4 HOURS PRN
Refills: 0 | Status: DISCONTINUED | OUTPATIENT
Start: 2025-06-21 | End: 2025-06-23 | Stop reason: HOSPADM

## 2025-06-21 RX ADMIN — SODIUM ZIRCONIUM CYCLOSILICATE 10 G: 10 POWDER, FOR SUSPENSION ORAL at 09:05

## 2025-06-21 RX ADMIN — INSULIN LISPRO 8 UNITS: 100 INJECTION, SOLUTION INTRAVENOUS; SUBCUTANEOUS at 11:53

## 2025-06-21 RX ADMIN — PANTOPRAZOLE SODIUM 40 MG: 40 TABLET, DELAYED RELEASE ORAL at 05:42

## 2025-06-21 RX ADMIN — APIXABAN 5 MG: 5 TABLET, FILM COATED ORAL at 09:05

## 2025-06-21 RX ADMIN — CYANOCOBALAMIN TAB 1000 MCG 500 MCG: 1000 TAB at 09:05

## 2025-06-21 RX ADMIN — ACETAMINOPHEN 650 MG: 325 TABLET ORAL at 05:54

## 2025-06-21 RX ADMIN — SEVELAMER CARBONATE 800 MG: 800 TABLET, FILM COATED ORAL at 11:53

## 2025-06-21 RX ADMIN — INSULIN LISPRO 8 UNITS: 100 INJECTION, SOLUTION INTRAVENOUS; SUBCUTANEOUS at 09:31

## 2025-06-21 RX ADMIN — INSULIN GLARGINE 22 UNITS: 100 INJECTION, SOLUTION SUBCUTANEOUS at 19:42

## 2025-06-21 RX ADMIN — MIDODRINE HYDROCHLORIDE 5 MG: 5 TABLET ORAL at 09:05

## 2025-06-21 RX ADMIN — APIXABAN 5 MG: 5 TABLET, FILM COATED ORAL at 19:34

## 2025-06-21 RX ADMIN — SEVELAMER CARBONATE 800 MG: 800 TABLET, FILM COATED ORAL at 09:05

## 2025-06-21 RX ADMIN — CALCIUM CARBONATE 500 MG: 500 TABLET, CHEWABLE ORAL at 17:46

## 2025-06-21 RX ADMIN — SEVELAMER CARBONATE 800 MG: 800 TABLET, FILM COATED ORAL at 17:22

## 2025-06-21 RX ADMIN — OXYCODONE AND ACETAMINOPHEN 1 TABLET: 5; 325 TABLET ORAL at 10:21

## 2025-06-21 ASSESSMENT — PAIN SCALES - GENERAL
PAINLEVEL_OUTOF10: 6
PAINLEVEL_OUTOF10: 8
PAINLEVEL_OUTOF10: 2
PAINLEVEL_OUTOF10: 1
PAINLEVEL_OUTOF10: 10
PAINLEVEL_OUTOF10: 4
PAINLEVEL_OUTOF10: 10
PAINLEVEL_OUTOF10: 0

## 2025-06-21 ASSESSMENT — PAIN DESCRIPTION - ORIENTATION
ORIENTATION: RIGHT
ORIENTATION: RIGHT

## 2025-06-21 ASSESSMENT — PAIN DESCRIPTION - LOCATION
LOCATION: LEG

## 2025-06-21 ASSESSMENT — PAIN DESCRIPTION - DESCRIPTORS
DESCRIPTORS: ACHING;DISCOMFORT;THROBBING
DESCRIPTORS: ACHING;CRUSHING;PRESSURE

## 2025-06-21 NOTE — PLAN OF CARE
Problem: Chronic Conditions and Co-morbidities  Goal: Patient's chronic conditions and co-morbidity symptoms are monitored and maintained or improved  Outcome: Progressing     Problem: Safety - Adult  Goal: Free from fall injury  Outcome: Progressing     Problem: Pain  Goal: Verbalizes/displays adequate comfort level or baseline comfort level  Outcome: Progressing     Problem: Skin/Tissue Integrity  Goal: Skin integrity remains intact  Description: 1.  Monitor for areas of redness and/or skin breakdown  2.  Assess vascular access sites hourly  3.  Every 4-6 hours minimum:  Change oxygen saturation probe site  4.  Every 4-6 hours:  If on nasal continuous positive airway pressure, respiratory therapy assess nares and determine need for appliance change or resting period  Outcome: Progressing

## 2025-06-21 NOTE — PLAN OF CARE
Problem: Chronic Conditions and Co-morbidities  Goal: Patient's chronic conditions and co-morbidity symptoms are monitored and maintained or improved  6/21/2025 0136 by Gisele Jain RN  Outcome: Progressing  6/20/2025 1652 by Reena Boggs RN  Outcome: Progressing     Problem: Safety - Adult  Goal: Free from fall injury  6/21/2025 0136 by Gisele Jain RN  Outcome: Progressing  6/20/2025 1652 by Reena Boggs, RN  Outcome: Progressing

## 2025-06-22 PROBLEM — I73.9 PVD (PERIPHERAL VASCULAR DISEASE) WITH CLAUDICATION: Chronic | Status: ACTIVE | Noted: 2025-06-22

## 2025-06-22 LAB
ALBUMIN SERPL-MCNC: 3.2 G/DL (ref 3.5–5.2)
ALP SERPL-CCNC: 51 U/L (ref 40–129)
ALT SERPL-CCNC: 49 U/L (ref 0–50)
ANION GAP SERPL CALCULATED.3IONS-SCNC: 15 MMOL/L (ref 7–16)
AST SERPL-CCNC: 34 U/L (ref 0–50)
BASOPHILS # BLD: 0.01 K/UL (ref 0–0.2)
BASOPHILS NFR BLD: 0 % (ref 0–2)
BILIRUB SERPL-MCNC: 0.3 MG/DL (ref 0–1.2)
BUN SERPL-MCNC: 36 MG/DL (ref 8–23)
CALCIUM SERPL-MCNC: 9.7 MG/DL (ref 8.8–10.2)
CHLORIDE SERPL-SCNC: 94 MMOL/L (ref 98–107)
CO2 SERPL-SCNC: 25 MMOL/L (ref 22–29)
CREAT SERPL-MCNC: 9.3 MG/DL (ref 0.7–1.2)
EOSINOPHIL # BLD: 0.24 K/UL (ref 0.05–0.5)
EOSINOPHILS RELATIVE PERCENT: 5 % (ref 0–6)
ERYTHROCYTE [DISTWIDTH] IN BLOOD BY AUTOMATED COUNT: 17.7 % (ref 11.5–15)
GFR, ESTIMATED: 6 ML/MIN/1.73M2
GLUCOSE BLD-MCNC: 105 MG/DL (ref 74–99)
GLUCOSE BLD-MCNC: 141 MG/DL (ref 74–99)
GLUCOSE BLD-MCNC: 54 MG/DL (ref 74–99)
GLUCOSE BLD-MCNC: 92 MG/DL (ref 74–99)
GLUCOSE BLD-MCNC: 93 MG/DL (ref 74–99)
GLUCOSE SERPL-MCNC: 106 MG/DL (ref 74–99)
HCT VFR BLD AUTO: 45.9 % (ref 37–54)
HGB BLD-MCNC: 14.2 G/DL (ref 12.5–16.5)
IMM GRANULOCYTES # BLD AUTO: <0.03 K/UL (ref 0–0.58)
IMM GRANULOCYTES NFR BLD: 0 % (ref 0–5)
LYMPHOCYTES NFR BLD: 1.02 K/UL (ref 1.5–4)
LYMPHOCYTES RELATIVE PERCENT: 21 % (ref 20–42)
MAGNESIUM SERPL-MCNC: 2.1 MG/DL (ref 1.6–2.4)
MCH RBC QN AUTO: 30.7 PG (ref 26–35)
MCHC RBC AUTO-ENTMCNC: 30.9 G/DL (ref 32–34.5)
MCV RBC AUTO: 99.1 FL (ref 80–99.9)
MONOCYTES NFR BLD: 0.8 K/UL (ref 0.1–0.95)
MONOCYTES NFR BLD: 17 % (ref 2–12)
NEUTROPHILS NFR BLD: 57 % (ref 43–80)
NEUTS SEG NFR BLD: 2.72 K/UL (ref 1.8–7.3)
PHOSPHATE SERPL-MCNC: 4.9 MG/DL (ref 2.5–4.5)
PLATELET # BLD AUTO: 154 K/UL (ref 130–450)
PMV BLD AUTO: 11 FL (ref 7–12)
POTASSIUM SERPL-SCNC: 4.3 MMOL/L (ref 3.5–5.1)
PROT SERPL-MCNC: 6.9 G/DL (ref 6.4–8.3)
RBC # BLD AUTO: 4.63 M/UL (ref 3.8–5.8)
SODIUM SERPL-SCNC: 134 MMOL/L (ref 136–145)
WBC OTHER # BLD: 4.8 K/UL (ref 4.5–11.5)

## 2025-06-22 PROCEDURE — 99223 1ST HOSP IP/OBS HIGH 75: CPT | Performed by: SURGERY

## 2025-06-22 PROCEDURE — 80053 COMPREHEN METABOLIC PANEL: CPT

## 2025-06-22 PROCEDURE — 2700000000 HC OXYGEN THERAPY PER DAY

## 2025-06-22 PROCEDURE — 84100 ASSAY OF PHOSPHORUS: CPT

## 2025-06-22 PROCEDURE — 83735 ASSAY OF MAGNESIUM: CPT

## 2025-06-22 PROCEDURE — 6370000000 HC RX 637 (ALT 250 FOR IP)

## 2025-06-22 PROCEDURE — 82962 GLUCOSE BLOOD TEST: CPT

## 2025-06-22 PROCEDURE — 1200000000 HC SEMI PRIVATE

## 2025-06-22 PROCEDURE — 85025 COMPLETE CBC W/AUTO DIFF WBC: CPT

## 2025-06-22 PROCEDURE — 36415 COLL VENOUS BLD VENIPUNCTURE: CPT

## 2025-06-22 RX ADMIN — SODIUM ZIRCONIUM CYCLOSILICATE 10 G: 10 POWDER, FOR SUSPENSION ORAL at 08:11

## 2025-06-22 RX ADMIN — MIDODRINE HYDROCHLORIDE 5 MG: 5 TABLET ORAL at 08:09

## 2025-06-22 RX ADMIN — OXYCODONE AND ACETAMINOPHEN 1 TABLET: 5; 325 TABLET ORAL at 22:01

## 2025-06-22 RX ADMIN — PANTOPRAZOLE SODIUM 40 MG: 40 TABLET, DELAYED RELEASE ORAL at 05:15

## 2025-06-22 RX ADMIN — CYANOCOBALAMIN TAB 1000 MCG 500 MCG: 1000 TAB at 08:10

## 2025-06-22 RX ADMIN — MIDODRINE HYDROCHLORIDE 5 MG: 5 TABLET ORAL at 17:26

## 2025-06-22 RX ADMIN — INSULIN LISPRO 8 UNITS: 100 INJECTION, SOLUTION INTRAVENOUS; SUBCUTANEOUS at 17:27

## 2025-06-22 RX ADMIN — OXYCODONE AND ACETAMINOPHEN 1 TABLET: 5; 325 TABLET ORAL at 14:16

## 2025-06-22 RX ADMIN — APIXABAN 5 MG: 5 TABLET, FILM COATED ORAL at 08:10

## 2025-06-22 RX ADMIN — SEVELAMER CARBONATE 800 MG: 800 TABLET, FILM COATED ORAL at 08:09

## 2025-06-22 RX ADMIN — APIXABAN 5 MG: 5 TABLET, FILM COATED ORAL at 20:17

## 2025-06-22 RX ADMIN — SEVELAMER CARBONATE 800 MG: 800 TABLET, FILM COATED ORAL at 12:08

## 2025-06-22 RX ADMIN — SEVELAMER CARBONATE 800 MG: 800 TABLET, FILM COATED ORAL at 17:26

## 2025-06-22 ASSESSMENT — PAIN DESCRIPTION - LOCATION
LOCATION: GROIN
LOCATION: ABDOMEN;GROIN

## 2025-06-22 ASSESSMENT — PAIN SCALES - GENERAL
PAINLEVEL_OUTOF10: 10
PAINLEVEL_OUTOF10: 10
PAINLEVEL_OUTOF10: 6

## 2025-06-22 ASSESSMENT — PAIN DESCRIPTION - ORIENTATION
ORIENTATION: RIGHT
ORIENTATION: RIGHT

## 2025-06-22 NOTE — CONSULTS
Vascular Surgery Consultation Note    Reason for Consult:  PVD    HPI : This is a 69 y.o. male admitted on 6/19/2025  2:26 PM with lightheadedness.  He was noted to be hypotensive on admission.  After receiving midodrine he felt better.      He was bilateral le numbness and tingling in his feet.  He also has coolness in his feet.      He has progressive issues with Bilateral calf cramping, acheyness with walking.  This distance has progressively become worse and he is down to 25 feet.  He rests and is abl to walk again.  His pain with this is a 8/10.  This is lifestyle limiting.  He denies tissue loss or rest pain.      He does have chronic back pain and has right groin pain.      He does have chronic sob but doesn't feel that sob stops him from walking, and feels that it is his legs. Chronically on o2 at home.    He is chronically on dialysis and follows with Dr Oneal in re to this.  He also has procedures at  with nephrology.  He last had intervention at [facility] 6/16/25.  He has chronic bilateral UE numbness and tingling and coolness.      Vascular surgery is consulted in regards to PVD.      ROS : All others Negative if blank [], Positive if [x]  General Urinary   [] Fevers [] Hematuria   [] Chills [] Dysuria   [] Weight Loss Vascular   Skin [] Claudication   [] Tissue Loss [] Rest Pain   Eyes Neurologic   [] Wears Glasses/Contacts [] Stroke/TIA   [] Vision Changes [] Focal weakness   Respiratory [] Slurred Speech    [x] Shortness of breath ENT   Cardiovascular [] Difficulty swallowing   [] Chest Pain Endocrine    [x] Shortness of breath with exertion [] Increased Thirst   Gastrointestinal    [] Abdominal Pain    [] Melena   [] Hematochezia         Past Medical History:   Diagnosis Date    Back pain     Chronic kidney disease     Diabetes mellitus (HCC)     DMII (diabetes mellitus, type 2) ([facility]) 07/28/2015    Hemodialysis patient     History of cardiovascular stress test 02/16/2015    lexiscan    HTN

## 2025-06-23 VITALS
HEART RATE: 63 BPM | BODY MASS INDEX: 31 KG/M2 | RESPIRATION RATE: 20 BRPM | OXYGEN SATURATION: 95 % | DIASTOLIC BLOOD PRESSURE: 70 MMHG | HEIGHT: 67 IN | SYSTOLIC BLOOD PRESSURE: 112 MMHG | TEMPERATURE: 98.2 F | WEIGHT: 197.53 LBS

## 2025-06-23 LAB
ANION GAP SERPL CALCULATED.3IONS-SCNC: 15 MMOL/L (ref 7–16)
BASOPHILS # BLD: 0.02 K/UL (ref 0–0.2)
BASOPHILS NFR BLD: 1 % (ref 0–2)
BUN SERPL-MCNC: 37 MG/DL (ref 8–23)
CALCIUM SERPL-MCNC: 9.2 MG/DL (ref 8.8–10.2)
CHLORIDE SERPL-SCNC: 94 MMOL/L (ref 98–107)
CO2 SERPL-SCNC: 26 MMOL/L (ref 22–29)
CREAT SERPL-MCNC: 9.5 MG/DL (ref 0.7–1.2)
EOSINOPHIL # BLD: 0.21 K/UL (ref 0.05–0.5)
EOSINOPHILS RELATIVE PERCENT: 5 % (ref 0–6)
ERYTHROCYTE [DISTWIDTH] IN BLOOD BY AUTOMATED COUNT: 17.3 % (ref 11.5–15)
GFR, ESTIMATED: 6 ML/MIN/1.73M2
GLUCOSE BLD-MCNC: 83 MG/DL (ref 74–99)
GLUCOSE BLD-MCNC: 85 MG/DL (ref 74–99)
GLUCOSE SERPL-MCNC: 109 MG/DL (ref 74–99)
HCT VFR BLD AUTO: 45.4 % (ref 37–54)
HGB BLD-MCNC: 14.1 G/DL (ref 12.5–16.5)
IMM GRANULOCYTES # BLD AUTO: <0.03 K/UL (ref 0–0.58)
IMM GRANULOCYTES NFR BLD: 1 % (ref 0–5)
LYMPHOCYTES NFR BLD: 1.02 K/UL (ref 1.5–4)
LYMPHOCYTES RELATIVE PERCENT: 26 % (ref 20–42)
MCH RBC QN AUTO: 30.3 PG (ref 26–35)
MCHC RBC AUTO-ENTMCNC: 31.1 G/DL (ref 32–34.5)
MCV RBC AUTO: 97.4 FL (ref 80–99.9)
MONOCYTES NFR BLD: 0.55 K/UL (ref 0.1–0.95)
MONOCYTES NFR BLD: 14 % (ref 2–12)
NEUTROPHILS NFR BLD: 54 % (ref 43–80)
NEUTS SEG NFR BLD: 2.1 K/UL (ref 1.8–7.3)
PLATELET # BLD AUTO: 151 K/UL (ref 130–450)
PMV BLD AUTO: 10.1 FL (ref 7–12)
POTASSIUM SERPL-SCNC: 4.2 MMOL/L (ref 3.5–5.1)
RBC # BLD AUTO: 4.66 M/UL (ref 3.8–5.8)
SODIUM SERPL-SCNC: 135 MMOL/L (ref 136–145)
WBC OTHER # BLD: 3.9 K/UL (ref 4.5–11.5)

## 2025-06-23 PROCEDURE — 6370000000 HC RX 637 (ALT 250 FOR IP)

## 2025-06-23 PROCEDURE — 80048 BASIC METABOLIC PNL TOTAL CA: CPT

## 2025-06-23 PROCEDURE — 82962 GLUCOSE BLOOD TEST: CPT

## 2025-06-23 PROCEDURE — 85025 COMPLETE CBC W/AUTO DIFF WBC: CPT

## 2025-06-23 PROCEDURE — 2700000000 HC OXYGEN THERAPY PER DAY

## 2025-06-23 PROCEDURE — 90935 HEMODIALYSIS ONE EVALUATION: CPT

## 2025-06-23 RX ORDER — OXYCODONE AND ACETAMINOPHEN 5; 325 MG/1; MG/1
1 TABLET ORAL EVERY 4 HOURS PRN
Qty: 30 TABLET | Refills: 0 | Status: SHIPPED | OUTPATIENT
Start: 2025-06-23 | End: 2025-06-28

## 2025-06-23 RX ORDER — MIDODRINE HYDROCHLORIDE 5 MG/1
5 TABLET ORAL 2 TIMES DAILY WITH MEALS
Qty: 90 TABLET | Refills: 3 | Status: SHIPPED | OUTPATIENT
Start: 2025-06-23

## 2025-06-23 RX ADMIN — SEVELAMER CARBONATE 800 MG: 800 TABLET, FILM COATED ORAL at 13:05

## 2025-06-23 RX ADMIN — PANTOPRAZOLE SODIUM 40 MG: 40 TABLET, DELAYED RELEASE ORAL at 05:38

## 2025-06-23 RX ADMIN — APIXABAN 5 MG: 5 TABLET, FILM COATED ORAL at 08:09

## 2025-06-23 RX ADMIN — CYANOCOBALAMIN TAB 1000 MCG 500 MCG: 1000 TAB at 08:09

## 2025-06-23 RX ADMIN — MIDODRINE HYDROCHLORIDE 5 MG: 5 TABLET ORAL at 08:09

## 2025-06-23 RX ADMIN — SEVELAMER CARBONATE 800 MG: 800 TABLET, FILM COATED ORAL at 08:09

## 2025-06-23 NOTE — DISCHARGE INSTRUCTIONS
Your information:  Name: Carmelo Desir  : 1955    Your instructions:    You are discharged home.  Please make and keep all follow-up appointments.  Take all medications as prescribed.    If you have a return of your symptoms or have any of the following, call your doctor or return to the emergency room: chest pain, shortness of breath, weakness, loss of consciousness, fever, chills, nausea, vomiting, or generally not feeling well.     What to do after you leave the hospital:    Recommended diet: diabetic diet and low potassium    Recommended activity: activity as tolerated        The following personal items were collected during your admission and were returned to you:    Belongings  Dental Appliances: Partials, At home  Vision - Corrective Lenses: Eyeglasses  Hearing Aid: None  Clothing: Socks, Footwear, Shirt, Pajamas  Jewelry: Watch  Body Piercings Removed: N/A  Electronic Devices: Cell Phone,   Weapons (Notify Protective Services/Security): None  Other Valuables: Wallet  Home Medications: None  Valuables Given To: Patient  Provide Name(s) of Who Valuable(s) Were Given To: self  Responsible person(s) in the waiting room: n/a  Patient approves for provider to speak to responsible person post operatively: Yes    Information obtained by:  By signing below, I understand that if any problems occur once I leave the hospital I am to contact LEIA Sidhu.  I understand and acknowledge receipt of the instructions indicated above.

## 2025-06-23 NOTE — DISCHARGE SUMMARY
Internal Medicine Progress Note     [facility]     Carmelo Soto D.O., GUILLEIHenri Anderson D.O., MAYNOROHenriIMELONIE Vasquez, MSN, APRN-CNP  Sarath Welsh, MSN, APRN, NP-C  Mandi Earl, MSN, APRN-CNP  Neema Flores, MSN, APRN, NP-C       Internal Medicine  Discharge Summary    NAME: Carmelo Desir  :  1955  MRN:  [id number]  PCP:Sri Talamantes, APRNIKITA - CNP  ADMITTED: 2025      DISCHARGED: 25    ADMITTING PHYSICIAN: Pramod Rolle DO    CONSULTANT(S):   IP CONSULT TO NEPHROLOGY  IP CONSULT TO SOCIAL WORK  IP CONSULT TO SOCIAL WORK  IP CONSULT TO VASCULAR SURGERY     ADMITTING DIAGNOSIS:   Hypotension [I95.9]     DISCHARGE DIAGNOSES:   Multifactorial hypotension with resolution  Peripheral vascular disease with bilateral lower extremity lifestyle limiting claudication with plans for outpatient vascular intervention  ESRD on HD  on auryxia and Lokelma  PAF on eliquis, metoprolol succinate  Insulin-dependent diabetes mellitus type 2  Pernicious anemia on cyanocobalamin  COPD on albuterol inhaler and chronic nasal cannula oxygen  Class I obesity BMI 30.38 kg/m²    BRIEF HISTORY OF PRESENT ILLNESS:   Patient presents to ER due to lightheadedness starting today. He does wear 2 L nasal cannula at home and is on dialysis  via fistula in the right arm.  On arrival to the emergency room his blood pressure was 81/50.  He did get midodrine and states he feels better and blood pressures improved.  He also endorses numbness/tingling in his feet when he walks that sometimes radiates into the calves and coldness to the lower extremities making it difficult to walk at times.  He states he is on Eliquis due to a \"wacky heart\".  He denies new allergies and is unsure of his medications and asked me to check the chart.  There are no family members present, all questions answered

## 2025-06-23 NOTE — PROGRESS NOTES
Olu Garner MD  Nephrology    Hemodialysis/Progress note.    Patient seen and examined on hemodialyisis.  Chart reviewed.  Patient denies any shortness of breath.  Main complaint remains pain in his lower extremities.  Appetite is good.  No nausea or dysguesia.  Awake and alert . In no acute distress.    Vital SignsBP 133/71   Pulse 66   Temp 98.2 °F (36.8 °C)   Resp 20   Ht 1.702 m (5' 7\")   Wt 90.7 kg (199 lb 15.3 oz)   SpO2 95%   BMI 31.32 kg/m²   24HR INTAKE/OUTPUT:    Intake/Output Summary (Last 24 hours) at 6/23/2025 1244  Last data filed at 6/23/2025 1230  Gross per 24 hour   Intake 840 ml   Output 1700 ml   Net -860 ml          Physical  Exam      Neck: No JVD  Lungs: Breath sounds decreased at the bases with bilateral coarse breath sounds and scattered rhonchi.  Heart: Regular rate and rhythm. No S3 gallop. No murmrur.  Abdomen: Soft non distended, non tender and normal bowel sounds.  Extremeties: Trace bipedal edema.  AV fistula in the right arm with a bruit and thrill.        Medications:  Current Facility-Administered Medications   Medication Dose Route Frequency Provider Last Rate Last Admin    oxyCODONE-acetaminophen (PERCOCET) 5-325 MG per tablet 1 tablet  1 tablet Oral Q4H PRN Sarath Welsh APRN - CNP   1 tablet at 06/22/25 2201    calcium carbonate (TUMS) chewable tablet 500 mg  500 mg Oral TID PRN Sidney Jones U, DO   500 mg at 06/21/25 1746    insulin glargine (LANTUS) injection vial 22 Units  22 Units SubCUTAneous Nightly Neema Flores APRN - CNP   22 Units at 06/21/25 1942    albuterol (PROVENTIL) (2.5 MG/3ML) 0.083% nebulizer solution 2.5 mg  2.5 mg Nebulization Q6H PRN Neema Flores APRN - CNP        midodrine (PROAMATINE) tablet 5 mg  5 mg Oral BID WC Sarath Welsh APRN - CNP   5 mg at 06/23/25 0809    pantoprazole (PROTONIX) tablet 40 mg  40 mg Oral QAM AC Neema Flores APRN - CNP   40 mg at 06/23/25 0538    acetaminophen (TYLENOL) tablet 650 mg  650 mg Oral Q6H 
  Department of Internal Medicine      [facility]   Admitting Physician: Dr. Rolle  Consultants: Dr. Womack, PT/OT/SW      CHIEF COMPLAINT:  lightheadedness    HISTORY OF PRESENT ILLNESS:    Patient presents to ER due to lightheadedness starting today. He does wear 2 L nasal cannula at home and is on dialysis Monday Wednesday Friday via fistula in the right arm.  On arrival to the emergency room his blood pressure was 81/50.  He did get midodrine and states he feels better and blood pressures improved.  He also endorses numbness/tingling in his feet when he walks that sometimes radiates into the calves and coldness to the lower extremities making it difficult to walk at times.  He states he is on Eliquis due to a \"wacky heart\".  He denies new allergies and is unsure of his medications and asked me to check the chart.  There are no family members present, all questions answered at this time. Patient does not use nicotine, alcohol, marijuana, or illicit drugs. Lives at home alone, independent with ADLs, no home health services.  He agreeable to admission for consultation with his nephrologist and therapy teams.    6/20  Patient seen examined on telemetry floor.  Patient complained of some right foot discomfort.  Patient says it is numb.  He has some lightheadedness still but no chest pain, palpitation, nausea/vomiting or fever or chills.  Serum potassium 5.4 with BUN/creatinine 41/10.8.  Troponin ranges 161-180.  Transaminases are normal with a WBC 4.2 and hemoglobin 14.2.  Temperature 97.7 with heart rate 80 and blood pressure 83/63.  O2 sat 94% on 2 L nasal cannula.  Will check fasting ultrasound lower extremities.  Will start midodrine 5 mg 3 times daily and pending nephrology evaluation.    PAST MEDICAL Hx:  Past Medical History:   Diagnosis Date    Back pain     Chronic kidney disease     Diabetes mellitus (HCC)     DMII (diabetes mellitus, type 2) (HCC) 07/28/2015    Hemodialysis patient     History of 
  Nephrology Note  Olu Garner MD          Patient seen and examined.  No family member is present at bedside.  Chart reviewed.  Complaining of fatigue and weakness.  Vascular surgery has been consulted for low AMOL   Denies shortness of breath. Appetite slowly improving.  No nausea or dysguesia.   Awake and alert .   In no acute distress.    Vital SignsBP (!) 97/54   Pulse 73   Temp 98.1 °F (36.7 °C) (Oral)   Resp 16   Ht 1.702 m (5' 7\")   Wt 86.9 kg (191 lb 8 oz)   SpO2 96%   BMI 29.99 kg/m²   24HR INTAKE/OUTPUT:    Intake/Output Summary (Last 24 hours) at 6/21/2025 1443  Last data filed at 6/20/2025 2114  Gross per 24 hour   Intake 30 ml   Output --   Net 30 ml         Physical Exam    Neck: No JVD  Lungs: Breath sounds decreased at the bases with bilateral coarse breath sounds and scattered rhonchi.  Heart: Regular rate and rhythm. No S3 gallop. No murmrur.  Abdomen: Soft non distended, non tender and normal bowel sounds.  Extremeties: Trace bipedal edema.  AV fistula in the right arm with a bruit and thrill.           Current Facility-Administered Medications   Medication Dose Route Frequency Provider Last Rate Last Admin    oxyCODONE-acetaminophen (PERCOCET) 5-325 MG per tablet 1 tablet  1 tablet Oral Q4H PRN Sarath Welsh APRN - CNP   1 tablet at 06/21/25 1021    insulin glargine (LANTUS) injection vial 22 Units  22 Units SubCUTAneous Nightly Neema Flores APRN - CNP   22 Units at 06/20/25 2116    albuterol (PROVENTIL) (2.5 MG/3ML) 0.083% nebulizer solution 2.5 mg  2.5 mg Nebulization Q6H PRN Neema Flores APRN - CNP        midodrine (PROAMATINE) tablet 5 mg  5 mg Oral BID  Sarath Welsh APRN - CNP   5 mg at 06/21/25 0905    pantoprazole (PROTONIX) tablet 40 mg  40 mg Oral QAM AC Neema Flores APRN - CNP   40 mg at 06/21/25 0542    acetaminophen (TYLENOL) tablet 650 mg  650 mg Oral Q6H PRN Neema Flores APRN - CNP   650 mg at 06/21/25 0554    prochlorperazine (COMPAZINE) injection 10 mg  10 
  Nephrology Note  Olu Garner MD          Patient seen and examined.  No family member is present at bedside.  Chart reviewed.  Vascular surgery notes reviewed.  Denies shortness of breath. Appetite slowly improving.  No nausea or dysguesia.   Awake and alert .   In no acute distress.    Vital SignsBP 112/78   Pulse 66   Temp 97.7 °F (36.5 °C) (Oral)   Resp 18   Ht 1.702 m (5' 7\")   Wt 86.9 kg (191 lb 8 oz)   SpO2 93%   BMI 29.99 kg/m²   24HR INTAKE/OUTPUT:  No intake or output data in the 24 hours ending 06/22/25 1410        Physical Exam    Neck: No JVD  Lungs: Breath sounds decreased at the bases with bilateral coarse breath sounds and scattered rhonchi.  Heart: Regular rate and rhythm. No S3 gallop. No murmrur.  Abdomen: Soft non distended, non tender and normal bowel sounds.  Extremeties: Trace bipedal edema.  AV fistula in the right arm with a bruit and thrill.           Current Facility-Administered Medications   Medication Dose Route Frequency Provider Last Rate Last Admin    oxyCODONE-acetaminophen (PERCOCET) 5-325 MG per tablet 1 tablet  1 tablet Oral Q4H PRN Sarath Welsh APRN - CNP   1 tablet at 06/21/25 1021    calcium carbonate (TUMS) chewable tablet 500 mg  500 mg Oral TID PRN Robert Islibertad U, DO   500 mg at 06/21/25 1746    insulin glargine (LANTUS) injection vial 22 Units  22 Units SubCUTAneous Nightly Neema Flores APRN - CNP   22 Units at 06/21/25 1942    albuterol (PROVENTIL) (2.5 MG/3ML) 0.083% nebulizer solution 2.5 mg  2.5 mg Nebulization Q6H PRN Neema Flores APRN - CNP        midodrine (PROAMATINE) tablet 5 mg  5 mg Oral BID WC Sarath Welsh APRN - CNP   5 mg at 06/22/25 0809    pantoprazole (PROTONIX) tablet 40 mg  40 mg Oral QAM AC Neema Flores APRN - CNP   40 mg at 06/22/25 0515    acetaminophen (TYLENOL) tablet 650 mg  650 mg Oral Q6H PRN Neema Flores APRN - CNP   650 mg at 06/21/25 0554    prochlorperazine (COMPAZINE) injection 10 mg  10 mg IntraVENous Q6H PRN 
4 Eyes Skin Assessment     NAME:  Carmelo Desir  YOB: 1955  MEDICAL RECORD NUMBER:  [id number]    The patient is being assessed for  Admission    I agree that at least one RN has performed a thorough Head to Toe Skin Assessment on the patient. ALL assessment sites listed below have been assessed.      Areas assessed by both nurses:    Head, Face, Ears, Shoulders, Back, Chest, Arms, Elbows, Hands, Sacrum. Buttock, Coccyx, Ischium, and Legs. Feet and Heels        Does the Patient have a Wound? No noted wound(s)       Bear Prevention initiated by RN: No  Wound Care Orders initiated by RN: No    Pressure Injury (Stage 3,4, Unstageable, DTI, NWPT, and Complex wounds) if present, place Wound referral order by RN under : No    New Ostomies, if present place, Ostomy referral order under : No     Nurse 1 eSignature: Electronically signed by Luann Varela RN on 6/19/25 at 11:42 PM EDT    **SHARE this note so that the co-signing nurse can place an eSignature**    Nurse 2 eSignature: Electronically signed by Gisele Jain RN on 6/19/25 at 11:57 PM EDT   
CLINICAL PHARMACY NOTE: MEDS TO BEDS    Total # of Prescriptions Filled: 2   The following medications were delivered to the patient:  Percocet 5-325 mg  Midodrine 5 mg    Additional Documentation:    
Internal Medicine Consult Note    [facility]    Carmelo Soto D.O., MAYNOROHenriI.                    Carlo Anderson D.O., MAYNOROHenriI.                             Karlo Barajas D.O.     Robin Betts, MSN, APRN-CNP  Sarath Welsh, MSN, APRN-CNP  Mandi Earl, MSN APRN-CNP  Neema Flores, MSN. APRN-NP-C     Primary Care Physician: Sri Talamantes, APRN - CNP   Admitting Physician:  Pramod Rolle DO  Admission date and time: 6/19/2025  2:26 PM    Room:  [address] [place] [address]  Admitting diagnosis: Hypotension [I95.9]    Patient Name: Carmelo Desir  MRN: [id number]    Date of Service: 6/21/2025     Services for Dr. Rolle currently being covered by Dr. Soto and Dr. Anderson    Subjective:  Carmelo is a 69 y.o. male who was seen and examined today,6/21/2025, at the bedside.  Patient is resting comfortably in bed.  Patient states he is feeling a little better.  He states he normally does not have a problem with his blood pressure.  Patient does voice concerns about his edema.  He does receive hemodialysis.    No family present during my examination.    Review of System:   Constitutional:   Denies fever or chills, weight loss or gain, improving fatigue   HEENT:   Denies ear pain, sore throat, sinus or eye problems.  Cardiovascular:   Denies any chest pain, irregular heartbeats, or palpitations.  Fistula right upper arm positive thrill positive bruit.  Reports bilateral feet being excessively cold  Respiratory:   Denies, coughing, sputum production, hemoptysis, or wheezing.  Reports mild shortness of breath  Gastrointestinal:   Denies nausea, vomiting, diarrhea, or constipation.  Denies any abdominal pain.  Genitourinary:    Denies any urgency, frequency, hematuria. Voiding  without difficulty.  Extremities:   Positive edema lower extremities  Neurology:    Denies any headache or focal neurological deficits, Denies generalized weakness or memory difficulty.   Psch:   Denies being anxious or 
Internal Medicine Consult Note    [facility]    Carmelo Soto D.O., MAYNOROHenriI.                    Carlo Andreson D.O., MAYNOROHenriIHenri Barajas D.O.     Robin Betts, MSN, APRN-CNP  Sarath Welsh, MSN, APRN-CNP  Mandi Earl, MSN APRN-CNP  Neema Flores, MSN. APRN-NP-C     Primary Care Physician: Sri Talamantes, APRN - CNP   Admitting Physician:  Pramod Rolle DO  Admission date and time: 6/19/2025  2:26 PM    Room:  [address] [place] [address]  Admitting diagnosis: Hypotension [I95.9]    Patient Name: Carmelo Desir  MRN: [id number]    Date of Service: 6/22/2025     Services for Dr. Rolle currently being covered by Dr. Soto and Dr. Anderson    Subjective:  Carmelo is a 69 y.o. male who was seen and examined today,6/22/2025, at the bedside.  Resting comfortably at the present time.  Blood pressure seem to be improving.  Had been seen by vascular for symptomatic claudication    No family present during my examination.    Review of System:   Constitutional:   Denies fever or chills, weight loss or gain, improving fatigue   HEENT:   Denies ear pain, sore throat, sinus or eye problems.  Cardiovascular:   Denies any chest pain, irregular heartbeats, or palpitations.  Fistula right upper arm positive thrill positive bruit.  Reports bilateral feet being excessively cold  Respiratory:   Denies, coughing, sputum production, hemoptysis, or wheezing.  Reports mild shortness of breath  Gastrointestinal:   Denies nausea, vomiting, diarrhea, or constipation.  Denies any abdominal pain.  Genitourinary:    Denies any urgency, frequency, hematuria. Voiding  without difficulty.  Extremities:   Positive edema lower extremities  Neurology:    Denies any headache or focal neurological deficits, Denies generalized weakness or memory difficulty.   Psch:   Denies being anxious or depressed.  Musculoskeletal:    Denies  myalgias, joint complaints or back pain.   Integumentary:   Denies 
Messaged Dr. JOHNSON for consult, per Dr. JOHNSON will see patient 6/22/2025, if patient is to dc before than will make arrangements for follow up outpatient.  
Pharmacy Note    This patient was ordered Insulin Degludec 28 units nightly. Per the Pharmacy & Therapeutics Committee, this medication is non-formulary and not stocked by pharmacy. The medication can be reordered at discharge.      Switched to Lantus (insulin glargine) per protocol below.        Patient was also ordered Albuterol HFA to be used PRN. Per P&[employer] policy, this medication was changed to Albuterol nebs at the same frequency.    Isela Knowles,  PharmD.  6/20/2025 12:02 AM    SJW: [phone]   
Physical Therapy Initial Evaluation/Plan of Care    Room #:  0422/0422-02  Patient Name: Carmelo Desir  YOB: 1955  MRN: [id number]    Date of Service: 6/20/2025     Tentative placement recommendation: Home with Home Health Physical Therapy  Equipment recommendation: To be determined      Evaluating Physical Therapist: Meera Vaughn, PT #[id number]      Specific Provider Orders/Date/Referring Provider :  06/19/25 2200    PT eval and treat  Start:  06/19/25 2200,   End:  06/19/25 2200,   ONE TIME,   Standing Count:  1 Occurrences,   R       Mark, Neema BERRIOS, APRN - CNP    Admitting Diagnosis:   Hypotension [I95.9]      two weeks of increasing pain in both leg and feet. Symptoms worse when ambulating patient also did have lightheadedness that started today.   Surgery: none  Visit Diagnoses         Codes      ESRD (end stage renal disease) ([facility])    -  Primary N18.6      Acute on chronic hypoxic respiratory failure ([facility])     J96.21      Pain in both lower extremities     M79.604, M79.605            Patient Active Problem List   Diagnosis    Hallux valgus, acquired    DMII (diabetes mellitus, type 2) ([facility])    HTN (hypertension)    Lumbar radicular pain    Spinal stenosis    B12 deficiency    Idiopathic acute pancreatitis    Acute pancreatitis without necrosis or infection, unspecified    Pneumonia    Respiratory failure ([facility])    Acute respiratory failure with hypoxia ([facility])    Hyperkalemia    Paroxysmal atrial fibrillation ([facility])    ESRD on hemodialysis ([facility])    Acute pancreatitis    Encounter regarding vascular access for dialysis for end-stage renal disease ([facility])    Acute recurrent pancreatitis    Acute on chronic respiratory failure with hypoxia ([facility])    COPD exacerbation ([facility])    S/P inguinal herniorrhaphy    Right groin pain    Idiopathic acute pancreatitis, unspecified complication status    End stage renal disease ([facility])    S/P arteriovenous (AV) graft placement    Chronic diastolic (congestive) heart 
Spiritual Health History and Assessment/Progress Note  [facility]    (P) Follow-up,  ,  ,      Name: Carmelo Desir MRN: [id number]    Age: 69 y.o.     Sex: male   Language: English   Lutheran: Anabaptism   Hypotension     Date: 6/21/2025                           Spiritual Assessment continued in [facility] MED SURG TELE        Referral/Consult From: (P) Rounding   Encounter Overview/Reason: (P) Follow-up  Service Provided For: (P) Patient    Ginette, Belief, Meaning:   Patient is connected with a ginette tradition or spiritual practice  Family/Friends No family/friends present      Importance and Influence:  Patient has spiritual/personal beliefs that influence decisions regarding their health  Family/Friends No family/friends present    Community:  Patient is connected with a spiritual community  Family/Friends No family/friends present    Assessment and Plan of Care:     Patient Interventions include: Facilitated expression of thoughts and feelings  Family/Friends Interventions include: No family/friends present    Patient Plan of Care: Spiritual Care available upon further referral  Family/Friends Plan of Care: No family/friends present    Electronically signed by Chaplain Randy on 6/21/2025 at 2:15 PM   
Spiritual Health History and Assessment/Progress Note  [facility]    (P) Spiritual/Emotional Needs,  ,  ,      Name: Carmelo Desir MRN: [id number]    Age: 69 y.o.     Sex: male   Language: English   Amish: Pentecostalism   Hypotension     Date: 6/20/2025                           Spiritual Assessment began in [facility] 4 MED SURG TELE        Referral/Consult From: (P) Rounding   Encounter Overview/Reason: (P) Spiritual/Emotional Needs  Service Provided For: (P) Patient    Ginette, Belief, Meaning:   Patient identifies as spiritual and is connected with a ginette tradition or spiritual practice  Family/Friends identify as spiritual      Importance and Influence:  Patient has spiritual/personal beliefs that influence decisions regarding their health  Family/Friends have spiritual/personal beliefs that influence decisions regarding the patient's health    Community:  Patient is connected with a spiritual community and feels well-supported. Support system includes: Spouse/Partner  Family/Friends are connected with a spiritual community:    Assessment and Plan of Care:     Patient Interventions include: Facilitated expression of thoughts and feelings, Explored spiritual coping/struggle/distress, Engaged in theological reflection, and Affirmed coping skills/support systems  Family/Friends Interventions include: Facilitated expression of thoughts and feelings and Explored spiritual coping/struggle/distress    Patient Plan of Care: Contact Ginette  for support or sacramental needs and No spiritual needs identified for follow-up  Family/Friends Plan of Care: Contact Ginette  for support or sacramental needs and No spiritual needs identified for follow-up    Electronically signed by Chaplain Yap Do on 6/20/2025 at 1:59 PM   
Spiritual Health History and Assessment/Progress Note  [facility]    (P) Spiritual/Emotional Needs,  ,  ,      Name: Carmelo Desir MRN: [id number]    Age: 69 y.o.     Sex: male   Language: English   Episcopal: Advent   Hypotension     Date: 6/23/2025                           Spiritual Assessment began in [facility] MED SURG TELE        Referral/Consult From: (P) Rounding   Encounter Overview/Reason: (P) Spiritual/Emotional Needs  Service Provided For: (P) Patient    Ginette, Belief, Meaning:   Patient has beliefs or practices that help with coping during difficult times  Family/Friends No family/friends present      Importance and Influence:  Patient has spiritual/personal beliefs that influence decisions regarding their health  Family/Friends No family/friends present    Community:  Patient feels well-supported. Support system includes: Extended family  Family/Friends No family/friends present    Assessment and Plan of Care:     Patient Interventions include: Facilitated expression of thoughts and feelings  Family/Friends Interventions include: No family/friends present    Patient Plan of Care: Spiritual Care available upon further referral  Family/Friends Plan of Care: No family/friends present    Electronically signed by Chaplain Briseida on 6/23/2025 at 3:45 PM    
  Independent    UB Dressing Supervision   Independent    LB Dressing Supervision   B/L hospital socks  Independent    Bathing Supervision  Independent    Toileting Supervision   Independent    Bed Mobility  Supine to sit: N/T   Sit to supine: N/T   Rolling:N/T    Pt seated in bedside chair.    Supine to sit: Independent   Sit to supine: Independent   Rolling:Independent     Functional Transfers Minimal Assist from bedside chair to standing.   Moderate assist to/from toilet.   Minimal assistance from standing to seated in bedside chair.   Transfer training with verbal cues for hand placement throughout session to improve safety.     Educated on toilet riser and bedside commode for home toilet if needed.  Independent    Functional Mobility Minimal Assist without device to improve balance, verbal cues for walker sequence and safety.   Functional mobility from bedside chair to bathroom, from bathroom to bedside chair.   Independent    Balance Sitting:     Static: good     Dynamic: good   Standing: fair  without device  Sitting:     Static: good     Dynamic: good   Standing: good  with LRD   Activity Tolerance fair   good    Visual/  Perceptual Glasses: left                Hand Dominance: Right     AROM (PROM) Strength Additional Info:  Goal:   RUE  WFL 4/5 Fair  and wfl FMC/dexterity noted during ADL tasks   Improve overall RUE strength  for participation in functional tasks   LUE WFL 4/5 Fair  and wfl FMC/dexterity noted during ADL tasks   Improve overall LUE strength  for participation in functional tasks     Hearing: WFL   Sensation:   Patient  reports numbness/tingling bilateral hands, bilateral feet, intermittently     Tone: WFL   Edema: none    Comments: Upon arrival the patient was seated in bedside chair.  At end of session, patient was seated in bedside chair with call light and phone within reach, all lines and tubes intact.  Overall patient demonstrated decreased independence and safety during

## 2025-06-23 NOTE — FLOWSHEET NOTE
06/23/25 1230   Vital Signs   /70   Temp 98.2 °F (36.8 °C)   Pulse 63   Respirations 20   Weight - Scale 89.6 kg (197 lb 8.5 oz)   Weight Method Estimated   Percent Weight Change -1.21   Pain Assessment   Pain Assessment None - Denies Pain   Post-Hemodialysis Assessment   Post-Treatment Procedures Blood returned;Access bleeding time > 10 minutes   Machine Disinfection Process Acid/Vinegar Clean;Bicarb Jug Disinfection;Exterior Machine Disinfection;Heat Disinfect;Verified Absence of Bleach in HCO3 Jug;Machine Absence of Bleach Machine   Rinseback Volume (ml) 300 ml   Blood Volume Processed (Liters) 1700 L   Dialyzer Clearance Lightly streaked   Duration of Treatment (minutes) 210 minutes   Hemodialysis Intake (ml) 300 ml   Hemodialysis Output (ml) 1700 ml   NET Removed (ml) 1400   Tolerated Treatment Good   Patient Response to Treatment Net -1400mL off with  treatment.  pt remained stable throughout tx.  Report called to Safia   Bilateral Breath Sounds Diminished   Edema None   Time Off 1150   Patient Disposition Return to room   Observations & Evaluations   Level of Consciousness 0   Oriented X 3   Heart Rhythm Regular   Respiratory Quality/Effort Unlabored   O2 Device Nasal cannula   Skin Condition/Temp Dry;Warm   Abdomen Inspection Soft   Bowel Sounds (All Quadrants) Active

## 2025-06-23 NOTE — CARE COORDINATION
Ss note;6/23/2025 1:10 PM Discharge order noted. Met with pt, relays no one has key to his home to get the oxygen tank. SW contacted [facility] and liaison Baudilio will bring a portable tank for discharge. Pt relays he will contact his cousin or another person to take him home. Taxi voucher in soft chart if needed. PTA attends HD at [facility] and [employer] taxi transports. FLY Victor

## 2025-06-24 ENCOUNTER — TELEPHONE (OUTPATIENT)
Dept: VASCULAR SURGERY | Age: 70
End: 2025-06-24

## 2025-06-24 NOTE — TELEPHONE ENCOUNTER
Spoke with the pt, scheduled abdominal aortogram possible intervention with Dr. Alcazar 7/1/25 at 12:30 pm.  Pt was instructed to report to [address] floor registration at 10:30 pm, be NPO after midnight the night before except heart and/or BP meds the morning of with sips of water, last dose of Eliquis on 6/28 and must have transportation.

## 2025-06-25 PROBLEM — I73.9 PVD (PERIPHERAL VASCULAR DISEASE): Status: ACTIVE | Noted: 2025-06-25

## 2025-07-01 ENCOUNTER — HOSPITAL ENCOUNTER (OUTPATIENT)
Age: 70
Discharge: HOME | End: 2025-07-01
Attending: STUDENT IN AN ORGANIZED HEALTH CARE EDUCATION/TRAINING PROGRAM | Admitting: STUDENT IN AN ORGANIZED HEALTH CARE EDUCATION/TRAINING PROGRAM
Payer: MEDICARE

## 2025-07-01 VITALS
HEART RATE: 80 BPM | OXYGEN SATURATION: 98 % | DIASTOLIC BLOOD PRESSURE: 57 MMHG | TEMPERATURE: 97.2 F | HEIGHT: 67 IN | RESPIRATION RATE: 22 BRPM | SYSTOLIC BLOOD PRESSURE: 111 MMHG | WEIGHT: 184 LBS | BODY MASS INDEX: 28.88 KG/M2

## 2025-07-01 DIAGNOSIS — I73.9 PERIPHERAL VASCULAR DISEASE, UNSPECIFIED: ICD-10-CM

## 2025-07-01 LAB
ANION GAP SERPL CALCULATED.3IONS-SCNC: 16 MMOL/L (ref 7–16)
ARM BAND NUMBER: NORMAL
BLOOD BANK SAMPLE EXPIRATION: NORMAL
BLOOD GROUP ANTIBODIES SERPL: NEGATIVE
BUN SERPL-MCNC: 21 MG/DL (ref 8–23)
CHLORIDE SERPL-SCNC: 92 MMOL/L (ref 98–107)
CO2 SERPL-SCNC: 27 MMOL/L (ref 22–29)
CREAT SERPL-MCNC: 8.9 MG/DL (ref 0.7–1.2)
ECHO BSA: 1.99 M2
ERYTHROCYTE [DISTWIDTH] IN BLOOD BY AUTOMATED COUNT: 17.3 % (ref 11.5–15)
GLUCOSE SERPL-MCNC: 136 MG/DL (ref 74–99)
HCT VFR BLD AUTO: 47.3 % (ref 37–54)
MCH RBC QN AUTO: 30.8 PG (ref 26–35)
MCHC RBC AUTO-ENTMCNC: 31.9 G/DL (ref 32–34.5)
PLATELET # BLD AUTO: 198 K/UL (ref 130–450)
PMV BLD AUTO: 11.4 FL (ref 7–12)
POTASSIUM SERPL-SCNC: 4.7 MMOL/L (ref 3.5–5.1)
SODIUM SERPL-SCNC: 135 MMOL/L (ref 136–145)
WBC OTHER # BLD: 5 K/UL (ref 4.5–11.5)

## 2025-07-01 PROCEDURE — 86850 RBC ANTIBODY SCREEN: CPT

## 2025-07-01 PROCEDURE — C1894 INTRO/SHEATH, NON-LASER: HCPCS | Performed by: STUDENT IN AN ORGANIZED HEALTH CARE EDUCATION/TRAINING PROGRAM

## 2025-07-01 PROCEDURE — C1769 GUIDE WIRE: HCPCS | Performed by: STUDENT IN AN ORGANIZED HEALTH CARE EDUCATION/TRAINING PROGRAM

## 2025-07-01 PROCEDURE — C1760 CLOSURE DEV, VASC: HCPCS | Performed by: STUDENT IN AN ORGANIZED HEALTH CARE EDUCATION/TRAINING PROGRAM

## 2025-07-01 PROCEDURE — 86901 BLOOD TYPING SEROLOGIC RH(D): CPT

## 2025-07-01 PROCEDURE — 6360000004 HC RX CONTRAST MEDICATION: Performed by: STUDENT IN AN ORGANIZED HEALTH CARE EDUCATION/TRAINING PROGRAM

## 2025-07-01 PROCEDURE — 2709999900 HC NON-CHARGEABLE SUPPLY: Performed by: STUDENT IN AN ORGANIZED HEALTH CARE EDUCATION/TRAINING PROGRAM

## 2025-07-01 PROCEDURE — C1884 EMBOLIZATION PROTECT SYST: HCPCS | Performed by: STUDENT IN AN ORGANIZED HEALTH CARE EDUCATION/TRAINING PROGRAM

## 2025-07-01 PROCEDURE — 76937 US GUIDE VASCULAR ACCESS: CPT | Performed by: STUDENT IN AN ORGANIZED HEALTH CARE EDUCATION/TRAINING PROGRAM

## 2025-07-01 PROCEDURE — 37225 HC FEM POP TERRITORY ATHERECTOMY: CPT | Performed by: STUDENT IN AN ORGANIZED HEALTH CARE EDUCATION/TRAINING PROGRAM

## 2025-07-01 PROCEDURE — C2623 CATH, TRANSLUMIN, DRUG-COAT: HCPCS | Performed by: STUDENT IN AN ORGANIZED HEALTH CARE EDUCATION/TRAINING PROGRAM

## 2025-07-01 PROCEDURE — 85027 COMPLETE CBC AUTOMATED: CPT

## 2025-07-01 PROCEDURE — 86900 BLOOD TYPING SEROLOGIC ABO: CPT

## 2025-07-01 PROCEDURE — 75625 CONTRAST EXAM ABDOMINL AORTA: CPT | Performed by: STUDENT IN AN ORGANIZED HEALTH CARE EDUCATION/TRAINING PROGRAM

## 2025-07-01 PROCEDURE — 75716 ARTERY X-RAYS ARMS/LEGS: CPT | Performed by: STUDENT IN AN ORGANIZED HEALTH CARE EDUCATION/TRAINING PROGRAM

## 2025-07-01 PROCEDURE — 6370000000 HC RX 637 (ALT 250 FOR IP): Performed by: STUDENT IN AN ORGANIZED HEALTH CARE EDUCATION/TRAINING PROGRAM

## 2025-07-01 PROCEDURE — 75774 ARTERY X-RAY EACH VESSEL: CPT | Performed by: STUDENT IN AN ORGANIZED HEALTH CARE EDUCATION/TRAINING PROGRAM

## 2025-07-01 PROCEDURE — 6360000002 HC RX W HCPCS: Performed by: STUDENT IN AN ORGANIZED HEALTH CARE EDUCATION/TRAINING PROGRAM

## 2025-07-01 PROCEDURE — C1714 CATH, TRANS ATHERECTOMY, DIR: HCPCS | Performed by: STUDENT IN AN ORGANIZED HEALTH CARE EDUCATION/TRAINING PROGRAM

## 2025-07-01 PROCEDURE — C1887 CATHETER, GUIDING: HCPCS | Performed by: STUDENT IN AN ORGANIZED HEALTH CARE EDUCATION/TRAINING PROGRAM

## 2025-07-01 PROCEDURE — 80048 BASIC METABOLIC PNL TOTAL CA: CPT

## 2025-07-01 DEVICE — ANGIO-SEAL VIP VASCULAR CLOSURE DEVICE
Type: IMPLANTABLE DEVICE | Site: GROIN | Status: FUNCTIONAL
Brand: ANGIO-SEAL

## 2025-07-01 RX ORDER — SODIUM CHLORIDE 0.9 % (FLUSH) 0.9 %
5-40 SYRINGE (ML) INJECTION EVERY 12 HOURS SCHEDULED
Status: DISCONTINUED | OUTPATIENT
Start: 2025-07-01 | End: 2025-07-01 | Stop reason: HOSPADM

## 2025-07-01 RX ORDER — MIDAZOLAM HYDROCHLORIDE 1 MG/ML
INJECTION, SOLUTION INTRAMUSCULAR; INTRAVENOUS PRN
Status: DISCONTINUED | OUTPATIENT
Start: 2025-07-01 | End: 2025-07-01 | Stop reason: HOSPADM

## 2025-07-01 RX ORDER — IOPAMIDOL 612 MG/ML
INJECTION, SOLUTION INTRAVASCULAR PRN
Status: DISCONTINUED | OUTPATIENT
Start: 2025-07-01 | End: 2025-07-01 | Stop reason: HOSPADM

## 2025-07-01 RX ORDER — SODIUM CHLORIDE 0.9 % (FLUSH) 0.9 %
5-40 SYRINGE (ML) INJECTION PRN
Status: DISCONTINUED | OUTPATIENT
Start: 2025-07-01 | End: 2025-07-01 | Stop reason: HOSPADM

## 2025-07-01 RX ORDER — HEPARIN SODIUM 1000 [USP'U]/ML
INJECTION, SOLUTION INTRAVENOUS; SUBCUTANEOUS PRN
Status: DISCONTINUED | OUTPATIENT
Start: 2025-07-01 | End: 2025-07-01 | Stop reason: HOSPADM

## 2025-07-01 RX ORDER — CLOPIDOGREL BISULFATE 75 MG/1
75 TABLET ORAL DAILY
Qty: 90 TABLET | Refills: 1 | Status: SHIPPED | OUTPATIENT
Start: 2025-07-01

## 2025-07-01 RX ORDER — SODIUM CHLORIDE 9 MG/ML
INJECTION, SOLUTION INTRAVENOUS CONTINUOUS
Status: DISCONTINUED | OUTPATIENT
Start: 2025-07-01 | End: 2025-07-01 | Stop reason: HOSPADM

## 2025-07-01 RX ORDER — ONDANSETRON 2 MG/ML
4 INJECTION INTRAMUSCULAR; INTRAVENOUS EVERY 8 HOURS PRN
Status: DISCONTINUED | OUTPATIENT
Start: 2025-07-01 | End: 2025-07-01 | Stop reason: HOSPADM

## 2025-07-01 RX ORDER — SODIUM CHLORIDE 9 MG/ML
INJECTION, SOLUTION INTRAVENOUS PRN
Status: DISCONTINUED | OUTPATIENT
Start: 2025-07-01 | End: 2025-07-01 | Stop reason: HOSPADM

## 2025-07-01 RX ORDER — ACETAMINOPHEN 325 MG/1
650 TABLET ORAL EVERY 4 HOURS PRN
Status: DISCONTINUED | OUTPATIENT
Start: 2025-07-01 | End: 2025-07-01 | Stop reason: HOSPADM

## 2025-07-01 RX ORDER — CLOPIDOGREL 300 MG/1
TABLET, FILM COATED ORAL PRN
Status: DISCONTINUED | OUTPATIENT
Start: 2025-07-01 | End: 2025-07-01 | Stop reason: HOSPADM

## 2025-07-01 ASSESSMENT — PAIN SCALES - GENERAL: PAINLEVEL_OUTOF10: 0

## 2025-07-01 NOTE — DISCHARGE INSTRUCTIONS
This includes over-the-counter medicines and herb or dietary supplements.   Plan ahead for refills so you do not run out.   Lifestyle Changes    You and your doctor will plan lifestyle changes that will help you recover. Some things to keep in mind include:   Atherosclerosis and high blood pressure should be carefully managed. This can be done with medicines and a healthy lifestyle.   If you smoke, talk to your doctor about quitting.   Follow-up   Call Your Doctor If Any of the Following Occurs   After you leave the hospital, call your doctor if any of the following occurs:   Redness, swelling, increasing pain, excessive bleeding, or discharge at the incision site   Signs of infection, including fever and chills   Any change of color or sensation in your legs or feet   Burning, pain, or other problems when urinating   Nausea or vomiting   Abdominal cramps or diarrhea   Unusual fatigue or depression   Disorientation or confusion   Numbness or tingling in the legs   New, unexplained symptoms   Cough   Call 911 or go to the emergency room right away if you have:   Shortness of breath   Chest pain   If you think you have an emergency,  CALL 911.

## 2025-07-01 NOTE — H&P
Vascular Surgery History & Physical Exam      Chief Complaint: Peripheral vascular disease, B LE lifestyle limiting claudication    HISTORY OF PRESENT ILLNESS:                The patient is a 69 y.o. male who presents to the hospital for elective arteriogram with possible intervention.  The patient has a history of peripheral vascular disease and bilateral lower extremity lifestyle limiting claudication, R>L.      IMPRESSION:    Active Hospital Problems    Diagnosis     PVD (peripheral vascular disease) [I73.9]        PLAN:  Aortogram,  Bilateral lower extremity arteriogram, possible intervention.    I reviewed the procedure with the patient.  I discussed the risks, benefits, and alternatives of the procedure.  The patient understands and consents.  All questions were answered.    Patient Active Problem List   Diagnosis Code    Hallux valgus, acquired M20.10    DMII (diabetes mellitus, type 2) ([facility]) E11.9    HTN (hypertension) I10    Lumbar radicular pain M54.16    Spinal stenosis M48.00    B12 deficiency E53.8    Idiopathic acute pancreatitis K85.00    Acute pancreatitis without necrosis or infection, unspecified K85.90    Pneumonia J18.9    Respiratory failure ([facility]) J96.90    Acute respiratory failure with hypoxia ([facility]) J96.01    Hyperkalemia E87.5    Paroxysmal atrial fibrillation ([facility]) I48.0    ESRD on hemodialysis ([facility]) N18.6, Z99.2    Acute pancreatitis K85.90    Encounter regarding vascular access for dialysis for end-stage renal disease ([facility]) N18.6, Z99.2    Acute recurrent pancreatitis K85.90    Acute on chronic respiratory failure with hypoxia ([facility]) J96.21    COPD exacerbation ([facility]) J44.1    S/P inguinal herniorrhaphy Z98.890, Z87.19    Right groin pain R10.31    Idiopathic acute pancreatitis, unspecified complication status K85.00    End stage renal disease ([facility]) N18.6    S/P arteriovenous (AV) graft placement Z95.828    Chronic diastolic (congestive) heart failure ([facility]) I50.32    Other chronic

## 2025-07-01 NOTE — POST SEDATION
Sedation Post Procedure Note    Patient Name: Carmelo Desir   YOB: 1955  Room/Bed: Cath Pool Room/  Medical Record Number: [id number]  Date: 7/1/2025   Time: 1:15 PM         Physicians/Assistants: Jono Alcazar MD, MD    Procedure Performed:  angiogram    Post-Sedation Vital Signs:  Vitals:    07/01/25 1033   BP: 137/64   Pulse: (!) 102   Resp: 22   Temp: 97.6 °F (36.4 °C)   SpO2: 97%      Vital signs were reviewed and were stable after the procedure (see flow sheet for vitals)            Post-Sedation Exam: [id number]           Complications: none    Electronically signed by Jono Alcazar MD on 7/1/2025 at 1:15 PM

## 2025-07-01 NOTE — OP NOTE
Operative Note      Patient: Carmelo Desir  YOB: 1955  MRN: [id number]    Date of Procedure: 7/1/2025    Pre-Op Diagnosis Codes:      * Peripheral vascular disease, unspecified [I73.9]    Post-Op Diagnosis: Same       Procedure(s):  Aortagram abdominal  Angioplasty superficial femoral artery  Atherectomy superficial femoral artery left  Ultrasound-guided access of the left common femoral artery with static images saved to our system  Abdominal aortogram with bilateral lower extremity runoff  Right lower extremity angiogram, selective, final catheter positions and common femoral artery, popliteal artery  Right superficial femoral artery atherectomy with 6 [place] turbo Hawk and #6 spider  Conscious moderate sedation for a total of 45 minutes using combination of Versed and fentanyl administered by dedicated cardiovascular nurse at my request.  Constant hemodynamic monitoring is performed in this encounter.  I performed continuous face-to-face assessment during this encounter.  I was the sole attending provider responsible with conscious moderate sedation.    Surgeon(s):  Jono Alcazar MD    Assistant:   * No surgical staff found *    Anesthesia: None    Estimated Blood Loss (mL): Minimal    Complications: None    Specimens:   * No specimens in log *    Implants:  Implant Name Type Inv. Item Serial No.  Lot No. LRB No. Used Action   DEVICE CLSR ANGIO-SEAL VIP 6FR 0.035IN V TWST INTEGR PLATFRM - [id number]  DEVICE CLSR ANGIO-SEAL VIP 6FR 0.035IN V TWST INTEGR PLATFRM  [employer] [id number] Left 1 Implanted         Drains: * No LDAs found *    Findings:  Infection Present At Time Of Surgery (PATOS) (choose all levels that have infection present):  No infection present  Other Findings:   Ultrasound of the left groin demonstrates pain, femoral artery, superficial femoral artery, profundal artery, there is some scattered anterior wall calcification posterior wall calcification of

## 2025-07-01 NOTE — PRE SEDATION
Sedation Plan  ASA: class 4 - patient with severe systemic disease that is a constant threat to life     Mallampati class: III - soft palate, base of uvula visible.    Sedation plan: local anesthesia, level 2-1: moderate/analgesia (conscious sedation), minimal sedation and moderate (conscious sedation)    Risks, benefits, and alternatives discussed with patient.        Immediate reassessment prior to sedation:  Patient's status reviewed and vital signs assessed; acceptable to perform procedure and proceed to administer sedation as planned.

## 2025-07-02 NOTE — CARE COORDINATION
SOCIAL WORK/CASEMANAGEMENT TRANSITION OF CARE PLANNING( SANFORD SALGUERO, [phone]):  discharged home yesterday. I called all contacts and no one either answered or the sister said she [place] to be able to take pt home. I called [employer] and they are closed. Yakelin Luna the supervisor for CM gave permission for RN to give pt a taxi voucher thru [employer]. .FLY Mckeon.7/2/2025

## 2025-07-19 NOTE — PROGRESS NOTES
Vascular Surgery Outpatient Progress Note      No chief complaint on file.      HISTORY OF PRESENT ILLNESS:                The patient is a 69 y.o. male who returns for follow-up evaluation of peripheral arterial disease.  He is known to my partner Dr. Oneal related to hemodialysis access.  I perform right lower extremity SFA atherectomy on July 1, 2025, he was noted to have high-grade SFA stenosis near occlusion, he had minimal tibial peroneal disease, he was a claudicant.  He returns today for follow-up, he has been taking his Plavix, he reports that right lower extremity feels tremendously better, he is pleased with the result.  Regarding tobacco, he is not an active smoker.  He says that he is contralateral side bothers him as well but is not lifestyle limiting.  Certainly nowhere near as bad as the right side was.  He denies groin issues.    Past Medical History:        Diagnosis Date    Back pain     Chronic kidney disease     Diabetes mellitus (HCC)     DMII (diabetes mellitus, type 2) ([facility]) 07/28/2015    Hemodialysis patient     History of cardiovascular stress test 02/16/2015    lexiscan    HTN (hypertension) 07/28/2015    Hyperlipidemia     Hypertension     Lumbar radicular pain 07/28/2015    Oxygen dependent     wears 2 liters at home    PVD (peripheral vascular disease) with claudication 06/22/2025    Type II or unspecified type diabetes mellitus without mention of complication, not stated as uncontrolled      Past Surgical History:        Procedure Laterality Date    CARDIOVASCULAR STRESS TEST N/A 05/24/2023    Lexiscan stress test    CATHETER REMOVAL Left 8/17/2023    removal of tunneled hemodialysis catheter performed by Thad Oneal MD at [facility] [place]    DIALYSIS FISTULA CREATION Right 02/23/2023    AV FISTULA CREATION RIGHT ARM performed by Thad Oneal MD at [facility] [place]    DIALYSIS FISTULA CREATION Right 1/4/2024    REVISION AV FISTULA RIGHT ARM performed by Thad Oneal MD at [facility] [place]

## 2025-07-21 ENCOUNTER — OFFICE VISIT (OUTPATIENT)
Dept: VASCULAR SURGERY | Age: 70
End: 2025-07-21
Payer: MEDICARE

## 2025-07-21 VITALS — BODY MASS INDEX: 27.72 KG/M2 | WEIGHT: 177 LBS

## 2025-07-21 DIAGNOSIS — I73.9 PAD (PERIPHERAL ARTERY DISEASE): Primary | ICD-10-CM

## 2025-07-21 PROCEDURE — 3017F COLORECTAL CA SCREEN DOC REV: CPT | Performed by: STUDENT IN AN ORGANIZED HEALTH CARE EDUCATION/TRAINING PROGRAM

## 2025-07-21 PROCEDURE — G2211 COMPLEX E/M VISIT ADD ON: HCPCS | Performed by: STUDENT IN AN ORGANIZED HEALTH CARE EDUCATION/TRAINING PROGRAM

## 2025-07-21 PROCEDURE — 1111F DSCHRG MED/CURRENT MED MERGE: CPT | Performed by: STUDENT IN AN ORGANIZED HEALTH CARE EDUCATION/TRAINING PROGRAM

## 2025-07-21 PROCEDURE — 4004F PT TOBACCO SCREEN RCVD TLK: CPT | Performed by: STUDENT IN AN ORGANIZED HEALTH CARE EDUCATION/TRAINING PROGRAM

## 2025-07-21 PROCEDURE — G8427 DOCREV CUR MEDS BY ELIG CLIN: HCPCS | Performed by: STUDENT IN AN ORGANIZED HEALTH CARE EDUCATION/TRAINING PROGRAM

## 2025-07-21 PROCEDURE — G8419 CALC BMI OUT NRM PARAM NOF/U: HCPCS | Performed by: STUDENT IN AN ORGANIZED HEALTH CARE EDUCATION/TRAINING PROGRAM

## 2025-07-21 PROCEDURE — 1123F ACP DISCUSS/DSCN MKR DOCD: CPT | Performed by: STUDENT IN AN ORGANIZED HEALTH CARE EDUCATION/TRAINING PROGRAM

## 2025-07-21 PROCEDURE — 1159F MED LIST DOCD IN RCRD: CPT | Performed by: STUDENT IN AN ORGANIZED HEALTH CARE EDUCATION/TRAINING PROGRAM

## 2025-07-21 PROCEDURE — 99214 OFFICE O/P EST MOD 30 MIN: CPT | Performed by: STUDENT IN AN ORGANIZED HEALTH CARE EDUCATION/TRAINING PROGRAM

## (undated) DEVICE — Device

## (undated) DEVICE — GLOVE SURG SZ 75 CRM LTX FREE POLYISOPRENE POLYMER BEAD ANTI

## (undated) DEVICE — PMI PTFE COATED LAPAROSCOPIC WIRE L-HOOK 33 CM: Brand: PMI

## (undated) DEVICE — MICROPUNCTURE INTRODUCER SET SILHOUETTE TRANSITIONLESS PUSH-PLUS DESIGN - STIFFENED CANNULA WITH STAINLESS STEEL WIRE GUIDE: Brand: MICROPUNCTURE

## (undated) DEVICE — CATHETER IV L1IN DIA20GA PNK VIALON SFTY SHLD WNG INSYT

## (undated) DEVICE — CATHETER VASC DIAG SHEP FLSH PERIPH W/O HYDRPHLC COAT AD

## (undated) DEVICE — BITEBLOCK 54FR W/ DENT RIM BLOX

## (undated) DEVICE — STOCKINETTE,SINGLE PLY,4X48,STERILE: Brand: MEDLINE

## (undated) DEVICE — CATHETER ATHRCTMY 6FR L135CM TIP L5.9CM GWIRE 0.014IN

## (undated) DEVICE — TESIO: Brand: MEDLINE INDUSTRIES, INC.

## (undated) DEVICE — GLOVE ORANGE PI 7 1/2   MSG9075

## (undated) DEVICE — APPLICATOR MEDICATED 26 CC SOLUTION HI LT ORNG CHLORAPREP

## (undated) DEVICE — LOOP VES W13MM THK09MM MINI RED SIL FLD REPELLENT

## (undated) DEVICE — GOWN,SIRUS,NONRNF,SETINSLV,XL,20/CS: Brand: MEDLINE

## (undated) DEVICE — ELECTRODE PT RET AD L9FT HI MOIST COND ADH HYDRGEL CORDED

## (undated) DEVICE — GARMENT,MEDLINE,DVT,INT,CALF,MED, GEN2: Brand: MEDLINE

## (undated) DEVICE — DRAPE SHEET: Brand: UNBRANDED

## (undated) DEVICE — SOLUTION IRRIG 500ML 0.9% SOD CHLO USP POUR PLAS BTL

## (undated) DEVICE — CANNULA NSL CANN NSL L25FT TBNG AD O2 SUP SFT UC

## (undated) DEVICE — CLAMP INSERT: Brand: STEALTH® CLAMP INSERT

## (undated) DEVICE — BASIC DOUBLE BASIN 2-LF: Brand: MEDLINE INDUSTRIES, INC.

## (undated) DEVICE — INSUFFLATION NEEDLE TO ESTABLISH PNEUMOPERITONEUM.: Brand: INSUFFLATION NEEDLE

## (undated) DEVICE — LIQUIBAND RAPID ADHESIVE 36/CS 0.8ML: Brand: MEDLINE

## (undated) DEVICE — PACK SURG CARDIAC CATH

## (undated) DEVICE — Z DISCONTINUED USE 2866711 CLOTH SURG PREP PREOPERATIVE CHLORHEXIDINE GLUC 2% READYPREP

## (undated) DEVICE — COVER,LIGHT HANDLE,FLX,1/PK: Brand: MEDLINE INDUSTRIES, INC.

## (undated) DEVICE — SHEET, T, LAPAROTOMY, STERILE: Brand: MEDLINE

## (undated) DEVICE — CONTAINER SPEC 60ML PH 7NEUTRAL BUFF FRMLN RDY TO USE

## (undated) DEVICE — APPLIER CLP M/L SHFT DIA5MM 15 LIG LIGAMAX 5

## (undated) DEVICE — DEFENDO AIR WATER SUCTION AND BIOPSY VALVE KIT FOR  OLYMPUS: Brand: DEFENDO AIR/WATER/SUCTION AND BIOPSY VALVE

## (undated) DEVICE — SLING ARM XL L20IN D75IN WHT POLY MESH ENVELOP MTL SIDE

## (undated) DEVICE — TRAILBLAZER L90CM 50MM MRK BND SPC GWIRE 0.035IN

## (undated) DEVICE — 18 GA N.G. KIT, 10 PACK: Brand: SITE-RITE

## (undated) DEVICE — TOWEL,OR,DSP,ST,BLUE,STD,6/PK,12PK/CS: Brand: MEDLINE

## (undated) DEVICE — CANNULA NSL ORAL AD FOR CAPNOFLEX CO2 O2 AIRLFE

## (undated) DEVICE — SYSTEM CATH 20GA L1IN OD1.0668-1.143MM ID0.7874-0.8636MM

## (undated) DEVICE — GLOVE SURG SZ 65 THK91MIL LTX FREE SYN POLYISOPRENE

## (undated) DEVICE — SYRINGE MED 10ML TRNSLUC BRL PLUNG BLK MRK POLYPR CTRL

## (undated) DEVICE — NEEDLE HYPO 25GA L1.5IN BLU POLYPR HUB S STL REG BVL STR

## (undated) DEVICE — RADIFOCUS GLIDEWIRE: Brand: GLIDEWIRE

## (undated) DEVICE — MARKER,SKIN,WI/RULER AND LABELS: Brand: MEDLINE

## (undated) DEVICE — DEVICE EMBOLIC PROTCT L320MM DIA6MM 0.014IN NIT OTW

## (undated) DEVICE — GAUZE,SPONGE,4"X4",8PLY,STRL,LF,10/TRAY: Brand: MEDLINE

## (undated) DEVICE — AV FISTULA: Brand: MEDLINE INDUSTRIES, INC.

## (undated) DEVICE — ADHESIVE SKIN CLSR 0.7ML TOP DERMBND ADV

## (undated) DEVICE — TROCAR: Brand: KII SLEEVE

## (undated) DEVICE — STAPLER INT DIA5MM 25 ABSRB STRP FIX DISP FOR HERN MESH

## (undated) DEVICE — SYRINGE,TOOMEY,IRRIGATION,70CC,STERILE: Brand: MEDLINE

## (undated) DEVICE — GLOVE SURG SZ 75 L12IN FNGR THK79MIL GRN LTX FREE

## (undated) DEVICE — SNAP KOVER: Brand: UNBRANDED

## (undated) DEVICE — MICROPUNCTURE INTRODUCER SET SILHOUETTE TRANSITIONLESS WITH NITINOL WIRE GUIDE: Brand: MICROPUNCTURE

## (undated) DEVICE — [HIGH FLOW INSUFFLATOR,  DO NOT USE IF PACKAGE IS DAMAGED,  KEEP DRY,  KEEP AWAY FROM SUNLIGHT,  PROTECT FROM HEAT AND RADIOACTIVE SOURCES.]: Brand: PNEUMOSURE

## (undated) DEVICE — DESTINATION RENAL GUIDING SHEATH: Brand: DESTINATION

## (undated) DEVICE — INFLATION DEVICE KIT: Brand: ENCORE™ 26 ADVANTAGE KIT

## (undated) DEVICE — GOWN,AURORA,NONREINF,RAGLAN,L,STERILE: Brand: MEDLINE

## (undated) DEVICE — RADIFOCUS GLIDEWIRE ADVANTAGE GUIDEWIRE: Brand: GLIDEWIRE ADVANTAGE

## (undated) DEVICE — LAPAROSCOPIC SCISSORS: Brand: EPIX LAPAROSCOPIC SCISSORS

## (undated) DEVICE — CLOTH SURG PREP PREOPERATIVE CHLORHEXIDINE GLUC 2% READYPREP

## (undated) DEVICE — Device: Brand: BALLOON3

## (undated) DEVICE — NEEDLE SPNL 22GA L5IN BLK HUB S STL W/ QNCKE PNT W/OUT

## (undated) DEVICE — GOWN,SIRUS,POLYRNF,BRTHSLV,XLN/XXL,18/CS: Brand: MEDLINE

## (undated) DEVICE — FORCEPS BX PED L160CM JAW DIA1.8MM WRK CHN 2MM GASTRO YEL

## (undated) DEVICE — TROCAR: Brand: KII FIOS FIRST ENTRY

## (undated) DEVICE — PLUMEPORT ACTIV LAPAROSCOPIC SMOKE FILTRATION DEVICE: Brand: PLUMEPORT ACTIVE

## (undated) DEVICE — PACK SURG LAP CHOLE CUSTOM